# Patient Record
Sex: FEMALE | Race: WHITE | NOT HISPANIC OR LATINO | Employment: OTHER | URBAN - METROPOLITAN AREA
[De-identification: names, ages, dates, MRNs, and addresses within clinical notes are randomized per-mention and may not be internally consistent; named-entity substitution may affect disease eponyms.]

---

## 2017-01-18 ENCOUNTER — OFFICE VISIT (OUTPATIENT)
Dept: MEDICAL GROUP | Facility: MEDICAL CENTER | Age: 67
End: 2017-01-18
Attending: FAMILY MEDICINE
Payer: MEDICAID

## 2017-01-18 VITALS
RESPIRATION RATE: 20 BRPM | DIASTOLIC BLOOD PRESSURE: 80 MMHG | WEIGHT: 293 LBS | TEMPERATURE: 96.6 F | BODY MASS INDEX: 50.02 KG/M2 | OXYGEN SATURATION: 91 % | SYSTOLIC BLOOD PRESSURE: 129 MMHG | HEIGHT: 64 IN | HEART RATE: 84 BPM

## 2017-01-18 DIAGNOSIS — I10 ESSENTIAL HYPERTENSION: ICD-10-CM

## 2017-01-18 DIAGNOSIS — J43.9 PULMONARY EMPHYSEMA, UNSPECIFIED EMPHYSEMA TYPE (HCC): ICD-10-CM

## 2017-01-18 DIAGNOSIS — I82.403 DEEP VEIN THROMBOSIS (DVT) OF BOTH LOWER EXTREMITIES, UNSPECIFIED CHRONICITY, UNSPECIFIED VEIN (HCC): ICD-10-CM

## 2017-01-18 DIAGNOSIS — E03.9 HYPOTHYROIDISM, UNSPECIFIED TYPE: ICD-10-CM

## 2017-01-18 DIAGNOSIS — E88.810 METABOLIC SYNDROME: ICD-10-CM

## 2017-01-18 PROCEDURE — 99213 OFFICE O/P EST LOW 20 MIN: CPT | Performed by: FAMILY MEDICINE

## 2017-01-18 PROCEDURE — 99214 OFFICE O/P EST MOD 30 MIN: CPT | Performed by: FAMILY MEDICINE

## 2017-01-18 ASSESSMENT — ENCOUNTER SYMPTOMS
TINGLING: 1
HEADACHES: 0
SHORTNESS OF BREATH: 0
ABDOMINAL PAIN: 0
FEVER: 0
PALPITATIONS: 0
NAUSEA: 0
BACK PAIN: 1
CHILLS: 0
VOMITING: 0

## 2017-01-18 NOTE — MR AVS SNAPSHOT
"        Atiya Henley   2017 2:10 PM   Office Visit   MRN: [id number]    Department:  [facility]   Dept Phone:  [phone]    Description:  Female : 1950   Provider:  Carmelo Pat M.D.           Reason for Visit     Follow-Up           Allergies as of 2017     Allergen Noted Reactions    Pcn [Penicillins] 07/10/2014   Anaphylaxis, Rash    Reaction; happened as a child. Tolerated cephalosporins and Zosyn      You were diagnosed with     Metabolic syndrome   [350086]       Hypothyroidism, unspecified type   [1509548]       Essential hypertension   [7878701]       Pulmonary emphysema, unspecified emphysema type (CMS-HCC)   [9864345]       Deep vein thrombosis (DVT) of both lower extremities, unspecified chronicity, unspecified vein (CMS-HCC)   [1770666]         Vital Signs     Blood Pressure Pulse Temperature Respirations Height Weight    129/80 mmHg 84 35.9 °C (96.6 °F) 20 1.626 m (5' 4\") 136.079 kg (300 lb)    Body Mass Index Oxygen Saturation Last Menstrual Period Breastfeeding? Smoking Status       51.47 kg/m2 91% 1975 No Current Every Day Smoker       Basic Information     Date Of Birth Sex Race Ethnicity Preferred Language    1950 Female White Non- English      Your appointments     2017  3:30 PM   Established Patient with Carmelo Pat M.D.   [facility] ([facility])    [address] [place] [address] [id number] [phone]           You will be receiving a confirmation call a few days before your appointment from our automated call confirmation system.            May 22, 2017  3:00 PM   Pulmonary Function Test with PFT-RM3   [facility] (--)    [address] [facility] [address]  [facility] [address] [phone]            May 22, 2017  4:00 PM   Established Patient Pul with ALCIRA Chavez   [facility] (--)    [address] [facility] [address]  [facility] [address] [phone]              Problem " List              ICD-10-CM Priority Class Noted - Resolved    Morbid obesity (CMS-HCC) E66.01 Low  7/7/2009 - Present    YESSICA (obstructive sleep apnea) G47.33 Medium  7/7/2009 - Present    Hypothyroid E03.9 Medium  7/7/2009 - Present    Prediabetes R73.03   9/10/2009 - Present    HTN (hypertension) I10 Medium  9/10/2009 - Present    Anemia D64.9 Medium  3/22/2010 - Present    COPD (chronic obstructive pulmonary disease) (CMS-HCC) J44.9 Low  4/9/2010 - Present    DVT (deep venous thrombosis) (CMS-HCC) I82.409 Medium  4/10/2010 - Present    Colostomy care (CMS-HCC) Z43.3   5/17/2010 - Present    Anxiety F41.9   5/17/2010 - Present    Depression F32.9 Low  5/17/2010 - Present    Vitamin d deficiency    8/31/2011 - Present    Chronic autoimmune thyroiditis E06.3   10/2/2012 - Present    Chronic anticoagulation Z79.01   10/16/2012 - Present    Vitamin d deficiency    1/30/2013 - Present    Other pulmonary insufficiency, not elsewhere classified, following trauma and surgery J95.89 High  9/2/2013 - Present    COPD (chronic obstructive pulmonary disease) (CMS-HCC) J44.9 Medium  9/2/2013 - Present    Tobacco abuse Z72.0 Low  9/18/2013 - Present    S/P IVC filter Z95.828 Medium  9/18/2013 - Present    Bipolar disorder F31.9 Low  9/18/2013 - Present    History of hernia repair Z98.890, Z87.19   10/7/2013 - Present    Obesity E66.9   7/7/2009 - Present    GERD (gastroesophageal reflux disease) K21.9 Low  11/25/2013 - Present    Colostomy fistula (CMS-HCC) K94.09 High  12/3/2013 - Present    Thrombocytosis (CMS-HCC) D47.3 Medium  12/3/2013 - Present    Hypomagnesemia E83.42 Medium  12/4/2013 - Present    Hyponatremia E87.1 Medium  12/10/2013 - Present    Vitamin D deficiency E55.9   8/4/2015 - Present    Pain in joint, lower leg M25.569   8/21/2015 - Present    Deep vein thrombosis (DVT) (CMS-HCC) I82.409   11/11/2015 - Present    Cervical spondylosis M47.812   5/4/2016 - Present    DDD (degenerative disc disease), cervical  M50.30   5/4/2016 - Present    Chronic neck pain M54.2, G89.29   5/4/2016 - Present    Lumbosacral spondylosis M47.817   5/4/2016 - Present    DDD (degenerative disc disease), lumbar M51.36   5/4/2016 - Present    Lumbar radiculopathy M54.16   5/4/2016 - Present    Chronic low back pain M54.5, G89.29   5/4/2016 - Present    Left knee pain M25.562   5/4/2016 - Present    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, Z96.659   5/4/2016 - Present    Chronic pain syndrome G89.4   8/25/2016 - Present    Positive FIT (fecal immunochemical test) R19.5   9/27/2016 - Present    Chronic obstructive asthma (CMS-HCC) J44.9, J45.909   11/14/2016 - Present    Allergic rhinitis J30.9   11/14/2016 - Present    Hypoxemia R09.02   11/14/2016 - Present      Health Maintenance        Date Due Completion Dates    RETINAL SCREENING 11/2/1970 ---    IMM DTaP/Tdap/Td Vaccine (1 - Tdap) 11/2/1971 ---    PAP SMEAR 11/2/1973 ---    COLONOSCOPY 11/2/2002 ---    IMM ZOSTER VACCINE 11/2/2012 ---    A1C SCREENING 2/3/2016 8/3/2015, 7/10/2014, 6/20/2014, 11/29/2013, 5/17/2013, 8/31/2012, 7/16/2012, 3/30/2012, 7/19/2011, 5/7/2010, 10/4/2009    URINE ACR / MICROALBUMIN 8/3/2016 8/3/2015, 6/20/2014    MAMMOGRAM 6/29/2017 6/29/2016    FASTING LIPID PROFILE 8/30/2017 8/30/2016, 8/3/2015, 7/11/2014, 6/20/2014, 5/17/2013, 7/19/2011, 5/1/2009, 2/23/2009, 9/16/2008, 5/9/2008, 11/29/2007    SERUM CREATININE 8/30/2017 8/30/2016, 8/3/2015, 7/11/2014, 7/10/2014, 6/20/2014, 2/6/2014, 1/31/2014, 1/17/2014, 1/13/2014, 12/19/2013, 12/18/2013, 12/17/2013, 12/16/2013, 12/15/2013, 12/14/2013, 12/13/2013, 12/12/2013, 12/11/2013, 12/10/2013, 12/10/2013, 12/9/2013, 12/8/2013, 12/7/2013, 12/6/2013, 12/5/2013, 12/4/2013, 12/3/2013, 12/2/2013, 11/30/2013, 11/28/2013, 11/27/2013, 11/25/2013, 11/22/2013, 11/20/2013, 11/10/2013, 10/27/2013, 10/20/2013, 10/15/2013, 10/14/2013, 10/13/2013, 10/12/2013, 10/11/2013, 10/10/2013, 10/9/2013, 10/8/2013, 10/7/2013, 9/25/2013,  9/24/2013, 9/22/2013, 9/21/2013, 9/20/2013, 9/19/2013, 9/18/2013, 9/17/2013, 9/17/2013, 9/15/2013, 9/13/2013, 9/12/2013, 9/11/2013, 9/10/2013, 9/9/2013, 9/9/2013, 9/8/2013, 9/7/2013, 9/7/2013, 9/6/2013, 9/5/2013, 9/4/2013, 9/3/2013, 9/1/2013, 8/31/2013, 5/17/2013, 1/25/2013, 12/13/2012, 10/17/2012, 10/1/2012, 8/24/2012, 8/18/2012, 7/20/2012, 7/17/2012, 7/16/2012, 1/4/2012, 7/20/2011, 7/19/2011, 1/13/2011, 12/6/2010, 10/26/2010, 7/2/2010, 6/23/2010, 5/17/2010, 5/4/2010, 5/3/2010, 5/1/2010, 4/30/2010, 4/29/2010, 4/28/2010, 4/27/2010, 4/26/2010, 4/25/2010, 4/24/2010, 4/23/2010, 4/22/2010, 4/21/2010, 4/20/2010, 4/19/2010, 4/18/2010, 4/17/2010, 4/15/2010, 4/14/2010, 4/13/2010, 4/12/2010, 4/11/2010, 4/10/2010, 4/9/2010, 4/8/2010, 4/7/2010, 4/6/2010, 4/5/2010, 4/4/2010, 4/3/2010, 4/2/2010, 4/1/2010, 3/31/2010, 3/30/2010, 3/29/2010, 3/28/2010, 3/27/2010, 3/26/2010, 3/25/2010, 3/24/2010, 3/23/2010, 3/22/2010, 3/21/2010, 3/20/2010, 3/19/2010, 3/18/2010, 3/17/2010, 3/17/2010, 3/16/2010, 3/15/2010, 3/12/2010, 3/11/2010, 3/5/2010, 3/1/2010, 1/31/2010, 1/30/2010, 1/29/2010, 1/28/2010, 1/27/2010, 1/26/2010, 11/5/2009, 10/7/2009, 10/5/2009, 10/4/2009, 10/3/2009, 10/2/2009, 10/1/2009, 9/21/2009, 9/15/2009, 9/4/2009, 4/27/2009, 2/23/2009, 8/14/2008, 11/29/2007    DIABETES MONOFILAMENT / LE EXAM 12/6/2017 12/6/2016            Current Immunizations     Influenza TIV (IM) 9/3/2013  8:45 PM, 9/20/2011    Influenza Vaccine 12/1/2009    Influenza Vaccine Pediatric 11/11/2010    Influenza Vaccine Quad Inj (Pf) 11/20/2014  1:50 PM    Influenza Vaccine Quad Inj (Preserved) 11/14/2016  9:52 AM, 10/14/2015    Pneumococcal Vaccine (UF)Historical Data 12/1/2009    Pneumococcal polysaccharide vaccine (PPSV-23) 11/14/2016  9:53 AM      Below and/or attached are the medications your provider expects you to take. Review all of your home medications and newly ordered medications with your provider and/or pharmacist. Follow medication instructions as  directed by your provider and/or pharmacist. Please keep your medication list with you and share with your provider. Update the information when medications are discontinued, doses are changed, or new medications (including over-the-counter products) are added; and carry medication information at all times in the event of emergency situations     Allergies:  PCN - Anaphylaxis,Rash               Medications  Valid as of: January 18, 2017 -  3:16 PM    Generic Name Brand Name Tablet Size Instructions for use    Albuterol Sulfate (Aero Soln) albuterol 108 (90 BASE) MCG/ACT Inhale 2 Puffs by mouth every four hours as needed. For Shortness of breath        Amitriptyline HCl (Tab) ELAVIL 25 MG Take 25 mg by mouth every bedtime.        Ascorbic Acid (Tab) ascorbic acid 500 MG TAKE ONE [1] TABLET BY MOUTH EVERY DAY        Budesonide-Formoterol Fumarate (Aerosol) SYMBICORT 160-4.5 MCG/ACT Inhale 2 Puffs by mouth 2 Times a Day. With spacer, rinse mouth after use        CarBAMazepine (Tab) TEGRETOL 200 MG Take 3 Tabs by mouth every bedtime.        Ergocalciferol (Cap) DRISDOL 62624 UNITS TAKE 1 CAPSULE BY MOUTH EVERY 28 DAYS.        FLUoxetine HCl (Cap) PROZAC 20 MG Take 20 mg by mouth every day.        Flurazepam HCl (Cap) DALMANE 30 MG Take 30 mg by mouth at bedtime as needed.        Gabapentin (Cap) NEURONTIN 300 MG TAKE THREE (3) CAPSULES BY MOUTH THREE (3) TIMES DAILY         Incontinence Supply Disposable (Misc) DEPEND UNDERWEAR X-LARGE  1 Each by Does not apply route 3 times a day.        Ipratropium-Albuterol (Aero Soln) COMBIVENT RESPIMAT  MCG/ACT Inhale 1 puff by mouth every 4 hours as needed for shortness of breath and/or wheezing (max is 6 puffs per 24 hours)        Ipratropium-Albuterol (Aero Soln) COMBIVENT RESPIMAT  MCG/ACT Inhale 1 Puff by mouth 4 times a day.        Levothyroxine Sodium (Tab) SYNTHROID 125 MCG TAKE TWO [2] TABLETS BY MOUTH EVERY DAY         Levothyroxine Sodium (Tab) SYNTHROID 200  MCG Take 1 Tab by mouth Every morning on an empty stomach.        Levothyroxine Sodium (Tab) SYNTHROID 25 MCG Take 1 Tab by mouth Every morning on an empty stomach.        MetFORMIN HCl (Tab) GLUCOPHAGE 500 MG TAKE ONE [1] TABLET BY MOUTH EVERY DAY        Misc. Devices (Misc) Misc. Devices  Pt needs service for her PMD she will need a new battery to avoid stalling        Misc. Devices (Misc) Misc. Devices  CPAP supplies (hoses, filter, water chamber, face mask-resmed small), nasal cannula tubing        Misc. Devices (Misc) Misc. Devices  O2 to use continuously to keep her sats >90 %        Misc. Devices (Misc) Misc. Devices  Please evaluate pt for wider power mobility devise, pt has tipped over on multiple occasions        Misc. Devices (Misc) Misc. Devices  O2 tank rack for power wheel chair        Misc. Devices (Misc) Misc. Devices  Adult incontinence supplies to use as directed        Misc. Devices (Misc) Misc. Devices  Power mobility devise        Misc. Devices (Misc) Misc. Devices  O2 concentrator to use 24/7 and also with her CPAP at night to keep her O2 sats > 90%        Misc. Devices (Misc) Misc. Devices  Incontinence supplies (for wheel chair) and adult briefs to use as needed #120        Mometasone Furoate (Suspension) NASONEX 50 MCG/ACT Spray 1-2 Sprays in nose every day. Each nostril.        Ostomy Supplies (Misc) Ostomy Supplies  Dispense as ordered        RaNITidine HCl (Tab) ZANTAC 150 MG TAKE ONE (1) TABLET BY MOUTH TWICE DAILY WITH MEALS        Tiotropium Bromide Monohydrate (Aero Soln) Tiotropium Bromide Monohydrate 2.5 MCG/ACT Inhale 2.5 mcg by mouth every day at 6 PM.        Tiotropium Bromide Monohydrate (Aero Soln) Tiotropium Bromide Monohydrate 2.5 MCG/ACT Inhale 2 Puffs by mouth every day.        Warfarin Sodium (Tab) COUMADIN 10 MG Take one tablet daily as directed by coumadin clinic        Warfarin Sodium (Tab) COUMADIN 10 MG Take 1 to 1.5 tablets by mouth daily as directed by Coumadin  Clinic.        .                 Medicines prescribed today were sent to:     [id number] - [place], [place] - [address]    [address] [place] [address]    Phone: [phone] Fax: [phone]    Open 24 Hours?: No    [facility] - [place], [place] - [address]    [address] [place] [address]    Phone: [phone] Fax: [phone]    Open 24 Hours?: No      Medication refill instructions:       If your prescription bottle indicates you have medication refills left, it is not necessary to call your provider’s office. Please contact your pharmacy and they will refill your medication.    If your prescription bottle indicates you do not have any refills left, you may request refills at any time through one of the following ways: The online [employer] system (except Urgent Care), by calling your provider’s office, or by asking your pharmacy to contact your provider’s office with a refill request. Medication refills are processed only during regular business hours and may not be available until the next business day. Your provider may request additional information or to have a follow-up visit with you prior to refilling your medication.   *Please Note: Medication refills are assigned a new Rx number when refilled electronically. Your pharmacy may indicate that no refills were authorized even though a new prescription for the same medication is available at the pharmacy. Please request the medicine by name with the pharmacy before contacting your provider for a refill.        Your To Do List     Future Labs/Procedures Complete By Expires    CBC WITH DIFFERENTIAL  As directed 1/18/2018    COMP METABOLIC PANEL  As directed 1/18/2018    HEMOGLOBIN A1C  As directed 1/18/2018    LIPID PROFILE  As directed 1/18/2018    TSH WITH REFLEX TO FT4  As directed 1/18/2018         Organizerhart Status: Patient Declined

## 2017-01-18 NOTE — PROGRESS NOTES
Subjective:      Atiya Henley is a 66 y.o. female who presents with Follow-Up            HPI Comments: Pt here for follow up of her metabolic syndrome, hypothyroidism, positive FIT, COPD and YESSICA, and DVT.     Patient has been going to the Coumadin clinic for Coumadin management. Her last INR was 2.5. She has not been having any chest pain, shortness of breath, bruising or bleeding. Will have her continue to go to the Coumadin clinic for further management of her DVT. ER precautions have been given if she should develop any worsening symptoms.    Review the results of her recent blood work. Her blood sugars have been elevated but not to the point of diagnosing diabetes. Her A1c at last check was 5.9. She has a history of hypothyroidism and at her last TSH level was 2.5. We'll have her continue to use her medication as prescribed. She is not having any symptoms of hypo-or hyperthyroidism.  We'll have her continue to follow-up with her endocrinologist as directed. We'll continue to follow.    She has a history of a positive FIT screen, but has not been able to schedule an appointment with GI yet despite being referred. Patient has been advised to go to the emergency room if she should develop any sudden abdominal pain, unusual bloating, bleeding or other abdominal symptoms. We'll continue to follow.    We'll have her continue to use her inhalers as directed for her COPD. She has not had any recent exacerbations of her symptoms. She also uses a CPAP for her YESSICA symptoms. She follows up with pulmonology for additional management of her symptoms. Will continue to follow.     Current medications, allergies, and problem list reviewed with patient and updated in EPIC.        Review of Systems   Constitutional: Negative for fever and chills.   HENT: Negative for hearing loss.    Respiratory: Negative for shortness of breath.    Cardiovascular: Negative for chest pain and palpitations.   Gastrointestinal: Negative for  "nausea, vomiting and abdominal pain.   Musculoskeletal: Positive for back pain and joint pain.   Neurological: Positive for tingling. Negative for headaches.          Objective:     /80 mmHg  Pulse 84  Temp(Src) 35.9 °C (96.6 °F)  Resp 20  Ht 1.626 m (5' 4\")  Wt 136.079 kg (300 lb)  BMI 51.47 kg/m2  SpO2 91%  LMP 08/02/1975  Breastfeeding? No     Physical Exam   HENT:   Right Ear: External ear normal.   Left Ear: External ear normal.   Nose: Nose normal.   Mouth/Throat: Oropharynx is clear and moist.   Eyes: EOM are normal. Pupils are equal, round, and reactive to light.   Neck: Normal range of motion.   Cardiovascular: Normal rate, regular rhythm and normal heart sounds.  Exam reveals no friction rub.    No murmur heard.  Pulmonary/Chest: Breath sounds normal. No respiratory distress. She has no wheezes. She has no rales.   Abdominal: Soft. Bowel sounds are normal.   Musculoskeletal:   Decreased ROM of affected extremities using PMD   Neurological: She is alert.   Skin: Skin is warm and dry.   Psychiatric: Her behavior is normal.               Assessment/Plan:     1. Metabolic syndrome  Repeat labs to further evaluate her metabolic syndrome as well as her hypothyroidism. We'll have her continue to use her medications as directed. We'll continue to follow. Also discussed diet and exercise to help manage her metabolic issues as well as her elevated BMI.  - TSH WITH REFLEX TO FT4; Future  - COMP METABOLIC PANEL; Future  - CBC WITH DIFFERENTIAL; Future  - LIPID PROFILE; Future  - HEMOGLOBIN A1C; Future    2. Hypothyroidism, unspecified type  See above plan.  - TSH WITH REFLEX TO FT4; Future  - COMP METABOLIC PANEL; Future  - CBC WITH DIFFERENTIAL; Future  - LIPID PROFILE; Future  - HEMOGLOBIN A1C; Future    3. Essential hypertension  We'll have patient continue to take her medications as directed. She has been advised to monitor blood pressure at home and keep notes. If blood pressure elevated or having " symptoms of CP, SOB or neurologic changes to go to the er.    - TSH WITH REFLEX TO FT4; Future  - COMP METABOLIC PANEL; Future  - CBC WITH DIFFERENTIAL; Future  - LIPID PROFILE; Future  - HEMOGLOBIN A1C; Future    4. Pulmonary emphysema, unspecified emphysema type (CMS-HCC)  We'll have her continue to use her medications as directed. We'll also have her continue to follow-up with pulmonary as needed and will continue to follow.    5. Deep vein thrombosis (DVT) of both lower extremities, unspecified chronicity, unspecified vein (CMS-HCC)  We'll have patient continue to take her medications as directed. Her last INR was 2.5. She is not having any bleeding or bruising or chest pain or shortness of breath. We'll continue to follow.

## 2017-01-30 RX ORDER — LEVOTHYROXINE SODIUM 0.2 MG/1
TABLET ORAL
Qty: 30 TAB | Refills: 2 | Status: SHIPPED | OUTPATIENT
Start: 2017-01-30 | End: 2017-05-22 | Stop reason: SDUPTHER

## 2017-01-30 RX ORDER — RANITIDINE 150 MG/1
TABLET ORAL
Qty: 60 TAB | Refills: 5 | Status: SHIPPED | OUTPATIENT
Start: 2017-01-30 | End: 2017-08-14 | Stop reason: SDUPTHER

## 2017-01-30 RX ORDER — LEVOTHYROXINE SODIUM 0.03 MG/1
TABLET ORAL
Qty: 30 TAB | Refills: 2 | Status: SHIPPED | OUTPATIENT
Start: 2017-01-30 | End: 2017-05-22 | Stop reason: SDUPTHER

## 2017-02-27 ENCOUNTER — OFFICE VISIT (OUTPATIENT)
Dept: MEDICAL GROUP | Facility: MEDICAL CENTER | Age: 67
End: 2017-02-27
Attending: FAMILY MEDICINE
Payer: MEDICAID

## 2017-02-27 VITALS
HEART RATE: 60 BPM | HEIGHT: 64 IN | DIASTOLIC BLOOD PRESSURE: 80 MMHG | SYSTOLIC BLOOD PRESSURE: 128 MMHG | WEIGHT: 293 LBS | OXYGEN SATURATION: 92 % | TEMPERATURE: 97 F | RESPIRATION RATE: 16 BRPM | BODY MASS INDEX: 50.02 KG/M2

## 2017-02-27 DIAGNOSIS — M54.16 LUMBAR RADICULOPATHY: ICD-10-CM

## 2017-02-27 DIAGNOSIS — G89.29 CHRONIC NECK PAIN: ICD-10-CM

## 2017-02-27 DIAGNOSIS — M54.2 CHRONIC NECK PAIN: ICD-10-CM

## 2017-02-27 DIAGNOSIS — G89.4 CHRONIC PAIN SYNDROME: ICD-10-CM

## 2017-02-27 DIAGNOSIS — R49.9 HOARSENESS OR CHANGING VOICE: ICD-10-CM

## 2017-02-27 DIAGNOSIS — Z72.0 TOBACCO ABUSE: ICD-10-CM

## 2017-02-27 PROCEDURE — 99214 OFFICE O/P EST MOD 30 MIN: CPT | Performed by: FAMILY MEDICINE

## 2017-02-27 ASSESSMENT — ENCOUNTER SYMPTOMS
ABDOMINAL PAIN: 0
FOCAL WEAKNESS: 1
TINGLING: 1
FEVER: 0
SPEECH CHANGE: 0
SHORTNESS OF BREATH: 0
NAUSEA: 0
NECK PAIN: 1
CHILLS: 0
PALPITATIONS: 0
VOMITING: 0
TREMORS: 0
BACK PAIN: 1
SENSORY CHANGE: 0
HEADACHES: 0

## 2017-02-27 NOTE — PROGRESS NOTES
Subjective:      Atiya Henley is a 66 y.o. female who presents with No chief complaint on file.            HPI Comments: Patient here for follow-up of recent cataract and glaucoma surgery, chronic neck and back pain, and worsening hoarseness of her voice.    She recently had glaucoma and cataract surgery in both eyes. The procedure was done by Dr Piedra. Will request records for the procedure. She states she has noticed the difference in her vision. We'll continue to follow.    She has been seeing a pain specialist for the treatments of her chronic leg, back and neck pain. She states [place] [facility] and then was seen Dr. Galan but is currently not seeing any specialist since Dr Galan is no longer at the office she was being seen at.  Will have pt referred back to pain management for a further evaluation and management of her chronic back and neck pain. She is currently using a power mobility device for mobility purposes. We'll continue to follow.    She is also been seeing psychiatry for management of her mood stabilizing medications. She is not having any issues with urges to harm herself or others. She does have occasional bouts of anxiety for which she hasn't had significant medication which has been using. We'll have her continue using her medications as directed by her psychiatrist. She has any sudden worsening of her signs and symptoms will have her go to the emergency room for further assistance in management. We'll continue to follow.    She has recently noticed that her voice has been becoming a increasingly hoarse for no apparent reasons. She is still smoking at least half a pack a day of cigarettes. Discussed the possibility that the hoarseness of her voice could be secondary to her cigarette smoking, but will order a soft tissue neck ultrasound to further evaluate her neck for other possible reasons for the hoarseness of her voice. Patient has been  "encouraged to stop smoking so we will have her try nicotine gum since she was unable to tolerate the nicotine patches. Also recommended attempting smoking cessation programs in the community. We'll continue to follow.    She will also need to get her blood work done that have been ordered at her last appointment. We'll review her blood work once it started at time of her next appointment. We'll continue to follow.     Current medications, allergies, and problem list reviewed with patient and updated in EPIC.        Review of Systems   Constitutional: Negative for fever and chills.   HENT: Negative for hearing loss.         Hoarse voice   Respiratory: Negative for shortness of breath.    Cardiovascular: Negative for chest pain and palpitations.   Gastrointestinal: Negative for nausea, vomiting and abdominal pain.   Musculoskeletal: Positive for back pain, joint pain and neck pain.   Neurological: Positive for tingling and focal weakness. Negative for tremors, sensory change, speech change and headaches.          Objective:     [facility] 08/[birth year]/1975   Filed Vitals:    02/27/17 1530   BP: 128/80   Pulse: 60   Temp: 36.1 °C (97 °F)   Resp: 16   Height: 1.626 m (5' 4.02\")   Weight: 136.079 kg (300 lb)   SpO2: 92%         Physical Exam   HENT:   Right Ear: External ear normal.   Left Ear: External ear normal.   Congestion, hoarseness of her voice   Eyes: EOM are normal. Pupils are equal, round, and reactive to light.   Neck: Normal range of motion.   Cardiovascular: Normal rate, regular rhythm and normal heart sounds.  Exam reveals no friction rub.    No murmur heard.  Pulmonary/Chest: Breath sounds normal. No respiratory distress. She has no wheezes. She has no rales.   Coarse breath sounds    Abdominal: Soft. Bowel sounds are normal.   Musculoskeletal:   In power wheelchair   Neurological: She is alert.   Skin: Skin is warm and dry.   Psychiatric: Her behavior is normal.               Assessment/Plan:     1. Chronic neck " pain  We'll have her continue to use the medication she has been for her pain management until she is further evaluated and managed by her next pain management specialist. She will also continue to use her power mobility device for mobility purposes. We'll continue to follow.  - REFERRAL TO PAIN CLINIC    2. Lumbar radiculopathy  See above plan.  - REFERRAL TO PAIN CLINIC    3. Chronic pain syndrome  See above plan.  - REFERRAL TO PAIN CLINIC    4. Tobacco abuse  We'll have her try nicotine gum as directed. Also recommended attempting smoking cessation classes in the community. We'll continue to follow.  - nicotine polacrilex (NICORETTE) 2 MG Gum; Take 1 Each by mouth as needed for Smoking Cessation.  Dispense: 60 Each; Refill: 3    5. Hoarseness or changing voice  We'll order a ultrasound of her neck to further evaluate the reason for her worsening hoarseness of her voice. Also recommended attempting to stop smoking. If there are any changes on the ultrasound or if her 40s continues to change with all changes on the ultrasound we'll refer to ear nose and throat for a further evaluation and possible laryngoscopy to further evaluate. We'll continue to follow.  - US-SOFT TISSUES OF HEAD - NECK; Future

## 2017-02-27 NOTE — MR AVS SNAPSHOT
"Atiya Henley   2017 3:30 PM   Office Visit   MRN: [id number]    Department:  [facility]   Dept Phone:  [phone]    Description:  Female : 1950   Provider:  Carmelo Pat M.D.           Reason for Visit     Medical Advice           Allergies as of 2017     Allergen Noted Reactions    Pcn [Penicillins] 07/10/2014   Anaphylaxis, Rash    Reaction; happened as a child. Tolerated cephalosporins and Zosyn      You were diagnosed with     Chronic neck pain   [118530]       Lumbar radiculopathy   [422458]       Chronic pain syndrome   [338.4.ICD-9-CM]       Tobacco abuse   [701558]       Hoarseness or changing voice   [791991]         Vital Signs     Blood Pressure Pulse Temperature Respirations Height Weight    128/80 mmHg 60 36.1 °C (97 °F) 16 1.626 m (5' 4.02\") 136.079 kg (300 lb)    Body Mass Index Oxygen Saturation Last Menstrual Period Smoking Status          51.47 kg/m2 92% 1975 Current Every Day Smoker        Basic Information     Date Of Birth Sex Race Ethnicity Preferred Language    1950 Female White Non- English      Your appointments     Apr 10, 2017  3:30 PM   Established Patient with Carmelo Pat M.D.   [facility] ([facility])    [address] [place] [id number] [phone]           You will be receiving a confirmation call a few days before your appointment from our automated call confirmation system.            May 22, 2017  3:00 PM   Pulmonary Function Test with PFT-RM3   [facility] (--)    [address] [facility] [address]  [facility] [id number] [phone]            May 22, 2017  4:00 PM   Established Patient Pul with ALCIRA Chavez   [facility] (--)    [address] [facility] [address]  [facility] [address] [phone]              Problem List              ICD-10-CM Priority Class Noted - Resolved    Morbid obesity (CMS-HCC) E66.01 Low  2009 - Present    YESSICA (obstructive " sleep apnea) G47.33 Medium  7/7/2009 - Present    Hypothyroid E03.9 Medium  7/7/2009 - Present    Prediabetes R73.03   9/10/2009 - Present    HTN (hypertension) I10 Medium  9/10/2009 - Present    Anemia D64.9 Medium  3/22/2010 - Present    COPD (chronic obstructive pulmonary disease) (CMS-HCC) J44.9 Low  4/9/2010 - Present    DVT (deep venous thrombosis) (CMS-HCC) I82.409 Medium  4/10/2010 - Present    Colostomy care (CMS-HCC) Z43.3   5/17/2010 - Present    Anxiety F41.9   5/17/2010 - Present    Depression F32.9 Low  5/17/2010 - Present    Vitamin d deficiency    8/31/2011 - Present    Chronic autoimmune thyroiditis E06.3   10/2/2012 - Present    Chronic anticoagulation Z79.01   10/16/2012 - Present    Vitamin d deficiency    1/30/2013 - Present    Other pulmonary insufficiency, not elsewhere classified, following trauma and surgery J95.89 High  9/2/2013 - Present    COPD (chronic obstructive pulmonary disease) (CMS-HCC) J44.9 Medium  9/2/2013 - Present    Tobacco abuse Z72.0 Low  9/18/2013 - Present    S/P IVC filter Z95.828 Medium  9/18/2013 - Present    Bipolar disorder F31.9 Low  9/18/2013 - Present    History of hernia repair Z98.890, Z87.19   10/7/2013 - Present    Obesity E66.9   7/7/2009 - Present    GERD (gastroesophageal reflux disease) K21.9 Low  11/25/2013 - Present    Colostomy fistula (CMS-HCC) K94.09 High  12/3/2013 - Present    Thrombocytosis (CMS-HCC) D47.3 Medium  12/3/2013 - Present    Hypomagnesemia E83.42 Medium  12/4/2013 - Present    Hyponatremia E87.1 Medium  12/10/2013 - Present    Vitamin D deficiency E55.9   8/4/2015 - Present    Pain in joint, lower leg M25.569   8/21/2015 - Present    Deep vein thrombosis (DVT) (CMS-HCC) I82.409   11/11/2015 - Present    Cervical spondylosis M47.812   5/4/2016 - Present    DDD (degenerative disc disease), cervical M50.30   5/4/2016 - Present    Chronic neck pain M54.2, G89.29   5/4/2016 - Present    Lumbosacral spondylosis M47.817   5/4/2016 - Present     DDD (degenerative disc disease), lumbar M51.36   5/4/2016 - Present    Lumbar radiculopathy M54.16   5/4/2016 - Present    Chronic low back pain M54.5, G89.29   5/4/2016 - Present    Left knee pain M25.562   5/4/2016 - Present    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, Z96.659   5/4/2016 - Present    Chronic pain syndrome G89.4   8/25/2016 - Present    Positive FIT (fecal immunochemical test) R19.5   9/27/2016 - Present    Chronic obstructive asthma (CMS-HCC) J44.9, J45.909   11/14/2016 - Present    Allergic rhinitis J30.9   11/14/2016 - Present    Hypoxemia R09.02   11/14/2016 - Present      Health Maintenance        Date Due Completion Dates    RETINAL SCREENING 11/2/1970 ---    IMM DTaP/Tdap/Td Vaccine (1 - Tdap) 11/2/1971 ---    PAP SMEAR 11/2/1973 ---    IMM ZOSTER VACCINE 11/2/2012 ---    A1C SCREENING 2/3/2016 8/3/2015, 7/10/2014, 6/20/2014, 11/29/2013, 5/17/2013, 8/31/2012, 7/16/2012, 3/30/2012, 7/19/2011, 5/7/2010, 10/4/2009    URINE ACR / MICROALBUMIN 8/3/2016 8/3/2015, 6/20/2014    MAMMOGRAM 6/29/2017 6/29/2016    FASTING LIPID PROFILE 8/30/2017 8/30/2016, 8/3/2015, 7/11/2014, 6/20/2014, 5/17/2013, 7/19/2011, 5/1/2009, 2/23/2009, 9/16/2008, 5/9/2008, 11/29/2007    SERUM CREATININE 8/30/2017 8/30/2016, 8/3/2015, 7/11/2014, 7/10/2014, 6/20/2014, 2/6/2014, 1/31/2014, 1/17/2014, 1/13/2014, 12/19/2013, 12/18/2013, 12/17/2013, 12/16/2013, 12/15/2013, 12/14/2013, 12/13/2013, 12/12/2013, 12/11/2013, 12/10/2013, 12/10/2013, 12/9/2013, 12/8/2013, 12/7/2013, 12/6/2013, 12/5/2013, 12/4/2013, 12/3/2013, 12/2/2013, 11/30/2013, 11/28/2013, 11/27/2013, 11/25/2013, 11/22/2013, 11/20/2013, 11/10/2013, 10/27/2013, 10/20/2013, 10/15/2013, 10/14/2013, 10/13/2013, 10/12/2013, 10/11/2013, 10/10/2013, 10/9/2013, 10/8/2013, 10/7/2013, 9/25/2013, 9/24/2013, 9/22/2013, 9/21/2013, 9/20/2013, 9/19/2013, 9/18/2013, 9/17/2013, 9/17/2013, 9/15/2013, 9/13/2013, 9/12/2013, 9/11/2013, 9/10/2013, 9/9/2013, 9/9/2013, 9/8/2013,  9/7/2013, 9/7/2013, 9/6/2013, 9/5/2013, 9/4/2013, 9/3/2013, 9/1/2013, 8/31/2013, 5/17/2013, 1/25/2013, 12/13/2012, 10/17/2012, 10/1/2012, 8/24/2012, 8/18/2012, 7/20/2012, 7/17/2012, 7/16/2012, 1/4/2012, 7/20/2011, 7/19/2011, 1/13/2011, 12/6/2010, 10/26/2010, 7/2/2010, 6/23/2010, 5/17/2010, 5/4/2010, 5/3/2010, 5/1/2010, 4/30/2010, 4/29/2010, 4/28/2010, 4/27/2010, 4/26/2010, 4/25/2010, 4/24/2010, 4/23/2010, 4/22/2010, 4/21/2010, 4/20/2010, 4/19/2010, 4/18/2010, 4/17/2010, 4/15/2010, 4/14/2010, 4/13/2010, 4/12/2010, 4/11/2010, 4/10/2010, 4/9/2010, 4/8/2010, 4/7/2010, 4/6/2010, 4/5/2010, 4/4/2010, 4/3/2010, 4/2/2010, 4/1/2010, 3/31/2010, 3/30/2010, 3/29/2010, 3/28/2010, 3/27/2010, 3/26/2010, 3/25/2010, 3/24/2010, 3/23/2010, 3/22/2010, 3/21/2010, 3/20/2010, 3/19/2010, 3/18/2010, 3/17/2010, 3/17/2010, 3/16/2010, 3/15/2010, 3/12/2010, 3/11/2010, 3/5/2010, 3/1/2010, 1/31/2010, 1/30/2010, 1/29/2010, 1/28/2010, 1/27/2010, 1/26/2010, 11/5/2009, 10/7/2009, 10/5/2009, 10/4/2009, 10/3/2009, 10/2/2009, 10/1/2009, 9/21/2009, 9/15/2009, 9/4/2009, 4/27/2009, 2/23/2009, 8/14/2008, 11/29/2007    DIABETES MONOFILAMENT / LE EXAM 12/6/2017 12/6/2016    COLONOSCOPY 11/17/2020 11/17/2010            Current Immunizations     Influenza TIV (IM) 9/3/2013  8:45 PM, 9/20/2011    Influenza Vaccine 12/1/2009    Influenza Vaccine Pediatric 11/11/2010    Influenza Vaccine Quad Inj (Pf) 11/20/2014  1:50 PM    Influenza Vaccine Quad Inj (Preserved) 11/14/2016  9:52 AM, 10/14/2015    Pneumococcal Vaccine (UF)Historical Data 12/1/2009    Pneumococcal polysaccharide vaccine (PPSV-23) 11/14/2016  9:53 AM      Below and/or attached are the medications your provider expects you to take. Review all of your home medications and newly ordered medications with your provider and/or pharmacist. Follow medication instructions as directed by your provider and/or pharmacist. Please keep your medication list with you and share with your provider. Update the  information when medications are discontinued, doses are changed, or new medications (including over-the-counter products) are added; and carry medication information at all times in the event of emergency situations     Allergies:  PCN - Anaphylaxis,Rash               Medications  Valid as of: February 27, 2017 -  3:44 PM    Generic Name Brand Name Tablet Size Instructions for use    Albuterol Sulfate (Aero Soln) albuterol 108 (90 BASE) MCG/ACT Inhale 2 Puffs by mouth every four hours as needed. For Shortness of breath        Amitriptyline HCl (Tab) ELAVIL 25 MG Take 25 mg by mouth every bedtime.        Ascorbic Acid (Tab) ascorbic acid 500 MG TAKE ONE [1] TABLET BY MOUTH EVERY DAY        Budesonide-Formoterol Fumarate (Aerosol) SYMBICORT 160-4.5 MCG/ACT Inhale 2 Puffs by mouth 2 Times a Day. With spacer, rinse mouth after use        CarBAMazepine (Tab) TEGRETOL 200 MG Take 3 Tabs by mouth every bedtime.        Ergocalciferol (Cap) DRISDOL 61280 UNITS TAKE 1 CAPSULE BY MOUTH EVERY 28 DAYS.        FLUoxetine HCl (Cap) PROZAC 20 MG Take 20 mg by mouth every day.        Flurazepam HCl (Cap) DALMANE 30 MG Take 30 mg by mouth at bedtime as needed.        Gabapentin (Cap) NEURONTIN 300 MG TAKE THREE (3) CAPSULES BY MOUTH THREE (3) TIMES DAILY         Incontinence Supply Disposable (Misc) DEPEND UNDERWEAR X-LARGE  1 Each by Does not apply route 3 times a day.        Ipratropium-Albuterol (Aero Soln) COMBIVENT RESPIMAT  MCG/ACT Inhale 1 puff by mouth every 4 hours as needed for shortness of breath and/or wheezing (max is 6 puffs per 24 hours)        Ipratropium-Albuterol (Aero Soln) COMBIVENT RESPIMAT  MCG/ACT Inhale 1 Puff by mouth 4 times a day.        Levothyroxine Sodium (Tab) SYNTHROID 125 MCG TAKE TWO [2] TABLETS BY MOUTH EVERY DAY         Levothyroxine Sodium (Tab) SYNTHROID 200 MCG TAKE ONE (1) TABLET BY MOUTH EVERY MORNING ON AN EMPTY STOMACH.        Levothyroxine Sodium (Tab) SYNTHROID 25 MCG TAKE ONE  (1) TABLET BY MOUTH EVERY MORNING ON AN EMPTY STOMACH. TO USE ALONG WITH 200 MCG FOR TOTAL  MCG.        MetFORMIN HCl (Tab) GLUCOPHAGE 500 MG TAKE ONE [1] TABLET BY MOUTH EVERY DAY        Misc. Devices (Misc) Misc. Devices  Pt needs service for her PMD she will need a new battery to avoid stalling        Misc. Devices (Misc) Misc. Devices  CPAP supplies (hoses, filter, water chamber, face mask-resmed small), nasal cannula tubing        Misc. Devices (Misc) Misc. Devices  O2 to use continuously to keep her sats >90 %        Misc. Devices (Misc) Misc. Devices  Please evaluate pt for wider power mobility devise, pt has tipped over on multiple occasions        Misc. Devices (Misc) Misc. Devices  O2 tank rack for power wheel chair        Misc. Devices (Misc) Misc. Devices  Adult incontinence supplies to use as directed        Misc. Devices (Misc) Misc. Devices  Power mobility devise        Misc. Devices (Misc) Misc. Devices  O2 concentrator to use 24/7 and also with her CPAP at night to keep her O2 sats > 90%        Misc. Devices (Misc) Misc. Devices  Incontinence supplies (for wheel chair) and adult briefs to use as needed #120        Mometasone Furoate (Suspension) NASONEX 50 MCG/ACT Spray 1-2 Sprays in nose every day. Each nostril.        Nicotine Polacrilex (Gum) NICORETTE 2 MG Take 1 Each by mouth as needed for Smoking Cessation.        Ostomy Supplies (Misc) Ostomy Supplies  Dispense as ordered        RaNITidine HCl (Tab) ZANTAC 150 MG TAKE ONE (1) TABLET BY MOUTH TWICE DAILY WITH MEALS        Tiotropium Bromide Monohydrate (Aero Soln) Tiotropium Bromide Monohydrate 2.5 MCG/ACT Inhale 2.5 mcg by mouth every day at 6 PM.        Tiotropium Bromide Monohydrate (Aero Soln) Tiotropium Bromide Monohydrate 2.5 MCG/ACT Inhale 2 Puffs by mouth every day.        Warfarin Sodium (Tab) COUMADIN 10 MG Take one tablet daily as directed by coumadin clinic        Warfarin Sodium (Tab) COUMADIN 10 MG Take 1 to 1.5 tablets by  mouth daily as directed by Coumadin Clinic.        .                 Medicines prescribed today were sent to:     [id number] - [place], [place] - [address]    [address] [place] [address]    Phone: [phone] Fax: [phone]    Open 24 Hours?: No    [facility] - [place], [place] - [address] [facility] [address]    [address] [facility] [facility] [place] [address]    Phone: [phone] Fax: [phone]    Open 24 Hours?: No      Medication refill instructions:       If your prescription bottle indicates you have medication refills left, it is not necessary to call your provider’s office. Please contact your pharmacy and they will refill your medication.    If your prescription bottle indicates you do not have any refills left, you may request refills at any time through one of the following ways: The online [employer] system (except Urgent Care), by calling your provider’s office, or by asking your pharmacy to contact your provider’s office with a refill request. Medication refills are processed only during regular business hours and may not be available until the next business day. Your provider may request additional information or to have a follow-up visit with you prior to refilling your medication.   *Please Note: Medication refills are assigned a new Rx number when refilled electronically. Your pharmacy may indicate that no refills were authorized even though a new prescription for the same medication is available at the pharmacy. Please request the medicine by name with the pharmacy before contacting your provider for a refill.        Your To Do List     Future Labs/Procedures Complete By Expires    US-SOFT TISSUES OF HEAD - NECK  As directed 2/27/2018      Referral     A referral request has been sent to our patient care coordination department. Please allow 3-5 business days for us to process this request and contact you either by phone or mail. If you do not hear from us by the 5th business day, please  call us at [phone].           MyChart Status: Patient Declined

## 2017-03-03 DIAGNOSIS — I82.409 ACUTE DEEP VEIN THROMBOSIS (DVT) OF OTHER VEIN OF LOWER EXTREMITY: ICD-10-CM

## 2017-03-06 ENCOUNTER — TELEPHONE (OUTPATIENT)
Dept: VASCULAR LAB | Facility: MEDICAL CENTER | Age: 67
End: 2017-03-06

## 2017-03-06 NOTE — TELEPHONE ENCOUNTER
[facility]  Left a voicemail to remind pt to obtain next INR.     [place], PHARMD

## 2017-03-20 ENCOUNTER — HOSPITAL ENCOUNTER (OUTPATIENT)
Dept: LAB | Facility: MEDICAL CENTER | Age: 67
End: 2017-03-20
Attending: FAMILY MEDICINE
Payer: MEDICAID

## 2017-03-20 ENCOUNTER — ANTICOAGULATION VISIT (OUTPATIENT)
Dept: VASCULAR LAB | Facility: MEDICAL CENTER | Age: 67
End: 2017-03-20
Attending: INTERNAL MEDICINE
Payer: MEDICAID

## 2017-03-20 ENCOUNTER — HOSPITAL ENCOUNTER (OUTPATIENT)
Dept: RADIOLOGY | Facility: MEDICAL CENTER | Age: 67
End: 2017-03-20
Attending: FAMILY MEDICINE
Payer: MEDICAID

## 2017-03-20 DIAGNOSIS — E11.8 DM (DIABETES MELLITUS) WITH COMPLICATIONS (HCC): ICD-10-CM

## 2017-03-20 DIAGNOSIS — I82.401 ACUTE DEEP VEIN THROMBOSIS (DVT) OF RIGHT LOWER EXTREMITY, UNSPECIFIED VEIN (HCC): ICD-10-CM

## 2017-03-20 DIAGNOSIS — Z79.01 CHRONIC ANTICOAGULATION: ICD-10-CM

## 2017-03-20 DIAGNOSIS — Z95.828 S/P IVC FILTER: ICD-10-CM

## 2017-03-20 DIAGNOSIS — E88.810 METABOLIC SYNDROME: ICD-10-CM

## 2017-03-20 DIAGNOSIS — E03.9 HYPOTHYROIDISM, UNSPECIFIED TYPE: ICD-10-CM

## 2017-03-20 DIAGNOSIS — I10 ESSENTIAL HYPERTENSION: ICD-10-CM

## 2017-03-20 DIAGNOSIS — R49.9 HOARSENESS OR CHANGING VOICE: ICD-10-CM

## 2017-03-20 LAB
ALBUMIN SERPL BCP-MCNC: 3.7 G/DL (ref 3.2–4.9)
ALBUMIN/GLOB SERPL: 1 G/DL
ALP SERPL-CCNC: 84 U/L (ref 30–99)
ALT SERPL-CCNC: 12 U/L (ref 2–50)
ANION GAP SERPL CALC-SCNC: 9 MMOL/L (ref 0–11.9)
AST SERPL-CCNC: 20 U/L (ref 12–45)
BASOPHILS # BLD AUTO: 0.03 K/UL (ref 0–0.12)
BASOPHILS NFR BLD AUTO: 0.4 % (ref 0–1.8)
BILIRUB SERPL-MCNC: 0.3 MG/DL (ref 0.1–1.5)
BUN SERPL-MCNC: 10 MG/DL (ref 8–22)
CALCIUM SERPL-MCNC: 8.9 MG/DL (ref 8.5–10.5)
CHLORIDE SERPL-SCNC: 104 MMOL/L (ref 96–112)
CHOLEST SERPL-MCNC: 207 MG/DL (ref 100–199)
CO2 SERPL-SCNC: 25 MMOL/L (ref 20–33)
CREAT SERPL-MCNC: 0.65 MG/DL (ref 0.5–1.4)
CREAT UR-MCNC: 190 MG/DL
EOSINOPHIL # BLD: 0.19 K/UL (ref 0–0.51)
EOSINOPHIL NFR BLD AUTO: 2.8 % (ref 0–6.9)
ERYTHROCYTE [DISTWIDTH] IN BLOOD BY AUTOMATED COUNT: 55.8 FL (ref 35.9–50)
EST. AVERAGE GLUCOSE BLD GHB EST-MCNC: 114 MG/DL
GLOBULIN SER CALC-MCNC: 3.8 G/DL (ref 1.9–3.5)
GLUCOSE SERPL-MCNC: 102 MG/DL (ref 65–99)
HBA1C MFR BLD: 5.6 % (ref 0–5.6)
HCT VFR BLD AUTO: 44.2 % (ref 37–47)
HDLC SERPL-MCNC: 57 MG/DL
HGB BLD-MCNC: 14.3 G/DL (ref 12–16)
IMM GRANULOCYTES # BLD AUTO: 0.01 K/UL (ref 0–0.11)
IMM GRANULOCYTES NFR BLD AUTO: 0.1 % (ref 0–0.9)
INR PPP: 2.1 (ref 2–3.5)
LDLC SERPL CALC-MCNC: 119 MG/DL
LYMPHOCYTES # BLD: 1.81 K/UL (ref 1–4.8)
LYMPHOCYTES NFR BLD AUTO: 26.9 % (ref 22–41)
MCH RBC QN AUTO: 32.4 PG (ref 27–33)
MCHC RBC AUTO-ENTMCNC: 32.4 G/DL (ref 33.6–35)
MCV RBC AUTO: 100.2 FL (ref 81.4–97.8)
MICROALBUMIN UR-MCNC: 1.3 MG/DL
MICROALBUMIN/CREAT UR: 7 MG/G (ref 0–30)
MONOCYTES # BLD: 0.43 K/UL (ref 0–0.85)
MONOCYTES NFR BLD AUTO: 6.4 % (ref 0–13.4)
NEUTROPHILS # BLD: 4.27 K/UL (ref 2–7.15)
NEUTROPHILS NFR BLD AUTO: 63.4 % (ref 44–72)
NRBC # BLD AUTO: 0 K/UL
NRBC BLD-RTO: 0 /100 WBC
PLATELET # BLD AUTO: 229 K/UL (ref 164–446)
PMV BLD AUTO: 11.1 FL (ref 9–12.9)
POTASSIUM SERPL-SCNC: 3.7 MMOL/L (ref 3.6–5.5)
PROT SERPL-MCNC: 7.5 G/DL (ref 6–8.2)
RBC # BLD AUTO: 4.41 M/UL (ref 4.2–5.4)
SODIUM SERPL-SCNC: 138 MMOL/L (ref 135–145)
TRIGL SERPL-MCNC: 154 MG/DL (ref 0–149)
TSH SERPL DL<=0.005 MIU/L-ACNC: 0.61 UIU/ML (ref 0.3–3.7)
WBC # BLD AUTO: 6.7 K/UL (ref 4.8–10.8)

## 2017-03-20 PROCEDURE — 85610 PROTHROMBIN TIME: CPT

## 2017-03-20 PROCEDURE — 99212 OFFICE O/P EST SF 10 MIN: CPT | Performed by: NURSE PRACTITIONER

## 2017-03-20 NOTE — PROGRESS NOTES
Anticoagulation Summary as of 3/20/2017     INR goal 2.0-3.0   Selected INR 2.1 (3/20/2017)   Maintenance plan 10 mg (10 mg x 1) every day   Weekly total 70 mg   Plan last modified PRIYA AlcarazPCHU (3/20/2017)   Next INR check 4/17/2017   Priority Routine   Target end date Indefinite    Indications   DVT (deep venous thrombosis) (CMS-HCC) [I82.409]  Chronic anticoagulation [Z79.01]  S/P IVC filter [Z95.828]  Deep vein thrombosis (DVT) (CMS-[facility]) [I82.409]         Anticoagulation Episode Summary     INR check location [facility]    Preferred lab [facility]    Send INR reminders to     Comments       Anticoagulation Care Providers     Provider Role Specialty Phone number    Carmelo Pat M.D. Referring Family Medicine [phone]    [facility] Responsible  [phone]        Patient is therapeutic today. Last INR in September. She is taking 10 mg daily. Denies any medication or diet changes. No current symptoms of bleeding or thrombosis reported. Continue current regimen. Follow up in 4 weeks.    Next Appointment: Monday, April 17, 2017 at 1:15 pm.      Bri APODACA

## 2017-03-20 NOTE — MR AVS SNAPSHOT
Atiya Henley   3/20/2017 1:45 PM   Anticoagulation Visit   MRN: [id number]    Department:  Vascular Medicine   Dept Phone:  [phone]    Description:  Female : 1950   Provider:  [facility] EXAM 5           Allergies as of 3/20/2017     Allergen Noted Reactions    Pcn [Penicillins] 07/10/2014   Anaphylaxis, Rash    Reaction; happened as a child. Tolerated cephalosporins and Zosyn      You were diagnosed with     S/P IVC filter   [148530]       Acute deep vein thrombosis (DVT) of right lower extremity, unspecified vein (CMS-HCC)   [1257755]       Chronic anticoagulation   [730832]         Vital Signs     Last Menstrual Period Smoking Status                1975 Current Every Day Smoker          Basic Information     Date Of Birth Sex Race Ethnicity Preferred Language    1950 Female White Non- English      Your appointments     Mar 20, 2017  1:00 PM   US [id number] with [address] US 1   [facility] - ULTRASOUND - [address]    [address]  [facility] [id number] [phone]            Mar 20, 2017  1:45 PM   Established Patient with V EXAM 5   [facility]  (--)    [address] [place] [address]   [phone]            Apr 10, 2017  3:30 PM   Established Patient with Carmelo Pat M.D.   [facility] (Healthcare Center)    [address] [facility] [id number] [phone]           You will be receiving a confirmation call a few days before your appointment from our automated call confirmation system.            2017  1:15 PM   Established Patient with IHV EXAM 5   [facility]  (--)    [address] [place] [address]   [phone]            May 22, 2017  3:00 PM   Pulmonary Function Test with PFT-RM3   [facility] (--)    [address]  [address]  [place] [id number] [phone]            May 22, 2017   4:00 PM   Established Patient Pul with ALCIRA Chavez   [facility] (--)    [address]  [address]  [place] [id number] [phone]            Jun 30, 2017  8:30 AM   New Patient with Choco Smith M.D.   [facility] (--)    [address]  [place] [id number] [phone]           Please bring Photo ID, Insurance Cards, All Medication Bottles and copies of any legal documents (such as Living Will, Power of ) If speaking a language besides English please bring an adult . Please arrive 30 minutes prior for check in and registration. You will be receiving a confirmation call a few days before your appointment from our automated call confirmation system.              Problem List              ICD-10-CM Priority Class Noted - Resolved    Morbid obesity (CMS-HCC) E66.01 Low  7/7/2009 - Present    YESSICA (obstructive sleep apnea) G47.33 Medium  7/7/2009 - Present    Hypothyroid E03.9 Medium  7/7/2009 - Present    Prediabetes R73.03   9/10/2009 - Present    HTN (hypertension) I10 Medium  9/10/2009 - Present    Anemia D64.9 Medium  3/22/2010 - Present    COPD (chronic obstructive pulmonary disease) (CMS-HCC) J44.9 Low  4/9/2010 - Present    DVT (deep venous thrombosis) (CMS-HCC) I82.409 Medium  4/10/2010 - Present    Colostomy care (CMS-HCC) Z43.3   5/17/2010 - Present    Anxiety F41.9   5/17/2010 - Present    Depression F32.9 Low  5/17/2010 - Present    Vitamin d deficiency    8/31/2011 - Present    Chronic autoimmune thyroiditis E06.3   10/2/2012 - Present    Chronic anticoagulation Z79.01   10/16/2012 - Present    Vitamin d deficiency    1/30/2013 - Present    Other pulmonary insufficiency, not elsewhere classified, following trauma and surgery J95.89 High  9/2/2013 - Present    COPD (chronic obstructive pulmonary disease) (CMS-HCC) J44.9 Medium  9/2/2013 - Present    Tobacco abuse Z72.0 Low  9/18/2013 - Present    S/P IVC  filter Z95.828 Medium  9/18/2013 - Present    Bipolar disorder F31.9 Low  9/18/2013 - Present    History of hernia repair Z98.890, Z87.19   10/7/2013 - Present    Obesity E66.9   7/7/2009 - Present    GERD (gastroesophageal reflux disease) K21.9 Low  11/25/2013 - Present    Colostomy fistula (CMS-HCC) K94.09 High  12/3/2013 - Present    Thrombocytosis (CMS-HCC) D47.3 Medium  12/3/2013 - Present    Hypomagnesemia E83.42 Medium  12/4/2013 - Present    Hyponatremia E87.1 Medium  12/10/2013 - Present    Vitamin D deficiency E55.9   8/4/2015 - Present    Pain in joint, lower leg M25.569   8/21/2015 - Present    Deep vein thrombosis (DVT) (CMS-HCC) I82.409   11/11/2015 - Present    Cervical spondylosis M47.812   5/4/2016 - Present    DDD (degenerative disc disease), cervical M50.30   5/4/2016 - Present    Chronic neck pain M54.2, G89.29   5/4/2016 - Present    Lumbosacral spondylosis M47.817   5/4/2016 - Present    DDD (degenerative disc disease), lumbar M51.36   5/4/2016 - Present    Lumbar radiculopathy M54.16   5/4/2016 - Present    Chronic low back pain M54.5, G89.29   5/4/2016 - Present    Left knee pain M25.562   5/4/2016 - Present    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, Z96.659   5/4/2016 - Present    Chronic pain syndrome G89.4   8/25/2016 - Present    Positive FIT (fecal immunochemical test) R19.5   9/27/2016 - Present    Chronic obstructive asthma (CMS-HCC) J44.9, J45.909   11/14/2016 - Present    Allergic rhinitis J30.9   11/14/2016 - Present    Hypoxemia R09.02   11/14/2016 - Present      Health Maintenance        Date Due Completion Dates    RETINAL SCREENING 11/2/1970 ---    IMM DTaP/Tdap/Td Vaccine (1 - Tdap) 11/2/1971 ---    PAP SMEAR 11/2/1973 ---    IMM ZOSTER VACCINE 11/2/2012 ---    A1C SCREENING 2/3/2016 8/3/2015, 7/10/2014, 6/20/2014, 11/29/2013, 5/17/2013, 8/31/2012, 7/16/2012, 3/30/2012, 7/19/2011, 5/7/2010, 10/4/2009    URINE ACR / MICROALBUMIN 8/3/2016 8/3/2015, 6/20/2014     MAMMOGRAM 6/29/2017 6/29/2016    FASTING LIPID PROFILE 8/30/2017 8/30/2016, 8/3/2015, 7/11/2014, 6/20/2014, 5/17/2013, 7/19/2011, 5/1/2009, 2/23/2009, 9/16/2008, 5/9/2008, 11/29/2007    SERUM CREATININE 8/30/2017 8/30/2016, 8/3/2015, 7/11/2014, 7/10/2014, 6/20/2014, 2/6/2014, 1/31/2014, 1/17/2014, 1/13/2014, 12/19/2013, 12/18/2013, 12/17/2013, 12/16/2013, 12/15/2013, 12/14/2013, 12/13/2013, 12/12/2013, 12/11/2013, 12/10/2013, 12/10/2013, 12/9/2013, 12/8/2013, 12/7/2013, 12/6/2013, 12/5/2013, 12/4/2013, 12/3/2013, 12/2/2013, 11/30/2013, 11/28/2013, 11/27/2013, 11/25/2013, 11/22/2013, 11/20/2013, 11/10/2013, 10/27/2013, 10/20/2013, 10/15/2013, 10/14/2013, 10/13/2013, 10/12/2013, 10/11/2013, 10/10/2013, 10/9/2013, 10/8/2013, 10/7/2013, 9/25/2013, 9/24/2013, 9/22/2013, 9/21/2013, 9/20/2013, 9/19/2013, 9/18/2013, 9/17/2013, 9/17/2013, 9/15/2013, 9/13/2013, 9/12/2013, 9/11/2013, 9/10/2013, 9/9/2013, 9/9/2013, 9/8/2013, 9/7/2013, 9/7/2013, 9/6/2013, 9/5/2013, 9/4/2013, 9/3/2013, 9/1/2013, 8/31/2013, 5/17/2013, 1/25/2013, 12/13/2012, 10/17/2012, 10/1/2012, 8/24/2012, 8/18/2012, 7/20/2012, 7/17/2012, 7/16/2012, 1/4/2012, 7/20/2011, 7/19/2011, 1/13/2011, 12/6/2010, 10/26/2010, 7/2/2010, 6/23/2010, 5/17/2010, 5/4/2010, 5/3/2010, 5/1/2010, 4/30/2010, 4/29/2010, 4/28/2010, 4/27/2010, 4/26/2010, 4/25/2010, 4/24/2010, 4/23/2010, 4/22/2010, 4/21/2010, 4/20/2010, 4/19/2010, 4/18/2010, 4/17/2010, 4/15/2010, 4/14/2010, 4/13/2010, 4/12/2010, 4/11/2010, 4/10/2010, 4/9/2010, 4/8/2010, 4/7/2010, 4/6/2010, 4/5/2010, 4/4/2010, 4/3/2010, 4/2/2010, 4/1/2010, 3/31/2010, 3/30/2010, 3/29/2010, 3/28/2010, 3/27/2010, 3/26/2010, 3/25/2010, 3/24/2010, 3/23/2010, 3/22/2010, 3/21/2010, 3/20/2010, 3/19/2010, 3/18/2010, 3/17/2010, 3/17/2010, 3/16/2010, 3/15/2010, 3/12/2010, 3/11/2010, 3/5/2010, 3/1/2010, 1/31/2010, 1/30/2010, 1/29/2010, 1/28/2010, 1/27/2010, 1/26/2010, 11/5/2009, 10/7/2009, 10/5/2009, 10/4/2009, 10/3/2009, 10/2/2009, 10/1/2009,  9/21/2009, 9/15/2009, 9/4/2009, 4/27/2009, 2/23/2009, 8/14/2008, 11/29/2007    DIABETES MONOFILAMENT / LE EXAM 12/6/2017 12/6/2016    COLONOSCOPY 11/17/2020 11/17/2010            Results     POCT Protime      Component    INR    2.1                        Current Immunizations     Influenza TIV (IM) 9/3/2013  8:45 PM, 9/20/2011    Influenza Vaccine 12/1/2009    Influenza Vaccine Pediatric 11/11/2010    Influenza Vaccine Quad Inj (Pf) 11/20/2014  1:50 PM    Influenza Vaccine Quad Inj (Preserved) 11/14/2016  9:52 AM, 10/14/2015    Pneumococcal Vaccine (UF)Historical Data 12/1/2009    Pneumococcal polysaccharide vaccine (PPSV-23) 11/14/2016  9:53 AM      Below and/or attached are the medications your provider expects you to take. Review all of your home medications and newly ordered medications with your provider and/or pharmacist. Follow medication instructions as directed by your provider and/or pharmacist. Please keep your medication list with you and share with your provider. Update the information when medications are discontinued, doses are changed, or new medications (including over-the-counter products) are added; and carry medication information at all times in the event of emergency situations     Allergies:  PCN - Anaphylaxis,Rash               Medications  Valid as of: March 20, 2017 - 11:40 AM    Generic Name Brand Name Tablet Size Instructions for use    Albuterol Sulfate (Aero Soln) albuterol 108 (90 BASE) MCG/ACT Inhale 2 Puffs by mouth every four hours as needed. For Shortness of breath        Amitriptyline HCl (Tab) ELAVIL 25 MG Take 25 mg by mouth every bedtime.        Ascorbic Acid (Tab) ascorbic acid 500 MG TAKE ONE [1] TABLET BY MOUTH EVERY DAY        Budesonide-Formoterol Fumarate (Aerosol) SYMBICORT 160-4.5 MCG/ACT Inhale 2 Puffs by mouth 2 Times a Day. With spacer, rinse mouth after use        CarBAMazepine (Tab) TEGRETOL 200 MG Take 3 Tabs by mouth every bedtime.        Ergocalciferol (Cap)  DRISDOL 81187 UNITS TAKE 1 CAPSULE BY MOUTH EVERY 28 DAYS.        FLUoxetine HCl (Cap) PROZAC 20 MG Take 20 mg by mouth every day.        Flurazepam HCl (Cap) DALMANE 30 MG Take 30 mg by mouth at bedtime as needed.        Gabapentin (Cap) NEURONTIN 300 MG TAKE THREE (3) CAPSULES BY MOUTH THREE (3) TIMES DAILY         Incontinence Supply Disposable (Misc) DEPEND UNDERWEAR X-LARGE  1 Each by Does not apply route 3 times a day.        Ipratropium-Albuterol (Aero Soln) COMBIVENT RESPIMAT  MCG/ACT Inhale 1 puff by mouth every 4 hours as needed for shortness of breath and/or wheezing (max is 6 puffs per 24 hours)        Ipratropium-Albuterol (Aero Soln) COMBIVENT RESPIMAT  MCG/ACT Inhale 1 Puff by mouth 4 times a day.        Levothyroxine Sodium (Tab) SYNTHROID 125 MCG TAKE TWO [2] TABLETS BY MOUTH EVERY DAY         Levothyroxine Sodium (Tab) SYNTHROID 200 MCG TAKE ONE (1) TABLET BY MOUTH EVERY MORNING ON AN EMPTY STOMACH.        Levothyroxine Sodium (Tab) SYNTHROID 25 MCG TAKE ONE (1) TABLET BY MOUTH EVERY MORNING ON AN EMPTY STOMACH. TO USE ALONG WITH 200 MCG FOR TOTAL  MCG.        MetFORMIN HCl (Tab) GLUCOPHAGE 500 MG TAKE ONE [1] TABLET BY MOUTH EVERY DAY        Misc. Devices (Misc) Misc. Devices  Pt needs service for her PMD she will need a new battery to avoid stalling        Misc. Devices (Misc) Misc. Devices  CPAP supplies (hoses, filter, water chamber, face mask-resmed small), nasal cannula tubing        Misc. Devices (Misc) Misc. Devices  O2 to use continuously to keep her sats >90 %        Misc. Devices (Misc) Misc. Devices  Please evaluate pt for wider power mobility devise, pt has tipped over on multiple occasions        Misc. Devices (Misc) Misc. Devices  O2 tank rack for power wheel chair        Misc. Devices (Misc) Misc. Devices  Adult incontinence supplies to use as directed        Misc. Devices (Misc) Misc. Devices  Power mobility devise        Misc. Devices (Misc) Misc. Devices  O2  concentrator to use 24/7 and also with her CPAP at night to keep her O2 sats > 90%        Misc. Devices (Misc) Misc. Devices  Incontinence supplies (for wheel chair) and adult briefs to use as needed #120        Mometasone Furoate (Suspension) NASONEX 50 MCG/ACT Spray 1-2 Sprays in nose every day. Each nostril.        Nicotine Polacrilex (Gum) NICORETTE 2 MG Take 1 Each by mouth as needed for Smoking Cessation.        Ostomy Supplies (Misc) Ostomy Supplies  Dispense as ordered        RaNITidine HCl (Tab) ZANTAC 150 MG TAKE ONE (1) TABLET BY MOUTH TWICE DAILY WITH MEALS        Tiotropium Bromide Monohydrate (Aero Soln) Tiotropium Bromide Monohydrate 2.5 MCG/ACT Inhale 2.5 mcg by mouth every day at 6 PM.        Tiotropium Bromide Monohydrate (Aero Soln) Tiotropium Bromide Monohydrate 2.5 MCG/ACT Inhale 2 Puffs by mouth every day.        Warfarin Sodium (Tab) COUMADIN 10 MG Take one tablet daily as directed by coumadin clinic        Warfarin Sodium (Tab) COUMADIN 10 MG Take 1 to 1.5 tablets by mouth daily as directed by Coumadin Clinic.        .                 Medicines prescribed today were sent to:     [id number] - [place] - [address]    [address] [place] [address]    Phone: [phone] Fax: [phone]    Open 24 Hours?: No    [facility] - [place] - [address]    [address] [place] [address]    Phone: [phone] Fax: [phone]    Open 24 Hours?: No      Medication refill instructions:       If your prescription bottle indicates you have medication refills left, it is not necessary to call your provider’s office. Please contact your pharmacy and they will refill your medication.    If your prescription bottle indicates you do not have any refills left, you may request refills at any time through one of the following ways: The online [employer] system (except Urgent Care), by calling your provider’s office, or by asking your pharmacy to contact your  provider’s office with a refill request. Medication refills are processed only during regular business hours and may not be available until the next business day. Your provider may request additional information or to have a follow-up visit with you prior to refilling your medication.   *Please Note: Medication refills are assigned a new Rx number when refilled electronically. Your pharmacy may indicate that no refills were authorized even though a new prescription for the same medication is available at the pharmacy. Please request the medicine by name with the pharmacy before contacting your provider for a refill.        Warfarin Dosing Calendar   March 2017 Details    Sun Mon Tue Wed u Fri Sat        1               2               3               4                 5               6               7               8               9               10               11                 12               13               14               15               16               17               18                 19               20   2.1   10 mg   See details      21      10 mg         22      10 mg         23      10 mg         24      10 mg         25      10 mg           26      10 mg         27      10 mg         28      10 mg         29      10 mg         30      10 mg         31      10 mg           Date Details   03/20 This INR check   INR: 2.1               How to take your warfarin dose     To take:  10 mg Take 1 of the 10 mg tablets.           Warfarin Dosing Calendar   April 2017 Details    Sun Mon Tue Wed u Fri Sat           1      10 mg           2      10 mg         3      10 mg         4      10 mg         5      10 mg         6      10 mg         7      10 mg         8      10 mg           9      10 mg         10      10 mg         11      10 mg         12      10 mg         13      10 mg         14      10 mg         15      10 mg           16      10 mg         17      10 mg         18               19                 20               21               22                 23               24               25               26               27               28               29                 30                      Date Details   No additional details    Date of next INR:  4/17/2017         How to take your warfarin dose     To take:  10 mg Take 1 of the 10 mg tablets.              MyChart Status: Patient Declined        Quit Tobacco Information     Do you want to quit using tobacco?    Quitting tobacco decreases risks of cancer, heart and lung disease, increases life expectancy, improves sense of taste and smell, and increases spending money, among other benefits.    If you are thinking about quitting, we can help.  • [facility] Quit Tobacco Program: [phone]  o Program occurs weekly for four weeks and includes pharmacist consultation on products to support quitting smoking or chewing tobacco. A provider referral is needed for pharmacist consultation.  • Tobacco Users Help Hotline: 2-104-QUITNOW (905-5647) or https://[place].quitlogix.org/  o Free, confidential telephone and online coaching for [place] residents. Sessions are designed on a schedule that is convenient for you. Eligible clients receive free nicotine replacement therapy.  • Nationally: www.smokefree.gov  o Information and professional assistance to support both immediate and long-term needs as you become, and remain, a non-smoker. Smokefree.gov allows you to choose the help that best fits your needs.

## 2017-03-22 LAB — INR BLD: 2.1 (ref 0.9–1.2)

## 2017-04-03 RX ORDER — ASCORBIC ACID 500 MG
TABLET ORAL
Qty: 90 TAB | Refills: 2 | Status: ON HOLD | OUTPATIENT
Start: 2017-04-03 | End: 2018-04-07

## 2017-04-10 ENCOUNTER — OFFICE VISIT (OUTPATIENT)
Dept: MEDICAL GROUP | Facility: MEDICAL CENTER | Age: 67
End: 2017-04-10
Attending: FAMILY MEDICINE
Payer: MEDICAID

## 2017-04-10 VITALS
RESPIRATION RATE: 18 BRPM | DIASTOLIC BLOOD PRESSURE: 70 MMHG | HEART RATE: 80 BPM | BODY MASS INDEX: 50.02 KG/M2 | HEIGHT: 64 IN | OXYGEN SATURATION: 92 % | WEIGHT: 293 LBS | TEMPERATURE: 97.4 F | SYSTOLIC BLOOD PRESSURE: 135 MMHG

## 2017-04-10 DIAGNOSIS — E78.5 HYPERLIPIDEMIA, UNSPECIFIED HYPERLIPIDEMIA TYPE: ICD-10-CM

## 2017-04-10 DIAGNOSIS — K94.09 COLOSTOMY FISTULA (HCC): ICD-10-CM

## 2017-04-10 DIAGNOSIS — R77.9 ELEVATED SERUM PROTEIN LEVEL: ICD-10-CM

## 2017-04-10 DIAGNOSIS — E03.9 HYPOTHYROIDISM, UNSPECIFIED TYPE: ICD-10-CM

## 2017-04-10 PROCEDURE — 99212 OFFICE O/P EST SF 10 MIN: CPT | Performed by: FAMILY MEDICINE

## 2017-04-10 PROCEDURE — 99214 OFFICE O/P EST MOD 30 MIN: CPT | Performed by: FAMILY MEDICINE

## 2017-04-10 ASSESSMENT — ENCOUNTER SYMPTOMS
TREMORS: 0
SHORTNESS OF BREATH: 0
CHILLS: 0
VOMITING: 0
FEVER: 0
HEADACHES: 0
SENSORY CHANGE: 0
TINGLING: 1
FOCAL WEAKNESS: 1
BACK PAIN: 1
PALPITATIONS: 0
NAUSEA: 0
ABDOMINAL PAIN: 0
SPEECH CHANGE: 0

## 2017-04-10 NOTE — MR AVS SNAPSHOT
"        Atiya Henley   4/10/2017 3:30 PM   Office Visit   MRN: [id number]    [facility]   Dept Phone:  [phone]    Description:  Female : 1950   Provider:  Carmelo Pat M.D.           Reason for Visit     Follow-Up           Allergies as of 4/10/2017     Allergen Noted Reactions    Pcn [Penicillins] 07/10/2014   Anaphylaxis, Rash    Reaction; happened as a child. Tolerated cephalosporins and Zosyn      You were diagnosed with     Colostomy fistula (CMS-[facility])   [266692]       Hypothyroidism, unspecified type   [6684731]       Elevated serum protein level   [141194]       Hyperlipidemia, unspecified hyperlipidemia type   [4805537]         Vital Signs     Blood Pressure Pulse Temperature Respirations Height Weight    135/70 mmHg 80 36.3 °C (97.4 °F) 18 1.626 m (5' 4.02\") 136.079 kg (300 lb)    Body Mass Index Oxygen Saturation Last Menstrual Period Breastfeeding? Smoking Status       51.47 kg/m2 92% 1975 No Current Every Day Smoker       Basic Information     Date Of Birth Sex Race Ethnicity Preferred Language    1950 Female White Non- English      Your appointments     2017  1:15 PM   Established Patient with [facility] EXAM 5   [facility]  (--)    [address]  [place] [address]   [phone]            May 22, 2017  3:00 PM   Pulmonary Function Test with PFT-RM3   [facility] (--)    [address]  [address]  [place] [id number] [phone]            May 22, 2017  4:00 PM   Established Patient Pul with ALCIRA Chavez   [facility] (--)    [address]  [address]  [place] [id number] [phone]            2017  8:30 AM   New Patient with Choco Smith M.D.   [facility] (--)    [address]  [place] [id number] [phone]           Please bring Photo ID, Insurance Cards, All Medication " Bottles and copies of any legal documents (such as Living Will, Power of ) If speaking a language besides English please bring an adult . Please arrive 30 minutes prior for check in and registration. You will be receiving a confirmation call a few days before your appointment from our automated call confirmation system.              Problem List              ICD-10-CM Priority Class Noted - Resolved    Morbid obesity (CMS-HCC) E66.01 Low  7/7/2009 - Present    YESSICA (obstructive sleep apnea) G47.33 Medium  7/7/2009 - Present    Hypothyroid E03.9 Medium  7/7/2009 - Present    Prediabetes R73.03   9/10/2009 - Present    HTN (hypertension) I10 Medium  9/10/2009 - Present    Anemia D64.9 Medium  3/22/2010 - Present    COPD (chronic obstructive pulmonary disease) (CMS-HCC) J44.9 Low  4/9/2010 - Present    DVT (deep venous thrombosis) (CMS-HCC) I82.409 Medium  4/10/2010 - Present    Colostomy care (CMS-HCC) Z43.3   5/17/2010 - Present    Anxiety F41.9   5/17/2010 - Present    Depression F32.9 Low  5/17/2010 - Present    Vitamin d deficiency    8/31/2011 - Present    Chronic autoimmune thyroiditis E06.3   10/2/2012 - Present    Chronic anticoagulation Z79.01   10/16/2012 - Present    Vitamin d deficiency    1/30/2013 - Present    Other pulmonary insufficiency, not elsewhere classified, following trauma and surgery J95.89 High  9/2/2013 - Present    COPD (chronic obstructive pulmonary disease) (CMS-HCC) J44.9 Medium  9/2/2013 - Present    Tobacco abuse Z72.0 Low  9/18/2013 - Present    S/P IVC filter Z95.828 Medium  9/18/2013 - Present    Bipolar disorder F31.9 Low  9/18/2013 - Present    History of hernia repair Z98.890, Z87.19   10/7/2013 - Present    Obesity E66.9   7/7/2009 - Present    GERD (gastroesophageal reflux disease) K21.9 Low  11/25/2013 - Present    Colostomy fistula (CMS-HCC) K94.09 High  12/3/2013 - Present    Thrombocytosis (CMS-HCC) D47.3 Medium  12/3/2013 - Present    Hypomagnesemia  E83.42 Medium  12/4/2013 - Present    Hyponatremia E87.1 Medium  12/10/2013 - Present    Vitamin D deficiency E55.9   8/4/2015 - Present    Pain in joint, lower leg M25.569   8/21/2015 - Present    Deep vein thrombosis (DVT) (CMS-HCC) I82.409   11/11/2015 - Present    Cervical spondylosis M47.812   5/4/2016 - Present    DDD (degenerative disc disease), cervical M50.30   5/4/2016 - Present    Chronic neck pain M54.2, G89.29   5/4/2016 - Present    Lumbosacral spondylosis M47.817   5/4/2016 - Present    DDD (degenerative disc disease), lumbar M51.36   5/4/2016 - Present    Lumbar radiculopathy M54.16   5/4/2016 - Present    Chronic low back pain M54.5, G89.29   5/4/2016 - Present    Left knee pain M25.562   5/4/2016 - Present    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, Z96.659   5/4/2016 - Present    Chronic pain syndrome G89.4   8/25/2016 - Present    Positive FIT (fecal immunochemical test) R19.5   9/27/2016 - Present    Chronic obstructive asthma (CMS-HCC) J44.9, J45.909   11/14/2016 - Present    Allergic rhinitis J30.9   11/14/2016 - Present    Hypoxemia R09.02   11/14/2016 - Present      Health Maintenance        Date Due Completion Dates    RETINAL SCREENING 11/2/1970 ---    IMM DTaP/Tdap/Td Vaccine (1 - Tdap) 11/2/1971 ---    PAP SMEAR 11/2/1973 ---    IMM ZOSTER VACCINE 11/2/2012 ---    MAMMOGRAM 6/29/2017 6/29/2016    A1C SCREENING 9/20/2017 3/20/2017, 8/3/2015, 7/10/2014, 6/20/2014, 11/29/2013, 5/17/2013, 8/31/2012, 7/16/2012, 3/30/2012, 7/19/2011, 5/7/2010, 10/4/2009    DIABETES MONOFILAMENT / LE EXAM 12/6/2017 12/6/2016    FASTING LIPID PROFILE 3/20/2018 3/20/2017, 8/30/2016, 8/3/2015, 7/11/2014, 6/20/2014, 5/17/2013, 7/19/2011, 5/1/2009, 2/23/2009, 9/16/2008, 5/9/2008, 11/29/2007    URINE ACR / MICROALBUMIN 3/20/2018 3/20/2017, 8/3/2015, 6/20/2014    SERUM CREATININE 3/20/2018 3/20/2017, 8/30/2016, 8/3/2015, 7/11/2014, 7/10/2014, 6/20/2014, 2/6/2014, 1/31/2014, 1/17/2014, 1/13/2014,  12/19/2013, 12/18/2013, 12/17/2013, 12/16/2013, 12/15/2013, 12/14/2013, 12/13/2013, 12/12/2013, 12/11/2013, 12/10/2013, 12/10/2013, 12/9/2013, 12/8/2013, 12/7/2013, 12/6/2013, 12/5/2013, 12/4/2013, 12/3/2013, 12/2/2013, 11/30/2013, 11/28/2013, 11/27/2013, 11/25/2013, 11/22/2013, 11/20/2013, 11/10/2013, 10/27/2013, 10/20/2013, 10/15/2013, 10/14/2013, 10/13/2013, 10/12/2013, 10/11/2013, 10/10/2013, 10/9/2013, 10/8/2013, 10/7/2013, 9/25/2013, 9/24/2013, 9/22/2013, 9/21/2013, 9/20/2013, 9/19/2013, 9/18/2013, 9/17/2013, 9/17/2013, 9/15/2013, 9/13/2013, 9/12/2013, 9/11/2013, 9/10/2013, 9/9/2013, 9/9/2013, 9/8/2013, 9/7/2013, 9/7/2013, 9/6/2013, 9/5/2013, 9/4/2013, 9/3/2013, 9/1/2013, 8/31/2013, 5/17/2013, 1/25/2013, 12/13/2012, 10/17/2012, 10/1/2012, 8/24/2012, 8/18/2012, 7/20/2012, 7/17/2012, 7/16/2012, 1/4/2012, 7/20/2011, 7/19/2011, 1/13/2011, 12/6/2010, 10/26/2010, 7/2/2010, 6/23/2010, 5/17/2010, 5/4/2010, 5/3/2010, 5/1/2010, 4/30/2010, 4/29/2010, 4/28/2010, 4/27/2010, 4/26/2010, 4/25/2010, 4/24/2010, 4/23/2010, 4/22/2010, 4/21/2010, 4/20/2010, 4/19/2010, 4/18/2010, 4/17/2010, 4/15/2010, 4/14/2010, 4/13/2010, 4/12/2010, 4/11/2010, 4/10/2010, 4/9/2010, 4/8/2010, 4/7/2010, 4/6/2010, 4/5/2010, 4/4/2010, 4/3/2010, 4/2/2010, 4/1/2010, 3/31/2010, 3/30/2010, 3/29/2010, 3/28/2010, 3/27/2010, 3/26/2010, 3/25/2010, 3/24/2010, 3/23/2010, 3/22/2010, 3/21/2010, 3/20/2010, 3/19/2010, 3/18/2010, 3/17/2010, 3/17/2010, 3/16/2010, 3/15/2010, 3/12/2010, 3/11/2010, 3/5/2010, 3/1/2010, 1/31/2010, 1/30/2010, 1/29/2010, 1/28/2010, 1/27/2010, 1/26/2010, 11/5/2009, 10/7/2009, 10/5/2009, 10/4/2009, 10/3/2009, 10/2/2009, 10/1/2009, 9/21/2009, 9/15/2009, 9/4/2009, 4/27/2009, 2/23/2009, 8/14/2008, 11/29/2007    COLONOSCOPY 11/17/2020 11/17/2010            Current Immunizations     Influenza TIV (IM) 9/3/2013  8:45 PM, 9/20/2011    Influenza Vaccine 12/1/2009    Influenza Vaccine Pediatric 11/11/2010    Influenza Vaccine Quad Inj (Pf) 11/20/2014   1:50 PM    Influenza Vaccine Quad Inj (Preserved) 11/14/2016  9:52 AM, 10/14/2015    Pneumococcal Vaccine (UF)Historical Data 12/1/2009    Pneumococcal polysaccharide vaccine (PPSV-23) 11/14/2016  9:53 AM      Below and/or attached are the medications your provider expects you to take. Review all of your home medications and newly ordered medications with your provider and/or pharmacist. Follow medication instructions as directed by your provider and/or pharmacist. Please keep your medication list with you and share with your provider. Update the information when medications are discontinued, doses are changed, or new medications (including over-the-counter products) are added; and carry medication information at all times in the event of emergency situations     Allergies:  PCN - Anaphylaxis,Rash               Medications  Valid as of: April 10, 2017 -  3:45 PM    Generic Name Brand Name Tablet Size Instructions for use    Albuterol Sulfate (Aero Soln) albuterol 108 (90 BASE) MCG/ACT Inhale 2 Puffs by mouth every four hours as needed. For Shortness of breath        Amitriptyline HCl (Tab) ELAVIL 25 MG Take 25 mg by mouth every bedtime.        Ascorbic Acid (Tab) ascorbic acid 500 MG TAKE ONE [1] TABLET BY MOUTH EVERY DAY        Budesonide-Formoterol Fumarate (Aerosol) SYMBICORT 160-4.5 MCG/ACT Inhale 2 Puffs by mouth 2 Times a Day. With spacer, rinse mouth after use        CarBAMazepine (Tab) TEGRETOL 200 MG Take 3 Tabs by mouth every bedtime.        Ergocalciferol (Cap) DRISDOL 75563 UNITS TAKE 1 CAPSULE BY MOUTH EVERY 28 DAYS.        FLUoxetine HCl (Cap) PROZAC 20 MG Take 20 mg by mouth every day.        Flurazepam HCl (Cap) DALMANE 30 MG Take 30 mg by mouth at bedtime as needed.        Gabapentin (Cap) NEURONTIN 300 MG TAKE THREE (3) CAPSULES BY MOUTH THREE (3) TIMES DAILY         Incontinence Supply Disposable (Misc) DEPEND UNDERWEAR X-LARGE  1 Each by Does not apply route 3 times a day.         Ipratropium-Albuterol (Aero Soln) COMBIVENT RESPIMAT  MCG/ACT Inhale 1 puff by mouth every 4 hours as needed for shortness of breath and/or wheezing (max is 6 puffs per 24 hours)        Ipratropium-Albuterol (Aero Soln) COMBIVENT RESPIMAT  MCG/ACT Inhale 1 Puff by mouth 4 times a day.        Levothyroxine Sodium (Tab) SYNTHROID 125 MCG TAKE TWO [2] TABLETS BY MOUTH EVERY DAY         Levothyroxine Sodium (Tab) SYNTHROID 200 MCG TAKE ONE (1) TABLET BY MOUTH EVERY MORNING ON AN EMPTY STOMACH.        Levothyroxine Sodium (Tab) SYNTHROID 25 MCG TAKE ONE (1) TABLET BY MOUTH EVERY MORNING ON AN EMPTY STOMACH. TO USE ALONG WITH 200 MCG FOR TOTAL  MCG.        MetFORMIN HCl (Tab) GLUCOPHAGE 500 MG TAKE ONE [1] TABLET BY MOUTH EVERY DAY        Misc. Devices (Misc) Misc. Devices  Pt needs service for her PMD she will need a new battery to avoid stalling        Misc. Devices (Misc) Misc. Devices  CPAP supplies (hoses, filter, water chamber, face mask-resmed small), nasal cannula tubing        Misc. Devices (Misc) Misc. Devices  O2 to use continuously to keep her sats >90 %        Misc. Devices (Misc) Misc. Devices  Please evaluate pt for wider power mobility devise, pt has tipped over on multiple occasions        Misc. Devices (Misc) Misc. Devices  O2 tank rack for power wheel chair        Misc. Devices (Misc) Misc. Devices  Adult incontinence supplies to use as directed        Misc. Devices (Misc) Misc. Devices  Power mobility devise        Misc. Devices (Misc) Misc. Devices  O2 concentrator to use 24/7 and also with her CPAP at night to keep her O2 sats > 90%        Misc. Devices (Misc) Misc. Devices  Incontinence supplies (for wheel chair) and adult briefs to use as needed #120        Mometasone Furoate (Suspension) NASONEX 50 MCG/ACT Spray 1-2 Sprays in nose every day. Each nostril.        Nicotine Polacrilex (Gum) NICORETTE 2 MG Take 1 Each by mouth as needed for Smoking Cessation.        Ostomy Supplies  (Misc) Ostomy Supplies  Dispense as ordered        RaNITidine HCl (Tab) ZANTAC 150 MG TAKE ONE (1) TABLET BY MOUTH TWICE DAILY WITH MEALS        Tiotropium Bromide Monohydrate (Aero Soln) Tiotropium Bromide Monohydrate 2.5 MCG/ACT Inhale 2.5 mcg by mouth every day at 6 PM.        Tiotropium Bromide Monohydrate (Aero Soln) Tiotropium Bromide Monohydrate 2.5 MCG/ACT Inhale 2 Puffs by mouth every day.        Warfarin Sodium (Tab) COUMADIN 10 MG Take one tablet daily as directed by coumadin clinic        Warfarin Sodium (Tab) COUMADIN 10 MG Take 1 to 1.5 tablets by mouth daily as directed by Coumadin Clinic.        .                 Medicines prescribed today were sent to:     [id number] [place] - [address]    [address] [place] [address]    Phone: [phone] Fax: [phone]    Open 24 Hours?: No    [facility] - [place] - [address]    [address] [place] [address]    Phone: [phone] Fax: [phone]    Open 24 Hours?: No      Medication refill instructions:       If your prescription bottle indicates you have medication refills left, it is not necessary to call your provider’s office. Please contact your pharmacy and they will refill your medication.    If your prescription bottle indicates you do not have any refills left, you may request refills at any time through one of the following ways: The online [employer] system (except Urgent Care), by calling your provider’s office, or by asking your pharmacy to contact your provider’s office with a refill request. Medication refills are processed only during regular business hours and may not be available until the next business day. Your provider may request additional information or to have a follow-up visit with you prior to refilling your medication.   *Please Note: Medication refills are assigned a new Rx number when refilled electronically. Your pharmacy may indicate that no refills were authorized even  though a new prescription for the same medication is available at the pharmacy. Please request the medicine by name with the pharmacy before contacting your provider for a refill.        Your To Do List     Future Labs/Procedures Complete By Expires    COMP METABOLIC PANEL  As directed 4/10/2018    LIPID PROFILE  As directed 4/10/2018    MISCELLANEOUS LAB TEST ([facility]/Other)  As directed 4/10/2018    Comments:    BLAKE Spencemari Status: Patient Declined        Quit Tobacco Information     Do you want to quit using tobacco?    Quitting tobacco decreases risks of cancer, heart and lung disease, increases life expectancy, improves sense of taste and smell, and increases spending money, among other benefits.    If you are thinking about quitting, we can help.  • [facility] Quit Tobacco Program: 754.789.1199  o Program occurs weekly for four weeks and includes pharmacist consultation on products to support quitting smoking or chewing tobacco. A provider referral is needed for pharmacist consultation.  • Tobacco Users Help Hotline: 4-535-QUITNOW (944-5505) or https://[place].quitlogix.org/  o Free, confidential telephone and online coaching for [place] residents. Sessions are designed on a schedule that is convenient for you. Eligible clients receive free nicotine replacement therapy.  • Nationally: www.smokefree.gov  o Information and professional assistance to support both immediate and long-term needs as you become, and remain, a non-smoker. Smokefree.gov allows you to choose the help that best fits your needs.

## 2017-04-10 NOTE — PROGRESS NOTES
Subjective:      Atiya Henley is a 66 y.o. female who presents with Follow-Up            HPI Comments: Patient here to follow-up on her recent thyroid ultrasound as well as recent blood work. She was noted to have an elevated serum globulin which has been consistent over the past few years. She also has a history of hypothyroidism and has a colostomy which is intact. The results of her recent thyroid ultrasound is as follows:    Small thyroid gland with very heterogeneous echotexture. No focal nodules appreciated.    She is currently on 225 mg of levothyroxine. Her last TSH was 0.610. She has not been having any symptoms of hyper or hypothyroidism. She is also followed up with Dr. Mccord for her hypothyroidism and her previously diagnosed Hashimoto's thyroiditis. We'll continue to follow.    On her blood work she was noted to have an elevated globulin level at 3.8. Previously her globulin levels ranged from 4.1-3.7. We'll order a serum protein electrophoresis to further evaluate her globulin levels. We'll continue to follow. If her globulin levels are abnormally high or her serum protein electrophoresis is abnormal will refer to hematology for further assistance in management of her elevated globulin levels. We'll continue to follow.    Patient states that she has an upcoming appointment with GI for a colonoscopy in follow-up of her positive FIT screen. She has not been having any abdominal bloating or changes in the consistency of her stools or blood in her stools and is visible. She does have a colostomy bag. No current issues. We'll continue to follow.    Her recent cholesterol levels were slightly elevated. The results were as follows:    3/20/2017 12:31  Cholesterol,Tot: 207 (H)  Triglycerides: 154 (H)  HDL: 57  LDL: 119 (H)    She wants to attempt to lower her cholesterol with dietary changes as well as attempting to exercise as tolerated. She does have difficulty with most exercises being confined  "mostly to a power mobility device. We'll have her try to decrease her fatty and fried food intake as well as increase the fiber in her diet. We'll recheck her cholesterol in approximately 2-3 months to see if any changes have been made. If her cholesterol continues to remain elevated will start a low-dose statin to attempt to lower her cholesterol. We'll continue to follow.     Current medications, allergies, and problem list reviewed with patient and updated in EPIC.        Review of Systems   Constitutional: Negative for fever and chills.   HENT: Negative for hearing loss.    Respiratory: Negative for shortness of breath.    Cardiovascular: Negative for chest pain and palpitations.   Gastrointestinal: Negative for nausea, vomiting and abdominal pain.   Musculoskeletal: Positive for back pain.   Neurological: Positive for tingling and focal weakness. Negative for tremors, sensory change, speech change and headaches.          Objective:     /70 mmHg  Pulse 80  Temp(Src) 36.3 °C (97.4 °F)  Resp 18  Ht 1.626 m (5' 4.02\")  Wt 136.079 kg (300 lb)  BMI 51.47 kg/m2  SpO2 92%  LMP 08/02/1975  Breastfeeding? No     Physical Exam   Constitutional:   BMI > 51   HENT:   Right Ear: External ear normal.   Left Ear: External ear normal.   Nose: Nose normal.   Mouth/Throat: Oropharynx is clear and moist.   Eyes: EOM are normal. Pupils are equal, round, and reactive to light.   Neck: Normal range of motion.   Cardiovascular: Normal rate, regular rhythm and normal heart sounds.  Exam reveals no friction rub.    No murmur heard.  Pulmonary/Chest: Breath sounds normal. No respiratory distress. She has no wheezes. She has no rales.   Abdominal: Soft. Bowel sounds are normal.   Musculoskeletal:   In power mobility devise   Neurological: She is alert.   Skin: Skin is warm and dry.   Psychiatric: Her behavior is normal.               Assessment/Plan:     1. Colostomy fistula (CMS-HCC)  Ostomy intact, patient has an " appointment with GI for a positive FIT screen will continue to follow.    2. Hypothyroidism, unspecified type  Discussed the results of her recent thyroid ultrasound. We'll have her continue to use her thyroid medication as directed by her endocrinologist. We'll continue to follow.    3. Elevated serum protein level  Will order a serum protein electrophoresis as well as a repeat complete metabolic panel to further evaluate her elevated globulin levels. If her serum protein electrophoresis is abnormal will consult hematology for assistance in evaluating as well as managing her elevated globulin levels. We'll continue to follow.  - MISCELLANEOUS LAB TEST ([facility]/Other); Future  - COMP METABOLIC PANEL; Future    4. Hyperlipidemia, unspecified hyperlipidemia type  We'll repeat her lipid panel to see if dietary measures as well as exercising as tolerated will lower her current LDL levels. We'll continue to follow.  - LIPID PROFILE; Future

## 2017-04-25 ENCOUNTER — TELEPHONE (OUTPATIENT)
Dept: MEDICAL GROUP | Facility: MEDICAL CENTER | Age: 67
End: 2017-04-25

## 2017-04-25 DIAGNOSIS — Z74.09 DECREASED STRENGTH, ENDURANCE, AND MOBILITY: ICD-10-CM

## 2017-04-25 DIAGNOSIS — R68.89 DECREASED STRENGTH, ENDURANCE, AND MOBILITY: ICD-10-CM

## 2017-04-25 DIAGNOSIS — R53.1 DECREASED STRENGTH, ENDURANCE, AND MOBILITY: ICD-10-CM

## 2017-04-25 NOTE — TELEPHONE ENCOUNTER
----- Message from Sagrario Black sent at 4/25/2017  3:10 PM PDT -----  Atiya Henley's  came to the window, she says that Atiya has been trying to reach you but hasn't had much luck and she's been getting a little confused with the new phone system. So instead the  came in on her behalf and is asking if a referral or script can be done for a replacement battery for the wheelchair. It has been failing recently.

## 2017-04-25 NOTE — TELEPHONE ENCOUNTER
Phone Number Called: [phone] (home)     Message: spoke with patient, she states she needs a Rx for a new battery and a gear stick for her powered wheel chair sent to [facility]. Please advise     Left Message for patient to call back: no

## 2017-05-01 ENCOUNTER — TELEPHONE (OUTPATIENT)
Dept: VASCULAR LAB | Facility: MEDICAL CENTER | Age: 67
End: 2017-05-01

## 2017-05-01 NOTE — TELEPHONE ENCOUNTER
[facility]    VM is not set up, will contact again at a later time.    [place], REXD

## 2017-05-03 ENCOUNTER — TELEPHONE (OUTPATIENT)
Dept: PULMONOLOGY | Facility: HOSPICE | Age: 67
End: 2017-05-03

## 2017-05-03 NOTE — TELEPHONE ENCOUNTER
MEDICATION PRIOR AUTHORIZATION NEEDED:    1. Name of Medication: Spiriva Respimat 2.5 mcg    2. Requested By (Name of Pharmacy): [facility]     3. Is insurance on file current? yes    4. What is the name & phone number of the 3rd party payor? Medicaid [phone]

## 2017-05-09 ENCOUNTER — OFFICE VISIT (OUTPATIENT)
Dept: MEDICAL GROUP | Facility: MEDICAL CENTER | Age: 67
End: 2017-05-09
Attending: FAMILY MEDICINE
Payer: MEDICAID

## 2017-05-09 VITALS
HEIGHT: 64 IN | WEIGHT: 293 LBS | OXYGEN SATURATION: 98 % | BODY MASS INDEX: 50.02 KG/M2 | SYSTOLIC BLOOD PRESSURE: 130 MMHG | DIASTOLIC BLOOD PRESSURE: 90 MMHG | RESPIRATION RATE: 20 BRPM | HEART RATE: 68 BPM | TEMPERATURE: 97 F

## 2017-05-09 DIAGNOSIS — J43.9 PULMONARY EMPHYSEMA, UNSPECIFIED EMPHYSEMA TYPE (HCC): ICD-10-CM

## 2017-05-09 DIAGNOSIS — G47.33 OSA (OBSTRUCTIVE SLEEP APNEA): ICD-10-CM

## 2017-05-09 DIAGNOSIS — G47.00 INSOMNIA, UNSPECIFIED TYPE: ICD-10-CM

## 2017-05-09 DIAGNOSIS — E03.9 HYPOTHYROIDISM, UNSPECIFIED TYPE: ICD-10-CM

## 2017-05-09 DIAGNOSIS — Z23 NEED FOR TDAP VACCINATION: ICD-10-CM

## 2017-05-09 DIAGNOSIS — K94.09 COLOSTOMY FISTULA (HCC): ICD-10-CM

## 2017-05-09 PROCEDURE — 99213 OFFICE O/P EST LOW 20 MIN: CPT | Mod: 25 | Performed by: FAMILY MEDICINE

## 2017-05-09 PROCEDURE — 90715 TDAP VACCINE 7 YRS/> IM: CPT | Performed by: FAMILY MEDICINE

## 2017-05-09 PROCEDURE — 90471 IMMUNIZATION ADMIN: CPT | Performed by: FAMILY MEDICINE

## 2017-05-09 PROCEDURE — 99214 OFFICE O/P EST MOD 30 MIN: CPT | Mod: 25 | Performed by: FAMILY MEDICINE

## 2017-05-09 RX ORDER — TRAZODONE HYDROCHLORIDE 50 MG/1
50 TABLET ORAL NIGHTLY PRN
Qty: 30 TAB | Refills: 3 | Status: ON HOLD | OUTPATIENT
Start: 2017-05-09 | End: 2018-04-07

## 2017-05-09 ASSESSMENT — ENCOUNTER SYMPTOMS
VOMITING: 0
FEVER: 0
HEADACHES: 0
TINGLING: 1
BACK PAIN: 1
CHILLS: 0
NAUSEA: 0
PALPITATIONS: 0
ABDOMINAL PAIN: 0
SHORTNESS OF BREATH: 0

## 2017-05-09 NOTE — MR AVS SNAPSHOT
"        Atiya Henley   2017 9:50 AM   Office Visit   MRN: [id number]    [facility]   Dept Phone:  [phone]    Description:  Female : 1950   Provider:  Carmelo Pat M.D.           Reason for Visit     Unable to Sleep           Allergies as of 2017     Allergen Noted Reactions    Pcn [Penicillins] 07/10/2014   Anaphylaxis, Rash    Reaction; happened as a child. Tolerated cephalosporins and Zosyn      You were diagnosed with     Insomnia, unspecified type   [4349594]       YESSICA (obstructive sleep apnea)   [987603]       Hypothyroidism, unspecified type   [2236207]       Colostomy fistula (CMS-HCC)   [356416]       Pulmonary emphysema, unspecified emphysema type (CMS-HCC)   [7393516]       Need for Tdap vaccination   [727730]         Vital Signs     Blood Pressure Pulse Temperature Respirations Height Weight    130/90 mmHg 68 36.1 °C (97 °F) 20 1.626 m (5' 4.02\") 136.079 kg (300 lb)    Body Mass Index Oxygen Saturation Last Menstrual Period Smoking Status          51.47 kg/m2 98% 1975 Current Every Day Smoker        Basic Information     Date Of Birth Sex Race Ethnicity Preferred Language    1950 Female White Non- English      Your appointments     May 22, 2017  3:00 PM   Pulmonary Function Test with PFT-RM3   [facility] Pulmonary Medicine (--)    [address]  [address]  [facility] [id number] [phone]            May 22, 2017  4:00 PM   Established Patient Pul with ALCIRA Chavez   [facility] (--)    [address]  [address]  [place] [address]   [phone]            2017 10:50 AM   Established Patient with Carmelo Pat M.D.   [facility] ([facility])    [address] [facility] [id number] [phone]           You will be receiving a confirmation call a few days before your appointment from our automated call confirmation system.            2017  8:30 AM   New Patient with " Choco Smith M.D.   [facility] PHYSIATRY (--)    [address]  [facility] [address] [phone]           Please bring Photo ID, Insurance Cards, All Medication Bottles and copies of any legal documents (such as Living Will, Power of ) If speaking a language besides English please bring an adult . Please arrive 30 minutes prior for check in and registration. You will be receiving a confirmation call a few days before your appointment from our automated call confirmation system.              Problem List              ICD-10-CM Priority Class Noted - Resolved    Morbid obesity (CMS-HCC) E66.01 Low  7/7/2009 - Present    YESSICA (obstructive sleep apnea) G47.33 Medium  7/7/2009 - Present    Hypothyroid E03.9 Medium  7/7/2009 - Present    Prediabetes R73.03   9/10/2009 - Present    HTN (hypertension) I10 Medium  9/10/2009 - Present    Anemia D64.9 Medium  3/22/2010 - Present    DVT (deep venous thrombosis) (CMS-HCC) I82.409 Medium  4/10/2010 - Present    Anxiety F41.9   5/17/2010 - Present    Depression F32.9 Low  5/17/2010 - Present    Vitamin d deficiency    8/31/2011 - Present    Chronic autoimmune thyroiditis E06.3   10/2/2012 - Present    Chronic anticoagulation Z79.01   10/16/2012 - Present    Vitamin d deficiency    1/30/2013 - Present    Other pulmonary insufficiency, not elsewhere classified, following trauma and surgery J95.89 High  9/2/2013 - Present    COPD (chronic obstructive pulmonary disease) (CMS-HCC) J44.9 Medium  9/2/2013 - Present    Tobacco abuse Z72.0 Low  9/18/2013 - Present    S/P IVC filter Z95.828 Medium  9/18/2013 - Present    Bipolar disorder F31.9 Low  9/18/2013 - Present    History of hernia repair Z98.890, Z87.19   10/7/2013 - Present    Obesity E66.9   7/7/2009 - Present    GERD (gastroesophageal reflux disease) K21.9 Low  11/25/2013 - Present    Colostomy fistula (CMS-HCC) K94.09 High  12/3/2013 - Present    Thrombocytosis (CMS-HCC) D47.3 Medium   12/3/2013 - Present    Hypomagnesemia E83.42 Medium  12/4/2013 - Present    Hyponatremia E87.1 Medium  12/10/2013 - Present    Vitamin D deficiency E55.9   8/4/2015 - Present    Pain in joint, lower leg M25.569   8/21/2015 - Present    Deep vein thrombosis (DVT) (CMS-HCC) I82.409   11/11/2015 - Present    Cervical spondylosis M47.812   5/4/2016 - Present    DDD (degenerative disc disease), cervical M50.30   5/4/2016 - Present    Chronic neck pain M54.2, G89.29   5/4/2016 - Present    Lumbosacral spondylosis M47.817   5/4/2016 - Present    DDD (degenerative disc disease), lumbar M51.36   5/4/2016 - Present    Lumbar radiculopathy M54.16   5/4/2016 - Present    Chronic low back pain M54.5, G89.29   5/4/2016 - Present    Left knee pain M25.562   5/4/2016 - Present    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, Z96.659   5/4/2016 - Present    Chronic pain syndrome G89.4   8/25/2016 - Present    Positive FIT (fecal immunochemical test) R19.5   9/27/2016 - Present    Chronic obstructive asthma (CMS-HCC) J44.9, J45.909   11/14/2016 - Present    Allergic rhinitis J30.9   11/14/2016 - Present    Hypoxemia R09.02   11/14/2016 - Present      Health Maintenance        Date Due Completion Dates    RETINAL SCREENING 11/2/1970 ---    IMM DTaP/Tdap/Td Vaccine (1 - Tdap) 11/2/1971 ---    PAP SMEAR 11/2/1973 ---    IMM ZOSTER VACCINE 11/2/2012 ---    MAMMOGRAM 6/29/2017 6/29/2016    A1C SCREENING 9/20/2017 3/20/2017, 8/3/2015, 7/10/2014, 6/20/2014, 11/29/2013, 5/17/2013, 8/31/2012, 7/16/2012, 3/30/2012, 7/19/2011, 5/7/2010, 10/4/2009    DIABETES MONOFILAMENT / LE EXAM 12/6/2017 12/6/2016    FASTING LIPID PROFILE 3/20/2018 3/20/2017, 8/30/2016, 8/3/2015, 7/11/2014, 6/20/2014, 5/17/2013, 7/19/2011, 5/1/2009, 2/23/2009, 9/16/2008, 5/9/2008, 11/29/2007    URINE ACR / MICROALBUMIN 3/20/2018 3/20/2017, 8/3/2015, 6/20/2014    SERUM CREATININE 3/20/2018 3/20/2017, 8/30/2016, 8/3/2015, 7/11/2014, 7/10/2014, 6/20/2014, 2/6/2014,  1/31/2014, 1/17/2014, 1/13/2014, 12/19/2013, 12/18/2013, 12/17/2013, 12/16/2013, 12/15/2013, 12/14/2013, 12/13/2013, 12/12/2013, 12/11/2013, 12/10/2013, 12/10/2013, 12/9/2013, 12/8/2013, 12/7/2013, 12/6/2013, 12/5/2013, 12/4/2013, 12/3/2013, 12/2/2013, 11/30/2013, 11/28/2013, 11/27/2013, 11/25/2013, 11/22/2013, 11/20/2013, 11/10/2013, 10/27/2013, 10/20/2013, 10/15/2013, 10/14/2013, 10/13/2013, 10/12/2013, 10/11/2013, 10/10/2013, 10/9/2013, 10/8/2013, 10/7/2013, 9/25/2013, 9/24/2013, 9/22/2013, 9/21/2013, 9/20/2013, 9/19/2013, 9/18/2013, 9/17/2013, 9/17/2013, 9/15/2013, 9/13/2013, 9/12/2013, 9/11/2013, 9/10/2013, 9/9/2013, 9/9/2013, 9/8/2013, 9/7/2013, 9/7/2013, 9/6/2013, 9/5/2013, 9/4/2013, 9/3/2013, 9/1/2013, 8/31/2013, 5/17/2013, 1/25/2013, 12/13/2012, 10/17/2012, 10/1/2012, 8/24/2012, 8/18/2012, 7/20/2012, 7/17/2012, 7/16/2012, 1/4/2012, 7/20/2011, 7/19/2011, 1/13/2011, 12/6/2010, 10/26/2010, 7/2/2010, 6/23/2010, 5/17/2010, 5/4/2010, 5/3/2010, 5/1/2010, 4/30/2010, 4/29/2010, 4/28/2010, 4/27/2010, 4/26/2010, 4/25/2010, 4/24/2010, 4/23/2010, 4/22/2010, 4/21/2010, 4/20/2010, 4/19/2010, 4/18/2010, 4/17/2010, 4/15/2010, 4/14/2010, 4/13/2010, 4/12/2010, 4/11/2010, 4/10/2010, 4/9/2010, 4/8/2010, 4/7/2010, 4/6/2010, 4/5/2010, 4/4/2010, 4/3/2010, 4/2/2010, 4/1/2010, 3/31/2010, 3/30/2010, 3/29/2010, 3/28/2010, 3/27/2010, 3/26/2010, 3/25/2010, 3/24/2010, 3/23/2010, 3/22/2010, 3/21/2010, 3/20/2010, 3/19/2010, 3/18/2010, 3/17/2010, 3/17/2010, 3/16/2010, 3/15/2010, 3/12/2010, 3/11/2010, 3/5/2010, 3/1/2010, 1/31/2010, 1/30/2010, 1/29/2010, 1/28/2010, 1/27/2010, 1/26/2010, 11/5/2009, 10/7/2009, 10/5/2009, 10/4/2009, 10/3/2009, 10/2/2009, 10/1/2009, 9/21/2009, 9/15/2009, 9/4/2009, 4/27/2009, 2/23/2009, 8/14/2008, 11/29/2007    COLONOSCOPY 11/17/2020 11/17/2010            Current Immunizations     Influenza TIV (IM) 9/3/2013  8:45 PM, 9/20/2011    Influenza Vaccine 12/1/2009    Influenza Vaccine Pediatric 11/11/2010    Influenza  Vaccine Quad Inj (Pf) 11/20/2014  1:50 PM    Influenza Vaccine Quad Inj (Preserved) 11/14/2016  9:52 AM, 10/14/2015    Pneumococcal Vaccine (UF)Historical Data 12/1/2009    Pneumococcal polysaccharide vaccine (PPSV-23) 11/14/2016  9:53 AM    Tdap Vaccine  Incomplete      Below and/or attached are the medications your provider expects you to take. Review all of your home medications and newly ordered medications with your provider and/or pharmacist. Follow medication instructions as directed by your provider and/or pharmacist. Please keep your medication list with you and share with your provider. Update the information when medications are discontinued, doses are changed, or new medications (including over-the-counter products) are added; and carry medication information at all times in the event of emergency situations     Allergies:  PCN - Anaphylaxis,Rash               Medications  Valid as of: May 09, 2017 - 10:17 AM    Generic Name Brand Name Tablet Size Instructions for use    Albuterol Sulfate (Aero Soln) albuterol 108 (90 BASE) MCG/ACT Inhale 2 Puffs by mouth every four hours as needed. For Shortness of breath        Amitriptyline HCl (Tab) ELAVIL 25 MG Take 25 mg by mouth every bedtime.        Ascorbic Acid (Tab) ascorbic acid 500 MG TAKE ONE [1] TABLET BY MOUTH EVERY DAY        Budesonide-Formoterol Fumarate (Aerosol) SYMBICORT 160-4.5 MCG/ACT Inhale 2 Puffs by mouth 2 Times a Day. With spacer, rinse mouth after use        CarBAMazepine (Tab) TEGRETOL 200 MG Take 3 Tabs by mouth every bedtime.        Ergocalciferol (Cap) DRISDOL 77016 UNITS TAKE 1 CAPSULE BY MOUTH EVERY 28 DAYS.        FLUoxetine HCl (Cap) PROZAC 20 MG Take 20 mg by mouth every day.        Flurazepam HCl (Cap) DALMANE 30 MG Take 30 mg by mouth at bedtime as needed.        Gabapentin (Cap) NEURONTIN 300 MG TAKE THREE (3) CAPSULES BY MOUTH THREE (3) TIMES DAILY         Incontinence Supply Disposable (Misc) DEPEND UNDERWEAR X-LARGE  1 Each by  Does not apply route 3 times a day.        Ipratropium-Albuterol (Aero Soln) COMBIVENT RESPIMAT  MCG/ACT Inhale 1 puff by mouth every 4 hours as needed for shortness of breath and/or wheezing (max is 6 puffs per 24 hours)        Ipratropium-Albuterol (Aero Soln) COMBIVENT RESPIMAT  MCG/ACT Inhale 1 Puff by mouth 4 times a day.        Levothyroxine Sodium (Tab) SYNTHROID 125 MCG TAKE TWO [2] TABLETS BY MOUTH EVERY DAY         Levothyroxine Sodium (Tab) SYNTHROID 200 MCG TAKE ONE (1) TABLET BY MOUTH EVERY MORNING ON AN EMPTY STOMACH.        Levothyroxine Sodium (Tab) SYNTHROID 25 MCG TAKE ONE (1) TABLET BY MOUTH EVERY MORNING ON AN EMPTY STOMACH. TO USE ALONG WITH 200 MCG FOR TOTAL  MCG.        MetFORMIN HCl (Tab) GLUCOPHAGE 500 MG TAKE ONE [1] TABLET BY MOUTH EVERY DAY        Misc. Devices (Misc) Misc. Devices  Pt needs service for her PMD she will need a new battery to avoid stalling        Misc. Devices (Misc) Misc. Devices  CPAP supplies (hoses, filter, water chamber, face mask-resmed small), nasal cannula tubing        Misc. Devices (Misc) Misc. Devices  O2 to use continuously to keep her sats >90 %        Misc. Devices (Misc) Misc. Devices  Please evaluate pt for wider power mobility devise, pt has tipped over on multiple occasions        Misc. Devices (Misc) Misc. Devices  O2 tank rack for power wheel chair        Misc. Devices (Misc) Misc. Devices  Adult incontinence supplies to use as directed        Misc. Devices (Misc) Misc. Devices  Power mobility devise        Misc. Devices (Misc) Misc. Devices  O2 concentrator to use 24/7 and also with her CPAP at night to keep her O2 sats > 90%        Misc. Devices (Misc) Misc. Devices  Incontinence supplies (for wheel chair) and adult briefs to use as needed #120        Misc. Devices (Misc) Misc. Devices  New battery and gear shifter for power wheel chair        Mometasone Furoate (Suspension) NASONEX 50 MCG/ACT Spray 1-2 Sprays in nose every day.  Each nostril.        Nicotine Polacrilex (Gum) NICORETTE 2 MG Take 1 Each by mouth as needed for Smoking Cessation.        Ostomy Supplies (Misc) Ostomy Supplies  Dispense as ordered        RaNITidine HCl (Tab) ZANTAC 150 MG TAKE ONE (1) TABLET BY MOUTH TWICE DAILY WITH MEALS        Tiotropium Bromide Monohydrate (Aero Soln) Tiotropium Bromide Monohydrate 2.5 MCG/ACT Inhale 2.5 mcg by mouth every day at 6 PM.        Tiotropium Bromide Monohydrate (Aero Soln) Tiotropium Bromide Monohydrate 2.5 MCG/ACT Inhale 2 Puffs by mouth every day.        TraZODone HCl (Tab) DESYREL 50 MG Take 1 Tab by mouth at bedtime as needed for Sleep.        Warfarin Sodium (Tab) COUMADIN 10 MG Take one tablet daily as directed by coumadin clinic        Warfarin Sodium (Tab) COUMADIN 10 MG Take 1 to 1.5 tablets by mouth daily as directed by Coumadin Clinic.        .                 Medicines prescribed today were sent to:     [id number] [place] - [address] [facility] [place] [address]    Phone: [phone] Fax: [phone]    Open 24 Hours?: No    [facility] - [place] - [address] [facility] [address]    [address] [facility] [address] [place] [address]    Phone: [phone] Fax: [phone]    Open 24 Hours?: No      Medication refill instructions:       If your prescription bottle indicates you have medication refills left, it is not necessary to call your provider’s office. Please contact your pharmacy and they will refill your medication.    If your prescription bottle indicates you do not have any refills left, you may request refills at any time through one of the following ways: The online [email or web address] system (except Urgent Care), by calling your provider’s office, or by asking your pharmacy to contact your provider’s office with a refill request. Medication refills are processed only during regular business hours and may not be available until the next business day. Your provider may request additional information  or to have a follow-up visit with you prior to refilling your medication.   *Please Note: Medication refills are assigned a new Rx number when refilled electronically. Your pharmacy may indicate that no refills were authorized even though a new prescription for the same medication is available at the pharmacy. Please request the medicine by name with the pharmacy before contacting your provider for a refill.           MyChart Access Code: Activation code not generated  Current MyChart Status: Active          Quit Tobacco Information     Do you want to quit using tobacco?    Quitting tobacco decreases risks of cancer, heart and lung disease, increases life expectancy, improves sense of taste and smell, and increases spending money, among other benefits.    If you are thinking about quitting, we can help.  • [place] [employer] Quit Tobacco Program: [phone]  o Program occurs weekly for four weeks and includes pharmacist consultation on products to support quitting smoking or chewing tobacco. A provider referral is needed for pharmacist consultation.  • Tobacco Users Help Hotline: 4-171-QUIT-NOW (929-1620) or https://[place].quitlogix.org/  o Free, confidential telephone and online coaching for [place] residents. Sessions are designed on a schedule that is convenient for you. Eligible clients receive free nicotine replacement therapy.  • Nationally: www.smokefree.gov  o Information and professional assistance to support both immediate and long-term needs as you become, and remain, a non-smoker. Smokefree.gov allows you to choose the help that best fits your needs.

## 2017-05-09 NOTE — PROGRESS NOTES
"Subjective:      Atiya Henley is a 66 y.o. female who presents with No chief complaint on file.            HPI Comments: Pt here for follow up of her insomnia, COPD, sleep apnea, and hypothyroidism.    She has been having difficulty getting the medications that have been prescribed by her psychiatrist. She states recently despite the medication has been limited so she has not been able to get it from her pharmacy. She will contact her psychiatrist to let them know the situation. In the meantime will prescribe trazodone for her to use as directed. She will also continue to use her CPAP as directed by her sleep specialist. We'll continue to follow.    She has been taking her thyroid medication as directed. Her last TSH was 0.61 discussed the possibility that she may be on too much thyroid medication. Patient is reluctant to decrease her thyroid medication because she states\" it is working for me\". Will have her discuss the level of medication with her endocrinologist. Patient has been advised that her thyroid medication could be affecting her sleep pattern as well. We'll continue to follow.    She has been decreasing the amount of tobacco she has been smoking. She states that she doesn't smoke cigarettes anymore but rolls her own cigarettes and only smokes part of it during the day. Will order a pulmonary function test for a further evaluation of her COPD. Will have her continue to use her inhalers as directed. We'll continue to follow.     Current medications, allergies, and problem list reviewed with patient and updated in EPIC.        Review of Systems   Constitutional: Negative for fever and chills.   HENT: Negative for hearing loss.    Respiratory: Negative for shortness of breath.    Cardiovascular: Negative for chest pain and palpitations.   Gastrointestinal: Negative for nausea, vomiting and abdominal pain.   Musculoskeletal: Positive for back pain.   Neurological: Positive for tingling. Negative for " "headaches.          Objective:     LMP 08/02/1975   Filed Vitals:    05/09/17 0948   BP: 130/90   Pulse: 68   Temp: 36.1 °C (97 °F)   Resp: 20   Height: 1.626 m (5' 4.02\")   Weight: 136.079 kg (300 lb)   SpO2: 98%         Physical Exam   HENT:   Right Ear: External ear normal.   Left Ear: External ear normal.   Nose: Nose normal.   Mouth/Throat: Oropharynx is clear and moist.   Eyes: EOM are normal. Pupils are equal, round, and reactive to light.   Neck: Normal range of motion.   Cardiovascular: Normal rate, regular rhythm and normal heart sounds.  Exam reveals no friction rub.    No murmur heard.  Pulmonary/Chest: Breath sounds normal. No respiratory distress. She has no wheezes.   Abdominal: Soft. Bowel sounds are normal. She exhibits no distension. There is no tenderness. There is no rebound and no guarding.   Has colostomy bag intact with hernia   Musculoskeletal:   In power wheelchair   Neurological: She is alert.   Skin: Skin is warm and dry.   Psychiatric: Her behavior is normal.               Assessment/Plan:     1. Insomnia, unspecified type  We'll have patient try trazodone as directed for her insomnia. She states that the medication she has been using was not available at the pharmacy so she will contact her psychiatrist to see if they would be willing to change her to another medication but in the meantime will have her use of trazodone as directed. We'll continue to follow.  - trazodone (DESYREL) 50 MG Tab; Take 1 Tab by mouth at bedtime as needed for Sleep.  Dispense: 30 Tab; Refill: 3    2. YESSICA (obstructive sleep apnea)  We'll have her continue to use her CPAP as directed. We'll continue to follow.    3. Hypothyroidism, unspecified type  Discussed the multiple medications she is currently taking for her thyroid disease, as possibly causing some problems with her sleeping. Her last TSH was slightly low at 0.61. Will have her discuss her dose with her endocrinologist. We'll continue to follow.    4. " Colostomy fistula (CMS-HCC)  She has been doing well with her colostomy no current problems By Patient. We'll Continue to Follow.    5. Pulmonary emphysema, unspecified emphysema type (CMS-[facility])  We'll have her continue to use her inhalers as directed. Will order a pulmonary function test for further assessment and will continue to follow.  - PULMONARY FUNCTION TESTS Test requested: Complete Pulmonary Function Test    6. Need for Tdap vaccination  We'll have patient get vaccine today discuss the risks and benefits of the vaccine. We'll continue to follow.  - Tdap =>8yo IM

## 2017-05-17 NOTE — TELEPHONE ENCOUNTER
DOCUMENTATION OF PRIOR AUTH STATUS    1. Medication name and dose: Spiriva Respimat 2.5 mcg    2. Name and Phone # of Prescription coverage company: Medicaid [id number]    3. Date Prior Auth was submitted: 5/2/2017    4. What information was given to obtain insurance decision: Clinical notes    5. Prior Auth letter Approved or Denied: Approved through 5/12/2018    6. Pharmacy notified: Yes    7. Patient notified: Yes

## 2017-05-22 ENCOUNTER — ANTICOAGULATION VISIT (OUTPATIENT)
Dept: VASCULAR LAB | Facility: MEDICAL CENTER | Age: 67
End: 2017-05-22
Attending: INTERNAL MEDICINE
Payer: MEDICAID

## 2017-05-22 ENCOUNTER — OFFICE VISIT (OUTPATIENT)
Dept: PULMONOLOGY | Facility: HOSPICE | Age: 67
End: 2017-05-22
Payer: MEDICAID

## 2017-05-22 ENCOUNTER — NON-PROVIDER VISIT (OUTPATIENT)
Dept: PULMONOLOGY | Facility: HOSPICE | Age: 67
End: 2017-05-22
Payer: MEDICAID

## 2017-05-22 VITALS — WEIGHT: 293 LBS | BODY MASS INDEX: 53.19 KG/M2

## 2017-05-22 VITALS
OXYGEN SATURATION: 91 % | BODY MASS INDEX: 53.19 KG/M2 | HEART RATE: 120 BPM | WEIGHT: 293 LBS | RESPIRATION RATE: 16 BRPM | DIASTOLIC BLOOD PRESSURE: 72 MMHG | SYSTOLIC BLOOD PRESSURE: 132 MMHG

## 2017-05-22 DIAGNOSIS — G47.33 OSA (OBSTRUCTIVE SLEEP APNEA): ICD-10-CM

## 2017-05-22 DIAGNOSIS — I82.401 ACUTE DEEP VEIN THROMBOSIS (DVT) OF RIGHT LOWER EXTREMITY, UNSPECIFIED VEIN (HCC): ICD-10-CM

## 2017-05-22 DIAGNOSIS — J30.9 ALLERGIC RHINITIS, UNSPECIFIED ALLERGIC RHINITIS TRIGGER, UNSPECIFIED RHINITIS SEASONALITY: ICD-10-CM

## 2017-05-22 DIAGNOSIS — R09.02 HYPOXEMIA: ICD-10-CM

## 2017-05-22 DIAGNOSIS — J44.89 CHRONIC OBSTRUCTIVE ASTHMA: ICD-10-CM

## 2017-05-22 DIAGNOSIS — E66.01 MORBID OBESITY, UNSPECIFIED OBESITY TYPE (HCC): ICD-10-CM

## 2017-05-22 DIAGNOSIS — Z79.01 CHRONIC ANTICOAGULATION: ICD-10-CM

## 2017-05-22 DIAGNOSIS — Z72.0 TOBACCO ABUSE: ICD-10-CM

## 2017-05-22 DIAGNOSIS — Z95.828 S/P IVC FILTER: ICD-10-CM

## 2017-05-22 LAB
INR BLD: 4.3 (ref 0.9–1.2)
INR PPP: 4.3 (ref 2–3.5)

## 2017-05-22 PROCEDURE — 94729 DIFFUSING CAPACITY: CPT | Performed by: INTERNAL MEDICINE

## 2017-05-22 PROCEDURE — 85610 PROTHROMBIN TIME: CPT

## 2017-05-22 PROCEDURE — 94726 PLETHYSMOGRAPHY LUNG VOLUMES: CPT | Performed by: INTERNAL MEDICINE

## 2017-05-22 PROCEDURE — 99212 OFFICE O/P EST SF 10 MIN: CPT | Performed by: NURSE PRACTITIONER

## 2017-05-22 PROCEDURE — 94060 EVALUATION OF WHEEZING: CPT | Performed by: INTERNAL MEDICINE

## 2017-05-22 PROCEDURE — 99214 OFFICE O/P EST MOD 30 MIN: CPT | Performed by: NURSE PRACTITIONER

## 2017-05-22 RX ORDER — LEVOTHYROXINE SODIUM 0.03 MG/1
TABLET ORAL
Qty: 30 TAB | Refills: 1 | Status: SHIPPED | OUTPATIENT
Start: 2017-05-22 | End: 2017-06-20 | Stop reason: SDUPTHER

## 2017-05-22 RX ORDER — LEVOTHYROXINE SODIUM 0.2 MG/1
TABLET ORAL
Qty: 30 TAB | Refills: 1 | Status: SHIPPED | OUTPATIENT
Start: 2017-05-22 | End: 2017-06-20 | Stop reason: SDUPTHER

## 2017-05-22 ASSESSMENT — PULMONARY FUNCTION TESTS
FEV1/FVC: 84
FVC: 1.7
FEV1_PREDICTED: 2.24
FEV1/FVC_PERCENT_PREDICTED: 111
FEV1/FVC_PERCENT_PREDICTED: 76
FVC_PREDICTED: 2.93
FEV1: 1.42
FEV1_PERCENT_PREDICTED: 63
FVC_PERCENT_PREDICTED: 57

## 2017-05-22 NOTE — PROGRESS NOTES
Chief Complaint   Patient presents with   • Asthma     COPD    • Apnea       HPI:  Atiya Henley is a 66 y.o. year old female here today for follow-up on her chronic obstructive Asthma. She also has a history of Obstructive Sleep Apnea and is on CPAP 9 CM H20 with 3 LPM 02 bleed in. She has been followed by Dr. Hodge in the past, but he retired. She did not bring her compliance chip to today's office visit. She states her current mask is a good fit. She often feels her CPAP pressure is too low and she will wake during the night gasping for air. She is waking un refreshed and is tired during the day. She notes trouble sleep at night. She states her Psychiatrist placed her on Dalmane, but she was unable to get this from the pharmacy. She was switched to Trazodone which helps, but she still has trouble falling asleep with it. She denies any morning headaches. She is on oxygen at 3 LPM with exertion. PFT's today in the office indicate an FEV1 of 1.42 L, 63% predicted with an FEV1/FVC ratio of 84 with a TLC of 78% and a DLCO of 97% predicted. Prior PFT's indicated an FEV1 of 1.55 L, 67% predicted with an FEV1/FVC ratio of 84 with a TLC of 84% predicted with a DLCO of 88% predicted. She does continue to smoke cigarettes and is smoking 1/24-1/2 pack per day. She is working on cutting back.    She is compliant on Symbicort 160/4.5 mcg 2 puffs INH bid, along with a Combivent respimat and Proventil HFA. She had been on Spiriva in the past, but is unsure of why this was stopped. She feels her chronic dyspnea is worse with exertion. She has a morning cough which is productive with clear mucous. She denies hemoptysis. She notes an occasional wheeze in the morning and with exertion. She denies any fevers or chills. She denies any recent respiratory infections. She does have clear sinus drainage which is chronic for her and she feels may be allergy triggered. She does have a Nasonex nasal spray which she uses as needed an  feels helps.       Past Medical History   Diagnosis Date   • Obesity 7/7/2009   • Oxygen dependent      4.5 L NC   • EMPHYSEMA    • Snoring    • Hypothyroid 7/7/2009   • Arthritis    • Cancer (CMS-HCC)      cervical   • Psychiatric problem      depression   • COPD    • Indigestion    • Sleep apnea      USES CPAP, 3-4L oxygen   • Hypertension      stated no longer on meds due to lost 100 lbs   • Colostomy in place (CMS-HCC) 2009   • History of total knee arthroplasty      infection   • Status post cholecystectomy 1992   • Chronic autoimmune thyroiditis      + TPO ab / + ultrasound   • Weight gain    • Obstruction      colostomy   • Pain      b/l legs   • Hashimoto's disease    • Personal history of venous thrombosis and embolism 2009, 2012     arm and leg left side   • Diabetes      type 2    • Dental disorder      dentures upper   • Infectious disease      MRSA,   • Infectious disease      VRE   • Bowel obstruction (CMS-HCC)    • Presence of IVC filter        Past Surgical History   Procedure Laterality Date   • Myringotomy  9/9/2009     Performed by RENETTA GOODSON at SURGERY SAME DAY [facility]   • Low anterior resection  3/10/2010     Performed by JESUSITA SUMNER at SURGERY [facility]   • Low anterior resection laparoscopic  3/10/2010     Performed by JESUSITA SUMNER at SURGERY [facility]   • Colostomy  3/16/2010     Performed by CHRISTA BENNETT at SURGERY [facility]   • Tracheostomy  3/31/2010     Performed by JESUSITA SUMNER at SURGERY [facility]   • Cholecystectomy  1992     laparoscopic   • Exploratory laparotomy  3/16/2010     Performed by CHRISTA BENNETT at SURGERY [facility]   • Colectomy  3/16/2010     Performed by CHRISTA BENNETT at SURGERY [facility]   • Hysterectomy laparoscopy  1983   • Colonoscopy - endo  11/17/2010     Performed by JESUSITA SUMNER at ENDOSCOPY [facility]   • Lateral release  7/20/2011     Performed by BERYL LOYD at SURGERY [facility]    • Other orthopedic surgery       LT KNEE X2   • Knee arthroscopy  7/20/2011     Performed by BERYL LOYD at [facility]   • Medial meniscectomy  7/20/2011     Performed by BERYL LOYD at [facility]   • Meniscectomy  7/20/2011     Performed by BERYL LOYD at [facility]   • Irrigation & debridement ortho  7/17/2012     Performed by BERYL LOYD at [facility]   • Irrigation & debridement ortho  7/19/2012     Performed by BERYL LOYD at [facility]   • Knee arthroplasty total  6/10/2012     left   • Irrigation & debridement general  12/17/2012     Performed by David Fowler M.D. at [facility]   • Irrigation & debridement ortho  2/8/2013     Performed by David Fowler M.D. at [facility]   • Exploratory laparotomy  9/2/2013     Performed by Maninder Carter M.D. at [facility]   • Parastomal hernia repair   9/2/2013     Performed by Maninder Carter M.D. at [facility]   • Bladder biopsy with cystoscopy  10/10/2013     Performed by Sang Castañeda M.D. at [facility]   • Colostomy  11/26/2013     Performed by Maninder aCrter M.D. at [facility]   • Exploratory laparotomy  11/26/2013     Performed by Maninder Carter M.D. at [facility]   • Lysis adhesions general  11/26/2013     Performed by Maninder Carter M.D. at [facility]   • Colon resection  11/26/2013     Performed by Maninder Carter M.D. at [facility]   • Exploratory laparotomy  12/10/2013     Performed by Sang Castañeda M.D. at [facility]   • Wound closure general  12/10/2013     Performed by Sang Castañeda M.D. at [facility]   • Pr inject rx other periph nerve Left 8/21/2015     Procedure: NEUROLYTIC DEST-OTHER NERVE;  Surgeon: Kd Galaviz D.O.;  Location: SURGERY [facility];  Service: Pain  Management   • Pr fluoroscopic guidance needle placement Left 8/21/2015     Procedure: FLOURO GUIDE NEEDLE PLACEMENT;  Surgeon: Kd Galaviz D.O.;  Location: SURGERY SURGICAL [facility];  Service: Pain Management   • Pr inject rx other periph nerve  8/21/2015     Procedure: NEUROLYTIC DEST-OTHER NERVE;  Surgeon: Kd Galaviz D.O.;  Location: SURGERY SURGICAL [facility];  Service: Pain Management   • Pr inject rx other periph nerve  8/21/2015     Procedure: NEUROLYTIC DEST-OTHER NERVE;  Surgeon: Kd Galaviz D.O.;  Location: SURGERY SURGICAL [facility];  Service: Pain Management       Family History   Problem Relation Age of Onset   • Heart Disease Mother    • Hypertension         Social History     Social History   • Marital Status:      Spouse Name: N/A   • Number of Children: N/A   • Years of Education: N/A     Occupational History   • Not on file.     Social History Main Topics   • Smoking status: Current Every Day Smoker -- 0.25 packs/day for 44 years     Types: Cigarettes   • Smokeless tobacco: Never Used      Comment: 1 ppd, 45 yrs   • Alcohol Use: No   • Drug Use: No   • Sexual Activity: Not on file     Other Topics Concern   • Not on file     Social History Narrative       ROS:  Constitutional: Denies fevers, chills, sweats, weight loss. Positive for fatigue  Eyes: Denies glasses, vision loss, pain, drainage, double vision  Ears/Nose/Mouth/Throat: Denies ear ache, difficulty hearing, sore throat, persistent hoarseness, decayed teeth/toothache. Positive for rhinitis   Cardiovascular: Denies chest pain, tightness, palpitations, swelling in feet/legs, fainting, difficulty breathing when laying down  Respiratory: See HPI   GI: Denies heartburn, difficulty swallowing, nausea, vomiting, abdominal pain, diarrhea, constipation  : Denies frequent urination, painful urination  Integumentary: Denies rashes, lumps or color changes  MSK: Denies painful joints, sore muscles, and back pain.    Neurological: Denies frequent headaches, dizziness, weakness  Sleep: See HPI       Current Outpatient Prescriptions on File Prior to Visit   Medication Sig Dispense Refill   • trazodone (DESYREL) 50 MG Tab Take 1 Tab by mouth at bedtime as needed for Sleep. 30 Tab 3   • Misc. Devices Misc New battery and gear shifter for power wheel chair 1 Device 0   • metformin (GLUCOPHAGE) 500 MG Tab TAKE ONE [1] TABLET BY MOUTH EVERY DAY 90 Tab 0   • ascorbic acid (ASCORBIC ACID) 500 MG Tab TAKE ONE [1] TABLET BY MOUTH EVERY DAY 90 Tab 2   • nicotine polacrilex (NICORETTE) 2 MG Gum Take 1 Each by mouth as needed for Smoking Cessation. 60 Each 3   • ranitidine (ZANTAC) 150 MG Tab TAKE ONE (1) TABLET BY MOUTH TWICE DAILY WITH MEALS 60 Tab 5   • gabapentin (NEURONTIN) 300 MG Cap TAKE THREE (3) CAPSULES BY MOUTH THREE (3) TIMES DAILY  270 Cap 5   • Incontinence Supply Disposable (DEPEND UNDERWEAR X-LARGE) Misc 1 Each by Does not apply route 3 times a day. 90 Each 6   • mometasone (NASONEX) 50 MCG/ACT nasal spray Spray 1-2 Sprays in nose every day. Each nostril. 1 Inhaler 6   • budesonide-formoterol (SYMBICORT) 160-4.5 MCG/ACT Aerosol Inhale 2 Puffs by mouth 2 Times a Day. With spacer, rinse mouth after use 1 Inhaler 6   • albuterol (PROVENTIL HFA) 108 (90 BASE) MCG/ACT Aero Soln inhalation aerosol Inhale 2 Puffs by mouth every four hours as needed. For Shortness of breath 1 Inhaler 6   • ipratropium-albuterol (COMBIVENT RESPIMAT)  MCG/ACT Aero Soln Inhale 1 Puff by mouth 4 times a day. 1 Inhaler 6   • Tiotropium Bromide Monohydrate (SPIRIVA RESPIMAT) 2.5 MCG/ACT Aero Soln Inhale 2 Puffs by mouth every day. 1 Inhaler 6   • vitamin D, Ergocalciferol, (DRISDOL) 13378 UNITS Cap capsule TAKE 1 CAPSULE BY MOUTH EVERY 28 DAYS. 3 Cap 3   • warfarin (COUMADIN) 10 MG Tab Take 1 to 1.5 tablets by mouth daily as directed by [facility]. 45 Tab 6   • Misc. Devices Misc Incontinence supplies (for wheel chair) and adult briefs to use  as needed #120 120 Device 6   • Ostomy Supplies Misc Dispense as ordered 20 Each 12   • levothyroxine (SYNTHROID) 125 MCG Tab TAKE TWO [2] TABLETS BY MOUTH EVERY DAY  60 Tab 1   • Misc. Devices Misc O2 concentrator to use 24/7 and also with her CPAP at night to keep her O2 sats > 90% 1 Device 0   • COMBIVENT RESPIMAT  MCG/ACT Aero Soln Inhale 1 puff by mouth every 4 hours as needed for shortness of breath and/or wheezing (max is 6 puffs per 24 hours) 1 Inhaler 5   • Misc. Devices Misc Adult incontinence supplies to use as directed 60 Device 6   • Misc. Devices Misc Power mobility devise 1 Device 0   • Tiotropium Bromide Monohydrate (SPIRIVA RESPIMAT) 2.5 MCG/ACT Aero Soln Inhale 2.5 mcg by mouth every day at 6 PM.     • flurazepam (DALMANE) 30 MG capsule Take 30 mg by mouth at bedtime as needed.     • fluoxetine (PROZAC) 20 MG Cap Take 20 mg by mouth every day.     • amitriptyline (ELAVIL) 25 MG Tab Take 25 mg by mouth every bedtime.     • Misc. Devices Misc Please evaluate pt for wider power mobility devise, pt has tipped over on multiple occasions 1 Device 0   • Misc. Devices Misc O2 tank rack for power wheel chair 1 Device 0   • warfarin (COUMADIN) 10 MG Tab Take one tablet daily as directed by coumadin clinic 30 Tab 0   • Misc. Devices Misc O2 to use continuously to keep her sats >90 % 1 Device 0   • Misc. Devices Misc CPAP supplies (hoses, filter, water chamber, face mask-resmed small), nasal cannula tubing 10 Device 6   • Misc. Devices Misc Pt needs service for her PMD she will need a new battery to avoid stalling 1 Device 0   • carbamazepine (TEGRETOL) 200 MG TABS Take 3 Tabs by mouth every bedtime. 90 Tab      No current facility-administered medications on file prior to visit.     Pcn    Blood pressure 132/72, pulse 120, resp. rate 16, weight 140.615 kg (310 lb), last menstrual period 08/02/1975, SpO2 91 %.  PE:   Appearance: Obese female, no acute distress  Eyes: PERRL, EOM intact, sclera white,  conjunctiva moist  Ears: no lesions or deformities  Hearing: grossly intact  Nose: no lesions or deformities  Oropharynx: tongue normal, posterior pharynx without erythema or exudate  Mallampati Classification: class 4  Neck: supple, trachea midline, no masses   Respiratory effort: no intercostal retractions or use of accessory muscles  Lung auscultation: no rales, rhonchi or wheezes  Heart auscultation: no murmur rub or gallop  Extremities: no cyanosis or edema  Abdomen: soft ,non tender, no masses  Gait and Station: wheelchair  Digits and nails: no clubbing, cyanosis, petechiae or nodes.  Cranial nerves: grossly intact  Skin: no rashes, lesions or ulcers noted  Orientation: Oriented to time, person and place  Mood and affect: mood and affect appropriate, normal interaction with examiner  Judgement: Intact          Assessment:  1. Chronic obstructive asthma (CMS-[facility])     2. YESSICA (obstructive sleep apnea)     3. Tobacco abuse     4. Allergic rhinitis, unspecified allergic rhinitis trigger, unspecified rhinitis seasonality     5. Morbid obesity, unspecified obesity type (CMS-[facility])     6. Hypoxemia           Plan:    1) In lab titration study now, consider switch to Bipap if needed. Request copy of her initial Polysomnogram through Dr. Hodge's office.    2) Smoking cessation discussed and recommended. She is working on quitting.  3) Continue 02 3 LPM prn exertion.  4) Continue Symbicort 160/4.5 mcg. Restart Spiriva respimat 2.5 mcg 2 puffs INH daily. Samples and RX provided. Continue Combivent and Proventil inhalers.   5) Continue Nasonex.  6) She is up to date on Prevnar 13, Pneumovax 23 and Influenza vaccines.  7) Weight loss recommended.  8) Sleep hygiene discussed. Continue Trazodone prescribed by Psychiatry.   9) Follow up after titration study, sooner if needed.

## 2017-05-22 NOTE — MR AVS SNAPSHOT
Atiya Henley   2017 3:00 PM   Non-Provider Visit   MRN: [id number]    Department:  [facility]   Dept Phone:  [phone]    Description:  Female : 1950   Provider:  Jose Luis Lazaro M.D.           Reason for Visit     COPD           Allergies as of 2017     Allergen Noted Reactions    Pcn [Penicillins] 07/10/2014   Anaphylaxis, Rash    Reaction; happened as a child. Tolerated cephalosporins and Zosyn      You were diagnosed with     Chronic obstructive asthma (CMS-[facility])   [0625109]         Vital Signs     Weight Last Menstrual Period Smoking Status             140.615 kg (310 lb) 1975 Current Every Day Smoker         Basic Information     Date Of Birth Sex Race Ethnicity Preferred Language    1950 Female White Non- English      Your appointments     May 22, 2017  4:00 PM   Established Patient Pul with ALCIRA Chavez   [facility] (--)    [address]  [address]  [facility] [address] [phone]            2017  8:30 AM   Established Patient with [facility] EXAM 4   [facility]  (--)    [address] [address]   [phone]            2017 10:50 AM   Established Patient with Carmelo Pat M.D.   [facility] ([facility])    [address] [facility] [id number] [phone]           You will be receiving a confirmation call a few days before your appointment from our automated call confirmation system.            2017  8:30 AM   New Patient with Choco Smith M.D.   [facility] (--)    [address]  [facility] [address] [phone]           Please bring Photo ID, Insurance Cards, All Medication Bottles and copies of any legal documents (such as Living Will, Power of ) If speaking a language besides English please bring an adult . Please arrive 30 minutes prior for check  in and registration. You will be receiving a confirmation call a few days before your appointment from our automated call confirmation system.              Problem List              ICD-10-CM Priority Class Noted - Resolved    Morbid obesity (CMS-HCC) E66.01 Low  7/7/2009 - Present    YESSICA (obstructive sleep apnea) G47.33 Medium  7/7/2009 - Present    Hypothyroid E03.9 Medium  7/7/2009 - Present    Prediabetes R73.03   9/10/2009 - Present    HTN (hypertension) I10 Medium  9/10/2009 - Present    Anemia D64.9 Medium  3/22/2010 - Present    DVT (deep venous thrombosis) (CMS-HCC) I82.409 Medium  4/10/2010 - Present    Anxiety F41.9   5/17/2010 - Present    Depression F32.9 Low  5/17/2010 - Present    Vitamin d deficiency    8/31/2011 - Present    Chronic autoimmune thyroiditis E06.3   10/2/2012 - Present    Chronic anticoagulation Z79.01   10/16/2012 - Present    Vitamin d deficiency    1/30/2013 - Present    Other pulmonary insufficiency, not elsewhere classified, following trauma and surgery J95.89 High  9/2/2013 - Present    COPD (chronic obstructive pulmonary disease) (CMS-HCC) J44.9 Medium  9/2/2013 - Present    Tobacco abuse Z72.0 Low  9/18/2013 - Present    S/P IVC filter Z95.828 Medium  9/18/2013 - Present    Bipolar disorder F31.9 Low  9/18/2013 - Present    History of hernia repair Z98.890, Z87.19   10/7/2013 - Present    Obesity E66.9   7/7/2009 - Present    GERD (gastroesophageal reflux disease) K21.9 Low  11/25/2013 - Present    Colostomy fistula (CMS-HCC) K94.09 High  12/3/2013 - Present    Thrombocytosis (CMS-HCC) D47.3 Medium  12/3/2013 - Present    Hypomagnesemia E83.42 Medium  12/4/2013 - Present    Hyponatremia E87.1 Medium  12/10/2013 - Present    Vitamin D deficiency E55.9   8/4/2015 - Present    Pain in joint, lower leg M25.569   8/21/2015 - Present    Deep vein thrombosis (DVT) (CMS-HCC) I82.409   11/11/2015 - Present    Cervical spondylosis M47.812   5/4/2016 - Present    DDD (degenerative disc  disease), cervical M50.30   5/4/2016 - Present    Chronic neck pain M54.2, G89.29   5/4/2016 - Present    Lumbosacral spondylosis M47.817   5/4/2016 - Present    DDD (degenerative disc disease), lumbar M51.36   5/4/2016 - Present    Lumbar radiculopathy M54.16   5/4/2016 - Present    Chronic low back pain M54.5, G89.29   5/4/2016 - Present    Left knee pain M25.562   5/4/2016 - Present    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, Z96.659   5/4/2016 - Present    Chronic pain syndrome G89.4   8/25/2016 - Present    Positive FIT (fecal immunochemical test) R19.5   9/27/2016 - Present    Chronic obstructive asthma (CMS-HCC) J44.9, J45.909   11/14/2016 - Present    Allergic rhinitis J30.9   11/14/2016 - Present    Hypoxemia R09.02   11/14/2016 - Present      Health Maintenance        Date Due Completion Dates    RETINAL SCREENING 11/2/1970 ---    PAP SMEAR 11/2/1973 ---    IMM ZOSTER VACCINE 11/2/2012 ---    MAMMOGRAM 6/29/2017 6/29/2016    A1C SCREENING 9/20/2017 3/20/2017, 8/3/2015, 7/10/2014, 6/20/2014, 11/29/2013, 5/17/2013, 8/31/2012, 7/16/2012, 3/30/2012, 7/19/2011, 5/7/2010, 10/4/2009    DIABETES MONOFILAMENT / LE EXAM 12/6/2017 12/6/2016    FASTING LIPID PROFILE 3/20/2018 3/20/2017, 8/30/2016, 8/3/2015, 7/11/2014, 6/20/2014, 5/17/2013, 7/19/2011, 5/1/2009, 2/23/2009, 9/16/2008, 5/9/2008, 11/29/2007    URINE ACR / MICROALBUMIN 3/20/2018 3/20/2017, 8/3/2015, 6/20/2014    SERUM CREATININE 3/20/2018 3/20/2017, 8/30/2016, 8/3/2015, 7/11/2014, 7/10/2014, 6/20/2014, 2/6/2014, 1/31/2014, 1/17/2014, 1/13/2014, 12/19/2013, 12/18/2013, 12/17/2013, 12/16/2013, 12/15/2013, 12/14/2013, 12/13/2013, 12/12/2013, 12/11/2013, 12/10/2013, 12/10/2013, 12/9/2013, 12/8/2013, 12/7/2013, 12/6/2013, 12/5/2013, 12/4/2013, 12/3/2013, 12/2/2013, 11/30/2013, 11/28/2013, 11/27/2013, 11/25/2013, 11/22/2013, 11/20/2013, 11/10/2013, 10/27/2013, 10/20/2013, 10/15/2013, 10/14/2013, 10/13/2013, 10/12/2013, 10/11/2013, 10/10/2013,  10/9/2013, 10/8/2013, 10/7/2013, 9/25/2013, 9/24/2013, 9/22/2013, 9/21/2013, 9/20/2013, 9/19/2013, 9/18/2013, 9/17/2013, 9/17/2013, 9/15/2013, 9/13/2013, 9/12/2013, 9/11/2013, 9/10/2013, 9/9/2013, 9/9/2013, 9/8/2013, 9/7/2013, 9/7/2013, 9/6/2013, 9/5/2013, 9/4/2013, 9/3/2013, 9/1/2013, 8/31/2013, 5/17/2013, 1/25/2013, 12/13/2012, 10/17/2012, 10/1/2012, 8/24/2012, 8/18/2012, 7/20/2012, 7/17/2012, 7/16/2012, 1/4/2012, 7/20/2011, 7/19/2011, 1/13/2011, 12/6/2010, 10/26/2010, 7/2/2010, 6/23/2010, 5/17/2010, 5/4/2010, 5/3/2010, 5/1/2010, 4/30/2010, 4/29/2010, 4/28/2010, 4/27/2010, 4/26/2010, 4/25/2010, 4/24/2010, 4/23/2010, 4/22/2010, 4/21/2010, 4/20/2010, 4/19/2010, 4/18/2010, 4/17/2010, 4/15/2010, 4/14/2010, 4/13/2010, 4/12/2010, 4/11/2010, 4/10/2010, 4/9/2010, 4/8/2010, 4/7/2010, 4/6/2010, 4/5/2010, 4/4/2010, 4/3/2010, 4/2/2010, 4/1/2010, 3/31/2010, 3/30/2010, 3/29/2010, 3/28/2010, 3/27/2010, 3/26/2010, 3/25/2010, 3/24/2010, 3/23/2010, 3/22/2010, 3/21/2010, 3/20/2010, 3/19/2010, 3/18/2010, 3/17/2010, 3/17/2010, 3/16/2010, 3/15/2010, 3/12/2010, 3/11/2010, 3/5/2010, 3/1/2010, 1/31/2010, 1/30/2010, 1/29/2010, 1/28/2010, 1/27/2010, 1/26/2010, 11/5/2009, 10/7/2009, 10/5/2009, 10/4/2009, 10/3/2009, 10/2/2009, 10/1/2009, 9/21/2009, 9/15/2009, 9/4/2009, 4/27/2009, 2/23/2009, 8/14/2008, 11/29/2007    COLONOSCOPY 11/17/2020 11/17/2010    IMM DTaP/Tdap/Td Vaccine (2 - Td) 5/9/2027 5/9/2017            Current Immunizations     Influenza TIV (IM) 9/3/2013  8:45 PM, 9/20/2011    Influenza Vaccine 12/1/2009    Influenza Vaccine Pediatric 11/11/2010    Influenza Vaccine Quad Inj (Pf) 11/20/2014  1:50 PM    Influenza Vaccine Quad Inj (Preserved) 11/14/2016  9:52 AM, 10/14/2015    Pneumococcal Vaccine (UF)Historical Data 12/1/2009    Pneumococcal polysaccharide vaccine (PPSV-23) 11/14/2016  9:53 AM    Tdap Vaccine 5/9/2017      Below and/or attached are the medications your provider expects you to take. Review all of your home  medications and newly ordered medications with your provider and/or pharmacist. Follow medication instructions as directed by your provider and/or pharmacist. Please keep your medication list with you and share with your provider. Update the information when medications are discontinued, doses are changed, or new medications (including over-the-counter products) are added; and carry medication information at all times in the event of emergency situations     Allergies:  PCN - Anaphylaxis,Rash               Medications  Valid as of: May 22, 2017 -  3:35 PM    Generic Name Brand Name Tablet Size Instructions for use    Albuterol Sulfate (Aero Soln) albuterol 108 (90 BASE) MCG/ACT Inhale 2 Puffs by mouth every four hours as needed. For Shortness of breath        Amitriptyline HCl (Tab) ELAVIL 25 MG Take 25 mg by mouth every bedtime.        Ascorbic Acid (Tab) ascorbic acid 500 MG TAKE ONE [1] TABLET BY MOUTH EVERY DAY        Budesonide-Formoterol Fumarate (Aerosol) SYMBICORT 160-4.5 MCG/ACT Inhale 2 Puffs by mouth 2 Times a Day. With spacer, rinse mouth after use        CarBAMazepine (Tab) TEGRETOL 200 MG Take 3 Tabs by mouth every bedtime.        Ergocalciferol (Cap) DRISDOL 27759 UNITS TAKE 1 CAPSULE BY MOUTH EVERY 28 DAYS.        FLUoxetine HCl (Cap) PROZAC 20 MG Take 20 mg by mouth every day.        Flurazepam HCl (Cap) DALMANE 30 MG Take 30 mg by mouth at bedtime as needed.        Gabapentin (Cap) NEURONTIN 300 MG TAKE THREE (3) CAPSULES BY MOUTH THREE (3) TIMES DAILY         Incontinence Supply Disposable (Misc) DEPEND UNDERWEAR X-LARGE  1 Each by Does not apply route 3 times a day.        Ipratropium-Albuterol (Aero Soln) COMBIVENT RESPIMAT  MCG/ACT Inhale 1 puff by mouth every 4 hours as needed for shortness of breath and/or wheezing (max is 6 puffs per 24 hours)        Ipratropium-Albuterol (Aero Soln) COMBIVENT RESPIMAT  MCG/ACT Inhale 1 Puff by mouth 4 times a day.        Levothyroxine Sodium  (Tab) SYNTHROID 125 MCG TAKE TWO [2] TABLETS BY MOUTH EVERY DAY         Levothyroxine Sodium (Tab) SYNTHROID 25 MCG Take 1 tablet by mouth once every morning on an empty stomach. Use along with 200 mcg levothyroxine for a total of 225 mcg daily        Levothyroxine Sodium (Tab) SYNTHROID 200 MCG TAKE ONE (1) TABLET BY MOUTH EVERY MORNING ON AN EMPTY STOMACH        MetFORMIN HCl (Tab) GLUCOPHAGE 500 MG TAKE ONE [1] TABLET BY MOUTH EVERY DAY        Misc. Devices (Misc) Misc. Devices  Pt needs service for her PMD she will need a new battery to avoid stalling        Misc. Devices (Misc) Misc. Devices  CPAP supplies (hoses, filter, water chamber, face mask-resmed small), nasal cannula tubing        Misc. Devices (Misc) Misc. Devices  O2 to use continuously to keep her sats >90 %        Misc. Devices (Misc) Misc. Devices  Please evaluate pt for wider power mobility devise, pt has tipped over on multiple occasions        Misc. Devices (Misc) Misc. Devices  O2 tank rack for power wheel chair        Misc. Devices (Misc) Misc. Devices  Adult incontinence supplies to use as directed        Misc. Devices (Misc) Misc. Devices  Power mobility devise        Misc. Devices (Misc) Misc. Devices  O2 concentrator to use 24/7 and also with her CPAP at night to keep her O2 sats > 90%        Misc. Devices (Misc) Misc. Devices  Incontinence supplies (for wheel chair) and adult briefs to use as needed #120        Misc. Devices (Misc) Misc. Devices  New battery and gear shifter for power wheel chair        Mometasone Furoate (Suspension) NASONEX 50 MCG/ACT Spray 1-2 Sprays in nose every day. Each nostril.        Nicotine Polacrilex (Gum) NICORETTE 2 MG Take 1 Each by mouth as needed for Smoking Cessation.        Ostomy Supplies (Misc) Ostomy Supplies  Dispense as ordered        RaNITidine HCl (Tab) ZANTAC 150 MG TAKE ONE (1) TABLET BY MOUTH TWICE DAILY WITH MEALS        Tiotropium Bromide Monohydrate (Aero Soln) Tiotropium Bromide  Monohydrate 2.5 MCG/ACT Inhale 2.5 mcg by mouth every day at 6 PM.        Tiotropium Bromide Monohydrate (Aero Soln) Tiotropium Bromide Monohydrate 2.5 MCG/ACT Inhale 2 Puffs by mouth every day.        TraZODone HCl (Tab) DESYREL 50 MG Take 1 Tab by mouth at bedtime as needed for Sleep.        Warfarin Sodium (Tab) COUMADIN 10 MG Take one tablet daily as directed by coumadin clinic        Warfarin Sodium (Tab) COUMADIN 10 MG Take 1 to 1.5 tablets by mouth daily as directed by Coumadin Clinic.        .                 Medicines prescribed today were sent to:     [facility] [id number] - [place], [place] - [address]    [address] [place] [address]    Phone: [phone] Fax: [phone]    Open 24 Hours?: No    [facility] - [place], [place] - [address]    [address] [place] [address]    Phone: [phone] Fax: [phone]    Open 24 Hours?: No      Medication refill instructions:       If your prescription bottle indicates you have medication refills left, it is not necessary to call your provider’s office. Please contact your pharmacy and they will refill your medication.    If your prescription bottle indicates you do not have any refills left, you may request refills at any time through one of the following ways: The online [employer] system (except Urgent Care), by calling your provider’s office, or by asking your pharmacy to contact your provider’s office with a refill request. Medication refills are processed only during regular business hours and may not be available until the next business day. Your provider may request additional information or to have a follow-up visit with you prior to refilling your medication.   *Please Note: Medication refills are assigned a new Rx number when refilled electronically. Your pharmacy may indicate that no refills were authorized even though a new prescription for the same medication is available at the pharmacy. Please request the medicine by  name with the pharmacy before contacting your provider for a refill.           MyChart Access Code: Activation code not generated  Current MyChart Status: Active          Quit Tobacco Information     Do you want to quit using tobacco?    Quitting tobacco decreases risks of cancer, heart and lung disease, increases life expectancy, improves sense of taste and smell, and increases spending money, among other benefits.    If you are thinking about quitting, we can help.  • [facility] Quit Tobacco Program: [phone]  o Program occurs weekly for four weeks and includes pharmacist consultation on products to support quitting smoking or chewing tobacco. A provider referral is needed for pharmacist consultation.  • Tobacco Users Help Hotline: 7-482-QUIT-NOW (937-6516) or https://[place].quitlogix.org/  o Free, confidential telephone and online coaching for [place] residents. Sessions are designed on a schedule that is convenient for you. Eligible clients receive free nicotine replacement therapy.  • Nationally: www.smokefree.gov  o Information and professional assistance to support both immediate and long-term needs as you become, and remain, a non-smoker. Smokefree.gov allows you to choose the help that best fits your needs.

## 2017-05-22 NOTE — MR AVS SNAPSHOT
Atiya Henley   2017 4:00 PM   Office Visit   MRN: [id number]    Department:  [facility]   Dept Phone:  [phone]    Description:  Female : 1950   Provider:  ALCIRA Chavez           Reason for Visit     Asthma COPD     Apnea           Allergies as of 2017     Allergen Noted Reactions    Pcn [Penicillins] 07/10/2014   Anaphylaxis, Rash    Reaction; happened as a child. Tolerated cephalosporins and Zosyn      You were diagnosed with     Chronic obstructive asthma (CMS-HCC)   [3767577]       YESSICA (obstructive sleep apnea)   [901668]       Tobacco abuse   [513080]       Allergic rhinitis, unspecified allergic rhinitis trigger, unspecified rhinitis seasonality   [6404338]       Morbid obesity, unspecified obesity type (CMS-HCC)   [3682932]       Hypoxemia   [799.02.ICD-9-CM]         Vital Signs     Blood Pressure Pulse Respirations Weight Oxygen Saturation Last Menstrual Period    132/72 mmHg 120 16 140.615 kg (310 lb) 91% 1975    Smoking Status                   Current Every Day Smoker           Basic Information     Date Of Birth Sex Race Ethnicity Preferred Language    1950 Female White Non- English      Your appointments     May 30, 2017  8:10 PM   Sleep Study with SLEEP TECH   [facility] (--)    [address] [facility] [id number] [phone]            2017  8:30 AM   Established Patient with [facility] EXAM 4   [facility]  (--)    [address] [facility] [address]   [phone]            2017 10:50 AM   Established Patient with Carmelo Pat M.D.   [facility] (Healthcare Center)    [address] [facility] [id number] [phone]           You will be receiving a confirmation call a few days before your appointment from our automated call confirmation system.            2017  8:30 AM   New Patient with Choco SERVIN  OSMIN Smith.   [facility] (--)    [address]  [place] [id number] [phone]           Please bring Photo ID, Insurance Cards, All Medication Bottles and copies of any legal documents (such as Living Will, Power of ) If speaking a language besides English please bring an adult . Please arrive 30 minutes prior for check in and registration. You will be receiving a confirmation call a few days before your appointment from our automated call confirmation system.            Jul 11, 2017  2:00 PM   Established Patient Pul with ALCIRA Chavez   [facility] (--)    [address]  [place] [id number] [phone]              Problem List              ICD-10-CM Priority Class Noted - Resolved    Morbid obesity (CMS-HCC) E66.01 Low  7/7/2009 - Present    YESSICA (obstructive sleep apnea) G47.33 Medium  7/7/2009 - Present    Hypothyroid E03.9 Medium  7/7/2009 - Present    Prediabetes R73.03   9/10/2009 - Present    HTN (hypertension) I10 Medium  9/10/2009 - Present    Anemia D64.9 Medium  3/22/2010 - Present    DVT (deep venous thrombosis) (CMS-HCC) I82.409 Medium  4/10/2010 - Present    Anxiety F41.9   5/17/2010 - Present    Depression F32.9 Low  5/17/2010 - Present    Vitamin d deficiency    8/31/2011 - Present    Chronic autoimmune thyroiditis E06.3   10/2/2012 - Present    Chronic anticoagulation Z79.01   10/16/2012 - Present    Vitamin d deficiency    1/30/2013 - Present    Other pulmonary insufficiency, not elsewhere classified, following trauma and surgery J95.89 High  9/2/2013 - Present    COPD (chronic obstructive pulmonary disease) (CMS-HCC) J44.9 Medium  9/2/2013 - Present    Tobacco abuse Z72.0 Low  9/18/2013 - Present    S/P IVC filter Z95.828 Medium  9/18/2013 - Present    Bipolar disorder F31.9 Low  9/18/2013 - Present    History of hernia repair Z98.890, Z87.19   10/7/2013 - Present    Obesity E66.9   7/7/2009  - Present    GERD (gastroesophageal reflux disease) K21.9 Low  11/25/2013 - Present    Colostomy fistula (CMS-HCC) K94.09 High  12/3/2013 - Present    Thrombocytosis (CMS-HCC) D47.3 Medium  12/3/2013 - Present    Hypomagnesemia E83.42 Medium  12/4/2013 - Present    Hyponatremia E87.1 Medium  12/10/2013 - Present    Vitamin D deficiency E55.9   8/4/2015 - Present    Pain in joint, lower leg M25.569   8/21/2015 - Present    Deep vein thrombosis (DVT) (CMS-HCC) I82.409   11/11/2015 - Present    Cervical spondylosis M47.812   5/4/2016 - Present    DDD (degenerative disc disease), cervical M50.30   5/4/2016 - Present    Chronic neck pain M54.2, G89.29   5/4/2016 - Present    Lumbosacral spondylosis M47.817   5/4/2016 - Present    DDD (degenerative disc disease), lumbar M51.36   5/4/2016 - Present    Lumbar radiculopathy M54.16   5/4/2016 - Present    Chronic low back pain M54.5, G89.29   5/4/2016 - Present    Left knee pain M25.562   5/4/2016 - Present    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, Z96.659   5/4/2016 - Present    Chronic pain syndrome G89.4   8/25/2016 - Present    Positive FIT (fecal immunochemical test) R19.5   9/27/2016 - Present    Chronic obstructive asthma (CMS-HCC) J44.9, J45.909   11/14/2016 - Present    Allergic rhinitis J30.9   11/14/2016 - Present    Hypoxemia R09.02   11/14/2016 - Present      Health Maintenance        Date Due Completion Dates    RETINAL SCREENING 11/2/1970 ---    PAP SMEAR 11/2/1973 ---    IMM ZOSTER VACCINE 11/2/2012 ---    MAMMOGRAM 6/29/2017 6/29/2016    A1C SCREENING 9/20/2017 3/20/2017, 8/3/2015, 7/10/2014, 6/20/2014, 11/29/2013, 5/17/2013, 8/31/2012, 7/16/2012, 3/30/2012, 7/19/2011, 5/7/2010, 10/4/2009    DIABETES MONOFILAMENT / LE EXAM 12/6/2017 12/6/2016    FASTING LIPID PROFILE 3/20/2018 3/20/2017, 8/30/2016, 8/3/2015, 7/11/2014, 6/20/2014, 5/17/2013, 7/19/2011, 5/1/2009, 2/23/2009, 9/16/2008, 5/9/2008, 11/29/2007    URINE ACR / MICROALBUMIN 3/20/2018  3/20/2017, 8/3/2015, 6/20/2014    SERUM CREATININE 3/20/2018 3/20/2017, 8/30/2016, 8/3/2015, 7/11/2014, 7/10/2014, 6/20/2014, 2/6/2014, 1/31/2014, 1/17/2014, 1/13/2014, 12/19/2013, 12/18/2013, 12/17/2013, 12/16/2013, 12/15/2013, 12/14/2013, 12/13/2013, 12/12/2013, 12/11/2013, 12/10/2013, 12/10/2013, 12/9/2013, 12/8/2013, 12/7/2013, 12/6/2013, 12/5/2013, 12/4/2013, 12/3/2013, 12/2/2013, 11/30/2013, 11/28/2013, 11/27/2013, 11/25/2013, 11/22/2013, 11/20/2013, 11/10/2013, 10/27/2013, 10/20/2013, 10/15/2013, 10/14/2013, 10/13/2013, 10/12/2013, 10/11/2013, 10/10/2013, 10/9/2013, 10/8/2013, 10/7/2013, 9/25/2013, 9/24/2013, 9/22/2013, 9/21/2013, 9/20/2013, 9/19/2013, 9/18/2013, 9/17/2013, 9/17/2013, 9/15/2013, 9/13/2013, 9/12/2013, 9/11/2013, 9/10/2013, 9/9/2013, 9/9/2013, 9/8/2013, 9/7/2013, 9/7/2013, 9/6/2013, 9/5/2013, 9/4/2013, 9/3/2013, 9/1/2013, 8/31/2013, 5/17/2013, 1/25/2013, 12/13/2012, 10/17/2012, 10/1/2012, 8/24/2012, 8/18/2012, 7/20/2012, 7/17/2012, 7/16/2012, 1/4/2012, 7/20/2011, 7/19/2011, 1/13/2011, 12/6/2010, 10/26/2010, 7/2/2010, 6/23/2010, 5/17/2010, 5/4/2010, 5/3/2010, 5/1/2010, 4/30/2010, 4/29/2010, 4/28/2010, 4/27/2010, 4/26/2010, 4/25/2010, 4/24/2010, 4/23/2010, 4/22/2010, 4/21/2010, 4/20/2010, 4/19/2010, 4/18/2010, 4/17/2010, 4/15/2010, 4/14/2010, 4/13/2010, 4/12/2010, 4/11/2010, 4/10/2010, 4/9/2010, 4/8/2010, 4/7/2010, 4/6/2010, 4/5/2010, 4/4/2010, 4/3/2010, 4/2/2010, 4/1/2010, 3/31/2010, 3/30/2010, 3/29/2010, 3/28/2010, 3/27/2010, 3/26/2010, 3/25/2010, 3/24/2010, 3/23/2010, 3/22/2010, 3/21/2010, 3/20/2010, 3/19/2010, 3/18/2010, 3/17/2010, 3/17/2010, 3/16/2010, 3/15/2010, 3/12/2010, 3/11/2010, 3/5/2010, 3/1/2010, 1/31/2010, 1/30/2010, 1/29/2010, 1/28/2010, 1/27/2010, 1/26/2010, 11/5/2009, 10/7/2009, 10/5/2009, 10/4/2009, 10/3/2009, 10/2/2009, 10/1/2009, 9/21/2009, 9/15/2009, 9/4/2009, 4/27/2009, 2/23/2009, 8/14/2008, 11/29/2007    COLONOSCOPY 11/17/2020 11/17/2010    IMM DTaP/Tdap/Td Vaccine (2 - Td)  5/9/2027 5/9/2017            Current Immunizations     Influenza TIV (IM) 9/3/2013  8:45 PM, 9/20/2011    Influenza Vaccine 12/1/2009    Influenza Vaccine Pediatric 11/11/2010    Influenza Vaccine Quad Inj (Pf) 11/20/2014  1:50 PM    Influenza Vaccine Quad Inj (Preserved) 11/14/2016  9:52 AM, 10/14/2015    Pneumococcal Vaccine (UF)Historical Data 12/1/2009    Pneumococcal polysaccharide vaccine (PPSV-23) 11/14/2016  9:53 AM    Tdap Vaccine 5/9/2017      Below and/or attached are the medications your provider expects you to take. Review all of your home medications and newly ordered medications with your provider and/or pharmacist. Follow medication instructions as directed by your provider and/or pharmacist. Please keep your medication list with you and share with your provider. Update the information when medications are discontinued, doses are changed, or new medications (including over-the-counter products) are added; and carry medication information at all times in the event of emergency situations     Allergies:  PCN - Anaphylaxis,Rash               Medications  Valid as of: May 22, 2017 -  4:51 PM    Generic Name Brand Name Tablet Size Instructions for use    Albuterol Sulfate (Aero Soln) albuterol 108 (90 BASE) MCG/ACT Inhale 2 Puffs by mouth every four hours as needed. For Shortness of breath        Amitriptyline HCl (Tab) ELAVIL 25 MG Take 25 mg by mouth every bedtime.        Ascorbic Acid (Tab) ascorbic acid 500 MG TAKE ONE [1] TABLET BY MOUTH EVERY DAY        Budesonide-Formoterol Fumarate (Aerosol) SYMBICORT 160-4.5 MCG/ACT Inhale 2 Puffs by mouth 2 Times a Day. With spacer, rinse mouth after use        CarBAMazepine (Tab) TEGRETOL 200 MG Take 3 Tabs by mouth every bedtime.        Ergocalciferol (Cap) DRISDOL 26409 UNITS TAKE 1 CAPSULE BY MOUTH EVERY 28 DAYS.        FLUoxetine HCl (Cap) PROZAC 20 MG Take 20 mg by mouth every day.        Flurazepam HCl (Cap) DALMANE 30 MG Take 30 mg by mouth at bedtime as  needed.        Gabapentin (Cap) NEURONTIN 300 MG TAKE THREE (3) CAPSULES BY MOUTH THREE (3) TIMES DAILY         Incontinence Supply Disposable (Misc) DEPEND UNDERWEAR X-LARGE  1 Each by Does not apply route 3 times a day.        Ipratropium-Albuterol (Aero Soln) COMBIVENT RESPIMAT  MCG/ACT Inhale 1 puff by mouth every 4 hours as needed for shortness of breath and/or wheezing (max is 6 puffs per 24 hours)        Ipratropium-Albuterol (Aero Soln) COMBIVENT RESPIMAT  MCG/ACT Inhale 1 Puff by mouth 4 times a day.        Levothyroxine Sodium (Tab) SYNTHROID 125 MCG TAKE TWO [2] TABLETS BY MOUTH EVERY DAY         Levothyroxine Sodium (Tab) SYNTHROID 25 MCG Take 1 tablet by mouth once every morning on an empty stomach. Use along with 200 mcg levothyroxine for a total of 225 mcg daily        Levothyroxine Sodium (Tab) SYNTHROID 200 MCG TAKE ONE (1) TABLET BY MOUTH EVERY MORNING ON AN EMPTY STOMACH        MetFORMIN HCl (Tab) GLUCOPHAGE 500 MG TAKE ONE [1] TABLET BY MOUTH EVERY DAY        Misc. Devices (Misc) Misc. Devices  Pt needs service for her PMD she will need a new battery to avoid stalling        Misc. Devices (Misc) Misc. Devices  CPAP supplies (hoses, filter, water chamber, face mask-resmed small), nasal cannula tubing        Misc. Devices (Misc) Misc. Devices  O2 to use continuously to keep her sats >90 %        Misc. Devices (Misc) Misc. Devices  Please evaluate pt for wider power mobility devise, pt has tipped over on multiple occasions        Misc. Devices (Misc) Misc. Devices  O2 tank rack for power wheel chair        Misc. Devices (Misc) Misc. Devices  Adult incontinence supplies to use as directed        Misc. Devices (Misc) Misc. Devices  Power mobility devise        Misc. Devices (Misc) Misc. Devices  O2 concentrator to use 24/7 and also with her CPAP at night to keep her O2 sats > 90%        Misc. Devices (Misc) Misc. Devices  Incontinence supplies (for wheel chair) and adult briefs to use as  needed #120        Misc. Devices (Misc) Misc. Devices  New battery and gear shifter for power wheel chair        Mometasone Furoate (Suspension) NASONEX 50 MCG/ACT Spray 1-2 Sprays in nose every day. Each nostril.        Nicotine Polacrilex (Gum) NICORETTE 2 MG Take 1 Each by mouth as needed for Smoking Cessation.        Ostomy Supplies (Mis) Ostomy Supplies  Dispense as ordered        RaNITidine HCl (Tab) ZANTAC 150 MG TAKE ONE (1) TABLET BY MOUTH TWICE DAILY WITH MEALS        Tiotropium Bromide Monohydrate (Aero Soln) Tiotropium Bromide Monohydrate 2.5 MCG/ACT Inhale 2.5 mcg by mouth every day at 6 PM.        Tiotropium Bromide Monohydrate (Aero Soln) Tiotropium Bromide Monohydrate 2.5 MCG/ACT Inhale 2 Puffs by mouth every day.        Tiotropium Bromide Monohydrate (Aero Soln) Tiotropium Bromide Monohydrate 2.5 MCG/ACT Inhale 2 Inhalation by mouth every day. Assemble and prime.        TraZODone HCl (Tab) DESYREL 50 MG Take 1 Tab by mouth at bedtime as needed for Sleep.        Warfarin Sodium (Tab) COUMADIN 10 MG Take one tablet daily as directed by coumadin clinic        Warfarin Sodium (Tab) COUMADIN 10 MG Take 1 to 1.5 tablets by mouth daily as directed by Coumadin Clinic.        .                 Medicines prescribed today were sent to:     [id number] [place] - [address]    [address] [place] [address]    Phone: [phone] Fax: [phone]    Open 24 Hours?: No    [facility] [place] - [address]    [address] [place] [address]    Phone: [phone] Fax: [phone]    Open 24 Hours?: No      Medication refill instructions:       If your prescription bottle indicates you have medication refills left, it is not necessary to call your provider’s office. Please contact your pharmacy and they will refill your medication.    If your prescription bottle indicates you do not have any refills left, you may request refills at any time through one of the  following ways: The online [employer] system (except Urgent Care), by calling your provider’s office, or by asking your pharmacy to contact your provider’s office with a refill request. Medication refills are processed only during regular business hours and may not be available until the next business day. Your provider may request additional information or to have a follow-up visit with you prior to refilling your medication.   *Please Note: Medication refills are assigned a new Rx number when refilled electronically. Your pharmacy may indicate that no refills were authorized even though a new prescription for the same medication is available at the pharmacy. Please request the medicine by name with the pharmacy before contacting your provider for a refill.           [employer] Access Code: Activation code not generated  Current [employer] Status: Active          Quit Tobacco Information     Do you want to quit using tobacco?    Quitting tobacco decreases risks of cancer, heart and lung disease, increases life expectancy, improves sense of taste and smell, and increases spending money, among other benefits.    If you are thinking about quitting, we can help.  • [employer] Quit Tobacco Program: 825.186.2610  o Program occurs weekly for four weeks and includes pharmacist consultation on products to support quitting smoking or chewing tobacco. A provider referral is needed for pharmacist consultation.  • Tobacco Users Help Hotline: 1-878-QUIT-NOW (828-0667) or https://[place].quitlogix.org/  o Free, confidential telephone and online coaching for [place] residents. Sessions are designed on a schedule that is convenient for you. Eligible clients receive free nicotine replacement therapy.  • Nationally: www.smokefree.gov  o Information and professional assistance to support both immediate and long-term needs as you become, and remain, a non-smoker. Smokefree.gov allows you to choose the help that best fits your needs.

## 2017-05-22 NOTE — PROCEDURES
Technician: CHELSEY Brumifeld    Technician Comment:  Good patient effort & cooperation.  The results of this test meet the ATS/ERS standards for acceptability & reproducibility.  Test was performed on the [employer] Body Plethysmograph-Elite DX system.  Predicted values were [facility]-3 for spirometry, [facility] for DLCO, ITS for Lung Volumes.  The DLCO was uncorrected for Hgb.  A bronchodilator of Ventolin HFA -2puffs via spacer administered.    Interpretation:  SPIROMETRY:  1. FVC was 1.70 L, 57 % of predicted  2. FEV1 was 1.42 L, 63 % of predicted   3. FEV1/FVC ratio was 84 %  4. Post bronchodilator spirometry was not done  5. Flow volume loop normal in shape but small in size    LUNG VOLUMES:  1. TLC was 78 % of predicted   2. RV was  104 % of predicted     DIFFUSION CAPACITY:  1.Defusion capacity was  97 % of predicted       IMPRESSION:  The patient has mild restrictive ventilatory defect with total lung capacity is 78% predicted  This could be secondary to interstitial lung disease versus chest wall abnormalities and body habitus. Clinical correlation is required.

## 2017-05-22 NOTE — PROGRESS NOTES
Anticoagulation Summary as of 5/22/2017     INR goal 2.0-3.0   Selected INR 4.3! (5/22/2017)   Maintenance plan 10 mg (10 mg x 1) every day   Weekly total 70 mg   Plan last modified PRIYA AlcarazPCHU (3/20/2017)   Next INR check 6/5/2017   Priority Routine   Target end date Indefinite    Indications   DVT (deep venous thrombosis) (CMS-HCC) [I82.409]  Chronic anticoagulation [Z79.01]  S/P IVC filter [Z95.828]  Deep vein thrombosis (DVT) (CMS-[facility]) [I82.409]         Anticoagulation Episode Summary     INR check location [facility]    Preferred lab [facility]    Send INR reminders to     Comments       Anticoagulation Care Providers     Provider Role Specialty Phone number    Carmelo Pat M.D. Referring Family Medicine [phone]    [facility] Responsible  [phone]        Patient is supratherapeutic today. She hasn't been eating greens but will return to her normal diet. Denies any medication changes. No current symptoms of bleeding or thrombosis reported. HOLD tonight then continue current regimen. Follow up in 2 weeks.    Next Appointment: Monday, June 5, 2017 at 8:30 am.     Bri APODACA

## 2017-05-22 NOTE — MR AVS SNAPSHOT
Atiya Henley   2017 8:30 AM   Anticoagulation Visit   MRN: [id number]    Department:  Vascular Medicine   Dept Phone:  [phone]    Description:  Female : 1950   Provider:  [facility] EXAM 5           Allergies as of 2017     Allergen Noted Reactions    Pcn [Penicillins] 07/10/2014   Anaphylaxis, Rash    Reaction; happened as a child. Tolerated cephalosporins and Zosyn      You were diagnosed with     S/P IVC filter   [449269]       Acute deep vein thrombosis (DVT) of right lower extremity, unspecified vein (CMS-HCC)   [0092497]       Chronic anticoagulation   [820302]         Vital Signs     Last Menstrual Period Smoking Status                1975 Current Every Day Smoker          Basic Information     Date Of Birth Sex Race Ethnicity Preferred Language    1950 Female White Non- English      Your appointments     May 22, 2017  8:30 AM   Established Patient with [facility] EXAM 5   [facility]  (--)    [address] [address]   [phone]            May 22, 2017  3:00 PM   Pulmonary Function Test with PFT-RM3   [facility] (--)    [address]  [address]  [place] [address]   [phone]            May 22, 2017  4:00 PM   Established Patient Pul with ALCIRA Chavez   [facility] (--)    [address]  [address]  [place] [address]   [phone]            2017  8:30 AM   Established Patient with [facility] EXAM 4   [facility]  (--)    [address] [address]   [phone]            2017 10:50 AM   Established Patient with Carmelo Pat M.D.   [facility] ([facility])    [address] [id number] [phone]           You will be receiving a confirmation call a few days before your appointment from our automated call confirmation system.            Jun 30, 2017  8:30 AM   New Patient with Choco Smith M.D.   [facility] (--)    [address]  [place] [address] [phone]           Please bring Photo ID, Insurance Cards, All Medication Bottles and copies of any legal documents (such as Living Will, Power of ) If speaking a language besides English please bring an adult . Please arrive 30 minutes prior for check in and registration. You will be receiving a confirmation call a few days before your appointment from our automated call confirmation system.              Problem List              ICD-10-CM Priority Class Noted - Resolved    Morbid obesity (CMS-HCC) E66.01 Low  7/7/2009 - Present    YESSICA (obstructive sleep apnea) G47.33 Medium  7/7/2009 - Present    Hypothyroid E03.9 Medium  7/7/2009 - Present    Prediabetes R73.03   9/10/2009 - Present    HTN (hypertension) I10 Medium  9/10/2009 - Present    Anemia D64.9 Medium  3/22/2010 - Present    DVT (deep venous thrombosis) (CMS-HCC) I82.409 Medium  4/10/2010 - Present    Anxiety F41.9   5/17/2010 - Present    Depression F32.9 Low  5/17/2010 - Present    Vitamin d deficiency    8/31/2011 - Present    Chronic autoimmune thyroiditis E06.3   10/2/2012 - Present    Chronic anticoagulation Z79.01   10/16/2012 - Present    Vitamin d deficiency    1/30/2013 - Present    Other pulmonary insufficiency, not elsewhere classified, following trauma and surgery J95.89 High  9/2/2013 - Present    COPD (chronic obstructive pulmonary disease) (CMS-HCC) J44.9 Medium  9/2/2013 - Present    Tobacco abuse Z72.0 Low  9/18/2013 - Present    S/P IVC filter Z95.828 Medium  9/18/2013 - Present    Bipolar disorder F31.9 Low  9/18/2013 - Present    History of hernia repair Z98.890, Z87.19   10/7/2013 - Present    Obesity E66.9   7/7/2009 - Present    GERD (gastroesophageal reflux disease) K21.9 Low  11/25/2013 - Present    Colostomy fistula (CMS-HCC) K94.09 High  12/3/2013 -  Present    Thrombocytosis (CMS-HCC) D47.3 Medium  12/3/2013 - Present    Hypomagnesemia E83.42 Medium  12/4/2013 - Present    Hyponatremia E87.1 Medium  12/10/2013 - Present    Vitamin D deficiency E55.9   8/4/2015 - Present    Pain in joint, lower leg M25.569   8/21/2015 - Present    Deep vein thrombosis (DVT) (CMS-HCC) I82.409   11/11/2015 - Present    Cervical spondylosis M47.812   5/4/2016 - Present    DDD (degenerative disc disease), cervical M50.30   5/4/2016 - Present    Chronic neck pain M54.2, G89.29   5/4/2016 - Present    Lumbosacral spondylosis M47.817   5/4/2016 - Present    DDD (degenerative disc disease), lumbar M51.36   5/4/2016 - Present    Lumbar radiculopathy M54.16   5/4/2016 - Present    Chronic low back pain M54.5, G89.29   5/4/2016 - Present    Left knee pain M25.562   5/4/2016 - Present    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, Z96.659   5/4/2016 - Present    Chronic pain syndrome G89.4   8/25/2016 - Present    Positive FIT (fecal immunochemical test) R19.5   9/27/2016 - Present    Chronic obstructive asthma (CMS-HCC) J44.9, J45.909   11/14/2016 - Present    Allergic rhinitis J30.9   11/14/2016 - Present    Hypoxemia R09.02   11/14/2016 - Present      Health Maintenance        Date Due Completion Dates    RETINAL SCREENING 11/2/1970 ---    PAP SMEAR 11/2/1973 ---    IMM ZOSTER VACCINE 11/2/2012 ---    MAMMOGRAM 6/29/2017 6/29/2016    A1C SCREENING 9/20/2017 3/20/2017, 8/3/2015, 7/10/2014, 6/20/2014, 11/29/2013, 5/17/2013, 8/31/2012, 7/16/2012, 3/30/2012, 7/19/2011, 5/7/2010, 10/4/2009    DIABETES MONOFILAMENT / LE EXAM 12/6/2017 12/6/2016    FASTING LIPID PROFILE 3/20/2018 3/20/2017, 8/30/2016, 8/3/2015, 7/11/2014, 6/20/2014, 5/17/2013, 7/19/2011, 5/1/2009, 2/23/2009, 9/16/2008, 5/9/2008, 11/29/2007    URINE ACR / MICROALBUMIN 3/20/2018 3/20/2017, 8/3/2015, 6/20/2014    SERUM CREATININE 3/20/2018 3/20/2017, 8/30/2016, 8/3/2015, 7/11/2014, 7/10/2014, 6/20/2014, 2/6/2014,  1/31/2014, 1/17/2014, 1/13/2014, 12/19/2013, 12/18/2013, 12/17/2013, 12/16/2013, 12/15/2013, 12/14/2013, 12/13/2013, 12/12/2013, 12/11/2013, 12/10/2013, 12/10/2013, 12/9/2013, 12/8/2013, 12/7/2013, 12/6/2013, 12/5/2013, 12/4/2013, 12/3/2013, 12/2/2013, 11/30/2013, 11/28/2013, 11/27/2013, 11/25/2013, 11/22/2013, 11/20/2013, 11/10/2013, 10/27/2013, 10/20/2013, 10/15/2013, 10/14/2013, 10/13/2013, 10/12/2013, 10/11/2013, 10/10/2013, 10/9/2013, 10/8/2013, 10/7/2013, 9/25/2013, 9/24/2013, 9/22/2013, 9/21/2013, 9/20/2013, 9/19/2013, 9/18/2013, 9/17/2013, 9/17/2013, 9/15/2013, 9/13/2013, 9/12/2013, 9/11/2013, 9/10/2013, 9/9/2013, 9/9/2013, 9/8/2013, 9/7/2013, 9/7/2013, 9/6/2013, 9/5/2013, 9/4/2013, 9/3/2013, 9/1/2013, 8/31/2013, 5/17/2013, 1/25/2013, 12/13/2012, 10/17/2012, 10/1/2012, 8/24/2012, 8/18/2012, 7/20/2012, 7/17/2012, 7/16/2012, 1/4/2012, 7/20/2011, 7/19/2011, 1/13/2011, 12/6/2010, 10/26/2010, 7/2/2010, 6/23/2010, 5/17/2010, 5/4/2010, 5/3/2010, 5/1/2010, 4/30/2010, 4/29/2010, 4/28/2010, 4/27/2010, 4/26/2010, 4/25/2010, 4/24/2010, 4/23/2010, 4/22/2010, 4/21/2010, 4/20/2010, 4/19/2010, 4/18/2010, 4/17/2010, 4/15/2010, 4/14/2010, 4/13/2010, 4/12/2010, 4/11/2010, 4/10/2010, 4/9/2010, 4/8/2010, 4/7/2010, 4/6/2010, 4/5/2010, 4/4/2010, 4/3/2010, 4/2/2010, 4/1/2010, 3/31/2010, 3/30/2010, 3/29/2010, 3/28/2010, 3/27/2010, 3/26/2010, 3/25/2010, 3/24/2010, 3/23/2010, 3/22/2010, 3/21/2010, 3/20/2010, 3/19/2010, 3/18/2010, 3/17/2010, 3/17/2010, 3/16/2010, 3/15/2010, 3/12/2010, 3/11/2010, 3/5/2010, 3/1/2010, 1/31/2010, 1/30/2010, 1/29/2010, 1/28/2010, 1/27/2010, 1/26/2010, 11/5/2009, 10/7/2009, 10/5/2009, 10/4/2009, 10/3/2009, 10/2/2009, 10/1/2009, 9/21/2009, 9/15/2009, 9/4/2009, 4/27/2009, 2/23/2009, 8/14/2008, 11/29/2007    COLONOSCOPY 11/17/2020 11/17/2010    IMM DTaP/Tdap/Td Vaccine (2 - Td) 5/9/2027 5/9/2017            Results     POCT Protime      Component    INR    4.3                        Current Immunizations      Influenza TIV (IM) 9/3/2013  8:45 PM, 9/20/2011    Influenza Vaccine 12/1/2009    Influenza Vaccine Pediatric 11/11/2010    Influenza Vaccine Quad Inj (Pf) 11/20/2014  1:50 PM    Influenza Vaccine Quad Inj (Preserved) 11/14/2016  9:52 AM, 10/14/2015    Pneumococcal Vaccine (UF)Historical Data 12/1/2009    Pneumococcal polysaccharide vaccine (PPSV-23) 11/14/2016  9:53 AM    Tdap Vaccine 5/9/2017      Below and/or attached are the medications your provider expects you to take. Review all of your home medications and newly ordered medications with your provider and/or pharmacist. Follow medication instructions as directed by your provider and/or pharmacist. Please keep your medication list with you and share with your provider. Update the information when medications are discontinued, doses are changed, or new medications (including over-the-counter products) are added; and carry medication information at all times in the event of emergency situations     Allergies:  PCN - Anaphylaxis,Rash               Medications  Valid as of: May 22, 2017 -  8:22 AM    Generic Name Brand Name Tablet Size Instructions for use    Albuterol Sulfate (Aero Soln) albuterol 108 (90 BASE) MCG/ACT Inhale 2 Puffs by mouth every four hours as needed. For Shortness of breath        Amitriptyline HCl (Tab) ELAVIL 25 MG Take 25 mg by mouth every bedtime.        Ascorbic Acid (Tab) ascorbic acid 500 MG TAKE ONE [1] TABLET BY MOUTH EVERY DAY        Budesonide-Formoterol Fumarate (Aerosol) SYMBICORT 160-4.5 MCG/ACT Inhale 2 Puffs by mouth 2 Times a Day. With spacer, rinse mouth after use        CarBAMazepine (Tab) TEGRETOL 200 MG Take 3 Tabs by mouth every bedtime.        Ergocalciferol (Cap) DRISDOL 84722 UNITS TAKE 1 CAPSULE BY MOUTH EVERY 28 DAYS.        FLUoxetine HCl (Cap) PROZAC 20 MG Take 20 mg by mouth every day.        Flurazepam HCl (Cap) DALMANE 30 MG Take 30 mg by mouth at bedtime as needed.        Gabapentin (Cap) NEURONTIN 300 MG TAKE  THREE (3) CAPSULES BY MOUTH THREE (3) TIMES DAILY         Incontinence Supply Disposable (Misc) DEPEND UNDERWEAR X-LARGE  1 Each by Does not apply route 3 times a day.        Ipratropium-Albuterol (Aero Soln) COMBIVENT RESPIMAT  MCG/ACT Inhale 1 puff by mouth every 4 hours as needed for shortness of breath and/or wheezing (max is 6 puffs per 24 hours)        Ipratropium-Albuterol (Aero Soln) COMBIVENT RESPIMAT  MCG/ACT Inhale 1 Puff by mouth 4 times a day.        Levothyroxine Sodium (Tab) SYNTHROID 125 MCG TAKE TWO [2] TABLETS BY MOUTH EVERY DAY         Levothyroxine Sodium (Tab) SYNTHROID 200 MCG TAKE ONE (1) TABLET BY MOUTH EVERY MORNING ON AN EMPTY STOMACH.        Levothyroxine Sodium (Tab) SYNTHROID 25 MCG TAKE ONE (1) TABLET BY MOUTH EVERY MORNING ON AN EMPTY STOMACH. TO USE ALONG WITH 200 MCG FOR TOTAL  MCG.        MetFORMIN HCl (Tab) GLUCOPHAGE 500 MG TAKE ONE [1] TABLET BY MOUTH EVERY DAY        Misc. Devices (Misc) Misc. Devices  Pt needs service for her PMD she will need a new battery to avoid stalling        Misc. Devices (Misc) Misc. Devices  CPAP supplies (hoses, filter, water chamber, face mask-resmed small), nasal cannula tubing        Misc. Devices (Misc) Misc. Devices  O2 to use continuously to keep her sats >90 %        Misc. Devices (Misc) Misc. Devices  Please evaluate pt for wider power mobility devise, pt has tipped over on multiple occasions        Misc. Devices (Misc) Misc. Devices  O2 tank rack for power wheel chair        Misc. Devices (Misc) Misc. Devices  Adult incontinence supplies to use as directed        Misc. Devices (Misc) Misc. Devices  Power mobility devise        Misc. Devices (Misc) Misc. Devices  O2 concentrator to use 24/7 and also with her CPAP at night to keep her O2 sats > 90%        Misc. Devices (Misc) Misc. Devices  Incontinence supplies (for wheel chair) and adult briefs to use as needed #120        Misc. Devices (Misc) Misc. Devices  New battery and  gear shifter for power wheel chair        Mometasone Furoate (Suspension) NASONEX 50 MCG/ACT Spray 1-2 Sprays in nose every day. Each nostril.        Nicotine Polacrilex (Gum) NICORETTE 2 MG Take 1 Each by mouth as needed for Smoking Cessation.        Ostomy Supplies (Misc) Ostomy Supplies  Dispense as ordered        RaNITidine HCl (Tab) ZANTAC 150 MG TAKE ONE (1) TABLET BY MOUTH TWICE DAILY WITH MEALS        Tiotropium Bromide Monohydrate (Aero Soln) Tiotropium Bromide Monohydrate 2.5 MCG/ACT Inhale 2.5 mcg by mouth every day at 6 PM.        Tiotropium Bromide Monohydrate (Aero Soln) Tiotropium Bromide Monohydrate 2.5 MCG/ACT Inhale 2 Puffs by mouth every day.        TraZODone HCl (Tab) DESYREL 50 MG Take 1 Tab by mouth at bedtime as needed for Sleep.        Warfarin Sodium (Tab) COUMADIN 10 MG Take one tablet daily as directed by coumadin clinic        Warfarin Sodium (Tab) COUMADIN 10 MG Take 1 to 1.5 tablets by mouth daily as directed by Coumadin Clinic.        .                 Medicines prescribed today were sent to:     [id number] - [place], [place] - [address]    [address] [place] [address]    Phone: [phone] Fax: [phone]    Open 24 Hours?: No    [facility] - [place] - [address]    [address] [place] [address]    Phone: [phone] Fax: [phone]    Open 24 Hours?: No      Medication refill instructions:       If your prescription bottle indicates you have medication refills left, it is not necessary to call your provider’s office. Please contact your pharmacy and they will refill your medication.    If your prescription bottle indicates you do not have any refills left, you may request refills at any time through one of the following ways: The online [employer] system (except Urgent Care), by calling your provider’s office, or by asking your pharmacy to contact your provider’s office with a refill request. Medication refills are processed only  during regular business hours and may not be available until the next business day. Your provider may request additional information or to have a follow-up visit with you prior to refilling your medication.   *Please Note: Medication refills are assigned a new Rx number when refilled electronically. Your pharmacy may indicate that no refills were authorized even though a new prescription for the same medication is available at the pharmacy. Please request the medicine by name with the pharmacy before contacting your provider for a refill.        Warfarin Dosing Calendar   May 2017 Details    Sun Mon Tue Wed Thu Fri Sat      1               2               3               4               5               6                 7               8               9               10               11               12               13                 14               15               16               17               18               19               20                 21               22   4.3   Hold   See details      23      10 mg         24      10 mg         25      10 mg         26      10 mg         27      10 mg           28      10 mg         29      10 mg         30      10 mg         31      10 mg             Date Details   05/22 This INR check   INR: 4.3               How to take your warfarin dose     To take:  10 mg Take 1 of the 10 mg tablets.    Hold Do not take your warfarin dose. See the Details table to the right for additional instructions.                Warfarin Dosing Calendar   June 2017 Details    Sun Mon Tue Wed Thu Fri Sat         1      10 mg         2      10 mg         3      10 mg           4      10 mg         5      10 mg         6               7               8               9               10                 11               12               13               14               15               16               17                 18               19               20               21                22               23               24                 25               26               27               28               29               30                 Date Details   No additional details    Date of next INR:  6/5/2017         How to take your warfarin dose     To take:  10 mg Take 1 of the 10 mg tablets.              MyChart Access Code: Activation code not generated  Current MyChart Status: Active          Quit Tobacco Information     Do you want to quit using tobacco?    Quitting tobacco decreases risks of cancer, heart and lung disease, increases life expectancy, improves sense of taste and smell, and increases spending money, among other benefits.    If you are thinking about quitting, we can help.  • [facility] Quit Tobacco Program: [phone]  o Program occurs weekly for four weeks and includes pharmacist consultation on products to support quitting smoking or chewing tobacco. A provider referral is needed for pharmacist consultation.  • Tobacco Users Help Hotline: 5-196-QUITNOW (335-3308) or https://[place].quitlogix.org/  o Free, confidential telephone and online coaching for [place] residents. Sessions are designed on a schedule that is convenient for you. Eligible clients receive free nicotine replacement therapy.  • Nationally: www.smokefree.gov  o Information and professional assistance to support both immediate and long-term needs as you become, and remain, a non-smoker. Smokefree.gov allows you to choose the help that best fits your needs.

## 2017-05-22 NOTE — PATIENT INSTRUCTIONS
Plan:    1) In lab titration study now, consider switch to Bipap if needed. Request copy of her initial Polysomnogram through Dr. Hodge's office.    2) Smoking cessation discussed and recommended. She is working on quitting.  3) Continue 02 3 LPM prn exertion.  4) Continue Symbicort 160/4.5 mcg. Restart Spiriva respimat 2.5 mcg 2 puffs INH daily. Samples and RX provided. Continue Combivent and Proventil inhalers.   5) Continue Nasonex.  6) She is up to date on Prevnar 13, Pneumovax 23 and Influenza vaccines.  7) Weight loss recommended.  8) Sleep hygiene discussed. Continue Trazodone prescribed by Psychiatry.   9) Follow up after titration study, sooner if needed.

## 2017-05-30 ENCOUNTER — APPOINTMENT (OUTPATIENT)
Dept: SLEEP MEDICINE | Facility: MEDICAL CENTER | Age: 67
End: 2017-05-30

## 2017-06-16 DIAGNOSIS — I82.409 DEEP VEIN THROMBOSIS (DVT) OF LOWER EXTREMITY, UNSPECIFIED CHRONICITY, UNSPECIFIED LATERALITY, UNSPECIFIED VEIN (HCC): ICD-10-CM

## 2017-06-16 RX ORDER — WARFARIN SODIUM 10 MG/1
TABLET ORAL
Qty: 30 TAB | Refills: 2 | Status: SHIPPED | OUTPATIENT
Start: 2017-06-16 | End: 2017-09-14 | Stop reason: SDUPTHER

## 2017-06-19 RX ORDER — NICOTINE POLACRILEX 2 MG/1
GUM, CHEWING ORAL
Qty: 110 EACH | Refills: 2 | Status: SHIPPED | OUTPATIENT
Start: 2017-06-19 | End: 2018-04-05

## 2017-06-20 ENCOUNTER — ANTICOAGULATION VISIT (OUTPATIENT)
Dept: VASCULAR LAB | Facility: MEDICAL CENTER | Age: 67
End: 2017-06-20
Attending: INTERNAL MEDICINE
Payer: MEDICAID

## 2017-06-20 ENCOUNTER — OFFICE VISIT (OUTPATIENT)
Dept: MEDICAL GROUP | Facility: MEDICAL CENTER | Age: 67
End: 2017-06-20
Attending: FAMILY MEDICINE
Payer: MEDICAID

## 2017-06-20 VITALS
HEIGHT: 64 IN | TEMPERATURE: 98.1 F | OXYGEN SATURATION: 90 % | HEART RATE: 72 BPM | RESPIRATION RATE: 16 BRPM | DIASTOLIC BLOOD PRESSURE: 84 MMHG | SYSTOLIC BLOOD PRESSURE: 134 MMHG | BODY MASS INDEX: 50.02 KG/M2 | WEIGHT: 293 LBS

## 2017-06-20 DIAGNOSIS — K91.1 DUMPING SYNDROME: ICD-10-CM

## 2017-06-20 DIAGNOSIS — Z78.9 DECREASED ACTIVITIES OF DAILY LIVING (ADL): ICD-10-CM

## 2017-06-20 DIAGNOSIS — E03.9 HYPOTHYROIDISM, UNSPECIFIED TYPE: ICD-10-CM

## 2017-06-20 DIAGNOSIS — Z95.828 S/P IVC FILTER: ICD-10-CM

## 2017-06-20 DIAGNOSIS — Z79.01 CHRONIC ANTICOAGULATION: ICD-10-CM

## 2017-06-20 DIAGNOSIS — K94.09 COLOSTOMY FISTULA (HCC): ICD-10-CM

## 2017-06-20 LAB — INR PPP: 4.4 (ref 2–3.5)

## 2017-06-20 PROCEDURE — 99214 OFFICE O/P EST MOD 30 MIN: CPT | Performed by: FAMILY MEDICINE

## 2017-06-20 PROCEDURE — 99212 OFFICE O/P EST SF 10 MIN: CPT | Mod: 27

## 2017-06-20 PROCEDURE — 85610 PROTHROMBIN TIME: CPT

## 2017-06-20 RX ORDER — LEVOTHYROXINE SODIUM 0.03 MG/1
TABLET ORAL
Qty: 90 TAB | Refills: 1 | Status: SHIPPED | OUTPATIENT
Start: 2017-06-20 | End: 2017-11-11 | Stop reason: SDUPTHER

## 2017-06-20 RX ORDER — LEVOTHYROXINE SODIUM 0.2 MG/1
TABLET ORAL
Qty: 90 TAB | Refills: 1 | Status: SHIPPED | OUTPATIENT
Start: 2017-06-20 | End: 2017-11-11 | Stop reason: SDUPTHER

## 2017-06-20 ASSESSMENT — ENCOUNTER SYMPTOMS
BACK PAIN: 1
SHORTNESS OF BREATH: 0
PALPITATIONS: 0
CHILLS: 0
VOMITING: 0
HEADACHES: 0
FOCAL WEAKNESS: 1
TINGLING: 1
ABDOMINAL PAIN: 0
NAUSEA: 0
FEVER: 0

## 2017-06-20 NOTE — PROGRESS NOTES
"Subjective:      Atiya Henley is a 66 y.o. female who presents with Medication Management            HPI Comments: Pt here for a renewal of her ostomy supplies, she has been recently diagnosed with \"dumping\" (rapid emptying of her bowel) in April by GI. She has been given Levsin which has been working for her.  She will need her ostomy supplies reordered. She will need more supplies due to her more frequent and copious bowel movements.     Her last TSH was 0.610 and she is currently on 225 mg of synthroid. Her prescription was sent to her pharmacy. Will continue to follow.      Current medications, allergies, and problem list reviewed with patient and updated in EPIC.        Review of Systems   Constitutional: Negative for fever and chills.   HENT: Negative for hearing loss.    Respiratory: Negative for shortness of breath.    Cardiovascular: Negative for chest pain and palpitations.   Gastrointestinal: Negative for nausea, vomiting and abdominal pain.   Musculoskeletal: Positive for back pain and joint pain.   Neurological: Positive for tingling and focal weakness. Negative for headaches.          Objective:     /84 mmHg  Pulse 72  Temp(Src) 36.7 °C (98.1 °F)  Resp 16  Ht 1.626 m (5' 4.02\")  Wt 140.615 kg (310 lb)  BMI 53.19 kg/m2  SpO2 90%  LMP 08/02/1975     Physical Exam   HENT:   Right Ear: External ear normal.   Left Ear: External ear normal.   Nose: Nose normal.   Mouth/Throat: Oropharynx is clear and moist.   Eyes: EOM are normal. Pupils are equal, round, and reactive to light.   Cardiovascular: Normal rate, regular rhythm and normal heart sounds.  Exam reveals no friction rub.    No murmur heard.  Pulmonary/Chest: Breath sounds normal. No respiratory distress. She has no wheezes. She has no rales.   Abdominal: Soft. Bowel sounds are normal. She exhibits no distension. There is tenderness. There is no rebound and no guarding.   Using ostomy supplies   Musculoskeletal:   Decreased ROM of " affected extremities using power wheelchair   Neurological: She is alert.   Skin: Skin is warm and dry.   Psychiatric: Her behavior is normal.               Assessment/Plan:     1. Dumping syndrome  Her ostomy supplies have been ordered for her continued use. She also recently followed up with GI for her more frequent abdominal pains as well as her copious bowel movements. She had been given Levsin which seems to alleviate the abdominal cramping but not the volume of stool. We'll continue to follow.  - Misc. Devices Misc; 3 boxes of pouches (64309), 2 boxes of wafers (07786), 3 boxes (tubes) paste  Dispense: 3 Container; Refill: 6    2. Colostomy fistula (CMS-HCC)  See above plan.  - Misc. Devices Misc; 3 boxes of pouches (34095), 2 boxes of wafers (77162), 3 boxes (tubes) paste  Dispense: 3 Container; Refill: 6    3. Decreased activities of daily living (ADL)  She will continue to use her power wheelchair as needed. Also use her incontinence supplies as directed as well. We'll continue to follow.  - Misc. Devices Misc; 3 boxes of pouches (83754), 2 boxes of wafers (25949), 3 boxes (tubes) paste  Dispense: 3 Container; Refill: 6    4. Hypothyroidism, unspecified type  Her Synthroid prescription refills have been sent to her pharmacy. We'll have her continue to use as directed. Her last TSH was 0.61. We'll continue to follow.

## 2017-06-20 NOTE — MR AVS SNAPSHOT
Atiya Henley   2017 8:45 AM   Anticoagulation Visit   MRN: [id number]    Department:  Vascular Medicine   Dept Phone:  [phone]    Description:  Female : 1950   Provider:  [facility] EXAM 5           Allergies as of 2017     Allergen Noted Reactions    Pcn [Penicillins] 07/10/2014   Anaphylaxis, Rash    Reaction; happened as a child. Tolerated cephalosporins and Zosyn      You were diagnosed with     S/P IVC filter   [909050]       Chronic anticoagulation   [981370]         Vital Signs     Last Menstrual Period Smoking Status                1975 Current Every Day Smoker          Basic Information     Date Of Birth Sex Race Ethnicity Preferred Language    1950 Female White Non- English      Your appointments     2017  8:45 AM   Established Patient with [facility] EXAM 5   [facility]  (--)    [address] [address]   [phone]            2017 10:50 AM   Established Patient with Carmelo Pat M.D.   [facility]    [address] [place] [id number] [phone]           You will be receiving a confirmation call a few days before your appointment from our automated call confirmation system.            2017  8:30 AM   New Patient with Choco Smith M.D.   [facility] (--)    [address]  [facility] [id number] [phone]           Please bring Photo ID, Insurance Cards, All Medication Bottles and copies of any legal documents (such as Living Will, Power of ) If speaking a language besides English please bring an adult . Please arrive 30 minutes prior for check in and registration. You will be receiving a confirmation call a few days before your appointment from our automated call confirmation system.            2017  8:00 AM   Established Patient with [facility] EXAM 4   [facility]  (--)    [address]  [place] [address]   [phone]            Jul 19, 2017  7:05 PM   Sleep Study with SLEEP TECH   [facility] Sleep [facility] (--)    [address]  [address]  [place] [id number] [phone]            Jul 26, 2017  1:00 PM   Established Patient Pul with ALCIRA Chavez   [facility] Pulmonary Medicine (--)    [address]  [place] [id number] [phone]              Problem List              ICD-10-CM Priority Class Noted - Resolved    Morbid obesity (CMS-HCC) E66.01 Low  7/7/2009 - Present    YESSICA (obstructive sleep apnea) G47.33 Medium  7/7/2009 - Present    Hypothyroid E03.9 Medium  7/7/2009 - Present    Prediabetes R73.03   9/10/2009 - Present    HTN (hypertension) I10 Medium  9/10/2009 - Present    Anemia D64.9 Medium  3/22/2010 - Present    DVT (deep venous thrombosis) (CMS-HCC) I82.409 Medium  4/10/2010 - Present    Anxiety F41.9   5/17/2010 - Present    Depression F32.9 Low  5/17/2010 - Present    Vitamin d deficiency    8/31/2011 - Present    Chronic autoimmune thyroiditis E06.3   10/2/2012 - Present    Chronic anticoagulation Z79.01   10/16/2012 - Present    Vitamin d deficiency    1/30/2013 - Present    Other pulmonary insufficiency, not elsewhere classified, following trauma and surgery J95.89 High  9/2/2013 - Present    COPD (chronic obstructive pulmonary disease) (CMS-HCC) J44.9 Medium  9/2/2013 - Present    Tobacco abuse Z72.0 Low  9/18/2013 - Present    S/P IVC filter Z95.828 Medium  9/18/2013 - Present    Bipolar disorder F31.9 Low  9/18/2013 - Present    History of hernia repair Z98.890, Z87.19   10/7/2013 - Present    Obesity E66.9   7/7/2009 - Present    GERD (gastroesophageal reflux disease) K21.9 Low  11/25/2013 - Present    Colostomy fistula (CMS-HCC) K94.09 High  12/3/2013 - Present    Thrombocytosis (CMS-HCC) D47.3 Medium  12/3/2013 - Present    Hypomagnesemia E83.42 Medium   12/4/2013 - Present    Hyponatremia E87.1 Medium  12/10/2013 - Present    Vitamin D deficiency E55.9   8/4/2015 - Present    Pain in joint, lower leg M25.569   8/21/2015 - Present    Deep vein thrombosis (DVT) (CMS-HCC) I82.409   11/11/2015 - Present    Cervical spondylosis M47.812   5/4/2016 - Present    DDD (degenerative disc disease), cervical M50.30   5/4/2016 - Present    Chronic neck pain M54.2, G89.29   5/4/2016 - Present    Lumbosacral spondylosis M47.817   5/4/2016 - Present    DDD (degenerative disc disease), lumbar M51.36   5/4/2016 - Present    Lumbar radiculopathy M54.16   5/4/2016 - Present    Chronic low back pain M54.5, G89.29   5/4/2016 - Present    Left knee pain M25.562   5/4/2016 - Present    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, Z96.659   5/4/2016 - Present    Chronic pain syndrome G89.4   8/25/2016 - Present    Positive FIT (fecal immunochemical test) R19.5   9/27/2016 - Present    Chronic obstructive asthma (CMS-HCC) J44.9, J45.909   11/14/2016 - Present    Allergic rhinitis J30.9   11/14/2016 - Present    Hypoxemia R09.02   11/14/2016 - Present      Health Maintenance        Date Due Completion Dates    RETINAL SCREENING 11/2/1970 ---    PAP SMEAR 11/2/1973 ---    IMM ZOSTER VACCINE 11/2/2012 ---    MAMMOGRAM 6/29/2017 6/29/2016    A1C SCREENING 9/20/2017 3/20/2017, 8/3/2015, 7/10/2014, 6/20/2014, 11/29/2013, 5/17/2013, 8/31/2012, 7/16/2012, 3/30/2012, 7/19/2011, 5/7/2010, 10/4/2009    DIABETES MONOFILAMENT / LE EXAM 12/6/2017 12/6/2016    FASTING LIPID PROFILE 3/20/2018 3/20/2017, 8/30/2016, 8/3/2015, 7/11/2014, 6/20/2014, 5/17/2013, 7/19/2011, 5/1/2009, 2/23/2009, 9/16/2008, 5/9/2008, 11/29/2007    URINE ACR / MICROALBUMIN 3/20/2018 3/20/2017, 8/3/2015, 6/20/2014    SERUM CREATININE 3/20/2018 3/20/2017, 8/30/2016, 8/3/2015, 7/11/2014, 7/10/2014, 6/20/2014, 2/6/2014, 1/31/2014, 1/17/2014, 1/13/2014, 12/19/2013, 12/18/2013, 12/17/2013, 12/16/2013, 12/15/2013, 12/14/2013,  12/13/2013, 12/12/2013, 12/11/2013, 12/10/2013, 12/10/2013, 12/9/2013, 12/8/2013, 12/7/2013, 12/6/2013, 12/5/2013, 12/4/2013, 12/3/2013, 12/2/2013, 11/30/2013, 11/28/2013, 11/27/2013, 11/25/2013, 11/22/2013, 11/20/2013, 11/10/2013, 10/27/2013, 10/20/2013, 10/15/2013, 10/14/2013, 10/13/2013, 10/12/2013, 10/11/2013, 10/10/2013, 10/9/2013, 10/8/2013, 10/7/2013, 9/25/2013, 9/24/2013, 9/22/2013, 9/21/2013, 9/20/2013, 9/19/2013, 9/18/2013, 9/17/2013, 9/17/2013, 9/15/2013, 9/13/2013, 9/12/2013, 9/11/2013, 9/10/2013, 9/9/2013, 9/9/2013, 9/8/2013, 9/7/2013, 9/7/2013, 9/6/2013, 9/5/2013, 9/4/2013, 9/3/2013, 9/1/2013, 8/31/2013, 5/17/2013, 1/25/2013, 12/13/2012, 10/17/2012, 10/1/2012, 8/24/2012, 8/18/2012, 7/20/2012, 7/17/2012, 7/16/2012, 1/4/2012, 7/20/2011, 7/19/2011, 1/13/2011, 12/6/2010, 10/26/2010, 7/2/2010, 6/23/2010, 5/17/2010, 5/4/2010, 5/3/2010, 5/1/2010, 4/30/2010, 4/29/2010, 4/28/2010, 4/27/2010, 4/26/2010, 4/25/2010, 4/24/2010, 4/23/2010, 4/22/2010, 4/21/2010, 4/20/2010, 4/19/2010, 4/18/2010, 4/17/2010, 4/15/2010, 4/14/2010, 4/13/2010, 4/12/2010, 4/11/2010, 4/10/2010, 4/9/2010, 4/8/2010, 4/7/2010, 4/6/2010, 4/5/2010, 4/4/2010, 4/3/2010, 4/2/2010, 4/1/2010, 3/31/2010, 3/30/2010, 3/29/2010, 3/28/2010, 3/27/2010, 3/26/2010, 3/25/2010, 3/24/2010, 3/23/2010, 3/22/2010, 3/21/2010, 3/20/2010, 3/19/2010, 3/18/2010, 3/17/2010, 3/17/2010, 3/16/2010, 3/15/2010, 3/12/2010, 3/11/2010, 3/5/2010, 3/1/2010, 1/31/2010, 1/30/2010, 1/29/2010, 1/28/2010, 1/27/2010, 1/26/2010, 11/5/2009, 10/7/2009, 10/5/2009, 10/4/2009, 10/3/2009, 10/2/2009, 10/1/2009, 9/21/2009, 9/15/2009, 9/4/2009, 4/27/2009, 2/23/2009, 8/14/2008, 11/29/2007    COLONOSCOPY 11/17/2020 11/17/2010    IMM DTaP/Tdap/Td Vaccine (2 - Td) 5/9/2027 5/9/2017            Results     POCT Protime      Component    INR    4.4                        Current Immunizations     Influenza TIV (IM) 9/3/2013  8:45 PM, 9/20/2011    Influenza Vaccine 12/1/2009    Influenza Vaccine  Pediatric 11/11/2010    Influenza Vaccine Quad Inj (Pf) 11/20/2014  1:50 PM    Influenza Vaccine Quad Inj (Preserved) 11/14/2016  9:52 AM, 10/14/2015    Pneumococcal Vaccine (UF)Historical Data 12/1/2009    Pneumococcal polysaccharide vaccine (PPSV-23) 11/14/2016  9:53 AM    Tdap Vaccine 5/9/2017      Below and/or attached are the medications your provider expects you to take. Review all of your home medications and newly ordered medications with your provider and/or pharmacist. Follow medication instructions as directed by your provider and/or pharmacist. Please keep your medication list with you and share with your provider. Update the information when medications are discontinued, doses are changed, or new medications (including over-the-counter products) are added; and carry medication information at all times in the event of emergency situations     Allergies:  PCN - Anaphylaxis,Rash               Medications  Valid as of: June 20, 2017 -  8:03 AM    Generic Name Brand Name Tablet Size Instructions for use    Albuterol Sulfate (Aero Soln) albuterol 108 (90 BASE) MCG/ACT Inhale 2 Puffs by mouth every four hours as needed. For Shortness of breath        Amitriptyline HCl (Tab) ELAVIL 25 MG Take 25 mg by mouth every bedtime.        Ascorbic Acid (Tab) ascorbic acid 500 MG TAKE ONE [1] TABLET BY MOUTH EVERY DAY        Budesonide-Formoterol Fumarate (Aerosol) SYMBICORT 160-4.5 MCG/ACT Inhale 2 Puffs by mouth 2 Times a Day. With spacer, rinse mouth after use        CarBAMazepine (Tab) TEGRETOL 200 MG Take 3 Tabs by mouth every bedtime.        Ergocalciferol (Cap) DRISDOL 28255 UNITS TAKE 1 CAPSULE BY MOUTH EVERY 28 DAYS.        FLUoxetine HCl (Cap) PROZAC 20 MG Take 20 mg by mouth every day.        Flurazepam HCl (Cap) DALMANE 30 MG Take 30 mg by mouth at bedtime as needed.        Gabapentin (Cap) NEURONTIN 300 MG TAKE THREE (3) CAPSULES BY MOUTH THREE (3) TIMES DAILY         Incontinence Supply Disposable (Misc)  DEPEND UNDERWEAR X-LARGE  1 Each by Does not apply route 3 times a day.        Ipratropium-Albuterol (Aero Soln) COMBIVENT RESPIMAT  MCG/ACT Inhale 1 puff by mouth every 4 hours as needed for shortness of breath and/or wheezing (max is 6 puffs per 24 hours)        Ipratropium-Albuterol (Aero Soln) COMBIVENT RESPIMAT  MCG/ACT Inhale 1 Puff by mouth 4 times a day.        Levothyroxine Sodium (Tab) SYNTHROID 125 MCG TAKE TWO [2] TABLETS BY MOUTH EVERY DAY         Levothyroxine Sodium (Tab) SYNTHROID 25 MCG Take 1 tablet by mouth once every morning on an empty stomach. Use along with 200 mcg levothyroxine for a total of 225 mcg daily        Levothyroxine Sodium (Tab) SYNTHROID 200 MCG TAKE ONE (1) TABLET BY MOUTH EVERY MORNING ON AN EMPTY STOMACH        MetFORMIN HCl (Tab) GLUCOPHAGE 500 MG TAKE ONE [1] TABLET BY MOUTH EVERY DAY        Misc. Devices (Misc) Misc. Devices  Pt needs service for her PMD she will need a new battery to avoid stalling        Misc. Devices (Misc) Misc. Devices  CPAP supplies (hoses, filter, water chamber, face mask-resmed small), nasal cannula tubing        Misc. Devices (Misc) Misc. Devices  O2 to use continuously to keep her sats >90 %        Misc. Devices (Misc) Misc. Devices  Please evaluate pt for wider power mobility devise, pt has tipped over on multiple occasions        Misc. Devices (Misc) Misc. Devices  O2 tank rack for power wheel chair        Misc. Devices (Misc) Misc. Devices  Adult incontinence supplies to use as directed        Misc. Devices (Misc) Misc. Devices  Power mobility devise        Misc. Devices (Misc) Misc. Devices  O2 concentrator to use 24/7 and also with her CPAP at night to keep her O2 sats > 90%        Misc. Devices (Misc) Misc. Devices  Incontinence supplies (for wheel chair) and adult briefs to use as needed #120        Misc. Devices (Misc) Misc. Devices  New battery and gear shifter for power wheel chair        Mometasone Furoate (Suspension)  NASONEX 50 MCG/ACT Spray 1-2 Sprays in nose every day. Each nostril.        Nicotine Polacrilex (Gum) SM NICOTINE POLACRILEX 2 MG CHEW ONE (1) PIECE AS NEEDED FOR SMOKING CESSATION        Ostomy Supplies (Misc) Ostomy Supplies  Dispense as ordered        RaNITidine HCl (Tab) ZANTAC 150 MG TAKE ONE (1) TABLET BY MOUTH TWICE DAILY WITH MEALS        Tiotropium Bromide Monohydrate (Aero Soln) Tiotropium Bromide Monohydrate 2.5 MCG/ACT Inhale 2.5 mcg by mouth every day at 6 PM.        Tiotropium Bromide Monohydrate (Aero Soln) Tiotropium Bromide Monohydrate 2.5 MCG/ACT Inhale 2 Puffs by mouth every day.        Tiotropium Bromide Monohydrate (Aero Soln) Tiotropium Bromide Monohydrate 2.5 MCG/ACT Inhale 2 Inhalation by mouth every day. Assemble and prime.        TraZODone HCl (Tab) DESYREL 50 MG Take 1 Tab by mouth at bedtime as needed for Sleep.        Warfarin Sodium (Tab) COUMADIN 10 MG Take 1 tablet by mouth daily as directed by [facility]        .                 Medicines prescribed today were sent to:     [id number] - [place], [place] - [address]    [address] [place] [address]    Phone: [phone] Fax: [phone]    Open 24 Hours?: No    [facility] - [place] - [address]    [address] [facility] [address]    Phone: [phone] Fax: [phone]    Open 24 Hours?: No      Medication refill instructions:       If your prescription bottle indicates you have medication refills left, it is not necessary to call your provider’s office. Please contact your pharmacy and they will refill your medication.    If your prescription bottle indicates you do not have any refills left, you may request refills at any time through one of the following ways: The online [employer] system (except Urgent Care), by calling your provider’s office, or by asking your pharmacy to contact your provider’s office with a refill request. Medication refills are processed only during  regular business hours and may not be available until the next business day. Your provider may request additional information or to have a follow-up visit with you prior to refilling your medication.   *Please Note: Medication refills are assigned a new Rx number when refilled electronically. Your pharmacy may indicate that no refills were authorized even though a new prescription for the same medication is available at the pharmacy. Please request the medicine by name with the pharmacy before contacting your provider for a refill.        Warfarin Dosing Calendar   June 2017 Details    Sun Mon Tue Wed Thu Fri Sat         1               2               3                 4               5               6               7               8               9               10                 11               12               13               14               15               16               17                 18               19               20   4.4   Hold   See details      21      10 mg         22      10 mg         23      10 mg         24      10 mg           25      10 mg         26      5 mg         27      10 mg         28      10 mg         29      10 mg         30      10 mg           Date Details   06/20 This INR check   INR: 4.4               How to take your warfarin dose     To take:  5 mg Take 0.5 of a 10 mg tablet.    To take:  10 mg Take 1 of the 10 mg tablets.    Hold Do not take your warfarin dose. See the Details table to the right for additional instructions.                Warfarin Dosing Calendar   July 2017 Details    Sun Mon Tue Wed Thu Fri Sat           1      10 mg           2      10 mg         3      5 mg         4               5               6               7               8                 9               10               11               12               13               14               15                 16               17               18               19               20                21               22                 23               24               25               26               27               28               29                 30               31                     Date Details   No additional details    Date of next INR:  7/3/2017         How to take your warfarin dose     To take:  5 mg Take 0.5 of a 10 mg tablet.    To take:  10 mg Take 1 of the 10 mg tablets.              MyChart Access Code: Activation code not generated  Current MyChart Status: Active          Quit Tobacco Information     Do you want to quit using tobacco?    Quitting tobacco decreases risks of cancer, heart and lung disease, increases life expectancy, improves sense of taste and smell, and increases spending money, among other benefits.    If you are thinking about quitting, we can help.  • [facility] Quit Tobacco Program: [phone]  o Program occurs weekly for four weeks and includes pharmacist consultation on products to support quitting smoking or chewing tobacco. A provider referral is needed for pharmacist consultation.  • Tobacco Users Help Hotline: 5-546-QUIT-NOW (650-0015) or https://[place].quitlogix.org/  o Free, confidential telephone and online coaching for [place] residents. Sessions are designed on a schedule that is convenient for you. Eligible clients receive free nicotine replacement therapy.  • Nationally: www.smokefree.gov  o Information and professional assistance to support both immediate and long-term needs as you become, and remain, a non-smoker. Smokefree.gov allows you to choose the help that best fits your needs.

## 2017-06-20 NOTE — MR AVS SNAPSHOT
"        Atiya Henley   2017 10:50 AM   Office Visit   MRN: [id number]    Department:  [facility]   Dept Phone:  [phone]    Description:  Female : 1950   Provider:  Carmelo Pat M.D.           Reason for Visit     Medication Management           Allergies as of 2017     Allergen Noted Reactions    Pcn [Penicillins] 07/10/2014   Anaphylaxis, Rash    Reaction; happened as a child. 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[facility])   [975158]       Decreased activities of daily living (ADL)   [8897362]       Hypothyroidism, unspecified type   [6037404]         Vital Signs     Blood Pressure Pulse Temperature Respirations Height Weight    134/84 mmHg 72 36.7 °C (98.1 °F) 16 1.626 m (5' 4.02\") 140.615 kg (310 lb)    Body Mass Index Oxygen Saturation Last Menstrual Period Smoking Status          53.19 kg/m2 90% 1975 Current Every Day Smoker        Basic Information     Date Of Birth Sex Race Ethnicity Preferred Language    1950 Female White Non- English      Your appointments     2017  8:30 AM   New Patient with Choco Smith M.D.   [facility] PHYSIATRY (--)    [address]  [place] [id number] [phone]           Please bring Photo ID, Insurance Cards, All Medication Bottles and copies of any legal documents (such as Living Will, Power of ) If speaking a language besides English please bring an adult . Please arrive 30 minutes prior for check in and registration. You will be receiving a confirmation call a few days before your appointment from our automated call confirmation system.            2017  8:00 AM   Established Patient with IHV EXAM 4   [facility]  (--)    [address]  [place] [address]   [phone]            2017  7:05 PM   Sleep Study with SLEEP " TECH   [facility] Sleep [facility] (--)    [address]  [address]  [place] [address] [phone]            Jul 26, 2017  1:00 PM   Established Patient Pul with ALCIRA Chavez   [facility] Pulmonary Medicine (--)    [address]  [address]  [place] [address]   [phone]            Aug 03, 2017 10:50 AM   Established Patient with Carmelo Pat M.D.   [facility] ([facility])    [address]  [place] [address]   [phone]           You will be receiving a confirmation call a few days before your appointment from our automated call confirmation system.              Problem List              ICD-10-CM Priority Class Noted - Resolved    Morbid obesity (CMS-HCC) E66.01 Low  7/7/2009 - Present    YESSICA (obstructive sleep apnea) G47.33 Medium  7/7/2009 - Present    Hypothyroid E03.9 Medium  7/7/2009 - Present    Prediabetes R73.03   9/10/2009 - Present    HTN (hypertension) I10 Medium  9/10/2009 - Present    Anemia D64.9 Medium  3/22/2010 - Present    DVT (deep venous thrombosis) (CMS-HCC) I82.409 Medium  4/10/2010 - Present    Anxiety F41.9   5/17/2010 - Present    Depression F32.9 Low  5/17/2010 - Present    Vitamin d deficiency    8/31/2011 - Present    Chronic autoimmune thyroiditis E06.3   10/2/2012 - Present    Chronic anticoagulation Z79.01   10/16/2012 - Present    Vitamin d deficiency    1/30/2013 - Present    Other pulmonary insufficiency, not elsewhere classified, following trauma and surgery J95.89 High  9/2/2013 - Present    COPD (chronic obstructive pulmonary disease) (CMS-HCC) J44.9 Medium  9/2/2013 - Present    Tobacco abuse Z72.0 Low  9/18/2013 - Present    S/P IVC filter Z95.828 Medium  9/18/2013 - Present    Bipolar disorder F31.9 Low  9/18/2013 - Present    History of hernia repair Z98.890, Z87.19   10/7/2013 - Present    Obesity E66.9   7/7/2009 - Present    GERD (gastroesophageal reflux disease) K21.9 Low  11/25/2013 - Present    Colostomy  fistula (CMS-HCC) K94.09 High  12/3/2013 - Present    Thrombocytosis (CMS-HCC) D47.3 Medium  12/3/2013 - Present    Hypomagnesemia E83.42 Medium  12/4/2013 - Present    Hyponatremia E87.1 Medium  12/10/2013 - Present    Vitamin D deficiency E55.9   8/4/2015 - Present    Pain in joint, lower leg M25.569   8/21/2015 - Present    Deep vein thrombosis (DVT) (CMS-HCC) I82.409   11/11/2015 - Present    Cervical spondylosis M47.812   5/4/2016 - Present    DDD (degenerative disc disease), cervical M50.30   5/4/2016 - Present    Chronic neck pain M54.2, G89.29   5/4/2016 - Present    Lumbosacral spondylosis M47.817   5/4/2016 - Present    DDD (degenerative disc disease), lumbar M51.36   5/4/2016 - Present    Lumbar radiculopathy M54.16   5/4/2016 - Present    Chronic low back pain M54.5, G89.29   5/4/2016 - Present    Left knee pain M25.562   5/4/2016 - Present    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, Z96.659   5/4/2016 - Present    Chronic pain syndrome G89.4   8/25/2016 - Present    Positive FIT (fecal immunochemical test) R19.5   9/27/2016 - Present    Chronic obstructive asthma (CMS-HCC) J44.9, J45.909   11/14/2016 - Present    Allergic rhinitis J30.9   11/14/2016 - Present    Hypoxemia R09.02   11/14/2016 - Present      Health Maintenance        Date Due Completion Dates    RETINAL SCREENING 11/2/1970 ---    PAP SMEAR 11/2/1973 ---    IMM ZOSTER VACCINE 11/2/2012 ---    MAMMOGRAM 6/29/2017 6/29/2016    A1C SCREENING 9/20/2017 3/20/2017, 8/3/2015, 7/10/2014, 6/20/2014, 11/29/2013, 5/17/2013, 8/31/2012, 7/16/2012, 3/30/2012, 7/19/2011, 5/7/2010, 10/4/2009    DIABETES MONOFILAMENT / LE EXAM 12/6/2017 12/6/2016    FASTING LIPID PROFILE 3/20/2018 3/20/2017, 8/30/2016, 8/3/2015, 7/11/2014, 6/20/2014, 5/17/2013, 7/19/2011, 5/1/2009, 2/23/2009, 9/16/2008, 5/9/2008, 11/29/2007    URINE ACR / MICROALBUMIN 3/20/2018 3/20/2017, 8/3/2015, 6/20/2014    SERUM CREATININE 3/20/2018 3/20/2017, 8/30/2016, 8/3/2015,  7/11/2014, 7/10/2014, 6/20/2014, 2/6/2014, 1/31/2014, 1/17/2014, 1/13/2014, 12/19/2013, 12/18/2013, 12/17/2013, 12/16/2013, 12/15/2013, 12/14/2013, 12/13/2013, 12/12/2013, 12/11/2013, 12/10/2013, 12/10/2013, 12/9/2013, 12/8/2013, 12/7/2013, 12/6/2013, 12/5/2013, 12/4/2013, 12/3/2013, 12/2/2013, 11/30/2013, 11/28/2013, 11/27/2013, 11/25/2013, 11/22/2013, 11/20/2013, 11/10/2013, 10/27/2013, 10/20/2013, 10/15/2013, 10/14/2013, 10/13/2013, 10/12/2013, 10/11/2013, 10/10/2013, 10/9/2013, 10/8/2013, 10/7/2013, 9/25/2013, 9/24/2013, 9/22/2013, 9/21/2013, 9/20/2013, 9/19/2013, 9/18/2013, 9/17/2013, 9/17/2013, 9/15/2013, 9/13/2013, 9/12/2013, 9/11/2013, 9/10/2013, 9/9/2013, 9/9/2013, 9/8/2013, 9/7/2013, 9/7/2013, 9/6/2013, 9/5/2013, 9/4/2013, 9/3/2013, 9/1/2013, 8/31/2013, 5/17/2013, 1/25/2013, 12/13/2012, 10/17/2012, 10/1/2012, 8/24/2012, 8/18/2012, 7/20/2012, 7/17/2012, 7/16/2012, 1/4/2012, 7/20/2011, 7/19/2011, 1/13/2011, 12/6/2010, 10/26/2010, 7/2/2010, 6/23/2010, 5/17/2010, 5/4/2010, 5/3/2010, 5/1/2010, 4/30/2010, 4/29/2010, 4/28/2010, 4/27/2010, 4/26/2010, 4/25/2010, 4/24/2010, 4/23/2010, 4/22/2010, 4/21/2010, 4/20/2010, 4/19/2010, 4/18/2010, 4/17/2010, 4/15/2010, 4/14/2010, 4/13/2010, 4/12/2010, 4/11/2010, 4/10/2010, 4/9/2010, 4/8/2010, 4/7/2010, 4/6/2010, 4/5/2010, 4/4/2010, 4/3/2010, 4/2/2010, 4/1/2010, 3/31/2010, 3/30/2010, 3/29/2010, 3/28/2010, 3/27/2010, 3/26/2010, 3/25/2010, 3/24/2010, 3/23/2010, 3/22/2010, 3/21/2010, 3/20/2010, 3/19/2010, 3/18/2010, 3/17/2010, 3/17/2010, 3/16/2010, 3/15/2010, 3/12/2010, 3/11/2010, 3/5/2010, 3/1/2010, 1/31/2010, 1/30/2010, 1/29/2010, 1/28/2010, 1/27/2010, 1/26/2010, 11/5/2009, 10/7/2009, 10/5/2009, 10/4/2009, 10/3/2009, 10/2/2009, 10/1/2009, 9/21/2009, 9/15/2009, 9/4/2009, 4/27/2009, 2/23/2009, 8/14/2008, 11/29/2007    COLONOSCOPY 11/17/2020 11/17/2010    IMM DTaP/Tdap/Td Vaccine (2 - Td) 5/9/2027 5/9/2017            Current Immunizations     Influenza TIV (IM) 9/3/2013  8:45 PM,  9/20/2011    Influenza Vaccine 12/1/2009    Influenza Vaccine Pediatric 11/11/2010    Influenza Vaccine Quad Inj (Pf) 11/20/2014  1:50 PM    Influenza Vaccine Quad Inj (Preserved) 11/14/2016  9:52 AM, 10/14/2015    Pneumococcal Vaccine (UF)Historical Data 12/1/2009    Pneumococcal polysaccharide vaccine (PPSV-23) 11/14/2016  9:53 AM    Tdap Vaccine 5/9/2017      Below and/or attached are the medications your provider expects you to take. Review all of your home medications and newly ordered medications with your provider and/or pharmacist. Follow medication instructions as directed by your provider and/or pharmacist. Please keep your medication list with you and share with your provider. Update the information when medications are discontinued, doses are changed, or new medications (including over-the-counter products) are added; and carry medication information at all times in the event of emergency situations     Allergies:  PCN - Anaphylaxis,Rash               Medications  Valid as of: June 20, 2017 - 11:07 AM    Generic Name Brand Name Tablet Size Instructions for use    Albuterol Sulfate (Aero Soln) albuterol 108 (90 BASE) MCG/ACT Inhale 2 Puffs by mouth every four hours as needed. For Shortness of breath        Amitriptyline HCl (Tab) ELAVIL 25 MG Take 25 mg by mouth every bedtime.        Ascorbic Acid (Tab) ascorbic acid 500 MG TAKE ONE [1] TABLET BY MOUTH EVERY DAY        Budesonide-Formoterol Fumarate (Aerosol) SYMBICORT 160-4.5 MCG/ACT Inhale 2 Puffs by mouth 2 Times a Day. With spacer, rinse mouth after use        Ergocalciferol (Cap) DRISDOL 43708 UNITS TAKE 1 CAPSULE BY MOUTH EVERY 28 DAYS.        FLUoxetine HCl (Cap) PROZAC 20 MG Take 20 mg by mouth every day.        Flurazepam HCl (Cap) DALMANE 30 MG Take 30 mg by mouth at bedtime as needed.        Gabapentin (Cap) NEURONTIN 300 MG TAKE THREE (3) CAPSULES BY MOUTH THREE (3) TIMES DAILY         Incontinence Supply Disposable (Misc) DEPEND UNDERWEAR  X-LARGE  1 Each by Does not apply route 3 times a day.        Levothyroxine Sodium (Tab) SYNTHROID 200 MCG TAKE ONE (1) TABLET BY MOUTH EVERY MORNING ON AN EMPTY STOMACH        Levothyroxine Sodium (Tab) SYNTHROID 25 MCG Take 1 tablet by mouth once every morning on an empty stomach. Use along with 200 mcg levothyroxine for a total of 225 mcg daily        MetFORMIN HCl (Tab) GLUCOPHAGE 500 MG TAKE ONE [1] TABLET BY MOUTH EVERY DAY        Misc. Devices (Misc) Misc. Devices  Pt needs service for her PMD she will need a new battery to avoid stalling        Misc. Devices (Misc) Misc. Devices  CPAP supplies (hoses, filter, water chamber, face mask-resmed small), nasal cannula tubing        Misc. Devices (Misc) Misc. Devices  O2 to use continuously to keep her sats >90 %        Misc. Devices (Misc) Misc. Devices  Please evaluate pt for wider power mobility devise, pt has tipped over on multiple occasions        Misc. Devices (Misc) Misc. Devices  O2 tank rack for power wheel chair        Misc. Devices (Misc) Misc. Devices  Adult incontinence supplies to use as directed        Misc. Devices (Misc) Misc. Devices  Power mobility devise        Misc. Devices (Misc) Misc. Devices  O2 concentrator to use 24/7 and also with her CPAP at night to keep her O2 sats > 90%        Misc. Devices (Misc) Misc. Devices  Incontinence supplies (for wheel chair) and adult briefs to use as needed #120        Misc. Devices (Misc) Misc. Devices  New battery and gear shifter for power wheel chair        Misc. Devices (Misc) Misc. Devices  3 boxes of pouches (79698), 2 boxes of wafers (98470), 3 boxes (tubes) paste        Mometasone Furoate (Suspension) NASONEX 50 MCG/ACT Spray 1-2 Sprays in nose every day. Each nostril.        Nicotine Polacrilex (Gum) SM NICOTINE POLACRILEX 2 MG CHEW ONE (1) PIECE AS NEEDED FOR SMOKING CESSATION        Ostomy Supplies (Misc) Ostomy Supplies  Dispense as ordered        RaNITidine HCl (Tab) ZANTAC 150 MG TAKE ONE  (1) TABLET BY MOUTH TWICE DAILY WITH MEALS        Tiotropium Bromide Monohydrate (Aero Soln) Tiotropium Bromide Monohydrate 2.5 MCG/ACT Inhale 2 Inhalation by mouth every day. Assemble and prime.        TraZODone HCl (Tab) DESYREL 50 MG Take 1 Tab by mouth at bedtime as needed for Sleep.        Warfarin Sodium (Tab) COUMADIN 10 MG Take 1 tablet by mouth daily as directed by [facility]        .                 Medicines prescribed today were sent to:     [id number] - [place], [place] - [address]    [address] [place] [address]    Phone: [phone] Fax: [phone]    Open 24 Hours?: No    [facility] - [place], [place] - [address]    [address] [place] [address]    Phone: [phone] Fax: [phone]    Open 24 Hours?: No      Medication refill instructions:       If your prescription bottle indicates you have medication refills left, it is not necessary to call your provider’s office. Please contact your pharmacy and they will refill your medication.    If your prescription bottle indicates you do not have any refills left, you may request refills at any time through one of the following ways: The online [employer] system (except Urgent Care), by calling your provider’s office, or by asking your pharmacy to contact your provider’s office with a refill request. Medication refills are processed only during regular business hours and may not be available until the next business day. Your provider may request additional information or to have a follow-up visit with you prior to refilling your medication.   *Please Note: Medication refills are assigned a new Rx number when refilled electronically. Your pharmacy may indicate that no refills were authorized even though a new prescription for the same medication is available at the pharmacy. Please request the medicine by name with the pharmacy before contacting your provider for a refill.           [employer]  Code: Activation code not generated  Current MyChart Status: Active          Quit Tobacco Information     Do you want to quit using tobacco?    Quitting tobacco decreases risks of cancer, heart and lung disease, increases life expectancy, improves sense of taste and smell, and increases spending money, among other benefits.    If you are thinking about quitting, we can help.  • [facility]: [phone]  o Program occurs weekly for four weeks and includes pharmacist consultation on products to support quitting smoking or chewing tobacco. A provider referral is needed for pharmacist consultation.  • Tobacco Users Help Hotline: 5-928-QUIT-NOW (671-7518) or https://[place].quitlogix.org/  o Free, confidential telephone and online coaching for [place] residents. Sessions are designed on a schedule that is convenient for you. Eligible clients receive free nicotine replacement therapy.  • Nationally: www.smokefree.gov  o Information and professional assistance to support both immediate and long-term needs as you become, and remain, a non-smoker. Smokefree.gov allows you to choose the help that best fits your needs.

## 2017-06-20 NOTE — PROGRESS NOTES
Anticoagulation Summary as of 6/20/2017     INR goal 2.0-3.0   Selected INR 4.4! (6/20/2017)   Maintenance plan 5 mg (10 mg x 0.5) on Mon; 10 mg (10 mg x 1) all other days   Weekly total 65 mg   Plan last modified [place], PHARMD (6/20/2017)   Next INR check 7/3/2017   Priority Routine   Target end date Indefinite    Indications   DVT (deep venous thrombosis) (CMS-[facility]) [I82.409]  Chronic anticoagulation [Z79.01]  S/P IVC filter [Z95.828]  Deep vein thrombosis (DVT) (CMS-[facility]) [I82.409]         Anticoagulation Episode Summary     INR check location [facility]    Preferred lab [facility]    Send INR reminders to     Comments       Anticoagulation Care Providers     Provider Role Specialty Phone number    Carmelo Pat M.D. Referring Family Medicine [phone]    [facility] Responsible  [phone]        Anticoagulation Patient Findings    Patient's INR was SUPRA therapeutic.  Pt denies any unusual s/s of bleeding, bruising, clotting or any changes to diet or medications.  Confirmed dosing regimen.  Pt to hold today and begin 7% reduced regimen.    Earliest pt can return due to travel restrictions is 2 weeks.    Bon [place], PHARMD

## 2017-06-21 LAB — INR BLD: 4.4 (ref 0.9–1.2)

## 2017-06-30 ENCOUNTER — OFFICE VISIT (OUTPATIENT)
Dept: PHYSICAL MEDICINE AND REHAB | Facility: MEDICAL CENTER | Age: 67
End: 2017-06-30
Payer: MEDICAID

## 2017-06-30 VITALS
TEMPERATURE: 97.7 F | DIASTOLIC BLOOD PRESSURE: 84 MMHG | BODY MASS INDEX: 50.02 KG/M2 | WEIGHT: 293 LBS | HEIGHT: 64 IN | OXYGEN SATURATION: 90 % | SYSTOLIC BLOOD PRESSURE: 140 MMHG | HEART RATE: 78 BPM

## 2017-06-30 DIAGNOSIS — M25.572 LEFT ANKLE PAIN, UNSPECIFIED CHRONICITY: ICD-10-CM

## 2017-06-30 DIAGNOSIS — M51.35 DDD (DEGENERATIVE DISC DISEASE), THORACOLUMBAR: ICD-10-CM

## 2017-06-30 DIAGNOSIS — M79.18 MYOFASCIAL PAIN: ICD-10-CM

## 2017-06-30 DIAGNOSIS — Z98.890 H/O KNEE SURGERY: ICD-10-CM

## 2017-06-30 DIAGNOSIS — M54.6 THORACOLUMBAR BACK PAIN: ICD-10-CM

## 2017-06-30 DIAGNOSIS — M25.562 LEFT KNEE PAIN, UNSPECIFIED CHRONICITY: ICD-10-CM

## 2017-06-30 DIAGNOSIS — M25.50 ARTHRALGIA, UNSPECIFIED JOINT: ICD-10-CM

## 2017-06-30 DIAGNOSIS — R26.9 IMPAIRED GAIT: ICD-10-CM

## 2017-06-30 DIAGNOSIS — M54.50 THORACOLUMBAR BACK PAIN: ICD-10-CM

## 2017-06-30 PROCEDURE — 99214 OFFICE O/P EST MOD 30 MIN: CPT | Performed by: PHYSICAL MEDICINE & REHABILITATION

## 2017-06-30 RX ORDER — NYSTATIN 100000 U/G
OINTMENT TOPICAL
COMMUNITY
Start: 2017-04-28 | End: 2017-08-21

## 2017-06-30 RX ORDER — CARBAMAZEPINE 200 MG/1
600 TABLET ORAL
COMMUNITY
Start: 2017-06-16

## 2017-06-30 RX ORDER — MOMETASONE FUROATE 1 MG/ML
SOLUTION TOPICAL
Status: ON HOLD | COMMUNITY
Start: 2017-05-22 | End: 2018-04-07

## 2017-06-30 RX ORDER — IPRATROPIUM BROMIDE AND ALBUTEROL 20; 100 UG/1; UG/1
SPRAY, METERED RESPIRATORY (INHALATION)
Status: ON HOLD | COMMUNITY
Start: 2017-04-24 | End: 2018-04-07

## 2017-06-30 ASSESSMENT — ENCOUNTER SYMPTOMS
SPUTUM PRODUCTION: 0
NERVOUS/ANXIOUS: 1
NAUSEA: 0
FEVER: 0
ORTHOPNEA: 0
BLURRED VISION: 0
CHILLS: 0
INSOMNIA: 1
PALPITATIONS: 0
DEPRESSION: 1
MYALGIAS: 1
BACK PAIN: 1
VOMITING: 0
DOUBLE VISION: 0
HEMOPTYSIS: 0

## 2017-06-30 NOTE — MR AVS SNAPSHOT
"        Atiya Henley   2017 8:30 AM   Office Visit   MRN: [id number]    Department:  [facility]   Dept Phone:  [phone]    Description:  Female : 1950   Provider:  Choco Smith M.D.           Reason for Visit     New Patient           Allergies as of 2017     Allergen Noted Reactions    Pcn [Penicillins] 07/10/2014   Anaphylaxis, Rash    Reaction; happened as a child. Tolerated cephalosporins and Zosyn      You were diagnosed with     Left knee pain, unspecified chronicity   [7224271]       H/O knee surgery   [654703]       Left ankle pain, unspecified chronicity   [4398812]         Vital Signs     Blood Pressure Pulse Temperature Height Weight Body Mass Index    140/84 mmHg 78 36.5 °C (97.7 °F) 1.626 m (5' 4\") 136.079 kg (300 lb) 51.47 kg/m2    Oxygen Saturation Last Menstrual Period Smoking Status             90% 1975 Current Every Day Smoker         Basic Information     Date Of Birth Sex Race Ethnicity Preferred Language    1950 Female White Non- English      Your appointments     2017  8:00 AM   Established Patient with [facility] EXAM 4   [facility]  (--)    [address]  [place] [address]   [phone]            2017  3:00 PM   Follow Up Visit with Choco Smith M.D.   [facility] PHYSIATRY (--)    [address]  [place] [address] [phone]           You will be receiving a confirmation call a few days before your appointment from our automated call confirmation system.            2017  7:05 PM   Sleep Study with SLEEP TECH   [facility] (--)    [address]  [address]  [place] [address] [phone]            2017  1:00 PM   Established Patient Pul with ALCIRA Chavez   [facility] (--)    [address]  [address]  [place] [id number] [phone]            Aug " 03, 2017 10:50 AM   Established Patient with Carmelo Pat M.D.   [facility]    [address] [place] [address]   [phone]           You will be receiving a confirmation call a few days before your appointment from our automated call confirmation system.              Problem List              ICD-10-CM Priority Class Noted - Resolved    Morbid obesity (CMS-HCC) E66.01 Low  7/7/2009 - Present    YESSICA (obstructive sleep apnea) G47.33 Medium  7/7/2009 - Present    Hypothyroid E03.9 Medium  7/7/2009 - Present    Prediabetes R73.03   9/10/2009 - Present    HTN (hypertension) I10 Medium  9/10/2009 - Present    Anemia D64.9 Medium  3/22/2010 - Present    DVT (deep venous thrombosis) (CMS-HCC) I82.409 Medium  4/10/2010 - Present    Anxiety F41.9   5/17/2010 - Present    Depression F32.9 Low  5/17/2010 - Present    Vitamin d deficiency    8/31/2011 - Present    Chronic autoimmune thyroiditis E06.3   10/2/2012 - Present    Chronic anticoagulation Z79.01   10/16/2012 - Present    Vitamin d deficiency    1/30/2013 - Present    Other pulmonary insufficiency, not elsewhere classified, following trauma and surgery J95.89 High  9/2/2013 - Present    COPD (chronic obstructive pulmonary disease) (CMS-HCC) J44.9 Medium  9/2/2013 - Present    Tobacco abuse Z72.0 Low  9/18/2013 - Present    S/P IVC filter Z95.828 Medium  9/18/2013 - Present    Bipolar disorder F31.9 Low  9/18/2013 - Present    History of hernia repair Z98.890, Z87.19   10/7/2013 - Present    Obesity E66.9   7/7/2009 - Present    GERD (gastroesophageal reflux disease) K21.9 Low  11/25/2013 - Present    Colostomy fistula (CMS-HCC) K94.09 High  12/3/2013 - Present    Thrombocytosis (CMS-HCC) D47.3 Medium  12/3/2013 - Present    Hypomagnesemia E83.42 Medium  12/4/2013 - Present    Hyponatremia E87.1 Medium  12/10/2013 - Present    Vitamin D deficiency E55.9   8/4/2015 - Present    Pain in joint, lower leg M25.569   8/21/2015 - Present     Deep vein thrombosis (DVT) (CMS-HCC) I82.409   11/11/2015 - Present    Cervical spondylosis M47.812   5/4/2016 - Present    DDD (degenerative disc disease), cervical M50.30   5/4/2016 - Present    Chronic neck pain M54.2, G89.29   5/4/2016 - Present    Lumbosacral spondylosis M47.817   5/4/2016 - Present    DDD (degenerative disc disease), lumbar M51.36   5/4/2016 - Present    Lumbar radiculopathy M54.16   5/4/2016 - Present    Chronic low back pain M54.5, G89.29   5/4/2016 - Present    Left knee pain M25.562   5/4/2016 - Present    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, Z96.659   5/4/2016 - Present    Chronic pain syndrome G89.4   8/25/2016 - Present    Positive FIT (fecal immunochemical test) R19.5   9/27/2016 - Present    Chronic obstructive asthma (CMS-HCC) J44.9, J45.909   11/14/2016 - Present    Allergic rhinitis J30.9   11/14/2016 - Present    Hypoxemia R09.02   11/14/2016 - Present      Health Maintenance        Date Due Completion Dates    RETINAL SCREENING 11/2/1970 ---    PAP SMEAR 11/2/1973 ---    IMM ZOSTER VACCINE 11/2/2012 ---    MAMMOGRAM 6/29/2017 6/29/2016    A1C SCREENING 9/20/2017 3/20/2017, 8/3/2015, 7/10/2014, 6/20/2014, 11/29/2013, 5/17/2013, 8/31/2012, 7/16/2012, 3/30/2012, 7/19/2011, 5/7/2010, 10/4/2009    DIABETES MONOFILAMENT / LE EXAM 12/6/2017 12/6/2016    FASTING LIPID PROFILE 3/20/2018 3/20/2017, 8/30/2016, 8/3/2015, 7/11/2014, 6/20/2014, 5/17/2013, 7/19/2011, 5/1/2009, 2/23/2009, 9/16/2008, 5/9/2008, 11/29/2007    URINE ACR / MICROALBUMIN 3/20/2018 3/20/2017, 8/3/2015, 6/20/2014    SERUM CREATININE 3/20/2018 3/20/2017, 8/30/2016, 8/3/2015, 7/11/2014, 7/10/2014, 6/20/2014, 2/6/2014, 1/31/2014, 1/17/2014, 1/13/2014, 12/19/2013, 12/18/2013, 12/17/2013, 12/16/2013, 12/15/2013, 12/14/2013, 12/13/2013, 12/12/2013, 12/11/2013, 12/10/2013, 12/10/2013, 12/9/2013, 12/8/2013, 12/7/2013, 12/6/2013, 12/5/2013, 12/4/2013, 12/3/2013, 12/2/2013, 11/30/2013, 11/28/2013, 11/27/2013,  11/25/2013, 11/22/2013, 11/20/2013, 11/10/2013, 10/27/2013, 10/20/2013, 10/15/2013, 10/14/2013, 10/13/2013, 10/12/2013, 10/11/2013, 10/10/2013, 10/9/2013, 10/8/2013, 10/7/2013, 9/25/2013, 9/24/2013, 9/22/2013, 9/21/2013, 9/20/2013, 9/19/2013, 9/18/2013, 9/17/2013, 9/17/2013, 9/15/2013, 9/13/2013, 9/12/2013, 9/11/2013, 9/10/2013, 9/9/2013, 9/9/2013, 9/8/2013, 9/7/2013, 9/7/2013, 9/6/2013, 9/5/2013, 9/4/2013, 9/3/2013, 9/1/2013, 8/31/2013, 5/17/2013, 1/25/2013, 12/13/2012, 10/17/2012, 10/1/2012, 8/24/2012, 8/18/2012, 7/20/2012, 7/17/2012, 7/16/2012, 1/4/2012, 7/20/2011, 7/19/2011, 1/13/2011, 12/6/2010, 10/26/2010, 7/2/2010, 6/23/2010, 5/17/2010, 5/4/2010, 5/3/2010, 5/1/2010, 4/30/2010, 4/29/2010, 4/28/2010, 4/27/2010, 4/26/2010, 4/25/2010, 4/24/2010, 4/23/2010, 4/22/2010, 4/21/2010, 4/20/2010, 4/19/2010, 4/18/2010, 4/17/2010, 4/15/2010, 4/14/2010, 4/13/2010, 4/12/2010, 4/11/2010, 4/10/2010, 4/9/2010, 4/8/2010, 4/7/2010, 4/6/2010, 4/5/2010, 4/4/2010, 4/3/2010, 4/2/2010, 4/1/2010, 3/31/2010, 3/30/2010, 3/29/2010, 3/28/2010, 3/27/2010, 3/26/2010, 3/25/2010, 3/24/2010, 3/23/2010, 3/22/2010, 3/21/2010, 3/20/2010, 3/19/2010, 3/18/2010, 3/17/2010, 3/17/2010, 3/16/2010, 3/15/2010, 3/12/2010, 3/11/2010, 3/5/2010, 3/1/2010, 1/31/2010, 1/30/2010, 1/29/2010, 1/28/2010, 1/27/2010, 1/26/2010, 11/5/2009, 10/7/2009, 10/5/2009, 10/4/2009, 10/3/2009, 10/2/2009, 10/1/2009, 9/21/2009, 9/15/2009, 9/4/2009, 4/27/2009, 2/23/2009, 8/14/2008, 11/29/2007    COLONOSCOPY 11/17/2020 11/17/2010    IMM DTaP/Tdap/Td Vaccine (2 - Td) 5/9/2027 5/9/2017            Current Immunizations     Influenza TIV (IM) 9/3/2013  8:45 PM, 9/20/2011    Influenza Vaccine 12/1/2009    Influenza Vaccine Pediatric 11/11/2010    Influenza Vaccine Quad Inj (Pf) 11/20/2014  1:50 PM    Influenza Vaccine Quad Inj (Preserved) 11/14/2016  9:52 AM, 10/14/2015    Pneumococcal Vaccine (UF)Historical Data 12/1/2009    Pneumococcal polysaccharide vaccine (PPSV-23) 11/14/2016  9:53  AM    Tdap Vaccine 5/9/2017      Below and/or attached are the medications your provider expects you to take. Review all of your home medications and newly ordered medications with your provider and/or pharmacist. Follow medication instructions as directed by your provider and/or pharmacist. Please keep your medication list with you and share with your provider. Update the information when medications are discontinued, doses are changed, or new medications (including over-the-counter products) are added; and carry medication information at all times in the event of emergency situations     Allergies:  PCN - Anaphylaxis,Rash               Medications  Valid as of: June 30, 2017 -  9:18 AM    Generic Name Brand Name Tablet Size Instructions for use    Albuterol Sulfate (Aero Soln) albuterol 108 (90 BASE) MCG/ACT Inhale 2 Puffs by mouth every four hours as needed. For Shortness of breath        Amitriptyline HCl (Tab) ELAVIL 25 MG Take 25 mg by mouth every bedtime.        Ascorbic Acid (Tab) ascorbic acid 500 MG TAKE ONE [1] TABLET BY MOUTH EVERY DAY        Budesonide-Formoterol Fumarate (Aerosol) SYMBICORT 160-4.5 MCG/ACT Inhale 2 Puffs by mouth 2 Times a Day. With spacer, rinse mouth after use        CarBAMazepine (Tab) TEGRETOL 200 MG         Ergocalciferol (Cap) DRISDOL 26998 UNITS TAKE 1 CAPSULE BY MOUTH EVERY 28 DAYS.        FLUoxetine HCl (Cap) PROZAC 20 MG Take 20 mg by mouth every day.        Flurazepam HCl (Cap) DALMANE 30 MG Take 30 mg by mouth at bedtime as needed.        Gabapentin (Cap) NEURONTIN 300 MG TAKE THREE (3) CAPSULES BY MOUTH THREE (3) TIMES DAILY         Hyoscyamine Sulfate (SL Tab) LEVSIN 0.125 MG         Incontinence Supply Disposable (Misc) DEPEND UNDERWEAR X-LARGE  1 Each by Does not apply route 3 times a day.        Ipratropium-Albuterol (Aero Soln) COMBIVENT RESPIMAT  MCG/ACT         Levothyroxine Sodium (Tab) SYNTHROID 200 MCG TAKE ONE (1) TABLET BY MOUTH EVERY MORNING ON AN EMPTY  STOMACH        Levothyroxine Sodium (Tab) SYNTHROID 25 MCG Take 1 tablet by mouth once every morning on an empty stomach. Use along with 200 mcg levothyroxine for a total of 225 mcg daily        MetFORMIN HCl (Tab) GLUCOPHAGE 500 MG TAKE ONE [1] TABLET BY MOUTH EVERY DAY        Misc. Devices (Misc) Misc. Devices  Pt needs service for her PMD she will need a new battery to avoid stalling        Misc. Devices (Misc) Misc. Devices  CPAP supplies (hoses, filter, water chamber, face mask-resmed small), nasal cannula tubing        Misc. Devices (Misc) Misc. Devices  O2 to use continuously to keep her sats >90 %        Misc. Devices (Misc) Misc. Devices  Please evaluate pt for wider power mobility devise, pt has tipped over on multiple occasions        Misc. Devices (Misc) Misc. Devices  O2 tank rack for power wheel chair        Misc. Devices (Misc) Misc. Devices  Adult incontinence supplies to use as directed        Misc. Devices (Misc) Misc. Devices  Power mobility devise        Misc. Devices (Misc) Misc. Devices  O2 concentrator to use 24/7 and also with her CPAP at night to keep her O2 sats > 90%        Misc. Devices (Misc) Misc. Devices  Incontinence supplies (for wheel chair) and adult briefs to use as needed #120        Misc. Devices (Misc) Misc. Devices  New battery and gear shifter for power wheel chair        Misc. Devices (Misc) Misc. Devices  3 boxes of pouches (93319), 2 boxes of wafers (00028), 3 boxes (tubes) paste        Mometasone Furoate (Suspension) NASONEX 50 MCG/ACT Spray 1-2 Sprays in nose every day. Each nostril.        Mometasone Furoate (Solution) ELOCON 0.1 %         Nicotine Polacrilex (Gum) SM NICOTINE POLACRILEX 2 MG CHEW ONE (1) PIECE AS NEEDED FOR SMOKING CESSATION        Nystatin (Ointment) MYCOSTATIN 654284 UNIT/GM         Ostomy Supplies (Misc) Ostomy Supplies  Dispense as ordered        RaNITidine HCl (Tab) ZANTAC 150 MG TAKE ONE (1) TABLET BY MOUTH TWICE DAILY WITH MEALS         Tiotropium Bromide Monohydrate (Aero Soln) Tiotropium Bromide Monohydrate 2.5 MCG/ACT Inhale 2 Inhalation by mouth every day. Assemble and prime.        TraZODone HCl (Tab) DESYREL 50 MG Take 1 Tab by mouth at bedtime as needed for Sleep.        Warfarin Sodium (Tab) COUMADIN 10 MG Take 1 tablet by mouth daily as directed by [facility] Anticoagulation Program        .                 Medicines prescribed today were sent to:     [id number] - [place], [place] - [address] [facility]    [address] [place] [address]    Phone: [phone] Fax: [phone]    Open 24 Hours?: No      Medication refill instructions:       If your prescription bottle indicates you have medication refills left, it is not necessary to call your provider’s office. Please contact your pharmacy and they will refill your medication.    If your prescription bottle indicates you do not have any refills left, you may request refills at any time through one of the following ways: The online [employer] system (except Urgent Care), by calling your provider’s office, or by asking your pharmacy to contact your provider’s office with a refill request. Medication refills are processed only during regular business hours and may not be available until the next business day. Your provider may request additional information or to have a follow-up visit with you prior to refilling your medication.   *Please Note: Medication refills are assigned a new Rx number when refilled electronically. Your pharmacy may indicate that no refills were authorized even though a new prescription for the same medication is available at the pharmacy. Please request the medicine by name with the pharmacy before contacting your provider for a refill.        Your To Do List     Future Labs/Procedures Complete By Expires    CT-KNEE W/O LEFT  As directed 6/30/2018    DX-ANKLE 3+ VIEWS LEFT  As directed 6/30/2018    DX-KNEE COMPLETE 4+ LEFT  As directed 6/30/2018         [employer] Access Code:  Activation code not generated  Current MyChart Status: Active          Quit Tobacco Information     Do you want to quit using tobacco?    Quitting tobacco decreases risks of cancer, heart and lung disease, increases life expectancy, improves sense of taste and smell, and increases spending money, among other benefits.    If you are thinking about quitting, we can help.  • [facility] Quit Tobacco Program: [phone]  o Program occurs weekly for four weeks and includes pharmacist consultation on products to support quitting smoking or chewing tobacco. A provider referral is needed for pharmacist consultation.  • Tobacco Users Help Hotline: 5-771-QUITNOW (125-3700) or https://[place].quitlogix.org/  o Free, confidential telephone and online coaching for [place] residents. Sessions are designed on a schedule that is convenient for you. Eligible clients receive free nicotine replacement therapy.  • Nationally: www.smokefree.gov  o Information and professional assistance to support both immediate and long-term needs as you become, and remain, a non-smoker. Smokefree.gov allows you to choose the help that best fits your needs.

## 2017-06-30 NOTE — PROGRESS NOTES
Subjective:      Atiya Henley is a 66 y.o. female who presents with Follow-Up            HPI Ms. Henley returns to the office today for follow-up evaluation of spinal/joint/musculoskeletal pain. The patient was previously seen by my colleague, Dr. Galan, reviewed records most recently from 6/2016. The patient was also seen at [facility], records pending.    Regarding today's visit:    The patient notes ongoing pain about the left knee, worse with activities, limiting standing and walking tolerance. The patient has had extensive care regarding the left knee over the years, including arthroscopy, total knee replacement with complication, infection, orthopedic care per Dr. Hogan, records pending. The patient has tried physical therapy and injections, without sustained relief.    The patient notes intermittent left ankle pain.    The patient notes intermittent thoracolumbar pain, now controlled, without radicular component.    The patient denies overt hip area pain.    The patient notes intermittent neck and shoulder area pain.    Note psychiatric disorder, bipolar, anxiety/depression.    The patient notes significant difficulty with sleep.    The patient has had prior treatment with medications. She has been to physical therapy. She has tried injections. She has colostomy in place. No bladder incontinence noted. Home exercise program has been limited due to the ongoing pain. The ongoing pain limits her ability to function. She is inquiring about additional treatment options.      MEDICAL RECORDS REVIEW/DATA REVIEW: Reviewed in epic.    Records Reviewed: Reviewed referring provider notes. Reviewed prior physiatry records, most recently from 6/2016.    I reviewed medications. Reviewed medication list.    I reviewed  profile 6/30/2017.    I reviewed diagnostic studies:     I reviewed radiographs. Reviewed left knee x-rays 12/2014. Reviewed left ankle x-rays 3/2015. Reviewed CTs cervical and lumbar spine 6/2016.  Reviewed CT thoracic spine 3/2015.    I reviewed lab studies. Reviewed labs 3/2017, including CMP, A1C 5.6.     I reviewed medical issues. Followed by consultants, including endocrinology.    I reviewed family history: No neuromuscular disorders noted.    I reviewed social issues. On disability.      PAST MEDICAL HISTORY:   Past Medical History   Diagnosis Date   • Obesity 7/7/2009   • Oxygen dependent      4.5 L NC   • EMPHYSEMA    • Snoring    • Hypothyroid 7/7/2009   • Arthritis    • Cancer (CMS-HCC)      cervical   • Psychiatric problem      depression   • COPD    • Indigestion    • Sleep apnea      USES CPAP, 3-4L oxygen   • Hypertension      stated no longer on meds due to lost 100 lbs   • Colostomy in place (CMS-HCC) 2009   • History of total knee arthroplasty      infection   • Status post cholecystectomy 1992   • Chronic autoimmune thyroiditis      + TPO ab / + ultrasound   • Weight gain    • Obstruction      colostomy   • Pain      b/l legs   • Hashimoto's disease    • Personal history of venous thrombosis and embolism 2009, 2012     arm and leg left side   • Diabetes      type 2    • Dental disorder      dentures upper   • Infectious disease      MRSA,   • Infectious disease      VRE   • Bowel obstruction (CMS-HCC)    • Presence of IVC filter        PAST SURGICAL HISTORY:    Past Surgical History   Procedure Laterality Date   • Myringotomy  9/9/2009     Performed by RENETTA GOODSON at SURGERY SAME DAY [facility] ORS   • Low anterior resection  3/10/2010     Performed by JESUSITA SUMNER at SURGERY [facility] ORS   • Low anterior resection laparoscopic  3/10/2010     Performed by JESUSITA SUMNER at SURGERY [facility] ORS   • Colostomy  3/16/2010     Performed by CHRISTA BENNETT at SURGERY [facility] ORS   • Tracheostomy  3/31/2010     Performed by JESUSITA SUMNER at SURGERY [facility] ORS   • Cholecystectomy  1992     laparoscopic   • Exploratory laparotomy  3/16/2010     Performed by CHRISTA BENNETT at  SURGERY [facility]   • Colectomy  3/16/2010     Performed by CHRISTA BENNETT at [facility]   • Hysterectomy laparoscopy  1983   • Colonoscopy - endo  11/17/2010     Performed by JESUSITA SUMNER at [facility]   • Lateral release  7/20/2011     Performed by BERYL LOYD at [facility]   • Other orthopedic surgery       LT KNEE X2   • Knee arthroscopy  7/20/2011     Performed by BERYL LOYD at [facility]   • Medial meniscectomy  7/20/2011     Performed by BERYL LOYD at [facility]   • Meniscectomy  7/20/2011     Performed by BERYL LOYD at [facility]   • Irrigation & debridement ortho  7/17/2012     Performed by BERYL LOYD at [facility]   • Irrigation & debridement ortho  7/19/2012     Performed by BERYL LOYD at [facility]   • Knee arthroplasty total  6/10/2012     left   • Irrigation & debridement general  12/17/2012     Performed by David Fowler M.D. at [facility]   • Irrigation & debridement ortho  2/8/2013     Performed by Dvaid Fowler M.D. at [facility]   • Exploratory laparotomy  9/2/2013     Performed by Maninder Carter M.D. at [facility]   • Parastomal hernia repair   9/2/2013     Performed by Maninder Carter M.D. at [facility]   • Bladder biopsy with cystoscopy  10/10/2013     Performed by Sang Castañeda M.D. at [facility]   • Colostomy  11/26/2013     Performed by Maninder Carter M.D. at [facility]   • Exploratory laparotomy  11/26/2013     Performed by Maninder Carter M.D. at [facility]   • Lysis adhesions general  11/26/2013     Performed by Maninder Carter M.D. at [facility]   • Colon resection  11/26/2013     Performed by Maninder Carter M.D. at [facility]   • Exploratory laparotomy  12/10/2013     Performed by  Sang Castañeda M.D. at SURGERY [facility] ORS   • Wound closure general  12/10/2013     Performed by Sang Castañeda M.D. at SURGERY [facility] ORS   • Pr inject rx other periph nerve Left 8/21/2015     Procedure: NEUROLYTIC DEST-OTHER NERVE;  Surgeon: Kd Galaviz D.O.;  Location: [facility] ORS;  Service: Pain Management   • Pr fluoroscopic guidance needle placement Left 8/21/2015     Procedure: FLOURO GUIDE NEEDLE PLACEMENT;  Surgeon: Kd Galaviz D.O.;  Location: SURGERY [facility] ORS;  Service: Pain Management   • Pr inject rx other periph nerve  8/21/2015     Procedure: NEUROLYTIC DEST-OTHER NERVE;  Surgeon: Kd Galaviz D.O.;  Location: SURGERY [facility];  Service: Pain Management   • Pr inject rx other periph nerve  8/21/2015     Procedure: NEUROLYTIC DEST-OTHER NERVE;  Surgeon: Kd Galaviz D.O.;  Location: SURGERY [facility];  Service: Pain Management       ALLERGIES:  Pcn    MEDICATIONS:    Outpatient Encounter Prescriptions as of 6/30/2017   Medication Sig Dispense Refill   • carbamazepine (TEGRETOL) 200 MG Tab      • hyoscyamine (LEVSIN) 0.125 MG SL Tab      • COMBIVENT RESPIMAT  MCG/ACT Aero Soln      • mometasone (ELOCON) 0.1 % lotion      • nystatin (MYCOSTATIN) 073488 UNIT/GM Ointment      • levothyroxine (SYNTHROID) 200 MCG Tab TAKE ONE (1) TABLET BY MOUTH EVERY MORNING ON AN EMPTY STOMACH 90 Tab 1   • levothyroxine (SYNTHROID) 25 MCG Tab Take 1 tablet by mouth once every morning on an empty stomach. Use along with 200 mcg levothyroxine for a total of 225 mcg daily 90 Tab 1   • SM NICOTINE POLACRILEX 2 MG Gum CHEW ONE (1) PIECE AS NEEDED FOR SMOKING CESSATION 110 Each 2   • warfarin (COUMADIN) 10 MG Tab Take 1 tablet by mouth daily as directed by [facility] 30 Tab 2   • Tiotropium Bromide Monohydrate (SPIRIVA RESPIMAT) 2.5 MCG/ACT Aero Soln Inhale 2 Inhalation by mouth every day. Assemble and prime. 1 Inhaler 6   •  trazodone (DESYREL) 50 MG Tab Take 1 Tab by mouth at bedtime as needed for Sleep. 30 Tab 3   • metformin (GLUCOPHAGE) 500 MG Tab TAKE ONE [1] TABLET BY MOUTH EVERY DAY 90 Tab 0   • ascorbic acid (ASCORBIC ACID) 500 MG Tab TAKE ONE [1] TABLET BY MOUTH EVERY DAY 90 Tab 2   • ranitidine (ZANTAC) 150 MG Tab TAKE ONE (1) TABLET BY MOUTH TWICE DAILY WITH MEALS 60 Tab 5   • gabapentin (NEURONTIN) 300 MG Cap TAKE THREE (3) CAPSULES BY MOUTH THREE (3) TIMES DAILY  270 Cap 5   • Incontinence Supply Disposable (DEPEND UNDERWEAR X-LARGE) Misc 1 Each by Does not apply route 3 times a day. 90 Each 6   • budesonide-formoterol (SYMBICORT) 160-4.5 MCG/ACT Aerosol Inhale 2 Puffs by mouth 2 Times a Day. With spacer, rinse mouth after use 1 Inhaler 6   • albuterol (PROVENTIL HFA) 108 (90 BASE) MCG/ACT Aero Soln inhalation aerosol Inhale 2 Puffs by mouth every four hours as needed. For Shortness of breath 1 Inhaler 6   • vitamin D, Ergocalciferol, (DRISDOL) 29481 UNITS Cap capsule TAKE 1 CAPSULE BY MOUTH EVERY 28 DAYS. 3 Cap 3   • Ostomy Supplies Misc Dispense as ordered 20 Each 12   • fluoxetine (PROZAC) 20 MG Cap Take 20 mg by mouth every day.     • amitriptyline (ELAVIL) 25 MG Tab Take 25 mg by mouth every bedtime.     • Misc. Devices Misc 3 boxes of pouches (71092), 2 boxes of wafers (47861), 3 boxes (tubes) paste 3 Container 6   • Misc. Devices Misc New battery and gear shifter for power wheel chair 1 Device 0   • mometasone (NASONEX) 50 MCG/ACT nasal spray Spray 1-2 Sprays in nose every day. Each nostril. 1 Inhaler 6   • Misc. Devices Misc Incontinence supplies (for wheel chair) and adult briefs to use as needed #120 120 Device 6   • Misc. Devices Misc O2 concentrator to use 24/7 and also with her CPAP at night to keep her O2 sats > 90% 1 Device 0   • Misc. Devices Misc Adult incontinence supplies to use as directed 60 Device 6   • Misc. Devices Misc Power mobility devise 1 Device 0   • flurazepam (DALMANE) 30 MG capsule Take 30  mg by mouth at bedtime as needed.     • Misc. Devices Misc Please evaluate pt for wider power mobility devise, pt has tipped over on multiple occasions 1 Device 0   • Misc. Devices Misc O2 tank rack for power wheel chair 1 Device 0   • Misc. Devices Misc O2 to use continuously to keep her sats >90 % 1 Device 0   • Misc. Devices Misc CPAP supplies (hoses, filter, water chamber, face mask-resmed small), nasal cannula tubing 10 Device 6   • Misc. Devices Misc Pt needs service for her PMD she will need a new battery to avoid stalling 1 Device 0     No facility-administered encounter medications on file as of 6/30/2017.       SOCIAL HISTORY:    Social History     Social History   • Marital Status:      Spouse Name: N/A   • Number of Children: N/A   • Years of Education: N/A     Social History Main Topics   • Smoking status: Current Every Day Smoker -- 0.25 packs/day for 44 years     Types: Cigarettes   • Smokeless tobacco: Never Used      Comment: 1 ppd, 45 yrs   • Alcohol Use: No   • Drug Use: No   • Sexual Activity: Not Asked     Other Topics Concern   •  Service No   • Blood Transfusions No     unknown   • Caffeine Concern No   • Occupational Exposure No   • Hobby Hazards No   • Sleep Concern Yes     sleep apnea   • Stress Concern Yes   • Weight Concern No   • Special Diet Yes   • Back Care Yes   • Exercise No   • Bike Helmet No     does not ride bike    • Seat Belt Yes   • Self-Exams Yes     Social History Narrative       Review of Systems   Constitutional: Negative for fever and chills.   HENT: Negative for hearing loss and tinnitus.    Eyes: Negative for blurred vision and double vision.   Respiratory: Negative for hemoptysis and sputum production.    Cardiovascular: Negative for palpitations and orthopnea.   Gastrointestinal: Negative for nausea and vomiting.   Genitourinary: Negative for urgency and frequency.   Musculoskeletal: Positive for myalgias, back pain and joint pain.   Skin: Negative.   "  Endo/Heme/Allergies: Negative.    Psychiatric/Behavioral: Positive for depression. The patient is nervous/anxious and has insomnia.    All other systems reviewed and are negative.        Objective:     /84 mmHg  Pulse 78  Temp(Src) 36.5 °C (97.7 °F)  Ht 1.626 m (5' 4\")  Wt 136.079 kg (300 lb)  BMI 51.47 kg/m2  SpO2 90%  LMP 08/02/1975     Physical Exam  Constitutional: oriented to person, place, and time, appears well-developed and well-nourished.   HEENT: Normocephalic atraumatic, neck supple, no JVD noted, no masses noted, no meningeal signs noted  Lymphadenopathy: no cervical, supraclavicular, or inguinal lymphadenopathy noted  Cardiovascular: Intact distal pulses, including at ankles, mild lower limb swelling noted, negative Homans  Pulmonary: No tachypnea noted, no accessory muscle use noted, no dyspnea noted  Abdominal: Soft, nontender, exhibits no distension, no peritoneal signs, no HSM, healed scars  Musculoskeletal:   Right shoulder: exhibits mild tenderness. Minimal pain with range of motion testing  Left shoulder: exhibits mild tenderness. Minimal pain with range of motion testing  Right hip: exhibits only mild tenderness. Minimal pain with range of motion testing  Left hip: exhibits only mild tenderness. Minimal pain with range of motion testing  Cervical back: exhibits only mild decreased range of motion, mild tenderness and mild pain. Spurling's testing produces mild axial pain, trigger points noted  Lumbar back: exhibits only mild decreased range of motion, mild tenderness and mild pain. negative straight leg testing, trigger points noted  Knee: Tender with palpation about left knee, pain with range of motion testing, no instability noted, healed scar  Ankle: Tender with palpation, mild pain with range of motion testing, stable with stress testing  Neurological: oriented to person, place, and time. Cranial nerves grossly intact, normal strength. Sensation intact distally. Reflexes 1+ in " upper and lower limbs,  No upper motor neuron signs evident  Skin: Skin is intact. no rashes or lesions noted  Psychiatric: normal mood and affect. speech is normal and behavior is normal. Judgment and thought content normal. Cognition and memory are normal.        Assessment/Plan:       ASSESSMENT:    1. Left knee pain, sprain strain, history arthritis, status post multiple left knee surgeries including left total knee replacement with complications, Dr. Hogan, impaired gait, consider hardware related complications    - CT-KNEE W/O LEFT; Future  - DX-KNEE COMPLETE 4+ LEFT; Future    2. Left ankle pain, sprain strain    - DX-ANKLE 3+ VIEWS LEFT; Future    3. Thoracolumbar pain, myofascial pain, degenerative disc disease, relatively controlled    4. Spinal/joint/musculoskeletal pain, arthritis    5. Psychiatric disorder, bipolar, anxiety/depression    - Reviewed psychosocial interventions    6. Insomnia    - Review sleep hygiene    7. Comorbid medical issues, including DVT status post IVC, COPD, status post ostomy, with care per primary care provider, consultants      DISCUSSION/PLAN:    - I discussed management options. I reviewed symptomatic care    - I would like to obtain/review additional records, including prior records from [facility]    - I reviewed home exercise program, with medical precautions. Left knee activity/restrictions per orthopedics    - The patient can consider complementary trials with     - I reviewed medication monitoring.  I reviewed further symptomatic medications. I did not prescribe any medications today    - I reviewed additional diagnostic options, including further/advanced imaging, electrodiagnostic testing, vascular studies, and further lab screen    - I reviewed additional therapeutic options, including injection/interventional therapy and additional consultative input    - I encourage the patient to stop or decrease cigarette use    - Return after the above-noted diagnostic studies  or  an as-needed basis      Please note that this dictation was created using voice recognition software. I have made every reasonable attempt to correct obvious errors but there may be errors of grammar and content that I may have overlooked prior to finalization of this note.

## 2017-07-05 ENCOUNTER — HOSPITAL ENCOUNTER (OUTPATIENT)
Dept: RADIOLOGY | Facility: MEDICAL CENTER | Age: 67
End: 2017-07-05
Attending: PHYSICAL MEDICINE & REHABILITATION
Payer: MEDICAID

## 2017-07-05 DIAGNOSIS — M25.562 LEFT KNEE PAIN, UNSPECIFIED CHRONICITY: ICD-10-CM

## 2017-07-05 DIAGNOSIS — Z98.890 H/O KNEE SURGERY: ICD-10-CM

## 2017-07-05 DIAGNOSIS — M25.572 LEFT ANKLE PAIN, UNSPECIFIED CHRONICITY: ICD-10-CM

## 2017-07-05 PROCEDURE — 73564 X-RAY EXAM KNEE 4 OR MORE: CPT | Mod: LT

## 2017-07-05 PROCEDURE — 73610 X-RAY EXAM OF ANKLE: CPT | Mod: LT

## 2017-07-05 PROCEDURE — 73700 CT LOWER EXTREMITY W/O DYE: CPT | Mod: LT

## 2017-07-06 ENCOUNTER — TELEPHONE (OUTPATIENT)
Dept: VASCULAR LAB | Facility: MEDICAL CENTER | Age: 67
End: 2017-07-06

## 2017-07-12 ENCOUNTER — OFFICE VISIT (OUTPATIENT)
Dept: PHYSICAL MEDICINE AND REHAB | Facility: MEDICAL CENTER | Age: 67
End: 2017-07-12
Payer: MEDICAID

## 2017-07-12 VITALS
OXYGEN SATURATION: 88 % | SYSTOLIC BLOOD PRESSURE: 128 MMHG | HEART RATE: 66 BPM | RESPIRATION RATE: 17 BRPM | DIASTOLIC BLOOD PRESSURE: 79 MMHG

## 2017-07-12 DIAGNOSIS — S83.005D PATELLAR DISLOCATION, LEFT, SUBSEQUENT ENCOUNTER: ICD-10-CM

## 2017-07-12 DIAGNOSIS — Z98.890 H/O LEFT KNEE SURGERY: ICD-10-CM

## 2017-07-12 DIAGNOSIS — M25.562 LEFT KNEE PAIN, UNSPECIFIED CHRONICITY: ICD-10-CM

## 2017-07-12 DIAGNOSIS — M25.572 LEFT ANKLE PAIN, UNSPECIFIED CHRONICITY: ICD-10-CM

## 2017-07-12 PROCEDURE — 99213 OFFICE O/P EST LOW 20 MIN: CPT | Performed by: PHYSICAL MEDICINE & REHABILITATION

## 2017-07-12 ASSESSMENT — PATIENT HEALTH QUESTIONNAIRE - PHQ9
5. POOR APPETITE OR OVEREATING: 3 - NEARLY EVERY DAY
SUM OF ALL RESPONSES TO PHQ QUESTIONS 1-9: 13
CLINICAL INTERPRETATION OF PHQ2 SCORE: 3

## 2017-07-12 NOTE — MR AVS SNAPSHOT
Atiya Henley   2017 3:00 PM   Office Visit   MRN: [id number]    Department:  Physiatry [facility]   Dept Phone:  [phone]    Description:  Female : 1950   Provider:  Choco Smith M.D.           Reason for Visit     Follow-Up cat scans and x rays      Allergies as of 2017     Allergen Noted Reactions    Pcn [Penicillins] 07/10/2014   Anaphylaxis, Rash    Reaction; happened as a child. Tolerated cephalosporins and Zosyn      You were diagnosed with     Left knee pain, unspecified chronicity   [5122741]       H/O left knee surgery   [[id number]]       Patellar dislocation, left, subsequent encounter   [[id number]]         Vital Signs     Blood Pressure Pulse Respirations Oxygen Saturation Last Menstrual Period Smoking Status    128/79 mmHg 66 17 88% 1975 Current Every Day Smoker      Basic Information     Date Of Birth Sex Race Ethnicity Preferred Language    1950 Female White Non- English      Your appointments     2017  7:05 PM   Sleep Study with SLEEP TECH   [facility] Sleep [facility] (--)    [address] [place]  [facility] [id number] [phone]            2017  1:00 PM   Established Patient Pul with ALCIRA Chavez   [facility] Pulmonary [facility] (--)    [address]  [address]  [facility] [address] [phone]            Aug 01, 2017 11:00 AM   Established Patient with Matt Trinh M.D.   [facility])    [address]  [facility] [address] [phone]           You will be receiving a confirmation call a few days before your appointment from our automated call confirmation system.            Aug 03, 2017 10:50 AM   Established Patient with Carmelo Pat M.D.   [facility]    [address] [id number] [phone]           You will be receiving a confirmation call a few days before your appointment from our  automated call confirmation system.              Problem List              ICD-10-CM Priority Class Noted - Resolved    Morbid obesity (CMS-HCC) E66.01 Low  7/7/2009 - Present    YESSICA (obstructive sleep apnea) G47.33 Medium  7/7/2009 - Present    Hypothyroid E03.9 Medium  7/7/2009 - Present    Prediabetes R73.03   9/10/2009 - Present    HTN (hypertension) I10 Medium  9/10/2009 - Present    Anemia D64.9 Medium  3/22/2010 - Present    DVT (deep venous thrombosis) (CMS-HCC) I82.409 Medium  4/10/2010 - Present    Anxiety F41.9   5/17/2010 - Present    Depression F32.9 Low  5/17/2010 - Present    Vitamin d deficiency    8/31/2011 - Present    Chronic autoimmune thyroiditis E06.3   10/2/2012 - Present    Chronic anticoagulation Z79.01   10/16/2012 - Present    Vitamin d deficiency    1/30/2013 - Present    Other pulmonary insufficiency, not elsewhere classified, following trauma and surgery J95.89 High  9/2/2013 - Present    COPD (chronic obstructive pulmonary disease) (CMS-HCC) J44.9 Medium  9/2/2013 - Present    Tobacco abuse Z72.0 Low  9/18/2013 - Present    S/P IVC filter Z95.828 Medium  9/18/2013 - Present    Bipolar disorder F31.9 Low  9/18/2013 - Present    History of hernia repair Z98.890, Z87.19   10/7/2013 - Present    Obesity E66.9   7/7/2009 - Present    GERD (gastroesophageal reflux disease) K21.9 Low  11/25/2013 - Present    Colostomy fistula (CMS-HCC) K94.09 High  12/3/2013 - Present    Thrombocytosis (CMS-HCC) D47.3 Medium  12/3/2013 - Present    Hypomagnesemia E83.42 Medium  12/4/2013 - Present    Hyponatremia E87.1 Medium  12/10/2013 - Present    Vitamin D deficiency E55.9   8/4/2015 - Present    Pain in joint, lower leg M25.569   8/21/2015 - Present    Deep vein thrombosis (DVT) (CMS-HCC) I82.409   11/11/2015 - Present    Cervical spondylosis M47.812   5/4/2016 - Present    DDD (degenerative disc disease), cervical M50.30   5/4/2016 - Present    Chronic neck pain M54.2, G89.29   5/4/2016 - Present     Lumbosacral spondylosis M47.817   5/4/2016 - Present    DDD (degenerative disc disease), lumbar M51.36   5/4/2016 - Present    Lumbar radiculopathy M54.16   5/4/2016 - Present    Chronic low back pain M54.5, G89.29   5/4/2016 - Present    Left knee pain M25.562   5/4/2016 - Present    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, Z96.659   5/4/2016 - Present    Chronic pain syndrome G89.4   8/25/2016 - Present    Positive FIT (fecal immunochemical test) R19.5   9/27/2016 - Present    Chronic obstructive asthma (CMS-HCC) J44.9, J45.909   11/14/2016 - Present    Allergic rhinitis J30.9   11/14/2016 - Present    Hypoxemia R09.02   11/14/2016 - Present      Health Maintenance        Date Due Completion Dates    RETINAL SCREENING 11/2/1970 ---    PAP SMEAR 11/2/1973 ---    IMM ZOSTER VACCINE 11/2/2012 ---    MAMMOGRAM 6/29/2017 6/29/2016    IMM INFLUENZA (1) 9/1/2017 11/14/2016, 10/14/2015, 11/20/2014, 9/3/2013, 9/20/2011, 11/11/2010    A1C SCREENING 9/20/2017 3/20/2017, 8/3/2015, 7/10/2014, 6/20/2014, 11/29/2013, 5/17/2013, 8/31/2012, 7/16/2012, 3/30/2012, 7/19/2011, 5/7/2010, 10/4/2009    DIABETES MONOFILAMENT / LE EXAM 12/6/2017 12/6/2016    FASTING LIPID PROFILE 3/20/2018 3/20/2017, 8/30/2016, 8/3/2015, 7/11/2014, 6/20/2014, 5/17/2013, 7/19/2011, 5/1/2009, 2/23/2009, 9/16/2008, 5/9/2008, 11/29/2007    URINE ACR / MICROALBUMIN 3/20/2018 3/20/2017, 8/3/2015, 6/20/2014    SERUM CREATININE 3/20/2018 3/20/2017, 8/30/2016, 8/3/2015, 7/11/2014, 7/10/2014, 6/20/2014, 2/6/2014, 1/31/2014, 1/17/2014, 1/13/2014, 12/19/2013, 12/18/2013, 12/17/2013, 12/16/2013, 12/15/2013, 12/14/2013, 12/13/2013, 12/12/2013, 12/11/2013, 12/10/2013, 12/10/2013, 12/9/2013, 12/8/2013, 12/7/2013, 12/6/2013, 12/5/2013, 12/4/2013, 12/3/2013, 12/2/2013, 11/30/2013, 11/28/2013, 11/27/2013, 11/25/2013, 11/22/2013, 11/20/2013, 11/10/2013, 10/27/2013, 10/20/2013, 10/15/2013, 10/14/2013, 10/13/2013, 10/12/2013, 10/11/2013, 10/10/2013, 10/9/2013,  10/8/2013, 10/7/2013, 9/25/2013, 9/24/2013, 9/22/2013, 9/21/2013, 9/20/2013, 9/19/2013, 9/18/2013, 9/17/2013, 9/17/2013, 9/15/2013, 9/13/2013, 9/12/2013, 9/11/2013, 9/10/2013, 9/9/2013, 9/9/2013, 9/8/2013, 9/7/2013, 9/7/2013, 9/6/2013, 9/5/2013, 9/4/2013, 9/3/2013, 9/1/2013, 8/31/2013, 5/17/2013, 1/25/2013, 12/13/2012, 10/17/2012, 10/1/2012, 8/24/2012, 8/18/2012, 7/20/2012, 7/17/2012, 7/16/2012, 1/4/2012, 7/20/2011, 7/19/2011, 1/13/2011, 12/6/2010, 10/26/2010, 7/2/2010, 6/23/2010, 5/17/2010, 5/4/2010, 5/3/2010, 5/1/2010, 4/30/2010, 4/29/2010, 4/28/2010, 4/27/2010, 4/26/2010, 4/25/2010, 4/24/2010, 4/23/2010, 4/22/2010, 4/21/2010, 4/20/2010, 4/19/2010, 4/18/2010, 4/17/2010, 4/15/2010, 4/14/2010, 4/13/2010, 4/12/2010, 4/11/2010, 4/10/2010, 4/9/2010, 4/8/2010, 4/7/2010, 4/6/2010, 4/5/2010, 4/4/2010, 4/3/2010, 4/2/2010, 4/1/2010, 3/31/2010, 3/30/2010, 3/29/2010, 3/28/2010, 3/27/2010, 3/26/2010, 3/25/2010, 3/24/2010, 3/23/2010, 3/22/2010, 3/21/2010, 3/20/2010, 3/19/2010, 3/18/2010, 3/17/2010, 3/17/2010, 3/16/2010, 3/15/2010, 3/12/2010, 3/11/2010, 3/5/2010, 3/1/2010, 1/31/2010, 1/30/2010, 1/29/2010, 1/28/2010, 1/27/2010, 1/26/2010, 11/5/2009, 10/7/2009, 10/5/2009, 10/4/2009, 10/3/2009, 10/2/2009, 10/1/2009, 9/21/2009, 9/15/2009, 9/4/2009, 4/27/2009, 2/23/2009, 8/14/2008, 11/29/2007    COLONOSCOPY 11/17/2020 11/17/2010    IMM DTaP/Tdap/Td Vaccine (2 - Td) 5/9/2027 5/9/2017            Current Immunizations     Influenza TIV (IM) 9/3/2013  8:45 PM, 9/20/2011    Influenza Vaccine 12/1/2009    Influenza Vaccine Pediatric 11/11/2010    Influenza Vaccine Quad Inj (Pf) 11/20/2014  1:50 PM    Influenza Vaccine Quad Inj (Preserved) 11/14/2016  9:52 AM, 10/14/2015    Pneumococcal Vaccine (UF)Historical Data 12/1/2009    Pneumococcal polysaccharide vaccine (PPSV-23) 11/14/2016  9:53 AM    Tdap Vaccine 5/9/2017      Below and/or attached are the medications your provider expects you to take. Review all of your home medications and  newly ordered medications with your provider and/or pharmacist. Follow medication instructions as directed by your provider and/or pharmacist. Please keep your medication list with you and share with your provider. Update the information when medications are discontinued, doses are changed, or new medications (including over-the-counter products) are added; and carry medication information at all times in the event of emergency situations     Allergies:  PCN - Anaphylaxis,Rash               Medications  Valid as of: July 12, 2017 -  4:00 PM    Generic Name Brand Name Tablet Size Instructions for use    Albuterol Sulfate (Aero Soln) albuterol 108 (90 BASE) MCG/ACT Inhale 2 Puffs by mouth every four hours as needed. For Shortness of breath        Amitriptyline HCl (Tab) ELAVIL 25 MG Take 25 mg by mouth every bedtime.        Ascorbic Acid (Tab) ascorbic acid 500 MG TAKE ONE [1] TABLET BY MOUTH EVERY DAY        Budesonide-Formoterol Fumarate (Aerosol) SYMBICORT 160-4.5 MCG/ACT Inhale 2 Puffs by mouth 2 Times a Day. With spacer, rinse mouth after use        CarBAMazepine (Tab) TEGRETOL 200 MG         Ergocalciferol (Cap) DRISDOL 46741 UNITS TAKE 1 CAPSULE BY MOUTH EVERY 28 DAYS.        FLUoxetine HCl (Cap) PROZAC 20 MG Take 20 mg by mouth every day.        Flurazepam HCl (Cap) DALMANE 30 MG Take 30 mg by mouth at bedtime as needed.        Gabapentin (Cap) NEURONTIN 300 MG TAKE THREE (3) CAPSULES BY MOUTH THREE (3) TIMES DAILY         Hyoscyamine Sulfate (SL Tab) LEVSIN 0.125 MG         Incontinence Supply Disposable (Misc) DEPEND UNDERWEAR X-LARGE  1 Each by Does not apply route 3 times a day.        Ipratropium-Albuterol (Aero Soln) COMBIVENT RESPIMAT  MCG/ACT         Levothyroxine Sodium (Tab) SYNTHROID 200 MCG TAKE ONE (1) TABLET BY MOUTH EVERY MORNING ON AN EMPTY STOMACH        Levothyroxine Sodium (Tab) SYNTHROID 25 MCG Take 1 tablet by mouth once every morning on an empty stomach. Use along with 200 mcg  levothyroxine for a total of 225 mcg daily        MetFORMIN HCl (Tab) GLUCOPHAGE 500 MG TAKE ONE [1] TABLET BY MOUTH EVERY DAY        Misc. Devices (Misc) Misc. Devices  Pt needs service for her PMD she will need a new battery to avoid stalling        Misc. Devices (Misc) Misc. Devices  CPAP supplies (hoses, filter, water chamber, face mask-resmed small), nasal cannula tubing        Misc. Devices (Misc) Misc. Devices  O2 to use continuously to keep her sats >90 %        Misc. Devices (Misc) Misc. Devices  Please evaluate pt for wider power mobility devise, pt has tipped over on multiple occasions        Misc. Devices (Misc) Misc. Devices  O2 tank rack for power wheel chair        Misc. Devices (Misc) Misc. Devices  Adult incontinence supplies to use as directed        Misc. Devices (Misc) Misc. Devices  Power mobility devise        Misc. Devices (Misc) Misc. Devices  O2 concentrator to use 24/7 and also with her CPAP at night to keep her O2 sats > 90%        Misc. Devices (Misc) Misc. Devices  Incontinence supplies (for wheel chair) and adult briefs to use as needed #120        Misc. Devices (Misc) Misc. Devices  New battery and gear shifter for power wheel chair        Misc. Devices (Misc) Misc. Devices  3 boxes of pouches (17203), 2 boxes of wafers (13731), 3 boxes (tubes) paste        Mometasone Furoate (Suspension) NASONEX 50 MCG/ACT Spray 1-2 Sprays in nose every day. Each nostril.        Mometasone Furoate (Solution) ELOCON 0.1 %         Nicotine Polacrilex (Gum) SM NICOTINE POLACRILEX 2 MG CHEW ONE (1) PIECE AS NEEDED FOR SMOKING CESSATION        Nystatin (Ointment) MYCOSTATIN 111531 UNIT/GM         Ostomy Supplies (Misc) Ostomy Supplies  Dispense as ordered        RaNITidine HCl (Tab) ZANTAC 150 MG TAKE ONE (1) TABLET BY MOUTH TWICE DAILY WITH MEALS        Tiotropium Bromide Monohydrate (Aero Soln) Tiotropium Bromide Monohydrate 2.5 MCG/ACT Inhale 2 Inhalation by mouth every day. Assemble and prime.         TraZODone HCl (Tab) DESYREL 50 MG Take 1 Tab by mouth at bedtime as needed for Sleep.        Warfarin Sodium (Tab) COUMADIN 10 MG Take 1 tablet by mouth daily as directed by [facility]        .                 Medicines prescribed today were sent to:     [id number] - [place], [place] - [address]    [address] [place] [address]    Phone: [phone] Fax: [phone]    Open 24 Hours?: No      Medication refill instructions:       If your prescription bottle indicates you have medication refills left, it is not necessary to call your provider’s office. Please contact your pharmacy and they will refill your medication.    If your prescription bottle indicates you do not have any refills left, you may request refills at any time through one of the following ways: The online [employer] system (except Urgent Care), by calling your provider’s office, or by asking your pharmacy to contact your provider’s office with a refill request. Medication refills are processed only during regular business hours and may not be available until the next business day. Your provider may request additional information or to have a follow-up visit with you prior to refilling your medication.   *Please Note: Medication refills are assigned a new Rx number when refilled electronically. Your pharmacy may indicate that no refills were authorized even though a new prescription for the same medication is available at the pharmacy. Please request the medicine by name with the pharmacy before contacting your provider for a refill.        Referral     A referral request has been sent to our patient care coordination department. Please allow 3-5 business days for us to process this request and contact you either by phone or mail. If you do not hear from us by the 5th business day, please call us at [phone].           [employer] Access Code: Activation code not generated  Current [employer] Status: Active          Quit Tobacco  Information     Do you want to quit using tobacco?    Quitting tobacco decreases risks of cancer, heart and lung disease, increases life expectancy, improves sense of taste and smell, and increases spending money, among other benefits.    If you are thinking about quitting, we can help.  • [facility] Quit Tobacco Program: [phone]  o Program occurs weekly for four weeks and includes pharmacist consultation on products to support quitting smoking or chewing tobacco. A provider referral is needed for pharmacist consultation.  • Tobacco Users Help Hotline: 2-787-QUIT-NOW (960-5275) or https://[place].quitlogix.org/  o Free, confidential telephone and online coaching for [place] residents. Sessions are designed on a schedule that is convenient for you. Eligible clients receive free nicotine replacement therapy.  • Nationally: www.smokefree.gov  o Information and professional assistance to support both immediate and long-term needs as you become, and remain, a non-smoker. Smokefree.gov allows you to choose the help that best fits your needs.

## 2017-07-12 NOTE — PROGRESS NOTES
Subjective:      Atiya Henley presents with Follow-Up            Subjective: Ms. Henley returns to the office today for follow-up evaluation of spinal/joint/musculoskeletal pain.     The patient notes ongoing pain about the left knee, worse with activities, limiting standing and walking tolerance. The patient has had extensive care regarding the left knee over the years, including arthroscopy, total knee replacement with complication, infection, orthopedic care per Dr. Hogan. The patient has tried physical therapy and injections, without sustained relief.    The patient was previously seen by Dr. Galan, reviewed records most recently from 6/2016. The patient was also seen at [facility], reviewed records most recently from 11/2015. The patient previously tried left knee area injection therapy, without benefit.    The patient notes intermittent left ankle pain.    The patient notes intermittent thoracolumbar pain, now controlled, without radicular component.    The patient denies overt hip area pain.    The patient notes intermittent neck and shoulder area pain.    Note psychiatric disorder, bipolar, anxiety/depression.    The patient notes significant difficulty with sleep.    The patient has had prior treatment with medications. She has been to physical therapy. She has tried injections. She has colostomy in place. No bladder incontinence noted. Home exercise program has been limited due to the ongoing pain. The ongoing left knee pain limits her ability to function, including standing and walking tolerance.      MEDICAL RECORDS REVIEW/DATA REVIEW: Reviewed in epic.    I reviewed medications. Reviewed medication list.    I reviewed  profile 6/30/2017.    I reviewed diagnostic studies:     I reviewed radiographs. Reviewed CT left knee 7/2017, images and report, see below. Reviewed left knee x-rays 7/2017, images and report, see below. Reviewed left ankle x-rays 7/2017, showed mild arthritis.. Reviewed CTs  cervical and lumbar spine 6/2016. Reviewed CT thoracic spine 3/2015.    I reviewed lab studies. Reviewed labs 3/2017, including CMP, A1C 5.6.     I reviewed medical issues. Followed by consultants, including endocrinology.    I reviewed family history: No neuromuscular disorders noted.    I reviewed social issues. On disability.      7/5/2017 4:47 PM    HISTORY/REASON FOR EXAM:  History of left knee surgery and staph infection.      TECHNIQUE/ EXAM DESCRIPTION AND NUMBER OF VIEWS:  CT scan of the LEFT knee without contrast, with reconstructions.    Noncontrast thin helical sections were obtained from the distal femur through the proximal tibia/fibula. Sagittal and coronal reconstructions were generated from the axial images.    Up to date radiation dose reduction adjustments have been utilized to meet ALARA standards for radiation dose reduction.    COMPARISON:  None.    FINDINGS:  Left knee arthroplasty is noted in place. Prosthetic articular surfaces of the distal femur and tibial plateau appear to be intact. Screws extending into the tibia appear to be intact. No focal malalignment is identified. No acute fracture of the distal   femur is noted. No acute fracture of the proximal tibia is noted. The proximal fibula is intact. The patella appears displaced laterally relative to the distal femur and the femoral prosthesis. No acute fracture or bone erosion involving the patella is   identified. No bone erosion or bone destruction is identified in the distal femur proximal tibia or fibula.         Impression        1.  Left knee arthroplasty is noted in place. Metallic hardware appears to be intact.    2.  Some lateral displacement of the patella is noted relative to the distal femur.    3.  No acute fractures are identified.    4.  No focal areas of bone erosion or bone destruction are identified.           7/5/2017 5:01 PM    HISTORY/REASON FOR EXAM:  Left knee pain. History of left knee  arthroplasty.      TECHNIQUE/EXAM DESCRIPTION AND NUMBER OF VIEWS:  4 views of the LEFT knee.    COMPARISON: 12/11/2014    FINDINGS:  Bone density is osteopenic  There is no evidence of fracture or dislocation.  Left knee arthroplasty is noted in place.  The patella again appears to be displaced somewhat laterally relative to the distal femur. There is no joint effusion.         Impression        1.  No evidence of fracture or dislocation.    2.  Left knee arthroplasty is noted in place.    3.  Unchanged lateral positioning of the patella relative to the distal femur.       PAST MEDICAL HISTORY:   Past Medical History   Diagnosis Date   • Obesity 7/7/2009   • Oxygen dependent      4.5 L NC   • EMPHYSEMA    • Snoring    • Hypothyroid 7/7/2009   • Arthritis    • Cancer (CMS-HCC)      cervical   • Psychiatric problem      depression   • COPD    • Indigestion    • Sleep apnea      USES CPAP, 3-4L oxygen   • Hypertension      stated no longer on meds due to lost 100 lbs   • Colostomy in place (CMS-HCC) 2009   • History of total knee arthroplasty      infection   • Status post cholecystectomy 1992   • Chronic autoimmune thyroiditis      + TPO ab / + ultrasound   • Weight gain    • Obstruction      colostomy   • Pain      b/l legs   • Hashimoto's disease    • Personal history of venous thrombosis and embolism 2009, 2012     arm and leg left side   • Diabetes      type 2    • Dental disorder      dentures upper   • Infectious disease      MRSA,   • Infectious disease      VRE   • Bowel obstruction (CMS-HCC)    • Presence of IVC filter        PAST SURGICAL HISTORY:    Past Surgical History   Procedure Laterality Date   • Myringotomy  9/9/2009     Performed by RENETTA GOODSON at SURGERY [facility]   • Low anterior resection  3/10/2010     Performed by JESUSITA SUMNER at SURGERY [facility]S   • Low anterior resection laparoscopic  3/10/2010     Performed by JESUSITA SUMNER at SURGERY [facility]   • Colostomy   3/16/2010     Performed by CHRISTA BENNETT at SURGERY [facility]   • Tracheostomy  3/31/2010     Performed by JESUSITA SUMNER at SURGERY [facility]   • Cholecystectomy  1992     laparoscopic   • Exploratory laparotomy  3/16/2010     Performed by CHRISTA BENNETT at [facility]   • Colectomy  3/16/2010     Performed by CHRISTA BENNETT at SURGERY [facility]   • Hysterectomy laparoscopy  1983   • Colonoscopy - endo  11/17/2010     Performed by JESUSITA SUMNER at ENDOSCOPY [facility]   • Lateral release  7/20/2011     Performed by BERYL LOYD at [facility]   • Other orthopedic surgery       LT KNEE X2   • Knee arthroscopy  7/20/2011     Performed by BERYL LOYD at [facility]   • Medial meniscectomy  7/20/2011     Performed by BERYL LOYD at [facility]   • Meniscectomy  7/20/2011     Performed by BERYL LOYD at [facility]   • Irrigation & debridement ortho  7/17/2012     Performed by BERYL LOYD at [facility]   • Irrigation & debridement ortho  7/19/2012     Performed by BERYL LOYD at [facility]   • Knee arthroplasty total  6/10/2012     left   • Irrigation & debridement general  12/17/2012     Performed by David Fowler M.D. at [facility]   • Irrigation & debridement ortho  2/8/2013     Performed by David Fowler M.D. at [facility]   • Exploratory laparotomy  9/2/2013     Performed by Maninder Carter M.D. at SURGERY [facility]   • Parastomal hernia repair   9/2/2013     Performed by Maninder Carter M.D. at [facility]   • Bladder biopsy with cystoscopy  10/10/2013     Performed by Sang Castañeda M.D. at [facility]   • Colostomy  11/26/2013     Performed by Maninder Carter M.D. at [facility]   • Exploratory laparotomy  11/26/2013     Performed by Maninder Carter M.D. at SURGERY  [facility] ORS   • Lysis adhesions general  11/26/2013     Performed by Maninder Carter M.D. at SURGERY [facility]   • Colon resection  11/26/2013     Performed by Maninder Carter M.D. at SURGERY [facility] ORS   • Exploratory laparotomy  12/10/2013     Performed by Sang Castañeda M.D. at SURGERY [facility]   • Wound closure general  12/10/2013     Performed by Sang Castañeda M.D. at SURGERY [facility]   • Pr inject rx other periph nerve Left 8/21/2015     Procedure: NEUROLYTIC DEST-OTHER NERVE;  Surgeon: Kd Galaviz D.O.;  Location: SURGERY [facility];  Service: Pain Management   • Pr fluoroscopic guidance needle placement Left 8/21/2015     Procedure: FLOURO GUIDE NEEDLE PLACEMENT;  Surgeon: Kd Galaviz D.O.;  Location: [facility];  Service: Pain Management   • Pr inject rx other periph nerve  8/21/2015     Procedure: NEUROLYTIC DEST-OTHER NERVE;  Surgeon: Kd Galaviz D.O.;  Location: SURGERY [facility];  Service: Pain Management   • Pr inject rx other periph nerve  8/21/2015     Procedure: NEUROLYTIC DEST-OTHER NERVE;  Surgeon: Kd Galaviz D.O.;  Location: [facility];  Service: Pain Management       ALLERGIES:  Pcn    MEDICATIONS:    Outpatient Encounter Prescriptions as of 7/12/2017   Medication Sig Dispense Refill   • carbamazepine (TEGRETOL) 200 MG Tab      • hyoscyamine (LEVSIN) 0.125 MG SL Tab      • COMBIVENT RESPIMAT  MCG/ACT Aero Soln      • mometasone (ELOCON) 0.1 % lotion      • nystatin (MYCOSTATIN) 749030 UNIT/GM Ointment      • Misc. Devices Misc 3 boxes of pouches (12846), 2 boxes of wafers (57227), 3 boxes (tubes) paste 3 Container 6   • levothyroxine (SYNTHROID) 200 MCG Tab TAKE ONE (1) TABLET BY MOUTH EVERY MORNING ON AN EMPTY STOMACH 90 Tab 1   • levothyroxine (SYNTHROID) 25 MCG Tab Take 1 tablet by mouth once every morning on an empty stomach. Use along with 200 mcg levothyroxine for a total of 225  mcg daily 90 Tab 1   • SM NICOTINE POLACRILEX 2 MG Gum CHEW ONE (1) PIECE AS NEEDED FOR SMOKING CESSATION 110 Each 2   • warfarin (COUMADIN) 10 MG Tab Take 1 tablet by mouth daily as directed by [facility] 30 Tab 2   • Tiotropium Bromide Monohydrate (SPIRIVA RESPIMAT) 2.5 MCG/ACT Aero Soln Inhale 2 Inhalation by mouth every day. Assemble and prime. 1 Inhaler 6   • trazodone (DESYREL) 50 MG Tab Take 1 Tab by mouth at bedtime as needed for Sleep. 30 Tab 3   • Misc. Devices Misc New battery and gear shifter for power wheel chair 1 Device 0   • metformin (GLUCOPHAGE) 500 MG Tab TAKE ONE [1] TABLET BY MOUTH EVERY DAY 90 Tab 0   • ascorbic acid (ASCORBIC ACID) 500 MG Tab TAKE ONE [1] TABLET BY MOUTH EVERY DAY 90 Tab 2   • ranitidine (ZANTAC) 150 MG Tab TAKE ONE (1) TABLET BY MOUTH TWICE DAILY WITH MEALS 60 Tab 5   • gabapentin (NEURONTIN) 300 MG Cap TAKE THREE (3) CAPSULES BY MOUTH THREE (3) TIMES DAILY  270 Cap 5   • Incontinence Supply Disposable (DEPEND UNDERWEAR X-LARGE) Misc 1 Each by Does not apply route 3 times a day. 90 Each 6   • mometasone (NASONEX) 50 MCG/ACT nasal spray Spray 1-2 Sprays in nose every day. Each nostril. 1 Inhaler 6   • budesonide-formoterol (SYMBICORT) 160-4.5 MCG/ACT Aerosol Inhale 2 Puffs by mouth 2 Times a Day. With spacer, rinse mouth after use 1 Inhaler 6   • albuterol (PROVENTIL HFA) 108 (90 BASE) MCG/ACT Aero Soln inhalation aerosol Inhale 2 Puffs by mouth every four hours as needed. For Shortness of breath 1 Inhaler 6   • vitamin D, Ergocalciferol, (DRISDOL) 88111 UNITS Cap capsule TAKE 1 CAPSULE BY MOUTH EVERY 28 DAYS. 3 Cap 3   • Misc. Devices Misc Incontinence supplies (for wheel chair) and adult briefs to use as needed #120 120 Device 6   • Ostomy Supplies Misc Dispense as ordered 20 Each 12   • Misc. Devices Misc O2 concentrator to use 24/7 and also with her CPAP at night to keep her O2 sats > 90% 1 Device 0   • Misc. Devices Misc Adult incontinence supplies to  use as directed 60 Device 6   • Misc. Devices Misc Power mobility devise 1 Device 0   • flurazepam (DALMANE) 30 MG capsule Take 30 mg by mouth at bedtime as needed.     • fluoxetine (PROZAC) 20 MG Cap Take 20 mg by mouth every day.     • amitriptyline (ELAVIL) 25 MG Tab Take 25 mg by mouth every bedtime.     • Misc. Devices Misc Please evaluate pt for wider power mobility devise, pt has tipped over on multiple occasions 1 Device 0   • Misc. Devices Misc O2 tank rack for power wheel chair 1 Device 0   • Misc. Devices Misc O2 to use continuously to keep her sats >90 % 1 Device 0   • Misc. Devices Misc CPAP supplies (hoses, filter, water chamber, face mask-resmed small), nasal cannula tubing 10 Device 6   • Misc. Devices Misc Pt needs service for her PMD she will need a new battery to avoid stalling 1 Device 0     No facility-administered encounter medications on file as of 7/12/2017.       SOCIAL HISTORY:    Social History     Social History   • Marital Status:      Spouse Name: N/A   • Number of Children: N/A   • Years of Education: N/A     Social History Main Topics   • Smoking status: Current Every Day Smoker -- 0.25 packs/day for 44 years     Types: Cigarettes   • Smokeless tobacco: Never Used      Comment: 1 ppd, 45 yrs   • Alcohol Use: No   • Drug Use: No   • Sexual Activity: No     Other Topics Concern   •  Service No   • Blood Transfusions No     unknown   • Caffeine Concern No   • Occupational Exposure No   • Hobby Hazards No   • Sleep Concern Yes     sleep apnea   • Stress Concern Yes   • Weight Concern No   • Special Diet Yes   • Back Care Yes   • Exercise No   • Bike Helmet No     does not ride bike    • Seat Belt Yes   • Self-Exams Yes     Social History Narrative       Review of Systems   Constitutional: Negative for fever and chills.   HENT: Negative for hearing loss and tinnitus.    Eyes: Negative for blurred vision and double vision.   Respiratory: Negative for hemoptysis and sputum  production.    Cardiovascular: Negative for palpitations and orthopnea.   Gastrointestinal: Negative for nausea and vomiting.   Genitourinary: Negative for urgency and frequency.   Musculoskeletal: Positive for myalgias, back pain and joint pain.   Skin: Negative.    Endo/Heme/Allergies: Negative.    Psychiatric/Behavioral: Positive for depression. The patient is nervous/anxious and has insomnia.    All other systems reviewed and are negative.        Objective:     /79 mmHg  Pulse 66  Resp 17  SpO2 88%  LMP 08/02/1975     Physical Exam  Constitutional: oriented to person, place, and time, appears well-developed and well-nourished.   HEENT: Normocephalic atraumatic, neck supple, no JVD noted, no masses noted, no meningeal signs noted  Lymphadenopathy: no cervical, supraclavicular, or inguinal lymphadenopathy noted  Cardiovascular: Intact distal pulses, including at ankles, mild lower limb swelling noted, negative Homans  Pulmonary: No tachypnea noted, no accessory muscle use noted, no dyspnea noted  Abdominal: Soft, nontender, exhibits no distension, no peritoneal signs, no HSM, healed scars  Musculoskeletal:   Right shoulder: exhibits mild tenderness. Minimal pain with range of motion testing  Left shoulder: exhibits mild tenderness. Minimal pain with range of motion testing  Right hip: exhibits only mild tenderness. Minimal pain with range of motion testing  Left hip: exhibits only mild tenderness. Minimal pain with range of motion testing  Cervical back: exhibits only mild decreased range of motion, mild tenderness and mild pain. Spurling's testing produces mild axial pain, trigger points noted  Lumbar back: exhibits only mild decreased range of motion, mild tenderness and mild pain. negative straight leg testing, trigger points noted  Knee: Tender with palpation about left knee, pain with range of motion testing, no instability noted, healed scar  Ankle: Tender with palpation, mild pain with range of  motion testing, stable with stress testing  Neurological: oriented to person, place, and time. Cranial nerves grossly intact, normal strength. Sensation intact distally. Reflexes 1+ in upper and lower limbs,  No upper motor neuron signs evident  Skin: Skin is intact. no rashes or lesions noted  Psychiatric: normal mood and affect. speech is normal and behavior is normal. Judgment and thought content normal. Cognition and memory are normal.        Assessment/Plan:       ASSESSMENT:    1. Left knee pain, sprain strain, history arthritis, status post multiple left knee surgeries including left total knee replacement with complications, chronic lateral patellar subluxation/dislocation, impaired gait    - Submitted orthopedic surgery consultation, I evaluate surgical options    2. Left ankle pain, sprain strain, mild arthritis    - Review orthotics/footwear    3. Thoracolumbar pain, myofascial pain, degenerative disc disease, relatively controlled    4. Spinal/joint/musculoskeletal pain, arthritis    5. Psychiatric disorder, bipolar, anxiety/depression    - Reviewed psychosocial interventions    6. Insomnia    - Review sleep hygiene    7. Comorbid medical issues, including DVT status post IVC, COPD, status post ostomy, with care per primary care provider, consultants      DISCUSSION/PLAN:    - I discussed management options. I reviewed symptomatic care    - I reviewed home exercise program, with medical precautions. Left knee activity/restrictions per orthopedics    - The patient can consider complementary trials with acupuncture, superficial massage therapy, or TENS unit    - I reviewed medication monitoring.  I reviewed further symptomatic medications. I did not prescribe any medications today    - I reviewed additional diagnostic options, including further/advanced imaging, electrodiagnostic testing, vascular studies, and further lab screen    - I reviewed additional therapeutic options, including  injection/interventional therapy and additional consultative input    - I encourage the patient to stop or decrease cigarette use    - Return after the orthopedic surgery consultation or an as-needed basis      Please note that this dictation was created using voice recognition software. I have made every reasonable attempt to correct obvious errors but there may be errors of grammar and content that I may have overlooked prior to finalization of this note.

## 2017-07-17 RX ORDER — GABAPENTIN 300 MG/1
CAPSULE ORAL
Qty: 270 CAP | Refills: 4 | Status: SHIPPED | OUTPATIENT
Start: 2017-07-17 | End: 2017-12-26 | Stop reason: SDUPTHER

## 2017-07-19 ENCOUNTER — TELEPHONE (OUTPATIENT)
Dept: VASCULAR LAB | Facility: MEDICAL CENTER | Age: 67
End: 2017-07-19

## 2017-07-19 ENCOUNTER — TELEPHONE (OUTPATIENT)
Dept: PULMONOLOGY | Facility: HOSPICE | Age: 67
End: 2017-07-19

## 2017-07-19 ENCOUNTER — SLEEP STUDY (OUTPATIENT)
Dept: SLEEP MEDICINE | Facility: MEDICAL CENTER | Age: 67
End: 2017-07-19
Attending: NURSE PRACTITIONER
Payer: MEDICAID

## 2017-07-19 DIAGNOSIS — G47.33 OBSTRUCTIVE SLEEP APNEA: ICD-10-CM

## 2017-07-19 PROCEDURE — 95811 POLYSOM 6/>YRS CPAP 4/> PARM: CPT | Performed by: INTERNAL MEDICINE

## 2017-07-19 NOTE — TELEPHONE ENCOUNTER
[facility]    Called to remind pt to obtain INR.  Earliest pt can get INR is 7/25/17.  She would like to be seen in clinic.    [place], PHARMD

## 2017-07-20 NOTE — PROCEDURES
Clinical Comments:  The patient underwent a comprehensive polysomnogram using the standard montage for measurement of parameters of sleep, respiratory events, movement abnormalities, heart rate and rhythm. A microphone was used to monitor snoring.      INTERPRETATION:  The total recording time was 525.0 minutes with a sleep period of 499.2 minutes and the total sleep time was 443.2 minutes with a sleep efficiency of 84.4%.  The sleep latency was 25.8 minutes, and REM latency was 131.0 minutes.  The patient experienced 74 arousals in total, for an arousal index of 10.0    RESPIRATORY: The patient had 9 apneas in total.  Of these, 3 were obstructive apneas, and 6 were central apneas.  This resulted in an apnea index (AI) of 1.2.  The patient had 38 hypopneas, for a hypopnea index of 5.1.  The overall AHI was 6.4, while the AHI during Stage R sleep was 7.1.  AHI while supine was N/A.    OXIMETRY: Oxygen saturation monitoring showed a mean SpO2 of 88.0%, with a minimum oxygen saturation of 81.0%.  Oxygen saturations were less than or = 89% for 348.4 minutes of sleep time.    CARDIAC: The highest heart rate during the recording was 90.0 beats per minute.  The average heart rate during sleep was 55.4 bpm.    LIMB MOVEMENTS: There were a total of 1475 PLMs during sleep, of which 20 were PLMs arousals.  This resulted in a PLMS index of 199.7.    CPAP was tried from 9 to 14cm H2O.    Interpretation:    This was an overnight positive airway pressure titration. The patient chose to use a small Simplus mask and heated humidification. The technician initiated treatment with CPAP at 9 cm of pressure and gradually increased the pressure to a maximum of CPAP at 14 cm. The patient never slept in the supine position. The patient appeared to do best on CPAP at 14 cm with an AHI of 1.0, a low saturation of 86%, and a mean saturation of 87.5%.    Recommendation:    Recommend a trial of CPAP at 14 cm using a mask of choice and heated  humidification followed by clinical and data card review in 6 weeks. Alternatively auto titrating CPAP 14-20 cm of water pressure would be reasonable alternative.

## 2017-07-26 ENCOUNTER — OFFICE VISIT (OUTPATIENT)
Dept: PULMONOLOGY | Facility: HOSPICE | Age: 67
End: 2017-07-26
Payer: MEDICAID

## 2017-07-26 VITALS
WEIGHT: 290 LBS | SYSTOLIC BLOOD PRESSURE: 138 MMHG | DIASTOLIC BLOOD PRESSURE: 72 MMHG | HEART RATE: 65 BPM | OXYGEN SATURATION: 91 % | BODY MASS INDEX: 53.37 KG/M2 | RESPIRATION RATE: 16 BRPM | HEIGHT: 62 IN

## 2017-07-26 DIAGNOSIS — J44.89 CHRONIC OBSTRUCTIVE ASTHMA: ICD-10-CM

## 2017-07-26 DIAGNOSIS — J30.9 ALLERGIC RHINITIS, UNSPECIFIED ALLERGIC RHINITIS TRIGGER, UNSPECIFIED RHINITIS SEASONALITY: ICD-10-CM

## 2017-07-26 DIAGNOSIS — R09.02 HYPOXEMIA: ICD-10-CM

## 2017-07-26 DIAGNOSIS — Z72.0 TOBACCO ABUSE: ICD-10-CM

## 2017-07-26 DIAGNOSIS — G47.33 OSA (OBSTRUCTIVE SLEEP APNEA): ICD-10-CM

## 2017-07-26 PROCEDURE — 99214 OFFICE O/P EST MOD 30 MIN: CPT | Performed by: NURSE PRACTITIONER

## 2017-07-26 RX ORDER — MOMETASONE FUROATE 50 UG/1
1-2 SPRAY, METERED NASAL DAILY
Qty: 1 INHALER | Refills: 6 | Status: ON HOLD | OUTPATIENT
Start: 2017-07-26 | End: 2018-04-07

## 2017-07-26 ASSESSMENT — PATIENT HEALTH QUESTIONNAIRE - PHQ9
CLINICAL INTERPRETATION OF PHQ2 SCORE: 1
SUM OF ALL RESPONSES TO PHQ QUESTIONS 1-9: 2
5. POOR APPETITE OR OVEREATING: 0 - NOT AT ALL

## 2017-07-26 NOTE — MR AVS SNAPSHOT
"        Atiya Quiñonez Henley   2017 1:00 PM   Office Visit   MRN: [id number]    Department:  [facility]   Dept Phone:  [phone]    Description:  Female : 1950   Provider:  ALCIRA Chavez           Reason for Visit     Hypoxemia     COPD           Allergies as of 2017     Allergen Noted Reactions    Pcn [Penicillins] 07/10/2014   Anaphylaxis, Rash    Reaction; happened as a child. Tolerated cephalosporins and Zosyn      You were diagnosed with     Chronic obstructive asthma (CMS-HCC)   [9697244]       YESSICA (obstructive sleep apnea)   [663726]       Tobacco abuse   [821389]       Allergic rhinitis, unspecified allergic rhinitis trigger, unspecified rhinitis seasonality   [2428132]       BMI 50.0-59.9, adult (CMS-HCC)   [960241]       Hypoxemia   [799.02.ICD-9-CM]         Vital Signs     Blood Pressure Pulse Respirations Height Weight Body Mass Index    138/72 mmHg 65 16 1.575 m (5' 2\") 131.543 kg (290 lb) 53.03 kg/m2    Oxygen Saturation Last Menstrual Period Smoking Status             91% 1975 Current Every Day Smoker         Basic Information     Date Of Birth Sex Race Ethnicity Preferred Language    1950 Female White Non- English      Your appointments     Aug 01, 2017 11:00 AM   Established Patient with Matt Trinh M.D.   [facility]    [address], [address]  [place] [place] [id number] [phone]           You will be receiving a confirmation call a few days before your appointment from our automated call confirmation system.            Aug 03, 2017 10:50 AM   Established Patient with Carmelo Pat M.D.   [facility] ([facility])    [address] [id number] [phone]           You will be receiving a confirmation call a few days before your appointment from our automated call confirmation system.            Sep 07, 2017  2:40 PM   Established Patient Pul with Val CAMPO" ALCIRA Jefferson   [facility] (--)    [address]  [address]  [facility] [id number] [phone]              Problem List              ICD-10-CM Priority Class Noted - Resolved    Morbid obesity (CMS-HCC) E66.01 Low  7/7/2009 - Present    YESSICA (obstructive sleep apnea) G47.33 Medium  7/7/2009 - Present    Hypothyroid E03.9 Medium  7/7/2009 - Present    Prediabetes R73.03   9/10/2009 - Present    HTN (hypertension) I10 Medium  9/10/2009 - Present    Anemia D64.9 Medium  3/22/2010 - Present    DVT (deep venous thrombosis) (CMS-HCC) I82.409 Medium  4/10/2010 - Present    Anxiety F41.9   5/17/2010 - Present    Depression F32.9 Low  5/17/2010 - Present    Vitamin d deficiency    8/31/2011 - Present    Chronic autoimmune thyroiditis E06.3   10/2/2012 - Present    Chronic anticoagulation Z79.01   10/16/2012 - Present    Vitamin d deficiency    1/30/2013 - Present    Other pulmonary insufficiency, not elsewhere classified, following trauma and surgery J95.89 High  9/2/2013 - Present    COPD (chronic obstructive pulmonary disease) (CMS-HCC) J44.9 Medium  9/2/2013 - Present    Tobacco abuse Z72.0 Low  9/18/2013 - Present    S/P IVC filter Z95.828 Medium  9/18/2013 - Present    Bipolar disorder F31.9 Low  9/18/2013 - Present    History of hernia repair Z98.890, Z87.19   10/7/2013 - Present    Obesity E66.9   7/7/2009 - Present    GERD (gastroesophageal reflux disease) K21.9 Low  11/25/2013 - Present    Colostomy fistula (CMS-HCC) K94.09 High  12/3/2013 - Present    Thrombocytosis (CMS-HCC) D47.3 Medium  12/3/2013 - Present    Hypomagnesemia E83.42 Medium  12/4/2013 - Present    Hyponatremia E87.1 Medium  12/10/2013 - Present    Vitamin D deficiency E55.9   8/4/2015 - Present    Pain in joint, lower leg M25.569   8/21/2015 - Present    Deep vein thrombosis (DVT) (CMS-HCC) I82.409   11/11/2015 - Present    Cervical spondylosis M47.812   5/4/2016 - Present    DDD (degenerative disc disease), cervical  M50.30   5/4/2016 - Present    Chronic neck pain M54.2, G89.29   5/4/2016 - Present    Lumbosacral spondylosis M47.817   5/4/2016 - Present    DDD (degenerative disc disease), lumbar M51.36   5/4/2016 - Present    Lumbar radiculopathy M54.16   5/4/2016 - Present    Chronic low back pain M54.5, G89.29   5/4/2016 - Present    Left knee pain M25.562   5/4/2016 - Present    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, Z96.659   5/4/2016 - Present    Chronic pain syndrome G89.4   8/25/2016 - Present    Positive FIT (fecal immunochemical test) R19.5   9/27/2016 - Present    Chronic obstructive asthma (CMS-HCC) J44.9, J45.909   11/14/2016 - Present    Allergic rhinitis J30.9   11/14/2016 - Present    Hypoxemia R09.02   11/14/2016 - Present      Health Maintenance        Date Due Completion Dates    RETINAL SCREENING 11/2/1970 ---    PAP SMEAR 11/2/1973 ---    IMM ZOSTER VACCINE 11/2/2012 ---    MAMMOGRAM 6/29/2017 6/29/2016    IMM INFLUENZA (1) 9/1/2017 11/14/2016, 10/14/2015, 11/20/2014, 9/3/2013, 9/20/2011, 11/11/2010    A1C SCREENING 9/20/2017 3/20/2017, 8/3/2015, 7/10/2014, 6/20/2014, 11/29/2013, 5/17/2013, 8/31/2012, 7/16/2012, 3/30/2012, 7/19/2011, 5/7/2010, 10/4/2009    DIABETES MONOFILAMENT / LE EXAM 12/6/2017 12/6/2016    FASTING LIPID PROFILE 3/20/2018 3/20/2017, 8/30/2016, 8/3/2015, 7/11/2014, 6/20/2014, 5/17/2013, 7/19/2011, 5/1/2009, 2/23/2009, 9/16/2008, 5/9/2008, 11/29/2007    URINE ACR / MICROALBUMIN 3/20/2018 3/20/2017, 8/3/2015, 6/20/2014    SERUM CREATININE 3/20/2018 3/20/2017, 8/30/2016, 8/3/2015, 7/11/2014, 7/10/2014, 6/20/2014, 2/6/2014, 1/31/2014, 1/17/2014, 1/13/2014, 12/19/2013, 12/18/2013, 12/17/2013, 12/16/2013, 12/15/2013, 12/14/2013, 12/13/2013, 12/12/2013, 12/11/2013, 12/10/2013, 12/10/2013, 12/9/2013, 12/8/2013, 12/7/2013, 12/6/2013, 12/5/2013, 12/4/2013, 12/3/2013, 12/2/2013, 11/30/2013, 11/28/2013, 11/27/2013, 11/25/2013, 11/22/2013, 11/20/2013, 11/10/2013, 10/27/2013, 10/20/2013,  10/15/2013, 10/14/2013, 10/13/2013, 10/12/2013, 10/11/2013, 10/10/2013, 10/9/2013, 10/8/2013, 10/7/2013, 9/25/2013, 9/24/2013, 9/22/2013, 9/21/2013, 9/20/2013, 9/19/2013, 9/18/2013, 9/17/2013, 9/17/2013, 9/15/2013, 9/13/2013, 9/12/2013, 9/11/2013, 9/10/2013, 9/9/2013, 9/9/2013, 9/8/2013, 9/7/2013, 9/7/2013, 9/6/2013, 9/5/2013, 9/4/2013, 9/3/2013, 9/1/2013, 8/31/2013, 5/17/2013, 1/25/2013, 12/13/2012, 10/17/2012, 10/1/2012, 8/24/2012, 8/18/2012, 7/20/2012, 7/17/2012, 7/16/2012, 1/4/2012, 7/20/2011, 7/19/2011, 1/13/2011, 12/6/2010, 10/26/2010, 7/2/2010, 6/23/2010, 5/17/2010, 5/4/2010, 5/3/2010, 5/1/2010, 4/30/2010, 4/29/2010, 4/28/2010, 4/27/2010, 4/26/2010, 4/25/2010, 4/24/2010, 4/23/2010, 4/22/2010, 4/21/2010, 4/20/2010, 4/19/2010, 4/18/2010, 4/17/2010, 4/15/2010, 4/14/2010, 4/13/2010, 4/12/2010, 4/11/2010, 4/10/2010, 4/9/2010, 4/8/2010, 4/7/2010, 4/6/2010, 4/5/2010, 4/4/2010, 4/3/2010, 4/2/2010, 4/1/2010, 3/31/2010, 3/30/2010, 3/29/2010, 3/28/2010, 3/27/2010, 3/26/2010, 3/25/2010, 3/24/2010, 3/23/2010, 3/22/2010, 3/21/2010, 3/20/2010, 3/19/2010, 3/18/2010, 3/17/2010, 3/17/2010, 3/16/2010, 3/15/2010, 3/12/2010, 3/11/2010, 3/5/2010, 3/1/2010, 1/31/2010, 1/30/2010, 1/29/2010, 1/28/2010, 1/27/2010, 1/26/2010, 11/5/2009, 10/7/2009, 10/5/2009, 10/4/2009, 10/3/2009, 10/2/2009, 10/1/2009, 9/21/2009, 9/15/2009, 9/4/2009, 4/27/2009, 2/23/2009, 8/14/2008, 11/29/2007    COLONOSCOPY 11/17/2020 11/17/2010    IMM DTaP/Tdap/Td Vaccine (2 - Td) 5/9/2027 5/9/2017            Current Immunizations     Influenza TIV (IM) 9/3/2013  8:45 PM, 9/20/2011    Influenza Vaccine 12/1/2009    Influenza Vaccine Pediatric 11/11/2010    Influenza Vaccine Quad Inj (Pf) 11/20/2014  1:50 PM    Influenza Vaccine Quad Inj (Preserved) 11/14/2016  9:52 AM, 10/14/2015    Pneumococcal Vaccine (UF)Historical Data 12/1/2009    Pneumococcal polysaccharide vaccine (PPSV-23) 11/14/2016  9:53 AM    Tdap Vaccine 5/9/2017      Below and/or attached are the  medications your provider expects you to take. Review all of your home medications and newly ordered medications with your provider and/or pharmacist. Follow medication instructions as directed by your provider and/or pharmacist. Please keep your medication list with you and share with your provider. Update the information when medications are discontinued, doses are changed, or new medications (including over-the-counter products) are added; and carry medication information at all times in the event of emergency situations     Allergies:  PCN - Anaphylaxis,Rash               Medications  Valid as of: July 26, 2017 -  1:40 PM    Generic Name Brand Name Tablet Size Instructions for use    Albuterol Sulfate (Aero Soln) albuterol 108 (90 BASE) MCG/ACT Inhale 2 Puffs by mouth every four hours as needed. For Shortness of breath        Amitriptyline HCl (Tab) ELAVIL 25 MG Take 25 mg by mouth every bedtime.        Ascorbic Acid (Tab) ascorbic acid 500 MG TAKE ONE [1] TABLET BY MOUTH EVERY DAY        Budesonide-Formoterol Fumarate (Aerosol) SYMBICORT 160-4.5 MCG/ACT Inhale 2 Puffs by mouth 2 Times a Day. With spacer, rinse mouth after use        CarBAMazepine (Tab) TEGRETOL 200 MG         Ergocalciferol (Cap) DRISDOL 10404 UNITS TAKE 1 CAPSULE BY MOUTH EVERY 28 DAYS.        FLUoxetine HCl (Cap) PROZAC 20 MG Take 20 mg by mouth every day.        Flurazepam HCl (Cap) DALMANE 30 MG Take 30 mg by mouth at bedtime as needed.        Gabapentin (Cap) NEURONTIN 300 MG TAKE THREE (3) CAPSULES BY MOUTH THREE (3) TIMES DAILY         Hyoscyamine Sulfate (SL Tab) LEVSIN 0.125 MG         Incontinence Supply Disposable (Misc) DEPEND UNDERWEAR X-LARGE  1 Each by Does not apply route 3 times a day.        Ipratropium-Albuterol (Aero Soln) COMBIVENT RESPIMAT  MCG/ACT         Levothyroxine Sodium (Tab) SYNTHROID 200 MCG TAKE ONE (1) TABLET BY MOUTH EVERY MORNING ON AN EMPTY STOMACH        Levothyroxine Sodium (Tab) SYNTHROID 25 MCG Take  1 tablet by mouth once every morning on an empty stomach. Use along with 200 mcg levothyroxine for a total of 225 mcg daily        MetFORMIN HCl (Tab) GLUCOPHAGE 500 MG TAKE ONE [1] TABLET BY MOUTH EVERY DAY        Misc. Devices (Misc) Misc. Devices  Pt needs service for her PMD she will need a new battery to avoid stalling        Misc. Devices (Misc) Misc. Devices  CPAP supplies (hoses, filter, water chamber, face mask-resmed small), nasal cannula tubing        Misc. Devices (Misc) Misc. Devices  O2 to use continuously to keep her sats >90 %        Misc. Devices (Misc) Misc. Devices  Please evaluate pt for wider power mobility devise, pt has tipped over on multiple occasions        Misc. Devices (Misc) Misc. Devices  O2 tank rack for power wheel chair        Misc. Devices (Misc) Misc. Devices  Adult incontinence supplies to use as directed        Misc. Devices (Misc) Misc. Devices  Power mobility devise        Misc. Devices (Misc) Misc. Devices  O2 concentrator to use 24/7 and also with her CPAP at night to keep her O2 sats > 90%        Misc. Devices (Misc) Misc. Devices  Incontinence supplies (for wheel chair) and adult briefs to use as needed #120        Misc. Devices (Misc) Misc. Devices  New battery and gear shifter for power wheel chair        Misc. Devices (Misc) Misc. Devices  3 boxes of pouches (90795), 2 boxes of wafers (70512), 3 boxes (tubes) paste        Mometasone Furoate (Solution) ELOCON 0.1 %         Mometasone Furoate (Suspension) NASONEX 50 MCG/ACT Spray 1-2 Sprays in nose every day. Each nostril.        Nicotine Polacrilex (Gum) SM NICOTINE POLACRILEX 2 MG CHEW ONE (1) PIECE AS NEEDED FOR SMOKING CESSATION        Nystatin (Ointment) MYCOSTATIN 799666 UNIT/GM         Ostomy Supplies (Misc) Ostomy Supplies  Dispense as ordered        RaNITidine HCl (Tab) ZANTAC 150 MG TAKE ONE (1) TABLET BY MOUTH TWICE DAILY WITH MEALS        Tiotropium Bromide Monohydrate (Aero Soln) Tiotropium Bromide Monohydrate  2.5 MCG/ACT Inhale 2 Inhalation by mouth every day. Assemble and prime.        TraZODone HCl (Tab) DESYREL 50 MG Take 1 Tab by mouth at bedtime as needed for Sleep.        Warfarin Sodium (Tab) COUMADIN 10 MG Take 1 tablet by mouth daily as directed by [facility]tico[facility]        .                 Medicines prescribed today were sent to:     [id number] - [place], [place] - [address]    [address] [place] [address]    Phone: [phone] Fax: [phone]    Open 24 Hours?: No      Medication refill instructions:       If your prescription bottle indicates you have medication refills left, it is not necessary to call your provider’s office. Please contact your pharmacy and they will refill your medication.    If your prescription bottle indicates you do not have any refills left, you may request refills at any time through one of the following ways: The online [employer] system (except Urgent Care), by calling your provider’s office, or by asking your pharmacy to contact your provider’s office with a refill request. Medication refills are processed only during regular business hours and may not be available until the next business day. Your provider may request additional information or to have a follow-up visit with you prior to refilling your medication.   *Please Note: Medication refills are assigned a new Rx number when refilled electronically. Your pharmacy may indicate that no refills were authorized even though a new prescription for the same medication is available at the pharmacy. Please request the medicine by name with the pharmacy before contacting your provider for a refill.           [employer] Access Code: Activation code not generated  Current [employer] Status: Active          Quit Tobacco Information     Do you want to quit using tobacco?    Quitting tobacco decreases risks of cancer, heart and lung disease, increases life expectancy, improves sense of taste and smell, and increases spending  money, among other benefits.    If you are thinking about quitting, we can help.  • [facility] Quit Tobacco Program: 774.474.5431  o Program occurs weekly for four weeks and includes pharmacist consultation on products to support quitting smoking or chewing tobacco. A provider referral is needed for pharmacist consultation.  • Tobacco Users Help Hotline: 3-800-QUIT-NOW (209-9613) or https://[place].quitlogix.org/  o Free, confidential telephone and online coaching for [place] residents. Sessions are designed on a schedule that is convenient for you. Eligible clients receive free nicotine replacement therapy.  • Nationally: www.smokefree.gov  o Information and professional assistance to support both immediate and long-term needs as you become, and remain, a non-smoker. Smokefree.gov allows you to choose the help that best fits your needs.

## 2017-07-26 NOTE — PROGRESS NOTES
Chief Complaint   Patient presents with   • Hypoxemia   • COPD       HPI:  Atiya Henley is a 66 y.o. year old female here today for follow-up on her chronic obstructive Asthma. She also has a history of Obstructive Sleep Apnea and is on CPAP 9 CM H20 with 3 LPM 02 bleed in. She had been followed by Dr. Hodge in the past, but he retired. She did not bring her compliance chip to today's office visit. She often feels her CPAP pressure is too low and she will wake during the night gasping for air. She is waking un refreshed and is tired during the day. She notes trouble sleep at night. She states her Psychiatrist placed her on Dalmane, but she was unable to get this from the pharmacy. She was switched to Trazodone which helps, but she still has trouble falling asleep with it. She denies any morning headaches. She underwent a dedicated in lab CPAP titration study which indicates adequate titration CPAP of 14 CM H20 with a resultant AHI of 1, mean 02 saturation of 87.5%. She recently received a new mask from her DME and feels this is comfortable.     She is on oxygen at 3 LPM with exertion. PFT's 5/22/2017 indicated an FEV1 of 1.42 L, 63% predicted with an FEV1/FVC ratio of 84 with a TLC of 78% and a DLCO of 97% predicted. Prior PFT's indicated an FEV1 of 1.55 L, 67% predicted with an FEV1/FVC ratio of 84 with a TLC of 84% predicted with a DLCO of 88% predicted. She does continue to smoke cigarettes and is smoking 1/24-1/2 pack per day. She is working on cutting back.      She is compliant on Symbicort 160/4.5 mcg 2 puffs INH bid, along with a Combivent respimat and Proventil HFA. She was restarted on Spiriva respimat 2.5 mcg 2 puffs INH daily. She feels her chronic dyspnea is worse with exertion. She has a morning cough which is productive with clear mucous. She denies hemoptysis. She notes an occasional wheeze in the morning and with exertion. She denies any fevers or chills. She denies any recent respiratory  infections. She does have clear sinus drainage which is chronic for her and she feels may be allergy triggered. She does have a Nasonex nasal spray which she uses as needed an feels helps.        Past Medical History   Diagnosis Date   • Obesity 7/7/2009   • Oxygen dependent      4.5 L NC   • EMPHYSEMA    • Snoring    • Hypothyroid 7/7/2009   • Arthritis    • Cancer (CMS-HCC)      cervical   • Psychiatric problem      depression   • COPD    • Indigestion    • Sleep apnea      USES CPAP, 3-4L oxygen   • Hypertension      stated no longer on meds due to lost 100 lbs   • Colostomy in place (CMS-HCC) 2009   • History of total knee arthroplasty      infection   • Status post cholecystectomy 1992   • Chronic autoimmune thyroiditis      + TPO ab / + ultrasound   • Weight gain    • Obstruction      colostomy   • Pain      b/l legs   • Hashimoto's disease    • Personal history of venous thrombosis and embolism 2009, 2012     arm and leg left side   • Diabetes      type 2    • Dental disorder      dentures upper   • Infectious disease      MRSA,   • Infectious disease      VRE   • Bowel obstruction (CMS-HCC)    • Presence of IVC filter        Past Surgical History   Procedure Laterality Date   • Myringotomy  9/9/2009     Performed by RENETTA GOODSON at SURGERY [facility] DAY [facility]   • Low anterior resection  3/10/2010     Performed by JESUSITA SUMNER at SURGERY [facility] ORS   • Low anterior resection laparoscopic  3/10/2010     Performed by JESUSITA SUMNER at SURGERY [facility]   • Colostomy  3/16/2010     Performed by CHRISTA BENNETT at SURGERY [facility]   • Tracheostomy  3/31/2010     Performed by JESUSITA SUMNER at SURGERY [facility]S   • Cholecystectomy  1992     laparoscopic   • Exploratory laparotomy  3/16/2010     Performed by CHRISTA BENNETT at SURGERY [facility]   • Colectomy  3/16/2010     Performed by CHRISTA BENNETT at SURGERY [facility]S   • Hysterectomy laparoscopy  1983   • Colonoscopy  - endo  11/17/2010     Performed by JESUSITA SUMNER at [facility]   • Lateral release  7/20/2011     Performed by BERYL LOYD at [facility]   • Other orthopedic surgery       LT KNEE X2   • Knee arthroscopy  7/20/2011     Performed by BERYL LOYD at [facility]   • Medial meniscectomy  7/20/2011     Performed by BERYL LOYD at [facility]   • Meniscectomy  7/20/2011     Performed by BERYL LOYD at [facility]   • Irrigation & debridement ortho  7/17/2012     Performed by BERYL LOYD at [facility]   • Irrigation & debridement ortho  7/19/2012     Performed by BERYL LOYD at [facility]   • Knee arthroplasty total  6/10/2012     left   • Irrigation & debridement general  12/17/2012     Performed by David Fowler M.D. at [facility]   • Irrigation & debridement ortho  2/8/2013     Performed by David Fowler M.D. at [facility]   • Exploratory laparotomy  9/2/2013     Performed by Maninder Carter M.D. at [facility]   • Parastomal hernia repair   9/2/2013     Performed by Maninder Carter M.D. at [facility]   • Bladder biopsy with cystoscopy  10/10/2013     Performed by Sang Castañeda M.D. at [facility]   • Colostomy  11/26/2013     Performed by Maninder Carter M.D. at [facility]   • Exploratory laparotomy  11/26/2013     Performed by Maninder Carter M.D. at [facility]   • Lysis adhesions general  11/26/2013     Performed by Maninder Carter M.D. at [facility]   • Colon resection  11/26/2013     Performed by Maninder Carter M.D. at [facility]   • Exploratory laparotomy  12/10/2013     Performed by Sang Castañeda M.D. at [facility]   • Wound closure general  12/10/2013     Performed by Sang Castañeda M.D. at [facility]   • Pr inject  rx other periph nerve Left 8/21/2015     Procedure: NEUROLYTIC DEST-OTHER NERVE;  Surgeon: Kd Galaviz D.O.;  Location: SURGERY SURGICAL [facility]S;  Service: Pain Management   • Pr fluoroscopic guidance needle placement Left 8/21/2015     Procedure: FLOURO GUIDE NEEDLE PLACEMENT;  Surgeon: Kd Galaviz D.O.;  Location: SURGERY [facility] ORS;  Service: Pain Management   • Pr inject rx other periph nerve  8/21/2015     Procedure: NEUROLYTIC DEST-OTHER NERVE;  Surgeon: Kd Galaviz D.O.;  Location: SURGERY SURGICAL [facility] ORS;  Service: Pain Management   • Pr inject rx other periph nerve  8/21/2015     Procedure: NEUROLYTIC DEST-OTHER NERVE;  Surgeon: Kd Galaviz D.O.;  Location: SURGERY [facility];  Service: Pain Management       Family History   Problem Relation Age of Onset   • Heart Disease Mother    • Hypertension         Social History     Social History   • Marital Status:      Spouse Name: N/A   • Number of Children: N/A   • Years of Education: N/A     Occupational History   • Not on file.     Social History Main Topics   • Smoking status: Current Every Day Smoker -- 0.25 packs/day for 44 years     Types: Cigarettes   • Smokeless tobacco: Current User      Comment: 1 ppd, 45 yrs   • Alcohol Use: No   • Drug Use: Yes     Special: Marijuana      Comment: 1x per month   • Sexual Activity: No     Other Topics Concern   •  Service No   • Blood Transfusions No     unknown   • Caffeine Concern No   • Occupational Exposure No   • Hobby Hazards No   • Sleep Concern Yes     sleep apnea   • Stress Concern Yes   • Weight Concern No   • Special Diet Yes   • Back Care Yes   • Exercise No   • Bike Helmet No     does not ride bike    • Seat Belt Yes   • Self-Exams Yes     Social History Narrative       ROS:  Constitutional: Denies fevers, chills, sweats, fatigue, weight loss  Eyes: Denies glasses, vision loss, pain, drainage, double vision  Ears/Nose/Mouth/Throat: Denies ear  ache, difficulty hearing, sore throat, persistent hoarseness, decayed teeth/toothache  Cardiovascular: Denies chest pain, tightness, palpitations, swelling in feet/legs, fainting, difficulty breathing when laying down  Respiratory: See HPI   GI: Denies heartburn, difficulty swallowing, nausea, vomiting, abdominal pain, diarrhea, constipation  : Denies frequent urination, painful urination  Integumentary: Denies rashes, lumps or color changes  MSK: Positive knee and ankle pain on the left  Neurological: Denies frequent headaches, dizziness, weakness  Sleep: See HPI       Current Outpatient Prescriptions on File Prior to Visit   Medication Sig Dispense Refill   • gabapentin (NEURONTIN) 300 MG Cap TAKE THREE (3) CAPSULES BY MOUTH THREE (3) TIMES DAILY  270 Cap 4   • carbamazepine (TEGRETOL) 200 MG Tab      • hyoscyamine (LEVSIN) 0.125 MG SL Tab      • COMBIVENT RESPIMAT  MCG/ACT Aero Soln      • mometasone (ELOCON) 0.1 % lotion      • nystatin (MYCOSTATIN) 563599 UNIT/GM Ointment      • Misc. Devices Misc 3 boxes of pouches (14399), 2 boxes of wafers (79448), 3 boxes (tubes) paste 3 Container 6   • levothyroxine (SYNTHROID) 200 MCG Tab TAKE ONE (1) TABLET BY MOUTH EVERY MORNING ON AN EMPTY STOMACH 90 Tab 1   • levothyroxine (SYNTHROID) 25 MCG Tab Take 1 tablet by mouth once every morning on an empty stomach. Use along with 200 mcg levothyroxine for a total of 225 mcg daily 90 Tab 1   • SM NICOTINE POLACRILEX 2 MG Gum CHEW ONE (1) PIECE AS NEEDED FOR SMOKING CESSATION 110 Each 2   • warfarin (COUMADIN) 10 MG Tab Take 1 tablet by mouth daily as directed by [facility] 30 Tab 2   • Tiotropium Bromide Monohydrate (SPIRIVA RESPIMAT) 2.5 MCG/ACT Aero Soln Inhale 2 Inhalation by mouth every day. Assemble and prime. 1 Inhaler 6   • trazodone (DESYREL) 50 MG Tab Take 1 Tab by mouth at bedtime as needed for Sleep. 30 Tab 3   • Misc. Devices Misc New battery and gear shifter for power wheel chair 1  Device 0   • metformin (GLUCOPHAGE) 500 MG Tab TAKE ONE [1] TABLET BY MOUTH EVERY DAY 90 Tab 0   • ascorbic acid (ASCORBIC ACID) 500 MG Tab TAKE ONE [1] TABLET BY MOUTH EVERY DAY 90 Tab 2   • ranitidine (ZANTAC) 150 MG Tab TAKE ONE (1) TABLET BY MOUTH TWICE DAILY WITH MEALS 60 Tab 5   • Incontinence Supply Disposable (DEPEND UNDERWEAR X-LARGE) Misc 1 Each by Does not apply route 3 times a day. 90 Each 6   • mometasone (NASONEX) 50 MCG/ACT nasal spray Spray 1-2 Sprays in nose every day. Each nostril. 1 Inhaler 6   • budesonide-formoterol (SYMBICORT) 160-4.5 MCG/ACT Aerosol Inhale 2 Puffs by mouth 2 Times a Day. With spacer, rinse mouth after use 1 Inhaler 6   • albuterol (PROVENTIL HFA) 108 (90 BASE) MCG/ACT Aero Soln inhalation aerosol Inhale 2 Puffs by mouth every four hours as needed. For Shortness of breath 1 Inhaler 6   • vitamin D, Ergocalciferol, (DRISDOL) 70036 UNITS Cap capsule TAKE 1 CAPSULE BY MOUTH EVERY 28 DAYS. 3 Cap 3   • Misc. Devices Misc Incontinence supplies (for wheel chair) and adult briefs to use as needed #120 120 Device 6   • Ostomy Supplies Misc Dispense as ordered 20 Each 12   • Misc. Devices Misc O2 concentrator to use 24/7 and also with her CPAP at night to keep her O2 sats > 90% 1 Device 0   • Misc. Devices Misc Adult incontinence supplies to use as directed 60 Device 6   • Misc. Devices Misc Power mobility devise 1 Device 0   • flurazepam (DALMANE) 30 MG capsule Take 30 mg by mouth at bedtime as needed.     • fluoxetine (PROZAC) 20 MG Cap Take 20 mg by mouth every day.     • amitriptyline (ELAVIL) 25 MG Tab Take 25 mg by mouth every bedtime.     • Misc. Devices Misc Please evaluate pt for wider power mobility devise, pt has tipped over on multiple occasions 1 Device 0   • Misc. Devices Misc O2 tank rack for power wheel chair 1 Device 0   • Misc. Devices Misc O2 to use continuously to keep her sats >90 % 1 Device 0   • Misc. Devices Misc CPAP supplies (hoses, filter, water chamber, face  "mask-resmed small), nasal cannula tubing 10 Device 6   • Misc. Devices Misc Pt needs service for her PMD she will need a new battery to avoid stalling 1 Device 0     No current facility-administered medications on file prior to visit.     Pcn    Blood pressure 138/72, pulse 65, resp. rate 16, height 1.575 m (5' 2\"), weight 131.543 kg (290 lb), last menstrual period 08/02/1975, SpO2 91 %.  PE:   Appearance: Obese female, no acute distress  Eyes: PERRL, EOM intact, sclera white, conjunctiva moist  Ears: no lesions or deformities  Hearing: grossly intact  Nose: no lesions or deformities  Oropharynx: tongue normal, posterior pharynx without erythema or exudate  Mallampati Classification: class 4   Neck: supple, trachea midline, no masses   Respiratory effort: no intercostal retractions or use of accessory muscles  Lung auscultation: no rales, rhonchi or wheezes  Heart auscultation: no murmur rub or gallop  Extremities: no cyanosis or edema  Abdomen: soft ,non tender, no masses  Gait and Station: wheelchair   Digits and nails: no clubbing, cyanosis, petechiae or nodes.  Cranial nerves: grossly intact  Skin: no rashes, lesions or ulcers noted  Orientation: Oriented to time, person and place  Mood and affect: mood and affect appropriate, normal interaction with examiner  Judgement: Intact          Assessment:  1. Chronic obstructive asthma (CMS-[facility])     2. YESSICA (obstructive sleep apnea)     3. Tobacco abuse     4. Allergic rhinitis, unspecified allergic rhinitis trigger, unspecified rhinitis seasonality     5. BMI 50.0-59.9, adult (CMS-[facility])     6. Hypoxemia           Plan:    1) Increase CPAP to 14 CM H20 with 3 LPM 02 bleed in. She recently received a new mask from her DME which she feels is a good fit.   2) Smoking cessation discussed and recommended. She is working on quitting.  3) Continue 02 3 LPM prn exertion.  4) Continue Symbicort 160/4.5 mcg. Continue Spiriva respimat 2.5 mcg 2 puffs INH daily. Continue Combivent " and Proventil inhalers.    5) Continue Nasonex. RX refill sent to her local pharmacy.   6) She is up to date on Prevnar 13, Pneumovax 23 and Influenza vaccines.  7) Weight loss recommended.  8) Sleep hygiene discussed. Continue Trazodone prescribed by Psychiatry.    9) Referral to [facility] lung cancer screening program  10) Follow up in 6 weeks with compliance card download, sooner if needed.

## 2017-07-26 NOTE — PATIENT INSTRUCTIONS
Plan:    1) Increase CPAP to 14 CM H20 with 3 LPM 02 bleed in. She recently received a new mask from her DME which she feels is a good fit.   2) Smoking cessation discussed and recommended. She is working on quitting.  3) Continue 02 3 LPM prn exertion.  4) Continue Symbicort 160/4.5 mcg. Continue Spiriva respimat 2.5 mcg 2 puffs INH daily. Continue Combivent and Proventil inhalers.    5) Continue Nasonex. RX refill sent to her local pharmacy.   6) She is up to date on Prevnar 13, Pneumovax 23 and Influenza vaccines.  7) Weight loss recommended.  8) Sleep hygiene discussed. Continue Trazodone prescribed by Psychiatry.    9) Referral to [facility] lung cancer screening program  10) Follow up in 6 weeks with compliance card download, sooner if needed.

## 2017-07-27 RX ORDER — DIAPER,BRIEF,ADULT, DISPOSABLE
EACH MISCELLANEOUS
Qty: 90 EACH | Refills: 0 | Status: SHIPPED | OUTPATIENT
Start: 2017-07-27 | End: 2017-08-07 | Stop reason: SDUPTHER

## 2017-07-28 ENCOUNTER — TELEPHONE (OUTPATIENT)
Dept: HEMATOLOGY ONCOLOGY | Facility: MEDICAL CENTER | Age: 67
End: 2017-07-28

## 2017-07-28 NOTE — TELEPHONE ENCOUNTER
Received referral to lung cancer screening program.  Chart review to assess for lung cancer screening program eligibility.   1. Age 55-77 yrs of age? Yes 66 y.o.  2. 30 pack year hx of smoking, or greater? Yes 1 ppdx 44 yrs=  44 pkyr hx  3. Current smoker or if quit, has pt quit within last 15 yrs?Yes    4. Any signs or symptoms of lung cancer? None noted  5. Previous history of lung cancer? None noted  6. Chest CT within past 12 mos.? None noted  Patient does meet eligibility criteria. [facility] scheduling notified to schedule the shared decision making visit.

## 2017-08-01 ENCOUNTER — OFFICE VISIT (OUTPATIENT)
Dept: ENDOCRINOLOGY | Facility: MEDICAL CENTER | Age: 67
End: 2017-08-01
Payer: MEDICAID

## 2017-08-01 VITALS
DIASTOLIC BLOOD PRESSURE: 78 MMHG | WEIGHT: 290 LBS | SYSTOLIC BLOOD PRESSURE: 136 MMHG | OXYGEN SATURATION: 96 % | HEIGHT: 64 IN | HEART RATE: 74 BPM | BODY MASS INDEX: 49.51 KG/M2

## 2017-08-01 DIAGNOSIS — E06.3 HYPOTHYROIDISM, ACQUIRED, AUTOIMMUNE: ICD-10-CM

## 2017-08-01 DIAGNOSIS — E66.01 MORBID OBESITY, UNSPECIFIED OBESITY TYPE (HCC): ICD-10-CM

## 2017-08-01 DIAGNOSIS — E06.3 CHRONIC AUTOIMMUNE THYROIDITIS: ICD-10-CM

## 2017-08-01 PROCEDURE — 99214 OFFICE O/P EST MOD 30 MIN: CPT | Performed by: INTERNAL MEDICINE

## 2017-08-01 NOTE — MR AVS SNAPSHOT
"Atiya Henley   2017 11:00 AM   Office Visit   MRN: [id number]    Department:  Endocrinology [facility]   Dept Phone:  [phone]    Description:  Female : 1950   Provider:  Matt Trinh M.D.           Allergies as of 2017     Allergen Noted Reactions    Pcn [Penicillins] 07/10/2014   Anaphylaxis, Rash    Reaction; happened as a child. Tolerated cephalosporins and Zosyn      Vital Signs     Blood Pressure Pulse Height Weight Body Mass Index Oxygen Saturation    136/78 mmHg 74 1.626 m (5' 4.02\") 131.543 kg (290 lb) 49.75 kg/m2 96%    Last Menstrual Period Smoking Status                1975 Current Every Day Smoker          Basic Information     Date Of Birth Sex Race Ethnicity Preferred Language    1950 Female White Non- English      Your appointments     Aug 03, 2017 10:50 AM   Established Patient with Carmelo Pat M.D.   [facility] ([facility])    [address] [facility] [id number] [phone]           You will be receiving a confirmation call a few days before your appointment from our automated call confirmation system.            Sep 07, 2017  2:40 PM   Established Patient Pul with ALCIRA Chavez   [facility] (--)    [address]  [facility] [address]  [facility] [address] [phone]              Problem List              ICD-10-CM Priority Class Noted - Resolved    Morbid obesity (CMS-HCC) E66.01 Low  2009 - Present    YESSICA (obstructive sleep apnea) G47.33 Medium  2009 - Present    Hypothyroid E03.9 Medium  2009 - Present    Prediabetes R73.03   9/10/2009 - Present    HTN (hypertension) I10 Medium  9/10/2009 - Present    Anemia D64.9 Medium  3/22/2010 - Present    DVT (deep venous thrombosis) (CMS-HCC) I82.409 Medium  4/10/2010 - Present    Anxiety F41.9   2010 - Present    Depression F32.9 Low  2010 - Present    Vitamin d deficiency    2011 - Present    Chronic autoimmune thyroiditis " E06.3   10/2/2012 - Present    Chronic anticoagulation Z79.01   10/16/2012 - Present    Vitamin d deficiency    1/30/2013 - Present    Other pulmonary insufficiency, not elsewhere classified, following trauma and surgery J95.89 High  9/2/2013 - Present    COPD (chronic obstructive pulmonary disease) (CMS-HCC) J44.9 Medium  9/2/2013 - Present    Tobacco abuse Z72.0 Low  9/18/2013 - Present    S/P IVC filter Z95.828 Medium  9/18/2013 - Present    Bipolar disorder F31.9 Low  9/18/2013 - Present    History of hernia repair Z98.890, Z87.19   10/7/2013 - Present    Obesity E66.9   7/7/2009 - Present    GERD (gastroesophageal reflux disease) K21.9 Low  11/25/2013 - Present    Colostomy fistula (CMS-HCC) K94.09 High  12/3/2013 - Present    Thrombocytosis (CMS-HCC) D47.3 Medium  12/3/2013 - Present    Hypomagnesemia E83.42 Medium  12/4/2013 - Present    Hyponatremia E87.1 Medium  12/10/2013 - Present    Vitamin D deficiency E55.9   8/4/2015 - Present    Pain in joint, lower leg M25.569   8/21/2015 - Present    Deep vein thrombosis (DVT) (CMS-HCC) I82.409   11/11/2015 - Present    Cervical spondylosis M47.812   5/4/2016 - Present    DDD (degenerative disc disease), cervical M50.30   5/4/2016 - Present    Chronic neck pain M54.2, G89.29   5/4/2016 - Present    Lumbosacral spondylosis M47.817   5/4/2016 - Present    DDD (degenerative disc disease), lumbar M51.36   5/4/2016 - Present    Lumbar radiculopathy M54.16   5/4/2016 - Present    Chronic low back pain M54.5, G89.29   5/4/2016 - Present    Left knee pain M25.562   5/4/2016 - Present    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, Z96.659   5/4/2016 - Present    Chronic pain syndrome G89.4   8/25/2016 - Present    Positive FIT (fecal immunochemical test) R19.5   9/27/2016 - Present    Chronic obstructive asthma (CMS-HCC) J44.9, J45.909   11/14/2016 - Present    Allergic rhinitis J30.9   11/14/2016 - Present    Hypoxemia R09.02   11/14/2016 - Present      Health  Maintenance        Date Due Completion Dates    RETINAL SCREENING 11/2/1970 ---    PAP SMEAR 11/2/1973 ---    IMM ZOSTER VACCINE 11/2/2012 ---    MAMMOGRAM 6/29/2017 6/29/2016    IMM INFLUENZA (1) 9/1/2017 11/14/2016, 10/14/2015, 11/20/2014, 9/3/2013, 9/20/2011, 11/11/2010    A1C SCREENING 9/20/2017 3/20/2017, 8/3/2015, 7/10/2014, 6/20/2014, 11/29/2013, 5/17/2013, 8/31/2012, 7/16/2012, 3/30/2012, 7/19/2011, 5/7/2010, 10/4/2009    DIABETES MONOFILAMENT / LE EXAM 12/6/2017 12/6/2016    FASTING LIPID PROFILE 3/20/2018 3/20/2017, 8/30/2016, 8/3/2015, 7/11/2014, 6/20/2014, 5/17/2013, 7/19/2011, 5/1/2009, 2/23/2009, 9/16/2008, 5/9/2008, 11/29/2007    URINE ACR / MICROALBUMIN 3/20/2018 3/20/2017, 8/3/2015, 6/20/2014    SERUM CREATININE 3/20/2018 3/20/2017, 8/30/2016, 8/3/2015, 7/11/2014, 7/10/2014, 6/20/2014, 2/6/2014, 1/31/2014, 1/17/2014, 1/13/2014, 12/19/2013, 12/18/2013, 12/17/2013, 12/16/2013, 12/15/2013, 12/14/2013, 12/13/2013, 12/12/2013, 12/11/2013, 12/10/2013, 12/10/2013, 12/9/2013, 12/8/2013, 12/7/2013, 12/6/2013, 12/5/2013, 12/4/2013, 12/3/2013, 12/2/2013, 11/30/2013, 11/28/2013, 11/27/2013, 11/25/2013, 11/22/2013, 11/20/2013, 11/10/2013, 10/27/2013, 10/20/2013, 10/15/2013, 10/14/2013, 10/13/2013, 10/12/2013, 10/11/2013, 10/10/2013, 10/9/2013, 10/8/2013, 10/7/2013, 9/25/2013, 9/24/2013, 9/22/2013, 9/21/2013, 9/20/2013, 9/19/2013, 9/18/2013, 9/17/2013, 9/17/2013, 9/15/2013, 9/13/2013, 9/12/2013, 9/11/2013, 9/10/2013, 9/9/2013, 9/9/2013, 9/8/2013, 9/7/2013, 9/7/2013, 9/6/2013, 9/5/2013, 9/4/2013, 9/3/2013, 9/1/2013, 8/31/2013, 5/17/2013, 1/25/2013, 12/13/2012, 10/17/2012, 10/1/2012, 8/24/2012, 8/18/2012, 7/20/2012, 7/17/2012, 7/16/2012, 1/4/2012, 7/20/2011, 7/19/2011, 1/13/2011, 12/6/2010, 10/26/2010, 7/2/2010, 6/23/2010, 5/17/2010, 5/4/2010, 5/3/2010, 5/1/2010, 4/30/2010, 4/29/2010, 4/28/2010, 4/27/2010, 4/26/2010, 4/25/2010, 4/24/2010, 4/23/2010, 4/22/2010, 4/21/2010, 4/20/2010, 4/19/2010, 4/18/2010, 4/17/2010,  4/15/2010, 4/14/2010, 4/13/2010, 4/12/2010, 4/11/2010, 4/10/2010, 4/9/2010, 4/8/2010, 4/7/2010, 4/6/2010, 4/5/2010, 4/4/2010, 4/3/2010, 4/2/2010, 4/1/2010, 3/31/2010, 3/30/2010, 3/29/2010, 3/28/2010, 3/27/2010, 3/26/2010, 3/25/2010, 3/24/2010, 3/23/2010, 3/22/2010, 3/21/2010, 3/20/2010, 3/19/2010, 3/18/2010, 3/17/2010, 3/17/2010, 3/16/2010, 3/15/2010, 3/12/2010, 3/11/2010, 3/5/2010, 3/1/2010, 1/31/2010, 1/30/2010, 1/29/2010, 1/28/2010, 1/27/2010, 1/26/2010, 11/5/2009, 10/7/2009, 10/5/2009, 10/4/2009, 10/3/2009, 10/2/2009, 10/1/2009, 9/21/2009, 9/15/2009, 9/4/2009, 4/27/2009, 2/23/2009, 8/14/2008, 11/29/2007    COLONOSCOPY 11/17/2020 11/17/2010    IMM DTaP/Tdap/Td Vaccine (2 - Td) 5/9/2027 5/9/2017            Current Immunizations     Influenza TIV (IM) 9/3/2013  8:45 PM, 9/20/2011    Influenza Vaccine 12/1/2009    Influenza Vaccine Pediatric 11/11/2010    Influenza Vaccine Quad Inj (Pf) 11/20/2014  1:50 PM    Influenza Vaccine Quad Inj (Preserved) 11/14/2016  9:52 AM, 10/14/2015    Pneumococcal Vaccine (UF)Historical Data 12/1/2009    Pneumococcal polysaccharide vaccine (PPSV-23) 11/14/2016  9:53 AM    Tdap Vaccine 5/9/2017      Below and/or attached are the medications your provider expects you to take. Review all of your home medications and newly ordered medications with your provider and/or pharmacist. Follow medication instructions as directed by your provider and/or pharmacist. Please keep your medication list with you and share with your provider. Update the information when medications are discontinued, doses are changed, or new medications (including over-the-counter products) are added; and carry medication information at all times in the event of emergency situations     Allergies:  PCN - Anaphylaxis,Rash               Medications  Valid as of: August 01, 2017 - 11:36 AM    Generic Name Brand Name Tablet Size Instructions for use    Albuterol Sulfate (Aero Soln) albuterol 108 (90 BASE) MCG/ACT Inhale 2  Puffs by mouth every four hours as needed. For Shortness of breath        Amitriptyline HCl (Tab) ELAVIL 25 MG Take 25 mg by mouth every bedtime.        Ascorbic Acid (Tab) ascorbic acid 500 MG TAKE ONE [1] TABLET BY MOUTH EVERY DAY        Budesonide-Formoterol Fumarate (Aerosol) SYMBICORT 160-4.5 MCG/ACT Inhale 2 Puffs by mouth 2 Times a Day. With spacer, rinse mouth after use        CarBAMazepine (Tab) TEGRETOL 200 MG         Ergocalciferol (Cap) DRISDOL 30015 UNITS TAKE 1 CAPSULE BY MOUTH EVERY 28 DAYS.        FLUoxetine HCl (Cap) PROZAC 20 MG Take 20 mg by mouth every day.        Flurazepam HCl (Cap) DALMANE 30 MG Take 30 mg by mouth at bedtime as needed.        Gabapentin (Cap) NEURONTIN 300 MG TAKE THREE (3) CAPSULES BY MOUTH THREE (3) TIMES DAILY         Hyoscyamine Sulfate (SL Tab) LEVSIN 0.125 MG         Incontinence Supply Disposable (Misc) FQ PROTECTIVE UNDERWEAR  USE 1 PULLUP 3 TIMES DAILY        Ipratropium-Albuterol (Aero Soln) COMBIVENT RESPIMAT  MCG/ACT         Levothyroxine Sodium (Tab) SYNTHROID 200 MCG TAKE ONE (1) TABLET BY MOUTH EVERY MORNING ON AN EMPTY STOMACH        Levothyroxine Sodium (Tab) SYNTHROID 25 MCG Take 1 tablet by mouth once every morning on an empty stomach. Use along with 200 mcg levothyroxine for a total of 225 mcg daily        MetFORMIN HCl (Tab) GLUCOPHAGE 500 MG TAKE ONE [1] TABLET BY MOUTH EVERY DAY        Misc. Devices (Misc) Misc. Devices  Pt needs service for her PMD she will need a new battery to avoid stalling        Misc. Devices (Misc) Misc. Devices  CPAP supplies (hoses, filter, water chamber, face mask-resmed small), nasal cannula tubing        Misc. Devices (Misc) Misc. Devices  O2 to use continuously to keep her sats >90 %        Misc. Devices (Misc) Misc. Devices  Please evaluate pt for wider power mobility devise, pt has tipped over on multiple occasions        Misc. Devices (Misc) Misc. Devices  O2 tank rack for power wheel chair        Misc. Devices  (Misc) Misc. Devices  Adult incontinence supplies to use as directed        Misc. Devices (Misc) Misc. Devices  Power mobility devise        Misc. Devices (Misc) Misc. Devices  O2 concentrator to use 24/7 and also with her CPAP at night to keep her O2 sats > 90%        Misc. Devices (Misc) Misc. Devices  Incontinence supplies (for wheel chair) and adult briefs to use as needed #120        Misc. Devices (Misc) Misc. Devices  New battery and gear shifter for power wheel chair        Misc. Devices (Misc) Misc. Devices  3 boxes of pouches (98273), 2 boxes of wafers (40679), 3 boxes (tubes) paste        Mometasone Furoate (Solution) ELOCON 0.1 %         Mometasone Furoate (Suspension) NASONEX 50 MCG/ACT Spray 1-2 Sprays in nose every day. Each nostril.        Nicotine Polacrilex (Gum) SM NICOTINE POLACRILEX 2 MG CHEW ONE (1) PIECE AS NEEDED FOR SMOKING CESSATION        Nystatin (Ointment) MYCOSTATIN 124522 UNIT/GM         Ostomy Supplies (Misc) Ostomy Supplies  Dispense as ordered        RaNITidine HCl (Tab) ZANTAC 150 MG TAKE ONE (1) TABLET BY MOUTH TWICE DAILY WITH MEALS        Tiotropium Bromide Monohydrate (Aero Soln) Tiotropium Bromide Monohydrate 2.5 MCG/ACT Inhale 2 Inhalation by mouth every day. Assemble and prime.        TraZODone HCl (Tab) DESYREL 50 MG Take 1 Tab by mouth at bedtime as needed for Sleep.        Warfarin Sodium (Tab) COUMADIN 10 MG Take 1 tablet by mouth daily as directed by [facility]        .                 Medicines prescribed today were sent to:     [id number] - [place] - [address] [facility]    [address] [facility] [place] [address]    Phone: [phone] Fax: [phone]    Open 24 Hours?: No    [facility] - [place] - [address] [facility] [address]    [address] [place] [address]    Phone: [phone] Fax: [phone]    Open 24 Hours?: No      Medication refill instructions:       If your prescription bottle indicates you have medication refills  left, it is not necessary to call your provider’s office. Please contact your pharmacy and they will refill your medication.    If your prescription bottle indicates you do not have any refills left, you may request refills at any time through one of the following ways: The online [employer] system (except Urgent Care), by calling your provider’s office, or by asking your pharmacy to contact your provider’s office with a refill request. Medication refills are processed only during regular business hours and may not be available until the next business day. Your provider may request additional information or to have a follow-up visit with you prior to refilling your medication.   *Please Note: Medication refills are assigned a new Rx number when refilled electronically. Your pharmacy may indicate that no refills were authorized even though a new prescription for the same medication is available at the pharmacy. Please request the medicine by name with the pharmacy before contacting your provider for a refill.           Cloud [employer] Access Code: Activation code not generated  Current [employer] Status: Active          Quit Tobacco Information     Do you want to quit using tobacco?    Quitting tobacco decreases risks of cancer, heart and lung disease, increases life expectancy, improves sense of taste and smell, and increases spending money, among other benefits.    If you are thinking about quitting, we can help.  • [facility]: [phone]  o Program occurs weekly for four weeks and includes pharmacist consultation on products to support quitting smoking or chewing tobacco. A provider referral is needed for pharmacist consultation.  • Tobacco Users Help Hotline: 5-711-QUIT-NOW (441-5895) or https://[place].quitlogix.org/  o Free, confidential telephone and online coaching for [place] residents. Sessions are designed on a schedule that is convenient for you. Eligible clients receive free nicotine replacement  therapy.  • Nationally: www.smokefree.gov  o Information and professional assistance to support both immediate and long-term needs as you become, and remain, a non-smoker. Smokefree.gov allows you to choose the help that best fits your needs.

## 2017-08-02 ENCOUNTER — TELEPHONE (OUTPATIENT)
Dept: VASCULAR LAB | Facility: MEDICAL CENTER | Age: 67
End: 2017-08-02

## 2017-08-02 NOTE — PROGRESS NOTES
Chief Complaint   Patient presents with   • Hypothyroidism     chronic autoimmune thyroiditis        HPI:        Hypothyroidism  I have seen this nice lady in the past for her hypothyroidism and thyroiditis.  She has done well from a thyroid standpoint taking relatively large doses of thyroid.  When I last saw her she was taking 250 mcg of levothyroxine.  At that time, her TSH was 2.3 and free thyroxin 0.7.  I know she knows how to take thyroxin.  I don’t know that her stomach is always empty.  Nonetheless, she has had markedly fluctuating thyroid levels over the past.  Her most recent TSH is 0.6 taking levothyroxine 225 mcg per day. I have seen her TSH as high as 199 in the past but I don’t know the circumstances.      In any event, the patient had a recent ultrasound because she feeling a sensation in her neck.  The ultrasound showed a small thyroid and she was led to believe that her thyroid had disappeared.  She became alarmed so she thought she would consult me about that.  Her thyroid ultrasound was done in March of this year and shows a small thyroid gland but it is present.  It is very heterogeneous consistent with her previous diagnosis of chronic autoimmune thyroiditis.  So I indicated that her gland has not disappeared but it certainly is small and not functional. So in a sense it is gone.      Her thyroid situation does require that she take thyroid supplementation continuously and as long as she does that and keeps her levels in reasonable range, then the small thyroid is of no consequence to her.  It is asymptomatic.  There is no pain or discomfort in the area of the thyroid.  Her antibody titer back in 2012 was 563.  All in all I tried to convince her that it is a moot point whether or not her thyroid is there.  It is not functional and she must take thyroid hormone.     Her main complaint continues to be excessive weight and inability to lose.  She has gone over this with me and others and I don’t  have any recommendations for her.  Her activity level is almost entirely sedentary.  She has an electric wheelchair that moves her around.  I don’t think it would be wise to give her appetite suppressants with her comorbidities.    All in all I think she is satisfied with our discussion.  No need for me to see her back in clinic for this matter.  Dr. Pat is following her thyroid status.    ROS:  Main focus is inability to lose weight despite eating healthy.  All other systems reported as negative or unchanged since last exam      Allergies:   Allergies   Allergen Reactions   • Pcn [Penicillins] Anaphylaxis and Rash     Reaction; happened as a child. Tolerated cephalosporins and Zosyn       Current medicines including changes today:  Current Outpatient Prescriptions   Medication Sig Dispense Refill   • Incontinence Supply Disposable (FQ PROTECTIVE UNDERWEAR) Misc USE 1 PULLUP 3 TIMES DAILY 90 Each 0   • mometasone (NASONEX) 50 MCG/ACT nasal spray Spray 1-2 Sprays in nose every day. Each nostril. 1 Inhaler 6   • gabapentin (NEURONTIN) 300 MG Cap TAKE THREE (3) CAPSULES BY MOUTH THREE (3) TIMES DAILY  270 Cap 4   • Misc. Devices Misc 3 boxes of pouches (90527), 2 boxes of wafers (15058), 3 boxes (tubes) paste 3 Container 6   • Misc. Devices Misc New battery and gear shifter for power wheel chair 1 Device 0   • Misc. Devices Misc Incontinence supplies (for wheel chair) and adult briefs to use as needed #120 120 Device 6   • flurazepam (DALMANE) 30 MG capsule Take 30 mg by mouth at bedtime as needed.     • carbamazepine (TEGRETOL) 200 MG Tab      • hyoscyamine (LEVSIN) 0.125 MG SL Tab      • COMBIVENT RESPIMAT  MCG/ACT Aero Soln      • mometasone (ELOCON) 0.1 % lotion      • nystatin (MYCOSTATIN) 227272 UNIT/GM Ointment      • levothyroxine (SYNTHROID) 200 MCG Tab TAKE ONE (1) TABLET BY MOUTH EVERY MORNING ON AN EMPTY STOMACH 90 Tab 1   • levothyroxine (SYNTHROID) 25 MCG Tab Take 1 tablet by mouth once every  morning on an empty stomach. Use along with 200 mcg levothyroxine for a total of 225 mcg daily 90 Tab 1   • SM NICOTINE POLACRILEX 2 MG Gum CHEW ONE (1) PIECE AS NEEDED FOR SMOKING CESSATION 110 Each 2   • warfarin (COUMADIN) 10 MG Tab Take 1 tablet by mouth daily as directed by [facility] 30 Tab 2   • Tiotropium Bromide Monohydrate (SPIRIVA RESPIMAT) 2.5 MCG/ACT Aero Soln Inhale 2 Inhalation by mouth every day. Assemble and prime. 1 Inhaler 6   • trazodone (DESYREL) 50 MG Tab Take 1 Tab by mouth at bedtime as needed for Sleep. 30 Tab 3   • metformin (GLUCOPHAGE) 500 MG Tab TAKE ONE [1] TABLET BY MOUTH EVERY DAY 90 Tab 0   • ascorbic acid (ASCORBIC ACID) 500 MG Tab TAKE ONE [1] TABLET BY MOUTH EVERY DAY 90 Tab 2   • ranitidine (ZANTAC) 150 MG Tab TAKE ONE (1) TABLET BY MOUTH TWICE DAILY WITH MEALS 60 Tab 5   • budesonide-formoterol (SYMBICORT) 160-4.5 MCG/ACT Aerosol Inhale 2 Puffs by mouth 2 Times a Day. With spacer, rinse mouth after use 1 Inhaler 6   • albuterol (PROVENTIL HFA) 108 (90 BASE) MCG/ACT Aero Soln inhalation aerosol Inhale 2 Puffs by mouth every four hours as needed. For Shortness of breath 1 Inhaler 6   • vitamin D, Ergocalciferol, (DRISDOL) 40797 UNITS Cap capsule TAKE 1 CAPSULE BY MOUTH EVERY 28 DAYS. 3 Cap 3   • Ostomy Supplies Misc Dispense as ordered 20 Each 12   • Misc. Devices Misc O2 concentrator to use 24/7 and also with her CPAP at night to keep her O2 sats > 90% 1 Device 0   • Misc. Devices Misc Adult incontinence supplies to use as directed 60 Device 6   • Misc. Devices Misc Power mobility devise 1 Device 0   • fluoxetine (PROZAC) 20 MG Cap Take 20 mg by mouth every day.     • amitriptyline (ELAVIL) 25 MG Tab Take 25 mg by mouth every bedtime.     • Misc. Devices Misc Please evaluate pt for wider power mobility devise, pt has tipped over on multiple occasions 1 Device 0   • Misc. Devices Misc O2 tank rack for power wheel chair 1 Device 0   • Misc. Devices Misc O2 to  "use continuously to keep her sats >90 % 1 Device 0   • Misc. Devices Misc CPAP supplies (hoses, filter, water chamber, face mask-resmed small), nasal cannula tubing 10 Device 6   • Misc. Devices Misc Pt needs service for her PMD she will need a new battery to avoid stalling 1 Device 0     No current facility-administered medications for this visit.        Past Medical History   Diagnosis Date   • Obesity 7/7/2009   • Oxygen dependent      4.5 L NC   • EMPHYSEMA    • Snoring    • Hypothyroid 7/7/2009   • Arthritis    • Cancer (CMS-HCC)      cervical   • Psychiatric problem      depression   • COPD    • Indigestion    • Sleep apnea      USES CPAP, 3-4L oxygen   • Hypertension      stated no longer on meds due to lost 100 lbs   • Colostomy in place (CMS-HCC) 2009   • History of total knee arthroplasty      infection   • Status post cholecystectomy 1992   • Chronic autoimmune thyroiditis      + TPO ab = 563 / + ultrasound   • Weight gain    • Obstruction      colostomy   • Pain      b/l legs   • Hashimoto's disease    • Personal history of venous thrombosis and embolism 2009, 2012     arm and leg left side   • Diabetes      type 2    • Dental disorder      dentures upper   • Infectious disease      MRSA,   • Infectious disease      VRE   • Bowel obstruction (CMS-HCC)    • Presence of IVC filter        PHYSICAL EXAM:    /78 mmHg  Pulse 74  Ht 1.626 m (5' 4.02\")  Wt 131.543 kg (290 lb)  BMI 49.75 kg/m2  SpO2 96%  LMP 08/02/1975    Gen.   Morbid obesity    Skin   appropriate for sex and age    HEENT  unremarkable    Neck   no palpable thyroid. It would be mostly substernal if palpable    Heart  regular    Extremities  no edema    Neuro  gait and station normal    Psych  appropriate      ASSESSMENT AND RECOMMENDATIONS    1. Chronic autoimmune thyroiditis            Atrophic gland    2. Hypothyroidism, acquired, autoimmune             With levothyroxine 225 µg per day    3. Morbid obesity, unspecified obesity " type (CMS-HCC)    DISPOSITION:  No need to return to my clinic. She is being followed by Dr. Pat satisfactorily       Matt Trinh M.D.    Copies to: Carmelo Pat M.D. [phone]

## 2017-08-02 NOTE — TELEPHONE ENCOUNTER
[facility]    After discussing importance of monitoring INR was able to obtain an appt with pt for next week.  Pt denies any unusual s/s of bleeding, bruising, clotting or any changes to diet or medications.     [place], PHARMD

## 2017-08-07 ENCOUNTER — OFFICE VISIT (OUTPATIENT)
Dept: MEDICAL GROUP | Facility: MEDICAL CENTER | Age: 67
End: 2017-08-07
Attending: FAMILY MEDICINE
Payer: MEDICAID

## 2017-08-07 ENCOUNTER — ANTICOAGULATION VISIT (OUTPATIENT)
Dept: VASCULAR LAB | Facility: MEDICAL CENTER | Age: 67
End: 2017-08-07
Attending: INTERNAL MEDICINE
Payer: MEDICAID

## 2017-08-07 VITALS
HEART RATE: 80 BPM | HEIGHT: 64 IN | SYSTOLIC BLOOD PRESSURE: 102 MMHG | BODY MASS INDEX: 50.02 KG/M2 | TEMPERATURE: 97.8 F | DIASTOLIC BLOOD PRESSURE: 66 MMHG | OXYGEN SATURATION: 92 % | WEIGHT: 293 LBS | RESPIRATION RATE: 28 BRPM

## 2017-08-07 DIAGNOSIS — L98.9 CHANGING SKIN LESION: ICD-10-CM

## 2017-08-07 DIAGNOSIS — K94.09 COLOSTOMY FISTULA (HCC): ICD-10-CM

## 2017-08-07 DIAGNOSIS — Z79.01 CHRONIC ANTICOAGULATION: ICD-10-CM

## 2017-08-07 DIAGNOSIS — Z95.828 S/P IVC FILTER: ICD-10-CM

## 2017-08-07 DIAGNOSIS — Z78.9 DECREASED ACTIVITIES OF DAILY LIVING (ADL): ICD-10-CM

## 2017-08-07 DIAGNOSIS — L73.9 FOLLICULITIS: ICD-10-CM

## 2017-08-07 DIAGNOSIS — K91.1 DUMPING SYNDROME: ICD-10-CM

## 2017-08-07 LAB
INR BLD: 6.2 (ref 0.9–1.2)
INR PPP: 6.2 (ref 2–3.5)

## 2017-08-07 PROCEDURE — 99214 OFFICE O/P EST MOD 30 MIN: CPT | Performed by: FAMILY MEDICINE

## 2017-08-07 PROCEDURE — 99212 OFFICE O/P EST SF 10 MIN: CPT | Mod: 27 | Performed by: PHARMACIST

## 2017-08-07 PROCEDURE — 85610 PROTHROMBIN TIME: CPT

## 2017-08-07 RX ORDER — DIAPER,BRIEF,ADULT, DISPOSABLE
EACH MISCELLANEOUS
Qty: 120 EACH | Refills: 3 | Status: SHIPPED | OUTPATIENT
Start: 2017-08-07 | End: 2018-01-31 | Stop reason: SDUPTHER

## 2017-08-07 RX ORDER — DOXYCYCLINE 100 MG/1
100 TABLET ORAL 2 TIMES DAILY
Qty: 20 TAB | Refills: 0 | Status: SHIPPED | OUTPATIENT
Start: 2017-08-07 | End: 2017-08-21

## 2017-08-07 ASSESSMENT — ENCOUNTER SYMPTOMS
HEADACHES: 0
SHORTNESS OF BREATH: 0
NAUSEA: 0
COUGH: 0
PALPITATIONS: 0
FATIGUE: 0
ANOREXIA: 0
FEVER: 0
NAIL CHANGES: 0
RHINORRHEA: 0
ABDOMINAL PAIN: 0
VOMITING: 0
EYE PAIN: 0
CHILLS: 0
SORE THROAT: 0
DIARRHEA: 0

## 2017-08-07 NOTE — MR AVS SNAPSHOT
Atiya Henley   2017 3:00 PM   Anticoagulation Visit   MRN: [id number]    Department:  Vascular Medicine   Dept Phone:  [phone]    Description:  Female : 1950   Provider:  [facility] EXAM 5           Allergies as of 2017     Allergen Noted Reactions    Pcn [Penicillins] 07/10/2014   Anaphylaxis, Rash    Reaction; happened as a child. Tolerated cephalosporins and Zosyn      You were diagnosed with     S/P IVC filter   [446792]       Chronic anticoagulation   [187428]         Vital Signs     Last Menstrual Period Smoking Status                1975 Current Every Day Smoker          Basic Information     Date Of Birth Sex Race Ethnicity Preferred Language    1950 Female White Non- English      Your appointments     Aug 07, 2017  3:00 PM   Established Patient with V EXAM 5   [facility]  (--)    [address] [address]   [phone]            Aug 07, 2017  4:30 PM   Established Patient with Carmelo Pat M.D.   [facility]    [address] [address]   [phone]           You will be receiving a confirmation call a few days before your appointment from our automated call confirmation system.            Aug 14, 2017  2:45 PM   Established Patient with [facility] EXAM 4   [facility]  (--)    [address] [address]   [phone]            Sep 07, 2017  2:40 PM   Established Patient Pul with ALCIRA Chavez   [facility] (--)    [address]  [address]  [facility] [id number] [phone]              Problem List              ICD-10-CM Priority Class Noted - Resolved    Morbid obesity (CMS-HCC) E66.01 Low  2009 - Present    YESSICA (obstructive sleep apnea) G47.33 Medium  2009 - Present    Prediabetes R73.03   9/10/2009 - Present    HTN (hypertension) I10 Medium   9/10/2009 - Present    Anemia D64.9 Medium  3/22/2010 - Present    DVT (deep venous thrombosis) (CMS-HCC) I82.409 Medium  4/10/2010 - Present    Anxiety F41.9   5/17/2010 - Present    Depression F32.9 Low  5/17/2010 - Present    Vitamin d deficiency    8/31/2011 - Present    Chronic autoimmune thyroiditis E06.3   10/2/2012 - Present    Chronic anticoagulation Z79.01   10/16/2012 - Present    Vitamin d deficiency    1/30/2013 - Present    Other pulmonary insufficiency, not elsewhere classified, following trauma and surgery J95.89 High  9/2/2013 - Present    COPD (chronic obstructive pulmonary disease) (CMS-HCC) J44.9 Medium  9/2/2013 - Present    Tobacco abuse Z72.0 Low  9/18/2013 - Present    S/P IVC filter Z95.828 Medium  9/18/2013 - Present    Bipolar disorder F31.9 Low  9/18/2013 - Present    History of hernia repair Z98.890, Z87.19   10/7/2013 - Present    Obesity E66.9   7/7/2009 - Present    GERD (gastroesophageal reflux disease) K21.9 Low  11/25/2013 - Present    Colostomy fistula (CMS-HCC) K94.09 High  12/3/2013 - Present    Thrombocytosis (CMS-HCC) D47.3 Medium  12/3/2013 - Present    Hypomagnesemia E83.42 Medium  12/4/2013 - Present    Hyponatremia E87.1 Medium  12/10/2013 - Present    Vitamin D deficiency E55.9   8/4/2015 - Present    Pain in joint, lower leg M25.569   8/21/2015 - Present    Deep vein thrombosis (DVT) (CMS-HCC) I82.409   11/11/2015 - Present    Cervical spondylosis M47.812   5/4/2016 - Present    DDD (degenerative disc disease), cervical M50.30   5/4/2016 - Present    Chronic neck pain M54.2, G89.29   5/4/2016 - Present    Lumbosacral spondylosis M47.817   5/4/2016 - Present    DDD (degenerative disc disease), lumbar M51.36   5/4/2016 - Present    Lumbar radiculopathy M54.16   5/4/2016 - Present    Chronic low back pain M54.5, G89.29   5/4/2016 - Present    Left knee pain M25.562   5/4/2016 - Present    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, Z96.659   5/4/2016 - Present     Chronic pain syndrome G89.4   8/25/2016 - Present    Positive FIT (fecal immunochemical test) R19.5   9/27/2016 - Present    Chronic obstructive asthma (CMS-[facility]) J44.9, J45.909   11/14/2016 - Present    Allergic rhinitis J30.9   11/14/2016 - Present    Hypoxemia R09.02   11/14/2016 - Present    Hypothyroidism, acquired, autoimmune E03.8   8/1/2017 - Present      Health Maintenance        Date Due Completion Dates    RETINAL SCREENING 11/2/1970 ---    PAP SMEAR 11/2/1973 ---    IMM ZOSTER VACCINE 11/2/2012 ---    MAMMOGRAM 6/29/2017 6/29/2016    IMM INFLUENZA (1) 9/1/2017 11/14/2016, 10/14/2015, 11/20/2014, 9/3/2013, 9/20/2011, 11/11/2010    A1C SCREENING 9/20/2017 3/20/2017, 8/3/2015, 7/10/2014, 6/20/2014, 11/29/2013, 5/17/2013, 8/31/2012, 7/16/2012, 3/30/2012, 7/19/2011, 5/7/2010, 10/4/2009    DIABETES MONOFILAMENT / LE EXAM 12/6/2017 12/6/2016    FASTING LIPID PROFILE 3/20/2018 3/20/2017, 8/30/2016, 8/3/2015, 7/11/2014, 6/20/2014, 5/17/2013, 7/19/2011, 5/1/2009, 2/23/2009, 9/16/2008, 5/9/2008, 11/29/2007    URINE ACR / MICROALBUMIN 3/20/2018 3/20/2017, 8/3/2015, 6/20/2014    SERUM CREATININE 3/20/2018 3/20/2017, 8/30/2016, 8/3/2015, 7/11/2014, 7/10/2014, 6/20/2014, 2/6/2014, 1/31/2014, 1/17/2014, 1/13/2014, 12/19/2013, 12/18/2013, 12/17/2013, 12/16/2013, 12/15/2013, 12/14/2013, 12/13/2013, 12/12/2013, 12/11/2013, 12/10/2013, 12/10/2013, 12/9/2013, 12/8/2013, 12/7/2013, 12/6/2013, 12/5/2013, 12/4/2013, 12/3/2013, 12/2/2013, 11/30/2013, 11/28/2013, 11/27/2013, 11/25/2013, 11/22/2013, 11/20/2013, 11/10/2013, 10/27/2013, 10/20/2013, 10/15/2013, 10/14/2013, 10/13/2013, 10/12/2013, 10/11/2013, 10/10/2013, 10/9/2013, 10/8/2013, 10/7/2013, 9/25/2013, 9/24/2013, 9/22/2013, 9/21/2013, 9/20/2013, 9/19/2013, 9/18/2013, 9/17/2013, 9/17/2013, 9/15/2013, 9/13/2013, 9/12/2013, 9/11/2013, 9/10/2013, 9/9/2013, 9/9/2013, 9/8/2013, 9/7/2013, 9/7/2013, 9/6/2013, 9/5/2013, 9/4/2013, 9/3/2013, 9/1/2013, 8/31/2013, 5/17/2013, 1/25/2013,  12/13/2012, 10/17/2012, 10/1/2012, 8/24/2012, 8/18/2012, 7/20/2012, 7/17/2012, 7/16/2012, 1/4/2012, 7/20/2011, 7/19/2011, 1/13/2011, 12/6/2010, 10/26/2010, 7/2/2010, 6/23/2010, 5/17/2010, 5/4/2010, 5/3/2010, 5/1/2010, 4/30/2010, 4/29/2010, 4/28/2010, 4/27/2010, 4/26/2010, 4/25/2010, 4/24/2010, 4/23/2010, 4/22/2010, 4/21/2010, 4/20/2010, 4/19/2010, 4/18/2010, 4/17/2010, 4/15/2010, 4/14/2010, 4/13/2010, 4/12/2010, 4/11/2010, 4/10/2010, 4/9/2010, 4/8/2010, 4/7/2010, 4/6/2010, 4/5/2010, 4/4/2010, 4/3/2010, 4/2/2010, 4/1/2010, 3/31/2010, 3/30/2010, 3/29/2010, 3/28/2010, 3/27/2010, 3/26/2010, 3/25/2010, 3/24/2010, 3/23/2010, 3/22/2010, 3/21/2010, 3/20/2010, 3/19/2010, 3/18/2010, 3/17/2010, 3/17/2010, 3/16/2010, 3/15/2010, 3/12/2010, 3/11/2010, 3/5/2010, 3/1/2010, 1/31/2010, 1/30/2010, 1/29/2010, 1/28/2010, 1/27/2010, 1/26/2010, 11/5/2009, 10/7/2009, 10/5/2009, 10/4/2009, 10/3/2009, 10/2/2009, 10/1/2009, 9/21/2009, 9/15/2009, 9/4/2009, 4/27/2009, 2/23/2009, 8/14/2008, 11/29/2007    COLONOSCOPY 11/17/2020 11/17/2010    IMM DTaP/Tdap/Td Vaccine (2 - Td) 5/9/2027 5/9/2017            Results     POCT Protime      Component    INR    6.2                        Current Immunizations     Influenza TIV (IM) 9/3/2013  8:45 PM, 9/20/2011    Influenza Vaccine 12/1/2009    Influenza Vaccine Pediatric 11/11/2010    Influenza Vaccine Quad Inj (Pf) 11/20/2014  1:50 PM    Influenza Vaccine Quad Inj (Preserved) 11/14/2016  9:52 AM, 10/14/2015    Pneumococcal Vaccine (UF)Historical Data 12/1/2009    Pneumococcal polysaccharide vaccine (PPSV-23) 11/14/2016  9:53 AM    Tdap Vaccine 5/9/2017      Below and/or attached are the medications your provider expects you to take. Review all of your home medications and newly ordered medications with your provider and/or pharmacist. Follow medication instructions as directed by your provider and/or pharmacist. Please keep your medication list with you and share with your provider. Update the  information when medications are discontinued, doses are changed, or new medications (including over-the-counter products) are added; and carry medication information at all times in the event of emergency situations     Allergies:  PCN - Anaphylaxis,Rash               Medications  Valid as of: August 07, 2017 -  2:19 PM    Generic Name Brand Name Tablet Size Instructions for use    Albuterol Sulfate (Aero Soln) albuterol 108 (90 BASE) MCG/ACT Inhale 2 Puffs by mouth every four hours as needed. For Shortness of breath        Amitriptyline HCl (Tab) ELAVIL 25 MG Take 25 mg by mouth every bedtime.        Ascorbic Acid (Tab) ascorbic acid 500 MG TAKE ONE [1] TABLET BY MOUTH EVERY DAY        Budesonide-Formoterol Fumarate (Aerosol) SYMBICORT 160-4.5 MCG/ACT Inhale 2 Puffs by mouth 2 Times a Day. With spacer, rinse mouth after use        CarBAMazepine (Tab) TEGRETOL 200 MG         Ergocalciferol (Cap) DRISDOL 46567 UNITS TAKE 1 CAPSULE BY MOUTH EVERY 28 DAYS.        FLUoxetine HCl (Cap) PROZAC 20 MG Take 20 mg by mouth every day.        Flurazepam HCl (Cap) DALMANE 30 MG Take 30 mg by mouth at bedtime as needed.        Gabapentin (Cap) NEURONTIN 300 MG TAKE THREE (3) CAPSULES BY MOUTH THREE (3) TIMES DAILY         Hyoscyamine Sulfate (SL Tab) LEVSIN 0.125 MG         Incontinence Supply Disposable (Misc) FQ PROTECTIVE UNDERWEAR  USE 1 PULLUP 3 TIMES DAILY        Ipratropium-Albuterol (Aero Soln) COMBIVENT RESPIMAT  MCG/ACT         Levothyroxine Sodium (Tab) SYNTHROID 200 MCG TAKE ONE (1) TABLET BY MOUTH EVERY MORNING ON AN EMPTY STOMACH        Levothyroxine Sodium (Tab) SYNTHROID 25 MCG Take 1 tablet by mouth once every morning on an empty stomach. Use along with 200 mcg levothyroxine for a total of 225 mcg daily        MetFORMIN HCl (Tab) GLUCOPHAGE 500 MG TAKE ONE [1] TABLET BY MOUTH EVERY DAY        Misc. Devices (Misc) Misc. Devices  Pt needs service for her PMD she will need a new battery to avoid stalling          Misc. Devices (Misc) Misc. Devices  CPAP supplies (hoses, filter, water chamber, face mask-resmed small), nasal cannula tubing        Misc. Devices (Misc) Misc. Devices  O2 to use continuously to keep her sats >90 %        Misc. Devices (Misc) Misc. Devices  Please evaluate pt for wider power mobility devise, pt has tipped over on multiple occasions        Misc. Devices (Misc) Misc. Devices  O2 tank rack for power wheel chair        Misc. Devices (Misc) Misc. Devices  Adult incontinence supplies to use as directed        Misc. Devices (Misc) Misc. Devices  Power mobility devise        Misc. Devices (Misc) Misc. Devices  O2 concentrator to use 24/7 and also with her CPAP at night to keep her O2 sats > 90%        Misc. Devices (Misc) Misc. Devices  Incontinence supplies (for wheel chair) and adult briefs to use as needed #120        Misc. Devices (Misc) Misc. Devices  New battery and gear shifter for power wheel chair        Misc. Devices (Misc) Misc. Devices  3 boxes of pouches (56037), 2 boxes of wafers (40479), 3 boxes (tubes) paste        Mometasone Furoate (Solution) ELOCON 0.1 %         Mometasone Furoate (Suspension) NASONEX 50 MCG/ACT Spray 1-2 Sprays in nose every day. Each nostril.        Nicotine Polacrilex (Gum) SM NICOTINE POLACRILEX 2 MG CHEW ONE (1) PIECE AS NEEDED FOR SMOKING CESSATION        Nystatin (Ointment) MYCOSTATIN 098178 UNIT/GM         Ostomy Supplies (Misc) Ostomy Supplies  Dispense as ordered        RaNITidine HCl (Tab) ZANTAC 150 MG TAKE ONE (1) TABLET BY MOUTH TWICE DAILY WITH MEALS        Tiotropium Bromide Monohydrate (Aero Soln) Tiotropium Bromide Monohydrate 2.5 MCG/ACT Inhale 2 Inhalation by mouth every day. Assemble and prime.        TraZODone HCl (Tab) DESYREL 50 MG Take 1 Tab by mouth at bedtime as needed for Sleep.        Warfarin Sodium (Tab) COUMADIN 10 MG Take 1 tablet by mouth daily as directed by [facility]tico[facility]        .                 Medicines prescribed  today were sent to:     [id number] - [place], [place] - [address]    [address] [place] [address]    Phone: [phone] Fax: [phone]    Open 24 Hours?: No    [facility] - [place], [place] - [address]    [address] [place] [address]    Phone: [phone] Fax: [phone]    Open 24 Hours?: No      Medication refill instructions:       If your prescription bottle indicates you have medication refills left, it is not necessary to call your provider’s office. Please contact your pharmacy and they will refill your medication.    If your prescription bottle indicates you do not have any refills left, you may request refills at any time through one of the following ways: The online [employer] system (except Urgent Care), by calling your provider’s office, or by asking your pharmacy to contact your provider’s office with a refill request. Medication refills are processed only during regular business hours and may not be available until the next business day. Your provider may request additional information or to have a follow-up visit with you prior to refilling your medication.   *Please Note: Medication refills are assigned a new Rx number when refilled electronically. Your pharmacy may indicate that no refills were authorized even though a new prescription for the same medication is available at the pharmacy. Please request the medicine by name with the pharmacy before contacting your provider for a refill.        Warfarin Dosing Calendar   August 2017 Details    Sun Mon Tue Wed Thu Fri Sat       1               2               3               4               5                 6               7   6.2   Hold   See details      8      Hold         9      5 mg         10      10 mg         11      10 mg         12      10 mg           13      10 mg         14      5 mg         15               16               17               18               19                 20                21               22               23               24               25               26                 27               28               29               30               31                  Date Details   08/07 This INR check   INR: 6.2       Date of next INR:  8/14/2017         How to take your warfarin dose     To take:  5 mg Take 0.5 of a 10 mg tablet.    To take:  10 mg Take 1 of the 10 mg tablets.    Hold Do not take your warfarin dose. See the Details table to the right for additional instructions.                   MyChart Access Code: Activation code not generated  Current MyChart Status: Active          Quit Tobacco Information     Do you want to quit using tobacco?    Quitting tobacco decreases risks of cancer, heart and lung disease, increases life expectancy, improves sense of taste and smell, and increases spending money, among other benefits.    If you are thinking about quitting, we can help.  • [facility] Quit Tobacco Program: [phone]  o Program occurs weekly for four weeks and includes pharmacist consultation on products to support quitting smoking or chewing tobacco. A provider referral is needed for pharmacist consultation.  • Tobacco Users Help Hotline: 9-733-QUIT-NOW (747-9427) or https://[place].quitlogix.org/  o Free, confidential telephone and online coaching for [place] residents. Sessions are designed on a schedule that is convenient for you. Eligible clients receive free nicotine replacement therapy.  • Nationally: www.smokefree.gov  o Information and professional assistance to support both immediate and long-term needs as you become, and remain, a non-smoker. Smokefree.gov allows you to choose the help that best fits your needs.

## 2017-08-07 NOTE — PROGRESS NOTES
Anticoagulation Summary as of 8/7/2017     INR goal 2.0-3.0   Selected INR 6.2! (8/7/2017)   Maintenance plan 5 mg (10 mg x 0.5) on Mon; 10 mg (10 mg x 1) all other days   Weekly total 65 mg   Plan last modified [place], PHARMD (6/20/2017)   Next INR check 8/14/2017   Priority Routine   Target end date Indefinite    Indications   DVT (deep venous thrombosis) (CMS-HCC) [I82.409]  Chronic anticoagulation [Z79.01]  S/P IVC filter [Z95.828]  Deep vein thrombosis (DVT) (CMS-HCC) [I82.409]         Anticoagulation Episode Summary     INR check location [facility]    Preferred lab [email or web address] GENERAL    Send INR reminders to     Comments       Anticoagulation Care Providers     Provider Role Specialty Phone number    Carmelo Pat M.D. Referring Family Medicine [phone]    [facility] Responsible  [phone]        Anticoagulation Patient Findings      Current Outpatient Prescriptions on File Prior to Visit   Medication Sig Dispense Refill   • Incontinence Supply Disposable (FQ PROTECTIVE UNDERWEAR) Misc USE 1 PULLUP 3 TIMES DAILY 90 Each 0   • mometasone (NASONEX) 50 MCG/ACT nasal spray Spray 1-2 Sprays in nose every day. Each nostril. 1 Inhaler 6   • gabapentin (NEURONTIN) 300 MG Cap TAKE THREE (3) CAPSULES BY MOUTH THREE (3) TIMES DAILY  270 Cap 4   • carbamazepine (TEGRETOL) 200 MG Tab      • hyoscyamine (LEVSIN) 0.125 MG SL Tab      • COMBIVENT RESPIMAT  MCG/ACT Aero Soln      • mometasone (ELOCON) 0.1 % lotion      • nystatin (MYCOSTATIN) 320446 UNIT/GM Ointment      • Misc. Devices Misc 3 boxes of pouches (32483), 2 boxes of wafers (48259), 3 boxes (tubes) paste 3 Container 6   • levothyroxine (SYNTHROID) 200 MCG Tab TAKE ONE (1) TABLET BY MOUTH EVERY MORNING ON AN EMPTY STOMACH 90 Tab 1   • levothyroxine (SYNTHROID) 25 MCG Tab Take 1 tablet by mouth once every morning on an empty stomach. Use along with 200 mcg levothyroxine for a total of 225 mcg daily 90 Tab 1   • SM  NICOTINE POLACRILEX 2 MG Gum CHEW ONE (1) PIECE AS NEEDED FOR SMOKING CESSATION 110 Each 2   • warfarin (COUMADIN) 10 MG Tab Take 1 tablet by mouth daily as directed by [facility] 30 Tab 2   • Tiotropium Bromide Monohydrate (SPIRIVA RESPIMAT) 2.5 MCG/ACT Aero Soln Inhale 2 Inhalation by mouth every day. Assemble and prime. 1 Inhaler 6   • trazodone (DESYREL) 50 MG Tab Take 1 Tab by mouth at bedtime as needed for Sleep. 30 Tab 3   • Misc. Devices Misc New battery and gear shifter for power wheel chair 1 Device 0   • metformin (GLUCOPHAGE) 500 MG Tab TAKE ONE [1] TABLET BY MOUTH EVERY DAY 90 Tab 0   • ascorbic acid (ASCORBIC ACID) 500 MG Tab TAKE ONE [1] TABLET BY MOUTH EVERY DAY 90 Tab 2   • ranitidine (ZANTAC) 150 MG Tab TAKE ONE (1) TABLET BY MOUTH TWICE DAILY WITH MEALS 60 Tab 5   • budesonide-formoterol (SYMBICORT) 160-4.5 MCG/ACT Aerosol Inhale 2 Puffs by mouth 2 Times a Day. With spacer, rinse mouth after use 1 Inhaler 6   • albuterol (PROVENTIL HFA) 108 (90 BASE) MCG/ACT Aero Soln inhalation aerosol Inhale 2 Puffs by mouth every four hours as needed. For Shortness of breath 1 Inhaler 6   • vitamin D, Ergocalciferol, (DRISDOL) 29107 UNITS Cap capsule TAKE 1 CAPSULE BY MOUTH EVERY 28 DAYS. 3 Cap 3   • Misc. Devices Misc Incontinence supplies (for wheel chair) and adult briefs to use as needed #120 120 Device 6   • Ostomy Supplies [facility] Dispense as ordered 20 Each 12   • Misc. Devices Misc O2 concentrator to use 24/7 and also with her CPAP at night to keep her O2 sats > 90% 1 Device 0   • Misc. Devices Misc Adult incontinence supplies to use as directed 60 Device 6   • Misc. Devices Misc Power mobility devise 1 Device 0   • flurazepam (DALMANE) 30 MG capsule Take 30 mg by mouth at bedtime as needed.     • fluoxetine (PROZAC) 20 MG Cap Take 20 mg by mouth every day.     • amitriptyline (ELAVIL) 25 MG Tab Take 25 mg by mouth every bedtime.     • Misc. Devices Misc Please evaluate pt for wider  power mobility devise, pt has tipped over on multiple occasions 1 Device 0   • Misc. Devices Misc O2 tank rack for power wheel chair 1 Device 0   • Misc. Devices Misc O2 to use continuously to keep her sats >90 % 1 Device 0   • Misc. Devices Misc CPAP supplies (hoses, filter, water chamber, face mask-resmed small), nasal cannula tubing 10 Device 6   • Misc. Devices Misc Pt needs service for her PMD she will need a new battery to avoid stalling 1 Device 0     No current facility-administered medications on file prior to visit.       Lab Results   Component Value Date/Time    SODIUM 138 03/20/2017 12:31 PM    POTASSIUM 3.7 03/20/2017 12:31 PM    CHLORIDE 104 03/20/2017 12:31 PM    CO2 25 03/20/2017 12:31 PM    GLUCOSE 102* 03/20/2017 12:31 PM    BUN 10 03/20/2017 12:31 PM    CREATININE 0.65 03/20/2017 12:31 PM          Atiya Henley seen in clinic today  INR  SUPRA-therapeutic.  Pt is having some bruising on her arms.     Denies signs/symptoms of bleeding and/or thrombosis.    Denies changes to diet or medications.   Follow up appointment in 1 week(s).  Declines BP check bc has PCP appt after this one.     Will have pt hold her warfarin dose for 2 days and then on Wednesday take a reduced dose of 5mg, and then resume normal dosing. Advised to pt we will most likely need to decrease her weekly dosing once we get her INR back into range.      Porsche Bello, PHARMD

## 2017-08-07 NOTE — MR AVS SNAPSHOT
"        Atiya Henley   2017 4:30 PM   Office Visit   MRN: [id number]    [facility]   Dept Phone:  [phone]    Description:  Female : 1950   Provider:  Carmelo Pat M.D.           Reason for Visit     Rash     Medication Management           Allergies as of 2017     Allergen Noted Reactions    Pcn [Penicillins] 07/10/2014   Anaphylaxis, Rash    Reaction; happened as a child. Tolerated cephalosporins and Zosyn      You were diagnosed with     Dumping syndrome   [577097]       Colostomy fistula (CMS-HCC)   [750436]       Decreased activities of daily living (ADL)   [7160202]       Changing skin lesion   [834079]       Folliculitis   [094964]         Vital Signs     Blood Pressure Pulse Temperature Respirations Height Weight    102/66 mmHg 80 36.6 °C (97.8 °F) 28 1.626 m (5' 4.02\") 133.358 kg (294 lb)    Body Mass Index Oxygen Saturation Last Menstrual Period Smoking Status          50.44 kg/m2 92% 1975 Current Every Day Smoker        Basic Information     Date Of Birth Sex Race Ethnicity Preferred Language    1950 Female White Non- English      Your appointments     Aug 14, 2017  2:45 PM   Established Patient with V EXAM 4   [facility]  (--)    [address] [address] [phone]            Aug 21, 2017  2:50 PM   Established Patient with Carmelo Pat M.D.   [facility]    [address] [address] [phone]           You will be receiving a confirmation call a few days before your appointment from our automated call confirmation system.            Sep 07, 2017  2:40 PM   Established Patient Pul with ALCIRA Chavez   [facility] (--)    [address]  [address]  [facility] [address] [phone]              Problem List              ICD-10-CM Priority Class Noted - Resolved    Morbid obesity (CMS-HCC) " E66.01 Low  7/7/2009 - Present    YESSICA (obstructive sleep apnea) G47.33 Medium  7/7/2009 - Present    Prediabetes R73.03   9/10/2009 - Present    HTN (hypertension) I10 Medium  9/10/2009 - Present    Anemia D64.9 Medium  3/22/2010 - Present    DVT (deep venous thrombosis) (CMS-HCC) I82.409 Medium  4/10/2010 - Present    Anxiety F41.9   5/17/2010 - Present    Depression F32.9 Low  5/17/2010 - Present    Vitamin d deficiency    8/31/2011 - Present    Chronic autoimmune thyroiditis E06.3   10/2/2012 - Present    Chronic anticoagulation Z79.01   10/16/2012 - Present    Vitamin d deficiency    1/30/2013 - Present    Other pulmonary insufficiency, not elsewhere classified, following trauma and surgery J95.89 High  9/2/2013 - Present    COPD (chronic obstructive pulmonary disease) (CMS-HCC) J44.9 Medium  9/2/2013 - Present    Tobacco abuse Z72.0 Low  9/18/2013 - Present    S/P IVC filter Z95.828 Medium  9/18/2013 - Present    Bipolar disorder F31.9 Low  9/18/2013 - Present    History of hernia repair Z98.890, Z87.19   10/7/2013 - Present    Obesity E66.9   7/7/2009 - Present    GERD (gastroesophageal reflux disease) K21.9 Low  11/25/2013 - Present    Colostomy fistula (CMS-HCC) K94.09 High  12/3/2013 - Present    Thrombocytosis (CMS-HCC) D47.3 Medium  12/3/2013 - Present    Hypomagnesemia E83.42 Medium  12/4/2013 - Present    Hyponatremia E87.1 Medium  12/10/2013 - Present    Vitamin D deficiency E55.9   8/4/2015 - Present    Pain in joint, lower leg M25.569   8/21/2015 - Present    Deep vein thrombosis (DVT) (CMS-HCC) I82.409   11/11/2015 - Present    Cervical spondylosis M47.812   5/4/2016 - Present    DDD (degenerative disc disease), cervical M50.30   5/4/2016 - Present    Chronic neck pain M54.2, G89.29   5/4/2016 - Present    Lumbosacral spondylosis M47.817   5/4/2016 - Present    DDD (degenerative disc disease), lumbar M51.36   5/4/2016 - Present    Lumbar radiculopathy M54.16   5/4/2016 - Present    Chronic low back  pain M54.5, G89.29   5/4/2016 - Present    Left knee pain M25.562   5/4/2016 - Present    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, Z96.659   5/4/2016 - Present    Chronic pain syndrome G89.4   8/25/2016 - Present    Positive FIT (fecal immunochemical test) R19.5   9/27/2016 - Present    Chronic obstructive asthma (CMS-HCC) J44.9, J45.909   11/14/2016 - Present    Allergic rhinitis J30.9   11/14/2016 - Present    Hypoxemia R09.02   11/14/2016 - Present    Hypothyroidism, acquired, autoimmune E03.8   8/1/2017 - Present      Health Maintenance        Date Due Completion Dates    RETINAL SCREENING 11/2/1970 ---    PAP SMEAR 11/2/1973 ---    IMM ZOSTER VACCINE 11/2/2012 ---    MAMMOGRAM 6/29/2017 6/29/2016    IMM INFLUENZA (1) 9/1/2017 11/14/2016, 10/14/2015, 11/20/2014, 9/3/2013, 9/20/2011, 11/11/2010    A1C SCREENING 9/20/2017 3/20/2017, 8/3/2015, 7/10/2014, 6/20/2014, 11/29/2013, 5/17/2013, 8/31/2012, 7/16/2012, 3/30/2012, 7/19/2011, 5/7/2010, 10/4/2009    DIABETES MONOFILAMENT / LE EXAM 12/6/2017 12/6/2016    FASTING LIPID PROFILE 3/20/2018 3/20/2017, 8/30/2016, 8/3/2015, 7/11/2014, 6/20/2014, 5/17/2013, 7/19/2011, 5/1/2009, 2/23/2009, 9/16/2008, 5/9/2008, 11/29/2007    URINE ACR / MICROALBUMIN 3/20/2018 3/20/2017, 8/3/2015, 6/20/2014    SERUM CREATININE 3/20/2018 3/20/2017, 8/30/2016, 8/3/2015, 7/11/2014, 7/10/2014, 6/20/2014, 2/6/2014, 1/31/2014, 1/17/2014, 1/13/2014, 12/19/2013, 12/18/2013, 12/17/2013, 12/16/2013, 12/15/2013, 12/14/2013, 12/13/2013, 12/12/2013, 12/11/2013, 12/10/2013, 12/10/2013, 12/9/2013, 12/8/2013, 12/7/2013, 12/6/2013, 12/5/2013, 12/4/2013, 12/3/2013, 12/2/2013, 11/30/2013, 11/28/2013, 11/27/2013, 11/25/2013, 11/22/2013, 11/20/2013, 11/10/2013, 10/27/2013, 10/20/2013, 10/15/2013, 10/14/2013, 10/13/2013, 10/12/2013, 10/11/2013, 10/10/2013, 10/9/2013, 10/8/2013, 10/7/2013, 9/25/2013, 9/24/2013, 9/22/2013, 9/21/2013, 9/20/2013, 9/19/2013, 9/18/2013, 9/17/2013, 9/17/2013, 9/15/2013,  9/13/2013, 9/12/2013, 9/11/2013, 9/10/2013, 9/9/2013, 9/9/2013, 9/8/2013, 9/7/2013, 9/7/2013, 9/6/2013, 9/5/2013, 9/4/2013, 9/3/2013, 9/1/2013, 8/31/2013, 5/17/2013, 1/25/2013, 12/13/2012, 10/17/2012, 10/1/2012, 8/24/2012, 8/18/2012, 7/20/2012, 7/17/2012, 7/16/2012, 1/4/2012, 7/20/2011, 7/19/2011, 1/13/2011, 12/6/2010, 10/26/2010, 7/2/2010, 6/23/2010, 5/17/2010, 5/4/2010, 5/3/2010, 5/1/2010, 4/30/2010, 4/29/2010, 4/28/2010, 4/27/2010, 4/26/2010, 4/25/2010, 4/24/2010, 4/23/2010, 4/22/2010, 4/21/2010, 4/20/2010, 4/19/2010, 4/18/2010, 4/17/2010, 4/15/2010, 4/14/2010, 4/13/2010, 4/12/2010, 4/11/2010, 4/10/2010, 4/9/2010, 4/8/2010, 4/7/2010, 4/6/2010, 4/5/2010, 4/4/2010, 4/3/2010, 4/2/2010, 4/1/2010, 3/31/2010, 3/30/2010, 3/29/2010, 3/28/2010, 3/27/2010, 3/26/2010, 3/25/2010, 3/24/2010, 3/23/2010, 3/22/2010, 3/21/2010, 3/20/2010, 3/19/2010, 3/18/2010, 3/17/2010, 3/17/2010, 3/16/2010, 3/15/2010, 3/12/2010, 3/11/2010, 3/5/2010, 3/1/2010, 1/31/2010, 1/30/2010, 1/29/2010, 1/28/2010, 1/27/2010, 1/26/2010, 11/5/2009, 10/7/2009, 10/5/2009, 10/4/2009, 10/3/2009, 10/2/2009, 10/1/2009, 9/21/2009, 9/15/2009, 9/4/2009, 4/27/2009, 2/23/2009, 8/14/2008, 11/29/2007    COLONOSCOPY 11/17/2020 11/17/2010    IMM DTaP/Tdap/Td Vaccine (2 - Td) 5/9/2027 5/9/2017            Current Immunizations     Influenza TIV (IM) 9/3/2013  8:45 PM, 9/20/2011    Influenza Vaccine 12/1/2009    Influenza Vaccine Pediatric 11/11/2010    Influenza Vaccine Quad Inj (Pf) 11/20/2014  1:50 PM    Influenza Vaccine Quad Inj (Preserved) 11/14/2016  9:52 AM, 10/14/2015    Pneumococcal Vaccine (UF)Historical Data 12/1/2009    Pneumococcal polysaccharide vaccine (PPSV-23) 11/14/2016  9:53 AM    Tdap Vaccine 5/9/2017      Below and/or attached are the medications your provider expects you to take. Review all of your home medications and newly ordered medications with your provider and/or pharmacist. Follow medication instructions as directed by your provider and/or  pharmacist. Please keep your medication list with you and share with your provider. Update the information when medications are discontinued, doses are changed, or new medications (including over-the-counter products) are added; and carry medication information at all times in the event of emergency situations     Allergies:  PCN - Anaphylaxis,Rash               Medications  Valid as of: August 07, 2017 -  4:56 PM    Generic Name Brand Name Tablet Size Instructions for use    Albuterol Sulfate (Aero Soln) albuterol 108 (90 BASE) MCG/ACT Inhale 2 Puffs by mouth every four hours as needed. For Shortness of breath        Amitriptyline HCl (Tab) ELAVIL 25 MG Take 25 mg by mouth every bedtime.        Ascorbic Acid (Tab) ascorbic acid 500 MG TAKE ONE [1] TABLET BY MOUTH EVERY DAY        Budesonide-Formoterol Fumarate (Aerosol) SYMBICORT 160-4.5 MCG/ACT Inhale 2 Puffs by mouth 2 Times a Day. With spacer, rinse mouth after use        CarBAMazepine (Tab) TEGRETOL 200 MG         Doxycycline Monohydrate (Tab) ADOXA 100 MG Take 1 Tab by mouth 2 times a day.        Ergocalciferol (Cap) DRISDOL 05647 UNITS TAKE 1 CAPSULE BY MOUTH EVERY 28 DAYS.        FLUoxetine HCl (Cap) PROZAC 20 MG Take 20 mg by mouth every day.        Flurazepam HCl (Cap) DALMANE 30 MG Take 30 mg by mouth at bedtime as needed.        Gabapentin (Cap) NEURONTIN 300 MG TAKE THREE (3) CAPSULES BY MOUTH THREE (3) TIMES DAILY         Hyoscyamine Sulfate (SL Tab) LEVSIN 0.125 MG         Incontinence Supply Disposable (Misc) FQ PROTECTIVE UNDERWEAR  USE 1 PULLUP 3 TIMES DAILY        Ipratropium-Albuterol (Aero Soln) COMBIVENT RESPIMAT  MCG/ACT         Levothyroxine Sodium (Tab) SYNTHROID 200 MCG TAKE ONE (1) TABLET BY MOUTH EVERY MORNING ON AN EMPTY STOMACH        Levothyroxine Sodium (Tab) SYNTHROID 25 MCG Take 1 tablet by mouth once every morning on an empty stomach. Use along with 200 mcg levothyroxine for a total of 225 mcg daily        MetFORMIN HCl (Tab)  GLUCOPHAGE 500 MG TAKE ONE [1] TABLET BY MOUTH EVERY DAY        Misc. Devices (Misc) Misc. Devices  Pt needs service for her PMD she will need a new battery to avoid stalling        Misc. Devices (Misc) Misc. Devices  CPAP supplies (hoses, filter, water chamber, face mask-resmed small), nasal cannula tubing        Misc. Devices (Misc) Misc. Devices  O2 to use continuously to keep her sats >90 %        Misc. Devices (Misc) Misc. Devices  Please evaluate pt for wider power mobility devise, pt has tipped over on multiple occasions        Misc. Devices (Misc) Misc. Devices  O2 tank rack for power wheel chair        Misc. Devices (Misc) Misc. Devices  Adult incontinence supplies to use as directed        Misc. Devices (Misc) Misc. Devices  Power mobility devise        Misc. Devices (Misc) Misc. Devices  O2 concentrator to use 24/7 and also with her CPAP at night to keep her O2 sats > 90%        Misc. Devices (Misc) Misc. Devices  Incontinence supplies (for wheel chair) and adult briefs to use as needed #120        Misc. Devices (Misc) Misc. Devices  New battery and gear shifter for power wheel chair        Misc. Devices (Misc) Misc. Devices  3 boxes of pouches (54949), 2 boxes of wafers (26501), 3 boxes (tubes) paste, 1 box of no string skin prep, 1 box of no string unsolve        Mometasone Furoate (Solution) ELOCON 0.1 %         Mometasone Furoate (Suspension) NASONEX 50 MCG/ACT Spray 1-2 Sprays in nose every day. Each nostril.        Nicotine Polacrilex (Gum) SM NICOTINE POLACRILEX 2 MG CHEW ONE (1) PIECE AS NEEDED FOR SMOKING CESSATION        Nystatin (Ointment) MYCOSTATIN 064638 UNIT/GM         Ostomy Supplies (Misc) Ostomy Supplies  Dispense as ordered        RaNITidine HCl (Tab) ZANTAC 150 MG TAKE ONE (1) TABLET BY MOUTH TWICE DAILY WITH MEALS        Tiotropium Bromide Monohydrate (Aero Soln) Tiotropium Bromide Monohydrate 2.5 MCG/ACT Inhale 2 Inhalation by mouth every day. Assemble and prime.        TraZODone HCl  (Tab) DESYREL 50 MG Take 1 Tab by mouth at bedtime as needed for Sleep.        Warfarin Sodium (Tab) COUMADIN 10 MG Take 1 tablet by mouth daily as directed by [facility]        .                 Medicines prescribed today were sent to:     [id number] - [place], [place] - [address] [facility]    [address] [facility] [place] [address]    Phone: [phone] Fax: [phone]    Open 24 Hours?: No    [facility] - [place], [place] - [address]    [address] [place] [address]    Phone: [phone] Fax: [phone]    Open 24 Hours?: No      Medication refill instructions:       If your prescription bottle indicates you have medication refills left, it is not necessary to call your provider’s office. Please contact your pharmacy and they will refill your medication.    If your prescription bottle indicates you do not have any refills left, you may request refills at any time through one of the following ways: The online [employer] system (except Urgent Care), by calling your provider’s office, or by asking your pharmacy to contact your provider’s office with a refill request. Medication refills are processed only during regular business hours and may not be available until the next business day. Your provider may request additional information or to have a follow-up visit with you prior to refilling your medication.   *Please Note: Medication refills are assigned a new Rx number when refilled electronically. Your pharmacy may indicate that no refills were authorized even though a new prescription for the same medication is available at the pharmacy. Please request the medicine by name with the pharmacy before contacting your provider for a refill.        Referral     A referral request has been sent to our patient care coordination department. Please allow 3-5 business days for us to process this request and contact you either by phone or mail. If you do not hear from us by the 5th  business day, please call us at [phone].           [employer] Access Code: Activation code not generated  Current [employer] Status: Active          Quit Tobacco Information     Do you want to quit using tobacco?    Quitting tobacco decreases risks of cancer, heart and lung disease, increases life expectancy, improves sense of taste and smell, and increases spending money, among other benefits.    If you are thinking about quitting, we can help.  • [facility] Quit Tobacco Program: [phone]  o Program occurs weekly for four weeks and includes pharmacist consultation on products to support quitting smoking or chewing tobacco. A provider referral is needed for pharmacist consultation.  • Tobacco Users Help Hotline: 800QUIT-NOW (744-7635) or https://[place].quitlogix.org/  o Free, confidential telephone and online coaching for [place] residents. Sessions are designed on a schedule that is convenient for you. Eligible clients receive free nicotine replacement therapy.  • Nationally: www.smokefree.gov  o Information and professional assistance to support both immediate and long-term needs as you become, and remain, a non-smoker. Smokefree.gov allows you to choose the help that best fits your needs.

## 2017-08-07 NOTE — PROGRESS NOTES
"Subjective:      Atiya Henley is a 66 y.o. female who presents with No chief complaint on file.            HPI Comments: She has been having increased dumping and has been going through more ostomy supplies. She states sometime her bags have also leaked and caused a mess. Will reorder ostomy supplies as well as incontinence supplies.     Will also refer to dermatology for a further evaluation of several dark skin lesions on her back that have been increasing in size and shape. Will continue to follow.      Current medications, allergies, and problem list reviewed with patient and updated in EPIC.      Rash  This is a recurrent problem. The current episode started 1 to 4 weeks ago. The problem has been gradually worsening since onset. The affected locations include the left arm and right arm. The rash is characterized by pain, redness and swelling. She was exposed to nothing. Pertinent negatives include no anorexia, congestion, cough, diarrhea, eye pain, facial edema, fatigue, fever, joint pain, nail changes, rhinorrhea, shortness of breath, sore throat or vomiting. Past treatments include nothing (will have her try warm compresses and will have her try doxycycline).       Review of Systems   Constitutional: Negative for fever, chills and fatigue.   HENT: Negative for congestion, hearing loss, rhinorrhea and sore throat.    Eyes: Negative for pain.   Respiratory: Negative for cough and shortness of breath.    Cardiovascular: Negative for chest pain and palpitations.   Gastrointestinal: Negative for nausea, vomiting, abdominal pain, diarrhea and anorexia.   Musculoskeletal: Negative for joint pain.   Skin: Positive for rash. Negative for nail changes.        Pustular lesions on B arms   Neurological: Negative for headaches.          Objective:     LMP 08/02/1975   Filed Vitals:    08/07/17 1644   BP: 102/66   Pulse: 80   Temp: 36.6 °C (97.8 °F)   Resp: 28   Height: 1.626 m (5' 4.02\")   Weight: 133.358 kg (294 " lb)   SpO2: 92%         Physical Exam   Constitutional: She is oriented to person, place, and time.   BMI > 50   HENT:   Right Ear: External ear normal.   Left Ear: External ear normal.   Nose: Nose normal.   Mouth/Throat: Oropharynx is clear and moist.   Eyes: EOM are normal. Pupils are equal, round, and reactive to light.   Neck: Normal range of motion.   Cardiovascular: Normal rate, regular rhythm and normal heart sounds.  Exam reveals no friction rub.    No murmur heard.  Pulmonary/Chest: Breath sounds normal. No respiratory distress. She has no wheezes. She has no rales.   Abdominal: Soft. Bowel sounds are normal. She exhibits no distension. There is no tenderness.   Ostomy intact   Musculoskeletal:   Using power wheelchairs   Neurological: She is alert and oriented to person, place, and time.   Skin: Skin is warm and dry. Rash noted. There is erythema.   Pustular on B arms   Psychiatric: She has a normal mood and affect. Her behavior is normal.               Assessment/Plan:     1. Dumping syndrome  Will have her continue to change her ostomy bags as directed (supplies reordered) and follow up with GI as needed.  - Misc. Devices Misc; 3 boxes of pouches (72717), 2 boxes of wafers (65933), 3 boxes (tubes) paste, 1 box of no string skin prep, 1 box of no string unsolve  Dispense: 5 Container; Refill: 6    2. Colostomy fistula (CMS-HCC)  See above plan.  - Misc. Devices Misc; 3 boxes of pouches (43680), 2 boxes of wafers (19955), 3 boxes (tubes) paste, 1 box of no string skin prep, 1 box of no string unsolve  Dispense: 5 Container; Refill: 6    3. Decreased activities of daily living (ADL)  Using power wheel chair as directed.  - Misc. Devices Misc; 3 boxes of pouches (91553), 2 boxes of wafers (31642), 3 boxes (tubes) paste, 1 box of no string skin prep, 1 box of no string unsolve  Dispense: 5 Container; Refill: 6    4. Changing skin lesion  Will refer to derm for a further evaluation and management of her  changing skin lesions.  - REFERRAL TO DERMATOLOGY    5. Folliculitis  Will have her try doxy and use warm compresses. Also recommended keeping area clean and dry.   - doxycycline monohydrate (ADOXA) 100 MG tablet; Take 1 Tab by mouth 2 times a day.  Dispense: 20 Tab; Refill: 0

## 2017-08-14 ENCOUNTER — ANTICOAGULATION VISIT (OUTPATIENT)
Dept: VASCULAR LAB | Facility: MEDICAL CENTER | Age: 67
End: 2017-08-14
Attending: INTERNAL MEDICINE
Payer: MEDICAID

## 2017-08-14 ENCOUNTER — OFFICE VISIT (OUTPATIENT)
Dept: HEMATOLOGY ONCOLOGY | Facility: MEDICAL CENTER | Age: 67
End: 2017-08-14
Payer: MEDICAID

## 2017-08-14 VITALS
DIASTOLIC BLOOD PRESSURE: 86 MMHG | BODY MASS INDEX: 49.55 KG/M2 | HEART RATE: 74 BPM | OXYGEN SATURATION: 91 % | WEIGHT: 290.24 LBS | TEMPERATURE: 97.2 F | SYSTOLIC BLOOD PRESSURE: 138 MMHG | HEIGHT: 64 IN | RESPIRATION RATE: 14 BRPM

## 2017-08-14 VITALS — SYSTOLIC BLOOD PRESSURE: 116 MMHG | DIASTOLIC BLOOD PRESSURE: 66 MMHG | HEART RATE: 58 BPM

## 2017-08-14 DIAGNOSIS — Z79.01 CHRONIC ANTICOAGULATION: ICD-10-CM

## 2017-08-14 DIAGNOSIS — F17.210 CIGARETTE SMOKER: ICD-10-CM

## 2017-08-14 DIAGNOSIS — Z95.828 S/P IVC FILTER: ICD-10-CM

## 2017-08-14 LAB — INR PPP: 3.1 (ref 2–3.5)

## 2017-08-14 PROCEDURE — 99212 OFFICE O/P EST SF 10 MIN: CPT | Performed by: NURSE PRACTITIONER

## 2017-08-14 PROCEDURE — G0296 VISIT TO DETERM LDCT ELIG: HCPCS | Performed by: NURSE PRACTITIONER

## 2017-08-14 PROCEDURE — 85610 PROTHROMBIN TIME: CPT

## 2017-08-14 RX ORDER — RANITIDINE 150 MG/1
TABLET ORAL
Qty: 60 TAB | Refills: 4 | Status: SHIPPED | OUTPATIENT
Start: 2017-08-14 | End: 2018-01-08 | Stop reason: SDUPTHER

## 2017-08-14 ASSESSMENT — ENCOUNTER SYMPTOMS
SHORTNESS OF BREATH: 1
WHEEZING: 1
SPUTUM PRODUCTION: 1
WEIGHT LOSS: 1
HEMOPTYSIS: 0
COUGH: 1

## 2017-08-14 ASSESSMENT — PAIN SCALES - GENERAL: PAINLEVEL: NO PAIN

## 2017-08-14 NOTE — PROGRESS NOTES
Anticoagulation Summary as of 8/14/2017     INR goal 2.0-3.0   Selected INR 3.1! (8/14/2017)   Maintenance plan 5 mg (10 mg x 0.5) on Mon, Wed, Fri; 10 mg (10 mg x 1) all other days   Weekly total 55 mg   Plan last modified ALCIRA Colbert (8/14/2017)   Next INR check 8/21/2017   Priority Routine   Target end date Indefinite    Indications   DVT (deep venous thrombosis) (CMS-HCC) [I82.409]  Chronic anticoagulation [Z79.01]  S/P IVC filter [Z95.828]  Deep vein thrombosis (DVT) (CMS-HCC) [I82.409]         Anticoagulation Episode Summary     INR check location [facility]    Preferred lab [facility]    Send INR reminders to     Comments       Anticoagulation Care Providers     Provider Role Specialty Phone number    Carmelo Pat M.D. Referring Family Medicine [phone]    [facility] An[facility]agulation Services Responsible  [phone]        Anticoagulation Patient Findings      Current Outpatient Prescriptions on File Prior to Visit   Medication Sig Dispense Refill   • Incontinence Supply Disposable (FQ PROTECTIVE UNDERWEAR) Misc USE 1 PULLUP 3 TIMES DAILY 120 Each 3   • Misc. Devices Misc 3 boxes of pouches (83748), 2 boxes of wafers (57334), 3 boxes (tubes) paste, 1 box of no string skin prep, 1 box of no string unsolve 5 Container 6   • doxycycline monohydrate (ADOXA) 100 MG tablet Take 1 Tab by mouth 2 times a day. 20 Tab 0   • mometasone (NASONEX) 50 MCG/ACT nasal spray Spray 1-2 Sprays in nose every day. Each nostril. 1 Inhaler 6   • gabapentin (NEURONTIN) 300 MG Cap TAKE THREE (3) CAPSULES BY MOUTH THREE (3) TIMES DAILY  270 Cap 4   • carbamazepine (TEGRETOL) 200 MG Tab      • hyoscyamine (LEVSIN) 0.125 MG SL Tab      • COMBIVENT RESPIMAT  MCG/ACT Aero Soln      • mometasone (ELOCON) 0.1 % lotion      • nystatin (MYCOSTATIN) 019823 UNIT/GM Ointment      • levothyroxine (SYNTHROID) 200 MCG Tab TAKE ONE (1) TABLET BY MOUTH EVERY MORNING ON AN EMPTY STOMACH 90 Tab 1   • levothyroxine  (SYNTHROID) 25 MCG Tab Take 1 tablet by mouth once every morning on an empty stomach. Use along with 200 mcg levothyroxine for a total of 225 mcg daily 90 Tab 1   • SM NICOTINE POLACRILEX 2 MG Gum CHEW ONE (1) PIECE AS NEEDED FOR SMOKING CESSATION 110 Each 2   • warfarin (COUMADIN) 10 MG Tab Take 1 tablet by mouth daily as directed by [facility] Anticoagulation Program 30 Tab 2   • Tiotropium Bromide Monohydrate (SPIRIVA RESPIMAT) 2.5 MCG/ACT Aero Soln Inhale 2 Inhalation by mouth every day. Assemble and prime. 1 Inhaler 6   • trazodone (DESYREL) 50 MG Tab Take 1 Tab by mouth at bedtime as needed for Sleep. 30 Tab 3   • Misc. Devices Misc New battery and gear shifter for power wheel chair 1 Device 0   • metformin (GLUCOPHAGE) 500 MG Tab TAKE ONE [1] TABLET BY MOUTH EVERY DAY 90 Tab 0   • ascorbic acid (ASCORBIC ACID) 500 MG Tab TAKE ONE [1] TABLET BY MOUTH EVERY DAY 90 Tab 2   • budesonide-formoterol (SYMBICORT) 160-4.5 MCG/ACT Aerosol Inhale 2 Puffs by mouth 2 Times a Day. With spacer, rinse mouth after use 1 Inhaler 6   • albuterol (PROVENTIL HFA) 108 (90 BASE) MCG/ACT Aero Soln inhalation aerosol Inhale 2 Puffs by mouth every four hours as needed. For Shortness of breath 1 Inhaler 6   • vitamin D, Ergocalciferol, (DRISDOL) 86814 UNITS Cap capsule TAKE 1 CAPSULE BY MOUTH EVERY 28 DAYS. 3 Cap 3   • Misc. Devices Misc Incontinence supplies (for wheel chair) and adult briefs to use as needed #120 120 Device 6   • Ostomy Supplies [facility] Dispense as ordered 20 Each 12   • Misc. Devices Misc O2 concentrator to use 24/7 and also with her CPAP at night to keep her O2 sats > 90% 1 Device 0   • Misc. Devices Misc Adult incontinence supplies to use as directed 60 Device 6   • Misc. Devices Misc Power mobility devise 1 Device 0   • flurazepam (DALMANE) 30 MG capsule Take 30 mg by mouth at bedtime as needed.     • fluoxetine (PROZAC) 20 MG Cap Take 20 mg by mouth every day.     • amitriptyline (ELAVIL) 25 MG Tab Take 25 mg by mouth  every bedtime.     • Misc. Devices Misc Please evaluate pt for wider power mobility devise, pt has tipped over on multiple occasions 1 Device 0   • Misc. Devices Misc O2 tank rack for power wheel chair 1 Device 0   • Misc. Devices Misc O2 to use continuously to keep her sats >90 % 1 Device 0   • Misc. Devices Misc CPAP supplies (hoses, filter, water chamber, face mask-resmed small), nasal cannula tubing 10 Device 6   • Misc. Devices Misc Pt needs service for her PMD she will need a new battery to avoid stalling 1 Device 0     No current facility-administered medications on file prior to visit.       Lab Results   Component Value Date/Time    SODIUM 138 03/20/2017 12:31 PM    POTASSIUM 3.7 03/20/2017 12:31 PM    CHLORIDE 104 03/20/2017 12:31 PM    CO2 25 03/20/2017 12:31 PM    GLUCOSE 102* 03/20/2017 12:31 PM    BUN 10 03/20/2017 12:31 PM    CREATININE 0.65 03/20/2017 12:31 PM            Atiya Henley seen in clinic today  INR  supra-therapeutic.    Denies signs/symptoms of bleeding and/or thrombosis.    Denies changes to diet or medications.   Follow up appointment in 1 week(s).      Take 5 mg mon, wed Fri, 10 mg rest of the week. Continue current medication regimen.        IDALIA Colbert.

## 2017-08-14 NOTE — MR AVS SNAPSHOT
Atiya Hneley   2017 2:45 PM   Anticoagulation Visit   MRN: [id number]    Department:  Vascular Medicine   Dept Phone:  [phone]    Description:  Female : 1950   Provider:  [facility] EXAM 4           Allergies as of 2017     Allergen Noted Reactions    Pcn [Penicillins] 07/10/2014   Anaphylaxis, Rash    Reaction; happened as a child. Tolerated cephalosporins and Zosyn      You were diagnosed with     S/P IVC filter   [073857]       Chronic anticoagulation   [502953]         Vital Signs     Last Menstrual Period Smoking Status                1975 Current Every Day Smoker          Basic Information     Date Of Birth Sex Race Ethnicity Preferred Language    1950 Female White Non- English      Your appointments     Aug 14, 2017  2:45 PM   Established Patient with V EXAM 4   [facility]  (--)    [address] [address]   [phone]            Aug 14, 2017  3:00 PM   Int Lung CA Screen with Mariela Joiner A.P.NMirian   [facility] (--)    [address] [facility], [address]  [facility] [place] [facility] [id number] [phone]            Aug 21, 2017  1:45 PM   Established Patient with IHV EXAM 5   [facility]  (--)    [address] [address]   [phone]            Aug 21, 2017  2:50 PM   Established Patient with Carmelo Pat M.D.   [facility]    [address] [facility] [id number] [phone]           You will be receiving a confirmation call a few days before your appointment from our automated call confirmation system.            Sep 07, 2017  2:40 PM   Established Patient Pul with PRIYA ChavezPMirianNMirian   [facility] (--)    [address]  [place] [facility] [place] [facility] [address] [phone]              Problem List              ICD-10-CM Priority Class Noted - Resolved    Morbid obesity  (CMS-HCC) E66.01 Low  7/7/2009 - Present    YESSICA (obstructive sleep apnea) G47.33 Medium  7/7/2009 - Present    Prediabetes R73.03   9/10/2009 - Present    HTN (hypertension) I10 Medium  9/10/2009 - Present    Anemia D64.9 Medium  3/22/2010 - Present    DVT (deep venous thrombosis) (CMS-HCC) I82.409 Medium  4/10/2010 - Present    Anxiety F41.9   5/17/2010 - Present    Depression F32.9 Low  5/17/2010 - Present    Vitamin d deficiency    8/31/2011 - Present    Chronic autoimmune thyroiditis E06.3   10/2/2012 - Present    Chronic anticoagulation Z79.01   10/16/2012 - Present    Vitamin d deficiency    1/30/2013 - Present    Other pulmonary insufficiency, not elsewhere classified, following trauma and surgery J95.89 High  9/2/2013 - Present    COPD (chronic obstructive pulmonary disease) (CMS-HCC) J44.9 Medium  9/2/2013 - Present    Tobacco abuse Z72.0 Low  9/18/2013 - Present    S/P IVC filter Z95.828 Medium  9/18/2013 - Present    Bipolar disorder F31.9 Low  9/18/2013 - Present    History of hernia repair Z98.890, Z87.19   10/7/2013 - Present    Obesity E66.9   7/7/2009 - Present    GERD (gastroesophageal reflux disease) K21.9 Low  11/25/2013 - Present    Colostomy fistula (CMS-HCC) K94.09 High  12/3/2013 - Present    Thrombocytosis (CMS-HCC) D47.3 Medium  12/3/2013 - Present    Hypomagnesemia E83.42 Medium  12/4/2013 - Present    Hyponatremia E87.1 Medium  12/10/2013 - Present    Vitamin D deficiency E55.9   8/4/2015 - Present    Pain in joint, lower leg M25.569   8/21/2015 - Present    Deep vein thrombosis (DVT) (CMS-HCC) I82.409   11/11/2015 - Present    Cervical spondylosis M47.812   5/4/2016 - Present    DDD (degenerative disc disease), cervical M50.30   5/4/2016 - Present    Chronic neck pain M54.2, G89.29   5/4/2016 - Present    Lumbosacral spondylosis M47.817   5/4/2016 - Present    DDD (degenerative disc disease), lumbar M51.36   5/4/2016 - Present    Lumbar radiculopathy M54.16   5/4/2016 - Present    Chronic  low back pain M54.5, G89.29   5/4/2016 - Present    Left knee pain M25.562   5/4/2016 - Present    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, Z96.659   5/4/2016 - Present    Chronic pain syndrome G89.4   8/25/2016 - Present    Positive FIT (fecal immunochemical test) R19.5   9/27/2016 - Present    Chronic obstructive asthma (CMS-HCC) J44.9, J45.909   11/14/2016 - Present    Allergic rhinitis J30.9   11/14/2016 - Present    Hypoxemia R09.02   11/14/2016 - Present    Hypothyroidism, acquired, autoimmune E03.8   8/1/2017 - Present      Health Maintenance        Date Due Completion Dates    RETINAL SCREENING 11/2/1970 ---    PAP SMEAR 11/2/1973 ---    IMM ZOSTER VACCINE 11/2/2012 ---    MAMMOGRAM 6/29/2017 6/29/2016    IMM INFLUENZA (1) 9/1/2017 11/14/2016, 10/14/2015, 11/20/2014, 9/3/2013, 9/20/2011, 11/11/2010    A1C SCREENING 9/20/2017 3/20/2017, 8/3/2015, 7/10/2014, 6/20/2014, 11/29/2013, 5/17/2013, 8/31/2012, 7/16/2012, 3/30/2012, 7/19/2011, 5/7/2010, 10/4/2009    DIABETES MONOFILAMENT / LE EXAM 12/6/2017 12/6/2016    FASTING LIPID PROFILE 3/20/2018 3/20/2017, 8/30/2016, 8/3/2015, 7/11/2014, 6/20/2014, 5/17/2013, 7/19/2011, 5/1/2009, 2/23/2009, 9/16/2008, 5/9/2008, 11/29/2007    URINE ACR / MICROALBUMIN 3/20/2018 3/20/2017, 8/3/2015, 6/20/2014    SERUM CREATININE 3/20/2018 3/20/2017, 8/30/2016, 8/3/2015, 7/11/2014, 7/10/2014, 6/20/2014, 2/6/2014, 1/31/2014, 1/17/2014, 1/13/2014, 12/19/2013, 12/18/2013, 12/17/2013, 12/16/2013, 12/15/2013, 12/14/2013, 12/13/2013, 12/12/2013, 12/11/2013, 12/10/2013, 12/10/2013, 12/9/2013, 12/8/2013, 12/7/2013, 12/6/2013, 12/5/2013, 12/4/2013, 12/3/2013, 12/2/2013, 11/30/2013, 11/28/2013, 11/27/2013, 11/25/2013, 11/22/2013, 11/20/2013, 11/10/2013, 10/27/2013, 10/20/2013, 10/15/2013, 10/14/2013, 10/13/2013, 10/12/2013, 10/11/2013, 10/10/2013, 10/9/2013, 10/8/2013, 10/7/2013, 9/25/2013, 9/24/2013, 9/22/2013, 9/21/2013, 9/20/2013, 9/19/2013, 9/18/2013, 9/17/2013, 9/17/2013,  9/15/2013, 9/13/2013, 9/12/2013, 9/11/2013, 9/10/2013, 9/9/2013, 9/9/2013, 9/8/2013, 9/7/2013, 9/7/2013, 9/6/2013, 9/5/2013, 9/4/2013, 9/3/2013, 9/1/2013, 8/31/2013, 5/17/2013, 1/25/2013, 12/13/2012, 10/17/2012, 10/1/2012, 8/24/2012, 8/18/2012, 7/20/2012, 7/17/2012, 7/16/2012, 1/4/2012, 7/20/2011, 7/19/2011, 1/13/2011, 12/6/2010, 10/26/2010, 7/2/2010, 6/23/2010, 5/17/2010, 5/4/2010, 5/3/2010, 5/1/2010, 4/30/2010, 4/29/2010, 4/28/2010, 4/27/2010, 4/26/2010, 4/25/2010, 4/24/2010, 4/23/2010, 4/22/2010, 4/21/2010, 4/20/2010, 4/19/2010, 4/18/2010, 4/17/2010, 4/15/2010, 4/14/2010, 4/13/2010, 4/12/2010, 4/11/2010, 4/10/2010, 4/9/2010, 4/8/2010, 4/7/2010, 4/6/2010, 4/5/2010, 4/4/2010, 4/3/2010, 4/2/2010, 4/1/2010, 3/31/2010, 3/30/2010, 3/29/2010, 3/28/2010, 3/27/2010, 3/26/2010, 3/25/2010, 3/24/2010, 3/23/2010, 3/22/2010, 3/21/2010, 3/20/2010, 3/19/2010, 3/18/2010, 3/17/2010, 3/17/2010, 3/16/2010, 3/15/2010, 3/12/2010, 3/11/2010, 3/5/2010, 3/1/2010, 1/31/2010, 1/30/2010, 1/29/2010, 1/28/2010, 1/27/2010, 1/26/2010, 11/5/2009, 10/7/2009, 10/5/2009, 10/4/2009, 10/3/2009, 10/2/2009, 10/1/2009, 9/21/2009, 9/15/2009, 9/4/2009, 4/27/2009, 2/23/2009, 8/14/2008, 11/29/2007    COLONOSCOPY 11/17/2020 11/17/2010    IMM DTaP/Tdap/Td Vaccine (2 - Td) 5/9/2027 5/9/2017            Results     POCT Protime      Component    INR    3.1                        Current Immunizations     Influenza TIV (IM) 9/3/2013  8:45 PM, 9/20/2011    Influenza Vaccine 12/1/2009    Influenza Vaccine Pediatric 11/11/2010    Influenza Vaccine Quad Inj (Pf) 11/20/2014  1:50 PM    Influenza Vaccine Quad Inj (Preserved) 11/14/2016  9:52 AM, 10/14/2015    Pneumococcal Vaccine (UF)Historical Data 12/1/2009    Pneumococcal polysaccharide vaccine (PPSV-23) 11/14/2016  9:53 AM    Tdap Vaccine 5/9/2017      Below and/or attached are the medications your provider expects you to take. Review all of your home medications and newly ordered medications with your provider  and/or pharmacist. Follow medication instructions as directed by your provider and/or pharmacist. Please keep your medication list with you and share with your provider. Update the information when medications are discontinued, doses are changed, or new medications (including over-the-counter products) are added; and carry medication information at all times in the event of emergency situations     Allergies:  PCN - Anaphylaxis,Rash               Medications  Valid as of: August 14, 2017 -  2:30 PM    Generic Name Brand Name Tablet Size Instructions for use    Albuterol Sulfate (Aero Soln) albuterol 108 (90 BASE) MCG/ACT Inhale 2 Puffs by mouth every four hours as needed. For Shortness of breath        Amitriptyline HCl (Tab) ELAVIL 25 MG Take 25 mg by mouth every bedtime.        Ascorbic Acid (Tab) ascorbic acid 500 MG TAKE ONE [1] TABLET BY MOUTH EVERY DAY        Budesonide-Formoterol Fumarate (Aerosol) SYMBICORT 160-4.5 MCG/ACT Inhale 2 Puffs by mouth 2 Times a Day. With spacer, rinse mouth after use        CarBAMazepine (Tab) TEGRETOL 200 MG         Doxycycline Monohydrate (Tab) ADOXA 100 MG Take 1 Tab by mouth 2 times a day.        Ergocalciferol (Cap) DRISDOL 55588 UNITS TAKE 1 CAPSULE BY MOUTH EVERY 28 DAYS.        FLUoxetine HCl (Cap) PROZAC 20 MG Take 20 mg by mouth every day.        Flurazepam HCl (Cap) DALMANE 30 MG Take 30 mg by mouth at bedtime as needed.        Gabapentin (Cap) NEURONTIN 300 MG TAKE THREE (3) CAPSULES BY MOUTH THREE (3) TIMES DAILY         Hyoscyamine Sulfate (SL Tab) LEVSIN 0.125 MG         Incontinence Supply Disposable (Misc) FQ PROTECTIVE UNDERWEAR  USE 1 PULLUP 3 TIMES DAILY        Ipratropium-Albuterol (Aero Soln) COMBIVENT RESPIMAT  MCG/ACT         Levothyroxine Sodium (Tab) SYNTHROID 200 MCG TAKE ONE (1) TABLET BY MOUTH EVERY MORNING ON AN EMPTY STOMACH        Levothyroxine Sodium (Tab) SYNTHROID 25 MCG Take 1 tablet by mouth once every morning on an empty stomach. Use  along with 200 mcg levothyroxine for a total of 225 mcg daily        MetFORMIN HCl (Tab) GLUCOPHAGE 500 MG TAKE ONE [1] TABLET BY MOUTH EVERY DAY        Misc. Devices (Misc) Misc. Devices  Pt needs service for her PMD she will need a new battery to avoid stalling        Misc. Devices (Misc) Misc. Devices  CPAP supplies (hoses, filter, water chamber, face mask-resmed small), nasal cannula tubing        Misc. Devices (Misc) Misc. Devices  O2 to use continuously to keep her sats >90 %        Misc. Devices (Misc) Misc. Devices  Please evaluate pt for wider power mobility devise, pt has tipped over on multiple occasions        Misc. Devices (Misc) Misc. Devices  O2 tank rack for power wheel chair        Misc. Devices (Misc) Misc. Devices  Adult incontinence supplies to use as directed        Misc. Devices (Misc) Misc. Devices  Power mobility devise        Misc. Devices (Misc) Misc. Devices  O2 concentrator to use 24/7 and also with her CPAP at night to keep her O2 sats > 90%        Misc. Devices (Misc) Misc. Devices  Incontinence supplies (for wheel chair) and adult briefs to use as needed #120        Misc. Devices (Misc) Misc. Devices  New battery and gear shifter for power wheel chair        Misc. Devices (Misc) Misc. Devices  3 boxes of pouches (58163), 2 boxes of wafers (01692), 3 boxes (tubes) paste, 1 box of no string skin prep, 1 box of no string unsolve        Mometasone Furoate (Solution) ELOCON 0.1 %         Mometasone Furoate (Suspension) NASONEX 50 MCG/ACT Spray 1-2 Sprays in nose every day. Each nostril.        Nicotine Polacrilex (Gum) SM NICOTINE POLACRILEX 2 MG CHEW ONE (1) PIECE AS NEEDED FOR SMOKING CESSATION        Nystatin (Ointment) MYCOSTATIN 952941 UNIT/GM         Ostomy Supplies (Misc) Ostomy Supplies  Dispense as ordered        RaNITidine HCl (Tab) ZANTAC 150 MG TAKE ONE TABLET BY MOUTH TWICE DAILY WITH MEALS        Tiotropium Bromide Monohydrate (Aero Soln) Tiotropium Bromide Monohydrate 2.5  MCG/ACT Inhale 2 Inhalation by mouth every day. Assemble and prime.        TraZODone HCl (Tab) DESYREL 50 MG Take 1 Tab by mouth at bedtime as needed for Sleep.        Warfarin Sodium (Tab) COUMADIN 10 MG Take 1 tablet by mouth daily as directed by [facility] Anticoagulation Program        .                 Medicines prescribed today were sent to:     [id number] - [place], [place] - [address] [facility]    [address] [facility] [place] [address]    Phone: [phone] Fax: [phone]    Open 24 Hours?: No    [facility] - [place], [place] - [address]    [address] [place] [address]    Phone: [phone] Fax: [phone]    Open 24 Hours?: No      Medication refill instructions:       If your prescription bottle indicates you have medication refills left, it is not necessary to call your provider’s office. Please contact your pharmacy and they will refill your medication.    If your prescription bottle indicates you do not have any refills left, you may request refills at any time through one of the following ways: The online [employer] system (except Urgent Care), by calling your provider’s office, or by asking your pharmacy to contact your provider’s office with a refill request. Medication refills are processed only during regular business hours and may not be available until the next business day. Your provider may request additional information or to have a follow-up visit with you prior to refilling your medication.   *Please Note: Medication refills are assigned a new Rx number when refilled electronically. Your pharmacy may indicate that no refills were authorized even though a new prescription for the same medication is available at the pharmacy. Please request the medicine by name with the pharmacy before contacting your provider for a refill.        Warfarin Dosing Calendar   August 2017 Details    Sun Mon Tue Wed Thu Fri Sat       1               2               3               4                5                 6               7               8               9               10               11               12                 13               14   3.1   5 mg   See details      15      10 mg         16      5 mg         17      10 mg         18      5 mg         19      10 mg           20      10 mg         21      5 mg         22               23               24               25               26                 27               28               29               30               31                  Date Details   08/14 This INR check   INR: 3.1       Date of next INR:  8/21/2017         How to take your warfarin dose     To take:  5 mg Take 0.5 of a 10 mg tablet.    To take:  10 mg Take 1 of the 10 mg tablets.              MyChart Access Code: Activation code not generated  Current [employer] Status: Active          Quit Tobacco Information     Do you want to quit using tobacco?    Quitting tobacco decreases risks of cancer, heart and lung disease, increases life expectancy, improves sense of taste and smell, and increases spending money, among other benefits.    If you are thinking about quitting, we can help.  • [facility] Quit Tobacco Program: [phone]  o Program occurs weekly for four weeks and includes pharmacist consultation on products to support quitting smoking or chewing tobacco. A provider referral is needed for pharmacist consultation.  • Tobacco Users Help Hotline: 8-480-QUITNOW (779-1718) or https://[place].quitlogix.org/  o Free, confidential telephone and online coaching for [place] residents. Sessions are designed on a schedule that is convenient for you. Eligible clients receive free nicotine replacement therapy.  • Nationally: www.smokefree.gov  o Information and professional assistance to support both immediate and long-term needs as you become, and remain, a non-smoker. Smokefree.gov allows you to choose the help that best fits your needs.

## 2017-08-14 NOTE — MR AVS SNAPSHOT
"        Atiya Henley   2017 3:00 PM   Office Visit   MRN: [id number]    Department:  [facility]   Dept Phone:  [phone]    Description:  Female : 1950   Provider:  Mariela Joiner A.P.N.           Reason for Visit     Lung Cancer Screening Program Prescreen           Allergies as of 2017     Allergen Noted Reactions    Pcn [Penicillins] 07/10/2014   Anaphylaxis, Rash    Reaction; happened as a child. Tolerated cephalosporins and Zosyn      You were diagnosed with     Cigarette smoker   [[id number]]         Vital Signs     Blood Pressure Pulse Temperature Respirations Height Weight    138/86 mmHg 74 36.2 °C (97.2 °F) 14 1.626 m (5' 4\") 131.65 kg (290 lb 3.8 oz)    Body Mass Index Oxygen Saturation Last Menstrual Period Smoking Status          49.79 kg/m2 91% 1975 Current Every Day Smoker        Basic Information     Date Of Birth Sex Race Ethnicity Preferred Language    1950 Female White Non- English      Your appointments     Aug 21, 2017  1:45 PM   Established Patient with [facility] EXAM 5   [facility]  (--)    [address] [facility] [address] [phone]            Aug 21, 2017  2:50 PM   Established Patient with Carmelo Pat M.D.   [facility] ([facility])    [address] [id number] [phone]           You will be receiving a confirmation call a few days before your appointment from our automated call confirmation system.            Sep 07, 2017  2:40 PM   Established Patient Pul with Val Jefferson A.P.NMirian   [facility] (--)    [address]  [facility] [address]  [facility] [id number] [phone]              Problem List              ICD-10-CM Priority Class Noted - Resolved    Morbid obesity (CMS-HCC) E66.01 Low  2009 - Present    YESSICA (obstructive sleep apnea) G47.33 Medium  2009 - Present    Prediabetes R73.03   9/10/2009 - Present    HTN " (hypertension) I10 Medium  9/10/2009 - Present    Anemia D64.9 Medium  3/22/2010 - Present    DVT (deep venous thrombosis) (CMS-HCC) I82.409 Medium  4/10/2010 - Present    Anxiety F41.9   5/17/2010 - Present    Depression F32.9 Low  5/17/2010 - Present    Vitamin d deficiency    8/31/2011 - Present    Chronic autoimmune thyroiditis E06.3   10/2/2012 - Present    Chronic anticoagulation Z79.01   10/16/2012 - Present    Vitamin d deficiency    1/30/2013 - Present    Other pulmonary insufficiency, not elsewhere classified, following trauma and surgery J95.89 High  9/2/2013 - Present    COPD (chronic obstructive pulmonary disease) (CMS-HCC) J44.9 Medium  9/2/2013 - Present    Tobacco abuse Z72.0 Low  9/18/2013 - Present    S/P IVC filter Z95.828 Medium  9/18/2013 - Present    Bipolar disorder F31.9 Low  9/18/2013 - Present    History of hernia repair Z98.890, Z87.19   10/7/2013 - Present    Obesity E66.9   7/7/2009 - Present    GERD (gastroesophageal reflux disease) K21.9 Low  11/25/2013 - Present    Colostomy fistula (CMS-HCC) K94.09 High  12/3/2013 - Present    Thrombocytosis (CMS-HCC) D47.3 Medium  12/3/2013 - Present    Hypomagnesemia E83.42 Medium  12/4/2013 - Present    Hyponatremia E87.1 Medium  12/10/2013 - Present    Vitamin D deficiency E55.9   8/4/2015 - Present    Pain in joint, lower leg M25.569   8/21/2015 - Present    Deep vein thrombosis (DVT) (CMS-HCC) I82.409   11/11/2015 - Present    Cervical spondylosis M47.812   5/4/2016 - Present    DDD (degenerative disc disease), cervical M50.30   5/4/2016 - Present    Chronic neck pain M54.2, G89.29   5/4/2016 - Present    Lumbosacral spondylosis M47.817   5/4/2016 - Present    DDD (degenerative disc disease), lumbar M51.36   5/4/2016 - Present    Lumbar radiculopathy M54.16   5/4/2016 - Present    Chronic low back pain M54.5, G89.29   5/4/2016 - Present    Left knee pain M25.562   5/4/2016 - Present    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,  Z96.659   5/4/2016 - Present    Chronic pain syndrome G89.4   8/25/2016 - Present    Positive FIT (fecal immunochemical test) R19.5   9/27/2016 - Present    Chronic obstructive asthma (CMS-HCC) J44.9, J45.909   11/14/2016 - Present    Allergic rhinitis J30.9   11/14/2016 - Present    Hypoxemia R09.02   11/14/2016 - Present    Hypothyroidism, acquired, autoimmune E03.8   8/1/2017 - Present      Health Maintenance        Date Due Completion Dates    RETINAL SCREENING 11/2/1970 ---    PAP SMEAR 11/2/1973 ---    IMM ZOSTER VACCINE 11/2/2012 ---    MAMMOGRAM 6/29/2017 6/29/2016    IMM INFLUENZA (1) 9/1/2017 11/14/2016, 10/14/2015, 11/20/2014, 9/3/2013, 9/20/2011, 11/11/2010    A1C SCREENING 9/20/2017 3/20/2017, 8/3/2015, 7/10/2014, 6/20/2014, 11/29/2013, 5/17/2013, 8/31/2012, 7/16/2012, 3/30/2012, 7/19/2011, 5/7/2010, 10/4/2009    DIABETES MONOFILAMENT / LE EXAM 12/6/2017 12/6/2016    FASTING LIPID PROFILE 3/20/2018 3/20/2017, 8/30/2016, 8/3/2015, 7/11/2014, 6/20/2014, 5/17/2013, 7/19/2011, 5/1/2009, 2/23/2009, 9/16/2008, 5/9/2008, 11/29/2007    URINE ACR / MICROALBUMIN 3/20/2018 3/20/2017, 8/3/2015, 6/20/2014    SERUM CREATININE 3/20/2018 3/20/2017, 8/30/2016, 8/3/2015, 7/11/2014, 7/10/2014, 6/20/2014, 2/6/2014, 1/31/2014, 1/17/2014, 1/13/2014, 12/19/2013, 12/18/2013, 12/17/2013, 12/16/2013, 12/15/2013, 12/14/2013, 12/13/2013, 12/12/2013, 12/11/2013, 12/10/2013, 12/10/2013, 12/9/2013, 12/8/2013, 12/7/2013, 12/6/2013, 12/5/2013, 12/4/2013, 12/3/2013, 12/2/2013, 11/30/2013, 11/28/2013, 11/27/2013, 11/25/2013, 11/22/2013, 11/20/2013, 11/10/2013, 10/27/2013, 10/20/2013, 10/15/2013, 10/14/2013, 10/13/2013, 10/12/2013, 10/11/2013, 10/10/2013, 10/9/2013, 10/8/2013, 10/7/2013, 9/25/2013, 9/24/2013, 9/22/2013, 9/21/2013, 9/20/2013, 9/19/2013, 9/18/2013, 9/17/2013, 9/17/2013, 9/15/2013, 9/13/2013, 9/12/2013, 9/11/2013, 9/10/2013, 9/9/2013, 9/9/2013, 9/8/2013, 9/7/2013, 9/7/2013, 9/6/2013, 9/5/2013, 9/4/2013, 9/3/2013, 9/1/2013,  8/31/2013, 5/17/2013, 1/25/2013, 12/13/2012, 10/17/2012, 10/1/2012, 8/24/2012, 8/18/2012, 7/20/2012, 7/17/2012, 7/16/2012, 1/4/2012, 7/20/2011, 7/19/2011, 1/13/2011, 12/6/2010, 10/26/2010, 7/2/2010, 6/23/2010, 5/17/2010, 5/4/2010, 5/3/2010, 5/1/2010, 4/30/2010, 4/29/2010, 4/28/2010, 4/27/2010, 4/26/2010, 4/25/2010, 4/24/2010, 4/23/2010, 4/22/2010, 4/21/2010, 4/20/2010, 4/19/2010, 4/18/2010, 4/17/2010, 4/15/2010, 4/14/2010, 4/13/2010, 4/12/2010, 4/11/2010, 4/10/2010, 4/9/2010, 4/8/2010, 4/7/2010, 4/6/2010, 4/5/2010, 4/4/2010, 4/3/2010, 4/2/2010, 4/1/2010, 3/31/2010, 3/30/2010, 3/29/2010, 3/28/2010, 3/27/2010, 3/26/2010, 3/25/2010, 3/24/2010, 3/23/2010, 3/22/2010, 3/21/2010, 3/20/2010, 3/19/2010, 3/18/2010, 3/17/2010, 3/17/2010, 3/16/2010, 3/15/2010, 3/12/2010, 3/11/2010, 3/5/2010, 3/1/2010, 1/31/2010, 1/30/2010, 1/29/2010, 1/28/2010, 1/27/2010, 1/26/2010, 11/5/2009, 10/7/2009, 10/5/2009, 10/4/2009, 10/3/2009, 10/2/2009, 10/1/2009, 9/21/2009, 9/15/2009, 9/4/2009, 4/27/2009, 2/23/2009, 8/14/2008, 11/29/2007    COLONOSCOPY 11/17/2020 11/17/2010    IMM DTaP/Tdap/Td Vaccine (2 - Td) 5/9/2027 5/9/2017            Current Immunizations     Influenza TIV (IM) 9/3/2013  8:45 PM, 9/20/2011    Influenza Vaccine 12/1/2009    Influenza Vaccine Pediatric 11/11/2010    Influenza Vaccine Quad Inj (Pf) 11/20/2014  1:50 PM    Influenza Vaccine Quad Inj (Preserved) 11/14/2016  9:52 AM, 10/14/2015    Pneumococcal Vaccine (UF)Historical Data 12/1/2009    Pneumococcal polysaccharide vaccine (PPSV-23) 11/14/2016  9:53 AM    Tdap Vaccine 5/9/2017      Below and/or attached are the medications your provider expects you to take. Review all of your home medications and newly ordered medications with your provider and/or pharmacist. Follow medication instructions as directed by your provider and/or pharmacist. Please keep your medication list with you and share with your provider. Update the information when medications are discontinued, doses  are changed, or new medications (including over-the-counter products) are added; and carry medication information at all times in the event of emergency situations     Allergies:  PCN - Anaphylaxis,Rash               Medications  Valid as of: August 14, 2017 -  3:50 PM    Generic Name Brand Name Tablet Size Instructions for use    Albuterol Sulfate (Aero Soln) albuterol 108 (90 BASE) MCG/ACT Inhale 2 Puffs by mouth every four hours as needed. For Shortness of breath        Amitriptyline HCl (Tab) ELAVIL 25 MG Take 25 mg by mouth every bedtime.        Ascorbic Acid (Tab) ascorbic acid 500 MG TAKE ONE [1] TABLET BY MOUTH EVERY DAY        Budesonide-Formoterol Fumarate (Aerosol) SYMBICORT 160-4.5 MCG/ACT Inhale 2 Puffs by mouth 2 Times a Day. With spacer, rinse mouth after use        CarBAMazepine (Tab) TEGRETOL 200 MG         Doxycycline Monohydrate (Tab) ADOXA 100 MG Take 1 Tab by mouth 2 times a day.        Ergocalciferol (Cap) DRISDOL 64826 UNITS TAKE 1 CAPSULE BY MOUTH EVERY 28 DAYS.        FLUoxetine HCl (Cap) PROZAC 20 MG Take 20 mg by mouth every day.        Flurazepam HCl (Cap) DALMANE 30 MG Take 30 mg by mouth at bedtime as needed.        Gabapentin (Cap) NEURONTIN 300 MG TAKE THREE (3) CAPSULES BY MOUTH THREE (3) TIMES DAILY         Hyoscyamine Sulfate (SL Tab) LEVSIN 0.125 MG         Incontinence Supply Disposable (Misc) FQ PROTECTIVE UNDERWEAR  USE 1 PULLUP 3 TIMES DAILY        Ipratropium-Albuterol (Aero Soln) COMBIVENT RESPIMAT  MCG/ACT         Levothyroxine Sodium (Tab) SYNTHROID 200 MCG TAKE ONE (1) TABLET BY MOUTH EVERY MORNING ON AN EMPTY STOMACH        Levothyroxine Sodium (Tab) SYNTHROID 25 MCG Take 1 tablet by mouth once every morning on an empty stomach. Use along with 200 mcg levothyroxine for a total of 225 mcg daily        MetFORMIN HCl (Tab) GLUCOPHAGE 500 MG TAKE ONE [1] TABLET BY MOUTH EVERY DAY        Misc. Devices (Misc) Misc. Devices  Pt needs service for her PMD she will need a  new battery to avoid stalling        Misc. Devices (Misc) Misc. Devices  CPAP supplies (hoses, filter, water chamber, face mask-resmed small), nasal cannula tubing        Misc. Devices (Misc) Misc. Devices  O2 to use continuously to keep her sats >90 %        Misc. Devices (Misc) Misc. Devices  Please evaluate pt for wider power mobility devise, pt has tipped over on multiple occasions        Misc. Devices (Misc) Misc. Devices  O2 tank rack for power wheel chair        Misc. Devices (Misc) Misc. Devices  Adult incontinence supplies to use as directed        Misc. Devices (Misc) Misc. Devices  Power mobility devise        Misc. Devices (Misc) Misc. Devices  O2 concentrator to use 24/7 and also with her CPAP at night to keep her O2 sats > 90%        Misc. Devices (Misc) Misc. Devices  Incontinence supplies (for wheel chair) and adult briefs to use as needed #120        Misc. Devices (Misc) Misc. Devices  New battery and gear shifter for power wheel chair        Misc. Devices (Misc) Misc. Devices  3 boxes of pouches (69947), 2 boxes of wafers (91962), 3 boxes (tubes) paste, 1 box of no string skin prep, 1 box of no string unsolve        Mometasone Furoate (Solution) ELOCON 0.1 %         Mometasone Furoate (Suspension) NASONEX 50 MCG/ACT Spray 1-2 Sprays in nose every day. Each nostril.        Nicotine Polacrilex (Gum) SM NICOTINE POLACRILEX 2 MG CHEW ONE (1) PIECE AS NEEDED FOR SMOKING CESSATION        Nystatin (Ointment) MYCOSTATIN 363054 UNIT/GM         Ostomy Supplies (Misc) Ostomy Supplies  Dispense as ordered        RaNITidine HCl (Tab) ZANTAC 150 MG TAKE ONE TABLET BY MOUTH TWICE DAILY WITH MEALS        Tiotropium Bromide Monohydrate (Aero Soln) Tiotropium Bromide Monohydrate 2.5 MCG/ACT Inhale 2 Inhalation by mouth every day. Assemble and prime.        TraZODone HCl (Tab) DESYREL 50 MG Take 1 Tab by mouth at bedtime as needed for Sleep.        Warfarin Sodium (Tab) COUMADIN 10 MG Take 1 tablet by mouth daily as  directed by [facility] Anticoagulation [facility]        .                 Medicines prescribed today were sent to:     [id number] - [place], [place] - [address]    [address] [place] [address]    Phone: [phone] Fax: [phone]    Open 24 Hours?: No    [facility] - [place], [place] - [address]    [address] [place] [address]    Phone: [phone] Fax: [phone]    Open 24 Hours?: No      Medication refill instructions:       If your prescription bottle indicates you have medication refills left, it is not necessary to call your provider’s office. Please contact your pharmacy and they will refill your medication.    If your prescription bottle indicates you do not have any refills left, you may request refills at any time through one of the following ways: The online [employer] system (except Urgent Care), by calling your provider’s office, or by asking your pharmacy to contact your provider’s office with a refill request. Medication refills are processed only during regular business hours and may not be available until the next business day. Your provider may request additional information or to have a follow-up visit with you prior to refilling your medication.   *Please Note: Medication refills are assigned a new Rx number when refilled electronically. Your pharmacy may indicate that no refills were authorized even though a new prescription for the same medication is available at the pharmacy. Please request the medicine by name with the pharmacy before contacting your provider for a refill.           [employer] Access Code: Activation code not generated  Current [employer] Status: Active          Quit Tobacco Information     Do you want to quit using tobacco?    Quitting tobacco decreases risks of cancer, heart and lung disease, increases life expectancy, improves sense of taste and smell, and increases spending money, among other benefits.    If you are thinking about quitting,  we can help.  • [facility] Quit Tobacco Program: [phone]  o Program occurs weekly for four weeks and includes pharmacist consultation on products to support quitting smoking or chewing tobacco. A provider referral is needed for pharmacist consultation.  • Tobacco Users Help Hotline: 3-762-QUIT-NOW (694-6810) or https://[place].quitlogix.org/  o Free, confidential telephone and online coaching for [place] residents. Sessions are designed on a schedule that is convenient for you. Eligible clients receive free nicotine replacement therapy.  • Nationally: www.smokefree.gov  o Information and professional assistance to support both immediate and long-term needs as you become, and remain, a non-smoker. Smokefree.gov allows you to choose the help that best fits your needs.

## 2017-08-14 NOTE — PROGRESS NOTES
Subjective:      Atiya Henley is a 66 y.o. female who presents with Lung Cancer Screening Program Prescreen for lung cancer screening shared decision making visit.         HPI    Patient seen today for initial lung cancer screening visit. Patient referred by her pulmonary provider, PAULIE Holden. PCP is Dr. Pat.     The patient meets eligibility criteria including age, smoking history (30+ pack years), if former smoker, quit in the last 15 years, and absence of signs or symptoms of lung cancer.    - Age - 66  - Smoking history - Patient has smoked for 50 years at an average of 1.5 ppd = 75 pack year smoking history.  - Current smoking status - Current smoker, and she would like to quit but does struggle. She has tried the patches and gum without success.   - No symptoms of lung cancer and no previous history of lung cancer     Allergies   Allergen Reactions   • Pcn [Penicillins] Anaphylaxis and Rash     Reaction; happened as a child. Tolerated cephalosporins and Zosyn     Current Outpatient Prescriptions on File Prior to Visit   Medication Sig Dispense Refill   • Incontinence Supply Disposable (FQ PROTECTIVE UNDERWEAR) Misc USE 1 PULLUP 3 TIMES DAILY 120 Each 3   • Misc. Devices Misc 3 boxes of pouches (94555), 2 boxes of wafers (40746), 3 boxes (tubes) paste, 1 box of no string skin prep, 1 box of no string unsolve 5 Container 6   • doxycycline monohydrate (ADOXA) 100 MG tablet Take 1 Tab by mouth 2 times a day. 20 Tab 0   • mometasone (NASONEX) 50 MCG/ACT nasal spray Spray 1-2 Sprays in nose every day. Each nostril. 1 Inhaler 6   • gabapentin (NEURONTIN) 300 MG Cap TAKE THREE (3) CAPSULES BY MOUTH THREE (3) TIMES DAILY  270 Cap 4   • carbamazepine (TEGRETOL) 200 MG Tab      • hyoscyamine (LEVSIN) 0.125 MG SL Tab      • COMBIVENT RESPIMAT  MCG/ACT Aero Soln      • mometasone (ELOCON) 0.1 % lotion      • nystatin (MYCOSTATIN) 811338 UNIT/GM Ointment      • levothyroxine (SYNTHROID) 200 MCG Tab  TAKE ONE (1) TABLET BY MOUTH EVERY MORNING ON AN EMPTY STOMACH 90 Tab 1   • levothyroxine (SYNTHROID) 25 MCG Tab Take 1 tablet by mouth once every morning on an empty stomach. Use along with 200 mcg levothyroxine for a total of 225 mcg daily 90 Tab 1   • SM NICOTINE POLACRILEX 2 MG Gum CHEW ONE (1) PIECE AS NEEDED FOR SMOKING CESSATION 110 Each 2   • warfarin (COUMADIN) 10 MG Tab Take 1 tablet by mouth daily as directed by [facility] Anticoagulation Program 30 Tab 2   • Tiotropium Bromide Monohydrate (SPIRIVA RESPIMAT) 2.5 MCG/ACT Aero Soln Inhale 2 Inhalation by mouth every day. Assemble and prime. 1 Inhaler 6   • trazodone (DESYREL) 50 MG Tab Take 1 Tab by mouth at bedtime as needed for Sleep. 30 Tab 3   • metformin (GLUCOPHAGE) 500 MG Tab TAKE ONE [1] TABLET BY MOUTH EVERY DAY 90 Tab 0   • ascorbic acid (ASCORBIC ACID) 500 MG Tab TAKE ONE [1] TABLET BY MOUTH EVERY DAY 90 Tab 2   • budesonide-formoterol (SYMBICORT) 160-4.5 MCG/ACT Aerosol Inhale 2 Puffs by mouth 2 Times a Day. With spacer, rinse mouth after use 1 Inhaler 6   • albuterol (PROVENTIL HFA) 108 (90 BASE) MCG/ACT Aero Soln inhalation aerosol Inhale 2 Puffs by mouth every four hours as needed. For Shortness of breath 1 Inhaler 6   • vitamin D, Ergocalciferol, (DRISDOL) 37181 UNITS Cap capsule TAKE 1 CAPSULE BY MOUTH EVERY 28 DAYS. 3 Cap 3   • Ostomy Supplies Misc Dispense as ordered 20 Each 12   • flurazepam (DALMANE) 30 MG capsule Take 30 mg by mouth at bedtime as needed.     • fluoxetine (PROZAC) 20 MG Cap Take 20 mg by mouth every day.     • amitriptyline (ELAVIL) 25 MG Tab Take 25 mg by mouth every bedtime.     • Misc. Devices Misc New battery and gear shifter for power wheel chair 1 Device 0   • Misc. Devices Misc Incontinence supplies (for wheel chair) and adult briefs to use as needed #120 120 Device 6   • Misc. Devices Misc O2 concentrator to use 24/7 and also with her CPAP at night to keep her O2 sats > 90% 1 Device 0   • Misc. Devices Misc Adult  "incontinence supplies to use as directed 60 Device 6   • Misc. Devices Misc Power mobility devise 1 Device 0   • Misc. Devices Misc Please evaluate pt for wider power mobility devise, pt has tipped over on multiple occasions 1 Device 0   • Misc. Devices Misc O2 tank rack for power wheel chair 1 Device 0   • Misc. Devices Misc O2 to use continuously to keep her sats >90 % 1 Device 0   • Misc. Devices Misc CPAP supplies (hoses, filter, water chamber, face mask-resmed small), nasal cannula tubing 10 Device 6   • Misc. Devices Misc Pt needs service for her PMD she will need a new battery to avoid stalling 1 Device 0     No current facility-administered medications on file prior to visit.       Review of Systems   Constitutional: Positive for weight loss (recently lost 14 pounds but she has made raffi diet changes). Negative for malaise/fatigue.   Respiratory: Positive for cough (in the morning), sputum production (clear sputum production in the morning), shortness of breath (with activity more so and sometimes she will have it at rest as well. ) and wheezing (sometimes). Negative for hemoptysis.         She is on O2 with CPAP and nasal canula at 3L during the day          Objective:     /86 mmHg  Pulse 74  Temp(Src) 36.2 °C (97.2 °F)  Resp 14  Ht 1.626 m (5' 4\")  Wt 131.65 kg (290 lb 3.8 oz)  BMI 49.79 kg/m2  SpO2 91%  LMP 08/02/1975     Physical Exam   Constitutional: She is oriented to person, place, and time. She appears well-developed and well-nourished. No distress.   HENT:   Head: Normocephalic and atraumatic.   Cardiovascular: Normal rate, regular rhythm and normal heart sounds.  Exam reveals no gallop and no friction rub.    No murmur heard.  Pulmonary/Chest: Effort normal. No respiratory distress. She has wheezes.   Audible inspiratory wheezing throughout all lung fields.    Musculoskeletal: Normal range of motion.   Neurological: She is alert and oriented to person, place, and time.   Skin: Skin is " warm and dry. She is not diaphoretic.   Vitals reviewed.              Assessment/Plan:     1. Cigarette smoker  CT-LUNG CANCER-SCREENING         We conducted a shared decision-making process using a decision aid. We reviewed benefits and harms of screening, including false positives and potential need for additional diagnostic testing, the possibility of over diagnosis, and total radiation exposure.    We discussed the importance of adhering to annual LDCT screening. We also discussed the impact of comorbities on the patient's the ability or willingness to undergo diagnostic procedure(s) and treatment.    Counseling on the importance of maintaining cigarette smoking abstinence if former smoker; or the importance of smoking cessation if current smoker and, if appropriate, furnishing of information about tobacco cessation interventions. I provided patient with smoking cessation materials and resources within [facility] and the community. Patient appreciative of the resources.       Based on our discussion, we have decided to begin annual lung cancer screening starting now.

## 2017-08-18 LAB — INR BLD: 3.1 (ref 0.9–1.2)

## 2017-08-21 ENCOUNTER — ANTICOAGULATION VISIT (OUTPATIENT)
Dept: VASCULAR LAB | Facility: MEDICAL CENTER | Age: 67
End: 2017-08-21
Attending: INTERNAL MEDICINE
Payer: MEDICAID

## 2017-08-21 ENCOUNTER — OFFICE VISIT (OUTPATIENT)
Dept: MEDICAL GROUP | Facility: MEDICAL CENTER | Age: 67
End: 2017-08-21
Attending: FAMILY MEDICINE
Payer: MEDICAID

## 2017-08-21 VITALS
SYSTOLIC BLOOD PRESSURE: 120 MMHG | DIASTOLIC BLOOD PRESSURE: 62 MMHG | TEMPERATURE: 98.8 F | BODY MASS INDEX: 49.51 KG/M2 | HEIGHT: 64 IN | WEIGHT: 290 LBS | OXYGEN SATURATION: 91 % | HEART RATE: 80 BPM | RESPIRATION RATE: 20 BRPM

## 2017-08-21 DIAGNOSIS — Z95.828 S/P IVC FILTER: ICD-10-CM

## 2017-08-21 DIAGNOSIS — I82.90 DEEP VEIN THROMBOSIS (DVT) OF NON-EXTREMITY VEIN, UNSPECIFIED CHRONICITY: ICD-10-CM

## 2017-08-21 DIAGNOSIS — I82.4Y9 DEEP VEIN THROMBOSIS (DVT) OF PROXIMAL LOWER EXTREMITY, UNSPECIFIED CHRONICITY, UNSPECIFIED LATERALITY (HCC): ICD-10-CM

## 2017-08-21 DIAGNOSIS — Z79.01 CHRONIC ANTICOAGULATION: ICD-10-CM

## 2017-08-21 DIAGNOSIS — L30.9 ECZEMA, UNSPECIFIED TYPE: ICD-10-CM

## 2017-08-21 LAB — INR PPP: 1.8 (ref 2–3.5)

## 2017-08-21 PROCEDURE — 85610 PROTHROMBIN TIME: CPT

## 2017-08-21 PROCEDURE — 99212 OFFICE O/P EST SF 10 MIN: CPT | Mod: 27 | Performed by: NURSE PRACTITIONER

## 2017-08-21 PROCEDURE — 99214 OFFICE O/P EST MOD 30 MIN: CPT | Performed by: FAMILY MEDICINE

## 2017-08-21 RX ORDER — AMMONIUM LACTATE 12 G/100G
LOTION TOPICAL
Qty: 1 BOTTLE | Refills: 3 | Status: SHIPPED | OUTPATIENT
Start: 2017-08-21 | End: 2017-09-21 | Stop reason: SDUPTHER

## 2017-08-21 ASSESSMENT — ENCOUNTER SYMPTOMS
FEVER: 0
DIARRHEA: 0
RHINORRHEA: 0
SHORTNESS OF BREATH: 0
CHILLS: 0
COUGH: 0
NAUSEA: 0
HEADACHES: 0
ANOREXIA: 0
ABDOMINAL PAIN: 0
NAIL CHANGES: 0
VOMITING: 0
EYE PAIN: 0
PALPITATIONS: 0
SORE THROAT: 0
FATIGUE: 0

## 2017-08-21 NOTE — PROGRESS NOTES
Anticoagulation Summary as of 8/21/2017     INR goal 2.0-3.0   Selected INR 1.8! (8/21/2017)   Maintenance plan 5 mg (10 mg x 0.5) on Wed, Fri; 10 mg (10 mg x 1) all other days   Weekly total 60 mg   Plan last modified ALCIRA Alcaraz (8/21/2017)   Next INR check    Priority Routine   Target end date Indefinite    Indications   DVT (deep venous thrombosis) (CMS-[facility]) [I82.409]  Chronic anticoagulation [Z79.01]  S/P IVC filter [Z95.828]  Deep vein thrombosis (DVT) (CMS-[facility]) [I82.409]         Anticoagulation Episode Summary     INR check location [facility]    Preferred lab [facility]    Send INR reminders to     Comments       Anticoagulation Care Providers     Provider Role Specialty Phone number    Carmelo Pat M.D. Referring Family Medicine [phone]    [facility] Responsible  [phone]        Patient is subtherapeutic today. Denies any medication or diet changes. No current symptoms of bleeding or thrombosis reported. No recent VTEs. Will increase regimen. Follow up in 1 week.    Next Appointment: Monday, August 28, 2017 at 1:45 pm.    Bri APODACA

## 2017-08-21 NOTE — MR AVS SNAPSHOT
"Atiya Henley   2017 2:50 PM   Office Visit   MRN: [id number]    [facility]   Dept Phone:  [phone]    Description:  Female : 1950   Provider:  Carmelo Pat M.D.           Reason for Visit     Follow-Up     Rash           Allergies as of 2017     Allergen Noted Reactions    Pcn [Penicillins] 07/10/2014   Anaphylaxis, Rash    Reaction; happened as a child. Tolerated cephalosporins and Zosyn      You were diagnosed with     Eczema, unspecified type   [[id number]]         Vital Signs     Blood Pressure Pulse Temperature Respirations Height Weight    120/62 mmHg 80 37.1 °C (98.8 °F) 20 1.626 m (5' 4.02\") 131.543 kg (290 lb)    Body Mass Index Oxygen Saturation Last Menstrual Period Smoking Status          49.75 kg/m2 91% 1975 Current Every Day Smoker        Basic Information     Date Of Birth Sex Race Ethnicity Preferred Language    1950 Female White Non- English      Your appointments     Aug 28, 2017  1:45 PM   Established Patient with [facility] EXAM 5   [facility]  (--)    [address] [facility] [address]   [phone]            Sep 05, 2017  3:00 PM   CT-LUNG CANCER SCREENING with [address]   [facility] IMAGING - CT - [address])    [address]  [place] [address]   [phone]            Sep 07, 2017  2:40 PM   Established Patient Pul with ALCIRA Chavez   [facility] (--)    [address]  [address]  [place] [address]   [phone]              Problem List              ICD-10-CM Priority Class Noted - Resolved    Morbid obesity (CMS-HCC) E66.01 Low  2009 - Present    YESSICA (obstructive sleep apnea) G47.33 Medium  2009 - Present    Prediabetes R73.03   9/10/2009 - Present    HTN (hypertension) I10 Medium  9/10/2009 - Present    Anemia D64.9 Medium  3/22/2010 - Present    DVT (deep venous thrombosis) (CMS-[facility]) I82.409 Medium  " 4/10/2010 - Present    Anxiety F41.9   5/17/2010 - Present    Depression F32.9 Low  5/17/2010 - Present    Vitamin d deficiency    8/31/2011 - Present    Chronic autoimmune thyroiditis E06.3   10/2/2012 - Present    Chronic anticoagulation Z79.01   10/16/2012 - Present    Vitamin d deficiency    1/30/2013 - Present    Other pulmonary insufficiency, not elsewhere classified, following trauma and surgery J95.89 High  9/2/2013 - Present    COPD (chronic obstructive pulmonary disease) (CMS-HCC) J44.9 Medium  9/2/2013 - Present    Tobacco abuse Z72.0 Low  9/18/2013 - Present    S/P IVC filter Z95.828 Medium  9/18/2013 - Present    Bipolar disorder F31.9 Low  9/18/2013 - Present    History of hernia repair Z98.890, Z87.19   10/7/2013 - Present    Obesity E66.9   7/7/2009 - Present    GERD (gastroesophageal reflux disease) K21.9 Low  11/25/2013 - Present    Colostomy fistula (CMS-HCC) K94.09 High  12/3/2013 - Present    Thrombocytosis (CMS-HCC) D47.3 Medium  12/3/2013 - Present    Hypomagnesemia E83.42 Medium  12/4/2013 - Present    Hyponatremia E87.1 Medium  12/10/2013 - Present    Vitamin D deficiency E55.9   8/4/2015 - Present    Pain in joint, lower leg M25.569   8/21/2015 - Present    Deep vein thrombosis (DVT) (CMS-HCC) I82.409   11/11/2015 - Present    Cervical spondylosis M47.812   5/4/2016 - Present    DDD (degenerative disc disease), cervical M50.30   5/4/2016 - Present    Chronic neck pain M54.2, G89.29   5/4/2016 - Present    Lumbosacral spondylosis M47.817   5/4/2016 - Present    DDD (degenerative disc disease), lumbar M51.36   5/4/2016 - Present    Lumbar radiculopathy M54.16   5/4/2016 - Present    Chronic low back pain M54.5, G89.29   5/4/2016 - Present    Left knee pain M25.562   5/4/2016 - Present    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, Z96.659   5/4/2016 - Present    Chronic pain syndrome G89.4   8/25/2016 - Present    Positive FIT (fecal immunochemical test) R19.5   9/27/2016 - Present      Chronic obstructive asthma (CMS-HCC) J44.9, J45.909   11/14/2016 - Present    Allergic rhinitis J30.9   11/14/2016 - Present    Hypoxemia R09.02   11/14/2016 - Present    Hypothyroidism, acquired, autoimmune E03.8   8/1/2017 - Present      Health Maintenance        Date Due Completion Dates    RETINAL SCREENING 11/2/1970 ---    PAP SMEAR 11/2/1973 ---    IMM ZOSTER VACCINE 11/2/2012 ---    MAMMOGRAM 6/29/2017 6/29/2016    IMM INFLUENZA (1) 9/1/2017 11/14/2016, 10/14/2015, 11/20/2014, 9/3/2013, 9/20/2011, 11/11/2010    A1C SCREENING 9/20/2017 3/20/2017, 8/3/2015, 7/10/2014, 6/20/2014, 11/29/2013, 5/17/2013, 8/31/2012, 7/16/2012, 3/30/2012, 7/19/2011, 5/7/2010, 10/4/2009    DIABETES MONOFILAMENT / LE EXAM 12/6/2017 12/6/2016    FASTING LIPID PROFILE 3/20/2018 3/20/2017, 8/30/2016, 8/3/2015, 7/11/2014, 6/20/2014, 5/17/2013, 7/19/2011, 5/1/2009, 2/23/2009, 9/16/2008, 5/9/2008, 11/29/2007    URINE ACR / MICROALBUMIN 3/20/2018 3/20/2017, 8/3/2015, 6/20/2014    SERUM CREATININE 3/20/2018 3/20/2017, 8/30/2016, 8/3/2015, 7/11/2014, 7/10/2014, 6/20/2014, 2/6/2014, 1/31/2014, 1/17/2014, 1/13/2014, 12/19/2013, 12/18/2013, 12/17/2013, 12/16/2013, 12/15/2013, 12/14/2013, 12/13/2013, 12/12/2013, 12/11/2013, 12/10/2013, 12/10/2013, 12/9/2013, 12/8/2013, 12/7/2013, 12/6/2013, 12/5/2013, 12/4/2013, 12/3/2013, 12/2/2013, 11/30/2013, 11/28/2013, 11/27/2013, 11/25/2013, 11/22/2013, 11/20/2013, 11/10/2013, 10/27/2013, 10/20/2013, 10/15/2013, 10/14/2013, 10/13/2013, 10/12/2013, 10/11/2013, 10/10/2013, 10/9/2013, 10/8/2013, 10/7/2013, 9/25/2013, 9/24/2013, 9/22/2013, 9/21/2013, 9/20/2013, 9/19/2013, 9/18/2013, 9/17/2013, 9/17/2013, 9/15/2013, 9/13/2013, 9/12/2013, 9/11/2013, 9/10/2013, 9/9/2013, 9/9/2013, 9/8/2013, 9/7/2013, 9/7/2013, 9/6/2013, 9/5/2013, 9/4/2013, 9/3/2013, 9/1/2013, 8/31/2013, 5/17/2013, 1/25/2013, 12/13/2012, 10/17/2012, 10/1/2012, 8/24/2012, 8/18/2012, 7/20/2012, 7/17/2012, 7/16/2012, 1/4/2012, 7/20/2011, 7/19/2011,  1/13/2011, 12/6/2010, 10/26/2010, 7/2/2010, 6/23/2010, 5/17/2010, 5/4/2010, 5/3/2010, 5/1/2010, 4/30/2010, 4/29/2010, 4/28/2010, 4/27/2010, 4/26/2010, 4/25/2010, 4/24/2010, 4/23/2010, 4/22/2010, 4/21/2010, 4/20/2010, 4/19/2010, 4/18/2010, 4/17/2010, 4/15/2010, 4/14/2010, 4/13/2010, 4/12/2010, 4/11/2010, 4/10/2010, 4/9/2010, 4/8/2010, 4/7/2010, 4/6/2010, 4/5/2010, 4/4/2010, 4/3/2010, 4/2/2010, 4/1/2010, 3/31/2010, 3/30/2010, 3/29/2010, 3/28/2010, 3/27/2010, 3/26/2010, 3/25/2010, 3/24/2010, 3/23/2010, 3/22/2010, 3/21/2010, 3/20/2010, 3/19/2010, 3/18/2010, 3/17/2010, 3/17/2010, 3/16/2010, 3/15/2010, 3/12/2010, 3/11/2010, 3/5/2010, 3/1/2010, 1/31/2010, 1/30/2010, 1/29/2010, 1/28/2010, 1/27/2010, 1/26/2010, 11/5/2009, 10/7/2009, 10/5/2009, 10/4/2009, 10/3/2009, 10/2/2009, 10/1/2009, 9/21/2009, 9/15/2009, 9/4/2009, 4/27/2009, 2/23/2009, 8/14/2008, 11/29/2007    COLONOSCOPY 11/17/2020 11/17/2010    IMM DTaP/Tdap/Td Vaccine (2 - Td) 5/9/2027 5/9/2017            Current Immunizations     Influenza TIV (IM) 9/3/2013  8:45 PM, 9/20/2011    Influenza Vaccine 12/1/2009    Influenza Vaccine Pediatric 11/11/2010    Influenza Vaccine Quad Inj (Pf) 11/20/2014  1:50 PM    Influenza Vaccine Quad Inj (Preserved) 11/14/2016  9:52 AM, 10/14/2015    Pneumococcal Vaccine (UF)Historical Data 12/1/2009    Pneumococcal polysaccharide vaccine (PPSV-23) 11/14/2016  9:53 AM    Tdap Vaccine 5/9/2017      Below and/or attached are the medications your provider expects you to take. Review all of your home medications and newly ordered medications with your provider and/or pharmacist. Follow medication instructions as directed by your provider and/or pharmacist. Please keep your medication list with you and share with your provider. Update the information when medications are discontinued, doses are changed, or new medications (including over-the-counter products) are added; and carry medication information at all times in the event of emergency  situations     Allergies:  PCN - Anaphylaxis,Rash               Medications  Valid as of: August 21, 2017 -  3:05 PM    Generic Name Brand Name Tablet Size Instructions for use    Albuterol Sulfate (Aero Soln) albuterol 108 (90 Base) MCG/ACT Inhale 2 Puffs by mouth every four hours as needed. For Shortness of breath        Amitriptyline HCl (Tab) ELAVIL 25 MG Take 25 mg by mouth every bedtime.        Ammonium Lactate (Lotion) LAC-HYDRIN 12 % Apply to dry skin after shower as directed. #1        Ascorbic Acid (Tab) ascorbic acid 500 MG TAKE ONE [1] TABLET BY MOUTH EVERY DAY        Budesonide-Formoterol Fumarate (Aerosol) SYMBICORT 160-4.5 MCG/ACT Inhale 2 Puffs by mouth 2 Times a Day. With spacer, rinse mouth after use        CarBAMazepine (Tab) TEGRETOL 200 MG         Ergocalciferol (Cap) DRISDOL 75704 units TAKE 1 CAPSULE BY MOUTH EVERY 28 DAYS.        FLUoxetine HCl (Cap) PROZAC 20 MG Take 20 mg by mouth every day.        Flurazepam HCl (Cap) DALMANE 30 MG Take 30 mg by mouth at bedtime as needed.        Gabapentin (Cap) NEURONTIN 300 MG TAKE THREE (3) CAPSULES BY MOUTH THREE (3) TIMES DAILY         Hyoscyamine Sulfate (SL Tab) LEVSIN 0.125 MG         Incontinence Supply Disposable (Misc) FQ PROTECTIVE UNDERWEAR  USE 1 PULLUP 3 TIMES DAILY        Ipratropium-Albuterol (Aero Soln) COMBIVENT RESPIMAT  MCG/ACT         Levothyroxine Sodium (Tab) SYNTHROID 200 MCG TAKE ONE (1) TABLET BY MOUTH EVERY MORNING ON AN EMPTY STOMACH        Levothyroxine Sodium (Tab) SYNTHROID 25 MCG Take 1 tablet by mouth once every morning on an empty stomach. Use along with 200 mcg levothyroxine for a total of 225 mcg daily        MetFORMIN HCl (Tab) GLUCOPHAGE 500 MG TAKE ONE [1] TABLET BY MOUTH EVERY DAY        Misc. Devices (Misc) Misc. Devices  Pt needs service for her PMD she will need a new battery to avoid stalling        Misc. Devices (Misc) Misc. Devices  CPAP supplies (hoses, filter, water chamber, face mask-resmed small),  nasal cannula tubing        Misc. Devices (Misc) Misc. Devices  O2 to use continuously to keep her sats >90 %        Misc. Devices (Misc) Misc. Devices  Please evaluate pt for wider power mobility devise, pt has tipped over on multiple occasions        Misc. Devices (Misc) Misc. Devices  O2 tank rack for power wheel chair        Misc. Devices (Misc) Misc. Devices  Adult incontinence supplies to use as directed        Misc. Devices (Misc) Misc. Devices  Power mobility devise        Misc. Devices (Misc) Misc. Devices  O2 concentrator to use 24/7 and also with her CPAP at night to keep her O2 sats > 90%        Misc. Devices (Misc) Misc. Devices  Incontinence supplies (for wheel chair) and adult briefs to use as needed #120        Misc. Devices (Misc) Misc. Devices  New battery and gear shifter for power wheel chair        Misc. Devices (Misc) Misc. Devices  3 boxes of pouches (57131), 2 boxes of wafers (57498), 3 boxes (tubes) paste, 1 box of no string skin prep, 1 box of no string unsolve        Mometasone Furoate (Solution) ELOCON 0.1 %         Mometasone Furoate (Suspension) NASONEX 50 MCG/ACT Spray 1-2 Sprays in nose every day. Each nostril.        Nicotine Polacrilex (Gum) SM NICOTINE POLACRILEX 2 MG CHEW ONE (1) PIECE AS NEEDED FOR SMOKING CESSATION        Ostomy Supplies (Misc) Ostomy Supplies  Dispense as ordered        RaNITidine HCl (Tab) ZANTAC 150 MG TAKE ONE TABLET BY MOUTH TWICE DAILY WITH MEALS        Tiotropium Bromide Monohydrate (Aero Soln) Tiotropium Bromide Monohydrate 2.5 MCG/ACT Inhale 2 Inhalation by mouth every day. Assemble and prime.        TraZODone HCl (Tab) DESYREL 50 MG Take 1 Tab by mouth at bedtime as needed for Sleep.        Warfarin Sodium (Tab) COUMADIN 10 MG Take 1 tablet by mouth daily as directed by [facility]        .                 Medicines prescribed today were sent to:     [id number] - [place], [place] - [address]    [address] [address]     Phone: [phone] Fax: [phone]    Open 24 Hours?: No    [facility] - [place], [place] - [address]    [address] [place] [address]    Phone: [phone] Fax: [phone]    Open 24 Hours?: No      Medication refill instructions:       If your prescription bottle indicates you have medication refills left, it is not necessary to call your provider’s office. Please contact your pharmacy and they will refill your medication.    If your prescription bottle indicates you do not have any refills left, you may request refills at any time through one of the following ways: The online Webcom system (except Urgent Care), by calling your provider’s office, or by asking your pharmacy to contact your provider’s office with a refill request. Medication refills are processed only during regular business hours and may not be available until the next business day. Your provider may request additional information or to have a follow-up visit with you prior to refilling your medication.   *Please Note: Medication refills are assigned a new Rx number when refilled electronically. Your pharmacy may indicate that no refills were authorized even though a new prescription for the same medication is available at the pharmacy. Please request the medicine by name with the pharmacy before contacting your provider for a refill.           Webcom Access Code: Activation code not generated  Current Webcom Status: Active          Quit Tobacco Information     Do you want to quit using tobacco?    Quitting tobacco decreases risks of cancer, heart and lung disease, increases life expectancy, improves sense of taste and smell, and increases spending money, among other benefits.    If you are thinking about quitting, we can help.  • [facility] Quit Tobacco Program: [phone]  o Program occurs weekly for four weeks and includes pharmacist consultation on products to support quitting smoking or chewing  tobacco. A provider referral is needed for pharmacist consultation.  • Tobacco Users Help Hotline: 6-800QUIT-NOW (006-7309) or https://[place].quitlogix.org/  o Free, confidential telephone and online coaching for [place] residents. Sessions are designed on a schedule that is convenient for you. Eligible clients receive free nicotine replacement therapy.  • Nationally: www.smokefree.gov  o Information and professional assistance to support both immediate and long-term needs as you become, and remain, a non-smoker. Smokefree.gov allows you to choose the help that best fits your needs.

## 2017-08-21 NOTE — PROGRESS NOTES
"Subjective:      Atiya Henley is a 66 y.o. female who presents with Follow-Up and Rash            Rash  This is a recurrent problem. The current episode started more than 1 month ago. The affected locations include the left arm, right arm, left lower leg and right lowerleg. The rash is characterized by itchiness, dryness, redness and swelling. Pertinent negatives include no anorexia, congestion, cough, diarrhea, eye pain, facial edema, fatigue, fever, joint pain, nail changes, rhinorrhea, shortness of breath, sore throat or vomiting. Treatments tried: will have her apply moisturizers to the affected areas will continue to follow.       Review of Systems   Constitutional: Negative for fever, chills and fatigue.   HENT: Negative for congestion, hearing loss, rhinorrhea and sore throat.    Eyes: Negative for pain.   Respiratory: Negative for cough and shortness of breath.    Cardiovascular: Negative for chest pain and palpitations.   Gastrointestinal: Negative for nausea, vomiting, abdominal pain, diarrhea and anorexia.   Musculoskeletal: Negative for joint pain.   Skin: Positive for itching and rash. Negative for nail changes.   Neurological: Negative for headaches.          Objective:     /62 mmHg  Pulse 80  Temp(Src) 37.1 °C (98.8 °F)  Resp 20  Ht 1.626 m (5' 4.02\")  Wt 131.543 kg (290 lb)  BMI 49.75 kg/m2  SpO2 91%  LMP 08/02/1975     Physical Exam   HENT:   Right Ear: External ear normal.   Left Ear: External ear normal.   Nose: Nose normal.   Mouth/Throat: Oropharynx is clear and moist.   Eyes: EOM are normal. Pupils are equal, round, and reactive to light.   Neck: Normal range of motion.   Cardiovascular: Normal rate, regular rhythm and normal heart sounds.  Exam reveals no friction rub.    No murmur heard.  Pulmonary/Chest: Breath sounds normal. No respiratory distress. She has no wheezes. She has no rales.   Abdominal: Soft. Bowel sounds are normal.   Neurological: She is alert.   Skin: " Skin is warm and dry.   Psychiatric: Her behavior is normal.               Assessment/Plan:     1. Eczema, unspecified type  Will have her apply moisturizers to her affected skin will continue to follow. She has a dermatology appt in Dec for her changing skin lesions. Will continue to follow.   - ammonium lactate (LAC-HYDRIN) 12 % Lotion; Apply to dry skin after shower as directed. #1  Dispense: 1 Bottle; Refill: 3

## 2017-08-21 NOTE — MR AVS SNAPSHOT
Atiya Henley   2017 1:45 PM   Anticoagulation Visit   MRN: [id number]    Department:  Vascular Medicine   Dept Phone:  [phone]    Description:  Female : 1950   Provider:  [facility] EXAM 5           Allergies as of 2017     Allergen Noted Reactions    Pcn [Penicillins] 07/10/2014   Anaphylaxis, Rash    Reaction; happened as a child. Tolerated cephalosporins and Zosyn      You were diagnosed with     S/P IVC filter   [617436]       Deep vein thrombosis (DVT) of proximal lower extremity, unspecified chronicity, unspecified laterality (CMS-HCC)   [0423268]       Deep vein thrombosis (DVT) of non-extremity vein, unspecified chronicity   [9250550]       Chronic anticoagulation   [250977]         Vital Signs     Last Menstrual Period Smoking Status                1975 Current Every Day Smoker          Basic Information     Date Of Birth Sex Race Ethnicity Preferred Language    1950 Female White Non- English      Your appointments     Aug 21, 2017  2:50 PM   Established Patient with Carmelo Pat M.D.   [facility] (Healthcare Center)    [address] [address] [phone]           You will be receiving a confirmation call a few days before your appointment from our automated call confirmation system.            Aug 28, 2017  1:45 PM   Established Patient with [facility] EXAM 5   [facility]  (--)    [address] [address]   [phone]            Sep 05, 2017  3:00 PM   CT-LUNG CANCER SCREENING with [address]   [facility] IMAGING - CT - [address]    [address]  [facility] [address] [phone]            Sep 07, 2017  2:40 PM   Established Patient Pul with ALCIRA Chavez   [facility] Pulmonary Medicine (--)    [address]  [facility] [id number] [phone]              Problem List              ICD-10-CM Priority Class Noted - Resolved       Morbid obesity (CMS-HCC) E66.01 Low  7/7/2009 - Present    YESSICA (obstructive sleep apnea) G47.33 Medium  7/7/2009 - Present    Prediabetes R73.03   9/10/2009 - Present    HTN (hypertension) I10 Medium  9/10/2009 - Present    Anemia D64.9 Medium  3/22/2010 - Present    DVT (deep venous thrombosis) (CMS-HCC) I82.409 Medium  4/10/2010 - Present    Anxiety F41.9   5/17/2010 - Present    Depression F32.9 Low  5/17/2010 - Present    Vitamin d deficiency    8/31/2011 - Present    Chronic autoimmune thyroiditis E06.3   10/2/2012 - Present    Chronic anticoagulation Z79.01   10/16/2012 - Present    Vitamin d deficiency    1/30/2013 - Present    Other pulmonary insufficiency, not elsewhere classified, following trauma and surgery J95.89 High  9/2/2013 - Present    COPD (chronic obstructive pulmonary disease) (CMS-HCC) J44.9 Medium  9/2/2013 - Present    Tobacco abuse Z72.0 Low  9/18/2013 - Present    S/P IVC filter Z95.828 Medium  9/18/2013 - Present    Bipolar disorder F31.9 Low  9/18/2013 - Present    History of hernia repair Z98.890, Z87.19   10/7/2013 - Present    Obesity E66.9   7/7/2009 - Present    GERD (gastroesophageal reflux disease) K21.9 Low  11/25/2013 - Present    Colostomy fistula (CMS-HCC) K94.09 High  12/3/2013 - Present    Thrombocytosis (CMS-HCC) D47.3 Medium  12/3/2013 - Present    Hypomagnesemia E83.42 Medium  12/4/2013 - Present    Hyponatremia E87.1 Medium  12/10/2013 - Present    Vitamin D deficiency E55.9   8/4/2015 - Present    Pain in joint, lower leg M25.569   8/21/2015 - Present    Deep vein thrombosis (DVT) (CMS-HCC) I82.409   11/11/2015 - Present    Cervical spondylosis M47.812   5/4/2016 - Present    DDD (degenerative disc disease), cervical M50.30   5/4/2016 - Present    Chronic neck pain M54.2, G89.29   5/4/2016 - Present    Lumbosacral spondylosis M47.817   5/4/2016 - Present    DDD (degenerative disc disease), lumbar M51.36   5/4/2016 - Present    Lumbar radiculopathy M54.16   5/4/2016 -  Present    Chronic low back pain M54.5, G89.29   5/4/2016 - Present    Left knee pain M25.562   5/4/2016 - Present    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, Z96.659   5/4/2016 - Present    Chronic pain syndrome G89.4   8/25/2016 - Present    Positive FIT (fecal immunochemical test) R19.5   9/27/2016 - Present    Chronic obstructive asthma (CMS-HCC) J44.9, J45.909   11/14/2016 - Present    Allergic rhinitis J30.9   11/14/2016 - Present    Hypoxemia R09.02   11/14/2016 - Present    Hypothyroidism, acquired, autoimmune E03.8   8/1/2017 - Present      Health Maintenance        Date Due Completion Dates    RETINAL SCREENING 11/2/1970 ---    PAP SMEAR 11/2/1973 ---    IMM ZOSTER VACCINE 11/2/2012 ---    MAMMOGRAM 6/29/2017 6/29/2016    IMM INFLUENZA (1) 9/1/2017 11/14/2016, 10/14/2015, 11/20/2014, 9/3/2013, 9/20/2011, 11/11/2010    A1C SCREENING 9/20/2017 3/20/2017, 8/3/2015, 7/10/2014, 6/20/2014, 11/29/2013, 5/17/2013, 8/31/2012, 7/16/2012, 3/30/2012, 7/19/2011, 5/7/2010, 10/4/2009    DIABETES MONOFILAMENT / LE EXAM 12/6/2017 12/6/2016    FASTING LIPID PROFILE 3/20/2018 3/20/2017, 8/30/2016, 8/3/2015, 7/11/2014, 6/20/2014, 5/17/2013, 7/19/2011, 5/1/2009, 2/23/2009, 9/16/2008, 5/9/2008, 11/29/2007    URINE ACR / MICROALBUMIN 3/20/2018 3/20/2017, 8/3/2015, 6/20/2014    SERUM CREATININE 3/20/2018 3/20/2017, 8/30/2016, 8/3/2015, 7/11/2014, 7/10/2014, 6/20/2014, 2/6/2014, 1/31/2014, 1/17/2014, 1/13/2014, 12/19/2013, 12/18/2013, 12/17/2013, 12/16/2013, 12/15/2013, 12/14/2013, 12/13/2013, 12/12/2013, 12/11/2013, 12/10/2013, 12/10/2013, 12/9/2013, 12/8/2013, 12/7/2013, 12/6/2013, 12/5/2013, 12/4/2013, 12/3/2013, 12/2/2013, 11/30/2013, 11/28/2013, 11/27/2013, 11/25/2013, 11/22/2013, 11/20/2013, 11/10/2013, 10/27/2013, 10/20/2013, 10/15/2013, 10/14/2013, 10/13/2013, 10/12/2013, 10/11/2013, 10/10/2013, 10/9/2013, 10/8/2013, 10/7/2013, 9/25/2013, 9/24/2013, 9/22/2013, 9/21/2013, 9/20/2013, 9/19/2013, 9/18/2013,  9/17/2013, 9/17/2013, 9/15/2013, 9/13/2013, 9/12/2013, 9/11/2013, 9/10/2013, 9/9/2013, 9/9/2013, 9/8/2013, 9/7/2013, 9/7/2013, 9/6/2013, 9/5/2013, 9/4/2013, 9/3/2013, 9/1/2013, 8/31/2013, 5/17/2013, 1/25/2013, 12/13/2012, 10/17/2012, 10/1/2012, 8/24/2012, 8/18/2012, 7/20/2012, 7/17/2012, 7/16/2012, 1/4/2012, 7/20/2011, 7/19/2011, 1/13/2011, 12/6/2010, 10/26/2010, 7/2/2010, 6/23/2010, 5/17/2010, 5/4/2010, 5/3/2010, 5/1/2010, 4/30/2010, 4/29/2010, 4/28/2010, 4/27/2010, 4/26/2010, 4/25/2010, 4/24/2010, 4/23/2010, 4/22/2010, 4/21/2010, 4/20/2010, 4/19/2010, 4/18/2010, 4/17/2010, 4/15/2010, 4/14/2010, 4/13/2010, 4/12/2010, 4/11/2010, 4/10/2010, 4/9/2010, 4/8/2010, 4/7/2010, 4/6/2010, 4/5/2010, 4/4/2010, 4/3/2010, 4/2/2010, 4/1/2010, 3/31/2010, 3/30/2010, 3/29/2010, 3/28/2010, 3/27/2010, 3/26/2010, 3/25/2010, 3/24/2010, 3/23/2010, 3/22/2010, 3/21/2010, 3/20/2010, 3/19/2010, 3/18/2010, 3/17/2010, 3/17/2010, 3/16/2010, 3/15/2010, 3/12/2010, 3/11/2010, 3/5/2010, 3/1/2010, 1/31/2010, 1/30/2010, 1/29/2010, 1/28/2010, 1/27/2010, 1/26/2010, 11/5/2009, 10/7/2009, 10/5/2009, 10/4/2009, 10/3/2009, 10/2/2009, 10/1/2009, 9/21/2009, 9/15/2009, 9/4/2009, 4/27/2009, 2/23/2009, 8/14/2008, 11/29/2007    COLONOSCOPY 11/17/2020 11/17/2010    IMM DTaP/Tdap/Td Vaccine (2 - Td) 5/9/2027 5/9/2017            Results     POCT Protime      Component    INR    1.8                        Current Immunizations     Influenza TIV (IM) 9/3/2013  8:45 PM, 9/20/2011    Influenza Vaccine 12/1/2009    Influenza Vaccine Pediatric 11/11/2010    Influenza Vaccine Quad Inj (Pf) 11/20/2014  1:50 PM    Influenza Vaccine Quad Inj (Preserved) 11/14/2016  9:52 AM, 10/14/2015    Pneumococcal Vaccine (UF)Historical Data 12/1/2009    Pneumococcal polysaccharide vaccine (PPSV-23) 11/14/2016  9:53 AM    Tdap Vaccine 5/9/2017      Below and/or attached are the medications your provider expects you to take. Review all of your home medications and newly ordered medications  with your provider and/or pharmacist. Follow medication instructions as directed by your provider and/or pharmacist. Please keep your medication list with you and share with your provider. Update the information when medications are discontinued, doses are changed, or new medications (including over-the-counter products) are added; and carry medication information at all times in the event of emergency situations     Allergies:  PCN - Anaphylaxis,Rash               Medications  Valid as of: August 21, 2017 -  1:45 PM    Generic Name Brand Name Tablet Size Instructions for use    Albuterol Sulfate (Aero Soln) albuterol 108 (90 Base) MCG/ACT Inhale 2 Puffs by mouth every four hours as needed. For Shortness of breath        Amitriptyline HCl (Tab) ELAVIL 25 MG Take 25 mg by mouth every bedtime.        Ascorbic Acid (Tab) ascorbic acid 500 MG TAKE ONE [1] TABLET BY MOUTH EVERY DAY        Budesonide-Formoterol Fumarate (Aerosol) SYMBICORT 160-4.5 MCG/ACT Inhale 2 Puffs by mouth 2 Times a Day. With spacer, rinse mouth after use        CarBAMazepine (Tab) TEGRETOL 200 MG         Doxycycline Monohydrate (Tab) ADOXA 100 MG Take 1 Tab by mouth 2 times a day.        Ergocalciferol (Cap) DRISDOL 71482 units TAKE 1 CAPSULE BY MOUTH EVERY 28 DAYS.        FLUoxetine HCl (Cap) PROZAC 20 MG Take 20 mg by mouth every day.        Flurazepam HCl (Cap) DALMANE 30 MG Take 30 mg by mouth at bedtime as needed.        Gabapentin (Cap) NEURONTIN 300 MG TAKE THREE (3) CAPSULES BY MOUTH THREE (3) TIMES DAILY         Hyoscyamine Sulfate (SL Tab) LEVSIN 0.125 MG         Incontinence Supply Disposable (Misc) FQ PROTECTIVE UNDERWEAR  USE 1 PULLUP 3 TIMES DAILY        Ipratropium-Albuterol (Aero Soln) COMBIVENT RESPIMAT  MCG/ACT         Levothyroxine Sodium (Tab) SYNTHROID 200 MCG TAKE ONE (1) TABLET BY MOUTH EVERY MORNING ON AN EMPTY STOMACH        Levothyroxine Sodium (Tab) SYNTHROID 25 MCG Take 1 tablet by mouth once every morning on an  empty stomach. Use along with 200 mcg levothyroxine for a total of 225 mcg daily        MetFORMIN HCl (Tab) GLUCOPHAGE 500 MG TAKE ONE [1] TABLET BY MOUTH EVERY DAY        Misc. Devices (Misc) Misc. Devices  Pt needs service for her PMD she will need a new battery to avoid stalling        Misc. Devices (Misc) Misc. Devices  CPAP supplies (hoses, filter, water chamber, face mask-resmed small), nasal cannula tubing        Misc. Devices (Misc) Misc. Devices  O2 to use continuously to keep her sats >90 %        Misc. Devices (Misc) Misc. Devices  Please evaluate pt for wider power mobility devise, pt has tipped over on multiple occasions        Misc. Devices (Misc) Misc. Devices  O2 tank rack for power wheel chair        Misc. Devices (Misc) Misc. Devices  Adult incontinence supplies to use as directed        Misc. Devices (Misc) Misc. Devices  Power mobility devise        Misc. Devices (Misc) Misc. Devices  O2 concentrator to use 24/7 and also with her CPAP at night to keep her O2 sats > 90%        Misc. Devices (Misc) Misc. Devices  Incontinence supplies (for wheel chair) and adult briefs to use as needed #120        Misc. Devices (Misc) Misc. Devices  New battery and gear shifter for power wheel chair        Misc. Devices (Misc) Misc. Devices  3 boxes of pouches (57862), 2 boxes of wafers (07652), 3 boxes (tubes) paste, 1 box of no string skin prep, 1 box of no string unsolve        Mometasone Furoate (Solution) ELOCON 0.1 %         Mometasone Furoate (Suspension) NASONEX 50 MCG/ACT Spray 1-2 Sprays in nose every day. Each nostril.        Nicotine Polacrilex (Gum) SM NICOTINE POLACRILEX 2 MG CHEW ONE (1) PIECE AS NEEDED FOR SMOKING CESSATION        Nystatin (Ointment) MYCOSTATIN 685316 UNIT/GM         Ostomy Supplies (Misc) Ostomy Supplies  Dispense as ordered        RaNITidine HCl (Tab) ZANTAC 150 MG TAKE ONE TABLET BY MOUTH TWICE DAILY WITH MEALS        Tiotropium Bromide Monohydrate (Aero Soln) Tiotropium Bromide  Monohydrate 2.5 MCG/ACT Inhale 2 Inhalation by mouth every day. Assemble and prime.        TraZODone HCl (Tab) DESYREL 50 MG Take 1 Tab by mouth at bedtime as needed for Sleep.        Warfarin Sodium (Tab) COUMADIN 10 MG Take 1 tablet by mouth daily as directed by [facility]ticoagulation [facility]        .                 Medicines prescribed today were sent to:     [id number] - [place], [place] - [address] [facility]    [address] [place] [address]    Phone: [phone] Fax: [phone]    Open 24 Hours?: No    [facility] - [place], [place] - [address]    [address] [place] [address]    Phone: [phone] Fax: [phone]    Open 24 Hours?: No      Medication refill instructions:       If your prescription bottle indicates you have medication refills left, it is not necessary to call your provider’s office. Please contact your pharmacy and they will refill your medication.    If your prescription bottle indicates you do not have any refills left, you may request refills at any time through one of the following ways: The online [email or web address] system (except Urgent Care), by calling your provider’s office, or by asking your pharmacy to contact your provider’s office with a refill request. Medication refills are processed only during regular business hours and may not be available until the next business day. Your provider may request additional information or to have a follow-up visit with you prior to refilling your medication.   *Please Note: Medication refills are assigned a new Rx number when refilled electronically. Your pharmacy may indicate that no refills were authorized even though a new prescription for the same medication is available at the pharmacy. Please request the medicine by name with the pharmacy before contacting your provider for a refill.        Warfarin Dosing Calendar   August 2017 Details    Sun Mon Tue Wed Thu Fri Sat       1               2               3                4               5                 6               7               8               9               10               11               12                 13               14               15               16               17               18               19                 20               21   1.8   10 mg   See details      22      10 mg         23      5 mg         24      10 mg         25      5 mg         26      10 mg           27      10 mg         28      10 mg         29               30               31                  Date Details   08/21 This INR check   INR: 1.8       Date of next INR:  8/28/2017         How to take your warfarin dose     To take:  5 mg Take 0.5 of a 10 mg tablet.    To take:  10 mg Take 1 of the 10 mg tablets.              MyChart Access Code: Activation code not generated  Current MyChart Status: Active          Quit Tobacco Information     Do you want to quit using tobacco?    Quitting tobacco decreases risks of cancer, heart and lung disease, increases life expectancy, improves sense of taste and smell, and increases spending money, among other benefits.    If you are thinking about quitting, we can help.  • [facility] Quit Tobacco Program: [phone]  o Program occurs weekly for four weeks and includes pharmacist consultation on products to support quitting smoking or chewing tobacco. A provider referral is needed for pharmacist consultation.  • Tobacco Users Help Hotline: 6-152-QUIT-NOW (159-3289) or https://[place].quitlogix.org/  o Free, confidential telephone and online coaching for [place] residents. Sessions are designed on a schedule that is convenient for you. Eligible clients receive free nicotine replacement therapy.  • Nationally: www.smokefree.gov  o Information and professional assistance to support both immediate and long-term needs as you become, and remain, a non-smoker. Smokefree.gov allows you to choose the help that best fits your needs.

## 2017-08-24 LAB — INR BLD: 1.8 (ref 0.9–1.2)

## 2017-09-01 ENCOUNTER — ANTICOAGULATION VISIT (OUTPATIENT)
Dept: VASCULAR LAB | Facility: MEDICAL CENTER | Age: 67
End: 2017-09-01
Attending: INTERNAL MEDICINE
Payer: MEDICAID

## 2017-09-01 DIAGNOSIS — Z95.828 S/P IVC FILTER: ICD-10-CM

## 2017-09-01 DIAGNOSIS — I82.401 ACUTE DEEP VEIN THROMBOSIS (DVT) OF RIGHT LOWER EXTREMITY, UNSPECIFIED VEIN (HCC): ICD-10-CM

## 2017-09-01 DIAGNOSIS — Z79.01 CHRONIC ANTICOAGULATION: ICD-10-CM

## 2017-09-01 LAB — INR PPP: 1.3 (ref 2–3.5)

## 2017-09-01 PROCEDURE — 99212 OFFICE O/P EST SF 10 MIN: CPT

## 2017-09-01 PROCEDURE — 85610 PROTHROMBIN TIME: CPT

## 2017-09-01 NOTE — PROGRESS NOTES
Anticoagulation Summary  As of 9/1/2017    INR goal:   2.0-3.0   TTR:   39.2 % (2.3 y)   Today's INR:   1.3!   Maintenance plan:   5 mg (10 mg x 0.5) on Wed, Fri; 10 mg (10 mg x 1) all other days   Weekly total:   60 mg   Plan last modified:   ALCIRA Alcaraz (8/21/2017)   Next INR check:   9/5/2017   Priority:   Routine   Target end date:   Indefinite    Indications    DVT (deep venous thrombosis) (CMS-[facility]) [I82.409]  Chronic anticoagulation [Z79.01]  S/P IVC filter [Z95.828]  Deep vein thrombosis (DVT) (CMS-[facility]) [I82.409]             Anticoagulation Episode Summary     [facility]   [facility]    Preferred lab:   [facility]    Send INR reminders to:       Comments:         Anticoagulation Care Providers     Provider Role Specialty Phone number    Carmelo Pat M.D. Referring [facility] Medicine [phone]    [facility] Anticoagulation Services Responsible  [phone]        Anticoagulation Patient Findings    Pt remains sub therapeutic today, trending down.  Will bolus dose with 15mg po qhs x3 then 10mg then test  Follow up in 4 days    Ruth Munoz PharmD      Pt continues to smoke 1/2-1 ppd.  Written materials given.        Pt declines vitals today

## 2017-09-05 ENCOUNTER — ANTICOAGULATION VISIT (OUTPATIENT)
Dept: VASCULAR LAB | Facility: MEDICAL CENTER | Age: 67
End: 2017-09-05
Attending: INTERNAL MEDICINE
Payer: MEDICAID

## 2017-09-05 ENCOUNTER — ANTICOAGULATION MONITORING (OUTPATIENT)
Dept: VASCULAR LAB | Facility: MEDICAL CENTER | Age: 67
End: 2017-09-05

## 2017-09-05 ENCOUNTER — HOSPITAL ENCOUNTER (OUTPATIENT)
Dept: RADIOLOGY | Facility: MEDICAL CENTER | Age: 67
End: 2017-09-05
Attending: NURSE PRACTITIONER
Payer: MEDICAID

## 2017-09-05 VITALS — DIASTOLIC BLOOD PRESSURE: 67 MMHG | SYSTOLIC BLOOD PRESSURE: 135 MMHG | HEART RATE: 69 BPM

## 2017-09-05 DIAGNOSIS — Z95.828 S/P IVC FILTER: ICD-10-CM

## 2017-09-05 DIAGNOSIS — Z79.01 CHRONIC ANTICOAGULATION: ICD-10-CM

## 2017-09-05 DIAGNOSIS — F17.210 CIGARETTE SMOKER: ICD-10-CM

## 2017-09-05 DIAGNOSIS — I82.401 ACUTE DEEP VEIN THROMBOSIS (DVT) OF RIGHT LOWER EXTREMITY, UNSPECIFIED VEIN (HCC): ICD-10-CM

## 2017-09-05 LAB — INR PPP: 1.1 (ref 2–3.5)

## 2017-09-05 PROCEDURE — G0297 LDCT FOR LUNG CA SCREEN: HCPCS

## 2017-09-05 PROCEDURE — 85610 PROTHROMBIN TIME: CPT | Performed by: NURSE PRACTITIONER

## 2017-09-05 PROCEDURE — 99212 OFFICE O/P EST SF 10 MIN: CPT | Performed by: NURSE PRACTITIONER

## 2017-09-05 NOTE — PROGRESS NOTES
Anticoagulation Summary  As of 9/5/2017    INR goal:   2.0-3.0   TTR:   39.0 % (2.3 y)   Today's INR:   1.1!   Maintenance plan:   5 mg (10 mg x 0.5) on Wed, Fri; 10 mg (10 mg x 1) all other days   Weekly total:   60 mg   Plan last modified:   PRIYA AlcarazPCHU (8/21/2017)   Next INR check:   9/11/2017   Priority:   Routine   Target end date:   Indefinite    Indications    DVT (deep venous thrombosis) (CMS-[facility]) [I82.409]  Chronic anticoagulation [Z79.01]  S/P IVC filter [Z95.828]  Deep vein thrombosis (DVT) (CMS-[facility]) [I82.409]             Anticoagulation Episode Summary     [facility] check [facility]   [facility]    Preferred lab:   [email or web address]    Send INR reminders to:       Comments:         Anticoagulation Care Providers     Provider Role Specialty Phone number    Carmelo Pat M.D. Referring Family Medicine [phone]    [facility] Anticoagulation Services Responsible  [phone]        Anticoagulation Patient Findings          Atiya Henley seen in clinic today  INR  sub-therapeutic.    Denies signs/symptoms of bleeding and/or thrombosis.    Denies changes to diet or medications.   Follow up appointment in 1 week(s). Unable to come in before Monday.     9/5 Take 20 mg tonight  Plus LOVENOX 120 mg ,   9/6 15 mg warfarin PLUS LOVENOX 120 MG AM & PM    9/7 15 mg Warfarin plus Lovenox 120 am & pm   9/8 10 mg Warfarin and Lovenox 120 mg am  Then follow usual schedule of 10 mg daily.       Verna Medina A.P.N.    9/15/2017    Concur with plan outlined above    Rashaun Benavidez, RodriguezD

## 2017-09-06 ENCOUNTER — TELEPHONE (OUTPATIENT)
Dept: HEMATOLOGY ONCOLOGY | Facility: MEDICAL CENTER | Age: 67
End: 2017-09-06

## 2017-09-06 LAB
INR BLD: 1.1 (ref 0.9–1.2)
INR BLD: 1.3 (ref 0.9–1.2)

## 2017-09-06 NOTE — TELEPHONE ENCOUNTER
Phoned patient with results of LDCT exam performed 9/5/2017 following review with PAULIE Bronson. Notified her that the results showed no lung nodules and no sign of lung cancer. Recommended to continue annual screening with LDCT in 12 months.  Informed her that the results did show findings consistent with mild pneumonitis. Explained that the results showed diffuse interstitial and groundglass opacities both lungs especially within the upper lobe similar to previous findings.  Also informed her of left lower lobe atelectasis.  Provided pt education on pneumonitis and recommended she follow up with Dr. Pat.  She verbalized understanding understanding.  Pt's PCP Dr. Pat and PAULIE Holden, notified of incidental findings by RN via [employer] message.  Health maintenance updated and pt sent result letter.

## 2017-09-14 DIAGNOSIS — J44.89 CHRONIC OBSTRUCTIVE ASTHMA: ICD-10-CM

## 2017-09-14 DIAGNOSIS — I82.409 DEEP VEIN THROMBOSIS (DVT) OF LOWER EXTREMITY, UNSPECIFIED CHRONICITY, UNSPECIFIED LATERALITY, UNSPECIFIED VEIN (HCC): ICD-10-CM

## 2017-09-14 RX ORDER — WARFARIN SODIUM 10 MG/1
TABLET ORAL
Qty: 30 TAB | Refills: 3 | Status: SHIPPED | OUTPATIENT
Start: 2017-09-14 | End: 2018-04-05

## 2017-09-14 RX ORDER — BUDESONIDE AND FORMOTEROL FUMARATE DIHYDRATE 160; 4.5 UG/1; UG/1
AEROSOL RESPIRATORY (INHALATION)
Qty: 1 INHALER | Refills: 0 | Status: SHIPPED | OUTPATIENT
Start: 2017-09-14 | End: 2017-10-26 | Stop reason: SDUPTHER

## 2017-09-14 NOTE — TELEPHONE ENCOUNTER
Have we ever prescribed this med? Yes.  If yes, what date? 11/14/2016    Last OV: 07/26/2017 - Alesia Mckeon    Next OV: none scheduled; No showed 9/7/2017 appt with alesia mckeon     DX: Chronic obstructive asthma    Medications: Symbicort

## 2017-09-21 ENCOUNTER — ANTICOAGULATION VISIT (OUTPATIENT)
Dept: VASCULAR LAB | Facility: MEDICAL CENTER | Age: 67
End: 2017-09-21
Attending: INTERNAL MEDICINE
Payer: MEDICAID

## 2017-09-21 ENCOUNTER — OFFICE VISIT (OUTPATIENT)
Dept: MEDICAL GROUP | Facility: MEDICAL CENTER | Age: 67
End: 2017-09-21
Attending: FAMILY MEDICINE
Payer: MEDICAID

## 2017-09-21 VITALS — DIASTOLIC BLOOD PRESSURE: 66 MMHG | HEART RATE: 74 BPM | SYSTOLIC BLOOD PRESSURE: 134 MMHG

## 2017-09-21 VITALS
RESPIRATION RATE: 16 BRPM | DIASTOLIC BLOOD PRESSURE: 82 MMHG | HEART RATE: 88 BPM | WEIGHT: 291 LBS | BODY MASS INDEX: 49.68 KG/M2 | SYSTOLIC BLOOD PRESSURE: 116 MMHG | OXYGEN SATURATION: 93 % | HEIGHT: 64 IN | TEMPERATURE: 98 F

## 2017-09-21 DIAGNOSIS — Z23 INFLUENZA VACCINE NEEDED: ICD-10-CM

## 2017-09-21 DIAGNOSIS — H26.9 CATARACT OF BOTH EYES, UNSPECIFIED CATARACT TYPE: ICD-10-CM

## 2017-09-21 DIAGNOSIS — Z79.01 CHRONIC ANTICOAGULATION: ICD-10-CM

## 2017-09-21 DIAGNOSIS — L30.9 ECZEMA, UNSPECIFIED TYPE: ICD-10-CM

## 2017-09-21 DIAGNOSIS — Z95.828 S/P IVC FILTER: ICD-10-CM

## 2017-09-21 LAB — INR PPP: 1.7 (ref 2–3.5)

## 2017-09-21 PROCEDURE — 90471 IMMUNIZATION ADMIN: CPT | Performed by: FAMILY MEDICINE

## 2017-09-21 PROCEDURE — 99214 OFFICE O/P EST MOD 30 MIN: CPT | Mod: 25 | Performed by: FAMILY MEDICINE

## 2017-09-21 PROCEDURE — 99212 OFFICE O/P EST SF 10 MIN: CPT

## 2017-09-21 PROCEDURE — 99213 OFFICE O/P EST LOW 20 MIN: CPT | Mod: 25 | Performed by: FAMILY MEDICINE

## 2017-09-21 PROCEDURE — 85610 PROTHROMBIN TIME: CPT

## 2017-09-21 PROCEDURE — 90686 IIV4 VACC NO PRSV 0.5 ML IM: CPT | Performed by: FAMILY MEDICINE

## 2017-09-21 RX ORDER — AMMONIUM LACTATE 12 G/100G
LOTION TOPICAL
Qty: 1 BOTTLE | Refills: 3 | Status: ON HOLD | OUTPATIENT
Start: 2017-09-21 | End: 2018-04-07

## 2017-09-21 ASSESSMENT — ENCOUNTER SYMPTOMS
FATIGUE: 0
FEVER: 0
SORE THROAT: 0
CHILLS: 0
BLURRED VISION: 0
DOUBLE VISION: 1
ANOREXIA: 0
HEADACHES: 0
DIARRHEA: 0
PALPITATIONS: 0
SPUTUM PRODUCTION: 0
FOCAL WEAKNESS: 1
NAIL CHANGES: 0
BACK PAIN: 1
VOMITING: 0
EYE PAIN: 0
PHOTOPHOBIA: 0
RHINORRHEA: 0
SHORTNESS OF BREATH: 0
TINGLING: 1
COUGH: 1

## 2017-09-21 NOTE — PROGRESS NOTES
Anticoagulation Summary  As of 9/21/2017    INR goal:   2.0-3.0   TTR:   38.3 % (2.4 y)   Today's INR:   1.7!   Maintenance plan:   5 mg (10 mg x 0.5) on Wed; 10 mg (10 mg x 1) all other days   Weekly total:   65 mg   Plan last modified:   Bon Ash, PharmD (9/21/2017)   Next INR check:   9/26/2017   Priority:   Routine   Target end date:   Indefinite    Indications    DVT (deep venous thrombosis) (CMS-[facility]) [I82.409]  Chronic anticoagulation [Z79.01]  S/P IVC filter [Z95.828]  Deep vein thrombosis (DVT) (CMS-[facility]) [I82.409]             Anticoagulation Episode Summary     [facility]   [facility]    Preferred lab:   [facility]    Send INR reminders to:       Comments:         Anticoagulation Care Providers     Provider Role Specialty Phone number    Carmelo Pat M.D. Referring Family Medicine [phone]    [facility] Anticoagulation Services Responsible  [phone]        Anticoagulation Patient Findings    HPI:  Atiya Henley seen in clinic today, on anticoagulation therapy with warfarin for DVT.  Changes to current medical/health status since last appt: None  Denies signs/symptoms of bleeding and/or thrombosis since the last appt.    Denies any interval changes to diet  Denies any interval changes to medications since last appt.   Denies any complications or cost restrictions with current therapy.   BP recorded in vitals.  Pt refuses enoxaparin.      A/P   INR  SUB-therapeutic.   Bolus today then begin 8% increased regimen.     Follow up appointment in 5 days.    Bon Ash, RodriguezD

## 2017-09-21 NOTE — PROGRESS NOTES
Subjective:      Atiya Henley is a 66 y.o. female who presents with No chief complaint on file.            Pt here for follow up of her rash which has improved slightly. She will need a refill of her lotion, she has an appt with dermatology in Dec. Will have her also use sunscreen over her lotion to prevent further sun irritation. Will continue to follow.    She recently had cataract surgery and since has been seeing double. Will have her follow up with her eye surgeon for her double vision.      Current medications, allergies, and problem list reviewed with patient and updated in [facility].        Rash   This is a recurrent problem. The current episode started more than 1 month ago. The problem has been gradually improving since onset. The affected locations include the left arm and right arm. The rash is characterized by dryness, itchiness, scaling and redness. She was exposed to nothing. Associated symptoms include coughing. Pertinent negatives include no anorexia, congestion, diarrhea, eye pain, facial edema, fatigue, fever, joint pain, nail changes, rhinorrhea, shortness of breath, sore throat or vomiting. Past treatments include anti-itch cream, moisturizer and topical steroids (will have her also see dermatologist). The treatment provided mild relief. Her past medical history is significant for eczema.       Review of Systems   Constitutional: Negative for chills, fatigue and fever.   HENT: Negative for congestion, hearing loss, rhinorrhea and sore throat.    Eyes: Positive for double vision. Negative for blurred vision, photophobia and pain.        Following cataract surgery   Respiratory: Positive for cough. Negative for sputum production and shortness of breath.    Cardiovascular: Negative for chest pain and palpitations.   Gastrointestinal: Negative for anorexia, diarrhea and vomiting.   Musculoskeletal: Positive for back pain. Negative for joint pain.   Skin: Positive for itching and rash. Negative for  "nail changes.   Neurological: Positive for tingling and focal weakness. Negative for headaches.          Objective:     LMP 07/19/2011    Vitals:    09/21/17 1439 09/21/17 1440   BP: 116/82    Pulse: 88    Resp: 16    Temp: 36.7 °C (98 °F)    SpO2: (!) 86% 93%   Weight: (!) 132 kg (291 lb)    Height: 1.626 m (5' 4.02\")              Physical Exam   Constitutional: She is oriented to person, place, and time.   Cardiovascular: Normal rate, regular rhythm and normal heart sounds.  Exam reveals no friction rub.    No murmur heard.  Pulmonary/Chest: Effort normal and breath sounds normal. No respiratory distress. She has no wheezes. She has no rales.   Slight coarse breath sounds   Abdominal: Soft. Bowel sounds are normal.   Musculoskeletal:   In power wheelchair   Neurological: She is alert and oriented to person, place, and time.   Skin: Skin is warm and dry. Rash noted. There is erythema.   BUE   Psychiatric: She has a normal mood and affect. Her behavior is normal.   Nursing note and vitals reviewed.              Assessment/Plan:     1. Eczema, unspecified type  Will have her continue to use her moisturizer as directed, she has an appt with a dermatologist for her rash.  - ammonium lactate (LAC-HYDRIN) 12 % Lotion; Apply to dry skin after shower as directed. #1  Dispense: 1 Bottle; Refill: 3    2. Influenza vaccine needed  She would like to get a flu shot, she understands the risks and benefits of the flu shot. No recent fevers, no egg allergies, and no hx of GBS.    - Flu Quad Inj >3 Year Pre-Filled PF    3. Cataract of both eyes, unspecified cataract type  She recently had cataract surgery and has been since having double vision she will be following up with her ophthalmologist for a further evaluation and management.       "

## 2017-09-22 LAB — INR BLD: 1.7 (ref 0.9–1.2)

## 2017-09-28 ENCOUNTER — TELEPHONE (OUTPATIENT)
Dept: VASCULAR LAB | Facility: MEDICAL CENTER | Age: 67
End: 2017-09-28

## 2017-09-28 ENCOUNTER — ANTICOAGULATION VISIT (OUTPATIENT)
Dept: VASCULAR LAB | Facility: MEDICAL CENTER | Age: 67
End: 2017-09-28
Attending: INTERNAL MEDICINE
Payer: MEDICAID

## 2017-09-28 DIAGNOSIS — Z79.01 CHRONIC ANTICOAGULATION: ICD-10-CM

## 2017-09-28 DIAGNOSIS — Z95.828 S/P IVC FILTER: ICD-10-CM

## 2017-09-28 LAB
INR BLD: 3.5 (ref 0.9–1.2)
INR PPP: 3.5 (ref 2–3.5)

## 2017-09-28 PROCEDURE — 99212 OFFICE O/P EST SF 10 MIN: CPT

## 2017-09-28 PROCEDURE — 85610 PROTHROMBIN TIME: CPT

## 2017-09-28 NOTE — TELEPHONE ENCOUNTER
[facility]    Pt missed appt yesterday.  Called pt and made appt for today.    Bon Ash, PharmD

## 2017-09-28 NOTE — PROGRESS NOTES
Anticoagulation Summary  As of 9/28/2017    INR goal:   2.0-3.0   TTR:   38.4 % (2.4 y)   Today's INR:   3.5!   Maintenance plan:   5 mg (10 mg x 0.5) on Wed; 10 mg (10 mg x 1) all other days   Weekly total:   65 mg   Plan last modified:   Rodriguez MartinezD (9/21/2017)   Next INR check:   10/12/2017   Priority:   Routine   Target end date:   Indefinite    Indications    DVT (deep venous thrombosis) (CMS-[facility]) [I82.409]  Chronic anticoagulation [Z79.01]  S/P IVC filter [Z95.828]  Deep vein thrombosis (DVT) (CMS-[facility]) [I82.409]             Anticoagulation Episode Summary     INR check location:   [facility]    Preferred lab:   [facility]    Send INR reminders to:       Comments:         Anticoagulation Care Providers     Provider Role Specialty Phone number    Carmelo Pat M.D. Referring Family Medicine [phone]    [facility] Anticoagulation Services Responsible  [phone]        Anticoagulation Patient Findings    HPI:  Atiya Henley seen in clinic today, on anticoagulation therapy with warfarin for DVT  Changes to current medical/health status since last appt:   Denies signs/symptoms of bleeding and/or thrombosis since the last appt.    Denies any interval changes to diet  Denies any interval changes to medications since last appt.   Denies any complications or cost restrictions with current therapy.   BP recorded in vitals.    A/P   INR  supra-therapeutic.   5mg today then continue weekly warfarin dose as noted      Follow up appointment in 4 week(s).     Bob Dupree, RodriguezD

## 2017-10-26 ENCOUNTER — ANTICOAGULATION VISIT (OUTPATIENT)
Dept: VASCULAR LAB | Facility: MEDICAL CENTER | Age: 67
End: 2017-10-26
Attending: INTERNAL MEDICINE
Payer: MEDICAID

## 2017-10-26 ENCOUNTER — OFFICE VISIT (OUTPATIENT)
Dept: PULMONOLOGY | Facility: HOSPICE | Age: 67
End: 2017-10-26
Payer: MEDICAID

## 2017-10-26 VITALS
BODY MASS INDEX: 49.92 KG/M2 | SYSTOLIC BLOOD PRESSURE: 146 MMHG | WEIGHT: 292.4 LBS | DIASTOLIC BLOOD PRESSURE: 84 MMHG | HEART RATE: 86 BPM | HEIGHT: 64 IN | OXYGEN SATURATION: 92 % | RESPIRATION RATE: 16 BRPM

## 2017-10-26 DIAGNOSIS — J44.89 CHRONIC OBSTRUCTIVE ASTHMA: ICD-10-CM

## 2017-10-26 DIAGNOSIS — J30.9 ALLERGIC RHINITIS, UNSPECIFIED CHRONICITY, UNSPECIFIED SEASONALITY, UNSPECIFIED TRIGGER: ICD-10-CM

## 2017-10-26 DIAGNOSIS — G47.33 OSA (OBSTRUCTIVE SLEEP APNEA): ICD-10-CM

## 2017-10-26 DIAGNOSIS — F17.200 TOBACCO DEPENDENCE: ICD-10-CM

## 2017-10-26 DIAGNOSIS — R09.02 HYPOXEMIA: ICD-10-CM

## 2017-10-26 DIAGNOSIS — Z95.828 S/P IVC FILTER: ICD-10-CM

## 2017-10-26 DIAGNOSIS — Z79.01 CHRONIC ANTICOAGULATION: ICD-10-CM

## 2017-10-26 LAB
INR BLD: 2.5 (ref 0.9–1.2)
INR PPP: 2.5 (ref 2–3.5)

## 2017-10-26 PROCEDURE — 85610 PROTHROMBIN TIME: CPT

## 2017-10-26 PROCEDURE — 99211 OFF/OP EST MAY X REQ PHY/QHP: CPT | Performed by: PHARMACIST

## 2017-10-26 PROCEDURE — 99214 OFFICE O/P EST MOD 30 MIN: CPT | Performed by: NURSE PRACTITIONER

## 2017-10-26 RX ORDER — BUDESONIDE AND FORMOTEROL FUMARATE DIHYDRATE 160; 4.5 UG/1; UG/1
2 AEROSOL RESPIRATORY (INHALATION) 2 TIMES DAILY
Qty: 1 INHALER | Refills: 11 | Status: SHIPPED | OUTPATIENT
Start: 2017-10-26 | End: 2019-01-07 | Stop reason: SDUPTHER

## 2017-10-26 NOTE — PROGRESS NOTES
Anticoagulation Summary  As of 10/26/2017    INR goal:   2.0-3.0   TTR:   38.8 % (2.5 y)   Today's INR:   2.5   Maintenance plan:   5 mg (10 mg x 0.5) on Wed; 10 mg (10 mg x 1) all other days   Weekly total:   65 mg   Plan last modified:   Bon Ash, PharmD (9/21/2017)   Next INR check:   12/5/2017   Priority:   Routine   Target end date:   Indefinite    Indications    DVT (deep venous thrombosis) (CMS-[facility]) [I82.409]  Chronic anticoagulation [Z79.01]  S/P IVC filter [Z95.828]  Deep vein thrombosis (DVT) (CMS-[facility]) [I82.409]             Anticoagulation Episode Summary     INR check location[facility]   [facility]    Preferred lab:   [facility]    Send INR reminders to:       Comments:         Anticoagulation Care Providers     Provider Role Specialty Phone number    Carmelo Pat M.D. Referring Family Medicine [phone]    [facility] Anticoagulation Services Responsible  [phone]        Anticoagulation Patient Findings      HPI:  Atiya Henley seen in clinic today, on anticoagulation therapy with warfarin for DVT  Changes to current medical/health status since last appt: denies  Denies signs/symptoms of bleeding and/or thrombosis since the last appt.    Denies any interval changes to diet  Denies any interval changes to medications since last appt.   Denies any complications or cost restrictions with current therapy.   Declines BP check      A/P   INR  is-therapeutic.   Will continue with the same warfarin dosing.     Follow up appointment in 6 week(s).    Porsche Bello, PharmD

## 2017-10-26 NOTE — PROGRESS NOTES
Chief Complaint   Patient presents with   • Apnea     14 CM H2O       HPI:  Atiya Henley is a 66 y.o. year old female here today for follow-up on her chronic obstructive Asthma. She also has a history of Obstructive Sleep Apnea and had been on CPAP 9 CM H20 with 3 LPM 02 bleed in. She had been followed by Dr. Hodge in the past, but he retired. She did not bring her compliance chip to her last office visit. However, she felt the pressure was too low and she was waking un refreshed. She underwent a dedicated in lab CPAP titration study in July which indicates adequate titration CPAP of 14 CM H20 with a resultant AHI of 1, mean 02 saturation of 87.5%. She was increased to CPAP of 14 CM H20 with 3 LPM 02 bleed in at her last office visit. She did not bring her chip to today's office visit. We are unable to get a download from her DME. She does tolerate the new pressure well. She does feel she is sleeping better on therapy and is waking more refreshed. She denies any morning headaches.      She is on oxygen at 3 LPM with exertion. PFT's 5/22/2017 indicated an FEV1 of 1.42 L, 63% predicted with an FEV1/FVC ratio of 84 with a TLC of 78% and a DLCO of 97% predicted. Prior PFT's indicated an FEV1 of 1.55 L, 67% predicted with an FEV1/FVC ratio of 84 with a TLC of 84% predicted with a DLCO of 88% predicted. She does continue to smoke cigarettes and is smoking 1/24-1/2 pack per day. She is working on cutting back.       She is compliant on Symbicort 160/4.5 mcg 2 puffs INH bid, along with a Combivent respimat and Proventil HFA. She was restarted on Spiriva respimat 2.5 mcg 2 puffs INH daily. She feels her chronic dyspnea is worse with exertion, but overall unchanged. She has a morning cough which is productive with clear mucous. She denies hemoptysis. She notes an occasional wheeze in the morning and with exertion. She denies any fevers or chills. She denies any recent respiratory infections. She does have clear sinus  drainage which is chronic for her and she feels may be allergy triggered. She does have a Nasonex nasal spray which she uses as needed an feels helps.      She was referred to lung cancer screening. She had a CT chest 9/5/2017 which indicated;  Diffuse interstitial and groundglass opacities both lungs especially within the upper lobe similar to previous findings. Consistent with mild pneumonitis.  Left lower lobe atelectasis similar to previous findings. No pleural effusion.  No focal mass identified.  She is recommended annual imaging.     Past Medical History:   Diagnosis Date   • Arthritis    • Bowel obstruction    • Cancer (CMS-HCC)     cervical   • Chronic autoimmune thyroiditis     + TPO ab = 563 / + ultrasound   • Colostomy in place (CMS-HCC) 2009   • COPD    • Dental disorder     dentures upper   • Diabetes     type 2    • EMPHYSEMA    • Hashimoto's disease    • History of total knee arthroplasty     infection   • Hypertension     stated no longer on meds due to lost 100 lbs   • Hypothyroid 7/7/2009   • Indigestion    • Infectious disease     MRSA,   • Infectious disease     VRE   • Obesity 7/7/2009   • Obstruction     colostomy   • Oxygen dependent     4.5 L NC   • Pain     b/l legs   • Personal history of venous thrombosis and embolism 2009, 2012    arm and leg left side   • Presence of IVC filter    • Psychiatric problem     depression   • Sleep apnea     USES CPAP, 3-4L oxygen   • Snoring    • Status post cholecystectomy 1992   • Weight gain        Past Surgical History:   Procedure Laterality Date   • PB INJECT RX OTHER PERIPH NERVE Left 8/21/2015    Procedure: NEUROLYTIC DEST-OTHER NERVE;  Surgeon: Kd Galaviz D.O.;  [facility];  Service: Pain Management   • PB FLUOROSCOPIC GUIDANCE NEEDLE PLACEMENT Left 8/21/2015    Procedure: FLOURO GUIDE NEEDLE PLACEMENT;  Surgeon: Kd Galaviz D.O.;  [facility];  Service: Pain Management   • PB INJECT  RX OTHER PERIPH NERVE  8/21/2015    Procedure: NEUROLYTIC DEST-OTHER NERVE;  Surgeon: Kd Galaviz D.O.;  Location: [facility];  Service: Pain Management   • PB INJECT RX OTHER PERIPH NERVE  8/21/2015    Procedure: NEUROLYTIC DEST-OTHER NERVE;  Surgeon: Kd Galaviz D.O.;  Location: [facility];  Service: Pain Management   • EXPLORATORY LAPAROTOMY  12/10/2013    Performed by Sang Castañeda M.D. at [facility]   • WOUND CLOSURE GENERAL  12/10/2013    Performed by Sang Castañeda M.D. at [facility]   • COLOSTOMY  11/26/2013    Performed by Maninder Carter M.D. at [facility]   • EXPLORATORY LAPAROTOMY  11/26/2013    Performed by Maninder Carter M.D. at [facility]   • LYSIS ADHESIONS GENERAL  11/26/2013    Performed by Maninder Carter M.D. at [facility]   • COLON RESECTION  11/26/2013    Performed by Maninder Carter M.D. at [facility]   • BLADDER BIOPSY WITH CYSTOSCOPY  10/10/2013    Performed by Sang Castañeda M.D. at [facility]   • EXPLORATORY LAPAROTOMY  9/2/2013    Performed by Maninder Carter M.D. at [facility]   • PARASTOMAL HERNIA REPAIR   9/2/2013    Performed by Maninder Carter M.D. at [facility]   • IRRIGATION & DEBRIDEMENT ORTHO  2/8/2013    Performed by David Fowler M.D. at [facility]   • IRRIGATION & DEBRIDEMENT GENERAL  12/17/2012    Performed by David Fowler M.D. at [facility]   • IRRIGATION & DEBRIDEMENT ORTHO  7/19/2012    Performed by BERYL LOYD at [facility]   • IRRIGATION & DEBRIDEMENT ORTHO  7/17/2012    Performed by BERYL LOYD at [facility]   • KNEE ARTHROPLASTY TOTAL  6/10/2012    left   • LATERAL RELEASE  7/20/2011    Performed by BERYL LOYD at [facility]   • KNEE ARTHROSCOPY  7/20/2011    Performed by BERYL LOYD at SURGERY  [facility]   • MEDIAL MENISCECTOMY  7/20/2011    Performed by BERYL LOYD at SURGERY [facility]   • MENISCECTOMY  7/20/2011    Performed by BERYL LOYD at SURGERY [facility]   • COLONOSCOPY - ENDO  11/17/2010    Performed by JESUSITA SUMNER at ENDOSCOPY [facility]   • TRACHEOSTOMY  3/31/2010    Performed by JESUSITA SUMNER at SURGERY [facility]   • COLOSTOMY  3/16/2010    Performed by CHRISTA BENNETT at SURGERY [facility]   • EXPLORATORY LAPAROTOMY  3/16/2010    Performed by CHRISTA BENNETT at SURGERY [facility]   • COLECTOMY  3/16/2010    Performed by CHRISTA BENNETT at SURGERY [facility]   • LOW ANTERIOR RESECTION  3/10/2010    Performed by JESUSITA SUMNER at SURGERY [facility]   • LOW ANTERIOR RESECTION LAPAROSCOPIC  3/10/2010    Performed by JESUSITA SUMNER at SURGERY [facility]   • MYRINGOTOMY  9/9/2009    Performed by RENETTA GOODSON at SURGERY [facility]   • CHOLECYSTECTOMY  1992    laparoscopic   • HYSTERECTOMY LAPAROSCOPY  1983   • OTHER ORTHOPEDIC SURGERY      LT KNEE X2       Family History   Problem Relation Age of Onset   • Heart Disease Mother    • Hypertension         Social History     Social History   • Marital status:      Spouse name: N/A   • Number of children: N/A   • Years of education: N/A     Occupational History   • Not on file.     Social History Main Topics   • Smoking status: Current Every Day Smoker     Packs/day: 0.25     Years: 50.00     Types: Cigarettes   • Smokeless tobacco: Current User      Comment: 1 ppd, 45 yrs   • Alcohol use No   • Drug use:      Types: Marijuana      Comment: 1x per month   • Sexual activity: No     Other Topics Concern   •  Service No   • Blood Transfusions No     unknown   • Caffeine Concern No   • Occupational Exposure No   • Hobby Hazards No   • Sleep Concern Yes     sleep apnea   • Stress Concern Yes   • Weight Concern No   • Special Diet Yes   • Back Care Yes    • Exercise No   • Bike Helmet No     does not ride bike    • Seat Belt Yes   • Self-Exams Yes     Social History Narrative   • No narrative on file         ROS:  Constitutional: Denies fevers, chills, sweats, weight loss  Eyes: Denies glasses, vision loss, pain, drainage, double vision  Ears/Nose/Mouth/Throat: Denies ear ache, difficulty hearing, sore throat, persistent hoarseness, decayed teeth/toothache  Cardiovascular: Denies chest pain, tightness, palpitations, swelling in feet/legs, fainting, difficulty breathing when laying down  Respiratory: See HPI   GI: Denies heartburn, difficulty swallowing, nausea, vomiting, abdominal pain, diarrhea, constipation  : Denies frequent urination, painful urination  Integumentary: Denies rashes, lumps or color changes  MSK: Denies painful joints, sore muscles, and back pain.   Neurological: Denies frequent headaches, dizziness, weakness  Sleep: See HPI       Current Outpatient Prescriptions   Medication Sig Dispense Refill   • NYSTATIN, TOPICAL, EX by Apply externally route.     • warfarin (COUMADIN) 10 MG Tab TAKE ONE [1] TABLET BY MOUTH EVERY DAY  AS DIRECTED BY [facility] ANTICOAGULATION PROGRAM 30 Tab 3   • ranitidine (ZANTAC) 150 MG Tab TAKE ONE TABLET BY MOUTH TWICE DAILY WITH MEALS 60 Tab 4   • Incontinence Supply Disposable (FQ PROTECTIVE UNDERWEAR) Misc USE 1 PULLUP 3 TIMES DAILY 120 Each 3   • mometasone (NASONEX) 50 MCG/ACT nasal spray Spray 1-2 Sprays in nose every day. Each nostril. 1 Inhaler 6   • gabapentin (NEURONTIN) 300 MG Cap TAKE THREE (3) CAPSULES BY MOUTH THREE (3) TIMES DAILY  270 Cap 4   • carbamazepine (TEGRETOL) 200 MG Tab      • hyoscyamine (LEVSIN) 0.125 MG SL Tab      • COMBIVENT RESPIMAT  MCG/ACT Aero Soln      • levothyroxine (SYNTHROID) 200 MCG Tab TAKE ONE (1) TABLET BY MOUTH EVERY MORNING ON AN EMPTY STOMACH 90 Tab 1   • levothyroxine (SYNTHROID) 25 MCG Tab Take 1 tablet by mouth once every morning on an empty stomach. Use along with  200 mcg levothyroxine for a total of 225 mcg daily 90 Tab 1   • SM NICOTINE POLACRILEX 2 MG Gum CHEW ONE (1) PIECE AS NEEDED FOR SMOKING CESSATION 110 Each 2   • Tiotropium Bromide Monohydrate (SPIRIVA RESPIMAT) 2.5 MCG/ACT Aero Soln Inhale 2 Inhalation by mouth every day. Assemble and prime. 1 Inhaler 6   • trazodone (DESYREL) 50 MG Tab Take 1 Tab by mouth at bedtime as needed for Sleep. 30 Tab 3   • metformin (GLUCOPHAGE) 500 MG Tab TAKE ONE [1] TABLET BY MOUTH EVERY DAY 90 Tab 0   • ascorbic acid (ASCORBIC ACID) 500 MG Tab TAKE ONE [1] TABLET BY MOUTH EVERY DAY 90 Tab 2   • vitamin D, Ergocalciferol, (DRISDOL) 42053 UNITS Cap capsule TAKE 1 CAPSULE BY MOUTH EVERY 28 DAYS. 3 Cap 3   • Ostomy Supplies Misc Dispense as ordered 20 Each 12   • flurazepam (DALMANE) 30 MG capsule Take 30 mg by mouth at bedtime as needed.     • fluoxetine (PROZAC) 20 MG Cap Take 20 mg by mouth every day.     • amitriptyline (ELAVIL) 25 MG Tab Take 25 mg by mouth every bedtime.     • ammonium lactate (LAC-HYDRIN) 12 % Lotion Apply to dry skin after shower as directed. #1 1 Bottle 3   • SYMBICORT 160-4.5 MCG/ACT Aerosol INHALE TWO [2] PUFFS BY MOUTH TWO [2] TIMES A DAY WITH SPACER. RINSE MOUTH AFTER USE 1 Inhaler 0   • enoxaparin (LOVENOX) 120 MG/0.8ML Solution inj Inject 120 mg as instructed every 12 hours. 6 Syringe 1   • Misc. Devices Misc 3 boxes of pouches (78127), 2 boxes of wafers (93486), 3 boxes (tubes) paste, 1 box of no string skin prep, 1 box of no string unsolve 5 Container 6   • mometasone (ELOCON) 0.1 % lotion      • Misc. Devices Misc New battery and gear shifter for power wheel chair 1 Device 0   • albuterol (PROVENTIL HFA) 108 (90 BASE) MCG/ACT Aero Soln inhalation aerosol Inhale 2 Puffs by mouth every four hours as needed. For Shortness of breath 1 Inhaler 6   • Misc. Devices Misc Incontinence supplies (for wheel chair) and adult briefs to use as needed #120 120 Device 6   • Misc. Devices Misc O2 concentrator to use 24/7  "and also with her CPAP at night to keep her O2 sats > 90% 1 Device 0   • Misc. Devices Misc Adult incontinence supplies to use as directed 60 Device 6   • Misc. Devices Misc Power mobility devise 1 Device 0   • Misc. Devices Misc Please evaluate pt for wider power mobility devise, pt has tipped over on multiple occasions 1 Device 0   • Misc. Devices Misc O2 tank rack for power wheel chair 1 Device 0   • Misc. Devices Misc O2 to use continuously to keep her sats >90 % 1 Device 0   • Misc. Devices Misc CPAP supplies (hoses, filter, water chamber, face mask-resmed small), nasal cannula tubing 10 Device 6   • Misc. Devices Misc Pt needs service for her PMD she will need a new battery to avoid stalling 1 Device 0     No current facility-administered medications for this visit.        Allergies   Allergen Reactions   • Pcn [Penicillins] Anaphylaxis and Rash     Reaction; happened as a child. Tolerated cephalosporins and Zosyn       Blood pressure 146/84, pulse 86, resp. rate 16, height 1.626 m (5' 4.02\"), weight (!) 132.6 kg (292 lb 6.4 oz), last menstrual period 07/19/2011, SpO2 92 %.    PE:   Appearance: Obese female, no acute distress  Eyes: PERRL, EOM intact, sclera white, conjunctiva moist  Ears: no lesions or deformities  Hearing: grossly intact  Nose: no lesions or deformities  Oropharynx: tongue normal, posterior pharynx without erythema or exudate  Mallampati Classification: Class 4   Neck: supple, trachea midline, no masses   Respiratory effort: no intercostal retractions or use of accessory muscles  Lung auscultation: no rales, rhonchi or wheezes, somewhat diminished throughout  Heart auscultation: no murmur rub or gallop  Extremities: no cyanosis or edema  Abdomen: soft ,non tender, no masses  Gait and Station: wheelchair  Digits and nails: no clubbing, cyanosis, petechiae or nodes.  Cranial nerves: grossly intact  Skin: no rashes, lesions or ulcers noted  Orientation: Oriented to time, person and place  Mood " and affect: mood and affect appropriate, normal interaction with examiner  Judgement: Intact          Assessment:  1. Chronic obstructive asthma (CMS-HCC)  budesonide-formoterol (SYMBICORT) 160-4.5 MCG/ACT Aerosol   2. YESSICA (obstructive sleep apnea)     3. Tobacco dependence     4. Allergic rhinitis, unspecified chronicity, unspecified seasonality, unspecified trigger     5. BMI 50.0-59.9, adult (CMS-HCC)     6. Hypoxemia           Plan:    1) Increase CPAP to 14 CM H20 with 3 LPM 02 bleed in. Encouraged to bring compliance chip to follow up visit.   2) Smoking cessation discussed and recommended. She is working on quitting.  3) Continue 02 3 LPM prn exertion.  4) Continue Symbicort 160/4.5 mcg. Continue Spiriva respimat 2.5 mcg 2 puffs INH daily. Continue Combivent and Proventil inhalers.    5) Continue Nasonex.   6) She is up to date on Prevnar 13, Pneumovax 23 and Influenza vaccines.  7) Weight loss recommended.  8) Sleep hygiene discussed. Continue Trazodone prescribed by Psychiatry.    9) CT scan of the chest indicates diffuse interstitial and ground glass opacities similar to 2015 imaging. No acute infections symptoms. Recommend quitting smoking. Continue annual CT's.  10) Follow up in 6 months with compliance card download, sooner if needed.

## 2017-10-27 NOTE — PATIENT INSTRUCTIONS
Plan:    1) Increase CPAP to 14 CM H20 with 3 LPM 02 bleed in. Encouraged to bring compliance chip to follow up visit.   2) Smoking cessation discussed and recommended. She is working on quitting.  3) Continue 02 3 LPM prn exertion.  4) Continue Symbicort 160/4.5 mcg. Continue Spiriva respimat 2.5 mcg 2 puffs INH daily. Continue Combivent and Proventil inhalers.    5) Continue Nasonex.   6) She is up to date on Prevnar 13, Pneumovax 23 and Influenza vaccines.  7) Weight loss recommended.  8) Sleep hygiene discussed. Continue Trazodone prescribed by Psychiatry.    9) CT scan of the chest indicates diffuse interstitial and ground glass opacities similar to 2015 imaging. No acute infections symptoms. Recommend quitting smoking. Continue annual CT's.  10) Follow up in 6 months with compliance card download, sooner if needed.

## 2017-11-13 RX ORDER — LEVOTHYROXINE SODIUM 0.2 MG/1
TABLET ORAL
Qty: 90 TAB | Refills: 0 | Status: SHIPPED | OUTPATIENT
Start: 2017-11-13 | End: 2017-12-26 | Stop reason: SDUPTHER

## 2017-11-13 RX ORDER — LEVOTHYROXINE SODIUM 0.03 MG/1
TABLET ORAL
Qty: 90 TAB | Refills: 0 | Status: SHIPPED | OUTPATIENT
Start: 2017-11-13 | End: 2017-12-26 | Stop reason: SDUPTHER

## 2017-11-15 RX ORDER — ERGOCALCIFEROL 1.25 MG/1
CAPSULE ORAL
Qty: 1 CAP | Refills: 2 | Status: SHIPPED | OUTPATIENT
Start: 2017-11-15 | End: 2018-05-04

## 2017-11-15 NOTE — TELEPHONE ENCOUNTER
Was the patient seen in the last year in this department? Yes     Does patient have an active prescription for medications requested? Yes     Received Request Via: Pharmacy   Future Appointments       Provider Department Center    12/5/2017 2:30 PM [facility] EXAM 5 [facility] for Heart and Vascular Health      4/26/2018 2:40 PM ALCIRA Chavez [facility] Pulmonary Medicine

## 2017-12-05 ENCOUNTER — ANTICOAGULATION VISIT (OUTPATIENT)
Dept: VASCULAR LAB | Facility: MEDICAL CENTER | Age: 67
End: 2017-12-05
Attending: INTERNAL MEDICINE
Payer: MEDICARE

## 2017-12-05 DIAGNOSIS — I82.401 ACUTE DEEP VEIN THROMBOSIS (DVT) OF RIGHT LOWER EXTREMITY, UNSPECIFIED VEIN (HCC): ICD-10-CM

## 2017-12-05 DIAGNOSIS — Z79.01 CHRONIC ANTICOAGULATION: ICD-10-CM

## 2017-12-05 DIAGNOSIS — Z95.828 S/P IVC FILTER: ICD-10-CM

## 2017-12-05 LAB — INR PPP: 2 (ref 2–3.5)

## 2017-12-05 PROCEDURE — 99211 OFF/OP EST MAY X REQ PHY/QHP: CPT

## 2017-12-05 PROCEDURE — 85610 PROTHROMBIN TIME: CPT

## 2017-12-05 NOTE — PROGRESS NOTES
/  Anticoagulation Summary  As of 12/5/2017    INR goal:   2.0-3.0   TTR:   41.4 % (2.6 y)   Today's INR:   2.0   Maintenance plan:   5 mg (10 mg x 0.5) on Wed; 10 mg (10 mg x 1) all other days   Weekly total:   65 mg   Plan last modified:   Rodriguez MartinezD (9/21/2017)   Next INR check:   1/30/2018   Priority:   Routine   Target end date:   Indefinite    Indications    DVT (deep venous thrombosis) (CMS-[facility]) [I82.409]  Chronic anticoagulation [Z79.01]  S/P IVC filter [Z95.828]  Deep vein thrombosis (DVT) (CMS-[facility]) [I82.409]             Anticoagulation Episode Summary     INR check location:   [facility]    Preferred lab:   [email or web address]    Send INR reminders to:       Comments:         Anticoagulation Care Providers     Provider Role Specialty Phone number    Carmelo Pat M.D. Referring Family Medicine [phone]    [facility] Responsible  [phone]        Anticoagulation Patient Findings  Patient Findings     Negatives:   Signs/symptoms of thrombosis, Signs/symptoms of bleeding, Laboratory test error suspected, Change in health, Change in alcohol use, Change in activity, Upcoming invasive procedure, Emergency department visit, Upcoming dental procedure, Missed doses, Extra doses, Change in medications, Change in diet/appetite, Hospital admission, Bruising, Other complaints        History of Present Illness: follow up appointment for chronic anticoagulation with the high risk medication, warfarin for DVT/IVC filter.  Pt continues to smoke 1/2ppd. Pt is not yet ready to quit Pt is therapeutic today. Pt is to continue with current warfarin dosing regimen.Follow up in 8 weeks, to reduce risk of adverse events related to this high risk medication,  Warfarin.    Ruth Munoz, PharmD      Pt declines vitals today

## 2017-12-06 LAB — INR BLD: 2 (ref 0.9–1.2)

## 2017-12-13 ENCOUNTER — APPOINTMENT (RX ONLY)
Dept: URBAN - METROPOLITAN AREA CLINIC 20 | Facility: CLINIC | Age: 67
Setting detail: DERMATOLOGY
End: 2017-12-13

## 2017-12-13 DIAGNOSIS — D22 MELANOCYTIC NEVI: ICD-10-CM

## 2017-12-13 DIAGNOSIS — L72.8 OTHER FOLLICULAR CYSTS OF THE SKIN AND SUBCUTANEOUS TISSUE: ICD-10-CM

## 2017-12-13 DIAGNOSIS — L82.1 OTHER SEBORRHEIC KERATOSIS: ICD-10-CM

## 2017-12-13 DIAGNOSIS — Z71.89 OTHER SPECIFIED COUNSELING: ICD-10-CM

## 2017-12-13 DIAGNOSIS — L81.4 OTHER MELANIN HYPERPIGMENTATION: ICD-10-CM

## 2017-12-13 DIAGNOSIS — D18.0 HEMANGIOMA: ICD-10-CM

## 2017-12-13 PROBLEM — D22.62 MELANOCYTIC NEVI OF LEFT UPPER LIMB, INCLUDING SHOULDER: Status: ACTIVE | Noted: 2017-12-13

## 2017-12-13 PROBLEM — D18.01 HEMANGIOMA OF SKIN AND SUBCUTANEOUS TISSUE: Status: ACTIVE | Noted: 2017-12-13

## 2017-12-13 PROBLEM — D22.5 MELANOCYTIC NEVI OF TRUNK: Status: ACTIVE | Noted: 2017-12-13

## 2017-12-13 PROBLEM — D22.61 MELANOCYTIC NEVI OF RIGHT UPPER LIMB, INCLUDING SHOULDER: Status: ACTIVE | Noted: 2017-12-13

## 2017-12-13 PROBLEM — E03.9 HYPOTHYROIDISM, UNSPECIFIED: Status: ACTIVE | Noted: 2017-12-13

## 2017-12-13 PROCEDURE — ? COUNSELING

## 2017-12-13 PROCEDURE — ? SUNSCREEN RECOMMENDATIONS

## 2017-12-13 PROCEDURE — 99203 OFFICE O/P NEW LOW 30 MIN: CPT

## 2017-12-13 ASSESSMENT — LOCATION SIMPLE DESCRIPTION DERM
LOCATION SIMPLE: RIGHT FOREHEAD
LOCATION SIMPLE: LEFT FOREARM
LOCATION SIMPLE: UPPER BACK
LOCATION SIMPLE: RIGHT HAND
LOCATION SIMPLE: RIGHT FOREARM
LOCATION SIMPLE: LEFT HAND
LOCATION SIMPLE: RIGHT UPPER BACK

## 2017-12-13 ASSESSMENT — LOCATION DETAILED DESCRIPTION DERM
LOCATION DETAILED: LEFT VENTRAL PROXIMAL FOREARM
LOCATION DETAILED: INFERIOR THORACIC SPINE
LOCATION DETAILED: RIGHT RADIAL DORSAL HAND
LOCATION DETAILED: LEFT PROXIMAL DORSAL FOREARM
LOCATION DETAILED: SUPERIOR THORACIC SPINE
LOCATION DETAILED: RIGHT INFERIOR MEDIAL FOREHEAD
LOCATION DETAILED: RIGHT VENTRAL DISTAL FOREARM
LOCATION DETAILED: LEFT RADIAL DORSAL HAND
LOCATION DETAILED: RIGHT PROXIMAL DORSAL FOREARM
LOCATION DETAILED: RIGHT INFERIOR UPPER BACK
LOCATION DETAILED: RIGHT MEDIAL UPPER BACK

## 2017-12-13 ASSESSMENT — LOCATION ZONE DERM
LOCATION ZONE: FACE
LOCATION ZONE: TRUNK
LOCATION ZONE: HAND
LOCATION ZONE: ARM

## 2017-12-27 RX ORDER — GABAPENTIN 300 MG/1
CAPSULE ORAL
Qty: 270 CAP | Refills: 3 | Status: SHIPPED | OUTPATIENT
Start: 2017-12-27 | End: 2018-07-02 | Stop reason: SDUPTHER

## 2017-12-27 RX ORDER — LEVOTHYROXINE SODIUM 0.03 MG/1
TABLET ORAL
Qty: 30 TAB | Refills: 2 | Status: ON HOLD | OUTPATIENT
Start: 2017-12-27 | End: 2018-04-07

## 2017-12-27 RX ORDER — LEVOTHYROXINE SODIUM 0.2 MG/1
TABLET ORAL
Qty: 30 TAB | Refills: 2 | Status: ON HOLD | OUTPATIENT
Start: 2017-12-27 | End: 2018-04-07

## 2017-12-27 NOTE — TELEPHONE ENCOUNTER
Was the patient seen in the last year in this department? Yes     Does patient have an active prescription for medications requested? Yes     Received Request Via: Pharmacy   Future Appointments       Provider Department Center    1/30/2018 2:30 PM [facility] EXAM 5 [facility] for [facility] Health      4/26/2018 2:40 PM ALCIRA Chavez [facility] Pulmonary Medicine

## 2018-01-08 RX ORDER — RANITIDINE 150 MG/1
TABLET ORAL
Qty: 60 TAB | Refills: 3 | Status: ON HOLD | OUTPATIENT
Start: 2018-01-08 | End: 2018-04-07

## 2018-01-08 NOTE — TELEPHONE ENCOUNTER
Was the patient seen in the last year in this department? Yes     Does patient have an active prescription for medications requested? Yes     Received Request Via: Pharmacy   Future Appointments       Provider Department Center    1/30/2018 2:30 PM [facility] EXAM 5 [facility] for Heart and Vascular Health      4/26/2018 2:40 PM ALCIRA Chavez [facility] Pulmonary Medicine

## 2018-01-31 RX ORDER — DIAPER,BRIEF,ADULT, DISPOSABLE
EACH MISCELLANEOUS
Qty: 90 EACH | Refills: 0 | Status: SHIPPED | OUTPATIENT
Start: 2018-01-31 | End: 2018-03-15 | Stop reason: SDUPTHER

## 2018-02-13 ENCOUNTER — TELEPHONE (OUTPATIENT)
Dept: VASCULAR LAB | Facility: MEDICAL CENTER | Age: 68
End: 2018-02-13

## 2018-02-28 DIAGNOSIS — Z86.718 HISTORY OF DVT (DEEP VEIN THROMBOSIS): ICD-10-CM

## 2018-03-06 ENCOUNTER — TELEPHONE (OUTPATIENT)
Dept: VASCULAR LAB | Facility: MEDICAL CENTER | Age: 68
End: 2018-03-06

## 2018-03-06 NOTE — TELEPHONE ENCOUNTER
[facility]    Pt states she will obtain INR at earliest convenience.     Bon Ash, PharmD

## 2018-03-15 RX ORDER — DIAPER,BRIEF,ADULT, DISPOSABLE
EACH MISCELLANEOUS
Qty: 90 EACH | Refills: 0 | Status: SHIPPED | OUTPATIENT
Start: 2018-03-15 | End: 2018-04-05 | Stop reason: SDUPTHER

## 2018-03-22 ENCOUNTER — TELEPHONE (OUTPATIENT)
Dept: VASCULAR LAB | Facility: MEDICAL CENTER | Age: 68
End: 2018-03-22

## 2018-04-05 ENCOUNTER — RESOLUTE PROFESSIONAL BILLING HOSPITAL PROF FEE (OUTPATIENT)
Dept: HOSPITALIST | Facility: MEDICAL CENTER | Age: 68
End: 2018-04-05
Payer: MEDICARE

## 2018-04-05 ENCOUNTER — APPOINTMENT (OUTPATIENT)
Dept: RADIOLOGY | Facility: MEDICAL CENTER | Age: 68
DRG: 194 | End: 2018-04-05
Attending: EMERGENCY MEDICINE
Payer: MEDICARE

## 2018-04-05 ENCOUNTER — HOSPITAL ENCOUNTER (INPATIENT)
Facility: MEDICAL CENTER | Age: 68
LOS: 6 days | DRG: 194 | End: 2018-04-11
Attending: EMERGENCY MEDICINE | Admitting: HOSPITALIST
Payer: MEDICARE

## 2018-04-05 ENCOUNTER — OFFICE VISIT (OUTPATIENT)
Dept: MEDICAL GROUP | Facility: MEDICAL CENTER | Age: 68
DRG: 194 | End: 2018-04-05
Attending: FAMILY MEDICINE
Payer: MEDICARE

## 2018-04-05 VITALS
HEART RATE: 92 BPM | RESPIRATION RATE: 14 BRPM | BODY MASS INDEX: 48.32 KG/M2 | SYSTOLIC BLOOD PRESSURE: 145 MMHG | OXYGEN SATURATION: 90 % | HEIGHT: 64 IN | DIASTOLIC BLOOD PRESSURE: 80 MMHG | WEIGHT: 283 LBS | TEMPERATURE: 97.3 F

## 2018-04-05 DIAGNOSIS — Z78.9 DECREASED ACTIVITIES OF DAILY LIVING (ADL): ICD-10-CM

## 2018-04-05 DIAGNOSIS — K94.09 COLOSTOMY FISTULA (HCC): ICD-10-CM

## 2018-04-05 DIAGNOSIS — J44.9 CHRONIC OBSTRUCTIVE PULMONARY DISEASE, UNSPECIFIED COPD TYPE (HCC): ICD-10-CM

## 2018-04-05 DIAGNOSIS — K91.1 DUMPING SYNDROME: ICD-10-CM

## 2018-04-05 DIAGNOSIS — J43.9 PULMONARY EMPHYSEMA, UNSPECIFIED EMPHYSEMA TYPE (HCC): ICD-10-CM

## 2018-04-05 DIAGNOSIS — I48.20 CHRONIC ATRIAL FIBRILLATION (HCC): ICD-10-CM

## 2018-04-05 DIAGNOSIS — J18.9 PNEUMONIA OF BOTH LUNGS DUE TO INFECTIOUS ORGANISM, UNSPECIFIED PART OF LUNG: ICD-10-CM

## 2018-04-05 DIAGNOSIS — R19.7 DIARRHEA, UNSPECIFIED TYPE: ICD-10-CM

## 2018-04-05 LAB
ALBUMIN SERPL BCP-MCNC: 3.9 G/DL (ref 3.2–4.9)
ALBUMIN/GLOB SERPL: 0.8 G/DL
ALP SERPL-CCNC: 84 U/L (ref 30–99)
ALT SERPL-CCNC: 11 U/L (ref 2–50)
ANION GAP SERPL CALC-SCNC: 10 MMOL/L (ref 0–11.9)
AST SERPL-CCNC: 17 U/L (ref 12–45)
BASOPHILS # BLD AUTO: 0.6 % (ref 0–1.8)
BASOPHILS # BLD: 0.05 K/UL (ref 0–0.12)
BILIRUB SERPL-MCNC: 0.3 MG/DL (ref 0.1–1.5)
BNP SERPL-MCNC: 27 PG/ML (ref 0–100)
BUN SERPL-MCNC: 7 MG/DL (ref 8–22)
CALCIUM SERPL-MCNC: 9.4 MG/DL (ref 8.5–10.5)
CHLORIDE SERPL-SCNC: 102 MMOL/L (ref 96–112)
CO2 SERPL-SCNC: 27 MMOL/L (ref 20–33)
COMMENT 1642: NORMAL
CREAT SERPL-MCNC: 0.7 MG/DL (ref 0.5–1.4)
EKG IMPRESSION: NORMAL
EOSINOPHIL # BLD AUTO: 0.24 K/UL (ref 0–0.51)
EOSINOPHIL NFR BLD: 2.9 % (ref 0–6.9)
ERYTHROCYTE [DISTWIDTH] IN BLOOD BY AUTOMATED COUNT: 55.8 FL (ref 35.9–50)
GLOBULIN SER CALC-MCNC: 4.8 G/DL (ref 1.9–3.5)
GLUCOSE SERPL-MCNC: 110 MG/DL (ref 65–99)
HCT VFR BLD AUTO: 47.7 % (ref 37–47)
HGB BLD-MCNC: 15.1 G/DL (ref 12–16)
IMM GRANULOCYTES # BLD AUTO: 0.07 K/UL (ref 0–0.11)
IMM GRANULOCYTES NFR BLD AUTO: 0.9 % (ref 0–0.9)
LYMPHOCYTES # BLD AUTO: 2.33 K/UL (ref 1–4.8)
LYMPHOCYTES NFR BLD: 28.5 % (ref 22–41)
MCH RBC QN AUTO: 32.4 PG (ref 27–33)
MCHC RBC AUTO-ENTMCNC: 31.7 G/DL (ref 33.6–35)
MCV RBC AUTO: 102.4 FL (ref 81.4–97.8)
MONOCYTES # BLD AUTO: 0.54 K/UL (ref 0–0.85)
MONOCYTES NFR BLD AUTO: 6.6 % (ref 0–13.4)
MORPHOLOGY BLD-IMP: NORMAL
NEUTROPHILS # BLD AUTO: 4.95 K/UL (ref 2–7.15)
NEUTROPHILS NFR BLD: 60.5 % (ref 44–72)
NRBC # BLD AUTO: 0 K/UL
NRBC BLD-RTO: 0 /100 WBC
PLATELET # BLD AUTO: 303 K/UL (ref 164–446)
PMV BLD AUTO: 10.4 FL (ref 9–12.9)
POTASSIUM SERPL-SCNC: 4 MMOL/L (ref 3.6–5.5)
PROT SERPL-MCNC: 8.7 G/DL (ref 6–8.2)
RBC # BLD AUTO: 4.66 M/UL (ref 4.2–5.4)
SODIUM SERPL-SCNC: 139 MMOL/L (ref 135–145)
WBC # BLD AUTO: 8.2 K/UL (ref 4.8–10.8)

## 2018-04-05 PROCEDURE — 83880 ASSAY OF NATRIURETIC PEPTIDE: CPT

## 2018-04-05 PROCEDURE — 36415 COLL VENOUS BLD VENIPUNCTURE: CPT

## 2018-04-05 PROCEDURE — 96374 THER/PROPH/DIAG INJ IV PUSH: CPT

## 2018-04-05 PROCEDURE — 94640 AIRWAY INHALATION TREATMENT: CPT

## 2018-04-05 PROCEDURE — 80053 COMPREHEN METABOLIC PANEL: CPT

## 2018-04-05 PROCEDURE — A9270 NON-COVERED ITEM OR SERVICE: HCPCS | Performed by: EMERGENCY MEDICINE

## 2018-04-05 PROCEDURE — 71045 X-RAY EXAM CHEST 1 VIEW: CPT

## 2018-04-05 PROCEDURE — 96375 TX/PRO/DX INJ NEW DRUG ADDON: CPT

## 2018-04-05 PROCEDURE — 83036 HEMOGLOBIN GLYCOSYLATED A1C: CPT

## 2018-04-05 PROCEDURE — 700101 HCHG RX REV CODE 250: Performed by: EMERGENCY MEDICINE

## 2018-04-05 PROCEDURE — 99212 OFFICE O/P EST SF 10 MIN: CPT | Performed by: FAMILY MEDICINE

## 2018-04-05 PROCEDURE — 99285 EMERGENCY DEPT VISIT HI MDM: CPT

## 2018-04-05 PROCEDURE — 87040 BLOOD CULTURE FOR BACTERIA: CPT | Mod: 91

## 2018-04-05 PROCEDURE — 85025 COMPLETE CBC W/AUTO DIFF WBC: CPT

## 2018-04-05 PROCEDURE — 700102 HCHG RX REV CODE 250 W/ 637 OVERRIDE(OP): Performed by: EMERGENCY MEDICINE

## 2018-04-05 PROCEDURE — 770006 HCHG ROOM/CARE - MED/SURG/GYN SEMI*

## 2018-04-05 PROCEDURE — 93005 ELECTROCARDIOGRAM TRACING: CPT | Performed by: EMERGENCY MEDICINE

## 2018-04-05 PROCEDURE — 99223 1ST HOSP IP/OBS HIGH 75: CPT | Mod: AI | Performed by: HOSPITALIST

## 2018-04-05 PROCEDURE — 99214 OFFICE O/P EST MOD 30 MIN: CPT | Performed by: FAMILY MEDICINE

## 2018-04-05 PROCEDURE — 700111 HCHG RX REV CODE 636 W/ 250 OVERRIDE (IP): Performed by: EMERGENCY MEDICINE

## 2018-04-05 PROCEDURE — 93005 ELECTROCARDIOGRAM TRACING: CPT

## 2018-04-05 RX ORDER — ONDANSETRON 4 MG/1
4 TABLET, ORALLY DISINTEGRATING ORAL EVERY 4 HOURS PRN
Status: DISCONTINUED | OUTPATIENT
Start: 2018-04-05 | End: 2018-04-11 | Stop reason: HOSPADM

## 2018-04-05 RX ORDER — SODIUM CHLORIDE 9 MG/ML
INJECTION, SOLUTION INTRAVENOUS CONTINUOUS
Status: DISCONTINUED | OUTPATIENT
Start: 2018-04-06 | End: 2018-04-10

## 2018-04-05 RX ORDER — ALBUTEROL SULFATE 90 UG/1
2 AEROSOL, METERED RESPIRATORY (INHALATION) EVERY 4 HOURS PRN
Status: DISCONTINUED | OUTPATIENT
Start: 2018-04-05 | End: 2018-04-11 | Stop reason: HOSPADM

## 2018-04-05 RX ORDER — LEVOTHYROXINE SODIUM 0.07 MG/1
225 TABLET ORAL DAILY
Status: DISCONTINUED | OUTPATIENT
Start: 2018-04-06 | End: 2018-04-11 | Stop reason: HOSPADM

## 2018-04-05 RX ORDER — MOMETASONE FUROATE 50 UG/1
1-2 SPRAY, METERED NASAL DAILY
Status: DISCONTINUED | OUTPATIENT
Start: 2018-04-06 | End: 2018-04-06

## 2018-04-05 RX ORDER — GABAPENTIN 300 MG/1
900 CAPSULE ORAL 3 TIMES DAILY
Status: DISCONTINUED | OUTPATIENT
Start: 2018-04-06 | End: 2018-04-11 | Stop reason: HOSPADM

## 2018-04-05 RX ORDER — NICOTINE 21 MG/24HR
14 PATCH, TRANSDERMAL 24 HOURS TRANSDERMAL
Status: DISCONTINUED | OUTPATIENT
Start: 2018-04-06 | End: 2018-04-11 | Stop reason: HOSPADM

## 2018-04-05 RX ORDER — ACETAMINOPHEN 325 MG/1
650 TABLET ORAL EVERY 6 HOURS PRN
Status: DISCONTINUED | OUTPATIENT
Start: 2018-04-05 | End: 2018-04-11 | Stop reason: HOSPADM

## 2018-04-05 RX ORDER — POLYETHYLENE GLYCOL 3350 17 G/17G
1 POWDER, FOR SOLUTION ORAL
Status: DISCONTINUED | OUTPATIENT
Start: 2018-04-05 | End: 2018-04-11 | Stop reason: HOSPADM

## 2018-04-05 RX ORDER — IPRATROPIUM BROMIDE AND ALBUTEROL SULFATE 2.5; .5 MG/3ML; MG/3ML
3 SOLUTION RESPIRATORY (INHALATION)
Status: DISCONTINUED | OUTPATIENT
Start: 2018-04-05 | End: 2018-04-07

## 2018-04-05 RX ORDER — LEVOFLOXACIN 5 MG/ML
750 INJECTION, SOLUTION INTRAVENOUS EVERY 24 HOURS
Status: DISCONTINUED | OUTPATIENT
Start: 2018-04-06 | End: 2018-04-06

## 2018-04-05 RX ORDER — DIAPER,BRIEF,ADULT, DISPOSABLE
EACH MISCELLANEOUS
Qty: 90 EACH | Refills: 0 | Status: SHIPPED
Start: 2018-04-05 | End: 2018-04-05

## 2018-04-05 RX ORDER — HALOPERIDOL 5 MG/ML
5 INJECTION INTRAMUSCULAR EVERY 4 HOURS PRN
Status: DISCONTINUED | OUTPATIENT
Start: 2018-04-05 | End: 2018-04-11 | Stop reason: HOSPADM

## 2018-04-05 RX ORDER — CARBAMAZEPINE 200 MG/1
600 TABLET ORAL
Status: DISCONTINUED | OUTPATIENT
Start: 2018-04-06 | End: 2018-04-11 | Stop reason: HOSPADM

## 2018-04-05 RX ORDER — BUDESONIDE AND FORMOTEROL FUMARATE DIHYDRATE 160; 4.5 UG/1; UG/1
2 AEROSOL RESPIRATORY (INHALATION) 2 TIMES DAILY
Status: DISCONTINUED | OUTPATIENT
Start: 2018-04-06 | End: 2018-04-11 | Stop reason: HOSPADM

## 2018-04-05 RX ORDER — TIOTROPIUM BROMIDE 18 UG/1
1 CAPSULE ORAL; RESPIRATORY (INHALATION)
Status: DISCONTINUED | OUTPATIENT
Start: 2018-04-06 | End: 2018-04-06

## 2018-04-05 RX ORDER — AZITHROMYCIN 250 MG/1
500 TABLET, FILM COATED ORAL ONCE
Status: COMPLETED | OUTPATIENT
Start: 2018-04-05 | End: 2018-04-05

## 2018-04-05 RX ORDER — METHYLPREDNISOLONE SODIUM SUCCINATE 125 MG/2ML
125 INJECTION, POWDER, LYOPHILIZED, FOR SOLUTION INTRAMUSCULAR; INTRAVENOUS ONCE
Status: COMPLETED | OUTPATIENT
Start: 2018-04-05 | End: 2018-04-05

## 2018-04-05 RX ORDER — ACETAMINOPHEN AND CODEINE PHOSPHATE 120; 12 MG/5ML; MG/5ML
30 SOLUTION ORAL NIGHTLY PRN
Status: DISCONTINUED | OUTPATIENT
Start: 2018-04-05 | End: 2018-04-06

## 2018-04-05 RX ORDER — AMOXICILLIN 250 MG
2 CAPSULE ORAL 2 TIMES DAILY
Status: DISCONTINUED | OUTPATIENT
Start: 2018-04-06 | End: 2018-04-11 | Stop reason: HOSPADM

## 2018-04-05 RX ORDER — AMITRIPTYLINE HYDROCHLORIDE 25 MG/1
25 TABLET, FILM COATED ORAL
Status: DISCONTINUED | OUTPATIENT
Start: 2018-04-06 | End: 2018-04-11 | Stop reason: HOSPADM

## 2018-04-05 RX ORDER — BISACODYL 10 MG
10 SUPPOSITORY, RECTAL RECTAL
Status: DISCONTINUED | OUTPATIENT
Start: 2018-04-05 | End: 2018-04-11 | Stop reason: HOSPADM

## 2018-04-05 RX ORDER — CEFTRIAXONE 2 G/1
2 INJECTION, POWDER, FOR SOLUTION INTRAMUSCULAR; INTRAVENOUS ONCE
Status: COMPLETED | OUTPATIENT
Start: 2018-04-05 | End: 2018-04-05

## 2018-04-05 RX ORDER — RANITIDINE 150 MG/1
150 TABLET ORAL 2 TIMES DAILY
Status: DISCONTINUED | OUTPATIENT
Start: 2018-04-06 | End: 2018-04-06

## 2018-04-05 RX ORDER — ONDANSETRON 2 MG/ML
4 INJECTION INTRAMUSCULAR; INTRAVENOUS EVERY 4 HOURS PRN
Status: DISCONTINUED | OUTPATIENT
Start: 2018-04-05 | End: 2018-04-11 | Stop reason: HOSPADM

## 2018-04-05 RX ORDER — FLUOXETINE HYDROCHLORIDE 20 MG/1
20 CAPSULE ORAL DAILY
Status: DISCONTINUED | OUTPATIENT
Start: 2018-04-06 | End: 2018-04-11 | Stop reason: HOSPADM

## 2018-04-05 RX ADMIN — CEFTRIAXONE SODIUM 2 G: 2 INJECTION, POWDER, FOR SOLUTION INTRAMUSCULAR; INTRAVENOUS at 23:21

## 2018-04-05 RX ADMIN — ALBUTEROL SULFATE 2.5 MG: 2.5 SOLUTION RESPIRATORY (INHALATION) at 23:54

## 2018-04-05 RX ADMIN — METHYLPREDNISOLONE SODIUM SUCCINATE 125 MG: 125 INJECTION, POWDER, FOR SOLUTION INTRAMUSCULAR; INTRAVENOUS at 23:20

## 2018-04-05 RX ADMIN — IPRATROPIUM BROMIDE 0.5 MG: 0.5 SOLUTION RESPIRATORY (INHALATION) at 23:54

## 2018-04-05 RX ADMIN — AZITHROMYCIN 500 MG: 250 TABLET, FILM COATED ORAL at 23:20

## 2018-04-05 ASSESSMENT — ENCOUNTER SYMPTOMS
ARTHRALGIAS: 0
PALPITATIONS: 0
COUGH: 0
WEIGHT LOSS: 1
FLATUS: 0
DEPRESSION: 1
FEVER: 0
FEVER: 1
SHORTNESS OF BREATH: 1
NAUSEA: 0
VOMITING: 0
WHEEZING: 1
BLOATING: 1
ROS GI COMMENTS: COLOSTOMY IN PLACE
EYES NEGATIVE: 1
SPUTUM PRODUCTION: 0
HEADACHES: 0
CARDIOVASCULAR NEGATIVE: 1
CHILLS: 1
BLOOD IN STOOL: 0
MYALGIAS: 0
SPUTUM PRODUCTION: 1
CHILLS: 0
DIARRHEA: 1
SHORTNESS OF BREATH: 0
NERVOUS/ANXIOUS: 1
COUGH: 1
DIARRHEA: 1
DIZZINESS: 1
SWEATS: 0
TINGLING: 1
ABDOMINAL PAIN: 0

## 2018-04-05 ASSESSMENT — COPD QUESTIONNAIRES
DURING THE PAST 4 WEEKS HOW MUCH DID YOU FEEL SHORT OF BREATH: SOME OF THE TIME
COPD SCREENING SCORE: 7
DO YOU EVER COUGH UP ANY MUCUS OR PHLEGM?: YES, A FEW DAYS A WEEK OR MONTH
HAVE YOU SMOKED AT LEAST 100 CIGARETTES IN YOUR ENTIRE LIFE: YES

## 2018-04-05 ASSESSMENT — PAIN SCALES - GENERAL: PAINLEVEL_OUTOF10: 0

## 2018-04-05 ASSESSMENT — LIFESTYLE VARIABLES: EVER_SMOKED: YES

## 2018-04-05 NOTE — PROGRESS NOTES
"Subjective:      Atiya Henley is a 67 y.o. female who presents with Cough (x1 week)            Diarrhea    This is a recurrent problem. The current episode started 1 to 4 weeks ago. The problem occurs 5 to 10 times per day. The problem has been unchanged. The stool consistency is described as watery. Associated symptoms include bloating and weight loss. Pertinent negatives include no abdominal pain, arthralgias, chills, coughing, fever, headaches, increased  flatus, myalgias, sweats, URI or vomiting. Nothing aggravates the symptoms. There are no known risk factors (pt has colostomy and incontinence of urine). Her past medical history is significant for bowel resection and malabsorption.       Review of Systems   Constitutional: Positive for weight loss. Negative for chills and fever.   HENT: Positive for congestion. Negative for hearing loss and tinnitus.    Respiratory: Negative for cough, sputum production and shortness of breath.    Cardiovascular: Negative for chest pain and palpitations.   Gastrointestinal: Positive for bloating and diarrhea. Negative for abdominal pain, blood in stool, flatus, nausea and vomiting.        Colostomy in place   Musculoskeletal: Negative for arthralgias and myalgias.   Neurological: Positive for tingling. Negative for headaches.          Objective:     /80   Pulse 92   Temp 36.3 °C (97.3 °F)   Resp 14   Ht 1.626 m (5' 4\")   Wt (!) 128.4 kg (283 lb)   LMP 07/19/2011   SpO2 90%   BMI 48.58 kg/m²      Physical Exam   Constitutional: She is oriented to person, place, and time.   BMI 48   HENT:   Head: Normocephalic and atraumatic.   congestion   Cardiovascular: Normal rate, regular rhythm and normal heart sounds.  Exam reveals no friction rub.    No murmur heard.  Pulmonary/Chest: Effort normal and breath sounds normal. No respiratory distress. She has no wheezes. She has no rales.   Abdominal: Soft. She exhibits no distension. There is no tenderness.   Watery " stool in bag   Musculoskeletal:   In wheelchair   Neurological: She is alert and oriented to person, place, and time.   Skin: Skin is warm and dry.   Psychiatric: She has a normal mood and affect. Her behavior is normal.   Nursing note and vitals reviewed.              Assessment/Plan:     1. Colostomy fistula (CMS-[facility])  Will order colostomy supplies for patient to continue using as directed. Referral to GI has also been made due to her recurrent diarrhea. Will also order stool studies and blood work. She has also been advised to stay well hydrated. Waiver to go to er signed, pt advised to go to er for IV hydration.  - Misc. Devices Misc; 3 boxes of pouches (46320), 2 boxes of wafers (81290), 3 boxes (tubes) paste, 1 box of no string skin prep, 1 box of no string unsolve  Dispense: 5 Container; Refill: 6    2. Diarrhea, unspecified type   see above plan  - REFERRAL TO GASTROENTEROLOGY  - CULTURE STOOL; Future  - COMPLETE O&P; Future  - C Diff by PCR rflx Toxin; Future  - COMP METABOLIC PANEL; Future  - CBC WITH DIFFERENTIAL; Future    3. Dumping syndrome  See above plan  - Misc. Devices Misc; 3 boxes of pouches (74270), 2 boxes of wafers (38391), 3 boxes (tubes) paste, 1 box of no string skin prep, 1 box of no string unsolve  Dispense: 5 Container; Refill: 6  - REFERRAL TO GASTROENTEROLOGY  - CULTURE STOOL; Future  - COMPLETE O&P; Future  - C Diff by PCR rflx Toxin; Future  - COMP METABOLIC PANEL; Future  - CBC WITH DIFFERENTIAL; Future    4. Decreased activities of daily living (ADL)  In wheelchair for mobility.  - Misc. Devices Misc; 3 boxes of pouches (83114), 2 boxes of wafers (01339), 3 boxes (tubes) paste, 1 box of no string skin prep, 1 box of no string unsolve  Dispense: 5 Container; Refill: 6

## 2018-04-06 PROBLEM — B37.9 CANDIDIASIS: Status: ACTIVE | Noted: 2018-04-06

## 2018-04-06 LAB
EST. AVERAGE GLUCOSE BLD GHB EST-MCNC: 123 MG/DL
HBA1C MFR BLD: 5.9 % (ref 0–5.6)

## 2018-04-06 PROCEDURE — G8989 SELF CARE D/C STATUS: HCPCS | Mod: CI

## 2018-04-06 PROCEDURE — 3E0234Z INTRODUCTION OF SERUM, TOXOID AND VACCINE INTO MUSCLE, PERCUTANEOUS APPROACH: ICD-10-PCS | Performed by: HOSPITALIST

## 2018-04-06 PROCEDURE — 700101 HCHG RX REV CODE 250: Performed by: HOSPITALIST

## 2018-04-06 PROCEDURE — G8978 MOBILITY CURRENT STATUS: HCPCS | Mod: CI

## 2018-04-06 PROCEDURE — A9270 NON-COVERED ITEM OR SERVICE: HCPCS | Performed by: HOSPITALIST

## 2018-04-06 PROCEDURE — 99407 BEHAV CHNG SMOKING > 10 MIN: CPT

## 2018-04-06 PROCEDURE — 90670 PCV13 VACCINE IM: CPT | Performed by: HOSPITALIST

## 2018-04-06 PROCEDURE — 700111 HCHG RX REV CODE 636 W/ 250 OVERRIDE (IP): Performed by: FAMILY MEDICINE

## 2018-04-06 PROCEDURE — 700102 HCHG RX REV CODE 250 W/ 637 OVERRIDE(OP): Performed by: HOSPITALIST

## 2018-04-06 PROCEDURE — A9270 NON-COVERED ITEM OR SERVICE: HCPCS | Performed by: FAMILY MEDICINE

## 2018-04-06 PROCEDURE — 700105 HCHG RX REV CODE 258: Performed by: HOSPITALIST

## 2018-04-06 PROCEDURE — 93005 ELECTROCARDIOGRAM TRACING: CPT | Performed by: FAMILY MEDICINE

## 2018-04-06 PROCEDURE — 770020 HCHG ROOM/CARE - TELE (206)

## 2018-04-06 PROCEDURE — 97161 PT EVAL LOW COMPLEX 20 MIN: CPT

## 2018-04-06 PROCEDURE — 97165 OT EVAL LOW COMPLEX 30 MIN: CPT

## 2018-04-06 PROCEDURE — 700101 HCHG RX REV CODE 250: Performed by: FAMILY MEDICINE

## 2018-04-06 PROCEDURE — 700102 HCHG RX REV CODE 250 W/ 637 OVERRIDE(OP): Performed by: FAMILY MEDICINE

## 2018-04-06 PROCEDURE — 90471 IMMUNIZATION ADMIN: CPT

## 2018-04-06 PROCEDURE — 93010 ELECTROCARDIOGRAM REPORT: CPT | Performed by: INTERNAL MEDICINE

## 2018-04-06 PROCEDURE — G8979 MOBILITY GOAL STATUS: HCPCS | Mod: CI

## 2018-04-06 PROCEDURE — 99233 SBSQ HOSP IP/OBS HIGH 50: CPT | Performed by: FAMILY MEDICINE

## 2018-04-06 PROCEDURE — G8980 MOBILITY D/C STATUS: HCPCS | Mod: CI

## 2018-04-06 PROCEDURE — G8987 SELF CARE CURRENT STATUS: HCPCS | Mod: CI

## 2018-04-06 PROCEDURE — 94760 N-INVAS EAR/PLS OXIMETRY 1: CPT

## 2018-04-06 PROCEDURE — G8988 SELF CARE GOAL STATUS: HCPCS | Mod: CI

## 2018-04-06 PROCEDURE — 700111 HCHG RX REV CODE 636 W/ 250 OVERRIDE (IP): Performed by: HOSPITALIST

## 2018-04-06 PROCEDURE — 94640 AIRWAY INHALATION TREATMENT: CPT

## 2018-04-06 RX ORDER — IPRATROPIUM BROMIDE AND ALBUTEROL SULFATE 2.5; .5 MG/3ML; MG/3ML
3 SOLUTION RESPIRATORY (INHALATION)
Status: DISCONTINUED | OUTPATIENT
Start: 2018-04-06 | End: 2018-04-07

## 2018-04-06 RX ORDER — TIOTROPIUM BROMIDE 18 UG/1
1 CAPSULE ORAL; RESPIRATORY (INHALATION) DAILY
Status: DISCONTINUED | OUTPATIENT
Start: 2018-04-06 | End: 2018-04-11 | Stop reason: HOSPADM

## 2018-04-06 RX ORDER — FAMOTIDINE 20 MG/1
20 TABLET, FILM COATED ORAL 2 TIMES DAILY
Status: DISCONTINUED | OUTPATIENT
Start: 2018-04-06 | End: 2018-04-11

## 2018-04-06 RX ORDER — METOPROLOL TARTRATE 1 MG/ML
5 INJECTION, SOLUTION INTRAVENOUS ONCE
Status: COMPLETED | OUTPATIENT
Start: 2018-04-06 | End: 2018-04-06

## 2018-04-06 RX ORDER — METOPROLOL TARTRATE 1 MG/ML
INJECTION, SOLUTION INTRAVENOUS
Status: ACTIVE
Start: 2018-04-06 | End: 2018-04-07

## 2018-04-06 RX ORDER — LEVOFLOXACIN 750 MG/1
750 TABLET, FILM COATED ORAL DAILY
Status: DISCONTINUED | OUTPATIENT
Start: 2018-04-06 | End: 2018-04-06

## 2018-04-06 RX ORDER — AZITHROMYCIN 250 MG/1
250 TABLET, FILM COATED ORAL DAILY
Status: COMPLETED | OUTPATIENT
Start: 2018-04-06 | End: 2018-04-09

## 2018-04-06 RX ORDER — LEVOFLOXACIN 750 MG/1
750 TABLET, FILM COATED ORAL DAILY
Status: CANCELLED | OUTPATIENT
Start: 2018-04-06 | End: 2018-04-11

## 2018-04-06 RX ORDER — FLUTICASONE PROPIONATE 50 MCG
2 SPRAY, SUSPENSION (ML) NASAL DAILY
Status: DISCONTINUED | OUTPATIENT
Start: 2018-04-06 | End: 2018-04-11 | Stop reason: HOSPADM

## 2018-04-06 RX ADMIN — FLUOXETINE HYDROCHLORIDE 20 MG: 20 CAPSULE ORAL at 10:32

## 2018-04-06 RX ADMIN — SODIUM CHLORIDE: 9 INJECTION, SOLUTION INTRAVENOUS at 04:05

## 2018-04-06 RX ADMIN — GABAPENTIN 900 MG: 300 CAPSULE ORAL at 20:40

## 2018-04-06 RX ADMIN — CEFTRIAXONE 2 G: 2 INJECTION, POWDER, FOR SOLUTION INTRAMUSCULAR; INTRAVENOUS at 13:28

## 2018-04-06 RX ADMIN — IPRATROPIUM BROMIDE AND ALBUTEROL SULFATE 3 ML: .5; 3 SOLUTION RESPIRATORY (INHALATION) at 06:55

## 2018-04-06 RX ADMIN — IPRATROPIUM BROMIDE AND ALBUTEROL SULFATE 3 ML: .5; 3 SOLUTION RESPIRATORY (INHALATION) at 18:47

## 2018-04-06 RX ADMIN — NICOTINE 14 MG: 14 PATCH, EXTENDED RELEASE TRANSDERMAL at 06:00

## 2018-04-06 RX ADMIN — LEVOFLOXACIN 750 MG: 750 TABLET, FILM COATED ORAL at 11:34

## 2018-04-06 RX ADMIN — FLUTICASONE PROPIONATE 100 MCG: 50 SPRAY, METERED NASAL at 10:29

## 2018-04-06 RX ADMIN — ENOXAPARIN SODIUM 40 MG: 100 INJECTION SUBCUTANEOUS at 10:34

## 2018-04-06 RX ADMIN — BUDESONIDE AND FORMOTEROL FUMARATE DIHYDRATE 2 PUFF: 160; 4.5 AEROSOL RESPIRATORY (INHALATION) at 18:51

## 2018-04-06 RX ADMIN — SODIUM CHLORIDE: 9 INJECTION, SOLUTION INTRAVENOUS at 18:12

## 2018-04-06 RX ADMIN — FAMOTIDINE 20 MG: 20 TABLET, FILM COATED ORAL at 10:32

## 2018-04-06 RX ADMIN — BUDESONIDE AND FORMOTEROL FUMARATE DIHYDRATE 2 PUFF: 160; 4.5 AEROSOL RESPIRATORY (INHALATION) at 10:28

## 2018-04-06 RX ADMIN — LEVOTHYROXINE SODIUM 225 MCG: 75 TABLET ORAL at 10:31

## 2018-04-06 RX ADMIN — CARBAMAZEPINE 600 MG: 200 TABLET ORAL at 20:39

## 2018-04-06 RX ADMIN — AMITRIPTYLINE HYDROCHLORIDE 25 MG: 25 TABLET, FILM COATED ORAL at 20:38

## 2018-04-06 RX ADMIN — FAMOTIDINE 20 MG: 20 TABLET, FILM COATED ORAL at 20:39

## 2018-04-06 RX ADMIN — IPRATROPIUM BROMIDE AND ALBUTEROL SULFATE 3 ML: .5; 3 SOLUTION RESPIRATORY (INHALATION) at 10:38

## 2018-04-06 RX ADMIN — TIOTROPIUM BROMIDE 1 CAPSULE: 18 CAPSULE ORAL; RESPIRATORY (INHALATION) at 10:27

## 2018-04-06 RX ADMIN — GABAPENTIN 900 MG: 300 CAPSULE ORAL at 10:30

## 2018-04-06 RX ADMIN — PNEUMOCOCCAL 13-VALENT CONJUGATE VACCINE 0.5 ML: 2.2; 2.2; 2.2; 2.2; 2.2; 4.4; 2.2; 2.2; 2.2; 2.2; 2.2; 2.2; 2.2 INJECTION, SUSPENSION INTRAMUSCULAR at 10:36

## 2018-04-06 RX ADMIN — IPRATROPIUM BROMIDE AND ALBUTEROL SULFATE 3 ML: .5; 3 SOLUTION RESPIRATORY (INHALATION) at 14:53

## 2018-04-06 RX ADMIN — METOPROLOL TARTRATE 5 MG: 5 INJECTION INTRAVENOUS at 20:15

## 2018-04-06 RX ADMIN — AZITHROMYCIN 250 MG: 250 TABLET, FILM COATED ORAL at 13:28

## 2018-04-06 RX ADMIN — GABAPENTIN 900 MG: 300 CAPSULE ORAL at 16:07

## 2018-04-06 ASSESSMENT — COGNITIVE AND FUNCTIONAL STATUS - GENERAL
MOVING FROM LYING ON BACK TO SITTING ON SIDE OF FLAT BED: A LITTLE
CLIMB 3 TO 5 STEPS WITH RAILING: A LITTLE
HELP NEEDED FOR BATHING: A LITTLE
DAILY ACTIVITIY SCORE: 24
SUGGESTED CMS G CODE MODIFIER DAILY ACTIVITY: CI
SUGGESTED CMS G CODE MODIFIER DAILY ACTIVITY: CH
MOVING TO AND FROM BED TO CHAIR: A LITTLE
TURNING FROM BACK TO SIDE WHILE IN FLAT BAD: A LITTLE
WALKING IN HOSPITAL ROOM: A LITTLE
CLIMB 3 TO 5 STEPS WITH RAILING: A LOT
MOBILITY SCORE: 20
DAILY ACTIVITIY SCORE: 23
SUGGESTED CMS G CODE MODIFIER MOBILITY: CJ
MOBILITY SCORE: 20
SUGGESTED CMS G CODE MODIFIER MOBILITY: CJ
TURNING FROM BACK TO SIDE WHILE IN FLAT BAD: A LITTLE

## 2018-04-06 ASSESSMENT — ENCOUNTER SYMPTOMS
HEARTBURN: 0
NECK PAIN: 0
HEADACHES: 0
FEVER: 0
MYALGIAS: 0
COUGH: 1
NAUSEA: 0
DIZZINESS: 0
SHORTNESS OF BREATH: 1
PALPITATIONS: 0
DOUBLE VISION: 0
CHILLS: 0
DEPRESSION: 0
BLURRED VISION: 0
BRUISES/BLEEDS EASILY: 0

## 2018-04-06 ASSESSMENT — LIFESTYLE VARIABLES
ALCOHOL_USE: NO
EVER_SMOKED: YES

## 2018-04-06 ASSESSMENT — PATIENT HEALTH QUESTIONNAIRE - PHQ9
SUM OF ALL RESPONSES TO PHQ9 QUESTIONS 1 AND 2: 0
1. LITTLE INTEREST OR PLEASURE IN DOING THINGS: NOT AT ALL
2. FEELING DOWN, DEPRESSED, IRRITABLE, OR HOPELESS: NOT AT ALL

## 2018-04-06 ASSESSMENT — ACTIVITIES OF DAILY LIVING (ADL): TOILETING: INDEPENDENT

## 2018-04-06 ASSESSMENT — GAIT ASSESSMENTS
ASSISTIVE DEVICE: 4 WHEEL WALKER
DEVIATION: ANTALGIC
GAIT LEVEL OF ASSIST: SUPERVISED
DISTANCE (FEET): 15

## 2018-04-06 ASSESSMENT — PAIN SCALES - GENERAL
PAINLEVEL_OUTOF10: 3
PAINLEVEL_OUTOF10: 6
PAINLEVEL_OUTOF10: 3
PAINLEVEL_OUTOF10: 0
PAINLEVEL_OUTOF10: 0

## 2018-04-06 NOTE — PROGRESS NOTES
2 RN skin check donw with ROS Puri     Ears red and blanching bilaterally, silicone tubing placed,  Colostomy present L abdomen, skin appears dry and intact.   Heels dry and flaky   Sacrum red and non blanching, wound consult ordered, waffle mattress placed

## 2018-04-06 NOTE — ED PROVIDER NOTES
ED Provider Note    Scribed for Lio Marte M.D. by Jennifer Guadalupe. 4/5/2018  10:35 PM    Primary care provider: Carmelo Pat M.D.  Means of arrival: walk-in  History obtained from: patient  History limited by: none    CHIEF COMPLAINT  Chief Complaint   Patient presents with   • Dizziness   • Shortness of Breath       HPI  Atiya Henley is a 67 y.o. female with a history of COPD, diabetes, hypertension, hypothyroid who presents to the Emergency Department for shortness of breath onset last week with associated dizziness, decreased PO intake, productive cough, and generalized weakness.  Patient was evaluated by her PCP earlier today following these week long symptoms, who advised her to come into the ER for further evaluation. She has a history of COPD and sleep apnea, requiring a CPAP machine at night. Patient has a significant abdominal and pulmonary history. She smokes several cigarettes daily.  No complaints of fever.    REVIEW OF SYSTEMS  Pertinent positives include shortness of breath, dizziness, decreased PO intake, productive cough, generalized weakness. Pertinent negatives include no fever.  All other systems reviewed and negative.    PAST MEDICAL HISTORY   has a past medical history of Arthritis; Bowel obstruction; Cancer (CMS-HCC); Chronic autoimmune thyroiditis; Colostomy in place (CMS-HCC) (2009); COPD; Dental disorder; Diabetes; EMPHYSEMA; Hashimoto's disease; History of total knee arthroplasty; Hypertension; Hypothyroid (7/7/2009); Indigestion; Infectious disease; Infectious disease; Obesity (7/7/2009); Obstruction; Oxygen dependent; Pain; Personal history of venous thrombosis and embolism (2009, 2012); Presence of IVC filter; Psychiatric problem; Sleep apnea; Snoring; Status post cholecystectomy (1992); and Weight gain.    SURGICAL HISTORY   has a past surgical history that includes myringotomy (9/9/2009); low anterior resection (3/10/2010); low anterior resection laparoscopic (3/10/2010);  colostomy (3/16/2010); tracheostomy (3/31/2010); cholecystectomy (1992); exploratory laparotomy (3/16/2010); colectomy (3/16/2010); hysterectomy laparoscopy (1983); colonoscopy - endo (11/17/2010); lateral release (7/20/2011); other orthopedic surgery; knee arthroscopy (7/20/2011); medial meniscectomy (7/20/2011); meniscectomy (7/20/2011); irrigation & debridement ortho (7/17/2012); irrigation & debridement ortho (7/19/2012); knee arthroplasty total (6/10/2012); irrigation & debridement general (12/17/2012); irrigation & debridement ortho (2/8/2013); exploratory laparotomy (9/2/2013); parastomal hernia repair (9/2/2013); bladder biopsy with cystoscopy (10/10/2013); colostomy (11/26/2013); exploratory laparotomy (11/26/2013); lysis adhesions general (11/26/2013); colon resection (11/26/2013); exploratory laparotomy (12/10/2013); wound closure general (12/10/2013); inject rx other periph nerve (Left, 8/21/2015); fluoroscopic guidance needle placement (Left, 8/21/2015); inject rx other periph nerve (8/21/2015); and inject rx other periph nerve (8/21/2015).    SOCIAL HISTORY  Social History   Substance Use Topics   • Smoking status: Current Every Day Smoker     Packs/day: 0.25     Years: 50.00     Types: Cigarettes   • Smokeless tobacco: Current User      Comment: 1 ppd, 45 yrs   • Alcohol use No      History   Drug Use   • Types: Marijuana     Comment: 1x per month       FAMILY HISTORY  Family History   Problem Relation Age of Onset   • Heart Disease Mother    • Hypertension         CURRENT MEDICATIONS  No current facility-administered medications on file prior to encounter.      Current Outpatient Prescriptions on File Prior to Encounter   Medication Sig Dispense Refill   • Misc. Devices Misc 3 boxes of pouches (75454), 2 boxes of wafers (69954), 3 boxes (tubes) paste, 1 box of no string skin prep, 1 box of no string unsolve 5 Container 6   • Incontinence Supply Disposable (FQ PROTECTIVE UNDERWEAR) Misc USE 1 PULLUP 3  TIMES DAILY 90 Each 0   • ranitidine (ZANTAC) 150 MG Tab TAKE ONE (1) TABLET BY MOUTH TWICE DAILY. 60 Tab 3   • gabapentin (NEURONTIN) 300 MG Cap TAKE THREE (3) CAPSULES BY MOUTH THREE (3) TIMES DAILY  270 Cap 3   • levothyroxine (SYNTHROID) 25 MCG Tab TAKE ONE (1) TABLET BY MOUTH EVERY MORNING ON AN EMPTY STOMACH USE ALONG WITH 200MCG FOR A DAILY DOSE OF 225MCG 30 Tab 2   • levothyroxine (SYNTHROID) 200 MCG Tab TAKE ONE [1] TABLET BY MOUTH EVERY DAY 30 Tab 2   • vitamin D, Ergocalciferol, (DRISDOL) 24866 units Cap capsule TAKE 1 CAPSULE BY MOUTH EVERY 28 DAYS. 1 Cap 2   • NYSTATIN, TOPICAL, EX by Apply externally route.     • budesonide-formoterol (SYMBICORT) 160-4.5 MCG/ACT Aerosol Inhale 2 Puffs by mouth 2 Times a Day. With spacer, rinse mouth after use 1 Inhaler 11   • ammonium lactate (LAC-HYDRIN) 12 % Lotion Apply to dry skin after shower as directed. #1 1 Bottle 3   • enoxaparin (LOVENOX) 120 MG/0.8ML Solution inj Inject 120 mg as instructed every 12 hours. 6 Syringe 1   • mometasone (NASONEX) 50 MCG/ACT nasal spray Spray 1-2 Sprays in nose every day. Each nostril. 1 Inhaler 6   • carbamazepine (TEGRETOL) 200 MG Tab      • hyoscyamine (LEVSIN) 0.125 MG SL Tab      • COMBIVENT RESPIMAT  MCG/ACT Aero Soln      • mometasone (ELOCON) 0.1 % lotion      • SM NICOTINE POLACRILEX 2 MG Gum CHEW ONE (1) PIECE AS NEEDED FOR SMOKING CESSATION 110 Each 2   • Tiotropium Bromide Monohydrate (SPIRIVA RESPIMAT) 2.5 MCG/ACT Aero Soln Inhale 2 Inhalation by mouth every day. Assemble and prime. 1 Inhaler 6   • trazodone (DESYREL) 50 MG Tab Take 1 Tab by mouth at bedtime as needed for Sleep. 30 Tab 3   • Misc. Devices Misc New battery and gear shifter for power wheel chair 1 Device 0   • metformin (GLUCOPHAGE) 500 MG Tab TAKE ONE [1] TABLET BY MOUTH EVERY DAY 90 Tab 0   • ascorbic acid (ASCORBIC ACID) 500 MG Tab TAKE ONE [1] TABLET BY MOUTH EVERY DAY 90 Tab 2   • albuterol (PROVENTIL HFA) 108 (90 BASE) MCG/ACT Aero Soln  "inhalation aerosol Inhale 2 Puffs by mouth every four hours as needed. For Shortness of breath 1 Inhaler 6   • Misc. Devices Misc Incontinence supplies (for wheel chair) and adult briefs to use as needed #120 120 Device 6   • Ostomy Supplies Misc Dispense as ordered 20 Each 12   • Misc. Devices Misc O2 concentrator to use 24/7 and also with her CPAP at night to keep her O2 sats > 90% 1 Device 0   • Misc. Devices Misc Adult incontinence supplies to use as directed 60 Device 6   • Misc. Devices Misc Power mobility devise 1 Device 0   • flurazepam (DALMANE) 30 MG capsule Take 30 mg by mouth at bedtime as needed.     • fluoxetine (PROZAC) 20 MG Cap Take 20 mg by mouth every day.     • amitriptyline (ELAVIL) 25 MG Tab Take 25 mg by mouth every bedtime.     • Misc. Devices Misc Please evaluate pt for wider power mobility devise, pt has tipped over on multiple occasions 1 Device 0   • Misc. Devices Misc O2 tank rack for power wheel chair 1 Device 0   • Misc. Devices Misc O2 to use continuously to keep her sats >90 % 1 Device 0   • Misc. Devices Misc CPAP supplies (hoses, filter, water chamber, face mask-resmed small), nasal cannula tubing 10 Device 6   • Misc. Devices Misc Pt needs service for her PMD she will need a new battery to avoid stalling 1 Device 0       ALLERGIES  Allergies   Allergen Reactions   • Pcn [Penicillins] Anaphylaxis and Rash     Reaction; happened as a child. Tolerated cephalosporins and Zosyn       PHYSICAL EXAM  VITAL SIGNS: /64   Pulse 66   Temp 36.6 °C (97.9 °F)   Resp 20   Ht 1.626 m (5' 4\")   Wt (!) 128.4 kg (283 lb)   LMP 07/19/2011   SpO2 93%   BMI 48.58 kg/m²     Vital signs reviewed.  Constitutional:  Ill appearing, elderly female  Head: normocephalic  Mouth/Throat: oropharynx moist  Neck: normal ROM  Cardiovascular: regular rate and rhythm  Pulmonary/Chest: minimal expiratory wheezing, no chest wall tenderness  Abdominal: non-tender, obese, soft  Musculoskeletal: normal UE and " LE  Lymphadenopathy: none noted  Neurological: alert and oriented, normal strength, sensation and gait  Skin: warm and dry no rashes  Psychiatric: normal mood and affect    LABS  Results for orders placed or performed during the hospital encounter of 04/05/18   BTYPE NATRIURETIC PEPTIDE   Result Value Ref Range    B Natriuretic Peptide 27 0 - 100 pg/mL   CBC WITH DIFFERENTIAL   Result Value Ref Range    WBC 8.2 4.8 - 10.8 K/uL    RBC 4.66 4.20 - 5.40 M/uL    Hemoglobin 15.1 12.0 - 16.0 g/dL    Hematocrit 47.7 (H) 37.0 - 47.0 %    .4 (H) 81.4 - 97.8 fL    MCH 32.4 27.0 - 33.0 pg    MCHC 31.7 (L) 33.6 - 35.0 g/dL    RDW 55.8 (H) 35.9 - 50.0 fL    Platelet Count 303 164 - 446 K/uL    MPV 10.4 9.0 - 12.9 fL    Neutrophils-Polys 60.50 44.00 - 72.00 %    Lymphocytes 28.50 22.00 - 41.00 %    Monocytes 6.60 0.00 - 13.40 %    Eosinophils 2.90 0.00 - 6.90 %    Basophils 0.60 0.00 - 1.80 %    Immature Granulocytes 0.90 0.00 - 0.90 %    Nucleated RBC 0.00 /100 WBC    Lymphs (Absolute) 2.33 1.00 - 4.80 K/uL    Monos (Absolute) 0.54 0.00 - 0.85 K/uL    Eos (Absolute) 0.24 0.00 - 0.51 K/uL    Baso (Absolute) 0.05 0.00 - 0.12 K/uL    Immature Granulocytes (abs) 0.07 0.00 - 0.11 K/uL    NRBC (Absolute) 0.00 K/uL    Neutrophils (Absolute) 4.95 2.00 - 7.15 K/uL   COMP METABOLIC PANEL   Result Value Ref Range    Sodium 139 135 - 145 mmol/L    Potassium 4.0 3.6 - 5.5 mmol/L    Chloride 102 96 - 112 mmol/L    Co2 27 20 - 33 mmol/L    Anion Gap 10.0 0.0 - 11.9    Glucose 110 (H) 65 - 99 mg/dL    Bun 7 (L) 8 - 22 mg/dL    Creatinine 0.70 0.50 - 1.40 mg/dL    Calcium 9.4 8.5 - 10.5 mg/dL    AST(SGOT) 17 12 - 45 U/L    ALT(SGPT) 11 2 - 50 U/L    Alkaline Phosphatase 84 30 - 99 U/L    Total Bilirubin 0.3 0.1 - 1.5 mg/dL    Albumin 3.9 3.2 - 4.9 g/dL    Total Protein 8.7 (H) 6.0 - 8.2 g/dL    Globulin 4.8 (H) 1.9 - 3.5 g/dL    A-G Ratio 0.8 g/dL   PERIPHERAL SMEAR REVIEW   Result Value Ref Range    Peripheral Smear Review see below     DIFFERENTIAL COMMENT   Result Value Ref Range    Comments-Diff see below    ESTIMATED GFR   Result Value Ref Range    GFR If African American >60 >60 mL/min/1.73 m 2    GFR If Non African American >60 >60 mL/min/1.73 m 2   EKG (ER)   Result Value Ref Range    Report       [facility] Emergency Dept.    Test Date:  2018  Pt Name:    CAITLIN BOYER                Department: ER  MRN:        [id number]                      Room:  Gender:     Female                       Technician: [id number]  :        1950                   Requested By:ER TRIAGE PROTOCOL  Order #:    [id number]                    Reading MD:    Measurements  Intervals                                Axis  Rate:       71                           P:          32  MT:         180                          QRS:        -46  QRSD:       86                           T:          78  QT:         404  QTc:        439    Interpretive Statements  SINUS RHYTHM  LEFT ANTERIOR FASCICULAR BLOCK  BORDERLINE R WAVE PROGRESSION, ANTERIOR LEADS  BORDERLINE T ABNORMALITIES, ANT-LAT LEADS  Compared to ECG 07/10/2014 13:03:32  Left anterior fascicular block now present  Ectopic atrial rhythm no longer present  Possible ischemia no longer present  T-wave abnormality still present         All labs reviewed by me.    EKG  12 Lead EKG interpreted by me to show sinus rhythm at 70. Normal P waves. Q waves in 3 and AVF, late R wave progression. Normal ST segments. Normal T waves. Borderline EKG    RADIOLOGY  DX-CHEST-LIMITED (1 VIEW)   Final Result         1.  Hazy pulmonary opacities suggests edema or infiltrate.   2.  Atherosclerosis        The radiologist's interpretation of all radiological studies have been reviewed by me.    COURSE & MEDICAL DECISION MAKING  Pertinent Labs & Imaging studies reviewed. (See chart for details) The patient's [facility] Nursing and past medical  records were reviewed    10:35 PM - Patient seen and examined at bedside. Ordered  chest xray, peripheral smear review, differential comment, BNP, blood culture, CBC, CMP, and EKG to evaluate her symptoms. The differential diagnoses include but are not limited to: pneumonia, bronchitis, COPD exacerbation. I informed the patient that imaging and labs would be completed to evaluate for any acute origins of her symptoms. Patient understands and agrees.    11:15 PM Spoke with Dr. Yi, Hospitalist, about the patient's condition. Agrees to admit.    11:30 PM I informed the patient of her test results and explained that she would be admitted for further treatment and evaluation. She understands and agrees.    DISPOSITION:  Patient will be admitted to Dr. Yi, Hospitalist in guarded condition.    FINAL IMPRESSION  1. Pneumonia of both lungs due to infectious organism, unspecified part of lung    2. Hypoxia     Jennifer KYLE (Scribe), am scribing for, and in the presence of, Lio Marte M.D..    Electronically signed by: Jennifer Guadalupe (Aideeibe), 4/5/2018    ILio M.D. personally performed the services described in this documentation, as scribed by Jennifer Guadalupe in my presence, and it is both accurate and complete.    The note accurately reflects work and decisions made by me.  Lio Marte  4/6/2018  12:07 AM

## 2018-04-06 NOTE — H&P
Hospital Medicine History and Physical    Date of Service  4/5/2018    Chief Complaint  Chief Complaint   Patient presents with   • Dizziness   • Shortness of Breath       History of Presenting Illness  67 y.o. female with history of COPD on oxygen therapy at home, obesity hypoventilation and obstructive sleep apnea with home CPAP therapy, and severe hypothyroidism on replacement therapy was in her usual state of health until 6 days prior to admission when she had the onset of light-headedness, weakness, and shortness of breath.  She initially attributed the shortness of breath to her COPD, but was alarmed by her weakness and light-headedness.  She endorses chills and fever during the same time, and in the days just prior to her arrival, she began to have a productive cough with bright green sputum.  She subsequently came to the emergency department for further evaluation.      Primary Care Physician  Carmelo Pat M.D.    Consultants  none    Code Status  Full code     Review of Systems  Review of Systems   Constitutional: Positive for chills, fever and malaise/fatigue.   HENT: Negative.    Eyes: Negative.    Respiratory: Positive for cough, sputum production, shortness of breath and wheezing.    Cardiovascular: Negative.    Gastrointestinal: Positive for diarrhea.   Genitourinary: Negative.    Musculoskeletal: Positive for joint pain.   Skin: Negative.    Neurological: Positive for dizziness.   Endo/Heme/Allergies: Negative.    Psychiatric/Behavioral: Positive for depression. The patient is nervous/anxious.         Past Medical History  Past Medical History:   Diagnosis Date   • Obesity 7/7/2009   • Hypothyroid 7/7/2009   • Colostomy in place (CMS-HCC) 2009   • Status post cholecystectomy 1992   • Arthritis    • Bowel obstruction    • Cancer (CMS-HCC)     cervical   • Chronic autoimmune thyroiditis     + TPO ab = 563 / + ultrasound   • COPD    • Dental disorder     dentures upper   • Diabetes     type 2    •  EMPHYSEMA    • Hashimoto's disease    • History of total knee arthroplasty     infection   • Hypertension     stated no longer on meds due to lost 100 lbs   • Indigestion    • Infectious disease     MRSA,   • Infectious disease     VRE   • Obstruction     colostomy   • Oxygen dependent     4.5 L NC   • Pain     b/l legs   • Personal history of venous thrombosis and embolism 2009, 2012    arm and leg left side   • Presence of IVC filter    • Psychiatric problem     depression   • Sleep apnea     USES CPAP, 3-4L oxygen   • Snoring    • Weight gain        Surgical History  Past Surgical History:   Procedure Laterality Date   • PB INJECT RX OTHER PERIPH NERVE Left 8/21/2015    Procedure: NEUROLYTIC DEST-OTHER NERVE;  Surgeon: Kd Galaviz D.O.;  Location: [facility];  Service: Pain Management   • PB FLUOROSCOPIC GUIDANCE NEEDLE PLACEMENT Left 8/21/2015    Procedure: FLOURO GUIDE NEEDLE PLACEMENT;  Surgeon: Kd Galaviz D.O.;  Location: [facility];  Service: Pain Management   • PB INJECT RX OTHER PERIPH NERVE  8/21/2015    Procedure: NEUROLYTIC DEST-OTHER NERVE;  Surgeon: Kd Galaviz D.O.;  Location: [facility];  Service: Pain Management   • PB INJECT RX OTHER PERIPH NERVE  8/21/2015    Procedure: NEUROLYTIC DEST-OTHER NERVE;  Surgeon: Kd Galaviz D.O.;  Location: [facility];  Service: Pain Management   • EXPLORATORY LAPAROTOMY  12/10/2013    Performed by Sang Castañeda M.D. at [facility]   • WOUND CLOSURE GENERAL  12/10/2013    Performed by Sang Castañeda M.D. at [facility]   • COLOSTOMY  11/26/2013    Performed by Maninder Carter M.D. at [facility]   • EXPLORATORY LAPAROTOMY  11/26/2013    Performed by Maninder Carter M.D. at [facility]   • LYSIS ADHESIONS GENERAL  11/26/2013    Performed by Maninder Carter M.D. at [facility]   • COLON RESECTION  11/26/2013    Performed  by Maninder Carter M.D. at [facility]   • BLADDER BIOPSY WITH CYSTOSCOPY  10/10/2013    Performed by Sang Castañeda M.D. at [facility]   • EXPLORATORY LAPAROTOMY  9/2/2013    Performed by Maninder Carter M.D. at [facility]   • PARASTOMAL HERNIA REPAIR   9/2/2013    Performed by Maninder Carter M.D. at [facility]   • IRRIGATION & DEBRIDEMENT ORTHO  2/8/2013    Performed by David Fowler M.D. at [facility]   • IRRIGATION & DEBRIDEMENT GENERAL  12/17/2012    Performed by David Fowler M.D. at [facility]   • IRRIGATION & DEBRIDEMENT ORTHO  7/19/2012    Performed by BERYL LOYD at [facility]   • IRRIGATION & DEBRIDEMENT ORTHO  7/17/2012    Performed by BERYL LOYD at [facility]   • KNEE ARTHROPLASTY TOTAL  6/10/2012    left   • LATERAL RELEASE  7/20/2011    Performed by BERYL LOYD at [facility]   • KNEE ARTHROSCOPY  7/20/2011    Performed by BERYL LOYD at [facility]   • MEDIAL MENISCECTOMY  7/20/2011    Performed by BERYL LOYD at [facility]   • MENISCECTOMY  7/20/2011    Performed by BERYL LOYD at [facility]   • COLONOSCOPY - ENDO  11/17/2010    Performed by JESUSITA SUMNER at [facility]   • TRACHEOSTOMY  3/31/2010    Performed by JESUSITA SUMNER at [facility]   • COLOSTOMY  3/16/2010    Performed by CHRISTA BENNETT at [facility]   • EXPLORATORY LAPAROTOMY  3/16/2010    Performed by CHRISTA BENNETT at [facility]   • COLECTOMY  3/16/2010    Performed by CHRISTA BENNETT at [facility]   • LOW ANTERIOR RESECTION  3/10/2010    Performed by JESUSITA SUMNER at [facility]   • LOW ANTERIOR RESECTION LAPAROSCOPIC  3/10/2010    Performed by JESUSITA SUMNER at [facility]   • MYRINGOTOMY  9/9/2009    Performed by RENETTA GOODSON  NELLA at SUR[facility]ERY [facility]   • CHOLECYSTECTOMY  1992    laparoscopic   • HYSTERECTOMY LAPAROSCOPY  1983   • OTHER ORTHOPEDIC SURGERY      LT KNEE X2       Medications  No current facility-administered medications on file prior to encounter.      Current Outpatient Prescriptions on File Prior to Encounter   Medication Sig Dispense Refill   • ranitidine (ZANTAC) 150 MG Tab TAKE ONE (1) TABLET BY MOUTH TWICE DAILY. 60 Tab 3   • gabapentin (NEURONTIN) 300 MG Cap TAKE THREE (3) CAPSULES BY MOUTH THREE (3) TIMES DAILY  270 Cap 3   • levothyroxine (SYNTHROID) 25 MCG Tab TAKE ONE (1) TABLET BY MOUTH EVERY MORNING ON AN EMPTY STOMACH USE ALONG WITH 200MCG FOR A DAILY DOSE OF 225MCG 30 Tab 2   • levothyroxine (SYNTHROID) 200 MCG Tab TAKE ONE [1] TABLET BY MOUTH EVERY DAY 30 Tab 2   • vitamin D, Ergocalciferol, (DRISDOL) 41082 units Cap capsule TAKE 1 CAPSULE BY MOUTH EVERY 28 DAYS. 1 Cap 2   • NYSTATIN, TOPICAL, EX by Apply externally route.     • budesonide-formoterol (SYMBICORT) 160-4.5 MCG/ACT Aerosol Inhale 2 Puffs by mouth 2 Times a Day. With spacer, rinse mouth after use 1 Inhaler 11   • ammonium lactate (LAC-HYDRIN) 12 % Lotion Apply to dry skin after shower as directed. #1 1 Bottle 3   • mometasone (NASONEX) 50 MCG/ACT nasal spray Spray 1-2 Sprays in nose every day. Each nostril. 1 Inhaler 6   • carbamazepine (TEGRETOL) 200 MG Tab      • COMBIVENT RESPIMAT  MCG/ACT Aero Soln      • mometasone (ELOCON) 0.1 % lotion      • Tiotropium Bromide Monohydrate (SPIRIVA RESPIMAT) 2.5 MCG/ACT Aero Soln Inhale 2 Inhalation by mouth every day. Assemble and prime. 1 Inhaler 6   • trazodone (DESYREL) 50 MG Tab Take 1 Tab by mouth at bedtime as needed for Sleep. 30 Tab 3   • metformin (GLUCOPHAGE) 500 MG Tab TAKE ONE [1] TABLET BY MOUTH EVERY DAY 90 Tab 0   • ascorbic acid (ASCORBIC ACID) 500 MG Tab TAKE ONE [1] TABLET BY MOUTH EVERY DAY 90 Tab 2   • albuterol (PROVENTIL HFA) 108 (90 BASE) MCG/ACT Aero Soln  inhalation aerosol Inhale 2 Puffs by mouth every four hours as needed. For Shortness of breath 1 Inhaler 6   • flurazepam (DALMANE) 30 MG capsule Take 30 mg by mouth at bedtime as needed.     • fluoxetine (PROZAC) 20 MG Cap Take 20 mg by mouth every day.     • amitriptyline (ELAVIL) 25 MG Tab Take 25 mg by mouth every bedtime.         Family History  Family History   Problem Relation Age of Onset   • Heart Disease Mother    • Seizures Mother    • Alcohol/Drug Father    • Lung Disease Father    • Hypertension         Social History  Social History   Substance Use Topics   • Smoking status: Current Every Day Smoker     Packs/day: 0.25     Years: 50.00     Types: Cigarettes   • Smokeless tobacco: Current User      Comment: 1 ppd, 45 yrs   • Alcohol use No       Allergies  Allergies   Allergen Reactions   • Pcn [Penicillins] Anaphylaxis and Rash     Reaction; happened as a child. Tolerated cephalosporins and Zosyn        Physical Exam  Laboratory   Hemodynamics  Temp (24hrs), Av.6 °C (97.9 °F), Min:36.6 °C (97.9 °F), Max:36.6 °C (97.9 °F)   Temperature: 36.6 °C (97.9 °F)  Pulse  Av.7  Min: 66  Max: 88 Heart Rate (Monitored): 65  Blood Pressure : 132/64, NIBP: 149/76      Respiratory      Respiration: (!) 22, Pulse Oximetry: 91 %        RUL Breath Sounds: Clear, RML Breath Sounds: Diminished, RLL Breath Sounds: Diminished, HUI Breath Sounds: Clear, LLL Breath Sounds: Diminished    Physical Exam   Constitutional: She is oriented to person, place, and time. She appears well-developed and well-nourished. No distress.   HENT:   Head: Normocephalic and atraumatic.   Eyes: Conjunctivae are normal. Pupils are equal, round, and reactive to light.   Neck: Normal range of motion. Neck supple. No tracheal deviation present. No thyromegaly present.   Cardiovascular: Normal rate, regular rhythm and normal heart sounds.  Exam reveals no gallop and no friction rub.    No murmur heard.  Pulmonary/Chest: She is in respiratory  distress. She has no wheezes. She has rales.   Abdominal: Soft. Bowel sounds are normal. She exhibits no distension. There is no tenderness. There is no rebound.   Left sided colostomy in place, multiple surgical scarring noted   Musculoskeletal: Normal range of motion. She exhibits no edema.   Lymphadenopathy:     She has no cervical adenopathy.   Neurological: She is alert and oriented to person, place, and time. No cranial nerve deficit.   Skin: Skin is warm and dry. She is not diaphoretic.   Psychiatric: She has a normal mood and affect.   Nursing note and vitals reviewed.      Recent Labs      04/05/18 1943   WBC  8.2   RBC  4.66   HEMOGLOBIN  15.1   HEMATOCRIT  47.7*   MCV  102.4*   MCH  32.4   MCHC  31.7*   RDW  55.8*   PLATELETCT  303   MPV  10.4     Recent Labs      04/05/18 1943   SODIUM  139   POTASSIUM  4.0   CHLORIDE  102   CO2  27   GLUCOSE  110*   BUN  7*   CREATININE  0.70   CALCIUM  9.4     Recent Labs      04/05/18 1943   ALTSGPT  11   ASTSGOT  17   ALKPHOSPHAT  84   TBILIRUBIN  0.3   GLUCOSE  110*         Recent Labs      04/05/18 1943   BNPBTYPENAT  27         Lab Results   Component Value Date    TROPONINI <0.01 07/11/2014     Urinalysis:    Lab Results  Component Value Date/Time   SPECGRAVITY 1.018 07/10/2014 1123   GLUCOSEUR Negative 07/10/2014 1123   KETONES Negative 07/10/2014 1123   NITRITE Negative 07/10/2014 1123   WBCURINE 10-20 (A) 07/10/2014 1123   RBCURINE 20-50 (A) 07/10/2014 1123   BACTERIA Few (A) 07/10/2014 1123   EPITHELCELL Few 07/10/2014 1123        Imaging  Chest radiograph with bilateral infiltrates   Assessment/Plan     I anticipate this patient will require at least two midnights for appropriate medical management, necessitating inpatient admission.    * CAP (community acquired pneumonia)   Assessment & Plan    IV antibiotic therapy.  Follow up culture results.          COPD (chronic obstructive pulmonary disease) (CMS-[facility])- (present on admission)   Assessment &  Plan    Without current exacerbation.  Likely a component of obesity hypoventilation syndrome as well.  Continue current respiratory therapy treatments.         HTN (hypertension)- (present on admission)   Assessment & Plan    Controlled with current medication regimen, monitor.         YESSICA (obstructive sleep apnea)- (present on admission)   Assessment & Plan    On CPAP at home.  Does not have home device with her.  Will order from respiratory.         Hypothyroidism, acquired, autoimmune- (present on admission)   Assessment & Plan    Continue synthroid and monitor.         Tobacco abuse- (present on admission)   Assessment & Plan    Ongoing.  No desire for cessation.         Depression- (present on admission)   Assessment & Plan    Continue current therapy.  Follow with psychiatry as outpatient.         Morbid obesity (CMS-HCC)- (present on admission)   Assessment & Plan    Body mass index is 48.58 kg/m².              VTE prophylaxis: SCD Lovenox.

## 2018-04-06 NOTE — ASSESSMENT & PLAN NOTE
Without current exacerbation.  Likely a component of obesity hypoventilation syndrome as well.    - Continue current respiratory therapy treatments.

## 2018-04-06 NOTE — PROGRESS NOTES
Assumed care at 0700, bedside report received from night shift RN. Pt is AOX4 with no signs of distress. Pt is SR 75 on the monitor. Initial assessment completed, orders reviewed, call light within reach, and hourly rounding in place. POC addressed with patient, no additional questions at this time.

## 2018-04-06 NOTE — PROGRESS NOTES
Pt arrived to unit with RN on zoll, tele monitor placed, SR verified with monitor tech with rate of 63. Denies pain at this time. Unit routines explained, verbalized understanding.

## 2018-04-06 NOTE — RESPIRATORY CARE
COPD EDUCATION by COPD CLINICAL EDUCATOR  4/6/2018  at  1:20 PM by Odalis Burgess     Patient interviewed by COPD education team.  Patient unable to participate in full program.  Short intervention has been conducted.  A comprehensive packet including information about COPD, treatments, and smoking cessation given.

## 2018-04-06 NOTE — THERAPY
"Occupational Therapy Evaluation completed.   Functional Status:  spv w/supine>sit EOB, spv w/sit>stand walking in room w/spv and 4ww, no overt c/o pain or fatigue no c/o dizziness, SBA w/LB dressing able to manage socks, BTB w/spv O2 in place. Reports having caregiver assist 9 hours per week and living in an [place] apartment   Plan of Care: Patient with no further skilled OT needs in the acute care setting at this time  Discharge Recommendations:  Equipment: No Equipment Needed. Post-acute therapy Currently anticipate no further OT needs   See \"Rehab Therapy-Acute\" Patient Summary Report for complete documentation.    "

## 2018-04-06 NOTE — ED TRIAGE NOTES
Pt sent by PCP for eval of dizziness and SOB. Pt reports not eating due to productive cough and weakness. SOB with activity. Denies fever.   Pt advised to return to triage nurse for any changes or concerns.

## 2018-04-06 NOTE — ED TRIAGE NOTES
"Patient complaining of productive cough w/ \"lime green sputum\". Patient also states \"I'm dizzy, I'm just falling this way and that way\". Patient denies vertigo, \"the room isn't spinning\", but states she is having balance issues.  "

## 2018-04-06 NOTE — CARE PLAN
Problem: Communication  Goal: The ability to communicate needs accurately and effectively will improve  Outcome: PROGRESSING AS EXPECTED  Pt educated to voice concerns and ask questions. Pt verbalizes understanding. Pt uses call light appropriately. Pt has no further questions at this time.     Problem: Safety  Goal: Will remain free from falls  Outcome: PROGRESSING AS EXPECTED  Pt up x1 with walker. Pt calls for assistance appropriately. Call light within reach. Treaded socks in place. Pt remains free from falls at this time.

## 2018-04-06 NOTE — DOCUMENTATION QUERY
DOCUMENTATION QUERY    PROVIDERS: Please select “Cosign w/ note” to reply to query.    To better represent the severity of illness of your patient, please review the following information and exercise your independent professional judgment in responding to this query.     History of COPD on oxygen therapy at home is documented in the History and Physical. Based upon the clinical findings, risk factors, and treatment, can a diagnosis be provided to support this finding?    • Chronic Respiratory Failure with Hypoxia  • Chronic Hypoxemia  • Acute on Chronic Respiratory Failure with Hypoxia  • Other explanation of clinical findings  • Unable to determine    The medical record reflects the following:   Clinical Findings H&P:   · history of COPD on oxygen therapy at home   · PMH: oxygen dependent (4.5L NC)  · RR 22, spO2 91%  · She is in respiratory distress, she has rales    4/6 RN PN: oxygen increased to 4L for hypoxia at 86% on 2L    ED provider note: Hypoxia   Treatment RT protocol  Supplemental oxygen   Spiriva  Solumedrol  Symbicort    Risk Factors Pneumonia  COPD  YESSICA  Obesity hypoventilation syndrome   Tobacco abuse    Location within medical record History and Physical, ED provider note and Progress Notes     Thank you,   Freda Aj, RN, BSN  Clinical   [phone]

## 2018-04-06 NOTE — DIETARY
NUTRITION SERVICES:   BMI - Pt with BMI >40 (=47.44). Weight loss counseling not appropriate in acute care setting.   Per nutrition admit screen, pt with recent unplanned wt loss. Per chart review pt weighed 132 kg at an office visit from ~6 months ago. Current wt per stand up scale = 129.3 kg, pt has had a 2% wt loss in the past 6 months which is not significant. No further nutritional concerns at this time.    RECOMMEND - Referral to outpatient nutrition services for weight management after D/C.    RD available prn

## 2018-04-06 NOTE — THERAPY
"Physical Therapy Evaluation completed.   Bed Mobility:  Supine to Sit: Supervised  Transfers: Sit to Stand: Supervised with 4WW   Gait: Level Of Assist: Supervised with 4-Wheel Walker       Plan of Care: Patient with no further skilled PT needs in the acute care setting at this time  Discharge Recommendations: Equipment: No Equipment Needed. See below.    Pt presents to physical therapy for mobility assessment. She is able to perform all mobility with SUP and 4WW. She states that she is at/near baseline with functional mobility. She expresses no concerns regarding discharge home, as she has all equipment she needs. Pt has no further acute PT needs.     See \"Rehab Therapy-Acute\" Patient Summary Report for complete documentation.     "

## 2018-04-07 PROBLEM — I48.91 NEW ONSET A-FIB (HCC): Status: ACTIVE | Noted: 2018-04-07

## 2018-04-07 PROBLEM — E83.39 HYPOPHOSPHATEMIA: Status: ACTIVE | Noted: 2018-04-07

## 2018-04-07 LAB
ALBUMIN SERPL BCP-MCNC: 2.9 G/DL (ref 3.2–4.9)
ALBUMIN/GLOB SERPL: 0.8 G/DL
ALP SERPL-CCNC: 59 U/L (ref 30–99)
ALT SERPL-CCNC: 8 U/L (ref 2–50)
ANION GAP SERPL CALC-SCNC: 5 MMOL/L (ref 0–11.9)
APTT PPP: 22 SEC (ref 24.7–36)
APTT PPP: 79.8 SEC (ref 24.7–36)
AST SERPL-CCNC: 10 U/L (ref 12–45)
BASOPHILS # BLD AUTO: 0.5 % (ref 0–1.8)
BASOPHILS # BLD: 0.04 K/UL (ref 0–0.12)
BILIRUB SERPL-MCNC: 0.2 MG/DL (ref 0.1–1.5)
BUN SERPL-MCNC: 8 MG/DL (ref 8–22)
CALCIUM SERPL-MCNC: 8.8 MG/DL (ref 8.5–10.5)
CHLORIDE SERPL-SCNC: 107 MMOL/L (ref 96–112)
CO2 SERPL-SCNC: 30 MMOL/L (ref 20–33)
CREAT SERPL-MCNC: 0.7 MG/DL (ref 0.5–1.4)
EKG IMPRESSION: NORMAL
EOSINOPHIL # BLD AUTO: 0.06 K/UL (ref 0–0.51)
EOSINOPHIL NFR BLD: 0.8 % (ref 0–6.9)
ERYTHROCYTE [DISTWIDTH] IN BLOOD BY AUTOMATED COUNT: 55.8 FL (ref 35.9–50)
GLOBULIN SER CALC-MCNC: 3.6 G/DL (ref 1.9–3.5)
GLUCOSE SERPL-MCNC: 96 MG/DL (ref 65–99)
HCT VFR BLD AUTO: 38.5 % (ref 37–47)
HGB BLD-MCNC: 12.1 G/DL (ref 12–16)
IMM GRANULOCYTES # BLD AUTO: 0.06 K/UL (ref 0–0.11)
IMM GRANULOCYTES NFR BLD AUTO: 0.8 % (ref 0–0.9)
INR PPP: 1.17 (ref 0.87–1.13)
LYMPHOCYTES # BLD AUTO: 2.24 K/UL (ref 1–4.8)
LYMPHOCYTES NFR BLD: 29.8 % (ref 22–41)
MAGNESIUM SERPL-MCNC: 1.9 MG/DL (ref 1.5–2.5)
MCH RBC QN AUTO: 32.4 PG (ref 27–33)
MCHC RBC AUTO-ENTMCNC: 31.4 G/DL (ref 33.6–35)
MCV RBC AUTO: 103.2 FL (ref 81.4–97.8)
MONOCYTES # BLD AUTO: 0.49 K/UL (ref 0–0.85)
MONOCYTES NFR BLD AUTO: 6.5 % (ref 0–13.4)
NEUTROPHILS # BLD AUTO: 4.62 K/UL (ref 2–7.15)
NEUTROPHILS NFR BLD: 61.6 % (ref 44–72)
NRBC # BLD AUTO: 0 K/UL
NRBC BLD-RTO: 0 /100 WBC
PHOSPHATE SERPL-MCNC: 2.3 MG/DL (ref 2.5–4.5)
PLATELET # BLD AUTO: 229 K/UL (ref 164–446)
PMV BLD AUTO: 10.2 FL (ref 9–12.9)
POTASSIUM SERPL-SCNC: 3.8 MMOL/L (ref 3.6–5.5)
PROT SERPL-MCNC: 6.5 G/DL (ref 6–8.2)
PROTHROMBIN TIME: 14.6 SEC (ref 12–14.6)
RBC # BLD AUTO: 3.73 M/UL (ref 4.2–5.4)
SODIUM SERPL-SCNC: 142 MMOL/L (ref 135–145)
TSH SERPL DL<=0.005 MIU/L-ACNC: 4.15 UIU/ML (ref 0.38–5.33)
WBC # BLD AUTO: 7.5 K/UL (ref 4.8–10.8)

## 2018-04-07 PROCEDURE — 85025 COMPLETE CBC W/AUTO DIFF WBC: CPT | Mod: 91

## 2018-04-07 PROCEDURE — 85610 PROTHROMBIN TIME: CPT | Mod: 91

## 2018-04-07 PROCEDURE — 83735 ASSAY OF MAGNESIUM: CPT

## 2018-04-07 PROCEDURE — 700105 HCHG RX REV CODE 258: Performed by: HOSPITALIST

## 2018-04-07 PROCEDURE — A9270 NON-COVERED ITEM OR SERVICE: HCPCS | Performed by: FAMILY MEDICINE

## 2018-04-07 PROCEDURE — A9270 NON-COVERED ITEM OR SERVICE: HCPCS | Performed by: HOSPITALIST

## 2018-04-07 PROCEDURE — 770020 HCHG ROOM/CARE - TELE (206)

## 2018-04-07 PROCEDURE — 85730 THROMBOPLASTIN TIME PARTIAL: CPT | Mod: 91

## 2018-04-07 PROCEDURE — 700111 HCHG RX REV CODE 636 W/ 250 OVERRIDE (IP): Performed by: FAMILY MEDICINE

## 2018-04-07 PROCEDURE — 700102 HCHG RX REV CODE 250 W/ 637 OVERRIDE(OP): Performed by: HOSPITALIST

## 2018-04-07 PROCEDURE — 36415 COLL VENOUS BLD VENIPUNCTURE: CPT

## 2018-04-07 PROCEDURE — 94640 AIRWAY INHALATION TREATMENT: CPT

## 2018-04-07 PROCEDURE — 700102 HCHG RX REV CODE 250 W/ 637 OVERRIDE(OP): Performed by: FAMILY MEDICINE

## 2018-04-07 PROCEDURE — 700111 HCHG RX REV CODE 636 W/ 250 OVERRIDE (IP): Performed by: HOSPITALIST

## 2018-04-07 PROCEDURE — A6250 SKIN SEAL PROTECT MOISTURIZR: HCPCS | Performed by: FAMILY MEDICINE

## 2018-04-07 PROCEDURE — 84443 ASSAY THYROID STIM HORMONE: CPT

## 2018-04-07 PROCEDURE — 84100 ASSAY OF PHOSPHORUS: CPT

## 2018-04-07 PROCEDURE — 700105 HCHG RX REV CODE 258: Performed by: FAMILY MEDICINE

## 2018-04-07 PROCEDURE — 99233 SBSQ HOSP IP/OBS HIGH 50: CPT | Performed by: FAMILY MEDICINE

## 2018-04-07 PROCEDURE — 700101 HCHG RX REV CODE 250: Performed by: FAMILY MEDICINE

## 2018-04-07 PROCEDURE — 80053 COMPREHEN METABOLIC PANEL: CPT

## 2018-04-07 RX ORDER — IPRATROPIUM BROMIDE AND ALBUTEROL SULFATE 2.5; .5 MG/3ML; MG/3ML
3 SOLUTION RESPIRATORY (INHALATION)
Status: DISCONTINUED | OUTPATIENT
Start: 2018-04-07 | End: 2018-04-11 | Stop reason: HOSPADM

## 2018-04-07 RX ORDER — HEPARIN SODIUM 1000 [USP'U]/ML
7000 INJECTION, SOLUTION INTRAVENOUS; SUBCUTANEOUS ONCE
Status: COMPLETED | OUTPATIENT
Start: 2018-04-07 | End: 2018-04-07

## 2018-04-07 RX ORDER — NYSTATIN 100000 U/G
CREAM TOPICAL 2 TIMES DAILY
Status: DISCONTINUED | OUTPATIENT
Start: 2018-04-07 | End: 2018-04-11 | Stop reason: HOSPADM

## 2018-04-07 RX ORDER — HEPARIN SODIUM 1000 [USP'U]/ML
3800 INJECTION, SOLUTION INTRAVENOUS; SUBCUTANEOUS PRN
Status: DISCONTINUED | OUTPATIENT
Start: 2018-04-07 | End: 2018-04-09 | Stop reason: ALTCHOICE

## 2018-04-07 RX ORDER — WARFARIN SODIUM 10 MG/1
10 TABLET ORAL
Status: DISCONTINUED | OUTPATIENT
Start: 2018-04-07 | End: 2018-04-09

## 2018-04-07 RX ORDER — LEVOTHYROXINE SODIUM 0.2 MG/1
225 TABLET ORAL
COMMUNITY
End: 2018-04-18 | Stop reason: SDUPTHER

## 2018-04-07 RX ORDER — CARVEDILOL 3.12 MG/1
3.12 TABLET ORAL 2 TIMES DAILY WITH MEALS
Status: DISCONTINUED | OUTPATIENT
Start: 2018-04-07 | End: 2018-04-10

## 2018-04-07 RX ORDER — WARFARIN SODIUM 5 MG/1
5 TABLET ORAL
Status: DISCONTINUED | OUTPATIENT
Start: 2018-04-11 | End: 2018-04-09

## 2018-04-07 RX ORDER — RANITIDINE 150 MG/1
150 TABLET ORAL 2 TIMES DAILY
COMMUNITY
End: 2019-01-03 | Stop reason: SDUPTHER

## 2018-04-07 RX ORDER — WARFARIN SODIUM 5 MG/1
5-10 TABLET ORAL EVERY EVENING
Status: ON HOLD | COMMUNITY
End: 2018-04-11

## 2018-04-07 RX ADMIN — NICOTINE 14 MG: 14 PATCH, EXTENDED RELEASE TRANSDERMAL at 06:03

## 2018-04-07 RX ADMIN — CARBAMAZEPINE 600 MG: 200 TABLET ORAL at 21:49

## 2018-04-07 RX ADMIN — FAMOTIDINE 20 MG: 20 TABLET, FILM COATED ORAL at 09:58

## 2018-04-07 RX ADMIN — FLUOXETINE HYDROCHLORIDE 20 MG: 20 CAPSULE ORAL at 09:57

## 2018-04-07 RX ADMIN — ENOXAPARIN SODIUM 40 MG: 100 INJECTION SUBCUTANEOUS at 09:58

## 2018-04-07 RX ADMIN — POTASSIUM PHOSPHATE, MONOBASIC AND POTASSIUM PHOSPHATE, DIBASIC 30 MMOL: 224; 236 INJECTION, SOLUTION INTRAVENOUS at 17:21

## 2018-04-07 RX ADMIN — GABAPENTIN 900 MG: 300 CAPSULE ORAL at 15:05

## 2018-04-07 RX ADMIN — NYSTATIN: 100000 CREAM TOPICAL at 21:45

## 2018-04-07 RX ADMIN — BUDESONIDE AND FORMOTEROL FUMARATE DIHYDRATE 2 PUFF: 160; 4.5 AEROSOL RESPIRATORY (INHALATION) at 21:47

## 2018-04-07 RX ADMIN — GABAPENTIN 900 MG: 300 CAPSULE ORAL at 09:58

## 2018-04-07 RX ADMIN — BUDESONIDE AND FORMOTEROL FUMARATE DIHYDRATE 2 PUFF: 160; 4.5 AEROSOL RESPIRATORY (INHALATION) at 07:57

## 2018-04-07 RX ADMIN — GABAPENTIN 900 MG: 300 CAPSULE ORAL at 21:47

## 2018-04-07 RX ADMIN — TIOTROPIUM BROMIDE 1 CAPSULE: 18 CAPSULE ORAL; RESPIRATORY (INHALATION) at 07:57

## 2018-04-07 RX ADMIN — AMITRIPTYLINE HYDROCHLORIDE 25 MG: 25 TABLET, FILM COATED ORAL at 21:48

## 2018-04-07 RX ADMIN — FLUTICASONE PROPIONATE 100 MCG: 50 SPRAY, METERED NASAL at 09:52

## 2018-04-07 RX ADMIN — SODIUM CHLORIDE: 9 INJECTION, SOLUTION INTRAVENOUS at 07:19

## 2018-04-07 RX ADMIN — FAMOTIDINE 20 MG: 20 TABLET, FILM COATED ORAL at 21:47

## 2018-04-07 RX ADMIN — HEPARIN SODIUM 7000 UNITS: 1000 INJECTION, SOLUTION INTRAVENOUS; SUBCUTANEOUS at 11:51

## 2018-04-07 RX ADMIN — ACETAMINOPHEN 650 MG: 325 TABLET, FILM COATED ORAL at 11:57

## 2018-04-07 RX ADMIN — HEPARIN SODIUM 1450 UNITS/HR: 5000 INJECTION, SOLUTION INTRAVENOUS; SUBCUTANEOUS at 11:52

## 2018-04-07 RX ADMIN — LEVOTHYROXINE SODIUM 225 MCG: 75 TABLET ORAL at 09:53

## 2018-04-07 RX ADMIN — CARVEDILOL 3.12 MG: 3.12 TABLET, FILM COATED ORAL at 17:21

## 2018-04-07 RX ADMIN — AZITHROMYCIN 250 MG: 250 TABLET, FILM COATED ORAL at 09:57

## 2018-04-07 RX ADMIN — CEFTRIAXONE 2 G: 2 INJECTION, POWDER, FOR SOLUTION INTRAMUSCULAR; INTRAVENOUS at 09:58

## 2018-04-07 RX ADMIN — NYSTATIN: 100000 CREAM TOPICAL at 11:57

## 2018-04-07 RX ADMIN — WARFARIN SODIUM 10 MG: 10 TABLET ORAL at 17:22

## 2018-04-07 ASSESSMENT — PAIN SCALES - GENERAL
PAINLEVEL_OUTOF10: 5
PAINLEVEL_OUTOF10: 6
PAINLEVEL_OUTOF10: 0
PAINLEVEL_OUTOF10: 3
PAINLEVEL_OUTOF10: 6
PAINLEVEL_OUTOF10: 3

## 2018-04-07 ASSESSMENT — ENCOUNTER SYMPTOMS
DOUBLE VISION: 0
BLURRED VISION: 0
TINGLING: 0
DIZZINESS: 0
BRUISES/BLEEDS EASILY: 0
BACK PAIN: 0
DEPRESSION: 0
NECK PAIN: 0
CHILLS: 0
PALPITATIONS: 0
PHOTOPHOBIA: 0
COUGH: 1
HEARTBURN: 0
MYALGIAS: 0
SHORTNESS OF BREATH: 1
HEADACHES: 0
VOMITING: 0
WEIGHT LOSS: 0
NAUSEA: 0
ORTHOPNEA: 0

## 2018-04-07 ASSESSMENT — CHA2DS2 SCORE
AGE 65 TO 74: YES
VASCULAR DISEASE: NO
CHF OR LEFT VENTRICULAR DYSFUNCTION: NO
AGE 75 OR GREATER: NO
DIABETES: YES
PRIOR STROKE OR TIA OR THROMBOEMBOLISM: YES
HYPERTENSION: YES
SEX: FEMALE
CHA2DS2 VASC SCORE: 6

## 2018-04-07 ASSESSMENT — LIFESTYLE VARIABLES: SUBSTANCE_ABUSE: 0

## 2018-04-07 NOTE — ASSESSMENT & PLAN NOTE
S/P IVC. Was on Coumadin and ran out 10 days prior to admission.  - On heparin gtt and Coumadin   - INR goal 2-3.

## 2018-04-07 NOTE — PROGRESS NOTES
[facility] Hospitalist Progress Note    Date of Service: 2018    Chief Complaint  67 y.o. female with history of COPD on oxygen therapy at home, obesity hypoventilation and obstructive sleep apnea with home CPAP therapy, and severe hypothyroidism on replacement therapy was in her usual state of health until 6 days prior to admission when she had the onset of light-headedness, weakness, and shortness of breath.  She initially attributed the shortness of breath to her COPD, but was alarmed by her weakness and light-headedness.  She endorses chills and fever during the same time, and in the days just prior to her arrival, she began to have a productive cough with bright green sputum.  She subsequently came to the emergency department for further evaluation.      Interval Problem Update  Resp status stable. No chest pain. Was found in a.fib last night. Given 1 dose of IV BB . Just informed the staff that she is on Coumadin for DVT and ran out 10 days ago!     Consultants/Specialty  None    Disposition  To be determined        Review of Systems   Constitutional: Negative for chills and weight loss.   HENT: Negative for ear pain, hearing loss and tinnitus.    Eyes: Negative for blurred vision, double vision and photophobia.   Respiratory: Positive for cough and shortness of breath.    Cardiovascular: Negative for chest pain, palpitations and orthopnea.   Gastrointestinal: Negative for heartburn, nausea and vomiting.   Genitourinary: Negative for dysuria and urgency.   Musculoskeletal: Negative for back pain, myalgias and neck pain.   Skin: Positive for rash (under the breast).   Neurological: Negative for dizziness, tingling and headaches.   Endo/Heme/Allergies: Negative for environmental allergies. Does not bruise/bleed easily.   Psychiatric/Behavioral: Negative for depression, substance abuse and suicidal ideas.      Physical Exam  Laboratory/Imaging   Hemodynamics  Temp (24hrs), Av.3 °C (97.3 °F), Min:36.1 °C (96.9  °F), Max:36.6 °C (97.9 °F)   Temperature: 36.1 °C (96.9 °F)  Pulse  Av.5  Min: 57  Max: 111    Blood Pressure : 111/48      Respiratory      Respiration: 18, Pulse Oximetry: 95 %, O2 Daily Delivery Respiratory : Silicone Nasal Cannula     Given By:: Mouthpiece, #MDI/DPI Given: MDI/DPI x 2, Work Of Breathing / Effort: Moderate  RUL Breath Sounds: Clear, RML Breath Sounds: Diminished, RLL Breath Sounds: Diminished, HUI Breath Sounds: Clear, LLL Breath Sounds: Diminished    Fluids    Intake/Output Summary (Last 24 hours) at 18 1604  Last data filed at 18 1500   Gross per 24 hour   Intake              300 ml   Output                0 ml   Net              300 ml       Nutrition  Orders Placed This Encounter   Procedures   • Diet Order     Standing Status:   Standing     Number of Occurrences:   1     Order Specific Question:   Diet:     Answer:   Cardiac [6]     Physical Exam   Constitutional: She is oriented to person, place, and time. She appears well-developed. No distress.   Obese   HENT:   Head: Normocephalic and atraumatic.   Eyes: Pupils are equal, round, and reactive to light. Left eye exhibits no discharge.   Neck: Normal range of motion. No tracheal deviation present. No thyromegaly present.   Cardiovascular: Normal rate and regular rhythm.  Exam reveals no friction rub.    Pulmonary/Chest: Effort normal. No accessory muscle usage. No respiratory distress. She has decreased breath sounds. She has no wheezes.   Abdominal: Soft. She exhibits no distension. There is no tenderness.   Musculoskeletal: Normal range of motion. She exhibits no tenderness or deformity.   Neurological: She is alert and oriented to person, place, and time.   Skin: Skin is warm. She is not diaphoretic. No erythema.   Psychiatric: She has a normal mood and affect. Her behavior is normal. Thought content normal.       Recent Labs      18   1943  18   0421   WBC  8.2  7.5   RBC  4.66  3.73*   HEMOGLOBIN  15.1   12.1   HEMATOCRIT  47.7*  38.5   MCV  102.4*  103.2*   MCH  32.4  32.4   MCHC  31.7*  31.4*   RDW  55.8*  55.8*   PLATELETCT  303  229   MPV  10.4  10.2     Recent Labs      04/05/18 1943 04/07/18   0421   SODIUM  139  142   POTASSIUM  4.0  3.8   CHLORIDE  102  107   CO2  27  30   GLUCOSE  110*  96   BUN  7*  8   CREATININE  0.70  0.70   CALCIUM  9.4  8.8     Recent Labs      04/07/18   1106   APTT  22.0*   INR  1.17*     Recent Labs      04/05/18 1943   BNPBTYPENAT  27              Assessment/Plan     * CAP (community acquired pneumonia)   Assessment & Plan    IV antibiotic therapy.  Follow up culture results.          COPD (chronic obstructive pulmonary disease) (CMS-HCC)- (present on admission)   Assessment & Plan    Without current exacerbation.  Likely a component of obesity hypoventilation syndrome as well.  Continue current respiratory therapy treatments.         HTN (hypertension)- (present on admission)   Assessment & Plan    Controlled with current medication regimen, monitor.         YESSICA (obstructive sleep apnea)- (present on admission)   Assessment & Plan    On CPAP at home.  Does not have home device with her.  Will order from respiratory.         Hypophosphatemia- (present on admission)   Assessment & Plan    Will replace        New onset a-fib (CMS-HCC)- (present on admission)   Assessment & Plan    Echocardiogram ordered  Check TSH, Mg, Phos  CHADVASC 5  Currently on heparin gtt and Coumadin.   Will add BB .        Candidiasis- (present on admission)   Assessment & Plan    Under the breasts bilaterally. Topical nystatin.        Hypothyroidism, acquired, autoimmune- (present on admission)   Assessment & Plan    Continue synthroid and monitor.         Deep vein thrombosis (DVT) (CMS-HCC)- (present on admission)   Assessment & Plan    S/P IVC  Obviously was on Coumadin and ran out 10 days ago.  Started heparin gtt and Coumadin   INR goal 2-3.         Prediabetes- (present on admission)   Assessment  & Plan    A1c 5.9  SSI         Tobacco abuse- (present on admission)   Assessment & Plan    Ongoing.  No desire for cessation.         Depression- (present on admission)   Assessment & Plan    Continue current therapy.  Follow with psychiatry as outpatient.         Morbid obesity (CMS-HCC)- (present on admission)   Assessment & Plan    Body mass index is 48.58 kg/m².          Plan of care discussed with multidisciplinary team during rounds.   Quality-Core Measures   Reviewed items::  Labs reviewed, Medications reviewed and Radiology images reviewed  Turner catheter::  No Turner  DVT prophylaxis - mechanical:  SCDs  Antibiotics:  Treating active infection/contamination beyond 24 hours perioperative coverage

## 2018-04-07 NOTE — PROGRESS NOTES
Assumed care at 0700, bedside report received from night shift RN. Pt is AOx4 with no signs of distress. Pt is SR 71 on the monitor. Initial assessment completed, orders reviewed, call light within reach, and hourly rounding in place. POC addressed with patient, no additional questions at this time.

## 2018-04-07 NOTE — PROGRESS NOTES
[facility]ist Progress Note    Date of Service: 2018    Chief Complaint  67 y.o. female with history of COPD on oxygen therapy at home, obesity hypoventilation and obstructive sleep apnea with home CPAP therapy, and severe hypothyroidism on replacement therapy was in her usual state of health until 6 days prior to admission when she had the onset of light-headedness, weakness, and shortness of breath.  She initially attributed the shortness of breath to her COPD, but was alarmed by her weakness and light-headedness.  She endorses chills and fever during the same time, and in the days just prior to her arrival, she began to have a productive cough with bright green sputum.  She subsequently came to the emergency department for further evaluation.      Interval Problem Update  Breathing better. No chest pain. No issues to report.    Consultants/Specialty  None    Disposition  To be determined        Review of Systems   Constitutional: Negative for chills and fever.   HENT: Negative for hearing loss and tinnitus.    Eyes: Negative for blurred vision and double vision.   Respiratory: Positive for cough and shortness of breath.    Cardiovascular: Negative for chest pain and palpitations.   Gastrointestinal: Negative for heartburn and nausea.   Genitourinary: Negative for dysuria.   Musculoskeletal: Negative for myalgias and neck pain.   Skin: Positive for rash (under the breast).   Neurological: Negative for dizziness and headaches.   Endo/Heme/Allergies: Does not bruise/bleed easily.   Psychiatric/Behavioral: Negative for depression and suicidal ideas.      Physical Exam  Laboratory/Imaging   Hemodynamics  Temp (24hrs), Av.8 °C (98.2 °F), Min:36.3 °C (97.3 °F), Max:37.2 °C (99 °F)   Temperature: 37.2 °C (99 °F)  Pulse  Av.4  Min: 57  Max: 88 Heart Rate (Monitored): 71  Blood Pressure : 113/55, NIBP: 156/70      Respiratory      Respiration: 18, Pulse Oximetry: 93 %, O2 Daily Delivery Respiratory :  Silicone Nasal Cannula     Given By:: Mouthpiece  RUL Breath Sounds: Clear, RML Breath Sounds: Diminished, RLL Breath Sounds: Diminished, HUI Breath Sounds: Clear, LLL Breath Sounds: Diminished    Fluids    Intake/Output Summary (Last 24 hours) at 04/06/18 1713  Last data filed at 04/06/18 1400   Gross per 24 hour   Intake              255 ml   Output                0 ml   Net              255 ml       Nutrition  Orders Placed This Encounter   Procedures   • Diet Order     Standing Status:   Standing     Number of Occurrences:   1     Order Specific Question:   Diet:     Answer:   Cardiac [6]     Physical Exam   Constitutional: She is oriented to person, place, and time. She appears well-developed. No distress.   Obese   HENT:   Head: Normocephalic and atraumatic.   Eyes: Pupils are equal, round, and reactive to light. Left eye exhibits no discharge.   Neck: Normal range of motion. No tracheal deviation present. No thyromegaly present.   Cardiovascular: Normal rate and regular rhythm.  Exam reveals no friction rub.    Pulmonary/Chest: Effort normal. No accessory muscle usage. No respiratory distress. She has decreased breath sounds.   Abdominal: Soft. She exhibits no distension.   Musculoskeletal: Normal range of motion. She exhibits no deformity.   Neurological: She is alert and oriented to person, place, and time.   Skin: Skin is warm. No erythema.   Psychiatric: She has a normal mood and affect. Her behavior is normal.       Recent Labs      04/05/18 1943   WBC  8.2   RBC  4.66   HEMOGLOBIN  15.1   HEMATOCRIT  47.7*   MCV  102.4*   MCH  32.4   MCHC  31.7*   RDW  55.8*   PLATELETCT  303   MPV  10.4     Recent Labs      04/05/18 1943   SODIUM  139   POTASSIUM  4.0   CHLORIDE  102   CO2  27   GLUCOSE  110*   BUN  7*   CREATININE  0.70   CALCIUM  9.4         Recent Labs      04/05/18 1943   BNPBTYPENAT  27              Assessment/Plan     * CAP (community acquired pneumonia)   Assessment & Plan    IV antibiotic  therapy.  Follow up culture results.          COPD (chronic obstructive pulmonary disease) (CMS-HCC)- (present on admission)   Assessment & Plan    Without current exacerbation.  Likely a component of obesity hypoventilation syndrome as well.  Continue current respiratory therapy treatments.         HTN (hypertension)- (present on admission)   Assessment & Plan    Controlled with current medication regimen, monitor.         YESSICA (obstructive sleep apnea)- (present on admission)   Assessment & Plan    On CPAP at home.  Does not have home device with her.  Will order from respiratory.         Candidiasis- (present on admission)   Assessment & Plan    Under the breasts bilaterally. Topical nystatin.        Hypothyroidism, acquired, autoimmune- (present on admission)   Assessment & Plan    Continue synthroid and monitor.         Tobacco abuse- (present on admission)   Assessment & Plan    Ongoing.  No desire for cessation.         Depression- (present on admission)   Assessment & Plan    Continue current therapy.  Follow with psychiatry as outpatient.         Morbid obesity (CMS-HCC)- (present on admission)   Assessment & Plan    Body mass index is 48.58 kg/m².          Plan of care discussed with multidisciplinary team during rounds.   Quality-Core Measures   Reviewed items::  Labs reviewed, Medications reviewed and Radiology images reviewed  Turner catheter::  No Turner  DVT prophylaxis - mechanical:  SCDs  Antibiotics:  Treating active infection/contamination beyond 24 hours perioperative coverage

## 2018-04-07 NOTE — PROGRESS NOTES
Paged on-call hospitalist Italo Flores regarding pt's conversion to Afib. Pt states she feels more SOB. Pt's O2 bumped up from 3L to 5L. Pt's O2 sats at 93%. Pt states she feels a little bit better. Pt's HR low 100s to 120s. Continuing to monitor pt closely. Waiting for hospitalist to call back.

## 2018-04-07 NOTE — PROGRESS NOTES
Monitor summary  SR-Afib   Converted from SR to Afib at 1940  Converted from Afib to SR at 2051  .20/.10/.44

## 2018-04-07 NOTE — ASSESSMENT & PLAN NOTE
Paroxysmal. No further episodes. Unable to tolerate BB due to slow HR  CHADVASC 5. Echocardiogram showed normal ejection fraction and mild tricuspid regurgitation.  - On Coumadin for now.

## 2018-04-07 NOTE — CARE PLAN
Problem: Safety  Goal: Will remain free from injury  Outcome: PROGRESSING AS EXPECTED  Pt remains free from falls or injuries. Bed alarm set and pt calls for assistance appropriately. Pt up 1-1 assist with walker.     Problem: Venous Thromboembolism (VTW)/Deep Vein Thrombosis (DVT) Prevention:  Goal: Patient will participate in Venous Thrombosis (VTE)/Deep Vein Thrombosis (DVT)Prevention Measures  Outcome: PROGRESSING AS EXPECTED  Pt free from s/sx of DVT. Pt receiving Lovenox for VTE prophylaxis.     Problem: Respiratory:  Goal: Respiratory status will improve  Outcome: PROGRESSING AS EXPECTED  No SOB or distress present at this time. Pt resting comfortably in bed. VSS.

## 2018-04-07 NOTE — CARE PLAN
Problem: Respiratory:  Goal: Respiratory status will improve  Outcome: PROGRESSING AS EXPECTED  Pt is satting at 95% on 3L liters of o2. Pt denies SOB at this time. Educated on TCDB. Resting in bed comfortably.     Problem: Pain Management  Goal: Pain level will decrease to patient's comfort goal  Outcome: PROGRESSING AS EXPECTED  Pt encouraged to voice feelings of pain. Pt educated to use pain scale 0-10 to rate pain. Pt states best pain gets is a 3/10. Tylenol given prn as ordered.

## 2018-04-08 LAB
ALBUMIN SERPL BCP-MCNC: 3 G/DL (ref 3.2–4.9)
ALBUMIN/GLOB SERPL: 0.9 G/DL
ALP SERPL-CCNC: 78 U/L (ref 30–99)
ALT SERPL-CCNC: 11 U/L (ref 2–50)
ANION GAP SERPL CALC-SCNC: 6 MMOL/L (ref 0–11.9)
APTT PPP: 67.7 SEC (ref 24.7–36)
APTT PPP: 76.9 SEC (ref 24.7–36)
AST SERPL-CCNC: 21 U/L (ref 12–45)
BASOPHILS # BLD AUTO: 0.5 % (ref 0–1.8)
BASOPHILS # BLD: 0.03 K/UL (ref 0–0.12)
BILIRUB SERPL-MCNC: 0.2 MG/DL (ref 0.1–1.5)
BUN SERPL-MCNC: 9 MG/DL (ref 8–22)
CALCIUM SERPL-MCNC: 8.2 MG/DL (ref 8.5–10.5)
CHLORIDE SERPL-SCNC: 106 MMOL/L (ref 96–112)
CO2 SERPL-SCNC: 28 MMOL/L (ref 20–33)
CREAT SERPL-MCNC: 0.62 MG/DL (ref 0.5–1.4)
EOSINOPHIL # BLD AUTO: 0.13 K/UL (ref 0–0.51)
EOSINOPHIL NFR BLD: 2 % (ref 0–6.9)
ERYTHROCYTE [DISTWIDTH] IN BLOOD BY AUTOMATED COUNT: 55.8 FL (ref 35.9–50)
ERYTHROCYTE [DISTWIDTH] IN BLOOD BY AUTOMATED COUNT: 56.2 FL (ref 35.9–50)
FERRITIN SERPL-MCNC: 90.8 NG/ML (ref 10–291)
FOLATE SERPL-MCNC: 5.3 NG/ML
GLOBULIN SER CALC-MCNC: 3.5 G/DL (ref 1.9–3.5)
GLUCOSE SERPL-MCNC: 104 MG/DL (ref 65–99)
HCT VFR BLD AUTO: 36.8 % (ref 37–47)
HCT VFR BLD AUTO: 41.7 % (ref 37–47)
HGB BLD-MCNC: 11.5 G/DL (ref 12–16)
HGB BLD-MCNC: 13.2 G/DL (ref 12–16)
HGB RETIC QN AUTO: 36.5 PG/CELL (ref 29–35)
IMM GRANULOCYTES # BLD AUTO: 0.04 K/UL (ref 0–0.11)
IMM GRANULOCYTES NFR BLD AUTO: 0.6 % (ref 0–0.9)
IMM RETICS NFR: 16.3 % (ref 9.3–17.4)
INR PPP: 1.08 (ref 0.87–1.13)
INR PPP: 1.13 (ref 0.87–1.13)
IRON SATN MFR SERPL: 26 % (ref 15–55)
IRON SERPL-MCNC: 81 UG/DL (ref 40–170)
LV EJECT FRACT  99904: 60
LV EJECT FRACT MOD 2C 99903: 69.45
LV EJECT FRACT MOD 4C 99902: 71.43
LV EJECT FRACT MOD BP 99901: 68.79
LYMPHOCYTES # BLD AUTO: 2.83 K/UL (ref 1–4.8)
LYMPHOCYTES NFR BLD: 42.8 % (ref 22–41)
MCH RBC QN AUTO: 31.9 PG (ref 27–33)
MCH RBC QN AUTO: 32.7 PG (ref 27–33)
MCHC RBC AUTO-ENTMCNC: 31.3 G/DL (ref 33.6–35)
MCHC RBC AUTO-ENTMCNC: 31.7 G/DL (ref 33.6–35)
MCV RBC AUTO: 101.9 FL (ref 81.4–97.8)
MCV RBC AUTO: 103.2 FL (ref 81.4–97.8)
MONOCYTES # BLD AUTO: 0.43 K/UL (ref 0–0.85)
MONOCYTES NFR BLD AUTO: 6.5 % (ref 0–13.4)
NEUTROPHILS # BLD AUTO: 3.15 K/UL (ref 2–7.15)
NEUTROPHILS NFR BLD: 47.6 % (ref 44–72)
NRBC # BLD AUTO: 0 K/UL
NRBC BLD-RTO: 0 /100 WBC
PLATELET # BLD AUTO: 226 K/UL (ref 164–446)
PLATELET # BLD AUTO: 233 K/UL (ref 164–446)
PMV BLD AUTO: 10.3 FL (ref 9–12.9)
PMV BLD AUTO: 10.7 FL (ref 9–12.9)
POTASSIUM SERPL-SCNC: 4.2 MMOL/L (ref 3.6–5.5)
PROT SERPL-MCNC: 6.5 G/DL (ref 6–8.2)
PROTHROMBIN TIME: 13.7 SEC (ref 12–14.6)
PROTHROMBIN TIME: 14.2 SEC (ref 12–14.6)
RBC # BLD AUTO: 3.61 M/UL (ref 4.2–5.4)
RBC # BLD AUTO: 4.04 M/UL (ref 4.2–5.4)
RETICS # AUTO: 0.05 M/UL (ref 0.04–0.06)
RETICS/RBC NFR: 1.2 % (ref 0.8–2.1)
SODIUM SERPL-SCNC: 140 MMOL/L (ref 135–145)
TIBC SERPL-MCNC: 307 UG/DL (ref 250–450)
VIT B12 SERPL-MCNC: 430 PG/ML (ref 211–911)
WBC # BLD AUTO: 6.6 K/UL (ref 4.8–10.8)
WBC # BLD AUTO: 7.2 K/UL (ref 4.8–10.8)

## 2018-04-08 PROCEDURE — 83540 ASSAY OF IRON: CPT

## 2018-04-08 PROCEDURE — 93306 TTE W/DOPPLER COMPLETE: CPT

## 2018-04-08 PROCEDURE — 99233 SBSQ HOSP IP/OBS HIGH 50: CPT | Performed by: FAMILY MEDICINE

## 2018-04-08 PROCEDURE — 85027 COMPLETE CBC AUTOMATED: CPT

## 2018-04-08 PROCEDURE — 770020 HCHG ROOM/CARE - TELE (206)

## 2018-04-08 PROCEDURE — 82746 ASSAY OF FOLIC ACID SERUM: CPT

## 2018-04-08 PROCEDURE — 82607 VITAMIN B-12: CPT

## 2018-04-08 PROCEDURE — 82728 ASSAY OF FERRITIN: CPT

## 2018-04-08 PROCEDURE — 93306 TTE W/DOPPLER COMPLETE: CPT | Mod: 26 | Performed by: INTERNAL MEDICINE

## 2018-04-08 PROCEDURE — 85046 RETICYTE/HGB CONCENTRATE: CPT

## 2018-04-08 PROCEDURE — 36415 COLL VENOUS BLD VENIPUNCTURE: CPT

## 2018-04-08 PROCEDURE — 83550 IRON BINDING TEST: CPT

## 2018-04-08 PROCEDURE — 700102 HCHG RX REV CODE 250 W/ 637 OVERRIDE(OP): Performed by: HOSPITALIST

## 2018-04-08 PROCEDURE — 700111 HCHG RX REV CODE 636 W/ 250 OVERRIDE (IP): Performed by: FAMILY MEDICINE

## 2018-04-08 PROCEDURE — 700105 HCHG RX REV CODE 258: Performed by: HOSPITALIST

## 2018-04-08 PROCEDURE — 85610 PROTHROMBIN TIME: CPT

## 2018-04-08 PROCEDURE — 85730 THROMBOPLASTIN TIME PARTIAL: CPT

## 2018-04-08 PROCEDURE — A9270 NON-COVERED ITEM OR SERVICE: HCPCS | Performed by: HOSPITALIST

## 2018-04-08 PROCEDURE — 700102 HCHG RX REV CODE 250 W/ 637 OVERRIDE(OP): Performed by: FAMILY MEDICINE

## 2018-04-08 PROCEDURE — A9270 NON-COVERED ITEM OR SERVICE: HCPCS | Performed by: FAMILY MEDICINE

## 2018-04-08 RX ORDER — MICONAZOLE NITRATE 20 MG/G
CREAM TOPICAL 2 TIMES DAILY
Status: DISCONTINUED | OUTPATIENT
Start: 2018-04-08 | End: 2018-04-11 | Stop reason: HOSPADM

## 2018-04-08 RX ADMIN — NICOTINE 14 MG: 14 PATCH, EXTENDED RELEASE TRANSDERMAL at 05:55

## 2018-04-08 RX ADMIN — AMITRIPTYLINE HYDROCHLORIDE 25 MG: 25 TABLET, FILM COATED ORAL at 22:18

## 2018-04-08 RX ADMIN — BUDESONIDE AND FORMOTEROL FUMARATE DIHYDRATE 2 PUFF: 160; 4.5 AEROSOL RESPIRATORY (INHALATION) at 22:18

## 2018-04-08 RX ADMIN — SODIUM CHLORIDE: 9 INJECTION, SOLUTION INTRAVENOUS at 15:52

## 2018-04-08 RX ADMIN — BUDESONIDE AND FORMOTEROL FUMARATE DIHYDRATE 2 PUFF: 160; 4.5 AEROSOL RESPIRATORY (INHALATION) at 09:18

## 2018-04-08 RX ADMIN — STANDARDIZED SENNA CONCENTRATE AND DOCUSATE SODIUM 2 TABLET: 8.6; 5 TABLET, FILM COATED ORAL at 22:18

## 2018-04-08 RX ADMIN — GABAPENTIN 900 MG: 300 CAPSULE ORAL at 22:18

## 2018-04-08 RX ADMIN — LEVOTHYROXINE SODIUM 225 MCG: 75 TABLET ORAL at 09:19

## 2018-04-08 RX ADMIN — HEPARIN SODIUM 1450 UNITS/HR: 5000 INJECTION, SOLUTION INTRAVENOUS; SUBCUTANEOUS at 23:24

## 2018-04-08 RX ADMIN — ACETAMINOPHEN 650 MG: 325 TABLET, FILM COATED ORAL at 22:18

## 2018-04-08 RX ADMIN — FAMOTIDINE 20 MG: 20 TABLET, FILM COATED ORAL at 22:18

## 2018-04-08 RX ADMIN — GABAPENTIN 900 MG: 300 CAPSULE ORAL at 16:12

## 2018-04-08 RX ADMIN — TIOTROPIUM BROMIDE 1 CAPSULE: 18 CAPSULE ORAL; RESPIRATORY (INHALATION) at 09:22

## 2018-04-08 RX ADMIN — SODIUM CHLORIDE: 9 INJECTION, SOLUTION INTRAVENOUS at 03:21

## 2018-04-08 RX ADMIN — HEPARIN SODIUM 1450 UNITS/HR: 5000 INJECTION, SOLUTION INTRAVENOUS; SUBCUTANEOUS at 06:31

## 2018-04-08 RX ADMIN — CARBAMAZEPINE 600 MG: 200 TABLET ORAL at 22:17

## 2018-04-08 RX ADMIN — FLUTICASONE PROPIONATE 100 MCG: 50 SPRAY, METERED NASAL at 09:37

## 2018-04-08 RX ADMIN — FLUOXETINE HYDROCHLORIDE 20 MG: 20 CAPSULE ORAL at 09:19

## 2018-04-08 RX ADMIN — WARFARIN SODIUM 10 MG: 10 TABLET ORAL at 17:07

## 2018-04-08 RX ADMIN — FAMOTIDINE 20 MG: 20 TABLET, FILM COATED ORAL at 09:19

## 2018-04-08 RX ADMIN — CEFTRIAXONE 2 G: 2 INJECTION, POWDER, FOR SOLUTION INTRAMUSCULAR; INTRAVENOUS at 09:19

## 2018-04-08 RX ADMIN — GABAPENTIN 900 MG: 300 CAPSULE ORAL at 09:20

## 2018-04-08 RX ADMIN — AZITHROMYCIN 250 MG: 250 TABLET, FILM COATED ORAL at 09:21

## 2018-04-08 RX ADMIN — NYSTATIN: 100000 CREAM TOPICAL at 09:00

## 2018-04-08 RX ADMIN — ACETAMINOPHEN 650 MG: 325 TABLET, FILM COATED ORAL at 06:30

## 2018-04-08 ASSESSMENT — ENCOUNTER SYMPTOMS
HALLUCINATIONS: 0
FEVER: 0
BACK PAIN: 0
SHORTNESS OF BREATH: 0
MYALGIAS: 0
COUGH: 1
BRUISES/BLEEDS EASILY: 0
BLURRED VISION: 0
POLYDIPSIA: 0
DEPRESSION: 0
ORTHOPNEA: 0
EYE PAIN: 0
NAUSEA: 0
DIZZINESS: 0
ABDOMINAL PAIN: 0
DOUBLE VISION: 0
TINGLING: 0
CHILLS: 0
PALPITATIONS: 0
HEARTBURN: 0
CLAUDICATION: 0

## 2018-04-08 ASSESSMENT — PAIN SCALES - GENERAL
PAINLEVEL_OUTOF10: 0
PAINLEVEL_OUTOF10: 3
PAINLEVEL_OUTOF10: 8
PAINLEVEL_OUTOF10: 3
PAINLEVEL_OUTOF10: 4
PAINLEVEL_OUTOF10: 4
PAINLEVEL_OUTOF10: 3
PAINLEVEL_OUTOF10: 3

## 2018-04-08 ASSESSMENT — LIFESTYLE VARIABLES: SUBSTANCE_ABUSE: 0

## 2018-04-08 NOTE — CARE PLAN
Problem: Safety  Goal: Will remain free from injury  Outcome: PROGRESSING AS EXPECTED  Pt remains free from falls or injuries. Bed alarm set and pt calls for assistance appropriately. Pt 1-1 assist to bedside commode.     Problem: Venous Thromboembolism (VTW)/Deep Vein Thrombosis (DVT) Prevention:  Goal: Patient will participate in Venous Thrombosis (VTE)/Deep Vein Thrombosis (DVT)Prevention Measures  Outcome: PROGRESSING AS EXPECTED  Pt free from s/x of DVT. Pt receiving Heparin/Coumadin bridge of Afib, history of DVT, and VTE prophylaxis.     Problem: Pain Management  Goal: Pain level will decrease to patient's comfort goal  Outcome: PROGRESSING AS EXPECTED  Pt free from pain at this time. Pt resting comfortably in bed.

## 2018-04-08 NOTE — CARE PLAN
Problem: Safety  Goal: Will remain free from injury  Outcome: PROGRESSING AS EXPECTED  Bed locked and in lowest position, Bed alarm on. Treaded socks on. Call light and belongings within reach. Assistive device out of reach. Patient educated to call for assistance. Patient verbalized understanding. Hourly rounding in place.     Problem: Knowledge Deficit  Goal: Knowledge of the prescribed therapeutic regimen will improve  Outcome: PROGRESSING AS EXPECTED  Discussed POC and medications with patient, patient verbalized understanding.

## 2018-04-08 NOTE — PROGRESS NOTES
Assumed care at 0700, bedside report received from night shift RN. Pt is sinus Kade at 52 on the telemetry monitor. Patient is AO x 4 and is resting comfortably in bed with eyes open and even, unlabored breathing. Initial assessment completed and orders reviewed. POC addressed with patient. Call light within reach and hourly rounding in place. No further questions at this time

## 2018-04-08 NOTE — PROGRESS NOTES
[facility] Hospitalist Progress Note    Date of Service: 2018    Chief Complaint  67 y.o. female with history of COPD on oxygen therapy at home, obesity hypoventilation and obstructive sleep apnea with home CPAP therapy, and severe hypothyroidism on replacement therapy was in her usual state of health until 6 days prior to admission when she had the onset of light-headedness, weakness, and shortness of breath.  She initially attributed the shortness of breath to her COPD, but was alarmed by her weakness and light-headedness.  She endorses chills and fever during the same time, and in the days just prior to her arrival, she began to have a productive cough with bright green sputum.  She subsequently came to the emergency department for further evaluation.      Interval Problem Update  Breathing okay. At baseline with her oxygen requirements. No new issues to report.    Consultants/Specialty  None    Disposition  To be determined        Review of Systems   Constitutional: Negative for chills and fever.   HENT: Negative for ear discharge, ear pain, hearing loss and tinnitus.    Eyes: Negative for blurred vision, double vision and pain.   Respiratory: Positive for cough (Chronic ). Negative for shortness of breath.    Cardiovascular: Negative for chest pain, palpitations, orthopnea and claudication.   Gastrointestinal: Negative for abdominal pain, heartburn and nausea.   Genitourinary: Negative for dysuria and frequency.   Musculoskeletal: Negative for back pain and myalgias.   Skin: Positive for rash (under the breast).   Neurological: Negative for dizziness and tingling.   Endo/Heme/Allergies: Negative for polydipsia. Does not bruise/bleed easily.   Psychiatric/Behavioral: Negative for depression, hallucinations and substance abuse.      Physical Exam  Laboratory/Imaging   Hemodynamics  Temp (24hrs), Av.1 °C (96.9 °F), Min:35.9 °C (96.6 °F), Max:36.2 °C (97.2 °F)   Temperature: 36.1 °C (97 °F)  Pulse  Av.2   Min: 50  Max: 111    Blood Pressure : 131/69      Respiratory      Respiration: 18, Pulse Oximetry: 93 %     Work Of Breathing / Effort: Mild  RUL Breath Sounds: Clear, RML Breath Sounds: Clear, RLL Breath Sounds: Diminished;Fine Crackles, HUI Breath Sounds: Clear, LLL Breath Sounds: Diminished;Fine Crackles    Fluids    Intake/Output Summary (Last 24 hours) at 04/08/18 1631  Last data filed at 04/08/18 0700   Gross per 24 hour   Intake              300 ml   Output             2475 ml   Net            -2175 ml       Nutrition  Orders Placed This Encounter   Procedures   • Diet Order     Standing Status:   Standing     Number of Occurrences:   1     Order Specific Question:   Diet:     Answer:   Cardiac [6]     Physical Exam   Constitutional: She is oriented to person, place, and time. She appears well-developed. No distress.   Obese   HENT:   Head: Normocephalic and atraumatic.   Eyes: Pupils are equal, round, and reactive to light. Left eye exhibits no discharge.   Neck: Normal range of motion. No JVD present. No tracheal deviation present. No thyromegaly present.   Cardiovascular: Normal rate and regular rhythm.  Exam reveals no gallop and no friction rub.    Pulmonary/Chest: Effort normal. No accessory muscle usage. No respiratory distress. She has decreased breath sounds. She has no wheezes. She has no rales.   Abdominal: Soft. She exhibits no distension. There is no tenderness. There is no rebound.   Musculoskeletal: Normal range of motion. She exhibits no tenderness or deformity.   Neurological: She is alert and oriented to person, place, and time.   Skin: Skin is warm. No erythema.   Psychiatric: She has a normal mood and affect. Her behavior is normal. Thought content normal.       Recent Labs      04/07/18   0421  04/07/18   2356  04/08/18   0909   WBC  7.5  6.6  7.2   RBC  3.73*  3.61*  4.04*   HEMOGLOBIN  12.1  11.5*  13.2   HEMATOCRIT  38.5  36.8*  41.7   MCV  103.2*  101.9*  103.2*   MCH  32.4  31.9   32.7   MCHC  31.4*  31.3*  31.7*   RDW  55.8*  56.2*  55.8*   PLATELETCT  229  233  226   MPV  10.2  10.7  10.3     Recent Labs      04/05/18 1943 04/07/18   0421  04/07/18   2356   SODIUM  139  142  140   POTASSIUM  4.0  3.8  4.2   CHLORIDE  102  107  106   CO2  27  30  28   GLUCOSE  110*  96  104*   BUN  7*  8  9   CREATININE  0.70  0.70  0.62   CALCIUM  9.4  8.8  8.2*     Recent Labs      04/07/18   1106  04/07/18   1817  04/07/18   2357  04/08/18   0559   APTT  22.0*  79.8*  67.7*  76.9*   INR  1.17*   --   1.13  1.08     Recent Labs      04/05/18 1943   BNPBTYPENAT  27              Assessment/Plan     * CAP (community acquired pneumonia)   Assessment & Plan    IV antibiotic therapy.  Follow up culture results.          COPD (chronic obstructive pulmonary disease) (CMS-HCC)- (present on admission)   Assessment & Plan    Without current exacerbation.  Likely a component of obesity hypoventilation syndrome as well.  Continue current respiratory therapy treatments.         HTN (hypertension)- (present on admission)   Assessment & Plan    Controlled with current medication regimen, monitor.         YESSICA (obstructive sleep apnea)- (present on admission)   Assessment & Plan    On CPAP at home.  Does not have home device with her.  Will order from respiratory.         Hypophosphatemia- (present on admission)   Assessment & Plan    Replaced.         New onset a-fib (CMS-HCC)- (present on admission)   Assessment & Plan    Echocardiogram showed normal ejection fraction and mild tricuspid regurgitation.  Check TSH, Mg, Phos  CHADVASC 5  Currently on heparin gtt and Coumadin.   Unable to tolerate low doses of beta blockers. We'll hold for now and administer as heart rate allows.        Candidiasis- (present on admission)   Assessment & Plan    Under the breasts bilaterally. Topical nystatin.        Hypothyroidism, acquired, autoimmune- (present on admission)   Assessment & Plan    Continue synthroid and monitor.          Deep vein thrombosis (DVT) (CMS-HCC)- (present on admission)   Assessment & Plan    S/P IVC  Obviously was on Coumadin and ran out 10 days ago.  On heparin gtt and Coumadin   INR goal 2-3.         Prediabetes- (present on admission)   Assessment & Plan    A1c 5.9  SSI         Tobacco abuse- (present on admission)   Assessment & Plan    Ongoing.  No desire for cessation.         Depression- (present on admission)   Assessment & Plan    Continue current therapy.  Follow with psychiatry as outpatient.         Morbid obesity (CMS-HCC)- (present on admission)   Assessment & Plan    Body mass index is 48.58 kg/m².          Plan of care discussed with multidisciplinary team during rounds.   Quality-Core Measures   Reviewed items::  Labs reviewed, Medications reviewed and Radiology images reviewed  Turner catheter::  No Turner  DVT prophylaxis - mechanical:  SCDs  Antibiotics:  Treating active infection/contamination beyond 24 hours perioperative coverage

## 2018-04-08 NOTE — PROGRESS NOTES
Med rec completed by interview with patient at bedside and [facility]  Pt has been filling flurazepam 30 mg nightly  Pt reported taking warfarin 10 mg daily except for Wednesday where she takes 5 mg  No abx in past 30 days filled at [facility]  Allergy reviewed

## 2018-04-09 PROBLEM — I49.1 PAC (PREMATURE ATRIAL CONTRACTION): Status: ACTIVE | Noted: 2018-04-07

## 2018-04-09 PROBLEM — I47.19 MULTIFOCAL ATRIAL TACHYCARDIA (HCC): Status: ACTIVE | Noted: 2018-04-07

## 2018-04-09 LAB
ALBUMIN SERPL BCP-MCNC: 3 G/DL (ref 3.2–4.9)
ALBUMIN/GLOB SERPL: 0.9 G/DL
ALP SERPL-CCNC: 64 U/L (ref 30–99)
ALT SERPL-CCNC: 15 U/L (ref 2–50)
ANION GAP SERPL CALC-SCNC: 7 MMOL/L (ref 0–11.9)
APTT PPP: 83.5 SEC (ref 24.7–36)
AST SERPL-CCNC: 23 U/L (ref 12–45)
BASOPHILS # BLD AUTO: 0.5 % (ref 0–1.8)
BASOPHILS # BLD: 0.03 K/UL (ref 0–0.12)
BILIRUB SERPL-MCNC: 0.2 MG/DL (ref 0.1–1.5)
BUN SERPL-MCNC: 10 MG/DL (ref 8–22)
CALCIUM SERPL-MCNC: 8.4 MG/DL (ref 8.5–10.5)
CHLORIDE SERPL-SCNC: 103 MMOL/L (ref 96–112)
CO2 SERPL-SCNC: 28 MMOL/L (ref 20–33)
CREAT SERPL-MCNC: 0.56 MG/DL (ref 0.5–1.4)
EOSINOPHIL # BLD AUTO: 0.24 K/UL (ref 0–0.51)
EOSINOPHIL NFR BLD: 4.1 % (ref 0–6.9)
ERYTHROCYTE [DISTWIDTH] IN BLOOD BY AUTOMATED COUNT: 54.7 FL (ref 35.9–50)
GLOBULIN SER CALC-MCNC: 3.3 G/DL (ref 1.9–3.5)
GLUCOSE SERPL-MCNC: 119 MG/DL (ref 65–99)
HCT VFR BLD AUTO: 35.9 % (ref 37–47)
HGB BLD-MCNC: 11.6 G/DL (ref 12–16)
IMM GRANULOCYTES # BLD AUTO: 0.03 K/UL (ref 0–0.11)
IMM GRANULOCYTES NFR BLD AUTO: 0.5 % (ref 0–0.9)
INR PPP: 1.1 (ref 0.87–1.13)
LYMPHOCYTES # BLD AUTO: 1.96 K/UL (ref 1–4.8)
LYMPHOCYTES NFR BLD: 33.4 % (ref 22–41)
MAGNESIUM SERPL-MCNC: 1.8 MG/DL (ref 1.5–2.5)
MCH RBC QN AUTO: 32.8 PG (ref 27–33)
MCHC RBC AUTO-ENTMCNC: 32.3 G/DL (ref 33.6–35)
MCV RBC AUTO: 101.4 FL (ref 81.4–97.8)
MONOCYTES # BLD AUTO: 0.39 K/UL (ref 0–0.85)
MONOCYTES NFR BLD AUTO: 6.7 % (ref 0–13.4)
NEUTROPHILS # BLD AUTO: 3.21 K/UL (ref 2–7.15)
NEUTROPHILS NFR BLD: 54.8 % (ref 44–72)
NRBC # BLD AUTO: 0 K/UL
NRBC BLD-RTO: 0 /100 WBC
PHOSPHATE SERPL-MCNC: 3.9 MG/DL (ref 2.5–4.5)
PLATELET # BLD AUTO: 217 K/UL (ref 164–446)
PMV BLD AUTO: 10.7 FL (ref 9–12.9)
POTASSIUM SERPL-SCNC: 3.7 MMOL/L (ref 3.6–5.5)
PROT SERPL-MCNC: 6.3 G/DL (ref 6–8.2)
PROTHROMBIN TIME: 13.9 SEC (ref 12–14.6)
RBC # BLD AUTO: 3.54 M/UL (ref 4.2–5.4)
SODIUM SERPL-SCNC: 138 MMOL/L (ref 135–145)
WBC # BLD AUTO: 5.9 K/UL (ref 4.8–10.8)

## 2018-04-09 PROCEDURE — 700102 HCHG RX REV CODE 250 W/ 637 OVERRIDE(OP): Performed by: FAMILY MEDICINE

## 2018-04-09 PROCEDURE — A9270 NON-COVERED ITEM OR SERVICE: HCPCS | Performed by: HOSPITALIST

## 2018-04-09 PROCEDURE — 84100 ASSAY OF PHOSPHORUS: CPT

## 2018-04-09 PROCEDURE — 83735 ASSAY OF MAGNESIUM: CPT

## 2018-04-09 PROCEDURE — 99233 SBSQ HOSP IP/OBS HIGH 50: CPT | Performed by: FAMILY MEDICINE

## 2018-04-09 PROCEDURE — 700105 HCHG RX REV CODE 258: Performed by: HOSPITALIST

## 2018-04-09 PROCEDURE — 700111 HCHG RX REV CODE 636 W/ 250 OVERRIDE (IP): Performed by: FAMILY MEDICINE

## 2018-04-09 PROCEDURE — 36415 COLL VENOUS BLD VENIPUNCTURE: CPT

## 2018-04-09 PROCEDURE — 700102 HCHG RX REV CODE 250 W/ 637 OVERRIDE(OP): Performed by: HOSPITALIST

## 2018-04-09 PROCEDURE — 770020 HCHG ROOM/CARE - TELE (206)

## 2018-04-09 PROCEDURE — 85025 COMPLETE CBC W/AUTO DIFF WBC: CPT

## 2018-04-09 PROCEDURE — 85610 PROTHROMBIN TIME: CPT

## 2018-04-09 PROCEDURE — 80053 COMPREHEN METABOLIC PANEL: CPT

## 2018-04-09 PROCEDURE — 85730 THROMBOPLASTIN TIME PARTIAL: CPT

## 2018-04-09 PROCEDURE — A9270 NON-COVERED ITEM OR SERVICE: HCPCS | Performed by: FAMILY MEDICINE

## 2018-04-09 RX ORDER — WARFARIN SODIUM 10 MG/1
10 TABLET ORAL
Status: DISCONTINUED | OUTPATIENT
Start: 2018-04-10 | End: 2018-04-11 | Stop reason: HOSPADM

## 2018-04-09 RX ORDER — WARFARIN SODIUM 7.5 MG/1
15 TABLET ORAL
Status: COMPLETED | OUTPATIENT
Start: 2018-04-09 | End: 2018-04-09

## 2018-04-09 RX ORDER — WARFARIN SODIUM 5 MG/1
5 TABLET ORAL
Status: DISCONTINUED | OUTPATIENT
Start: 2018-04-11 | End: 2018-04-11 | Stop reason: HOSPADM

## 2018-04-09 RX ORDER — OMEPRAZOLE 20 MG/1
20 CAPSULE, DELAYED RELEASE ORAL DAILY
Status: DISCONTINUED | OUTPATIENT
Start: 2018-04-09 | End: 2018-04-11 | Stop reason: HOSPADM

## 2018-04-09 RX ADMIN — AMITRIPTYLINE HYDROCHLORIDE 25 MG: 25 TABLET, FILM COATED ORAL at 20:20

## 2018-04-09 RX ADMIN — AZITHROMYCIN 250 MG: 250 TABLET, FILM COATED ORAL at 08:14

## 2018-04-09 RX ADMIN — CEFTRIAXONE 2 G: 2 INJECTION, POWDER, FOR SOLUTION INTRAMUSCULAR; INTRAVENOUS at 08:13

## 2018-04-09 RX ADMIN — BUDESONIDE AND FORMOTEROL FUMARATE DIHYDRATE 2 PUFF: 160; 4.5 AEROSOL RESPIRATORY (INHALATION) at 20:20

## 2018-04-09 RX ADMIN — GABAPENTIN 900 MG: 300 CAPSULE ORAL at 20:20

## 2018-04-09 RX ADMIN — LEVOTHYROXINE SODIUM 225 MCG: 75 TABLET ORAL at 08:13

## 2018-04-09 RX ADMIN — ENOXAPARIN SODIUM 150 MG: 150 INJECTION SUBCUTANEOUS at 14:18

## 2018-04-09 RX ADMIN — TIOTROPIUM BROMIDE 1 CAPSULE: 18 CAPSULE ORAL; RESPIRATORY (INHALATION) at 08:12

## 2018-04-09 RX ADMIN — CARVEDILOL 3.12 MG: 3.12 TABLET, FILM COATED ORAL at 18:25

## 2018-04-09 RX ADMIN — MICONAZOLE NITRATE: 20 CREAM TOPICAL at 20:22

## 2018-04-09 RX ADMIN — GABAPENTIN 900 MG: 300 CAPSULE ORAL at 08:15

## 2018-04-09 RX ADMIN — BUDESONIDE AND FORMOTEROL FUMARATE DIHYDRATE 2 PUFF: 160; 4.5 AEROSOL RESPIRATORY (INHALATION) at 08:11

## 2018-04-09 RX ADMIN — FLUTICASONE PROPIONATE 100 MCG: 50 SPRAY, METERED NASAL at 08:11

## 2018-04-09 RX ADMIN — SODIUM CHLORIDE: 9 INJECTION, SOLUTION INTRAVENOUS at 04:27

## 2018-04-09 RX ADMIN — SODIUM CHLORIDE: 9 INJECTION, SOLUTION INTRAVENOUS at 18:26

## 2018-04-09 RX ADMIN — FAMOTIDINE 20 MG: 20 TABLET, FILM COATED ORAL at 20:20

## 2018-04-09 RX ADMIN — FAMOTIDINE 20 MG: 20 TABLET, FILM COATED ORAL at 08:14

## 2018-04-09 RX ADMIN — WARFARIN SODIUM 15 MG: 7.5 TABLET ORAL at 18:23

## 2018-04-09 RX ADMIN — GABAPENTIN 900 MG: 300 CAPSULE ORAL at 14:19

## 2018-04-09 RX ADMIN — NICOTINE 14 MG: 14 PATCH, EXTENDED RELEASE TRANSDERMAL at 06:00

## 2018-04-09 RX ADMIN — OMEPRAZOLE 20 MG: 20 CAPSULE, DELAYED RELEASE ORAL at 12:42

## 2018-04-09 RX ADMIN — FLUOXETINE HYDROCHLORIDE 20 MG: 20 CAPSULE ORAL at 08:14

## 2018-04-09 RX ADMIN — MICONAZOLE NITRATE: 20 CREAM TOPICAL at 08:16

## 2018-04-09 RX ADMIN — NYSTATIN: 100000 CREAM TOPICAL at 08:16

## 2018-04-09 RX ADMIN — CARBAMAZEPINE 600 MG: 200 TABLET ORAL at 20:20

## 2018-04-09 RX ADMIN — NYSTATIN: 100000 CREAM TOPICAL at 20:22

## 2018-04-09 ASSESSMENT — ENCOUNTER SYMPTOMS
BACK PAIN: 0
HALLUCINATIONS: 0
SPUTUM PRODUCTION: 0
COUGH: 1
HEARTBURN: 0
EYE PAIN: 0
DIZZINESS: 0
BRUISES/BLEEDS EASILY: 0
VOMITING: 0
PHOTOPHOBIA: 0
NAUSEA: 0
PALPITATIONS: 0
HEADACHES: 0
ORTHOPNEA: 0
CHILLS: 0
NECK PAIN: 0
DEPRESSION: 0
TINGLING: 0
DOUBLE VISION: 0
POLYDIPSIA: 0
WEIGHT LOSS: 0

## 2018-04-09 ASSESSMENT — PAIN SCALES - GENERAL
PAINLEVEL_OUTOF10: 0

## 2018-04-09 ASSESSMENT — LIFESTYLE VARIABLES: SUBSTANCE_ABUSE: 0

## 2018-04-09 NOTE — PROGRESS NOTES
Inpatient Anticoagulation Service Note  Date: 4/9/2018  Reason for Anticoagulation: Atrial Fibrillation, Deep Vein Thrombosis   [id number] VASc Score: 6  Hemoglobin Value: (!) 11.6  Hematocrit Value: (!) 35.9  Lab Platelet Value: 217  Target INR: 2.0 to 3.0  INR from last 7 days     Date/Time INR Value    04/09/18 0152  1.1    04/08/18 0559  1.08    04/07/18 2357  1.13    04/07/18 1106 (!)  1.17        Dose from last 7 days     Date/Time Dose (mg)    04/09/18 1100  15 (~1.5x home dose)    04/08/18 1700  10    04/07/18 1500  10        Average Dose (mg): TBD (Home Dose: 5 mg We and 10 mg ROW)  Significant Interactions: Proton Pump Inhibitor, Thyroid Medications, Other (Comments) (SSRI, TCA, carbamexepine)  Bridge Therapy: Yes  Bridge Therapy Start Date: 04/07/18  Days of Overlap Therapy: 2   INR Value Greater than 2 Prior to Discontinuation of Parenteral Anticoagulation: Not Applicable   Reversal Agent Administered: Not Applicable  Comments: INR unmoved. Prior dosing noted (pending confirmation). Heparin overlap noted. H/H low. PLT unremarkable. No overt bleeding noted. Warfarin interactions noted. Will give 15 mg once and trend INR with AM labs. Will trial resumption of home regimen pending response.     Plan:  Warfarin 15 mg 4/9/18  Education Material Provided?: No (chronic warfarin patient)  Pharmacist suggested discharge dosing: warfarin 5 mg We and 10 mg all other days    Gavino Fowler, PharmD

## 2018-04-09 NOTE — THERAPY
"Occupational Therapy     Patient verbalize and demo no significant change in status and remains at / near functional baseline. No skilled OT needs in this setting at this time. OT order canceled.     See \"Rehab Therapy-Acute\" Patient Summary Report for complete documentation.   "

## 2018-04-09 NOTE — PROGRESS NOTES
Assumed care at 0700, bedside report received from night shift RN. Pt is AOx4 with no signs of distress. Pt is SR 74 on the monitor. Initial assessment completed, orders reviewed, call light within reach, and hourly rounding in place. POC addressed with patient, no additional questions at this time.

## 2018-04-09 NOTE — WOUND TEAM
In to see pt for possible pressure injury to sacrum.Pt is incontinent of urine @ times. She reports she wears brief and has leakage and she doesn't change the briefs unless she urinates in them a lot. This has caused the skin to have IAD. Antifungal barrier cream ordered. Nsg to apply BID and if incontinent.

## 2018-04-09 NOTE — PROGRESS NOTES
Pt in bed, no complaints at this time, denies pain.  Plan of care fo th night reviewed with the patient.  Call light in reach, bed in lowest position.

## 2018-04-09 NOTE — THERAPY
Re-consult received; pt reports has been getting up self with 4WW and has no concerns with functional d/c to home once medically cleared. Please refer to initial PT evaluation for recommendations as needed. Pt should continue to get OOB as appropriate/able to prevent deconditioning while here. No skilled acute PT needs at this time.

## 2018-04-09 NOTE — CARE PLAN
Problem: Venous Thromboembolism (VTW)/Deep Vein Thrombosis (DVT) Prevention:  Goal: Patient will participate in Venous Thrombosis (VTE)/Deep Vein Thrombosis (DVT)Prevention Measures  Outcome: PROGRESSING AS EXPECTED  Pt being treated with lovenox/coumadin bridge. Pt educated on VTE prophylaxis and risk of bleeding. SCD's in place. Pt encouraged to ambulate.    Problem: Respiratory:  Goal: Respiratory status will improve  Outcome: PROGRESSING AS EXPECTED  Pt is satting at 92% on 3L liters of o2 which is her baseline. Pt denies SOB at this time. Resting in bed comfortably.

## 2018-04-09 NOTE — PROGRESS NOTES
Inpatient Anticoagulation Service Note    Date: 4/8/2018  Reason for Anticoagulation: Atrial Fibrillation, Deep Vein Thrombosis   [id number] VASc Score: 6    Hemoglobin Value: 13.2  Hematocrit Value: 41.7  Lab Platelet Value: 226  Target INR: 2.0 to 3.0    INR from last 7 days     Date/Time INR Value    04/08/18 0559  1.08    04/07/18 2357  1.13    04/07/18 1106 (!)  1.17        Dose from last 7 days     Date/Time Dose (mg)    04/08/18 1700  10    04/07/18 1500  10        Significant Interactions: Antibiotics, Thyroid Medications, Other (Comments) (Carbazapine, TCA, SSRI)  Bridge Therapy: Yes  Bridge Therapy Start Date: 04/07/18  Days of Overlap Therapy: 1  INR Value Greater than 2 Prior to Discontinuation of Parenteral Anticoagulation: Not Applicable     Reversal Agent Administered: Not Applicable  Comments: INR subtherapeutic, trending down. Anticipate INR to start increasing over the next couple days. Siginifcant interactions with carbamazapine may interfere with achieving goal INR quickly. Will continue with reported previous home dose to reach goal. Heparin drip continues. H/H, vitals, and renal function stable.     Plan:  10mg  Education Material Provided?: No  Pharmacist suggested discharge dosing: Pharmacist suggested discharge dosing: 10mg daily expect for 5mg on Wednesday. INR follow up within 72 hours of discharge     Hunter Fernandes, PharmD

## 2018-04-09 NOTE — WOUND TEAM
"[facility]   Inpatient Services   Established Ostomy Management/ troubleshooting  HPI: Reviewed  PMH: Reviewed   SH: Reviewed   Reason for Ostomy nurse consult:  Assess ostomy needs  Subjective: \"I was hoping I'd see you. It's been 3 years since I was here.\"  Objective: appliance starting to leak underneath  Ostomy type: colostomy  Stoma location: LLQ  Stoma assessment:    Appearance:  red, moist, oval base small center   Size: 1.5\"   Protrusion: ~1\"    jxn: resolved   Peristomal skin: intact    Ostomy Appliance (type and size): two piece 2.25\" convex cut to fit to 1.5\"  Assessment: pt has established colostomy with output and flatus  Interventions:  Removed appliance, cleaned skin with warm moist washcloths. Cut barrier to fit stoma, applied paste ring to skin then applied appliance, attached pouch and closed end.   Pt education: Questions and concerns addressed. Discussed paste ring vs paste  Plan: Nsg to help pt perform ostomy care. Ostomy nurses to follow for ostomy needs PRN pt request.  Anticipated discharge needs:none    "

## 2018-04-10 LAB
ALBUMIN SERPL BCP-MCNC: 3.2 G/DL (ref 3.2–4.9)
ALBUMIN/GLOB SERPL: 0.9 G/DL
ALP SERPL-CCNC: 81 U/L (ref 30–99)
ALT SERPL-CCNC: 16 U/L (ref 2–50)
ANION GAP SERPL CALC-SCNC: 4 MMOL/L (ref 0–11.9)
AST SERPL-CCNC: 22 U/L (ref 12–45)
BILIRUB SERPL-MCNC: 0.2 MG/DL (ref 0.1–1.5)
BUN SERPL-MCNC: 10 MG/DL (ref 8–22)
CALCIUM SERPL-MCNC: 8.7 MG/DL (ref 8.5–10.5)
CHLORIDE SERPL-SCNC: 102 MMOL/L (ref 96–112)
CO2 SERPL-SCNC: 32 MMOL/L (ref 20–33)
CREAT SERPL-MCNC: 0.62 MG/DL (ref 0.5–1.4)
GLOBULIN SER CALC-MCNC: 3.5 G/DL (ref 1.9–3.5)
GLUCOSE SERPL-MCNC: 96 MG/DL (ref 65–99)
INR PPP: 1.17 (ref 0.87–1.13)
POTASSIUM SERPL-SCNC: 3.9 MMOL/L (ref 3.6–5.5)
PROT SERPL-MCNC: 6.7 G/DL (ref 6–8.2)
PROTHROMBIN TIME: 14.6 SEC (ref 12–14.6)
SODIUM SERPL-SCNC: 138 MMOL/L (ref 135–145)
UFH PPP CHRO-ACNC: 1.1 U/ML

## 2018-04-10 PROCEDURE — 700102 HCHG RX REV CODE 250 W/ 637 OVERRIDE(OP): Performed by: HOSPITALIST

## 2018-04-10 PROCEDURE — 770020 HCHG ROOM/CARE - TELE (206)

## 2018-04-10 PROCEDURE — 85610 PROTHROMBIN TIME: CPT

## 2018-04-10 PROCEDURE — 700111 HCHG RX REV CODE 636 W/ 250 OVERRIDE (IP): Performed by: HOSPITALIST

## 2018-04-10 PROCEDURE — 80053 COMPREHEN METABOLIC PANEL: CPT

## 2018-04-10 PROCEDURE — 36415 COLL VENOUS BLD VENIPUNCTURE: CPT

## 2018-04-10 PROCEDURE — A9270 NON-COVERED ITEM OR SERVICE: HCPCS | Performed by: HOSPITALIST

## 2018-04-10 PROCEDURE — 85520 HEPARIN ASSAY: CPT

## 2018-04-10 PROCEDURE — 700105 HCHG RX REV CODE 258: Performed by: HOSPITALIST

## 2018-04-10 PROCEDURE — A9270 NON-COVERED ITEM OR SERVICE: HCPCS | Performed by: FAMILY MEDICINE

## 2018-04-10 PROCEDURE — 700102 HCHG RX REV CODE 250 W/ 637 OVERRIDE(OP): Performed by: FAMILY MEDICINE

## 2018-04-10 PROCEDURE — 99232 SBSQ HOSP IP/OBS MODERATE 35: CPT | Performed by: HOSPITALIST

## 2018-04-10 PROCEDURE — 700111 HCHG RX REV CODE 636 W/ 250 OVERRIDE (IP): Performed by: FAMILY MEDICINE

## 2018-04-10 RX ADMIN — AMITRIPTYLINE HYDROCHLORIDE 25 MG: 25 TABLET, FILM COATED ORAL at 21:13

## 2018-04-10 RX ADMIN — MICONAZOLE NITRATE: 20 CREAM TOPICAL at 09:03

## 2018-04-10 RX ADMIN — FLUOXETINE HYDROCHLORIDE 20 MG: 20 CAPSULE ORAL at 09:02

## 2018-04-10 RX ADMIN — FAMOTIDINE 20 MG: 20 TABLET, FILM COATED ORAL at 09:02

## 2018-04-10 RX ADMIN — LEVOTHYROXINE SODIUM 225 MCG: 75 TABLET ORAL at 09:02

## 2018-04-10 RX ADMIN — TIOTROPIUM BROMIDE 1 CAPSULE: 18 CAPSULE ORAL; RESPIRATORY (INHALATION) at 09:01

## 2018-04-10 RX ADMIN — WARFARIN SODIUM 10 MG: 10 TABLET ORAL at 17:12

## 2018-04-10 RX ADMIN — NYSTATIN: 100000 CREAM TOPICAL at 21:20

## 2018-04-10 RX ADMIN — GABAPENTIN 900 MG: 300 CAPSULE ORAL at 16:35

## 2018-04-10 RX ADMIN — STANDARDIZED SENNA CONCENTRATE AND DOCUSATE SODIUM 2 TABLET: 8.6; 5 TABLET, FILM COATED ORAL at 21:13

## 2018-04-10 RX ADMIN — MICONAZOLE NITRATE: 20 CREAM TOPICAL at 21:20

## 2018-04-10 RX ADMIN — GABAPENTIN 900 MG: 300 CAPSULE ORAL at 21:13

## 2018-04-10 RX ADMIN — ENOXAPARIN SODIUM 120 MG: 150 INJECTION SUBCUTANEOUS at 21:18

## 2018-04-10 RX ADMIN — FLUTICASONE PROPIONATE 100 MCG: 50 SPRAY, METERED NASAL at 09:01

## 2018-04-10 RX ADMIN — GABAPENTIN 900 MG: 300 CAPSULE ORAL at 09:02

## 2018-04-10 RX ADMIN — ACETAMINOPHEN 650 MG: 325 TABLET, FILM COATED ORAL at 21:13

## 2018-04-10 RX ADMIN — ENOXAPARIN SODIUM 150 MG: 150 INJECTION SUBCUTANEOUS at 15:26

## 2018-04-10 RX ADMIN — FAMOTIDINE 20 MG: 20 TABLET, FILM COATED ORAL at 21:13

## 2018-04-10 RX ADMIN — BUDESONIDE AND FORMOTEROL FUMARATE DIHYDRATE 2 PUFF: 160; 4.5 AEROSOL RESPIRATORY (INHALATION) at 21:16

## 2018-04-10 RX ADMIN — NYSTATIN: 100000 CREAM TOPICAL at 09:02

## 2018-04-10 RX ADMIN — OMEPRAZOLE 20 MG: 20 CAPSULE, DELAYED RELEASE ORAL at 09:03

## 2018-04-10 RX ADMIN — CARBAMAZEPINE 600 MG: 200 TABLET ORAL at 21:13

## 2018-04-10 RX ADMIN — ENOXAPARIN SODIUM 150 MG: 150 INJECTION SUBCUTANEOUS at 02:28

## 2018-04-10 RX ADMIN — SODIUM CHLORIDE: 9 INJECTION, SOLUTION INTRAVENOUS at 05:29

## 2018-04-10 RX ADMIN — NICOTINE 14 MG: 14 PATCH, EXTENDED RELEASE TRANSDERMAL at 05:28

## 2018-04-10 RX ADMIN — BUDESONIDE AND FORMOTEROL FUMARATE DIHYDRATE 2 PUFF: 160; 4.5 AEROSOL RESPIRATORY (INHALATION) at 09:01

## 2018-04-10 ASSESSMENT — ENCOUNTER SYMPTOMS
CHILLS: 0
NAUSEA: 0
HEADACHES: 0
PALPITATIONS: 0
DIZZINESS: 0
DOUBLE VISION: 0
PSYCHIATRIC NEGATIVE: 1
BACK PAIN: 0
BRUISES/BLEEDS EASILY: 0
ORTHOPNEA: 0
VOMITING: 0
SPUTUM PRODUCTION: 0
WEIGHT LOSS: 0
NECK PAIN: 0
PHOTOPHOBIA: 0
EYE PAIN: 0
HEARTBURN: 0
COUGH: 1

## 2018-04-10 ASSESSMENT — PAIN SCALES - GENERAL
PAINLEVEL_OUTOF10: 0
PAINLEVEL_OUTOF10: 8
PAINLEVEL_OUTOF10: 0
PAINLEVEL_OUTOF10: 0

## 2018-04-10 NOTE — DISCHARGE PLANNING
Medical Social Work    Anticipated Discharge Disposition: Home     Action: This  met with pt at bedside.  Pt uses the [facility] on [address].    This  faxed script to #[phone] to get copay amount and to see if prior auth is needed.     Barriers: None at this time.     Plan: SW to follow up on script

## 2018-04-10 NOTE — DISCHARGE PLANNING
Medical Social Work    Ongoing: This  received a call back from the [facility].  Per pharmacy there is no copay required and no PA required.    Updated FRANCISCO Engle on above.

## 2018-04-10 NOTE — DISCHARGE PLANNING
FRANCISCO provided Lovenox script to pt, updating her on her $0 copay. RN aware. SW to remain available.

## 2018-04-10 NOTE — CARE PLAN
Problem: Communication  Goal: The ability to communicate needs accurately and effectively will improve    Intervention: [place] patient and significant other/support system to call light to alert staff of needs  Completed at start of shift.       Problem: Safety  Goal: Will remain free from injury  Outcome: PROGRESSING AS EXPECTED  Bed in low position.  Treaded socks on patient.  Call light within reach.  Bedrails closest to bathroom down.  Patient educated to call for assistance.

## 2018-04-10 NOTE — PROGRESS NOTES
Inpatient Anticoagulation Service Note    Date: 4/10/2018  Reason for Anticoagulation: Atrial Fibrillation   [id number] VASc Score: 6    Hemoglobin Value: (!) 11.6  Hematocrit Value: (!) 35.9  Lab Platelet Value: 217  Target INR: 2.0 to 3.0    INR from last 7 days     Date/Time INR Value    04/10/18 0409 (!)  1.17    04/09/18 0152  1.1    04/08/18 0559  1.08    04/07/18 2357  1.13    04/07/18 1106 (!)  1.17        Dose from last 7 days     Date/Time Dose (mg)    04/10/18 0800  10    04/09/18 1100  15    04/08/18 1700  10    04/07/18 1500  10        Average Dose (mg):  (Home Dose: 5 mg We and 10 mg ROW)  Significant Interactions: Proton Pump Inhibitor, Thyroid Medications, Other (Comments) (Fluoxetine, Carbamazepine and Amitriptyline)  Bridge Therapy: Yes  Bridge Therapy Start Date: 04/07/18  Days of Overlap Therapy: 3   INR Value Greater than 2 Prior to Discontinuation of Parenteral Anticoagulation: Not Applicable   Reversal Agent Administered: Not Applicable    HPI: 68 yo female chronically anticoagulated with warfarin therapy for h/o DVT, diagnosed with new onset Afib this admission ([id number] VASc 6, moderate risk). Warfarin is managed outpatient by the [facility] and per records, patient is presribed warfarin 5mg po every Wednesday and warfarin 10mg po on all other days. Latest clinic visits within goal range, although last visit was December 2017. INR subtherapeutic on admission. Bridging to therapeutic INR with Lovenox 1mg/kg every 12 hours.     Assessment: INR remains SUBtherapeutic with trend upward, 1.17 from 1.1, following a bolus dose of warfarin 15mg po x one yesterday. Anti Xa level resulted at 1.1 u/mL, slightly supratherapeutic.     Potential drug-drug interactions identified with acute inpatient medications: Antibiotics completed on 4/9/18.    Potential drug-drug interactions identified with chronic home medications: Omeprazole, Levothyroxine, Fluoxetine and Amitriptyline which should be established interactions  with warfarin.   Inpatient Diet: Cardiac, all meals     Plan:  Resume usual home dosing regimen. Observe trend following bolus dose yesterday. Continue daily INR monitoring until re-stabilized in therapeutic range. Reduced Lovenox to 120mg subQ every 12 hours, d/t stand-up weight of 129 kg and slightly supratherapeutic anti-Xa level.     Education Material Provided?: No (chronic warfarin patient)    Pharmacist suggested discharge dosing: warfarin 5mg po every Wednesday and warfarin 10mg po on all other days.      Sabina June, PharmD

## 2018-04-10 NOTE — PROGRESS NOTES
[facility]ist Progress Note    Date of Service: 2018    Chief Complaint  67 y.o. female with history of COPD on oxygen therapy at home, obesity hypoventilation and obstructive sleep apnea with home CPAP therapy, and severe hypothyroidism on replacement therapy was in her usual state of health until 6 days prior to admission when she had the onset of light-headedness, weakness, and shortness of breath.  She initially attributed the shortness of breath to her COPD, but was alarmed by her weakness and light-headedness.  She endorses chills and fever during the same time, and in the days just prior to her arrival, she began to have a productive cough with bright green sputum.  She subsequently came to the emergency department for further evaluation.      Interval Problem Update  At baseline with her oxygen requirements. No new issues to report.    Consultants/Specialty  None    Disposition  To be determined        Review of Systems   Constitutional: Negative for chills and weight loss.   HENT: Negative for ear pain, hearing loss and tinnitus.    Eyes: Negative for double vision, photophobia and pain.   Respiratory: Positive for cough (Chronic ). Negative for sputum production.    Cardiovascular: Negative for chest pain, palpitations and orthopnea.   Gastrointestinal: Negative for heartburn, nausea and vomiting.   Genitourinary: Negative for dysuria and urgency.   Musculoskeletal: Negative for back pain and neck pain.   Skin: Positive for rash (under the breast).   Neurological: Negative for dizziness, tingling and headaches.   Endo/Heme/Allergies: Negative for environmental allergies and polydipsia. Does not bruise/bleed easily.   Psychiatric/Behavioral: Negative for depression, hallucinations, substance abuse and suicidal ideas.      Physical Exam  Laboratory/Imaging   Hemodynamics  Temp (24hrs), Av.6 °C (97.9 °F), Min:36.2 °C (97.1 °F), Max:37.1 °C (98.7 °F)   Temperature: 37.1 °C (98.7 °F)  Pulse  Avg:  69.5  Min: 50  Max: 111    Blood Pressure : 121/69      Respiratory      Respiration: 16, Pulse Oximetry: 91 %     Work Of Breathing / Effort: Mild  RUL Breath Sounds: Clear, RML Breath Sounds: Expiratory Wheezes, RLL Breath Sounds: Expiratory Wheezes, HUI Breath Sounds: Clear, LLL Breath Sounds: Expiratory Wheezes    Fluids    Intake/Output Summary (Last 24 hours) at 04/09/18 1818  Last data filed at 04/09/18 1200   Gross per 24 hour   Intake              300 ml   Output                0 ml   Net              300 ml       Nutrition  Orders Placed This Encounter   Procedures   • Diet Order     Standing Status:   Standing     Number of Occurrences:   1     Order Specific Question:   Diet:     Answer:   Cardiac [6]     Physical Exam   Constitutional: She is oriented to person, place, and time. She appears well-developed. No distress.   Obese   HENT:   Head: Normocephalic and atraumatic.   Eyes: Pupils are equal, round, and reactive to light. Left eye exhibits no discharge. No scleral icterus.   Neck: Normal range of motion. No JVD present. No tracheal deviation present. No thyromegaly present.   Cardiovascular: Normal rate and regular rhythm.  Exam reveals no gallop and no friction rub.    Pulmonary/Chest: Effort normal. No accessory muscle usage. No respiratory distress. She has decreased breath sounds. She has no wheezes. She has no rales.   Abdominal: Soft. She exhibits no distension. There is no tenderness. There is no rebound.   Musculoskeletal: Normal range of motion. She exhibits no tenderness or deformity.   Neurological: She is alert and oriented to person, place, and time.   Skin: Skin is warm and dry. She is not diaphoretic.   Psychiatric: She has a normal mood and affect. Her behavior is normal. Thought content normal.       Recent Labs      04/07/18   2356  04/08/18   0909  04/09/18   0152   WBC  6.6  7.2  5.9   RBC  3.61*  4.04*  3.54*   HEMOGLOBIN  11.5*  13.2  11.6*   HEMATOCRIT  36.8*  41.7  35.9*    MCV  101.9*  103.2*  101.4*   MCH  31.9  32.7  32.8   MCHC  31.3*  31.7*  32.3*   RDW  56.2*  55.8*  54.7*   PLATELETCT  233  226  217   MPV  10.7  10.3  10.7     Recent Labs      04/07/18   0421  04/07/18   2356  04/09/18   0152   SODIUM  142  140  138   POTASSIUM  3.8  4.2  3.7   CHLORIDE  107  106  103   CO2  30  28  28   GLUCOSE  96  104*  119*   BUN  8  9  10   CREATININE  0.70  0.62  0.56   CALCIUM  8.8  8.2*  8.4*     Recent Labs      04/07/18   2357  04/08/18   0559  04/09/18   0152  04/09/18   0608   APTT  67.7*  76.9*   --   83.5*   INR  1.13  1.08  1.10   --                   Assessment/Plan     * CAP (community acquired pneumonia)   Assessment & Plan    IV antibiotic therapy.  Follow up culture results.          COPD (chronic obstructive pulmonary disease) (CMS-HCC)- (present on admission)   Assessment & Plan    Without current exacerbation.  Likely a component of obesity hypoventilation syndrome as well.  Continue current respiratory therapy treatments.         Macrocytic anemia- (present on admission)   Assessment & Plan    Vitamin B12 and folic acid levels were normal.  Monitor H&H.        HTN (hypertension)- (present on admission)   Assessment & Plan    Controlled with current medication regimen, monitor.         YESSICA (obstructive sleep apnea)- (present on admission)   Assessment & Plan    On CPAP at home.  Does not have home device with her.  Will order from respiratory.         Hypophosphatemia- (present on admission)   Assessment & Plan    Replaced.         A-fib (CMS-HCC)- (present on admission)   Assessment & Plan    1 episode earlier (new dx). No further episodes. Unable to tolerate BB due to slow HR  CHADVASC 5  Echocardiogram showed normal ejection fraction and mild tricuspid regurgitation.  On Coumadin for now.         Candidiasis- (present on admission)   Assessment & Plan    Under the breasts bilaterally. Topical nystatin.        Hypothyroidism, acquired, autoimmune- (present on  admission)   Assessment & Plan    Continue synthroid and monitor.         Deep vein thrombosis (DVT) (CMS-HCC)- (present on admission)   Assessment & Plan    S/P IVC  Obviously was on Coumadin and ran out 10 days ago.  On heparin gtt and Coumadin   INR goal 2-3.         Prediabetes- (present on admission)   Assessment & Plan    A1c 5.9  SSI         Tobacco abuse- (present on admission)   Assessment & Plan    Ongoing.  No desire for cessation.         Depression- (present on admission)   Assessment & Plan    Continue current therapy.  Follow with psychiatry as outpatient.         Morbid obesity (CMS-HCC)- (present on admission)   Assessment & Plan    Body mass index is 48.58 kg/m².          Plan of care discussed with multidisciplinary team during rounds.   Quality-Core Measures   Reviewed items::  Labs reviewed, Medications reviewed and Radiology images reviewed  Turner catheter::  No Turner  DVT prophylaxis - mechanical:  SCDs  Antibiotics:  Treating active infection/contamination beyond 24 hours perioperative coverage

## 2018-04-10 NOTE — PROGRESS NOTES
Received report from day shift RN. Twelve hour chart check completed. Patient assessed. Patient A and 0 X 4.  Bed alarm is on.   Patient states that pain is a 0 out of 10.  Patient educated on plan of care for the evening including medications per MAR, monitoring vital signs, pain control, and cardiac monitoring. Patient educated to call for assistance. Patient verbalizes understanding.

## 2018-04-11 VITALS
DIASTOLIC BLOOD PRESSURE: 72 MMHG | TEMPERATURE: 96.9 F | HEIGHT: 65 IN | HEART RATE: 56 BPM | RESPIRATION RATE: 20 BRPM | WEIGHT: 293 LBS | OXYGEN SATURATION: 95 % | BODY MASS INDEX: 48.82 KG/M2 | SYSTOLIC BLOOD PRESSURE: 133 MMHG

## 2018-04-11 LAB
ALBUMIN SERPL BCP-MCNC: 3.1 G/DL (ref 3.2–4.9)
ALBUMIN/GLOB SERPL: 0.9 G/DL
ALP SERPL-CCNC: 84 U/L (ref 30–99)
ALT SERPL-CCNC: 13 U/L (ref 2–50)
ANION GAP SERPL CALC-SCNC: 6 MMOL/L (ref 0–11.9)
AST SERPL-CCNC: 17 U/L (ref 12–45)
BACTERIA BLD CULT: NORMAL
BACTERIA BLD CULT: NORMAL
BILIRUB SERPL-MCNC: 0.2 MG/DL (ref 0.1–1.5)
BUN SERPL-MCNC: 12 MG/DL (ref 8–22)
CALCIUM SERPL-MCNC: 8.8 MG/DL (ref 8.5–10.5)
CHLORIDE SERPL-SCNC: 102 MMOL/L (ref 96–112)
CO2 SERPL-SCNC: 33 MMOL/L (ref 20–33)
CREAT SERPL-MCNC: 0.63 MG/DL (ref 0.5–1.4)
ERYTHROCYTE [DISTWIDTH] IN BLOOD BY AUTOMATED COUNT: 53 FL (ref 35.9–50)
GLOBULIN SER CALC-MCNC: 3.6 G/DL (ref 1.9–3.5)
GLUCOSE SERPL-MCNC: 121 MG/DL (ref 65–99)
HCT VFR BLD AUTO: 36.2 % (ref 37–47)
HGB BLD-MCNC: 11.8 G/DL (ref 12–16)
INR PPP: 1.68 (ref 0.87–1.13)
MCH RBC QN AUTO: 32.5 PG (ref 27–33)
MCHC RBC AUTO-ENTMCNC: 32.6 G/DL (ref 33.6–35)
MCV RBC AUTO: 99.7 FL (ref 81.4–97.8)
PLATELET # BLD AUTO: 233 K/UL (ref 164–446)
PMV BLD AUTO: 11 FL (ref 9–12.9)
POTASSIUM SERPL-SCNC: 3.6 MMOL/L (ref 3.6–5.5)
PROT SERPL-MCNC: 6.7 G/DL (ref 6–8.2)
PROTHROMBIN TIME: 19.5 SEC (ref 12–14.6)
RBC # BLD AUTO: 3.63 M/UL (ref 4.2–5.4)
SIGNIFICANT IND 70042: NORMAL
SIGNIFICANT IND 70042: NORMAL
SITE SITE: NORMAL
SITE SITE: NORMAL
SODIUM SERPL-SCNC: 141 MMOL/L (ref 135–145)
SOURCE SOURCE: NORMAL
SOURCE SOURCE: NORMAL
WBC # BLD AUTO: 6 K/UL (ref 4.8–10.8)

## 2018-04-11 PROCEDURE — 700102 HCHG RX REV CODE 250 W/ 637 OVERRIDE(OP): Performed by: FAMILY MEDICINE

## 2018-04-11 PROCEDURE — 85610 PROTHROMBIN TIME: CPT

## 2018-04-11 PROCEDURE — 700102 HCHG RX REV CODE 250 W/ 637 OVERRIDE(OP): Performed by: HOSPITALIST

## 2018-04-11 PROCEDURE — A9270 NON-COVERED ITEM OR SERVICE: HCPCS | Performed by: HOSPITALIST

## 2018-04-11 PROCEDURE — 99239 HOSP IP/OBS DSCHRG MGMT >30: CPT | Performed by: HOSPITALIST

## 2018-04-11 PROCEDURE — 700111 HCHG RX REV CODE 636 W/ 250 OVERRIDE (IP): Performed by: HOSPITALIST

## 2018-04-11 PROCEDURE — A9270 NON-COVERED ITEM OR SERVICE: HCPCS | Performed by: FAMILY MEDICINE

## 2018-04-11 PROCEDURE — 36415 COLL VENOUS BLD VENIPUNCTURE: CPT

## 2018-04-11 PROCEDURE — 85027 COMPLETE CBC AUTOMATED: CPT

## 2018-04-11 PROCEDURE — 80053 COMPREHEN METABOLIC PANEL: CPT

## 2018-04-11 RX ORDER — ALUMINA, MAGNESIA, AND SIMETHICONE 2400; 2400; 240 MG/30ML; MG/30ML; MG/30ML
10 SUSPENSION ORAL 4 TIMES DAILY PRN
Status: DISCONTINUED | OUTPATIENT
Start: 2018-04-11 | End: 2018-04-11 | Stop reason: HOSPADM

## 2018-04-11 RX ORDER — AMITRIPTYLINE HYDROCHLORIDE 25 MG/1
25 TABLET, FILM COATED ORAL
Qty: 30 TAB | Refills: 0 | Status: SHIPPED | OUTPATIENT
Start: 2018-04-11 | End: 2018-04-11

## 2018-04-11 RX ORDER — WARFARIN SODIUM 5 MG/1
5-10 TABLET ORAL EVERY EVENING
Qty: 60 TAB | Refills: 0 | Status: SHIPPED | OUTPATIENT
Start: 2018-04-11 | End: 2018-05-04

## 2018-04-11 RX ORDER — AMITRIPTYLINE HYDROCHLORIDE 25 MG/1
25 TABLET, FILM COATED ORAL
Qty: 30 TAB | Refills: 0 | Status: SHIPPED | OUTPATIENT
Start: 2018-04-11 | End: 2020-10-05

## 2018-04-11 RX ADMIN — MICONAZOLE NITRATE: 20 CREAM TOPICAL at 09:34

## 2018-04-11 RX ADMIN — ACETAMINOPHEN 650 MG: 325 TABLET, FILM COATED ORAL at 11:09

## 2018-04-11 RX ADMIN — NICOTINE 14 MG: 14 PATCH, EXTENDED RELEASE TRANSDERMAL at 06:02

## 2018-04-11 RX ADMIN — ALUMINUM HYDROXIDE, MAGNESIUM HYDROXIDE,SIMETHICONE 10 ML: 400; 400; 40 LIQUID ORAL at 11:43

## 2018-04-11 RX ADMIN — GABAPENTIN 900 MG: 300 CAPSULE ORAL at 17:08

## 2018-04-11 RX ADMIN — BUDESONIDE AND FORMOTEROL FUMARATE DIHYDRATE 2 PUFF: 160; 4.5 AEROSOL RESPIRATORY (INHALATION) at 09:34

## 2018-04-11 RX ADMIN — FLUOXETINE HYDROCHLORIDE 20 MG: 20 CAPSULE ORAL at 09:33

## 2018-04-11 RX ADMIN — ENOXAPARIN SODIUM 120 MG: 150 INJECTION SUBCUTANEOUS at 09:35

## 2018-04-11 RX ADMIN — LEVOTHYROXINE SODIUM 225 MCG: 75 TABLET ORAL at 09:33

## 2018-04-11 RX ADMIN — TIOTROPIUM BROMIDE 1 CAPSULE: 18 CAPSULE ORAL; RESPIRATORY (INHALATION) at 09:56

## 2018-04-11 RX ADMIN — FAMOTIDINE 20 MG: 20 TABLET, FILM COATED ORAL at 09:33

## 2018-04-11 RX ADMIN — OMEPRAZOLE 20 MG: 20 CAPSULE, DELAYED RELEASE ORAL at 09:33

## 2018-04-11 RX ADMIN — FLUTICASONE PROPIONATE 100 MCG: 50 SPRAY, METERED NASAL at 09:34

## 2018-04-11 RX ADMIN — GABAPENTIN 900 MG: 300 CAPSULE ORAL at 09:32

## 2018-04-11 RX ADMIN — STANDARDIZED SENNA CONCENTRATE AND DOCUSATE SODIUM 2 TABLET: 8.6; 5 TABLET, FILM COATED ORAL at 09:37

## 2018-04-11 RX ADMIN — NYSTATIN: 100000 CREAM TOPICAL at 09:34

## 2018-04-11 ASSESSMENT — PAIN SCALES - GENERAL: PAINLEVEL_OUTOF10: 7

## 2018-04-11 NOTE — CARE PLAN
Problem: Communication  Goal: The ability to communicate needs accurately and effectively will improve    Intervention: Educate patient and significant other/support system about the plan of care, procedures, treatments, medications and allow for questions  Bedside report completed. Patient educated on plan of care, call light, and communication board. Patient verbalizes understanding.         Problem: Safety  Goal: Will remain free from injury  Outcome: PROGRESSING AS EXPECTED  Bed in low position.  Treaded socks on patient.  Call light within reach.  Saline locked.  Bedrails closest to bathroom down.  Patient educated to call for assistance.       Problem: Pain Management  Goal: Pain level will decrease to patient's comfort goal  Outcome: PROGRESSING AS EXPECTED  Patient verbalizing pain in head. Patient given Tylenol per MAR. Room lights turned off and peaceful environment promoted.

## 2018-04-11 NOTE — CARE PLAN
Problem: Safety  Goal: Will remain free from injury  Outcome: PROGRESSING AS EXPECTED  No falls or injuries this shift. Pt calls appropriately with call light. Bed alarm placed. Falls education given.    Problem: Discharge Barriers/Planning  Goal: Patient's continuum of care needs will be met  Outcome: PROGRESSING AS EXPECTED  POC reviewed with pt and pt verbalizes understanding.    Problem: Pain Management  Goal: Pain level will decrease to patient's comfort goal  Outcome: PROGRESSING AS EXPECTED  Pt denies having any pain this shift. Will continue to monitor.

## 2018-04-11 NOTE — WOUND TEAM
Patient seen by wound team per consult order. Patient able to turn self easily to the side for assessment of sacrococcygeal area and buttocks.  Patient already seen by wound team on 04/08 for redness to area and ROB cream ordered at that time by wound team. Redness has resolved with ROB treatment, no pressure injuries noted. Patient with waffle overlay in place to mattress. Bilateral ears assessed beneath oxygen mask ties, skin is red, but blanching,no pressure injuries noted. Patient is utilizing gray foam pads over ties. No advanced wound care needs at this time. Discussed POC with staff RN, patient.

## 2018-04-11 NOTE — PROGRESS NOTES
[facility] Hospitalist Progress Note    Date of Service: 4/10/2018    Chief Complaint  67 y.o. female with history of COPD on oxygen therapy at home, obesity hypoventilation and obstructive sleep apnea with home CPAP therapy, and severe hypothyroidism on replacement therapy was in her usual state of health until 6 days prior to admission when she had the onset of light-headedness, weakness, and shortness of breath.  She initially attributed the shortness of breath to her COPD, but was alarmed by her weakness and light-headedness.  She endorses chills and fever during the same time, and in the days just prior to her arrival, she began to have a productive cough with bright green sputum.  She subsequently came to the emergency department for further evaluation.      Interval Problem Update  Doing well. No questions at this time. No acute overnight events.    Consultants/Specialty  None    Disposition  To be determined        Review of Systems   Constitutional: Negative for chills and weight loss.   HENT: Negative for ear pain, hearing loss and tinnitus.    Eyes: Negative for double vision, photophobia and pain.   Respiratory: Positive for cough (Chronic ). Negative for sputum production.    Cardiovascular: Negative for chest pain, palpitations and orthopnea.   Gastrointestinal: Negative for heartburn, nausea and vomiting.   Genitourinary: Negative for dysuria and urgency.   Musculoskeletal: Negative for back pain and neck pain.   Skin: Positive for rash (under the breast).   Neurological: Negative for dizziness and headaches.   Endo/Heme/Allergies: Does not bruise/bleed easily.   Psychiatric/Behavioral: Negative.       Physical Exam  Laboratory/Imaging   Hemodynamics  Temp (24hrs), Av.2 °C (97.1 °F), Min:36 °C (96.8 °F), Max:36.4 °C (97.5 °F)   Temperature: 36.1 °C (96.9 °F)  Pulse  Av  Min: 50  Max: 111    Blood Pressure : 130/65      Respiratory      Respiration: 16, Pulse Oximetry: 97 %     Work Of Breathing /  Effort: Mild  RUL Breath Sounds: Clear, RML Breath Sounds: Clear, RLL Breath Sounds: Diminished, HUI Breath Sounds: Clear, LLL Breath Sounds: Diminished    Fluids    Intake/Output Summary (Last 24 hours) at 04/10/18 2129  Last data filed at 04/10/18 0904   Gross per 24 hour   Intake             1370 ml   Output                0 ml   Net             1370 ml       Nutrition  Orders Placed This Encounter   Procedures   • Diet Order     Standing Status:   Standing     Number of Occurrences:   1     Order Specific Question:   Diet:     Answer:   Cardiac [6]     Physical Exam   Constitutional: She is oriented to person, place, and time. She appears well-developed. No distress.   Obese   HENT:   Head: Normocephalic and atraumatic.   Mouth/Throat: Oropharynx is clear and moist.   Eyes: EOM are normal. Right eye exhibits no discharge. Left eye exhibits no discharge. No scleral icterus.   Neck: Normal range of motion. Neck supple.   Cardiovascular: Normal rate, regular rhythm and intact distal pulses.    Pulmonary/Chest: Effort normal. No accessory muscle usage. No respiratory distress. She has decreased breath sounds. She has no rales.   Abdominal: Soft. She exhibits no distension. There is no tenderness.   Musculoskeletal: Normal range of motion.   Neurological: She is alert and oriented to person, place, and time.   Skin: Skin is warm and dry.   Psychiatric: She has a normal mood and affect. Her behavior is normal.   Vitals reviewed.      Recent Labs      04/07/18 2356  04/08/18   0909  04/09/18   0152   WBC  6.6  7.2  5.9   RBC  3.61*  4.04*  3.54*   HEMOGLOBIN  11.5*  13.2  11.6*   HEMATOCRIT  36.8*  41.7  35.9*   MCV  101.9*  103.2*  101.4*   MCH  31.9  32.7  32.8   MCHC  31.3*  31.7*  32.3*   RDW  56.2*  55.8*  54.7*   PLATELETCT  233  226  217   MPV  10.7  10.3  10.7     Recent Labs      04/07/18   2356  04/09/18   0152  04/10/18   0409   SODIUM  140  138  138   POTASSIUM  4.2  3.7  3.9   CHLORIDE  106  103  102    CO2  28  28  32   GLUCOSE  104*  119*  96   BUN  9  10  10   CREATININE  0.62  0.56  0.62   CALCIUM  8.2*  8.4*  8.7     Recent Labs      04/07/18   2357  04/08/18   0559  04/09/18   0152  04/09/18   0608  04/10/18   0409   APTT  67.7*  76.9*   --   83.5*   --    INR  1.13  1.08  1.10   --   1.17*                  Assessment/Plan     * CAP (community acquired pneumonia)- (present on admission)   Assessment & Plan    - s/p 5 days abx   - Follow up culture results        Deep vein thrombosis (DVT) (CMS-HCC)- (present on admission)   Assessment & Plan    S/P IVC. Was on Coumadin and ran out 10 days prior to admission.  - On heparin gtt and Coumadin   - INR goal 2-3.         COPD (chronic obstructive pulmonary disease) (CMS-HCC)- (present on admission)   Assessment & Plan    Without current exacerbation.  Likely a component of obesity hypoventilation syndrome as well.    - Continue current respiratory therapy treatments.         Macrocytic anemia- (present on admission)   Assessment & Plan    Vitamin B12 and folic acid levels were normal.  Monitor H&H.        HTN (hypertension)- (present on admission)   Assessment & Plan    Controlled with current medication regimen, monitor.         YESSICA (obstructive sleep apnea)- (present on admission)   Assessment & Plan    On CPAP at home.  Does not have home device with her.          Hypophosphatemia- (present on admission)   Assessment & Plan    Replaced.         A-fib (CMS-HCC)- (present on admission)   Assessment & Plan    Paroxysmal. No further episodes. Unable to tolerate BB due to slow HR  CHADVASC 5. Echocardiogram showed normal ejection fraction and mild tricuspid regurgitation.  - On Coumadin for now.         Hypothyroidism, acquired, autoimmune- (present on admission)   Assessment & Plan    Continue synthroid and monitor.         Prediabetes- (present on admission)   Assessment & Plan    A1c 5.9  SSI         Candidiasis- (present on admission)   Assessment & Plan     Under the breasts bilaterally. Topical nystatin.        Tobacco abuse- (present on admission)   Assessment & Plan    Ongoing.  No desire for cessation.         Depression- (present on admission)   Assessment & Plan    Continue current therapy.  Follow with psychiatry as outpatient.         Morbid obesity (CMS-HCC)- (present on admission)   Assessment & Plan    Body mass index is 48.58 kg/m².           Quality-Core Measures   Reviewed items::  Labs reviewed, Medications reviewed and Radiology images reviewed  Turner catheter::  No Turner  DVT prophylaxis pharmacological::  Enoxaparin (Lovenox)  DVT prophylaxis - mechanical:  SCDs  Antibiotics:  Treating active infection/contamination beyond 24 hours perioperative coverage

## 2018-04-11 NOTE — PROGRESS NOTES
Inpatient Anticoagulation Service Note    Date: 4/11/2018  Reason for Anticoagulation: Atrial Fibrillation   [id number] VASc Score: 6    Hemoglobin Value: (!) 11.8  Hematocrit Value: (!) 36.2  Lab Platelet Value: 233  Target INR: 2.0 to 3.0    INR from last 7 days     Date/Time INR Value    04/11/18 0157 (!)  1.68    04/10/18 0409 (!)  1.17    04/09/18 0152  1.1    04/08/18 0559  1.08    04/07/18 2357  1.13    04/07/18 1106 (!)  1.17        Dose from last 7 days     Date/Time Dose (mg)    04/11/18 0900  5    04/10/18 0800  10    04/09/18 1100  15    04/08/18 1700  10    04/07/18 1500  10        Average Dose (mg): 10  Significant Interactions: Proton Pump Inhibitor, Thyroid Medications, Other (Comments) (Fluoxetine, Carbamazepine and Amitriptyline)  Bridge Therapy: Yes  Bridge Therapy Start Date: 04/07/18  Days of Overlap Therapy: 4  INR Value Greater than 2 Prior to Discontinuation of Parenteral Anticoagulation: Not Applicable   Reversal Agent Administered: Not Applicable    HPI: 68 yo female chronically anticoagulated with warfarin therapy for h/o DVT, diagnosed with new onset Afib this admission ([id number] VASc 6, moderate risk). Warfarin is managed outpatient by the [facility] and per records, patient is presribed warfarin 5mg po every Wednesday and warfarin 10mg po on all other days. Latest clinic visits within goal range, although last visit was December 2017. INR subtherapeutic on admission. Bridging to therapeutic INR with Lovenox 1mg/kg every 12 hours.      Assessment: INR remains SUBtherapeutic with significant trend upward, 1.68 from 1.17, following a dose of warfarin 10mg last night per usual dosing regimen. Bolus dose of warfarin 15mg x one on 4/9/18 demonstrating its effect on the INR. Anti-XA level resulted at 1.1 u/mL yesterday, slightly supratherapeutic. Stand up weight on file is 129.3kg (less than bed scale). Reduced Lovenox from 150mg to 120mg subQ every 12 hours.      Potential drug-drug interactions  identified with acute inpatient medications: Antibiotics completed on 4/9/18.    Potential drug-drug interactions identified with chronic home medications: Omeprazole, Levothyroxine, Fluoxetine and Amitriptyline which should be established interactions with warfarin.   Inpatient Diet: Cardiac, all meals      Plan: Continue usual home dosing regimen, warfarin 5mg tonight. Continue daily INR monitoring until re-stabilized in therapeutic range.      Education Material Provided?: No (chronic warfarin patient)     Pharmacist suggested discharge dosing: warfarin 5mg po every Wednesday and warfarin 10mg po on all other days.      Sabina June, PharmD

## 2018-04-11 NOTE — DISCHARGE PLANNING
Receivied choice form from FRANCIS[facility] for O2. Referral sent to [facility] at 1357.

## 2018-04-11 NOTE — FACE TO FACE
Face to Face Note  -  Durable Medical Equipment    More Yeung M.D. - NPI: [id number]  I certify that this patient is under my care and that they had a durable medical equipment(DME)face to face encounter by myself that meets the physician DME face-to-face encounter requirements with this patient on:    Date of encounter:   Patient:                    MRN:                       YOB: 2016  Atiya Henley  [id number]  1950     The encounter with the patient was in whole, or in part, for the following medical condition, which is the primary reason for durable medical equipment:  COPD    I certify that, based on my findings, the following durable medical equipment is medically necessary:  Oxygen.    HOME O2 Saturation Measurements:(Values must be present for Home Oxygen orders)  Room air sat at rest: 87  Room air sat with amb: 84  With liters of O2: 3, O2 sat at rest with O2: 95  With Liters of O2: 3, O2 sat with amb with O2 : 93  Is the patient mobile?: Yes    My Clinical findings support the need for the above equipment due to:  Hypoxia

## 2018-04-11 NOTE — DISCHARGE PLANNING
MSW was provided updated lovenox prescription. MSW called [facility] and pt does not have a copay and no prior Auth is needed with the change in the lovenox prescription.

## 2018-04-11 NOTE — DISCHARGE INSTRUCTIONS
Discharge Instructions    Discharged to home by car with self. Discharged via wheelchair, hospital escort: Yes.  Special equipment needed: Oxygen    Be sure to schedule a follow-up appointment with your primary care doctor or any specialists as instructed.     Discharge Plan:   Diet Plan: Discussed  Activity Level: Discussed  Smoking Cessation Offered: Patient Counseled  Confirmed Follow up Appointment: Appointment Scheduled  Confirmed Symptoms Management: Discussed  Medication Reconciliation Updated: Yes  Pneumococcal Vaccine Given - only chart on this line when given: Given (See MAR)  Influenza Vaccine Indication: Not indicated: Previously immunized this influenza season and > 8 years of age    I understand that a diet low in cholesterol, fat, and sodium is recommended for good health. Unless I have been given specific instructions below for another diet, I accept this instruction as my diet prescription.   Other diet: heart healthy and low sugar    Special Instructions:     · Is patient discharged on Warfarin / Coumadin?   Yes    You are receiving the drug warfarin. Please understand the importance of monitoring warfarin with scheduled PT/INR blood draws.  Follow[facility]up with the [facility] in one week for INR lab..    IMPORTANT: HOW TO USE THIS INFORMATION:  This is a summary and does NOT have all possible information about this product. This information does not assure that this product is safe, effective, or appropriate for you. This information is not individual medical advice and does not substitute for the advice of your health care professional. Always ask your health care professional for complete information about this product and your specific health needs.      WARFARIN - ORAL (WARF-uh-rin)      COMMON BRAND NAME(S): Coumadin      WARNING:  Warfarin can cause very serious (possibly fatal) bleeding. This is more likely to occur when you first start taking this medication or if you take too much  "warfarin. To decrease your risk for bleeding, your doctor or other health care provider will monitor you closely and check your lab results (INR test) to make sure you are not taking too much warfarin. Keep all medical and laboratory appointments. Tell your doctor right away if you notice any signs of serious bleeding. See also Side Effects section.      USES:  This medication is used to treat blood clots (such as in deep vein thrombosis-DVT or pulmonary embolus-PE) and/or to prevent new clots from forming in your body. Preventing harmful blood clots helps to reduce the risk of a stroke or heart attack. Conditions that increase your risk of developing blood clots include a certain type of irregular heart rhythm (atrial fibrillation), heart valve replacement, recent heart attack, and certain surgeries (such as hip/knee replacement). Warfarin is commonly called a \"blood thinner,\" but the more correct term is \"anticoagulant.\" It helps to keep blood flowing smoothly in your body by decreasing the amount of certain substances (clotting proteins) in your blood.      HOW TO USE:  Read the Medication Guide provided by your pharmacist before you start taking warfarin and each time you get a refill. If you have any questions, ask your doctor or pharmacist. Take this medication by mouth with or without food as directed by your doctor or other health care professional, usually once a day. It is very important to take it exactly as directed. Do not increase the dose, take it more frequently, or stop using it unless directed by your doctor. Dosage is based on your medical condition, laboratory tests (such as INR), and response to treatment. Your doctor or other health care provider will monitor you closely while you are taking this medication to determine the right dose for you. Use this medication regularly to get the most benefit from it. To help you remember, take it at the same time each day. It is important to eat a " balanced, consistent diet while taking warfarin. Some foods can affect how warfarin works in your body and may affect your treatment and dose. Avoid sudden large increases or decreases in your intake of foods high in vitamin K (such as broccoli, cauliflower, cabbage, brussels sprouts, kale, spinach, and other green leafy vegetables, liver, green tea, certain vitamin supplements). If you are trying to lose weight, check with your doctor before you try to go on a diet. Cranberry products may also affect how your warfarin works. Limit the amount of cranberry juice (16 ounces/480 milliliters a day) or other cranberry products you may drink or eat.      SIDE EFFECTS:  Nausea, loss of appetite, or stomach/abdominal pain may occur. If any of these effects persist or worsen, tell your doctor or pharmacist promptly. Remember that your doctor has prescribed this medication because he or she has judged that the benefit to you is greater than the risk of side effects. Many people using this medication do not have serious side effects. This medication can cause serious bleeding if it affects your blood clotting proteins too much (shown by unusually high INR lab results). Even if your doctor stops your medication, this risk of bleeding can continue for up to a week. Tell your doctor right away if you have any signs of serious bleeding, including: unusual pain/swelling/discomfort, unusual/easy bruising, prolonged bleeding from cuts or gums, persistent/frequent nosebleeds, unusually heavy/prolonged menstrual flow, pink/dark urine, coughing up blood, vomit that is bloody or looks like coffee grounds, severe headache, dizziness/fainting, unusual or persistent tiredness/weakness, bloody/black/tarry stools, chest pain, shortness of breath, difficulty swallowing. Tell your doctor right away if any of these unlikely but serious side effects occur: persistent nausea/vomiting, severe stomach/abdominal pain, yellowing eyes/skin. This drug  rarely has caused very serious (possibly fatal) problems if its effects lead to small blood clots (usually at the beginning of treatment). This can lead to severe skin/tissue damage that may require surgery or amputation if left untreated. Patients with certain blood conditions (protein C or S deficiency) may be at greater risk. Get medical help right away if any of these rare but serious side effects occur: painful/red/purplish patches on the skin (such as on the toe, breast, abdomen), change in the amount of urine, vision changes, confusion, slurred speech, weakness on one side of the body. A very serious allergic reaction to this drug is rare. However, get medical help right away if you notice any symptoms of a serious allergic reaction, including: rash, itching/swelling (especially of the face/tongue/throat), severe dizziness, trouble breathing. This is not a complete list of possible side effects. If you notice other effects not listed above, contact your doctor or pharmacist. In the [place] - Call your doctor for medical advice about side effects. You may report side effects to FDA at 4-571-BHB-5802. In [place] - Call your doctor for medical advice about side effects. You may report side effects to [facility] [place] at 1-447.927.8886.      PRECAUTIONS:  Before taking warfarin, tell your doctor or pharmacist if you are allergic to it; or if you have any other allergies. This product may contain inactive ingredients, which can cause allergic reactions or other problems. Talk to your pharmacist for more details. Before using this medication, tell your doctor or pharmacist your medical history, especially of: blood disorders (such as anemia, hemophilia), bleeding problems (such as bleeding of the stomach/intestines, bleeding in the brain), blood vessel disorders (such as aneurysms), recent major injury/surgery, liver disease, alcohol use, mental/mood disorders (including memory problems), frequent falls/injuries. It is  important that all your doctors and dentists know that you take warfarin. Before having surgery or any medical/dental procedures, tell your doctor or dentist that you are taking this medication and about all the products you use (including prescription drugs, nonprescription drugs, and herbal products). Avoid getting injections into the muscles. If you must have an injection into a muscle (for example, a flu shot), it should be given in the arm. This way, it will be easier to check for bleeding and/or apply pressure bandages. This medication may cause stomach bleeding. Daily use of alcohol while using this medicine will increase your risk for stomach bleeding and may also affect how this medication works. Limit or avoid alcoholic beverages. If you have not been eating well, if you have an illness or infection that causes fever, vomiting, or diarrhea for more than 2 days, or if you start using any antibiotic medications, contact your doctor or pharmacist immediately because these conditions can affect how warfarin works. This medication can cause heavy bleeding. To lower the chance of getting cut, bruised, or injured, use great caution with sharp objects like safety razors and nail cutters. Use an electric razor when shaving and a soft toothbrush when brushing your teeth. Avoid activities such as contact sports. If you fall or injure yourself, especially if you hit your head, call your doctor immediately. Your doctor may need to check you. The Food & Drug Administration has stated that generic warfarin products are interchangeable. However, consult your doctor or pharmacist before switching warfarin products. Be careful not to take more than one medication that contains warfarin unless specifically directed by the doctor or health care provider who is monitoring your warfarin treatment. Older adults may be at greater risk for bleeding while using this drug. This medication is not recommended for use during pregnancy  "because of serious (possibly fatal) harm to an unborn baby. Discuss the use of reliable forms of birth control with your doctor. If you become pregnant or think you may be pregnant, tell your doctor immediately. If you are planning pregnancy, discuss a plan for managing your condition with your doctor before you become pregnant. Your doctor may switch the type of medication you use during pregnancy. Very small amounts of this medication may pass into breast milk but is unlikely to harm a nursing infant. Consult your doctor before breast-feeding.      DRUG INTERACTIONS:  Drug interactions may change how your medications work or increase your risk for serious side effects. This document does not contain all possible drug interactions. Keep a list of all the products you use (including prescription/nonprescription drugs and herbal products) and share it with your doctor and pharmacist. Do not start, stop, or change the dosage of any medicines without your doctor's approval. Warfarin interacts with many prescription, nonprescription, vitamin, and herbal products. This includes medications that are applied to the skin or inside the vagina or rectum. The interactions with warfarin usually result in an increase or decrease in the \"blood-thinning\" (anticoagulant) effect. Your doctor or other health care professional should closely monitor you to prevent serious bleeding or clotting problems. While taking warfarin, it is very important to tell your doctor or pharmacist of any changes in medications, vitamins, or herbal products that you are taking. Some products that may interact with this drug include: capecitabine, imatinib, mifepristone. Aspirin, aspirin-like drugs (salicylates), and nonsteroidal anti-inflammatory drugs (NSAIDs such as ibuprofen, naproxen, celecoxib) may have effects similar to warfarin. These drugs may increase the risk of bleeding problems if taken during treatment with warfarin. Carefully check all " prescription/nonprescription product labels (including drugs applied to the skin such as pain-relieving creams) since the products may contain NSAIDs or salicylates. Talk to your doctor about using a different medication (such as acetaminophen) to treat pain/fever. Low-dose aspirin and related drugs (such as clopidogrel, ticlopidine) should be continued if prescribed by your doctor for specific medical reasons such as heart attack or stroke prevention. Consult your doctor or pharmacist for more details. Many herbal products interact with warfarin. Tell your doctor before taking any herbal products, especially bromelains, coenzyme Q10, cranberry, danshen, dong quai, fenugreek, garlic, ginkgo biloba, ginseng, and [place]'s wort, among others. This medication may interfere with a certain laboratory test to measure theophylline levels, possibly causing false test results. Make sure laboratory personnel and all your doctors know you use this drug.      OVERDOSE:  If overdose is suspected, contact a poison control center or emergency room immediately. [place] residents can call the [place] National Poison Hotline at 1-441.343.4958. [place] residents can call a provincial poison control center. Symptoms of overdose may include: bloody/black/tarry stools, pink/dark urine, unusual/prolonged bleeding.      NOTES:  Do not share this medication with others. Laboratory and/or medical tests (such as INR, complete blood count) must be performed periodically to monitor your progress or check for side effects. Consult your doctor for more details.      MISSED DOSE:  For the best possible benefit, do not miss any doses. If you do miss a dose and remember on the same day, take it as soon as you remember. If you remember on the next day, skip the missed dose and resume your usual dosing schedule. Do not double the dose to catch up because this could increase your risk for bleeding. Keep a record of missed doses to give to your doctor or  pharmacist. Contact your doctor or pharmacist if you miss 2 or more doses in a row.      STORAGE:  Store at room temperature away from light and moisture. Do not store in the bathroom. Keep all medications away from children and pets. Do not flush medications down the toilet or pour them into a drain unless instructed to do so. Properly discard this product when it is  or no longer needed. Consult your pharmacist or local waste disposal company for more details about how to safely discard your product.      MEDICAL ALERT:  Your condition and medication can cause complications in a medical emergency. For information about enrolling in MedicAlert, call 1-152.457.6756 ([place]) or 1-569.391.1411 ([place]).      Information last revised 2010 Copyright(c) 2010 [employer]        Community-Acquired Pneumonia, Adult  Pneumonia is an infection of the lungs. There are different types of pneumonia. One type can develop while a person is in a hospital. A different type, called community-acquired pneumonia, develops in people who are not, or have not recently been, in the hospital or other health care facility.  What are the causes?  Pneumonia may be caused by bacteria, viruses, or funguses. Community-acquired pneumonia is often caused by Streptococcus pneumonia bacteria. These bacteria are often passed from one person to another by breathing in droplets from the cough or sneeze of an infected person.  What increases the risk?  The condition is more likely to develop in:  · People who have chronic diseases, such as chronic obstructive pulmonary disease (COPD), asthma, congestive heart failure, cystic fibrosis, diabetes, or kidney disease.  · People who have early-stage or late-stage HIV.  · People who have sickle cell disease.  · People who have had their spleen removed (splenectomy).  · People who have poor dental hygiene.  · People who have medical conditions that increase the risk of breathing in  (aspirating) secretions their own mouth and nose.  · People who have a weakened immune system (immunocompromised).  · People who smoke.  · People who travel to areas where pneumonia-causing germs commonly exist.  · People who are around animal habitats or animals that have pneumonia-causing germs, including birds, bats, rabbits, cats, and farm animals.  What are the signs or symptoms?  Symptoms of this condition include:  · A dry cough.  · A wet (productive) cough.  · Fever.  · Sweating.  · Chest pain, especially when breathing deeply or coughing.  · Rapid breathing or difficulty breathing.  · Shortness of breath.  · Shaking chills.  · Fatigue.  · Muscle aches.  How is this diagnosed?  Your health care provider will take a medical history and perform a physical exam. You may also have other tests, including:  · Imaging studies of your chest, including X-rays.  · Tests to check your blood oxygen level and other blood gases.  · Other tests on blood, mucus (sputum), fluid around your lungs (pleural fluid), and urine.  If your pneumonia is severe, other tests may be done to identify the specific cause of your illness.  How is this treated?  The type of treatment that you receive depends on many factors, such as the cause of your pneumonia, the medicines you take, and other medical conditions that you have. For most adults, treatment and recovery from pneumonia may occur at home. In some cases, treatment must happen in a hospital. Treatment may include:  · Antibiotic medicines, if the pneumonia was caused by bacteria.  · Antiviral medicines, if the pneumonia was caused by a virus.  · Medicines that are given by mouth or through an IV tube.  · Oxygen.  · Respiratory therapy.  Although rare, treating severe pneumonia may include:  · Mechanical ventilation. This is done if you are not breathing well on your own and you cannot maintain a safe blood oxygen level.  · Thoracentesis. This procedure removes fluid around one lung  or both lungs to help you breathe better.  Follow these instructions at home:  · Take over-the-counter and prescription medicines only as told by your health care provider.  ¨ Only take cough medicine if you are losing sleep. Understand that cough medicine can prevent your body’s natural ability to remove mucus from your lungs.  ¨ If you were prescribed an antibiotic medicine, take it as told by your health care provider. Do not stop taking the antibiotic even if you start to feel better.  · Sleep in a semi-upright position at night. Try sleeping in a reclining chair, or place a few pillows under your head.  · Do not use tobacco products, including cigarettes, chewing tobacco, and e-cigarettes. If you need help quitting, ask your health care provider.  · Drink enough water to keep your urine clear or pale yellow. This will help to thin out mucus secretions in your lungs.  How is this prevented?  There are ways that you can decrease your risk of developing community-acquired pneumonia. Consider getting a pneumococcal vaccine if:  · You are older than 65 years of age.  · You are older than 19 years of age and are undergoing cancer treatment, have chronic lung disease, or have other medical conditions that affect your immune system. Ask your health care provider if this applies to you.  There are different types and schedules of pneumococcal vaccines. Ask your health care provider which vaccination option is best for you.  You may also prevent community-acquired pneumonia if you take these actions:  · Get an influenza vaccine every year. Ask your health care provider which type of influenza vaccine is best for you.  · Go to the dentist on a regular basis.  · Wash your hands often. Use hand  if soap and water are not available.  Contact a health care provider if:  · You have a fever.  · You are losing sleep because you cannot control your cough with cough medicine.  Get help right away if:  · You have worsening  shortness of breath.  · You have increased chest pain.  · Your sickness becomes worse, especially if you are an older adult or have a weakened immune system.  · You cough up blood.  This information is not intended to replace advice given to you by your health care provider. Make sure you discuss any questions you have with your health care provider.  Document Released: 12/18/2006 Document Revised: 04/27/2017 Document Reviewed: 04/13/2016  [employer] Patient Education © 2017 [employer]        Depression / Suicide Risk    As you are discharged from this [facility] facility, it is important to learn how to keep safe from harming yourself.    Recognize the warning signs:  · Abrupt changes in personality, positive or negative- including increase in energy   · Giving away possessions  · Change in eating patterns- significant weight changes-  positive or negative  · Change in sleeping patterns- unable to sleep or sleeping all the time   · Unwillingness or inability to communicate  · Depression  · Unusual sadness, discouragement and loneliness  · Talk of wanting to die  · Neglect of personal appearance   · Rebelliousness- reckless behavior  · Withdrawal from people/activities they love  · Confusion- inability to concentrate     If you or a loved one observes any of these behaviors or has concerns about self-harm, here's what you can do:  · Talk about it- your feelings and reasons for harming yourself  · Remove any means that you might use to hurt yourself (examples: pills, rope, extension cords, firearm)  · Get professional help from the community (Mental Health, Substance Abuse, psychological counseling)  · Do not be alone:Call your Safe Contact- someone whom you trust who will be there for you.  · Call your local CRISIS HOTLINE 294-0477 or 152-017-8534  · Call your local Children's Mobile Crisis Response Team [place] (874) 559-2438 or www.SureDone  · Call the toll free National Suicide  Prevention Hotlines    · National Suicide Prevention Lifeline 894-672-HBML (1870)  · National Hope Line Network 800-SUICIDE (324-3658)

## 2018-04-11 NOTE — PROGRESS NOTES
Received report from day shift RN. Twelve hour chart check completed. Patient assessed. Patient A and 0 X 4.  Bed alarm is on.   Patient states that pain is a 8 out of 10.  Patient educated on plan of care for the evening including medications per MAR, monitoring vital signs, pain control, and rest. Patient educated to call for assistance. Patient verbalizes understanding.

## 2018-04-11 NOTE — DISCHARGE SUMMARY
CHIEF COMPLAINT ON ADMISSION  Chief Complaint   Patient presents with   • Dizziness   • Shortness of Breath       CODE STATUS  Full Code    HPI & HOSPITAL COURSE  This is a 67 y.o. female here with SOB and dizziness. She has known history of COPD on home O2 during the day and CPAP at night for YESSICA/obesity hypoventilation syndrome. Her associated symptoms included generalized weakness, productive cough, SOB and fever/chills. She was admitted to telemetry for community acquired pneumonia. Her symptoms improved and she completed a 5 day course of antibiotics.     During her hospitalization, it was noted that her INR was subtherapeutic. She had h/o DVT and noted that she had ran out of coumadin ~10 days prior to admission. She was started on lovenox, as she was comfortable giving herself the injections during her last admission for DVT. INR was up to 1.68 by the time of discharge. She was supplied with a prescription for Lovenox (no copay) and referred to the anticoagulation clinic. Coumadin prescription was refilled for an additional month.    The patient met 2-midnight criteria for an inpatient stay at the time of discharge.    Therefore, she is discharged in good and stable condition with close outpatient follow-up.    SPECIFIC OUTPATIENT FOLLOW-UP  Follow up as noted below.  Anticoagulation clinic.    DISCHARGE PROBLEM LIST  Principal Problem:    CAP (community acquired pneumonia) POA: Yes  Active Problems:    Deep vein thrombosis (DVT) (CMS-HCC) POA: Yes      Overview: Robert update 10/1/2016    YESSICA (obstructive sleep apnea) POA: Yes      Overview: CPAP at night at home    HTN (hypertension) POA: Yes    Macrocytic anemia POA: Yes      Overview: Follow.          COPD (chronic obstructive pulmonary disease) (CMS-HCC) POA: Yes      Overview: On home O2      Current smoker.      Significant comorbidity    Morbid obesity (CMS-HCC) POA: Yes      Overview: BMI 51.8      Significant comorbidity    Depression POA: Yes     Tobacco abuse POA: Yes    Candidiasis POA: Yes    Prediabetes POA: Yes    Hypothyroidism, acquired, autoimmune POA: Yes    A-fib (CMS-HCC) POA: Yes    Hypophosphatemia POA: Yes      FOLLOW UP  Future Appointments  Date Time Provider Department Center   4/13/2018 2:20 PM V EXAM 4 [facility] None   4/18/2018 3:50 PM Carmelo Pat M.D. [facility]   4/26/2018 2:40 PM ALCIRA Chavez PULM None     Carmelo Pat M.D.  [address]  [address]  [facility] [address]  [phone]            MEDICATIONS ON DISCHARGE   Atiya Henley   Home Medication Instructions [id number]    Printed on:04/11/18 1372   Medication Information                      albuterol (PROVENTIL HFA) 108 (90 BASE) MCG/ACT Aero Soln inhalation aerosol  Inhale 2 Puffs by mouth every four hours as needed. For Shortness of breath             amitriptyline (ELAVIL) 25 MG Tab  Take 1 Tab by mouth every bedtime.             budesonide-formoterol (SYMBICORT) 160-4.5 MCG/ACT Aerosol  Inhale 2 Puffs by mouth 2 Times a Day. With spacer, rinse mouth after use             carbamazepine (TEGRETOL) 200 MG Tab  Take 600 mg by mouth every bedtime.             enoxaparin (LOVENOX) 120 MG/0.8ML Solution inj  Inject 120 mg as instructed every 12 hours.             fluoxetine (PROZAC) 20 MG Cap  Take 20 mg by mouth every day.             flurazepam (DALMANE) 30 MG capsule  Take 30 mg by mouth every bedtime.             gabapentin (NEURONTIN) 300 MG Cap  TAKE THREE (3) CAPSULES BY MOUTH THREE (3) TIMES DAILY              levothyroxine (SYNTHROID) 200 MCG Tab  Take 225 mcg by mouth Every morning on an empty stomach.             raNITidine (ZANTAC) 150 MG Tab  Take 150 mg by mouth 2 times a day.             Tiotropium Bromide Monohydrate (SPIRIVA RESPIMAT) 2.5 MCG/ACT Aero Soln  Inhale 2 Inhalation by mouth every day. Assemble and prime.             vitamin D, Ergocalciferol, (DRISDOL) 06348 units Cap capsule  TAKE 1 CAPSULE BY MOUTH EVERY 28 DAYS.              warfarin (COUMADIN) 5 MG Tab  Take 5-10 mg by mouth every evening. 5 mg on Wednesdays and 10 mg all other days                 DIET  Orders Placed This Encounter   Procedures   • Diet Order     Standing Status:   Standing     Number of Occurrences:   1     Order Specific Question:   Diet:     Answer:   Cardiac [6]       ACTIVITY  As tolerated.  Weight bearing as tolerated      CONSULTATIONS  None    PROCEDURES  CXR-  1.  Hazy pulmonary opacities suggests edema or infiltrate.  2.  Atherosclerosis    Echo-  Technically difficult due to body habitus, positioning and pt unable to   assume left lateral decubitus but adequate study for interpretation.   Normal left ventricular wall thickness.  Left ventricular ejection fraction is visually estimated to be 60%.  Mild tricuspid regurgitation.  Right ventricular systolic pressure is estimated to be 32 mmHg with an   estimated RAP of 3 mmHg.    LABORATORY  Lab Results   Component Value Date/Time    SODIUM 141 04/11/2018 01:57 AM    POTASSIUM 3.6 04/11/2018 01:57 AM    CHLORIDE 102 04/11/2018 01:57 AM    CO2 33 04/11/2018 01:57 AM    GLUCOSE 121 (H) 04/11/2018 01:57 AM    BUN 12 04/11/2018 01:57 AM    CREATININE 0.63 04/11/2018 01:57 AM    CREATININE 0.8 04/27/2009 08:33 AM        Lab Results   Component Value Date/Time    WBC 6.0 04/11/2018 01:57 AM    HEMOGLOBIN 11.8 (L) 04/11/2018 01:57 AM    HEMATOCRIT 36.2 (L) 04/11/2018 01:57 AM    PLATELETCT 233 04/11/2018 01:57 AM        Total time of the discharge process exceeds 40 minutes

## 2018-04-12 ENCOUNTER — ANTICOAGULATION VISIT (OUTPATIENT)
Dept: VASCULAR LAB | Facility: MEDICAL CENTER | Age: 68
End: 2018-04-12
Attending: INTERNAL MEDICINE
Payer: MEDICARE

## 2018-04-12 ENCOUNTER — PATIENT OUTREACH (OUTPATIENT)
Dept: HEALTH INFORMATION MANAGEMENT | Facility: OTHER | Age: 68
End: 2018-04-12

## 2018-04-12 DIAGNOSIS — I82.401 ACUTE DEEP VEIN THROMBOSIS (DVT) OF RIGHT LOWER EXTREMITY, UNSPECIFIED VEIN (HCC): ICD-10-CM

## 2018-04-12 DIAGNOSIS — Z79.01 CHRONIC ANTICOAGULATION: ICD-10-CM

## 2018-04-12 DIAGNOSIS — Z95.828 S/P IVC FILTER: ICD-10-CM

## 2018-04-12 LAB — INR PPP: 1.5 (ref 2–3.5)

## 2018-04-12 PROCEDURE — 85610 PROTHROMBIN TIME: CPT

## 2018-04-12 PROCEDURE — 99212 OFFICE O/P EST SF 10 MIN: CPT | Performed by: NURSE PRACTITIONER

## 2018-04-12 NOTE — PROGRESS NOTES
Anticoagulation Summary  As of 4/12/2018    INR goal:   2.0-3.0   TTR:   41.4 % (2.6 y)   Today's INR:   1.5!   Maintenance plan:   5 mg (10 mg x 0.5) on Wed; 10 mg (10 mg x 1) all other days   Weekly total:   65 mg   Plan last modified:   Bon Ash, PharmD (9/21/2017)   Next INR check:   4/17/2018   Priority:   Routine   Target end date:   Indefinite    Indications    DVT (deep venous thrombosis) (CMS-[facility]) [I82.409]  Chronic anticoagulation [Z79.01]  S/P IVC filter [Z95.828]  Deep vein thrombosis (DVT) (CMS-[facility]) [I82.409]             Anticoagulation Episode Summary     [facility]   [facility]    Preferred lab:   [facility]    Send INR reminders to:       Comments:         Anticoagulation Care Providers     Provider Role Specialty Phone number    Carmelo Pat M.D. Referring Family Medicine [phone]    [facility] Anticoagulation Services Responsible  [phone]        Anticoagulation Patient Findings      HPI:  Atiya Henley seen in clinic today for follow up on anticoagulation therapy in the presence of DVT hx. DC'd from hospital yesterday. Completed 5 day course of abx for CAP. Also noted to have subtherapeutic INR because she ran out of meds. No recent VTEs. Started on Lovenox but hasn't picked it up from pharm yet. Denies any medication or diet changes. No current symptoms of bleeding or thrombosis reported.    A/P:   INR subtherapeutic. Restart Lovenox 120 mg injections twice daily and take warfarin.     Follow up appointment in 5 days.    Next Appointment: Tuesday, April 17, 2018 at 4:00 pm.     Bri APODACA

## 2018-04-12 NOTE — PROGRESS NOTES
Placed discharge outreach phone call to patient s/p hospital discharge 4/11/18.  Left voicemail providing my contact information and instructions to call with any questions or concerns.

## 2018-04-12 NOTE — PROGRESS NOTES
Discharge instructions provided to pt. Discussed diet, activity, follow up, prescriptions and symptom management. Pt states understanding. Pt states all questions have been answered. Copy of discharge provided to pt. Signed copy in chart. Pt states that all personal belongings are in possession. Pt wheeled to car in [facility] with all belongings.

## 2018-04-13 LAB — INR BLD: 1.5 (ref 0.9–1.2)

## 2018-04-16 ENCOUNTER — PATIENT MESSAGE (OUTPATIENT)
Dept: HEALTH INFORMATION MANAGEMENT | Facility: OTHER | Age: 68
End: 2018-04-16

## 2018-04-17 ENCOUNTER — ANTICOAGULATION VISIT (OUTPATIENT)
Dept: VASCULAR LAB | Facility: MEDICAL CENTER | Age: 68
End: 2018-04-17
Attending: INTERNAL MEDICINE
Payer: MEDICARE

## 2018-04-17 DIAGNOSIS — Z79.01 CHRONIC ANTICOAGULATION: ICD-10-CM

## 2018-04-17 DIAGNOSIS — Z95.828 S/P IVC FILTER: ICD-10-CM

## 2018-04-17 LAB
INR PPP: 1 (ref 2–3.5)
INR PPP: 1 (ref 2–3.5)

## 2018-04-17 PROCEDURE — 85610 PROTHROMBIN TIME: CPT

## 2018-04-17 PROCEDURE — 99212 OFFICE O/P EST SF 10 MIN: CPT

## 2018-04-17 NOTE — PROGRESS NOTES
Anticoagulation Summary  As of 4/17/2018    INR goal:   2.0-3.0   TTR:   41.2 % (2.6 y)   Today's INR:   1!   Maintenance plan:   5 mg (10 mg x 0.5) on Wed; 10 mg (10 mg x 1) all other days   Weekly total:   65 mg   Plan last modified:   ALCIRA Alcaraz (4/12/2018)   Next INR check:   4/20/2018   Priority:   Routine   Target end date:   Indefinite    Indications    DVT (deep venous thrombosis) (CMS-[facility]) [I82.409]  Chronic anticoagulation [Z79.01]  S/P IVC filter [Z95.828]  Deep vein thrombosis (DVT) (CMS-[facility]) [I82.409]             Anticoagulation Episode Summary     INR check location[facility]   [facility]    Preferred lab:   [facility]    Send INR reminders to:       Comments:         Anticoagulation Care Providers     Provider Role Specialty Phone number    Carmelo Pat M.D. Referring Family Medicine [phone]    [facility] Anticoagulation Services Responsible  [phone]        Anticoagulation Patient Findings      HPI:  Atiya Henley seen in clinic today, on anticoagulation therapy with warfarin for DVT.   Changes to current medical/health status since last appt: Pt is bridging with enoxaparin.    Denies signs/symptoms of bleeding and/or thrombosis since the last appt.    Denies any interval changes to diet  Denies any interval changes to medications since last appt.   Denies any complications or cost restrictions with current therapy.   Refused BP.  Unknown reason why INR is low.     A/P   INR  SUB-therapeutic.   Continue enoxaparin.  Bolus warfairn 20 mg daily until INR follow up in 3 days.    Follow up appointment in 3 days.    Bon Ash, PharmD

## 2018-04-18 ENCOUNTER — OFFICE VISIT (OUTPATIENT)
Dept: MEDICAL GROUP | Facility: MEDICAL CENTER | Age: 68
End: 2018-04-18
Attending: FAMILY MEDICINE
Payer: MEDICARE

## 2018-04-18 VITALS
WEIGHT: 283 LBS | DIASTOLIC BLOOD PRESSURE: 60 MMHG | HEART RATE: 72 BPM | RESPIRATION RATE: 14 BRPM | BODY MASS INDEX: 48.32 KG/M2 | TEMPERATURE: 96.6 F | SYSTOLIC BLOOD PRESSURE: 125 MMHG | HEIGHT: 64 IN | OXYGEN SATURATION: 92 %

## 2018-04-18 DIAGNOSIS — J18.9 COMMUNITY ACQUIRED PNEUMONIA, UNSPECIFIED LATERALITY: ICD-10-CM

## 2018-04-18 DIAGNOSIS — M25.562 LEFT KNEE PAIN, UNSPECIFIED CHRONICITY: ICD-10-CM

## 2018-04-18 DIAGNOSIS — G47.33 OSA AND COPD OVERLAP SYNDROME (HCC): ICD-10-CM

## 2018-04-18 DIAGNOSIS — J44.9 OSA AND COPD OVERLAP SYNDROME (HCC): ICD-10-CM

## 2018-04-18 DIAGNOSIS — E03.9 HYPOTHYROIDISM, UNSPECIFIED TYPE: ICD-10-CM

## 2018-04-18 DIAGNOSIS — Z98.890 HISTORY OF LEFT KNEE SURGERY: ICD-10-CM

## 2018-04-18 LAB — INR BLD: 1 (ref 0.9–1.2)

## 2018-04-18 PROCEDURE — 99214 OFFICE O/P EST MOD 30 MIN: CPT | Performed by: FAMILY MEDICINE

## 2018-04-18 PROCEDURE — 99213 OFFICE O/P EST LOW 20 MIN: CPT | Performed by: FAMILY MEDICINE

## 2018-04-18 RX ORDER — LEVOTHYROXINE SODIUM 0.2 MG/1
225 TABLET ORAL
Qty: 30 TAB | Refills: 3 | Status: SHIPPED | OUTPATIENT
Start: 2018-04-18 | End: 2018-05-04

## 2018-04-18 ASSESSMENT — ENCOUNTER SYMPTOMS
NAUSEA: 0
CHILLS: 0
SPUTUM PRODUCTION: 0
HEADACHES: 0
COUGH: 0
FEVER: 0
SHORTNESS OF BREATH: 0
TREMORS: 0
VOMITING: 0
TINGLING: 1
SENSORY CHANGE: 0
ABDOMINAL PAIN: 0
PALPITATIONS: 0
SPEECH CHANGE: 0
BACK PAIN: 1
FOCAL WEAKNESS: 1

## 2018-04-18 NOTE — PROGRESS NOTES
Subjective:      Atiya Henley is a 67 y.o. female who presents with Follow-Up (pneumonia)            Patient here for a recent hospitalization secondary to a community-acquired pneumonia. She had been admitted for several days of receiving antibiotic treatments. Her INR level was subtherapeutic so she had also been started on Lovenox as well as her Coumadin. She'll be discharged with Lovenox and Coumadin and is currently following up with the anticoagulation clinic for continued management. She's been feeling much better since being discharged from the hospital. She is currently using her walker rather than her power wheelchair for mobility. She states that since she has been given a car by her sister she has been able to be more independent in getting around to her appointments as well as doing her daily chores. The result of her x-ray prior to being admitted to the hospital is as follows:    1.  Hazy pulmonary opacities suggests edema or infiltrate.  2.  Atherosclerosis    Since she is feeling much better since her discharge, will have her continue using her medications as previously directed. If she has any worsening or persistent symptoms we'll reorder a chest x-ray but otherwise will just continue to follow.    She states that she had been referred to an orthopedic physician for her left knee pain by her pain specialist. The initial referral has been made last year so another referral will be made today. She has had surgery to that same knee in the past, so we'll have her continue to use her medications as directed by her pain specialist will continue to follow.     Current medications, allergies, and problem list reviewed with patient and updated in EPIC.          Review of Systems   Constitutional: Negative for chills and fever.   HENT: Negative for hearing loss and tinnitus.    Respiratory: Negative for cough, sputum production and shortness of breath.    Cardiovascular: Negative for chest pain and  "palpitations.   Gastrointestinal: Negative for abdominal pain, nausea and vomiting.   Musculoskeletal: Positive for back pain and joint pain.   Neurological: Positive for tingling and focal weakness. Negative for tremors, sensory change, speech change and headaches.          Objective:     LMP 07/19/2011    Vitals:    04/18/18 1646   BP: 125/60   Pulse: 72   Resp: 14   Temp: 35.9 °C (96.6 °F)   SpO2: 92%   Weight: (!) 128.4 kg (283 lb)   Height: 1.626 m (5' 4\")         Physical Exam   Constitutional: She is oriented to person, place, and time.   HENT:   Head: Normocephalic and atraumatic.   Cardiovascular: Normal rate and regular rhythm.  Exam reveals no friction rub.    No murmur heard.  Heart sounds distant   Pulmonary/Chest: Effort normal and breath sounds normal. No respiratory distress. She has no wheezes. She has no rales.   Abdominal: Soft. Bowel sounds are normal. She exhibits no distension. There is no tenderness.   Colostomy bag intact   Musculoskeletal:   Using walker to ambulate   Neurological: She is alert and oriented to person, place, and time.   Psychiatric: She has a normal mood and affect. Her behavior is normal.   Nursing note and vitals reviewed.              Assessment/Plan:     1. Left knee pain, unspecified chronicity  Will have her referred to orthopedics for further assessment and possible assistance in management of her left knee pain. She will also continue to follow-up with her pain specialist for continued management of her other chronic pain issues. We'll continue to follow.    - REFERRAL TO ORTHOPEDICS    2. History of left knee surgery  See above plan.  - levothyroxine (SYNTHROID) 200 MCG Tab; Take 1 Tab by mouth Every morning on an empty stomach.  Dispense: 30 Tab; Refill: 3  - REFERRAL TO ORTHOPEDICS    3. Community acquired pneumonia, unspecified laterality  Will have her continue to use her medication as previously directed. She is doing much better since being discharged from the " hospital. We'll continue to follow.    4. YESSICA and COPD overlap syndrome (CMS-HCC)  See above plan.    5. Hypothyroidism, unspecified type  Will have her continue to use medication as directed. Will recheck her TSH and T4 levels. We'll continue to follow.  - levothyroxine (SYNTHROID) 200 MCG Tab; Take 1 Tab by mouth Every morning on an empty stomach.  Dispense: 30 Tab; Refill: 3

## 2018-04-20 ENCOUNTER — ANTICOAGULATION VISIT (OUTPATIENT)
Dept: VASCULAR LAB | Facility: MEDICAL CENTER | Age: 68
End: 2018-04-20
Attending: INTERNAL MEDICINE
Payer: MEDICARE

## 2018-04-20 DIAGNOSIS — Z79.01 CHRONIC ANTICOAGULATION: ICD-10-CM

## 2018-04-20 DIAGNOSIS — Z95.828 S/P IVC FILTER: ICD-10-CM

## 2018-04-20 LAB
INR BLD: 1.5 (ref 0.9–1.2)
INR PPP: 1.5 (ref 2–3.5)

## 2018-04-20 PROCEDURE — 99212 OFFICE O/P EST SF 10 MIN: CPT

## 2018-04-20 PROCEDURE — 85610 PROTHROMBIN TIME: CPT

## 2018-04-20 NOTE — PROGRESS NOTES
Anticoagulation Summary  As of 4/20/2018    INR goal:   2.0-3.0   TTR:   41.1 % (2.6 y)   Today's INR:   1.5!   Maintenance plan:   5 mg (10 mg x 0.5) on Wed; 10 mg (10 mg x 1) all other days   Weekly total:   65 mg   Plan last modified:   ALCIRA Alcaraz (4/12/2018)   Next INR check:   4/23/2018   Priority:   Routine   Target end date:   Indefinite    Indications    DVT (deep venous thrombosis) (CMS-[facility]) [I82.409]  Chronic anticoagulation [Z79.01]  S/P IVC filter [Z95.828]  Deep vein thrombosis (DVT) (CMS-[facility]) [I82.409]             Anticoagulation Episode Summary     INR check location:   [facility]    Preferred lab:   [facility]    Send INR reminders to:       Comments:         Anticoagulation Care Providers     Provider Role Specialty Phone number    Carmelo Pat M.D. Referring Family Medicine [phone]    [facility] Anticoagulation Services Responsible  [phone]        Anticoagulation Patient Findings  Patient Findings     Negatives:   Signs/symptoms of thrombosis, Signs/symptoms of bleeding, Laboratory test error suspected, Change in health, Change in alcohol use, Change in activity, Upcoming invasive procedure, Emergency department visit, Upcoming dental procedure, Missed doses, Extra doses, Change in medications, Change in diet/appetite, Hospital admission, Bruising, Other complaints        History of Present Illness: follow up appointment for chronic anticoagulation with the high risk medication, warfarin for dvt.    Last INR was out of range, dosage adjusted: yes    INR subtherapeutic  Will bolus through the weekend  Lovenox to continue, prescription sent  Follow up INR Monday  No complaints from patient  She is eating THC butter since coming out of the hospital.     Hunter Fernandes, PharmD

## 2018-04-23 ENCOUNTER — ANTICOAGULATION VISIT (OUTPATIENT)
Dept: VASCULAR LAB | Facility: MEDICAL CENTER | Age: 68
End: 2018-04-23
Attending: INTERNAL MEDICINE
Payer: MEDICARE

## 2018-04-23 DIAGNOSIS — Z95.828 S/P IVC FILTER: ICD-10-CM

## 2018-04-23 DIAGNOSIS — Z79.01 CHRONIC ANTICOAGULATION: ICD-10-CM

## 2018-04-23 LAB — INR PPP: 1.1 (ref 2–3.5)

## 2018-04-23 PROCEDURE — 99212 OFFICE O/P EST SF 10 MIN: CPT

## 2018-04-23 PROCEDURE — 85610 PROTHROMBIN TIME: CPT

## 2018-04-23 NOTE — PROGRESS NOTES
OP Anticoagulation Service Note    Date: 4/23/2018  There were no vitals filed for this visit.  pt declined vitals    Anticoagulation Summary  As of 4/23/2018    INR goal:   2.0-3.0   TTR:   40.9 % (2.6 y)   Today's INR:   1.1!   Maintenance plan:   5 mg (10 mg x 0.5) on Wed; 10 mg (10 mg x 1) all other days   Weekly total:   65 mg   Plan last modified:   ALCIRA Alcaraz (4/12/2018)   Next INR check:      Priority:   Routine   Target end date:   Indefinite    Indications    DVT (deep venous thrombosis) (CMS-[facility]) [I82.409]  Chronic anticoagulation [Z79.01]  S/P IVC filter [Z95.828]  Deep vein thrombosis (DVT) (CMS-[facility]) [I82.409]             Anticoagulation Episode Summary     INR check location:   [facility]    Preferred lab:   [facility]    Send INR reminders to:       Comments:         Anticoagulation Care Providers     Provider Role Specialty Phone number    Carmelo Pat M.D. Referring Family Medicine [phone]    [facility] Anticoagulation Services Responsible  [phone]        Anticoagulation Patient Findings      HPI:   Atiya Henley seen in clinic today, on anticoagulation therapy with warfarin (a high risk medication) for DVT hx.    Pt is here today to evaluate anticoagulation therapy    Previous INR was  1.5 on 4/20/18    Pt was instructed to continue lovenox and bolus warfarin through the weekend.    Patient reports missing Saturday's dose. Patient reports not being able to  Lovenox on Friday.    Diet has been consistent with foods rich in vitamin K: No - patient reports eating much less, missing meals  Changes in ETOH:  No  Changes in smoking status: No  Changes in medication: No   Cost restriction: No  S/s of bleeding:  No  Signs/symptoms  thrombosis since the last appt: No    A/P   INR  sub-therapeutic today, will require dose adjust ment today to prevent recurrence of thrombosis and closer follow up.  Will continue to bolus with 20mg warfarin daily.  Patient to   lovenox injections today.  Stressed importance of medication adherence.  Patient unable to be seen within 72 hours due to scheduling conflicts.  Patient to be seen on Friday in AM.     Pt educated to contact our clinic with any changes in medications or s/s of bleeding or thrombosis. Pt is aware to seek immediate medical attention for falls, head injury or deep cuts    Follow up appointment on Friday April 27th to reduce risk of adverse events from warfarin.  Raj Birmingham Pharm.D.  Pharmacy Practice Resident, PGY1

## 2018-04-26 ENCOUNTER — OFFICE VISIT (OUTPATIENT)
Dept: PULMONOLOGY | Facility: HOSPICE | Age: 68
End: 2018-04-26
Payer: MEDICARE

## 2018-04-26 VITALS
BODY MASS INDEX: 47.97 KG/M2 | TEMPERATURE: 98.2 F | WEIGHT: 281 LBS | HEART RATE: 77 BPM | HEIGHT: 64 IN | RESPIRATION RATE: 16 BRPM | OXYGEN SATURATION: 91 % | SYSTOLIC BLOOD PRESSURE: 128 MMHG | DIASTOLIC BLOOD PRESSURE: 80 MMHG

## 2018-04-26 DIAGNOSIS — J30.9 ALLERGIC RHINITIS, UNSPECIFIED CHRONICITY, UNSPECIFIED SEASONALITY, UNSPECIFIED TRIGGER: ICD-10-CM

## 2018-04-26 DIAGNOSIS — J18.9 COMMUNITY ACQUIRED PNEUMONIA, UNSPECIFIED LATERALITY: ICD-10-CM

## 2018-04-26 DIAGNOSIS — R09.02 HYPOXEMIA: ICD-10-CM

## 2018-04-26 DIAGNOSIS — Z72.0 TOBACCO ABUSE: ICD-10-CM

## 2018-04-26 DIAGNOSIS — G47.33 OSA (OBSTRUCTIVE SLEEP APNEA): ICD-10-CM

## 2018-04-26 DIAGNOSIS — J44.89 CHRONIC OBSTRUCTIVE ASTHMA: ICD-10-CM

## 2018-04-26 LAB — INR BLD: 1.1 (ref 0.9–1.2)

## 2018-04-26 PROCEDURE — 99214 OFFICE O/P EST MOD 30 MIN: CPT | Performed by: NURSE PRACTITIONER

## 2018-04-26 RX ORDER — ALBUTEROL SULFATE 90 UG/1
2 AEROSOL, METERED RESPIRATORY (INHALATION) EVERY 4 HOURS PRN
Qty: 1 INHALER | Refills: 11 | Status: SHIPPED | OUTPATIENT
Start: 2018-04-26 | End: 2019-05-08

## 2018-04-26 NOTE — PATIENT INSTRUCTIONS
Plan:    1) Continue CPAP to 14 CM H20 with 3 LPM 02 bleed in. Encouraged to bring compliance chip in. She states she will bring her chip by next week for download.   2) Smoking cessation discussed and recommended. She is working on quitting.  3) Continue 02 3 LPM prn exertion.  4) Continue Symbicort 160/4.5 mcg. Continue Spiriva respimat 2.5 mcg 2 puffs INH daily. Continue Proventil HFA inhaler as needed. RX refill sent to her local pharmacy.   5) Continue Nasonex.   6) She is up to date on Prevnar 13, Pneumovax 23 and Influenza vaccines.  7) Weight loss recommended.  8) Sleep hygiene discussed. Continue Trazodone prescribed by Psychiatry.    9) CT scan of the chest indicates diffuse interstitial and ground glass opacities similar to 2015 imaging. No acute infections symptoms. Recommend quitting smoking. Continue annual CT's.  10) Follow up in 1 month with repeat chest xray for follow up on her Pneumonia, sooner OV if needed.

## 2018-04-26 NOTE — PROGRESS NOTES
Chief Complaint   Patient presents with   • Apnea   • COPD       HPI:  Atiya Henley is a 67 y.o. year old female here today for follow-up on her chronic obstructive Asthma and YESSICA. She was hospitalized in 4/5-4/11/2018 for community acquired pneumonia and a DVT. She was discharged on Coumadin. She states she was treated in hospital with antibiotics, but was not discharged on antibiotics.   Chest xray 4/5/2018 indicated; Hazy pulm  onary opacities suggests edema or infiltrate. Atherosclerosis.     Echo 4/8/2018 indicated;   Technically difficult due to body habitus, positioning and pt unable to   assume left lateral decubitus but adequate study for interpretation.   Normal left ventricular wall thickness.  Left ventricular ejection fraction is visually estimated to be 60%.  Mild tricuspid regurgitation.  Right ventricular systolic pressure is estimated to be 32 mmHg with an   estimated RAP of 3 mmHg.    She is feeling better overall. She feels her chronic dyspnea is worse with exertion. She feels better when she is on her oxygen. She has a morning cough which is productive with clear mucous. She denies hemoptysis. She notes an occasional wheeze in the morning and with exertion. She feels this has improved since her hospitalization. She denies any fevers or chills. She does have clear sinus drainage which is chronic for her and she feels may be allergy triggered. She does have a Nasonex nasal spray which she uses as needed an feels helps.      In regards to her Obstructive Sleep Apnea she is compliant on CPAP 9 CM H20 with 3 LPM 02 bleed in. She had been followed by Dr. Hodge in the past, but he retired. She did not bring her compliance chip to her last office visit. However, she felt the pressure was too low and she was waking un refreshed. She underwent a dedicated in lab CPAP titration study in July which indicates adequate titration CPAP of 14 CM H20 with a resultant AHI of 1, mean 02 saturation of 87.5%.  She was increased to CPAP of 14 CM H20 with 3 LPM 02 bleed in. She did not bring her chip to today's office visit. We are unable to get a download from her DME. She does tolerate the new pressure well. She does feel she is sleeping better on therapy and is waking more refreshed. She denies any morning headaches.      She is on oxygen at 3 LPM with exertion. PFT's 5/22/2017 indicated an FEV1 of 1.42 L, 63% predicted with an FEV1/FVC ratio of 84 with a TLC of 78% and a DLCO of 97% predicted. Prior PFT's indicated an FEV1 of 1.55 L, 67% predicted with an FEV1/FVC ratio of 84 with a TLC of 84% predicted with a DLCO of 88% predicted. She does continue to smoke cigarettes and is smoking 1/24-1/2 pack per day. She is working on [employer] back.       She is compliant on Symbicort 160/4.5 mcg 2 puffs INH bid, along with a Combivent respimat and Proventil HFA. She was restarted on Spiriva respimat 2.5 mcg 2 puffs INH daily.      She was referred to lung cancer screening. She had a CT chest 9/5/2017 which indicated;  Diffuse interstitial and groundglass opacities both lungs especially within the upper lobe similar to previous findings. Consistent with mild pneumonitis.  Left lower lobe atelectasis similar to previous findings. No pleural effusion.  No focal mass identified.  She is recommended annual imaging.       Past Medical History:   Diagnosis Date   • Arthritis    • Bowel obstruction    • Cancer (CMS-HCC)     cervical   • Chronic autoimmune thyroiditis     + TPO ab = 563 / + ultrasound   • Colostomy in place (CMS-HCC) 2009   • COPD    • Dental disorder     dentures upper   • Diabetes     type 2    • EMPHYSEMA    • Hashimoto's disease    • History of total knee arthroplasty     infection   • Hypertension     stated no longer on meds due to lost 100 lbs   • Hypothyroid 7/7/2009   • Indigestion    • Infectious disease     MRSA,   • Infectious disease     VRE   • Obesity 7/7/2009   • Obstruction     colostomy   • Oxygen  dependent     4.5 L NC   • Pain     b/l legs   • Personal history of venous thrombosis and embolism 2009, 2012    arm and leg left side   • Presence of IVC filter    • Psychiatric problem     depression   • Sleep apnea     USES CPAP, 3-4L oxygen   • Snoring    • Status post cholecystectomy 1992   • Weight gain        Past Surgical History:   Procedure Laterality Date   • PB INJECT RX OTHER PERIPH NERVE Left 8/21/2015    Procedure: NEUROLYTIC DEST-OTHER NERVE;  Surgeon: Kd Galaviz D.O.;  Location: SURGERY [facility];  Service: Pain Management   • PB FLUOROSCOPIC GUIDANCE NEEDLE PLACEMENT Left 8/21/2015    Procedure: FLOURO GUIDE NEEDLE PLACEMENT;  Surgeon: Kd Galaviz D.O.;  Location: SURGERY [facility];  Service: Pain Management   • PB INJECT RX OTHER PERIPH NERVE  8/21/2015    Procedure: NEUROLYTIC DEST-OTHER NERVE;  Surgeon: Kd Galaviz D.O.;  Location: [facility];  Service: Pain Management   • PB INJECT RX OTHER PERIPH NERVE  8/21/2015    Procedure: NEUROLYTIC DEST-OTHER NERVE;  Surgeon: Kd Galaviz D.O.;  Location: [facility];  Service: Pain Management   • EXPLORATORY LAPAROTOMY  12/10/2013    Performed by Sang Castañeda M.D. at [facility]   • WOUND CLOSURE GENERAL  12/10/2013    Performed by Sang Castañeda M.D. at [facility]   • COLOSTOMY  11/26/2013    Performed by Maninder Carter M.D. at [facility]   • EXPLORATORY LAPAROTOMY  11/26/2013    Performed by Maninder Carter M.D. at [facility]   • LYSIS ADHESIONS GENERAL  11/26/2013    Performed by Maninder Carter M.D. at [facility]   • COLON RESECTION  11/26/2013    Performed by Maninder Carter M.D. at [facility]   • BLADDER BIOPSY WITH CYSTOSCOPY  10/10/2013    Performed by Sang Castañeda M.D. at [facility]   • EXPLORATORY LAPAROTOMY  9/2/2013    Performed by Maninder aCrter M.D. at [facility]  ORS   • PARASTOMAL HERNIA REPAIR   9/2/2013    Performed by Maninder Carter M.D. at SURGERY [facility]   • IRRIGATION & DEBRIDEMENT ORTHO  2/8/2013    Performed by David Fowler M.D. at SURGERY [facility]   • IRRIGATION & DEBRIDEMENT GENERAL  12/17/2012    Performed by David Fowler M.D. at [facility]   • IRRIGATION & DEBRIDEMENT ORTHO  7/19/2012    Performed by BERYL LOYD at [facility]   • IRRIGATION & DEBRIDEMENT ORTHO  7/17/2012    Performed by BERYL LOYD at [facility]   • KNEE ARTHROPLASTY TOTAL  6/10/2012    left   • LATERAL RELEASE  7/20/2011    Performed by BERYL LOYD at [facility]   • KNEE ARTHROSCOPY  7/20/2011    Performed by BERYL LOYD at [facility]   • MEDIAL MENISCECTOMY  7/20/2011    Performed by BERYL LOYD at [facility]   • MENISCECTOMY  7/20/2011    Performed by BERYL LOYD at [facility]   • COLONOSCOPY - ENDO  11/17/2010    Performed by JESUSITA SUMNER at ENDOSCOPY [facility]S   • TRACHEOSTOMY  3/31/2010    Performed by JESUSITA SUMNER at SURGERY [facility]   • COLOSTOMY  3/16/2010    Performed by CHRISTA BENNETT at SURGERY [facility]   • EXPLORATORY LAPAROTOMY  3/16/2010    Performed by CHRISTA BENNETT at SURGERY [facility]   • COLECTOMY  3/16/2010    Performed by CHRISTA BENNETT at SURGERY [facility]   • LOW ANTERIOR RESECTION  3/10/2010    Performed by JESUSITA SUMNER at SURGERY [facility]   • LOW ANTERIOR RESECTION LAPAROSCOPIC  3/10/2010    Performed by JESUSITA SUMNER at SURGERY [facility]   • MYRINGOTOMY  9/9/2009    Performed by RENETTA GOODSON at SURGERY [facility] DAY [facility]   • CHOLECYSTECTOMY  1992    laparoscopic   • HYSTERECTOMY LAPAROSCOPY  1983   • OTHER ORTHOPEDIC SURGERY      LT KNEE X2       Family History   Problem Relation Age of Onset   • Heart Disease Mother    • Seizures Mother     • Alcohol/Drug Father    • Lung Disease Father    • Hypertension         Social History     Social History   • Marital status:      Spouse name: N/A   • Number of children: N/A   • Years of education: N/A     Occupational History   • Not on file.     Social History Main Topics   • Smoking status: Current Every Day Smoker     Packs/day: 0.25     Years: 50.00     Types: Cigarettes   • Smokeless tobacco: Current User      Comment: 1 ppd, 45 yrs   • Alcohol use No   • Drug use: Yes     Types: Marijuana      Comment: 1x per month   • Sexual activity: No     Other Topics Concern   •  Service No   • Blood Transfusions No     unknown   • Caffeine Concern No   • Occupational Exposure No   • Hobby Hazards No   • Sleep Concern Yes     sleep apnea   • Stress Concern Yes   • Weight Concern No   • Special Diet Yes   • Back Care Yes   • Exercise No   • Bike Helmet No     does not ride bike    • Seat Belt Yes   • Self-Exams Yes     Social History Narrative   • No narrative on file       ROS:  Constitutional: Denies fevers, chills, sweats, weight loss  Eyes: Denies vision loss, pain, drainage, double vision. Wears glasses   Ears/Nose/Mouth/Throat: Denies ear ache, difficulty hearing, sore throat, persistent hoarseness, decayed teeth/toothache  Cardiovascular: Denies chest pain, tightness, palpitations, fainting, difficulty breathing when laying down  Respiratory: See HPI   GI: Denies heartburn, difficulty swallowing, nausea, vomiting, abdominal pain, diarrhea, constipation  : Denies frequent urination, painful urination  Integumentary: Denies rashes, lumps or color changes  MSK: Denies painful joints, sore muscles, and back pain.   Neurological: Denies frequent headaches, dizziness, weakness  Sleep: See HPI       Current Outpatient Prescriptions   Medication Sig Dispense Refill   • enoxaparin (LOVENOX) 120 MG/0.8ML Solution inj Inject 120 mg as instructed every 12 hours. 10 Syringe 1   • levothyroxine  "(SYNTHROID) 200 MCG Tab Take 1 Tab by mouth Every morning on an empty stomach. 30 Tab 3   • amitriptyline (ELAVIL) 25 MG Tab Take 1 Tab by mouth every bedtime. 30 Tab 0   • warfarin (COUMADIN) 5 MG Tab Take 1-2 Tabs by mouth every evening. 5 mg on Wednesdays and 10 mg all other days 60 Tab 0   • raNITidine (ZANTAC) 150 MG Tab Take 150 mg by mouth 2 times a day.     • gabapentin (NEURONTIN) 300 MG Cap TAKE THREE (3) CAPSULES BY MOUTH THREE (3) TIMES DAILY  270 Cap 3   • vitamin D, Ergocalciferol, (DRISDOL) 74217 units Cap capsule TAKE 1 CAPSULE BY MOUTH EVERY 28 DAYS. 1 Cap 2   • budesonide-formoterol (SYMBICORT) 160-4.5 MCG/ACT Aerosol Inhale 2 Puffs by mouth 2 Times a Day. With spacer, rinse mouth after use 1 Inhaler 11   • carbamazepine (TEGRETOL) 200 MG Tab Take 600 mg by mouth every bedtime.     • Tiotropium Bromide Monohydrate (SPIRIVA RESPIMAT) 2.5 MCG/ACT Aero Soln Inhale 2 Inhalation by mouth every day. Assemble and prime. 1 Inhaler 6   • albuterol (PROVENTIL HFA) 108 (90 BASE) MCG/ACT Aero Soln inhalation aerosol Inhale 2 Puffs by mouth every four hours as needed. For Shortness of breath 1 Inhaler 6   • flurazepam (DALMANE) 30 MG capsule Take 30 mg by mouth every bedtime.     • fluoxetine (PROZAC) 20 MG Cap Take 20 mg by mouth every day.       No current facility-administered medications for this visit.        Allergies   Allergen Reactions   • Pcn [Penicillins] Anaphylaxis and Rash     Reaction; happened as a child. Tolerated cephalosporins and Zosyn       Blood pressure 128/80, pulse 77, temperature 36.8 °C (98.2 °F), resp. rate 16, height 1.626 m (5' 4\"), weight (!) 127.5 kg (281 lb), last menstrual period 07/19/2011, SpO2 91 %.    PE:   Appearance: Well developed, well nourished, no acute distress  Eyes: PERRL, EOM intact, sclera white, conjunctiva moist  Ears: no lesions or deformities  Hearing: grossly intact  Nose: no lesions or deformities  Oropharynx: tongue normal, posterior pharynx without " erythema or exudate  Mallampati Classification: Class 4  Neck: supple, trachea midline, no masses   Respiratory effort: no intercostal retractions or use of accessory muscles  Lung auscultation: no rales, rhonchi or wheezes  Heart auscultation: no murmur rub or gallop  Extremities: no cyanosis or edema  Abdomen: soft ,non tender, no masses  Gait and Station: Wheelchair   Digits and nails: no clubbing, cyanosis, petechiae or nodes.  Cranial nerves: grossly intact  Skin: no rashes, lesions or ulcers noted  Orientation: Oriented to time, person and place  Mood and affect: mood and affect appropriate, normal interaction with examiner  Judgement: Intact          Assessment:  1. Community acquired pneumonia, unspecified laterality  DX-CHEST-2 VIEWS   2. Chronic obstructive asthma (CMS-HCC)  albuterol (PROVENTIL HFA) 108 (90 Base) MCG/ACT Aero Soln inhalation aerosol   3. YESSICA (obstructive sleep apnea)     4. Tobacco abuse     5. Allergic rhinitis, unspecified chronicity, unspecified seasonality, unspecified trigger     6. BMI 45.0-49.9, adult (CMS-HCC)     7. Hypoxemia           Plan:    1) Continue CPAP to 14 CM H20 with 3 LPM 02 bleed in. Encouraged to bring compliance chip in. She states she will bring her chip by next week for download.   2) Smoking cessation discussed and recommended. She is working on quitting.  3) Continue 02 3 LPM prn exertion.  4) Continue Symbicort 160/4.5 mcg. Continue Spiriva respimat 2.5 mcg 2 puffs INH daily. Continue Proventil HFA inhaler as needed. RX refill sent to her local pharmacy.   5) Continue Nasonex.   6) She is up to date on Prevnar 13, Pneumovax 23 and Influenza vaccines.  7) Weight loss recommended.  8) Sleep hygiene discussed. Continue Trazodone prescribed by Psychiatry.    9) CT scan of the chest indicates diffuse interstitial and ground glass opacities similar to 2015 imaging. No acute infections symptoms. Recommend quitting smoking. Continue annual CT's.  10) Follow up in 1  month with repeat chest xray for follow up on her Pneumonia, sooner OV if needed.

## 2018-05-01 ENCOUNTER — ANTICOAGULATION VISIT (OUTPATIENT)
Dept: VASCULAR LAB | Facility: MEDICAL CENTER | Age: 68
End: 2018-05-01
Attending: INTERNAL MEDICINE
Payer: MEDICARE

## 2018-05-01 DIAGNOSIS — Z95.828 S/P IVC FILTER: ICD-10-CM

## 2018-05-01 DIAGNOSIS — Z79.01 CHRONIC ANTICOAGULATION: ICD-10-CM

## 2018-05-01 LAB — INR PPP: 2 (ref 2–3.5)

## 2018-05-01 PROCEDURE — 85610 PROTHROMBIN TIME: CPT

## 2018-05-01 PROCEDURE — 99212 OFFICE O/P EST SF 10 MIN: CPT

## 2018-05-01 RX ORDER — WARFARIN SODIUM 10 MG/1
15-20 TABLET ORAL DAILY
Qty: 60 TAB | Refills: 3 | Status: SHIPPED | OUTPATIENT
Start: 2018-05-01 | End: 2018-05-04

## 2018-05-01 NOTE — PROGRESS NOTES
OP Anticoagulation Service Note    Date: 5/1/2018  There were no vitals filed for this visit.    Anticoagulation Summary  As of 5/1/2018    INR goal:   2.0-3.0   TTR:   40.6 % (2.6 y)   Today's INR:   2.0   Warfarin maintenance plan:   10 mg (10 mg x 1) on Wed; 15 mg (10 mg x 1.5) all other days   Weekly warfarin total:   100 mg   Plan last modified:   Bob Dupree, PharmD (5/1/2018)   Next INR check:   5/8/2018   Priority:   Routine   Target end date:   Indefinite    Indications    DVT (deep venous thrombosis) (HCC) [I82.409]  Chronic anticoagulation [Z79.01]  S/P IVC filter [Z95.828]  Deep vein thrombosis (DVT) (HCC) [I82.409]             Anticoagulation Episode Summary     INR check location:   [facility]    Preferred lab:   [facility]    Send INR reminders to:       Comments:         Anticoagulation Care Providers     Provider Role Specialty Phone number    Carmelo Pat M.D. Referring Family Medicine [phone]    [facility] Anticoagulation Services Responsible  [phone]        Anticoagulation Patient Findings      HPI:   Atiya Henley seen in clinic today, they are here today for a INR check on anticoagulation therapy with warfarin because they have a PMH of a DVT     The reason for today's visit is to prevent morbidity and mortality from a blood clot  and to reduce the risk of bleeding while on a anticoagulant.     Reason for today's visit (per our collaborative practice policy) is because their last INR was 1.1  on  4/23.   Intervention at the last visit:  Increased the dose     Additional education provided today regarding reducing bleed risk and dietary constraints:  About diet     Any upcoming procedures:   none    Confirmed warfarin dosing regimen  Interval Changes with foods rich in vitamin K: No  Interval Changes in ETOH:   No  Interval Changes in smoking status:  No  Interval Changes in medication:  No   Cost restriction:  No    S/S of bleeding or bruising:  No  Signs/symptoms   thrombosis since the last appt:  No  Bleed risk is:  moderate,     3 vitals included with today's appt :No  (BP, HR, weight, ht, RR)     Assessment:   INR  therapeutic.     No change in dose needed today, they will need to continue with the same dose and diet to prevent adverse events while on a anticoagulant.     They have a TTR of 40.6  which is not at target (TTR target/goal is 100%) and requires close follow up to prevent a adverse event (the lower the TTR the higher risk of clots, strokes, or bleeding).     Plan:  Increase weekly warfarin dose as noted to 100mg per week which is what she has been on for the last 7 days       Follow up:  Follow up appointment in 1 week(s)       Other info:  Pt educated to contact our clinic with any changes in medications or s/s of bleeding or thrombosis    CHEST guidelines recommend frequent INR monitoring at regular intervals (a few days up to a max of 12 weeks) to ensure they are on the proper dose of warfarin and not having any complications from therapy.  INRs can dramatically change over a short time period due to diet, medications, and medical conditions.

## 2018-05-04 ENCOUNTER — APPOINTMENT (OUTPATIENT)
Dept: RADIOLOGY | Facility: MEDICAL CENTER | Age: 68
End: 2018-05-04
Attending: EMERGENCY MEDICINE
Payer: MEDICARE

## 2018-05-04 ENCOUNTER — HOSPITAL ENCOUNTER (OUTPATIENT)
Facility: MEDICAL CENTER | Age: 68
End: 2018-05-05
Attending: EMERGENCY MEDICINE | Admitting: HOSPITALIST
Payer: MEDICARE

## 2018-05-04 DIAGNOSIS — R07.89 CHEST WALL PAIN: ICD-10-CM

## 2018-05-04 DIAGNOSIS — R26.81 GAIT INSTABILITY: ICD-10-CM

## 2018-05-04 DIAGNOSIS — I48.20 CHRONIC ATRIAL FIBRILLATION (HCC): ICD-10-CM

## 2018-05-04 DIAGNOSIS — R29.6 FREQUENT FALLS: ICD-10-CM

## 2018-05-04 DIAGNOSIS — S20.212A CONTUSION OF LEFT CHEST WALL, INITIAL ENCOUNTER: ICD-10-CM
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
## 2018-05-04 LAB
ALBUMIN SERPL BCP-MCNC: 3.6 G/DL (ref 3.2–4.9)
ALBUMIN/GLOB SERPL: 1 G/DL
ALP SERPL-CCNC: 102 U/L (ref 30–99)
ALT SERPL-CCNC: 12 U/L (ref 2–50)
ANION GAP SERPL CALC-SCNC: 6 MMOL/L (ref 0–11.9)
APTT PPP: 27.7 SEC (ref 24.7–36)
AST SERPL-CCNC: 20 U/L (ref 12–45)
BASOPHILS # BLD AUTO: 0.5 % (ref 0–1.8)
BASOPHILS # BLD: 0.03 K/UL (ref 0–0.12)
BILIRUB SERPL-MCNC: 0.4 MG/DL (ref 0.1–1.5)
BNP SERPL-MCNC: 24 PG/ML (ref 0–100)
BUN SERPL-MCNC: 11 MG/DL (ref 8–22)
CALCIUM SERPL-MCNC: 9.1 MG/DL (ref 8.5–10.5)
CHLORIDE SERPL-SCNC: 105 MMOL/L (ref 96–112)
CO2 SERPL-SCNC: 26 MMOL/L (ref 20–33)
CREAT SERPL-MCNC: 0.59 MG/DL (ref 0.5–1.4)
DEPRECATED D DIMER PPP IA-ACNC: 216 NG/ML(D-DU)
EKG IMPRESSION: NORMAL
EOSINOPHIL # BLD AUTO: 0.24 K/UL (ref 0–0.51)
EOSINOPHIL NFR BLD: 4 % (ref 0–6.9)
ERYTHROCYTE [DISTWIDTH] IN BLOOD BY AUTOMATED COUNT: 53 FL (ref 35.9–50)
GLOBULIN SER CALC-MCNC: 3.6 G/DL (ref 1.9–3.5)
GLUCOSE SERPL-MCNC: 117 MG/DL (ref 65–99)
HCT VFR BLD AUTO: 41 % (ref 37–47)
HGB BLD-MCNC: 13.2 G/DL (ref 12–16)
IMM GRANULOCYTES # BLD AUTO: 0.02 K/UL (ref 0–0.11)
IMM GRANULOCYTES NFR BLD AUTO: 0.3 % (ref 0–0.9)
INR PPP: 1.6 (ref 0.87–1.13)
LIPASE SERPL-CCNC: 5 U/L (ref 11–82)
LYMPHOCYTES # BLD AUTO: 1.83 K/UL (ref 1–4.8)
LYMPHOCYTES NFR BLD: 30.3 % (ref 22–41)
MCH RBC QN AUTO: 32.1 PG (ref 27–33)
MCHC RBC AUTO-ENTMCNC: 32.2 G/DL (ref 33.6–35)
MCV RBC AUTO: 99.8 FL (ref 81.4–97.8)
MONOCYTES # BLD AUTO: 0.38 K/UL (ref 0–0.85)
MONOCYTES NFR BLD AUTO: 6.3 % (ref 0–13.4)
NEUTROPHILS # BLD AUTO: 3.54 K/UL (ref 2–7.15)
NEUTROPHILS NFR BLD: 58.6 % (ref 44–72)
NRBC # BLD AUTO: 0 K/UL
NRBC BLD-RTO: 0 /100 WBC
PLATELET # BLD AUTO: 211 K/UL (ref 164–446)
PMV BLD AUTO: 10 FL (ref 9–12.9)
POTASSIUM SERPL-SCNC: 4.1 MMOL/L (ref 3.6–5.5)
PROCALCITONIN SERPL-MCNC: <0.05 NG/ML
PROT SERPL-MCNC: 7.2 G/DL (ref 6–8.2)
PROTHROMBIN TIME: 18.7 SEC (ref 12–14.6)
RBC # BLD AUTO: 4.11 M/UL (ref 4.2–5.4)
SODIUM SERPL-SCNC: 137 MMOL/L (ref 135–145)
TSH SERPL DL<=0.005 MIU/L-ACNC: 0.12 UIU/ML (ref 0.38–5.33)
WBC # BLD AUTO: 6 K/UL (ref 4.8–10.8)

## 2018-05-04 PROCEDURE — 85379 FIBRIN DEGRADATION QUANT: CPT

## 2018-05-04 PROCEDURE — 71045 X-RAY EXAM CHEST 1 VIEW: CPT

## 2018-05-04 PROCEDURE — 36415 COLL VENOUS BLD VENIPUNCTURE: CPT

## 2018-05-04 PROCEDURE — 83690 ASSAY OF LIPASE: CPT

## 2018-05-04 PROCEDURE — 80053 COMPREHEN METABOLIC PANEL: CPT

## 2018-05-04 PROCEDURE — 96374 THER/PROPH/DIAG INJ IV PUSH: CPT

## 2018-05-04 PROCEDURE — 70450 CT HEAD/BRAIN W/O DYE: CPT

## 2018-05-04 PROCEDURE — 85730 THROMBOPLASTIN TIME PARTIAL: CPT

## 2018-05-04 PROCEDURE — 85610 PROTHROMBIN TIME: CPT

## 2018-05-04 PROCEDURE — A9270 NON-COVERED ITEM OR SERVICE: HCPCS | Performed by: HOSPITALIST

## 2018-05-04 PROCEDURE — 71250 CT THORAX DX C-: CPT

## 2018-05-04 PROCEDURE — 700102 HCHG RX REV CODE 250 W/ 637 OVERRIDE(OP): Performed by: HOSPITALIST

## 2018-05-04 PROCEDURE — 85025 COMPLETE CBC W/AUTO DIFF WBC: CPT

## 2018-05-04 PROCEDURE — G0378 HOSPITAL OBSERVATION PER HR: HCPCS

## 2018-05-04 PROCEDURE — 304561 HCHG STAT O2

## 2018-05-04 PROCEDURE — 84443 ASSAY THYROID STIM HORMONE: CPT

## 2018-05-04 PROCEDURE — 94760 N-INVAS EAR/PLS OXIMETRY 1: CPT

## 2018-05-04 PROCEDURE — 700111 HCHG RX REV CODE 636 W/ 250 OVERRIDE (IP): Performed by: HOSPITALIST

## 2018-05-04 PROCEDURE — 99220 PR INITIAL OBSERVATION CARE,LEVL III: CPT | Performed by: HOSPITALIST

## 2018-05-04 PROCEDURE — 84145 PROCALCITONIN (PCT): CPT

## 2018-05-04 PROCEDURE — 99285 EMERGENCY DEPT VISIT HI MDM: CPT

## 2018-05-04 PROCEDURE — 93005 ELECTROCARDIOGRAM TRACING: CPT | Performed by: EMERGENCY MEDICINE

## 2018-05-04 PROCEDURE — 83880 ASSAY OF NATRIURETIC PEPTIDE: CPT

## 2018-05-04 PROCEDURE — 93005 ELECTROCARDIOGRAM TRACING: CPT

## 2018-05-04 RX ORDER — LEVOTHYROXINE SODIUM 0.12 MG/1
250 TABLET ORAL
Status: DISCONTINUED | OUTPATIENT
Start: 2018-05-04 | End: 2018-05-04

## 2018-05-04 RX ORDER — TEMAZEPAM 15 MG/1
15 CAPSULE ORAL
Status: DISCONTINUED | OUTPATIENT
Start: 2018-05-04 | End: 2018-05-05 | Stop reason: HOSPADM

## 2018-05-04 RX ORDER — BISACODYL 10 MG
10 SUPPOSITORY, RECTAL RECTAL
Status: DISCONTINUED | OUTPATIENT
Start: 2018-05-04 | End: 2018-05-05 | Stop reason: HOSPADM

## 2018-05-04 RX ORDER — ONDANSETRON 4 MG/1
4 TABLET, ORALLY DISINTEGRATING ORAL EVERY 4 HOURS PRN
Status: DISCONTINUED | OUTPATIENT
Start: 2018-05-04 | End: 2018-05-05 | Stop reason: HOSPADM

## 2018-05-04 RX ORDER — BUDESONIDE AND FORMOTEROL FUMARATE DIHYDRATE 160; 4.5 UG/1; UG/1
2 AEROSOL RESPIRATORY (INHALATION) 2 TIMES DAILY
Status: DISCONTINUED | OUTPATIENT
Start: 2018-05-04 | End: 2018-05-05 | Stop reason: HOSPADM

## 2018-05-04 RX ORDER — LEVOTHYROXINE SODIUM 0.12 MG/1
250 TABLET ORAL
Status: DISCONTINUED | OUTPATIENT
Start: 2018-05-04 | End: 2018-05-05 | Stop reason: HOSPADM

## 2018-05-04 RX ORDER — ALBUTEROL SULFATE 90 UG/1
2 AEROSOL, METERED RESPIRATORY (INHALATION) EVERY 4 HOURS PRN
Status: DISCONTINUED | OUTPATIENT
Start: 2018-05-04 | End: 2018-05-05 | Stop reason: HOSPADM

## 2018-05-04 RX ORDER — ACETAMINOPHEN 325 MG/1
650 TABLET ORAL EVERY 6 HOURS PRN
Status: DISCONTINUED | OUTPATIENT
Start: 2018-05-04 | End: 2018-05-05 | Stop reason: HOSPADM

## 2018-05-04 RX ORDER — ONDANSETRON 2 MG/ML
4 INJECTION INTRAMUSCULAR; INTRAVENOUS EVERY 4 HOURS PRN
Status: DISCONTINUED | OUTPATIENT
Start: 2018-05-04 | End: 2018-05-05 | Stop reason: HOSPADM

## 2018-05-04 RX ORDER — CARBAMAZEPINE 200 MG/1
600 TABLET ORAL
Status: DISCONTINUED | OUTPATIENT
Start: 2018-05-04 | End: 2018-05-05 | Stop reason: HOSPADM

## 2018-05-04 RX ORDER — GABAPENTIN 300 MG/1
900 CAPSULE ORAL 3 TIMES DAILY
Status: DISCONTINUED | OUTPATIENT
Start: 2018-05-04 | End: 2018-05-05 | Stop reason: HOSPADM

## 2018-05-04 RX ORDER — WARFARIN SODIUM 10 MG/1
10 TABLET ORAL
Status: DISCONTINUED | OUTPATIENT
Start: 2018-05-09 | End: 2018-05-05 | Stop reason: HOSPADM

## 2018-05-04 RX ORDER — AMITRIPTYLINE HYDROCHLORIDE 25 MG/1
25 TABLET, FILM COATED ORAL
Status: DISCONTINUED | OUTPATIENT
Start: 2018-05-04 | End: 2018-05-05 | Stop reason: HOSPADM

## 2018-05-04 RX ORDER — FLUOXETINE HYDROCHLORIDE 20 MG/1
20 CAPSULE ORAL DAILY
Status: DISCONTINUED | OUTPATIENT
Start: 2018-05-04 | End: 2018-05-05 | Stop reason: HOSPADM

## 2018-05-04 RX ORDER — POLYETHYLENE GLYCOL 3350 17 G/17G
1 POWDER, FOR SOLUTION ORAL
Status: DISCONTINUED | OUTPATIENT
Start: 2018-05-04 | End: 2018-05-05 | Stop reason: HOSPADM

## 2018-05-04 RX ORDER — AMOXICILLIN 250 MG
2 CAPSULE ORAL 2 TIMES DAILY
Status: DISCONTINUED | OUTPATIENT
Start: 2018-05-04 | End: 2018-05-05 | Stop reason: HOSPADM

## 2018-05-04 RX ORDER — ACETAMINOPHEN AND CODEINE PHOSPHATE 120; 12 MG/5ML; MG/5ML
30 SOLUTION ORAL
Status: DISCONTINUED | OUTPATIENT
Start: 2018-05-04 | End: 2018-05-04

## 2018-05-04 RX ORDER — RANITIDINE 150 MG/1
150 TABLET ORAL 2 TIMES DAILY
Status: DISCONTINUED | OUTPATIENT
Start: 2018-05-04 | End: 2018-05-04

## 2018-05-04 RX ORDER — LEVOTHYROXINE SODIUM 0.05 MG/1
250 TABLET ORAL
COMMUNITY
End: 2018-05-25

## 2018-05-04 RX ORDER — FAMOTIDINE 20 MG/1
20 TABLET, FILM COATED ORAL 2 TIMES DAILY
Status: DISCONTINUED | OUTPATIENT
Start: 2018-05-04 | End: 2018-05-05 | Stop reason: HOSPADM

## 2018-05-04 RX ORDER — GUAIFENESIN/DEXTROMETHORPHAN 100-10MG/5
10 SYRUP ORAL EVERY 6 HOURS PRN
Status: DISCONTINUED | OUTPATIENT
Start: 2018-05-04 | End: 2018-05-05 | Stop reason: HOSPADM

## 2018-05-04 RX ORDER — MORPHINE SULFATE 4 MG/ML
2 INJECTION, SOLUTION INTRAMUSCULAR; INTRAVENOUS
Status: DISCONTINUED | OUTPATIENT
Start: 2018-05-04 | End: 2018-05-05 | Stop reason: HOSPADM

## 2018-05-04 RX ORDER — WARFARIN SODIUM 10 MG/1
10-15 TABLET ORAL EVERY EVENING
Status: ON HOLD | COMMUNITY
End: 2018-05-27

## 2018-05-04 RX ORDER — WARFARIN SODIUM 7.5 MG/1
15 TABLET ORAL
Status: DISCONTINUED | OUTPATIENT
Start: 2018-05-04 | End: 2018-05-05 | Stop reason: HOSPADM

## 2018-05-04 RX ORDER — NICOTINE 21 MG/24HR
14 PATCH, TRANSDERMAL 24 HOURS TRANSDERMAL
Status: DISCONTINUED | OUTPATIENT
Start: 2018-05-04 | End: 2018-05-05 | Stop reason: HOSPADM

## 2018-05-04 RX ORDER — TIOTROPIUM BROMIDE 18 UG/1
1 CAPSULE ORAL; RESPIRATORY (INHALATION) DAILY
Status: DISCONTINUED | OUTPATIENT
Start: 2018-05-04 | End: 2018-05-05 | Stop reason: HOSPADM

## 2018-05-04 RX ORDER — LEVOTHYROXINE SODIUM 0.2 MG/1
250 TABLET ORAL
Status: ON HOLD | COMMUNITY
End: 2018-05-27

## 2018-05-04 RX ORDER — OXYCODONE HYDROCHLORIDE 5 MG/1
2.5 TABLET ORAL
Status: DISCONTINUED | OUTPATIENT
Start: 2018-05-04 | End: 2018-05-05 | Stop reason: HOSPADM

## 2018-05-04 RX ORDER — OXYCODONE HYDROCHLORIDE 5 MG/1
5 TABLET ORAL
Status: DISCONTINUED | OUTPATIENT
Start: 2018-05-04 | End: 2018-05-05 | Stop reason: HOSPADM

## 2018-05-04 RX ADMIN — FAMOTIDINE 20 MG: 20 TABLET ORAL at 20:03

## 2018-05-04 RX ADMIN — CARBAMAZEPINE 600 MG: 200 TABLET ORAL at 20:16

## 2018-05-04 RX ADMIN — MORPHINE SULFATE 2 MG: 4 INJECTION INTRAVENOUS at 17:43

## 2018-05-04 RX ADMIN — GABAPENTIN 900 MG: 300 CAPSULE ORAL at 20:03

## 2018-05-04 RX ADMIN — WARFARIN SODIUM 15 MG: 7.5 TABLET ORAL at 20:07

## 2018-05-04 RX ADMIN — AMITRIPTYLINE HYDROCHLORIDE 25 MG: 25 TABLET, FILM COATED ORAL at 20:16

## 2018-05-04 RX ADMIN — GABAPENTIN 900 MG: 300 CAPSULE ORAL at 17:42

## 2018-05-04 RX ADMIN — BUDESONIDE AND FORMOTEROL FUMARATE DIHYDRATE 2 PUFF: 160; 4.5 AEROSOL RESPIRATORY (INHALATION) at 20:16

## 2018-05-04 RX ADMIN — FLUOXETINE HYDROCHLORIDE 20 MG: 20 CAPSULE ORAL at 17:42

## 2018-05-04 ASSESSMENT — COPD QUESTIONNAIRES
DURING THE PAST 4 WEEKS HOW MUCH DID YOU FEEL SHORT OF BREATH: NONE/LITTLE OF THE TIME
DO YOU EVER COUGH UP ANY MUCUS OR PHLEGM?: NO/ONLY WITH OCCASIONAL COLDS OR INFECTIONS
HAVE YOU SMOKED AT LEAST 100 CIGARETTES IN YOUR ENTIRE LIFE: YES
COPD SCREENING SCORE: 4

## 2018-05-04 ASSESSMENT — PAIN SCALES - GENERAL
PAINLEVEL_OUTOF10: 4
PAINLEVEL_OUTOF10: 7

## 2018-05-04 ASSESSMENT — LIFESTYLE VARIABLES: EVER_SMOKED: YES

## 2018-05-04 NOTE — ED NOTES
[place] fall risk assessment done on pt. K sign outside the room, yellow armband placed on right wrist. Call light within reach and educated/instructed to to call for assistance. Interventions done per fall protocol.

## 2018-05-04 NOTE — ED NOTES
Pt also states that she hit her head when she fell. Denies loc. No visible laceration noted.  Instructed to call for assistance once ready to provide urine specimen.

## 2018-05-04 NOTE — ED TRIAGE NOTES
"Chief Complaint   Patient presents with   • Rib Pain     right side after falling yesterday. states she slipped on the carpet then fell on her right side. per ems right chest/rib tender to touch.   • Shortness of Breath     \"all the time\", but states today having pain during deep inspiration. dx w/pna a month ago.   • Chest Wall Pain     right side     Pt arrived AAOX4. Was given fentanyl 100 mcg enroute w/relief. Pt able to speak in full sentences.    "

## 2018-05-04 NOTE — ED NOTES
Med rec complete per pt with RX bottles at bedside  Bottles reviewed and returned  Pt has many empty bottles with her. Pt reports this is because she told EMS to grab her medications and they brought mainly empty ones, so she is still missing a couple that she takes usually  Reviewed all current prescriptions, whether RX bottles present or not  Allergies reviewed  Pt denies ABX in last month

## 2018-05-04 NOTE — ED PROVIDER NOTES
"ED Provider Note    CHIEF COMPLAINT  Chief Complaint   Patient presents with   • Rib Pain     right side after falling yesterday. states she slipped on the carpet then fell on her right side. per ems right chest/rib tender to touch.   • Shortness of Breath     \"all the time\", but states today having pain during deep inspiration. dx w/pna a month ago.   • Chest Wall Pain     right side       HPI  Atiya Henley is a 67 y.o. female who presents to the ER complaining of right-sided rib pain.  The patient states that she is on a couple recent falls, landing with her arm tucked under her right rib cage.  The 1st one was a few days ago.  She fell on right side of her chest.  Then today she fell again with her arm tucked in the right side of her chest.  She has right-sided chest wall pain associated with this.  Pain is worse with movement, palpation, and taking a deep breath.  One of the time she fell she didn't hit her head.  She did not get knocked out.  The patient states she takes Coumadin chronically for A. fib.    Patient also states she is recent hospital for pneumonia.  She's been dyspneic and oxygen.  Since that time.  Denies any other acute concerns or complaints.    REVIEW OF SYSTEMS  See HPI for further details. All other systems are negative.    PAST MEDICAL HISTORY  Past Medical History:   Diagnosis Date   • Arthritis    • Bowel obstruction (HCC)    • Cancer (HCC)     cervical   • Chronic autoimmune thyroiditis     + TPO ab = 563 / + ultrasound   • Colostomy in place (HCC) 2009   • COPD    • Dental disorder     dentures upper   • Diabetes     type 2    • EMPHYSEMA    • Hashimoto's disease    • History of total knee arthroplasty     infection   • Hypertension     stated no longer on meds due to lost 100 lbs   • Hypothyroid 7/7/2009   • Indigestion    • Infectious disease     MRSA,   • Infectious disease     VRE   • Obesity 7/7/2009   • Obstruction     colostomy   • Oxygen dependent     4.5 L NC   • Pain  "    b/l legs   • Personal history of venous thrombosis and embolism 2009, 2012    arm and leg left side   • Presence of IVC filter    • Psychiatric problem     depression   • Sleep apnea     USES CPAP, 3-4L oxygen   • Snoring    • Status post cholecystectomy 1992   • Weight gain        FAMILY HISTORY  Family History   Problem Relation Age of Onset   • Heart Disease Mother    • Seizures Mother    • Alcohol/Drug Father    • Lung Disease Father    • Hypertension         SOCIAL HISTORY  Social History     Social History   • Marital status:      Spouse name: N/A   • Number of children: N/A   • Years of education: N/A     Social History Main Topics   • Smoking status: Current Every Day Smoker     Packs/day: 0.25     Years: 50.00     Types: Cigarettes   • Smokeless tobacco: Current User      Comment: 1 ppd, 45 yrs   • Alcohol use No   • Drug use: Yes     Types: Marijuana      Comment: 1x per month   • Sexual activity: No     Other Topics Concern   •  Service No   • Blood Transfusions No     unknown   • Caffeine Concern No   • Occupational Exposure No   • Hobby Hazards No   • Sleep Concern Yes     sleep apnea   • Stress Concern Yes   • Weight Concern No   • Special Diet Yes   • Back Care Yes   • Exercise No   • Bike Helmet No     does not ride bike    • Seat Belt Yes   • Self-Exams Yes     Social History Narrative   • No narrative on file       SURGICAL HISTORY  Past Surgical History:   Procedure Laterality Date   • PB INJECT RX OTHER PERIPH NERVE Left 8/21/2015    Procedure: NEUROLYTIC DEST-OTHER NERVE;  Surgeon: Kd Galaviz D.O.;  Location: [facility];  Service: Pain Management   • PB FLUOROSCOPIC GUIDANCE NEEDLE PLACEMENT Left 8/21/2015    Procedure: FLOURO GUIDE NEEDLE PLACEMENT;  Surgeon: Kd Galaviz D.O.;  Location: [facility];  Service: Pain Management   • PB INJECT RX OTHER PERIPH NERVE  8/21/2015    Procedure: NEUROLYTIC DEST-OTHER NERVE;  Surgeon: Kd DAMON  CK Galaviz;  Location: [facility];  Service: Pain Management   • PB INJECT RX OTHER PERIPH NERVE  8/21/2015    Procedure: NEUROLYTIC DEST-OTHER NERVE;  Surgeon: Kd Galaviz D.O.;  Location: [facility];  Service: Pain Management   • EXPLORATORY LAPAROTOMY  12/10/2013    Performed by Sang Castañeda M.D. at [facility]   • WOUND CLOSURE GENERAL  12/10/2013    Performed by Sang Castañeda M.D. at [facility]   • COLOSTOMY  11/26/2013    Performed by Maninder Carter M.D. at [facility]   • EXPLORATORY LAPAROTOMY  11/26/2013    Performed by Maninder Carter M.D. at [facility]   • LYSIS ADHESIONS GENERAL  11/26/2013    Performed by Maninder Carter M.D. at [facility]   • COLON RESECTION  11/26/2013    Performed by Maninder Carter M.D. at [facility]   • BLADDER BIOPSY WITH CYSTOSCOPY  10/10/2013    Performed by Sang Castañeda M.D. at [facility]   • EXPLORATORY LAPAROTOMY  9/2/2013    Performed by Maninder Carter M.D. at [facility]   • PARASTOMAL HERNIA REPAIR   9/2/2013    Performed by Maninder Carter M.D. at [facility]   • IRRIGATION & DEBRIDEMENT ORTHO  2/8/2013    Performed by David Fowler M.D. at [facility]   • IRRIGATION & DEBRIDEMENT GENERAL  12/17/2012    Performed by David Fowler M.D. at [facility]   • IRRIGATION & DEBRIDEMENT ORTHO  7/19/2012    Performed by BERYL LOYD at [facility]   • IRRIGATION & DEBRIDEMENT ORTHO  7/17/2012    Performed by BERYL LOYD at [facility]   • KNEE ARTHROPLASTY TOTAL  6/10/2012    left   • LATERAL RELEASE  7/20/2011    Performed by BERYL LOYD at [facility]   • KNEE ARTHROSCOPY  7/20/2011    Performed by BERYL LOYD at [facility]   • MEDIAL MENISCECTOMY  7/20/2011    Performed by BERYL LOYD at SURGERY  "[facility]   • MENISCECTOMY  7/20/2011    Performed by BERYL LOYD at SURGERY [facility]   • COLONOSCOPY - ENDO  11/17/2010    Performed by JESUSITA SUMNER at ENDOSCOPY [facility]   • TRACHEOSTOMY  3/31/2010    Performed by JESUSITA SUMNER at SURGERY [facility]   • COLOSTOMY  3/16/2010    Performed by CHRISTA BENNETT at SURGERY [facility]   • EXPLORATORY LAPAROTOMY  3/16/2010    Performed by CHRISTA BENNETT at SURGERY [facility]   • COLECTOMY  3/16/2010    Performed by CHRISTA BENNETT at SURGERY [facility]   • LOW ANTERIOR RESECTION  3/10/2010    Performed by JESUSITA SUMNER at SURGERY [facility]   • LOW ANTERIOR RESECTION LAPAROSCOPIC  3/10/2010    Performed by JESUSITA SUMNER at SURGERY [facility]   • MYRINGOTOMY  9/9/2009    Performed by RENETTA GOODSON at SURGERY SAME DAY [facility]   • CHOLECYSTECTOMY  1992    laparoscopic   • HYSTERECTOMY LAPAROSCOPY  1983   • OTHER ORTHOPEDIC SURGERY      LT KNEE X2       CURRENT MEDICATIONS  Home Medications    **Home medications have not yet been reviewed for this encounter**         ALLERGIES  Allergies   Allergen Reactions   • Pcn [Penicillins] Anaphylaxis and Rash     Reaction; happened as a child. Tolerated cephalosporins and Zosyn       PHYSICAL EXAM  VITAL SIGNS: /64   Pulse (!) 57   Temp 36.8 °C (98.3 °F)   Resp 16   Ht 1.65 m (5' 4.96\")   Wt (!) 127.5 kg (281 lb)   LMP 07/19/2011   SpO2 95%   BMI 46.82 kg/m²      Constitutional: Awake, alert, chronically ill-appearing, no acute cardiopulmonary distress.  HENT: Normocephalic, Atraumatic, Bilateral external ears normal, Oropharynx moist, No oral exudates, Nose normal.   Eyes: PERRL, EOMI, Conjunctiva normal, No discharge.   Neck: Normal range of motion, No tenderness, Supple, No stridor.   Cardiovascular: Normal heart rate, Normal rhythm, No murmurs, No rubs, No gallops.   Thorax & Lungs: Normal breath sounds, No respiratory distress, No wheezing.  " Right-sided chest wall tenderness.  Without crepitus.  Abdomen: Bowel sounds normal, Soft, No tenderness , left-sided colostomy.  Multiple bruises and subcutaneous swelling from injections from Lovenox.  S.   Skin: Warm, Dry, No erythema, No rash.   Musculoskeletal: Good range of motion in all major joints.  Range of motion, mild edema.  Neurologic: Alert No focal deficits noted.   Psychiatric: Affect anxious    Results for orders placed or performed during the hospital encounter of 05/04/18   CBC WITH DIFFERENTIAL   Result Value Ref Range    WBC 6.0 4.8 - 10.8 K/uL    RBC 4.11 (L) 4.20 - 5.40 M/uL    Hemoglobin 13.2 12.0 - 16.0 g/dL    Hematocrit 41.0 37.0 - 47.0 %    MCV 99.8 (H) 81.4 - 97.8 fL    MCH 32.1 27.0 - 33.0 pg    MCHC 32.2 (L) 33.6 - 35.0 g/dL    RDW 53.0 (H) 35.9 - 50.0 fL    Platelet Count 211 164 - 446 K/uL    MPV 10.0 9.0 - 12.9 fL    Neutrophils-Polys 58.60 44.00 - 72.00 %    Lymphocytes 30.30 22.00 - 41.00 %    Monocytes 6.30 0.00 - 13.40 %    Eosinophils 4.00 0.00 - 6.90 %    Basophils 0.50 0.00 - 1.80 %    Immature Granulocytes 0.30 0.00 - 0.90 %    Nucleated RBC 0.00 /100 WBC    Neutrophils (Absolute) 3.54 2.00 - 7.15 K/uL    Lymphs (Absolute) 1.83 1.00 - 4.80 K/uL    Monos (Absolute) 0.38 0.00 - 0.85 K/uL    Eos (Absolute) 0.24 0.00 - 0.51 K/uL    Baso (Absolute) 0.03 0.00 - 0.12 K/uL    Immature Granulocytes (abs) 0.02 0.00 - 0.11 K/uL    NRBC (Absolute) 0.00 K/uL   COMP METABOLIC PANEL   Result Value Ref Range    Sodium 137 135 - 145 mmol/L    Potassium 4.1 3.6 - 5.5 mmol/L    Chloride 105 96 - 112 mmol/L    Co2 26 20 - 33 mmol/L    Anion Gap 6.0 0.0 - 11.9    Glucose 117 (H) 65 - 99 mg/dL    Bun 11 8 - 22 mg/dL    Creatinine 0.59 0.50 - 1.40 mg/dL    Calcium 9.1 8.5 - 10.5 mg/dL    AST(SGOT) 20 12 - 45 U/L    ALT(SGPT) 12 2 - 50 U/L    Alkaline Phosphatase 102 (H) 30 - 99 U/L    Total Bilirubin 0.4 0.1 - 1.5 mg/dL    Albumin 3.6 3.2 - 4.9 g/dL    Total Protein 7.2 6.0 - 8.2 g/dL    Globulin  3.6 (H) 1.9 - 3.5 g/dL    A-G Ratio 1.0 g/dL   LIPASE   Result Value Ref Range    Lipase 5 (L) 11 - 82 U/L   PROTHROMBIN TIME (INR)   Result Value Ref Range    PT 18.7 (H) 12.0 - 14.6 sec    INR 1.60 (H) 0.87 - 1.13   APTT   Result Value Ref Range    APTT 27.7 24.7 - 36.0 sec   ESTIMATED GFR   Result Value Ref Range    GFR If African American >60 >60 mL/min/1.73 m 2    GFR If Non African American >60 >60 mL/min/1.73 m 2   EKG (ER)   Result Value Ref Range    Report       [facility] Emergency Dept.    Test Date:  2018  Pt Name:    CAITLIN BOYER                Department: ER  MRN:        [id number]                      Room:       [facility]  Gender:     Female                       Technician: [id number]  :        1950                   Requested By:ER TRIAGE PROTOCOL  Order #:    [id number]                    Reading MD: PRASHANT DREW. AMD    Measurements  Intervals                                Axis  Rate:       51                           P:          0  IN:         189                          QRS:        -30  QRSD:       98                           T:          52  QT:         436  QTc:        402    Interpretive Statements  SINUS BRADYCARDIA  LEFT AXIS DEVIATION  CONSIDER ANTERIOR INFARCT  BORDERLINE T ABNORMALITIES, ANTERIOR LEADS  Compared to ECG 2018 20:28:36  Myocardial infarct finding now present  T-wave abnormality now present  Atrial fibrillation no longer present    Electronically Signed On 2018 18:25:14 PD T by PRASHANT DREW. AMD       *Note: Due to a large number of results and/or encounters for the requested time period, some results have not been displayed. A complete set of results can be found in Results Review.      RADIOLOGY/PROCEDURES  CT-CHEST (THORAX) W/O   Final Result      1.  No acute cardiopulmonary disease.      2.  No displaced rib fracture or pneumothorax.      3.  Chronic interstitial reticulonodular opacities in the lung parenchyma bilaterally,  greatest in each right upper lobe.      4.  No pleural effusion.            CT-HEAD W/O   Final Result      1.  No acute intracranial process.      2.  Stable minimal small vessel ischemic change.      DX-CHEST-PORTABLE (1 VIEW)   Final Result      1.  No displaced rib fracture or pneumothorax identified.      2.  Stable opacity in the inferior lateral aspect of the left hemithorax which may represent chronic pleural thickening or residual or recurrent left pleural effusion.      3.  No right lung opacity.            COURSE & MEDICAL DECISION MAKING  Pertinent Labs & Imaging studies reviewed. (See chart for details)  Patient complains of falling, landing on her right chest twice.  She has significant right chest tenderness.  Differential diagnosis chest wall contusion, versus fracture.  She states these are mechanical fall that she is tripping.  She is not getting dizzy or lightheaded or passing out.  She has shortness of breath.  This chest wall pain.  Will was not dyspneic.  She has not had a syncopal episode.  Denies any fevers, chills, cough or other complaints.    The patient was recently in the hospital.  Basic labs were reviewed.    .  Chest x-ray was nondiagnostic.  His followed by chest CT that showed no rib fractures.  There is also no pneumothorax.  I have considered pulmonary embolism but she is chronically anticoagulated.  So I think this is less likely.    A CBC and CMP are unremarkable.  The patient's urinalysis remains pending.  Certainly UTI remains in the differential diagnosis.    Apparently, she is refusing a bladder catheterization.    The patient is chronically anticoagulated.  She did hit her head.  Therefore, did a head CT.  There is no hemorrhage.    The patient has significant pain and difficulty getting up and has a hard time handling her own unassisted.  The nurses don't feel she is safe to be discharged home.  At this point, she'll be admitted for gait instability, pain control and  observation.  She may require further workup.  She is admitted to the hospital for continued workup and treatment      FINAL IMPRESSION  1. Chest wall pain    2. Contusion of left chest wall, initial encounter    3. Gait instability    4. Frequent falls            Electronically signed by: Maurice Patel, 5/4/2018 12:02 PM

## 2018-05-05 ENCOUNTER — HOME HEALTH ADMISSION (OUTPATIENT)
Dept: HOME HEALTH SERVICES | Facility: HOME HEALTHCARE | Age: 68
End: 2018-05-05
Payer: MEDICARE

## 2018-05-05 VITALS
SYSTOLIC BLOOD PRESSURE: 125 MMHG | HEIGHT: 65 IN | RESPIRATION RATE: 20 BRPM | TEMPERATURE: 98.2 F | HEART RATE: 53 BPM | DIASTOLIC BLOOD PRESSURE: 67 MMHG | BODY MASS INDEX: 46.35 KG/M2 | WEIGHT: 278.22 LBS | OXYGEN SATURATION: 97 %
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
## 2018-05-05 LAB
ANION GAP SERPL CALC-SCNC: 5 MMOL/L (ref 0–11.9)
BASOPHILS # BLD AUTO: 0.7 % (ref 0–1.8)
BASOPHILS # BLD: 0.04 K/UL (ref 0–0.12)
BUN SERPL-MCNC: 12 MG/DL (ref 8–22)
CALCIUM SERPL-MCNC: 8.9 MG/DL (ref 8.5–10.5)
CHLORIDE SERPL-SCNC: 105 MMOL/L (ref 96–112)
CO2 SERPL-SCNC: 30 MMOL/L (ref 20–33)
CREAT SERPL-MCNC: 0.62 MG/DL (ref 0.5–1.4)
EOSINOPHIL # BLD AUTO: 0.32 K/UL (ref 0–0.51)
EOSINOPHIL NFR BLD: 5.8 % (ref 0–6.9)
ERYTHROCYTE [DISTWIDTH] IN BLOOD BY AUTOMATED COUNT: 54.2 FL (ref 35.9–50)
GLUCOSE SERPL-MCNC: 113 MG/DL (ref 65–99)
HCT VFR BLD AUTO: 37.8 % (ref 37–47)
HGB BLD-MCNC: 12.4 G/DL (ref 12–16)
IMM GRANULOCYTES # BLD AUTO: 0.02 K/UL (ref 0–0.11)
IMM GRANULOCYTES NFR BLD AUTO: 0.4 % (ref 0–0.9)
INR PPP: 1.28 (ref 0.87–1.13)
LYMPHOCYTES # BLD AUTO: 2.1 K/UL (ref 1–4.8)
LYMPHOCYTES NFR BLD: 38.1 % (ref 22–41)
MCH RBC QN AUTO: 32.8 PG (ref 27–33)
MCHC RBC AUTO-ENTMCNC: 32.8 G/DL (ref 33.6–35)
MCV RBC AUTO: 100 FL (ref 81.4–97.8)
MONOCYTES # BLD AUTO: 0.41 K/UL (ref 0–0.85)
MONOCYTES NFR BLD AUTO: 7.4 % (ref 0–13.4)
NEUTROPHILS # BLD AUTO: 2.62 K/UL (ref 2–7.15)
NEUTROPHILS NFR BLD: 47.6 % (ref 44–72)
NRBC # BLD AUTO: 0 K/UL
NRBC BLD-RTO: 0 /100 WBC
PLATELET # BLD AUTO: 201 K/UL (ref 164–446)
PMV BLD AUTO: 10.4 FL (ref 9–12.9)
POTASSIUM SERPL-SCNC: 4 MMOL/L (ref 3.6–5.5)
PROTHROMBIN TIME: 15.7 SEC (ref 12–14.6)
RBC # BLD AUTO: 3.78 M/UL (ref 4.2–5.4)
SODIUM SERPL-SCNC: 140 MMOL/L (ref 135–145)
WBC # BLD AUTO: 5.5 K/UL (ref 4.8–10.8)

## 2018-05-05 PROCEDURE — G0378 HOSPITAL OBSERVATION PER HR: HCPCS

## 2018-05-05 PROCEDURE — 96372 THER/PROPH/DIAG INJ SC/IM: CPT

## 2018-05-05 PROCEDURE — G8978 MOBILITY CURRENT STATUS: HCPCS | Mod: CK

## 2018-05-05 PROCEDURE — 700111 HCHG RX REV CODE 636 W/ 250 OVERRIDE (IP): Performed by: HOSPITALIST

## 2018-05-05 PROCEDURE — 700102 HCHG RX REV CODE 250 W/ 637 OVERRIDE(OP): Performed by: HOSPITALIST

## 2018-05-05 PROCEDURE — G8980 MOBILITY D/C STATUS: HCPCS | Mod: CK

## 2018-05-05 PROCEDURE — 85025 COMPLETE CBC W/AUTO DIFF WBC: CPT

## 2018-05-05 PROCEDURE — G8979 MOBILITY GOAL STATUS: HCPCS | Mod: CK

## 2018-05-05 PROCEDURE — A9270 NON-COVERED ITEM OR SERVICE: HCPCS | Performed by: HOSPITALIST

## 2018-05-05 PROCEDURE — 85610 PROTHROMBIN TIME: CPT

## 2018-05-05 PROCEDURE — 97162 PT EVAL MOD COMPLEX 30 MIN: CPT

## 2018-05-05 PROCEDURE — 99217 PR OBSERVATION CARE DISCHARGE: CPT | Performed by: HOSPITALIST

## 2018-05-05 PROCEDURE — 80048 BASIC METABOLIC PNL TOTAL CA: CPT

## 2018-05-05 RX ADMIN — OXYCODONE HYDROCHLORIDE 5 MG: 5 TABLET ORAL at 06:31

## 2018-05-05 RX ADMIN — FLUOXETINE HYDROCHLORIDE 20 MG: 20 CAPSULE ORAL at 09:24

## 2018-05-05 RX ADMIN — FAMOTIDINE 20 MG: 20 TABLET ORAL at 09:23

## 2018-05-05 RX ADMIN — ENOXAPARIN SODIUM 120 MG: 150 INJECTION SUBCUTANEOUS at 09:21

## 2018-05-05 RX ADMIN — STANDARDIZED SENNA CONCENTRATE AND DOCUSATE SODIUM 2 TABLET: 8.6; 5 TABLET, FILM COATED ORAL at 09:24

## 2018-05-05 RX ADMIN — GABAPENTIN 900 MG: 300 CAPSULE ORAL at 09:23

## 2018-05-05 RX ADMIN — LEVOTHYROXINE SODIUM 250 MCG: 125 TABLET ORAL at 06:31

## 2018-05-05 RX ADMIN — NICOTINE 14 MG: 14 PATCH, EXTENDED RELEASE TRANSDERMAL at 06:31

## 2018-05-05 RX ADMIN — TIOTROPIUM BROMIDE 1 CAPSULE: 18 CAPSULE ORAL; RESPIRATORY (INHALATION) at 09:25

## 2018-05-05 RX ADMIN — BUDESONIDE AND FORMOTEROL FUMARATE DIHYDRATE 2 PUFF: 160; 4.5 AEROSOL RESPIRATORY (INHALATION) at 09:20

## 2018-05-05 ASSESSMENT — GAIT ASSESSMENTS
DISTANCE (FEET): 25
ASSISTIVE DEVICE: FRONT WHEEL WALKER
GAIT LEVEL OF ASSIST: SUPERVISED

## 2018-05-05 ASSESSMENT — COGNITIVE AND FUNCTIONAL STATUS - GENERAL
MOVING FROM LYING ON BACK TO SITTING ON SIDE OF FLAT BED: A LITTLE
CLIMB 3 TO 5 STEPS WITH RAILING: A LITTLE
WALKING IN HOSPITAL ROOM: A LITTLE
SUGGESTED CMS G CODE MODIFIER MOBILITY: CK
MOBILITY SCORE: 18
STANDING UP FROM CHAIR USING ARMS: A LITTLE
MOVING TO AND FROM BED TO CHAIR: A LITTLE
TURNING FROM BACK TO SIDE WHILE IN FLAT BAD: A LITTLE

## 2018-05-05 ASSESSMENT — PAIN SCALES - GENERAL: PAINLEVEL_OUTOF10: 0

## 2018-05-05 NOTE — DISCHARGE SUMMARY
"CHIEF COMPLAINT ON ADMISSION  Chief Complaint   Patient presents with   • Rib Pain     right side after falling yesterday. states she slipped on the carpet then fell on her right side. per ems right chest/rib tender to touch.   • Shortness of Breath     \"all the time\", but states today having pain during deep inspiration. dx w/pna a month ago.   • Chest Wall Pain     right side       CODE STATUS  Full Code    HPI & HOSPITAL COURSE  This is a 67 y.o. female past medical history significant for ongoing tobacco use, COPD on home O2, YESSICA on CPAP, morbid obesity, history of DVT and A. fib on anticoagulation therapy here with ground-level fall and rib pain. She has recently been treated with pneumonia and was hospitalized 4/5 to 4/11/18. She has been having multiple falls lately stating she fell in a parking lot landing on her right side about a week ago. Then day prior to admission she fell at home after tripping over a rug again landing on her right side. CT chest in the emergency department did not identify any fractures and showed some residual pulmonary infiltrates.  Workup: Pro calcitonin less than 0.05. CBC and BMP essentially unremarkable. INR 1.6 down to 1.28 this morning. She was supposed to start Lovenox therapy prior to this hospitalization in order to bridge with the warfarin for her sub-therapeutic INR but she just picked up the prescription yesterday. She states she is comfortable with resuming the plan of twice-daily Lovenox in addition to her daily warfarin. She already has a Coumadin clinic appointment scheduled for Monday 5/7.  CT head showed no acute abnormalities. Chest x-ray showed no rib fractures or pneumothorax. CT chest showed no acute cardiopulmonary disease, no displaced rib fractures or pneumothorax, chronic interstitial reticulonodular opacities.   12-Lead EKG showed sinus bradycardia with no ST elevations or depressions. Overnight telemetry showed no evidence of arrhythmias, tachycardia, or " roel.   He was evaluated by physical therapy who do not recommend any additional needs at this time. Patient reports having a four-wheel walker, cane, scooter chair and elevating recliner at home. She also has in-home caregivers 3 days a week. A home health referral was placed and someone will contact her Monday. Really she is eager for discharge. She states she is feeling back to her baseline. Greater than 6 minutes was spent on tobacco cessation and patient declines nicotine patches are, this time. She is instructed to call 911 or return to the nearest emergency department for any emergencies.    Therefore, she is discharged in fair and stable condition with close outpatient follow-up.    DISCHARGE PROBLEM LIST  Principal Problem:    Fall POA: Unknown  Active Problems:    History of DVT (deep vein thrombosis) (Chronic) POA: Yes      Overview: Diagnois update 10/1/2016    YESSICA (obstructive sleep apnea) (Chronic) POA: Yes      Overview: CPAP at night at home    COPD (chronic obstructive pulmonary disease) (HCC) (Chronic) POA: Yes      Overview: On home O2      Current smoker.      Significant comorbidity    Chronic anticoagulation POA: Yes    Class 3 obesity with serious comorbidity in adult POA: Yes  Resolved Problems:    * No resolved hospital problems. *      FOLLOW UP  Future Appointments  Date Time Provider Department Center   5/8/2018 4:30 PM [facility] EXAM 5 VMED None   5/9/2018 8:10 AM Carmelo Pat M.D. [facility] C   5/23/2018 4:10 PM Carmelo Pat M.D. [facility] C   6/14/2018 1:15 PM PULMONARY DX 1 IPUL [address]   6/14/2018 1:40 PM ALCIRA Chavez PULM None     MEDICATIONS ON DISCHARGE     Medication List      START taking these medications      Instructions   enoxaparin 120 MG/0.8ML Soln inj  Commonly known as:  LOVENOX   Inject 120 mg as instructed every 12 hours.  Dose:  120 mg        CONTINUE taking these medications      Instructions   albuterol 108 (90 Base) MCG/ACT Aers inhalation  aerosol  Commonly known as:  PROVENTIL HFA   Inhale 2 Puffs by mouth every four hours as needed for Shortness of Breath (Wheezing).  Dose:  2 Puff     amitriptyline 25 MG Tabs  Commonly known as:  ELAVIL   Take 1 Tab by mouth every bedtime.  Dose:  25 mg     budesonide-formoterol 160-4.5 MCG/ACT Aero  Commonly known as:  SYMBICORT   Inhale 2 Puffs by mouth 2 Times a Day. With spacer, rinse mouth after use  Dose:  2 Puff     carBAMazepine 200 MG Tabs  Commonly known as:  TEGRETOL   Take 600 mg by mouth every bedtime.  Dose:  600 mg     FLUoxetine 20 MG Caps  Commonly known as:  PROZAC   Take 20 mg by mouth every day.  Dose:  20 mg     flurazepam 30 MG capsule  Commonly known as:  DALMANE   Take 30 mg by mouth every bedtime.  Dose:  30 mg     gabapentin 300 MG Caps  Commonly known as:  NEURONTIN   TAKE THREE (3) CAPSULES BY MOUTH THREE (3) TIMES DAILY     * levothyroxine 200 MCG Tabs  Commonly known as:  SYNTHROID   Take 250 mcg by mouth Every morning on an empty stomach. x1 200mcg tab + x1 50mcg tab for total dose = 250mcg QD  Dose:  250 mcg     * levothyroxine 50 MCG Tabs  Commonly known as:  SYNTHROID   Take 250 mcg by mouth Every morning on an empty stomach. x1 200mcg tab + x1 50mcg tab for total dose = 250mcg QD  Dose:  250 mcg     raNITidine 150 MG Tabs  Commonly known as:  ZANTAC   Take 150 mg by mouth 2 times a day.  Dose:  150 mg     Tiotropium Bromide Monohydrate 2.5 MCG/ACT Aers  Commonly known as:  SPIRIVA RESPIMAT   Inhale 2 Inhalation by mouth every day. Assemble and prime.  Dose:  2 Inhalation     warfarin 10 MG Tabs  Commonly known as:  COUMADIN   Take 10-15 mg by mouth every evening. 10mg on Wednesdays, 15mg on all other days  Dose:  10-15 mg        * This list has 2 medication(s) that are the same as other medications prescribed for you. Read the directions carefully, and ask your doctor or other care provider to review them with you.                DIET  Orders Placed This Encounter   Procedures   •  Diet Order     Standing Status:   Standing     Number of Occurrences:   1     Order Specific Question:   Diet:     Answer:   Regular [1]       ACTIVITY  As tolerated.  Weight bearing as tolerated      CONSULTATIONS  NA    PROCEDURES  NA    LABORATORY  Lab Results   Component Value Date/Time    SODIUM 140 05/05/2018 03:30 AM    POTASSIUM 4.0 05/05/2018 03:30 AM    CHLORIDE 105 05/05/2018 03:30 AM    CO2 30 05/05/2018 03:30 AM    GLUCOSE 113 (H) 05/05/2018 03:30 AM    BUN 12 05/05/2018 03:30 AM    CREATININE 0.62 05/05/2018 03:30 AM    CREATININE 0.8 04/27/2009 08:33 AM        Lab Results   Component Value Date/Time    WBC 5.5 05/05/2018 03:30 AM    HEMOGLOBIN 12.4 05/05/2018 03:30 AM    HEMATOCRIT 37.8 05/05/2018 03:30 AM    PLATELETCT 201 05/05/2018 03:30 AM        Total time of the discharge process exceeds 40 minutes   STEPHON Rangel.

## 2018-05-05 NOTE — PROGRESS NOTES
1620  Report from ROS Reid.    Assumed care of Pt at 1705 when Pt transferred to T204 via gurney on 3L o2 n/c.  Pt 1 assist to RR, A & O X 4, VSS but HTN with BP of 194/81; Pt c/o R sided pain with requests for pain medications - medicated per MAR.  Pt noted with colostomy bag - Pt did not bring supplies with her for changes.  Bed in low position, call light within reach - will continue to monitor.

## 2018-05-05 NOTE — THERAPY
"Physical Therapy Evaluation completed.   Bed Mobility:  Supine to Sit: Supervised  Transfers: Sit to Stand: Supervised  Gait: Level Of Assist: Supervised with Front-Wheel Walker       Plan of Care: Patient with no further skilled PT needs in the acute care setting at this time and Patient demonstrates safety with mobility in this environment at this time.   Discharge Recommendations: Equipment: No Equipment Needed. Post-acute therapy Discharge to home with outpatient or home health for additional skilled therapy services.    PT evaluation completed. Pt presents to acute PT subsequent to hx of falls with subsequent mild debility, weakness, pain, and impaired functional mobility/endurance. Pt reports ready to d/c home with assist from family/friends as needed. Patient is not being actively followed for therapy services at this time. However, pt may be seen at the rquest of attending provider for an addition visit to address any discharge or equipment neeeds within 30 days from evaluation. Pt living in mildly assisted living with 9 hour per week caregiver and pt already has all equipment that could be beneficial. Pt educated on importanc of safe mobility and preventing falls.    See \"Rehab Therapy-Acute\" Patient Summary Report for complete documentation.     "

## 2018-05-05 NOTE — PROGRESS NOTES
Inpatient Anticoagulation Service Note    Date: 5/4/2018  Reason for Anticoagulation: Deep Vein Thrombosis, Atrial Fibrillation      Hemoglobin Value: 13.2  Hematocrit Value: 41  Lab Platelet Value: 211  Target INR: 2.0 to 3.0    INR from last 7 days     Date/Time INR Value    05/04/18 1212 (!)  1.6        Dose from last 7 days     Date/Time Dose (mg)    05/04/18 1800  15        Average Dose (mg):  10 mg PO on Wednesdays and 15 mg all other days of the week  Significant Interactions: carbamazepine, fluoxetine, amitriptyline   Bridge Therapy: Yes  Date of Last VTE Event: 10/01/16  Bridge Therapy Start Date: 05/04/18  Days of Overlap Therapy: 1     INR Value Greater than 2 Prior to Discontinuation of Parenteral Anticoagulation: Yes     Reversal Agent Administered: Not Applicable    Comments: 66 y/o F on home warfarin for Hx of DVT (patient also has AFib listed on her problem list). The patient is followed by the [facility] anticoagulation clinic (last appointement on 5/1/18). The patient's INR today is subtherapeutic despite the patient denying non-compliance. H/H is stable with no overt sxs of bleeding.     Plan:  Will continue patient's home dose of 15 mg PO every day of the week except 10 mg on Friday and will continue bridge therapy with enoxaparin 1 mg/kg SQ Q12g. Will check INR tomorrow with AM labs. Pharmacy will continue to follow.     Education Material Provided?: No  Pharmacist suggested discharge dosing: TBD based on subsequent INR levels     Grace Fish, PharmD, BCPS

## 2018-05-05 NOTE — DISCHARGE PLANNING
We are currently verifying MD acceptance of your patient. This will be resolved ASAP. If you want this escalated for a faster response please call [phone] and press option #2. Thank you for your patience.    Respectfully,   [facility]

## 2018-05-05 NOTE — DISCHARGE PLANNING
ATTN: Case Management  RE: Referral for Home Health                We would like to take this opportunity to thank you for submitting a referral for your patient to continue care with [facility]. Our skilled team is dedicated to helping all patients recover and gain independence in the home setting.            As of 5/5/18, we have accepted the above patient into our service. A [facility] clinician will be out to see the patient within 48 hours to conduct our initial visit. If you have any questions or concerns regarding the patient’s transition to Home Health, please do not hesitate to contact us. We are open for referrals 7 days a week from 8AM to 5PM at [phone].      We look forward to collaborating with you,  [facility] Team

## 2018-05-05 NOTE — PROGRESS NOTES
RN took Pt's home meds to Pharmacy for safe keeping; 4 security [facility] to [facility].

## 2018-05-05 NOTE — PROGRESS NOTES
Received report from evening RN.  Pt is A/Ox4.  Respirations are even with mild labor on 3L mask.  Pt wears oxygen at home.  Monitors applied, VSS.  Plan of care reviewed, verbalized understanding. Call light within reach.

## 2018-05-05 NOTE — DISCHARGE INSTRUCTIONS
Discharge Instructions    Discharged to home by car with friend. Discharged via wheelchair, hospital escort: Yes.  Special equipment needed: Not Applicable    Be sure to schedule a follow-up appointment with your primary care doctor or any specialists as instructed.     Discharge Plan:   Diet Plan: Discussed  Activity Level: Discussed  Confirmed Follow up Appointment: Appointment Scheduled  Confirmed Symptoms Management: Discussed  Medication Reconciliation Updated: Yes    I understand that a diet low in cholesterol, fat, and sodium is recommended for good health. Unless I have been given specific instructions below for another diet, I accept this instruction as my diet prescription.   Other diet:     Special Instructions: Patient may discharge home.   She has appointment with [facility]in Clinic already scheduled on Monday.       · Is patient discharged on Warfarin / Coumadin?   Yes    You are receiving the drug warfarin. Please understand the importance of monitoring warfarin with scheduled PT/INR blood draws.  Follow-up with the Coumadin Clinic in one week for INR lab..    IMPORTANT: HOW TO USE THIS INFORMATION:  This is a summary and does NOT have all possible information about this product. This information does not assure that this product is safe, effective, or appropriate for you. This information is not individual medical advice and does not substitute for the advice of your health care professional. Always ask your health care professional for complete information about this product and your specific health needs.      WARFARIN - ORAL (WARF-uh-rin)      COMMON BRAND NAME(S): Coumadin      WARNING:  Warfarin can cause very serious (possibly fatal) bleeding. This is more likely to occur when you first start taking this medication or if you take too much warfarin. To decrease your risk for bleeding, your doctor or other health care provider will monitor you closely and check your lab results (INR test) to make  "sure you are not taking too much warfarin. Keep all medical and laboratory appointments. Tell your doctor right away if you notice any signs of serious bleeding. See also Side Effects section.      USES:  This medication is used to treat blood clots (such as in deep vein thrombosis-DVT or pulmonary embolus-PE) and/or to prevent new clots from forming in your body. Preventing harmful blood clots helps to reduce the risk of a stroke or heart attack. Conditions that increase your risk of developing blood clots include a certain type of irregular heart rhythm (atrial fibrillation), heart valve replacement, recent heart attack, and certain surgeries (such as hip/knee replacement). Warfarin is commonly called a \"blood thinner,\" but the more correct term is \"anticoagulant.\" It helps to keep blood flowing smoothly in your body by decreasing the amount of certain substances (clotting proteins) in your blood.      HOW TO USE:  Read the Medication Guide provided by your pharmacist before you start taking warfarin and each time you get a refill. If you have any questions, ask your doctor or pharmacist. Take this medication by mouth with or without food as directed by your doctor or other health care professional, usually once a day. It is very important to take it exactly as directed. Do not increase the dose, take it more frequently, or stop using it unless directed by your doctor. Dosage is based on your medical condition, laboratory tests (such as INR), and response to treatment. Your doctor or other health care provider will monitor you closely while you are taking this medication to determine the right dose for you. Use this medication regularly to get the most benefit from it. To help you remember, take it at the same time each day. It is important to eat a balanced, consistent diet while taking warfarin. Some foods can affect how warfarin works in your body and may affect your treatment and dose. Avoid sudden large " increases or decreases in your intake of foods high in vitamin K (such as broccoli, cauliflower, cabbage, brussels sprouts, kale, spinach, and other green leafy vegetables, liver, green tea, certain vitamin supplements). If you are trying to lose weight, check with your doctor before you try to go on a diet. Cranberry products may also affect how your warfarin works. Limit the amount of cranberry juice (16 ounces/480 milliliters a day) or other cranberry products you may drink or eat.      SIDE EFFECTS:  Nausea, loss of appetite, or stomach/abdominal pain may occur. If any of these effects persist or worsen, tell your doctor or pharmacist promptly. Remember that your doctor has prescribed this medication because he or she has judged that the benefit to you is greater than the risk of side effects. Many people using this medication do not have serious side effects. This medication can cause serious bleeding if it affects your blood clotting proteins too much (shown by unusually high INR lab results). Even if your doctor stops your medication, this risk of bleeding can continue for up to a week. Tell your doctor right away if you have any signs of serious bleeding, including: unusual pain/swelling/discomfort, unusual/easy bruising, prolonged bleeding from cuts or gums, persistent/frequent nosebleeds, unusually heavy/prolonged menstrual flow, pink/dark urine, coughing up blood, vomit that is bloody or looks like coffee grounds, severe headache, dizziness/fainting, unusual or persistent tiredness/weakness, bloody/black/tarry stools, chest pain, shortness of breath, difficulty swallowing. Tell your doctor right away if any of these unlikely but serious side effects occur: persistent nausea/vomiting, severe stomach/abdominal pain, yellowing eyes/skin. This drug rarely has caused very serious (possibly fatal) problems if its effects lead to small blood clots (usually at the beginning of treatment). This can lead to severe  skin/tissue damage that may require surgery or amputation if left untreated. Patients with certain blood conditions (protein C or S deficiency) may be at greater risk. Get medical help right away if any of these rare but serious side effects occur: painful/red/purplish patches on the skin (such as on the toe, breast, abdomen), change in the amount of urine, vision changes, confusion, slurred speech, weakness on one side of the body. A very serious allergic reaction to this drug is rare. However, get medical help right away if you notice any symptoms of a serious allergic reaction, including: rash, itching/swelling (especially of the face/tongue/throat), severe dizziness, trouble breathing. This is not a complete list of possible side effects. If you notice other effects not listed above, contact your doctor or pharmacist. In the [place] - Call your doctor for medical advice about side effects. You may report side effects to FDA at 7-371-AHS-4193. In [place] - Call your doctor for medical advice about side effects. You may report side effects to [facility] [place] at 1-610.910.8502.      PRECAUTIONS:  Before taking warfarin, tell your doctor or pharmacist if you are allergic to it; or if you have any other allergies. This product may contain inactive ingredients, which can cause allergic reactions or other problems. Talk to your pharmacist for more details. Before using this medication, tell your doctor or pharmacist your medical history, especially of: blood disorders (such as anemia, hemophilia), bleeding problems (such as bleeding of the stomach/intestines, bleeding in the brain), blood vessel disorders (such as aneurysms), recent major injury/surgery, liver disease, alcohol use, mental/mood disorders (including memory problems), frequent falls/injuries. It is important that all your doctors and dentists know that you take warfarin. Before having surgery or any medical/dental procedures, tell your doctor or dentist that you  are taking this medication and about all the products you use (including prescription drugs, nonprescription drugs, and herbal products). Avoid getting injections into the muscles. If you must have an injection into a muscle (for example, a flu shot), it should be given in the arm. This way, it will be easier to check for bleeding and/or apply pressure bandages. This medication may cause stomach bleeding. Daily use of alcohol while using this medicine will increase your risk for stomach bleeding and may also affect how this medication works. Limit or avoid alcoholic beverages. If you have not been eating well, if you have an illness or infection that causes fever, vomiting, or diarrhea for more than 2 days, or if you start using any antibiotic medications, contact your doctor or pharmacist immediately because these conditions can affect how warfarin works. This medication can cause heavy bleeding. To lower the chance of getting cut, bruised, or injured, use great caution with sharp objects like safety razors and nail cutters. Use an electric razor when shaving and a soft toothbrush when brushing your teeth. Avoid activities such as contact sports. If you fall or injure yourself, especially if you hit your head, call your doctor immediately. Your doctor may need to check you. The Food & Drug Administration has stated that generic warfarin products are interchangeable. However, consult your doctor or pharmacist before switching warfarin products. Be careful not to take more than one medication that contains warfarin unless specifically directed by the doctor or health care provider who is monitoring your warfarin treatment. Older adults may be at greater risk for bleeding while using this drug. This medication is not recommended for use during pregnancy because of serious (possibly fatal) harm to an unborn baby. Discuss the use of reliable forms of birth control with your doctor. If you become pregnant or think you  "may be pregnant, tell your doctor immediately. If you are planning pregnancy, discuss a plan for managing your condition with your doctor before you become pregnant. Your doctor may switch the type of medication you use during pregnancy. Very small amounts of this medication may pass into breast milk but is unlikely to harm a nursing infant. Consult your doctor before breast-feeding.      DRUG INTERACTIONS:  Drug interactions may change how your medications work or increase your risk for serious side effects. This document does not contain all possible drug interactions. Keep a list of all the products you use (including prescription/nonprescription drugs and herbal products) and share it with your doctor and pharmacist. Do not start, stop, or change the dosage of any medicines without your doctor's approval. Warfarin interacts with many prescription, nonprescription, vitamin, and herbal products. This includes medications that are applied to the skin or inside the vagina or rectum. The interactions with warfarin usually result in an increase or decrease in the \"blood-thinning\" (anticoagulant) effect. Your doctor or other health care professional should closely monitor you to prevent serious bleeding or clotting problems. While taking warfarin, it is very important to tell your doctor or pharmacist of any changes in medications, vitamins, or herbal products that you are taking. Some products that may interact with this drug include: capecitabine, imatinib, mifepristone. Aspirin, aspirin-like drugs (salicylates), and nonsteroidal anti-inflammatory drugs (NSAIDs such as ibuprofen, naproxen, celecoxib) may have effects similar to warfarin. These drugs may increase the risk of bleeding problems if taken during treatment with warfarin. Carefully check all prescription/nonprescription product labels (including drugs applied to the skin such as pain-relieving creams) since the products may contain NSAIDs or salicylates. " Talk to your doctor about using a different medication (such as acetaminophen) to treat pain/fever. Low-dose aspirin and related drugs (such as clopidogrel, ticlopidine) should be continued if prescribed by your doctor for specific medical reasons such as heart attack or stroke prevention. Consult your doctor or pharmacist for more details. Many herbal products interact with warfarin. Tell your doctor before taking any herbal products, especially bromelains, coenzyme Q10, cranberry, danshen, dong quai, fenugreek, garlic, ginkgo biloba, ginseng, and Bennie's wort, among others. This medication may interfere with a certain laboratory test to measure theophylline levels, possibly causing false test results. Make sure laboratory personnel and all your doctors know you use this drug.      OVERDOSE:  If overdose is suspected, contact a poison control center or emergency room immediately. [place] residents can call the [employer] [employer] Poison Hotline at 1-605.894.6236. [place] residents can call a provincial poison control center. Symptoms of overdose may include: bloody/black/tarry stools, pink/dark urine, unusual/prolonged bleeding.      NOTES:  Do not share this medication with others. Laboratory and/or medical tests (such as INR, complete blood count) must be performed periodically to monitor your progress or check for side effects. Consult your doctor for more details.      MISSED DOSE:  For the best possible benefit, do not miss any doses. If you do miss a dose and remember on the same day, take it as soon as you remember. If you remember on the next day, skip the missed dose and resume your usual dosing schedule. Do not double the dose to catch up because this could increase your risk for bleeding. Keep a record of missed doses to give to your doctor or pharmacist. Contact your doctor or pharmacist if you miss 2 or more doses in a row.      STORAGE:  Store at room temperature away from light and moisture. Do not store in the  bathroom. Keep all medications away from children and pets. Do not flush medications down the toilet or pour them into a drain unless instructed to do so. Properly discard this product when it is  or no longer needed. Consult your pharmacist or local waste disposal company for more details about how to safely discard your product.      MEDICAL ALERT:  Your condition and medication can cause complications in a medical emergency. For information about enrolling in MedicAlert, call 1-820.230.7198 ([place]) or 1-572.814.3899 ([place]).      Information last revised 2010 Copyright(c)  [employer]             Depression / Suicide Risk    As you are discharged from this [facility], it is important to learn how to keep safe from harming yourself.    Recognize the warning signs:  · Abrupt changes in personality, positive or negative- including increase in energy   · Giving away possessions  · Change in eating patterns- significant weight changes-  positive or negative  · Change in sleeping patterns- unable to sleep or sleeping all the time   · Unwillingness or inability to communicate  · Depression  · Unusual sadness, discouragement and loneliness  · Talk of wanting to die  · Neglect of personal appearance   · Rebelliousness- reckless behavior  · Withdrawal from people/activities they love  · Confusion- inability to concentrate     If you or a loved one observes any of these behaviors or has concerns about self-harm, here's what you can do:  · Talk about it- your feelings and reasons for harming yourself  · Remove any means that you might use to hurt yourself (examples: pills, rope, extension cords, firearm)  · Get professional help from the community (Mental Health, Substance Abuse, psychological counseling)  · Do not be alone:Call your Safe Contact- someone whom you trust who will be there for you.  · Call your local CRISIS HOTLINE 802-1236 or 348-550-9459  · Call your local Children's  Mobile Crisis Response Team [place] (521) 375-0822 or www.[employer].LIFEmee  · Call the toll free National Suicide Prevention Hotlines   · National Suicide Prevention Lifeline 482-063-TXLK (9397)  · National Hope Line Network 800-SUICIDE (700-0522)

## 2018-05-05 NOTE — DISCHARGE PLANNING
Choice form for [facility] faxed to Yvette KNOWLES.  Pt is calling her neighbor for ride home today.  No other needs verbazlied/id'd by pt/staff/MD/NP

## 2018-05-05 NOTE — DISCHARGE PLANNING
Received choice form from TRACY Sepulveda.  Referral sent to [facility] at 0913 on 05-05-18.

## 2018-05-05 NOTE — H&P
IDENTIFICATION:  This is a 67-year-old female.    PRIMARY CARE PHYSICIAN:  Carmelo Pat MD    CHIEF COMPLAINT:  Falls with right-sided chest pain and dyspnea.    HISTORY OF PRESENT ILLNESS:  This is a 67-year-old female with recent   diagnosis of pneumonia.  She is chronically O2 dependent, has a CPAP at home.    She approximately a week ago telling me that she fell in a parking lot and   landed on her right side, but was able to manage.  Then yesterday again, she   fell at home, tripping over a rug and was trying to brace herself landing on   the right side of his chest.  She complains of significant pain, pain with   respiration and comes to the emergency room with these complaints, here   evaluated.  CT of the chest did not identify any fracture.  She does have some   residual pulmonary infiltrates, but no other significant findings were found.    The patient is chronically on Coumadin for DVT, which is currently   subtherapeutic.  She had difficulty here in the emergency room, getting   situated and stabilized for discharge.  She states that she has had some   significant difficulties with breathing especially addressed.  She reports   that she does have some residual cough, but feels overall improved from a   recent discharge for pneumonia.  The patient states that she has been taking   her Coumadin, although she is subtherapeutic.  She cannot tell me why she has   been falling more frequently recently.  Overall, she will be admitted for   observation, gait stabilization, PT eval and improvement of her oxygenation   hopefully and pain control.  The patient will be an overnight observation   admission.  She has no help at home.  She lives alone.    ALLERGIES:  PENICILLIN.    MEDICATIONS:  Medication regimen includes the followin.  Albuterol 2 puffs q.4 p.r.n.  2.  Amitriptyline 25 mg at bedtime.  3.  Symbicort 2 puffs b.i.d.  4.  Tegretol 600 mg p.o. at bedtime.  5.  Prozac 20 mg daily.  6.  Dalmane 30 mg at  bedtime.  7.  Neurontin 300 mg 3 capsules p.o. t.i.d.  8.  Synthroid 250 mcg daily.  9.  Zantac 150 mg p.o. b.i.d.  10.  Spiriva 2 inhalations daily.  11.  Coumadin 10-15 mg daily.    PAST MEDICAL HISTORY:  1.  COPD, oxygen dependent with 3 years.  2.  Obstructive sleep apnea, on CPAP use.  3.  Obesity hypopnea syndrome.  4.  History of DVT.  5.  Anticoagulation.  6.  Hypertension.  7.  Anemia.  8.  Depression.  9.  Morbid obesity with a BMI of 46.  10.  History of hyperphosphatemia.  11.  History of atrial fibrillation.  12.  History of hypothyroidism.    PAST SURGICAL HISTORY:  1.  History of colon resection with complications and abdominal explorations.  2.  History of hernia repair.  3.  History of total knee arthroplasty.    SOCIAL HISTORY:  Patient smokes for many years.  She is currently down to 3-4   cigarettes daily.  Alcohol none, drugs none.  She is single.  She has a walker   as well as an electric wheelchair, apparently.    FAMILY HISTORY:  Positive for congestive heart failure in her mother and her   father  at age 50 of cirrhosis and emphysema.    REVIEW OF SYSTEMS:  Negative for AMA and CMS criteria other than outlined in   the HPI.  She does have some chronic low back pain.  She does have some cough   with greenish sputum.  She denies any dysuria.  She does have some early   dumping syndrome per her report.    PHYSICAL EXAMINATION:  VITAL SIGNS:  The patient is found with temperature of 36.8, pulse 64,   respirations 18, blood pressure is 141/60.  The patient is saturating 98% on 3   liters.  GENERAL:  This is a morbidly obese female, in no acute distress, no acute   respiratory distress.  Well developed, well nourished.  HEENT:  Normocephalic, atraumatic.  EOMI.  PERRLA.  Mucous membranes are   moist.  Nasopharynx clear.  NECK:  ____ range of motion.  No lymphadenopathy or thyromegaly.  CHEST:  Normal bony structures.  LUNGS:  Clear to auscultation anteriorly, posteriorly with mild rhonchi at  the   bases.  Right-sided chest wall is tender to palpation.  HEART:  Regular rate.  S1 and S2 appear normal.  No S3, S4.  ABDOMEN:  Protuberant.  There is no discrete tenderness, guarding, rebound.  GENITOURINARY:  Normal external female genitalia.  RECTAL:  Exam is deferred.  MUSCULOSKELETAL:  No clubbing, cyanosis.  She has some trace lower extremity   edema.  NEUROLOGIC:  The patient is alert and oriented x4.  Cranial nerves II-XII are   grossly intact.  No sensory loss is elicited.    LABORATORY DATA AND IMAGING:  CBC is essentially normal.  Chemistry abnormal   for glucose of 117, alkaline phosphatase of 102.  She does have a lipase of 5.    INR is 1.6.  CT of the chest, thorax shows no acute fracture.  There is no   acute cardiopulmonary disease, chronic interstitial reticular nodular   opacities in the lung parenchyma bilaterally, greatest in the right upper   lobe, but there is no effusion, otherwise.  Chest x-ray shows similar   findings.  EKG shows a sinus rhythm at a rate of 51 without acute ST-T   changes.  There is some left axis deviation.    ASSESSMENT AND PLAN:  1.  Chest trauma, twice now with right-sided pain and increased dyspnea.  The   patient with difficulty ambulation secondary to pain with dyspnea at rest.    The patient at this time will be admitted for continued medical care   optimization.  She will be admitted to the observation unit for physical   therapy evaluation.  2.  Hypoxemia, which is chronic, 3 liters of oxygen at home and chronic   obstructive pulmonary disease, on CPAP.  The patient at this time will be   continued on her home regimen, respiratory therapy, incentive spirometry, and   close monitoring of her oxygenation status.  4.  Anticoagulation with subtherapeutic INR.  Indication is deep vein   thrombosis and history of atrial fibrillation.  At this time, we will place   the patient on therapeutic Lovenox, which she also recently needed for her   subtherapeutic  anticoagulation.  We will continue with her titration of   Coumadin with the help of pharmacy.  5.  Hypertension, currently controlled.  6.  Anemia, history of.  7.  Depression, history of.  8.  Hypothyroidism, on replacement therapy.  9.  Morbid obesity.  10.  Frequent falls and debility.  The patient does have some assistive   devices at home.  This will be further evaluated once the patient is improved.    For full further details, please refer to the computer system and paper   chart.  The patient is overall medically complex.       ____________________________________     RUFINO KHAN MD    SS / NTS    DD:  05/04/2018 16:10:18  DT:  05/04/2018 17:00:16    D#:  [id number]  Job#:  [id number]

## 2018-05-08 ENCOUNTER — ANTICOAGULATION VISIT (OUTPATIENT)
Dept: VASCULAR LAB | Facility: MEDICAL CENTER | Age: 68
End: 2018-05-08
Attending: INTERNAL MEDICINE
Payer: MEDICARE

## 2018-05-08 DIAGNOSIS — Z79.01 CHRONIC ANTICOAGULATION: ICD-10-CM

## 2018-05-08 DIAGNOSIS — Z86.718 HISTORY OF DVT (DEEP VEIN THROMBOSIS): ICD-10-CM

## 2018-05-08 DIAGNOSIS — Z95.828 S/P IVC FILTER: ICD-10-CM

## 2018-05-08 LAB — INR PPP: 5 (ref 2–3.5)

## 2018-05-08 PROCEDURE — 99212 OFFICE O/P EST SF 10 MIN: CPT

## 2018-05-08 PROCEDURE — 85610 PROTHROMBIN TIME: CPT

## 2018-05-08 NOTE — PROGRESS NOTES
OP Anticoagulation Service Note    Date: 5/8/2018  There were no vitals filed for this visit.    Anticoagulation Summary  As of 5/8/2018    INR goal:   2.0-3.0   TTR:   40.4 % (2.6 y)   Today's INR:   5.0!   Warfarin maintenance plan:   10 mg (10 mg x 1) on Mon, Wed, Fri; 15 mg (10 mg x 1.5) all other days   Weekly warfarin total:   90 mg   Plan last modified:   Bob Dupree, PharmD (5/8/2018)   Next INR check:   5/15/2018   Priority:   Routine   Target end date:   Indefinite    Indications    DVT (deep venous thrombosis) ([facility]) [I82.409]  Chronic anticoagulation [Z79.01]  S/P IVC filter [Z95.828]  History of DVT (deep vein thrombosis) [Z86.718]             Anticoagulation Episode Summary     INR check location:   [facility]    Preferred lab:   [facility]    Send INR reminders to:       Comments:         Anticoagulation Care Providers     Provider Role Specialty Phone number    Carmelo Pat M.D. Referring Family Medicine [phone]    [facility] Antico[facility] Responsible  [phone]        Anticoagulation Patient Findings      HPI:   Atiya Henley seen in clinic today, they are here today for a INR check on anticoagulation therapy with warfarin because they have a PMH of DVTs    The reason for today's visit is to prevent morbidity and mortality from a blood clot  and to reduce the risk of bleeding while on a anticoagulant.     Reason for today's visit (per our collaborative practice policy) is because their last INR was 1.28  on  5/5.   Intervention at the last visit:  She was started on Lovenox on the 5th via the hospital md    Additional education provided today regarding reducing bleed risk and dietary constraints:  none    Any upcoming procedures:   none    Confirmed warfarin dosing regimen  Interval Changes with foods rich in vitamin K: No  Interval Changes in ETOH:   No  Interval Changes in smoking status:  No  Interval Changes in medication:  No   Cost restriction:  No    S/S  of bleeding or bruising:  No  Signs/symptoms  thrombosis since the last appt:  No  Bleed risk is:  high,     3 vitals included with today's appt :No  (BP, HR, weight, ht, RR)     Assessment:   INR  supra-therapeutic.     Possible reason(s) for INR today:   might be diet     Intervention required today to prevent:   They are at a increased risk of bleeding because INR above goal.    They have a TTR of 40.4  which is not at target (TTR target/goal is 100%) and requires close follow up to prevent a adverse event (the lower the TTR the higher risk of clots, strokes, or bleeding).       Plan:  Hold today then decrease weekly warfarin dose as noted and stop the lovenox shots       Follow up:  Follow up appointment in 1 week(s)       Other info:  Pt educated to contact our clinic with any changes in medications or s/s of bleeding or thrombosis    CHEST guidelines recommend frequent INR monitoring at regular intervals (a few days up to a max of 12 weeks) to ensure they are on the proper dose of warfarin and not having any complications from therapy.  INRs can dramatically change over a short time period due to diet, medications, and medical conditions.

## 2018-05-09 ENCOUNTER — HOME CARE VISIT (OUTPATIENT)
Dept: HOME HEALTH SERVICES | Facility: HOME HEALTHCARE | Age: 68
End: 2018-05-09

## 2018-05-21 DIAGNOSIS — K91.1 DUMPING SYNDROME: ICD-10-CM

## 2018-05-21 DIAGNOSIS — Z78.9 DECREASED ACTIVITIES OF DAILY LIVING (ADL): ICD-10-CM

## 2018-05-21 DIAGNOSIS — K94.09 COLOSTOMY FISTULA (HCC): ICD-10-CM

## 2018-05-21 NOTE — TELEPHONE ENCOUNTER
Patient called and said that a new Rx for her ostomy supplies is needed along with office notes and demographics. Patient uses [facility] pharmacy and supplies.   Phone:[phone]  FAX: [phone]

## 2018-05-22 ENCOUNTER — OFFICE VISIT (OUTPATIENT)
Dept: PHYSICAL MEDICINE AND REHAB | Facility: MEDICAL CENTER | Age: 68
End: 2018-05-22
Payer: MEDICARE

## 2018-05-22 VITALS
DIASTOLIC BLOOD PRESSURE: 78 MMHG | WEIGHT: 283 LBS | SYSTOLIC BLOOD PRESSURE: 118 MMHG | HEIGHT: 64 IN | BODY MASS INDEX: 48.32 KG/M2 | TEMPERATURE: 96.6 F

## 2018-05-22 DIAGNOSIS — Z98.890 H/O KNEE SURGERY: ICD-10-CM

## 2018-05-22 DIAGNOSIS — R26.9 IMPAIRED GAIT: ICD-10-CM

## 2018-05-22 DIAGNOSIS — M25.562 LEFT KNEE PAIN, UNSPECIFIED CHRONICITY: ICD-10-CM

## 2018-05-22 PROCEDURE — 99213 OFFICE O/P EST LOW 20 MIN: CPT | Performed by: PHYSICAL MEDICINE & REHABILITATION

## 2018-05-22 RX ORDER — AMMONIUM LACTATE 12 G/100G
LOTION TOPICAL PRN
COMMUNITY
Start: 2018-04-29 | End: 2018-05-25

## 2018-05-22 RX ORDER — TOBRAMYCIN AND DEXAMETHASONE 3; 1 MG/ML; MG/ML
SUSPENSION/ DROPS OPHTHALMIC
COMMUNITY
Start: 2018-02-26 | End: 2018-05-25

## 2018-05-22 NOTE — PROGRESS NOTES
Subjective:      Atiya Henley presents with Follow-Up        Subjective: Ms. Henley returns to the office today for follow-up evaluation of spinal/joint/musculoskeletal pain.     I last saw the patient in 7/2017, reviewed intercurrent medical and social issues.    Regarding today's visit:    The patient was seen by orthopedics regarding the left knee, reviewed report from 5/2018, recommending continued nonsurgical care. The patient is inquiring about second opinion orthopedic surgery consultation.    The patient notes ongoing pain about the left knee, worse with activities, limiting standing and walking tolerance. Has long history of regarding the left knee, including arthroscopy, total knee replacement with complication, infection, orthopedic care per Dr. Hogan, orthopedics. The patient has tried physical therapy and injections, without sustained relief.    The patient notes intermittent left ankle pain.    The patient notes intermittent thoracolumbar pain, controlled, without radicular component.    The patient notes only intermittent hip area pain.    The patient has had prior treatment with medications. She has been to physical therapy. She has tried injections. She has colostomy in place. No bladder incontinence noted. Home exercise program has been limited due to the ongoing pain. The ongoing left knee pain limits her ability to function, including standing and walking tolerance. She is inquiring about additional treatment options.      MEDICAL RECORDS REVIEW/DATA REVIEW: Reviewed in epic.    I reviewed medications. Reviewed medication list.    I reviewed diagnostic studies:     I reviewed radiographs.     Reviewed CT left knee 7/2017, see below. Reviewed left knee x-rays 7/2017, see below.     Reviewed left ankle x-rays 7/2017, showed mild arthritis.    Reviewed CTs cervical and lumbar spine 6/2016. Reviewed CT thoracic spine 3/2015.    Reviewed chest CT 4/2018. Reviewed head CT 4/2018.    I reviewed  lab studies. Reviewed labs 5/2018, including CMP, CBC.    I reviewed medical issues. Followed by consultants, including endocrinology. Note psychiatric disorder, bipolar, anxiety/depression.    I reviewed family history: No neuromuscular disorders noted.    I reviewed social issues. On disability.      7/5/2017 4:47 PM    HISTORY/REASON FOR EXAM:  History of left knee surgery and staph infection.      TECHNIQUE/ EXAM DESCRIPTION AND NUMBER OF VIEWS:  CT scan of the LEFT knee without contrast, with reconstructions.    Noncontrast thin helical sections were obtained from the distal femur through the proximal tibia/fibula. Sagittal and coronal reconstructions were generated from the axial images.    Up to date radiation dose reduction adjustments have been utilized to meet ALARA standards for radiation dose reduction.    COMPARISON:  None.    FINDINGS:  Left knee arthroplasty is noted in place. Prosthetic articular surfaces of the distal femur and tibial plateau appear to be intact. Screws extending into the tibia appear to be intact. No focal malalignment is identified. No acute fracture of the distal   femur is noted. No acute fracture of the proximal tibia is noted. The proximal fibula is intact. The patella appears displaced laterally relative to the distal femur and the femoral prosthesis. No acute fracture or bone erosion involving the patella is   identified. No bone erosion or bone destruction is identified in the distal femur proximal tibia or fibula.         Impression        1.  Left knee arthroplasty is noted in place. Metallic hardware appears to be intact.    2.  Some lateral displacement of the patella is noted relative to the distal femur.    3.  No acute fractures are identified.    4.  No focal areas of bone erosion or bone destruction are identified.           7/5/2017 5:01 PM    HISTORY/REASON FOR EXAM:  Left knee pain. History of left knee arthroplasty.      TECHNIQUE/EXAM DESCRIPTION AND NUMBER OF  VIEWS:  4 views of the LEFT knee.    COMPARISON: 12/11/2014    FINDINGS:  Bone density is osteopenic  There is no evidence of fracture or dislocation.  Left knee arthroplasty is noted in place.  The patella again appears to be displaced somewhat laterally relative to the distal femur. There is no joint effusion.         Impression        1.  No evidence of fracture or dislocation.    2.  Left knee arthroplasty is noted in place.    3.  Unchanged lateral positioning of the patella relative to the distal femur.       PAST MEDICAL HISTORY:   Past Medical History:   Diagnosis Date   • Arthritis    • Bowel obstruction ([facility])    • Cancer ([facility])     cervical   • Chronic autoimmune thyroiditis     + TPO ab = 563 / + ultrasound   • Colostomy in place ([facility]) 2009   • COPD    • Dental disorder     dentures upper   • Diabetes     type 2    • EMPHYSEMA    • Hashimoto's disease    • History of total knee arthroplasty     infection   • Hypertension     stated no longer on meds due to lost 100 lbs   • Hypothyroid 7/7/2009   • Indigestion    • Infectious disease     MRSA,   • Infectious disease     VRE   • Obesity 7/7/2009   • Obstruction     colostomy   • Oxygen dependent     4.5 L NC   • Pain     b/l legs   • Personal history of venous thrombosis and embolism 2009, 2012    arm and leg left side   • Presence of IVC filter    • Psychiatric problem     depression   • Sleep apnea     USES CPAP, 3-4L oxygen   • Snoring    • Status post cholecystectomy 1992   • Weight gain        PAST SURGICAL HISTORY:    Past Surgical History:   Procedure Laterality Date   • PB INJECT RX OTHER PERIPH NERVE Left 8/21/2015    Procedure: NEUROLYTIC DEST-OTHER NERVE;  Surgeon: Kd Galaviz D.O.;  Location: SURGERY SURGICAL [facility] ORS;  Service: Pain Management   • PB FLUOROSCOPIC GUIDANCE NEEDLE PLACEMENT Left 8/21/2015    Procedure: FLOURO GUIDE NEEDLE PLACEMENT;  Surgeon: Kd Galaviz D.O.;  Location: [facility];  Service: Pain  Management   • PB INJECT RX OTHER PERIPH NERVE  8/21/2015    Procedure: NEUROLYTIC DEST-OTHER NERVE;  Surgeon: Kd Galaviz D.O.;  Location: [facility];  Service: Pain Management   • PB INJECT RX OTHER PERIPH NERVE  8/21/2015    Procedure: NEUROLYTIC DEST-OTHER NERVE;  Surgeon: Kd Galaviz D.O.;  Location: [facility];  Service: Pain Management   • EXPLORATORY LAPAROTOMY  12/10/2013    Performed by Sang Castañeda M.D. at [facility]   • WOUND CLOSURE GENERAL  12/10/2013    Performed by Sang Castañeda M.D. at [facility]   • COLOSTOMY  11/26/2013    Performed by Maninder Carter M.D. at [facility]   • EXPLORATORY LAPAROTOMY  11/26/2013    Performed by Maninder Carter M.D. at [facility]   • LYSIS ADHESIONS GENERAL  11/26/2013    Performed by Maninder Carter M.D. at [facility]   • COLON RESECTION  11/26/2013    Performed by Maninder Carter M.D. at [facility]   • BLADDER BIOPSY WITH CYSTOSCOPY  10/10/2013    Performed by Sang Castañeda M.D. at [facility]   • EXPLORATORY LAPAROTOMY  9/2/2013    Performed by Maninder Carter M.D. at [facility]   • PARASTOMAL HERNIA REPAIR   9/2/2013    Performed by Maninder Carter M.D. at [facility]   • IRRIGATION & DEBRIDEMENT ORTHO  2/8/2013    Performed by David Fowler M.D. at [facility]   • IRRIGATION & DEBRIDEMENT GENERAL  12/17/2012    Performed by David Fowler M.D. at [facility]   • IRRIGATION & DEBRIDEMENT ORTHO  7/19/2012    Performed by BERYL LOYD at [facility]   • IRRIGATION & DEBRIDEMENT ORTHO  7/17/2012    Performed by BERYL LOYD at [facility]   • KNEE ARTHROPLASTY TOTAL  6/10/2012    left   • LATERAL RELEASE  7/20/2011    Performed by BERYL LODY at [facility]   • KNEE ARTHROSCOPY  7/20/2011    Performed by RACH  BERYL DAMON at SURGERY [facility] ORS   • MEDIAL MENISCECTOMY  7/20/2011    Performed by BERYL LOYD at SURGERY [facility] ORS   • MENISCECTOMY  7/20/2011    Performed by BERYL LOYD at SURGERY [facility] ORS   • COLONOSCOPY - ENDO  11/17/2010    Performed by JESUSITA SUMNER at ENDOSCOPY [facility] ORS   • TRACHEOSTOMY  3/31/2010    Performed by JESUSITA SUMNER at SURGERY [facility] ORS   • COLOSTOMY  3/16/2010    Performed by CHRISTA BENNETT at SURGERY [facility] ORS   • EXPLORATORY LAPAROTOMY  3/16/2010    Performed by CHRISTA BENNETT at SURGERY [facility] ORS   • COLECTOMY  3/16/2010    Performed by CHRISTA BENNETT at SURGERY [facility] ORS   • LOW ANTERIOR RESECTION  3/10/2010    Performed by JESUSITA SUMNER at SURGERY [facility] ORS   • LOW ANTERIOR RESECTION LAPAROSCOPIC  3/10/2010    Performed by JESUSITA SUMNER at SURGERY [facility] ORS   • MYRINGOTOMY  9/9/2009    Performed by RENETTA GOODSON at SURGERY [facility] DAY [facility]   • CHOLECYSTECTOMY  1992    laparoscopic   • HYSTERECTOMY LAPAROSCOPY  1983   • OTHER ORTHOPEDIC SURGERY      LT KNEE X2       ALLERGIES:  Pcn    MEDICATIONS:    Outpatient Encounter Prescriptions as of 5/22/2018   Medication Sig Dispense Refill   • ammonium lactate (LAC-HYDRIN) 12 % Lotion      • levothyroxine (SYNTHROID) 200 MCG Tab Take 250 mcg by mouth Every morning on an empty stomach. x1 200mcg tab + x1 50mcg tab for total dose = 250mcg QD     • levothyroxine (SYNTHROID) 50 MCG Tab Take 250 mcg by mouth Every morning on an empty stomach. x1 200mcg tab + x1 50mcg tab for total dose = 250mcg QD     • warfarin (COUMADIN) 10 MG Tab Take 10-15 mg by mouth every evening. 10mg on Wednesdays, 15mg on all other days     • albuterol (PROVENTIL HFA) 108 (90 Base) MCG/ACT Aero Soln inhalation aerosol Inhale 2 Puffs by mouth every four hours as needed for Shortness of Breath (Wheezing). 1 Inhaler 11   • amitriptyline (ELAVIL) 25 MG Tab Take 1 Tab by mouth every  bedtime. 30 Tab 0   • raNITidine (ZANTAC) 150 MG Tab Take 150 mg by mouth 2 times a day.     • gabapentin (NEURONTIN) 300 MG Cap TAKE THREE (3) CAPSULES BY MOUTH THREE (3) TIMES DAILY  270 Cap 3   • budesonide-formoterol (SYMBICORT) 160-4.5 MCG/ACT Aerosol Inhale 2 Puffs by mouth 2 Times a Day. With spacer, rinse mouth after use 1 Inhaler 11   • carbamazepine (TEGRETOL) 200 MG Tab Take 600 mg by mouth every bedtime.     • Tiotropium Bromide Monohydrate (SPIRIVA RESPIMAT) 2.5 MCG/ACT Aero Soln Inhale 2 Inhalation by mouth every day. Assemble and prime. 1 Inhaler 6   • flurazepam (DALMANE) 30 MG capsule Take 30 mg by mouth every bedtime.     • fluoxetine (PROZAC) 20 MG Cap Take 20 mg by mouth every day.     • tobramycin-dexamethasone (TOBRADEX) 0.3-0.1 % Suspension      • Misc. Devices Misc 3 boxes of pouches (70332), 2 boxes of wafers (63273), 3 boxes (tubes) paste, 1 box of no string skin prep, 1 box of no string unsolve 5 Container 6   • enoxaparin (LOVENOX) 120 MG/0.8ML Solution inj Inject 120 mg as instructed every 12 hours. 14 Syringe 0     No facility-administered encounter medications on file as of 5/22/2018.        SOCIAL HISTORY:    Social History     Social History   • Marital status:      Spouse name: N/A   • Number of children: N/A   • Years of education: N/A     Social History Main Topics   • Smoking status: Current Every Day Smoker     Packs/day: 0.25     Years: 50.00     Types: Cigarettes   • Smokeless tobacco: Current User      Comment: 1 ppd, 45 yrs   • Alcohol use No   • Drug use: Yes     Types: Marijuana      Comment: 1x per month   • Sexual activity: No     Other Topics Concern   •  Service No   • Blood Transfusions No     unknown   • Caffeine Concern No   • Occupational Exposure No   • Hobby Hazards No   • Sleep Concern Yes     sleep apnea   • Stress Concern Yes   • Weight Concern No   • Special Diet Yes   • Back Care Yes   • Exercise No   • Bike Helmet No     does not ride bike   "  • Seat Belt Yes   • Self-Exams Yes     Social History Narrative   • No narrative on file       Review of Systems  reviewed, no changes noted  Constitutional: Negative for fever and chills.   HENT: Negative for hearing loss and tinnitus.    Eyes: Negative for blurred vision and double vision.   Respiratory: Negative for hemoptysis and sputum production.    Cardiovascular: Negative for palpitations and orthopnea.   Gastrointestinal: Negative for nausea and vomiting.   Genitourinary: Negative for urgency and frequency.   Musculoskeletal: Positive for myalgias, back pain and joint pain.   Skin: Negative.    Endo/Heme/Allergies: Negative.    Psychiatric/Behavioral: Positive for depression. The patient is nervous/anxious and has insomnia.    All other systems reviewed and are negative.        Objective:     /78   Temp 35.9 °C (96.6 °F)   Ht 1.626 m (5' 4\")   Wt (!) 128.4 kg (283 lb)   LMP 07/19/2011   BMI 48.58 kg/m²      Physical Exam  Constitutional: oriented to person, place, and time, appears well-developed and well-nourished.   HEENT: Normocephalic atraumatic, neck supple, no JVD noted, no masses noted, no meningeal signs noted  Lymphadenopathy: no cervical, supraclavicular, or inguinal lymphadenopathy noted  Cardiovascular: Intact distal pulses, including at ankles, mild lower limb swelling noted, negative Homans  Pulmonary: No tachypnea noted, no accessory muscle use noted, no dyspnea noted  Abdominal: Soft, nontender, exhibits no distension, no peritoneal signs, no HSM, healed scars  Musculoskeletal:   Right hip: exhibits only mild tenderness. Minimal pain with range of motion testing  Left hip: exhibits only mild tenderness. Minimal pain with range of motion testing  Lumbar back: exhibits only mild decreased range of motion, mild tenderness and mild pain. negative straight leg testing, trigger points noted  Knee: Tender with palpation about left knee, pain with range of motion testing, no instability " noted, healed scar  Ankle: Mild Tender with palpation, mild pain with range of motion testing, stable with stress testing  Neurological: oriented to person, place, and time. Cranial nerves grossly intact, normal strength. Sensation intact distally. Reflexes 1+ in upper and lower limbs,  No upper motor neuron signs evident  Skin: Skin is intact. no rashes or lesions noted  Psychiatric: normal mood and affect. speech is normal and behavior is normal. Judgment and thought content normal. Cognition and memory are normal.        Assessment/Plan:       ASSESSMENT:    1. Left knee pain, sprain strain, history arthritis, status post multiple left knee surgeries including left total knee replacement with complications, chronic lateral patellar subluxation/dislocation, impaired gait    - Per patient request, submitted second opinion orthopedic surgery consultation, tertiary center    2. Left ankle pain, sprain strain, mild arthritis    - Review orthotics/footwear    3. Thoracolumbar pain, myofascial pain, degenerative disc disease, relatively controlled    4. Spinal/joint/musculoskeletal pain, arthritis    5. Comorbid medical issues, including DVT status post IVC, COPD, status post ostomy, with care per primary care provider, consultants      DISCUSSION/PLAN:    - I discussed management options. I reviewed symptomatic care    - I reviewed home exercise program, with medical precautions. Left knee activity/restrictions per orthopedics    - The patient can consider complementary trials with acupuncture, superficial massage therapy, or TENS unit    - I reviewed medication monitoring.  I reviewed further symptomatic medications. I did not prescribe any medications today    - I reviewed additional diagnostic options, including further/advanced imaging, electrodiagnostic testing, vascular studies, and further lab screen    - I reviewed additional therapeutic options, including injection/interventional therapy and additional  consultative input    - I encourage the patient to stop or decrease cigarette use    - Return after the second opinion orthopedic surgery consultation or an as-needed basis      Please note that this dictation was created using voice recognition software. I have made every reasonable attempt to correct obvious errors but there may be errors of grammar and content that I may have overlooked prior to finalization of this note.

## 2018-05-24 ENCOUNTER — TELEPHONE (OUTPATIENT)
Dept: VASCULAR LAB | Facility: MEDICAL CENTER | Age: 68
End: 2018-05-24

## 2018-05-24 NOTE — TELEPHONE ENCOUNTER
[facility]    Left a voicemail to remind pt to obtain next INR.    Bon Ash, PharmD

## 2018-05-25 ENCOUNTER — APPOINTMENT (OUTPATIENT)
Dept: RADIOLOGY | Facility: MEDICAL CENTER | Age: 68
End: 2018-05-25
Attending: EMERGENCY MEDICINE
Payer: MEDICARE

## 2018-05-25 ENCOUNTER — HOSPITAL ENCOUNTER (OUTPATIENT)
Facility: MEDICAL CENTER | Age: 68
End: 2018-05-27
Attending: EMERGENCY MEDICINE | Admitting: INTERNAL MEDICINE
Payer: MEDICARE

## 2018-05-25 DIAGNOSIS — R82.71 BACTERIURIA: ICD-10-CM

## 2018-05-25 DIAGNOSIS — H53.2 BINOCULAR VISION DISORDER WITH DIPLOPIA: ICD-10-CM

## 2018-05-25 DIAGNOSIS — R40.0 SOMNOLENCE: ICD-10-CM

## 2018-05-25 DIAGNOSIS — E03.8 OTHER SPECIFIED HYPOTHYROIDISM: ICD-10-CM

## 2018-05-25 PROBLEM — D68.318 CIRCULATING ANTICOAGULANTS (HCC): Status: ACTIVE | Noted: 2018-05-25

## 2018-05-25 PROBLEM — G92.8 TOXIC METABOLIC ENCEPHALOPATHY: Status: ACTIVE | Noted: 2018-05-25

## 2018-05-25 LAB
ALBUMIN SERPL BCP-MCNC: 3.5 G/DL (ref 3.2–4.9)
ALBUMIN/GLOB SERPL: 1 G/DL
ALP SERPL-CCNC: 91 U/L (ref 30–99)
ALT SERPL-CCNC: 13 U/L (ref 2–50)
AMMONIA PLAS-SCNC: 29 UMOL/L (ref 11–45)
ANION GAP SERPL CALC-SCNC: 9 MMOL/L (ref 0–11.9)
APPEARANCE UR: ABNORMAL
APTT PPP: 41.9 SEC (ref 24.7–36)
AST SERPL-CCNC: 16 U/L (ref 12–45)
BACTERIA #/AREA URNS HPF: ABNORMAL /HPF
BASE EXCESS BLDA CALC-SCNC: 1 MMOL/L (ref -4–3)
BASOPHILS # BLD AUTO: 0.5 % (ref 0–1.8)
BASOPHILS # BLD: 0.03 K/UL (ref 0–0.12)
BILIRUB SERPL-MCNC: 0.2 MG/DL (ref 0.1–1.5)
BILIRUB UR QL STRIP.AUTO: NEGATIVE
BODY TEMPERATURE: ABNORMAL CENTIGRADE
BUN SERPL-MCNC: 9 MG/DL (ref 8–22)
CALCIUM SERPL-MCNC: 8.5 MG/DL (ref 8.5–10.5)
CARBAMAZEPINE SERPL-MCNC: 13.5 UG/ML (ref 4–12)
CHLORIDE SERPL-SCNC: 104 MMOL/L (ref 96–112)
CO2 SERPL-SCNC: 26 MMOL/L (ref 20–33)
COLOR UR: YELLOW
CREAT SERPL-MCNC: 0.61 MG/DL (ref 0.5–1.4)
EKG IMPRESSION: NORMAL
EOSINOPHIL # BLD AUTO: 0.24 K/UL (ref 0–0.51)
EOSINOPHIL NFR BLD: 4.1 % (ref 0–6.9)
EPI CELLS #/AREA URNS HPF: ABNORMAL /HPF
ERYTHROCYTE [DISTWIDTH] IN BLOOD BY AUTOMATED COUNT: 51.5 FL (ref 35.9–50)
EST. AVERAGE GLUCOSE BLD GHB EST-MCNC: 126 MG/DL
ETHANOL BLD-MCNC: 0 G/DL
GLOBULIN SER CALC-MCNC: 3.6 G/DL (ref 1.9–3.5)
GLUCOSE SERPL-MCNC: 97 MG/DL (ref 65–99)
GLUCOSE UR STRIP.AUTO-MCNC: NEGATIVE MG/DL
HBA1C MFR BLD: 6 % (ref 0–5.6)
HCO3 BLDA-SCNC: 26 MMOL/L (ref 17–25)
HCT VFR BLD AUTO: 42.3 % (ref 37–47)
HGB BLD-MCNC: 13.8 G/DL (ref 12–16)
HYALINE CASTS #/AREA URNS LPF: ABNORMAL /LPF
IMM GRANULOCYTES # BLD AUTO: 0.02 K/UL (ref 0–0.11)
IMM GRANULOCYTES NFR BLD AUTO: 0.3 % (ref 0–0.9)
INHALED O2 FLOW RATE: 2 L/MIN (ref 2–10)
INR PPP: 4.06 (ref 0.87–1.13)
KETONES UR STRIP.AUTO-MCNC: ABNORMAL MG/DL
LEUKOCYTE ESTERASE UR QL STRIP.AUTO: ABNORMAL
LYMPHOCYTES # BLD AUTO: 2.59 K/UL (ref 1–4.8)
LYMPHOCYTES NFR BLD: 44.4 % (ref 22–41)
MCH RBC QN AUTO: 32.5 PG (ref 27–33)
MCHC RBC AUTO-ENTMCNC: 32.6 G/DL (ref 33.6–35)
MCV RBC AUTO: 99.8 FL (ref 81.4–97.8)
MICRO URNS: ABNORMAL
MONOCYTES # BLD AUTO: 0.34 K/UL (ref 0–0.85)
MONOCYTES NFR BLD AUTO: 5.8 % (ref 0–13.4)
NEUTROPHILS # BLD AUTO: 2.61 K/UL (ref 2–7.15)
NEUTROPHILS NFR BLD: 44.9 % (ref 44–72)
NITRITE UR QL STRIP.AUTO: NEGATIVE
NRBC # BLD AUTO: 0 K/UL
NRBC BLD-RTO: 0 /100 WBC
PCO2 BLDA: 44.2 MMHG (ref 26–37)
PH BLDA: 7.39 [PH] (ref 7.4–7.5)
PH UR STRIP.AUTO: 7 [PH]
PLATELET # BLD AUTO: 208 K/UL (ref 164–446)
PMV BLD AUTO: 10.8 FL (ref 9–12.9)
PO2 BLDA: 69 MMHG (ref 64–87)
POTASSIUM SERPL-SCNC: 4.1 MMOL/L (ref 3.6–5.5)
PROT SERPL-MCNC: 7.1 G/DL (ref 6–8.2)
PROT UR QL STRIP: NEGATIVE MG/DL
PROTHROMBIN TIME: 39.2 SEC (ref 12–14.6)
RBC # BLD AUTO: 4.24 M/UL (ref 4.2–5.4)
RBC # URNS HPF: ABNORMAL /HPF
RBC UR QL AUTO: NEGATIVE
SAO2 % BLDA: 92.9 % (ref 93–99)
SODIUM SERPL-SCNC: 139 MMOL/L (ref 135–145)
SP GR UR STRIP.AUTO: 1.02
T4 FREE SERPL-MCNC: 1.37 NG/DL (ref 0.53–1.43)
TROPONIN I SERPL-MCNC: <0.01 NG/ML (ref 0–0.04)
TSH SERPL DL<=0.005 MIU/L-ACNC: 0.06 UIU/ML (ref 0.38–5.33)
TSH SERPL DL<=0.005 MIU/L-ACNC: 0.07 UIU/ML (ref 0.38–5.33)
UROBILINOGEN UR STRIP.AUTO-MCNC: 0.2 MG/DL
VIT B12 SERPL-MCNC: 391 PG/ML (ref 211–911)
WBC # BLD AUTO: 5.8 K/UL (ref 4.8–10.8)
WBC #/AREA URNS HPF: ABNORMAL /HPF

## 2018-05-25 PROCEDURE — 80156 ASSAY CARBAMAZEPINE TOTAL: CPT

## 2018-05-25 PROCEDURE — G0378 HOSPITAL OBSERVATION PER HR: HCPCS

## 2018-05-25 PROCEDURE — 82607 VITAMIN B-12: CPT

## 2018-05-25 PROCEDURE — 80307 DRUG TEST PRSMV CHEM ANLYZR: CPT

## 2018-05-25 PROCEDURE — 93005 ELECTROCARDIOGRAM TRACING: CPT | Performed by: EMERGENCY MEDICINE

## 2018-05-25 PROCEDURE — 84484 ASSAY OF TROPONIN QUANT: CPT

## 2018-05-25 PROCEDURE — 83036 HEMOGLOBIN GLYCOSYLATED A1C: CPT

## 2018-05-25 PROCEDURE — 84443 ASSAY THYROID STIM HORMONE: CPT

## 2018-05-25 PROCEDURE — 81001 URINALYSIS AUTO W/SCOPE: CPT

## 2018-05-25 PROCEDURE — 82803 BLOOD GASES ANY COMBINATION: CPT

## 2018-05-25 PROCEDURE — 82140 ASSAY OF AMMONIA: CPT

## 2018-05-25 PROCEDURE — 84439 ASSAY OF FREE THYROXINE: CPT

## 2018-05-25 PROCEDURE — 85025 COMPLETE CBC W/AUTO DIFF WBC: CPT

## 2018-05-25 PROCEDURE — 700111 HCHG RX REV CODE 636 W/ 250 OVERRIDE (IP): Performed by: EMERGENCY MEDICINE

## 2018-05-25 PROCEDURE — 70450 CT HEAD/BRAIN W/O DYE: CPT

## 2018-05-25 PROCEDURE — A9270 NON-COVERED ITEM OR SERVICE: HCPCS | Performed by: INTERNAL MEDICINE

## 2018-05-25 PROCEDURE — 85730 THROMBOPLASTIN TIME PARTIAL: CPT

## 2018-05-25 PROCEDURE — 96374 THER/PROPH/DIAG INJ IV PUSH: CPT

## 2018-05-25 PROCEDURE — 80053 COMPREHEN METABOLIC PANEL: CPT

## 2018-05-25 PROCEDURE — 99285 EMERGENCY DEPT VISIT HI MDM: CPT

## 2018-05-25 PROCEDURE — 700102 HCHG RX REV CODE 250 W/ 637 OVERRIDE(OP): Performed by: INTERNAL MEDICINE

## 2018-05-25 PROCEDURE — 83880 ASSAY OF NATRIURETIC PEPTIDE: CPT

## 2018-05-25 PROCEDURE — 85610 PROTHROMBIN TIME: CPT

## 2018-05-25 RX ORDER — BUDESONIDE AND FORMOTEROL FUMARATE DIHYDRATE 160; 4.5 UG/1; UG/1
2 AEROSOL RESPIRATORY (INHALATION) 2 TIMES DAILY
Status: DISCONTINUED | OUTPATIENT
Start: 2018-05-25 | End: 2018-05-27 | Stop reason: HOSPADM

## 2018-05-25 RX ORDER — ACETAMINOPHEN 325 MG/1
650 TABLET ORAL EVERY 6 HOURS PRN
Status: DISCONTINUED | OUTPATIENT
Start: 2018-05-25 | End: 2018-05-27 | Stop reason: HOSPADM

## 2018-05-25 RX ORDER — POLYETHYLENE GLYCOL 3350 17 G/17G
1 POWDER, FOR SOLUTION ORAL
Status: DISCONTINUED | OUTPATIENT
Start: 2018-05-25 | End: 2018-05-27 | Stop reason: HOSPADM

## 2018-05-25 RX ORDER — CARBAMAZEPINE 200 MG/1
600 TABLET ORAL
Status: DISCONTINUED | OUTPATIENT
Start: 2018-05-25 | End: 2018-05-27 | Stop reason: HOSPADM

## 2018-05-25 RX ORDER — ATORVASTATIN CALCIUM 80 MG/1
80 TABLET, FILM COATED ORAL EVERY EVENING
Status: DISCONTINUED | OUTPATIENT
Start: 2018-05-25 | End: 2018-05-27 | Stop reason: HOSPADM

## 2018-05-25 RX ORDER — BISACODYL 10 MG
10 SUPPOSITORY, RECTAL RECTAL
Status: DISCONTINUED | OUTPATIENT
Start: 2018-05-25 | End: 2018-05-27 | Stop reason: HOSPADM

## 2018-05-25 RX ORDER — FLUOXETINE HYDROCHLORIDE 40 MG/1
40 CAPSULE ORAL EVERY EVENING
COMMUNITY
End: 2019-02-11

## 2018-05-25 RX ORDER — AMITRIPTYLINE HYDROCHLORIDE 25 MG/1
25 TABLET, FILM COATED ORAL
Status: DISCONTINUED | OUTPATIENT
Start: 2018-05-25 | End: 2018-05-27 | Stop reason: HOSPADM

## 2018-05-25 RX ORDER — FAMOTIDINE 20 MG/1
20 TABLET, FILM COATED ORAL 2 TIMES DAILY
Status: DISCONTINUED | OUTPATIENT
Start: 2018-05-25 | End: 2018-05-27 | Stop reason: HOSPADM

## 2018-05-25 RX ORDER — ALBUTEROL SULFATE 90 UG/1
2 AEROSOL, METERED RESPIRATORY (INHALATION) EVERY 4 HOURS PRN
Status: DISCONTINUED | OUTPATIENT
Start: 2018-05-25 | End: 2018-05-27 | Stop reason: HOSPADM

## 2018-05-25 RX ORDER — CEFTRIAXONE 2 G/1
2 INJECTION, POWDER, FOR SOLUTION INTRAMUSCULAR; INTRAVENOUS ONCE
Status: COMPLETED | OUTPATIENT
Start: 2018-05-25 | End: 2018-05-25

## 2018-05-25 RX ORDER — RANITIDINE 150 MG/1
150 TABLET ORAL 2 TIMES DAILY
Status: DISCONTINUED | OUTPATIENT
Start: 2018-05-25 | End: 2018-05-25

## 2018-05-25 RX ORDER — FLUOXETINE HYDROCHLORIDE 20 MG/1
40 CAPSULE ORAL EVERY EVENING
Status: DISCONTINUED | OUTPATIENT
Start: 2018-05-25 | End: 2018-05-27 | Stop reason: HOSPADM

## 2018-05-25 RX ORDER — AMOXICILLIN 250 MG
2 CAPSULE ORAL 2 TIMES DAILY
Status: DISCONTINUED | OUTPATIENT
Start: 2018-05-26 | End: 2018-05-27 | Stop reason: HOSPADM

## 2018-05-25 RX ORDER — TIOTROPIUM BROMIDE 18 UG/1
1 CAPSULE ORAL; RESPIRATORY (INHALATION) DAILY
Status: DISCONTINUED | OUTPATIENT
Start: 2018-05-26 | End: 2018-05-27 | Stop reason: HOSPADM

## 2018-05-25 RX ORDER — LEVOTHYROXINE SODIUM 0.12 MG/1
250 TABLET ORAL
Status: DISCONTINUED | OUTPATIENT
Start: 2018-05-26 | End: 2018-05-27

## 2018-05-25 RX ADMIN — CEFTRIAXONE SODIUM 2 G: 2 INJECTION, POWDER, FOR SOLUTION INTRAMUSCULAR; INTRAVENOUS at 17:10

## 2018-05-25 RX ADMIN — ASPIRIN 81 MG: 81 TABLET, COATED ORAL at 22:47

## 2018-05-25 RX ADMIN — ATORVASTATIN CALCIUM 80 MG: 80 TABLET, FILM COATED ORAL at 22:52

## 2018-05-25 RX ADMIN — CARBAMAZEPINE 600 MG: 200 TABLET ORAL at 22:49

## 2018-05-25 RX ADMIN — AMITRIPTYLINE HYDROCHLORIDE 25 MG: 25 TABLET, FILM COATED ORAL at 22:47

## 2018-05-25 RX ADMIN — BUDESONIDE AND FORMOTEROL FUMARATE DIHYDRATE 2 PUFF: 160; 4.5 AEROSOL RESPIRATORY (INHALATION) at 22:50

## 2018-05-25 RX ADMIN — FLUOXETINE HYDROCHLORIDE 40 MG: 20 CAPSULE ORAL at 22:47

## 2018-05-25 RX ADMIN — FAMOTIDINE 20 MG: 20 TABLET ORAL at 22:47

## 2018-05-25 ASSESSMENT — COPD QUESTIONNAIRES
DURING THE PAST 4 WEEKS HOW MUCH DID YOU FEEL SHORT OF BREATH: SOME OF THE TIME
HAVE YOU SMOKED AT LEAST 100 CIGARETTES IN YOUR ENTIRE LIFE: YES
DO YOU EVER COUGH UP ANY MUCUS OR PHLEGM?: YES, A FEW DAYS A WEEK OR MONTH
COPD SCREENING SCORE: 7

## 2018-05-25 ASSESSMENT — PAIN SCALES - GENERAL
PAINLEVEL_OUTOF10: 0
PAINLEVEL_OUTOF10: 0

## 2018-05-25 ASSESSMENT — LIFESTYLE VARIABLES: EVER_SMOKED: YES

## 2018-05-25 NOTE — ED PROVIDER NOTES
ED Provider Note    CHIEF COMPLAINT  Chief Complaint   Patient presents with   • Visual Problems   • Diplopia       HPI  Atiya Henley is a 67 y.o. female who presents for evaluation of double vision, onset was early this morning upon waking up, she reports double vision when looking at things with both eyes, no headache, weakness, no numbness, no tingling, she has no neck or back pain, chest pain, no abdominal pain, she has no other complaints. Her home nurse reports that she seems to be more sleepy than usual today with slurred speech patient has an extensive past medical history including diabetes, cervical cancer, a colostomy, COPD, thyroiditis on Synthroid as well as history of DVT on Coumadin with IVC filter.    REVIEW OF SYSTEMS  Negative for fever, rash, chest pain, dyspnea, abdominal pain, nausea, vomiting, diarrhea, headache, focal weakness, focal numbness, focal tingling, back pain. All other systems are negative.     PAST MEDICAL HISTORY  Past Medical History:   Diagnosis Date   • Arthritis    • Bowel obstruction (HCC)    • Cancer (HCC)     cervical   • Chronic autoimmune thyroiditis     + TPO ab = 563 / + ultrasound   • Colostomy in place (HCC) 2009   • COPD    • Dental disorder     dentures upper   • Diabetes     type 2    • EMPHYSEMA    • Hashimoto's disease    • History of total knee arthroplasty     infection   • Hypertension     stated no longer on meds due to lost 100 lbs   • Hypothyroid 7/7/2009   • Indigestion    • Infectious disease     MRSA,   • Infectious disease     VRE   • Obesity 7/7/2009   • Obstruction     colostomy   • Oxygen dependent     4.5 L NC   • Pain     b/l legs   • Personal history of venous thrombosis and embolism 2009, 2012    arm and leg left side   • Presence of IVC filter    • Psychiatric problem     depression   • Sleep apnea     USES CPAP, 3-4L oxygen   • Snoring    • Status post cholecystectomy 1992   • Weight gain        FAMILY HISTORY  Family History   Problem  Relation Age of Onset   • Heart Disease Mother    • Seizures Mother    • Alcohol/Drug Father    • Lung Disease Father    • Hypertension         SOCIAL HISTORY  Social History   Substance Use Topics   • Smoking status: Current Every Day Smoker     Packs/day: 0.25     Years: 50.00     Types: Cigarettes   • Smokeless tobacco: Current User      Comment: 1 ppd, 45 yrs   • Alcohol use No       SURGICAL HISTORY  Past Surgical History:   Procedure Laterality Date   • PB INJECT RX OTHER PERIPH NERVE Left 8/21/2015    Procedure: NEUROLYTIC DEST-OTHER NERVE;  Surgeon: Kd Galaviz D.O.;  Location: SURGERY [facility];  Service: Pain Management   • PB FLUOROSCOPIC GUIDANCE NEEDLE PLACEMENT Left 8/21/2015    Procedure: FLOURO GUIDE NEEDLE PLACEMENT;  Surgeon: Kd Galaviz D.O.;  Location: SURGERY [facility];  Service: Pain Management   • PB INJECT RX OTHER PERIPH NERVE  8/21/2015    Procedure: NEUROLYTIC DEST-OTHER NERVE;  Surgeon: Kd Galaviz D.O.;  Location: SURGERY [facility];  Service: Pain Management   • PB INJECT RX OTHER PERIPH NERVE  8/21/2015    Procedure: NEUROLYTIC DEST-OTHER NERVE;  Surgeon: Kd Galaviz D.O.;  Location: SURGERY [facility];  Service: Pain Management   • EXPLORATORY LAPAROTOMY  12/10/2013    Performed by Sang Castañeda M.D. at [facility]   • WOUND CLOSURE GENERAL  12/10/2013    Performed by Sang Castañeda M.D. at [facility]   • COLOSTOMY  11/26/2013    Performed by Maninder Carter M.D. at [facility]   • EXPLORATORY LAPAROTOMY  11/26/2013    Performed by Maninder Carter M.D. at [facility]   • LYSIS ADHESIONS GENERAL  11/26/2013    Performed by Maninder Carter M.D. at [facility]   • COLON RESECTION  11/26/2013    Performed by Maninder Carter M.D. at [facility]   • BLADDER BIOPSY WITH CYSTOSCOPY  10/10/2013    Performed by Sang Castañeda M.D. at [facility]   •  EXPLORATORY LAPAROTOMY  9/2/2013    Performed by Maninder Carter M.D. at SURGERY [facility]   • PARASTOMAL HERNIA REPAIR   9/2/2013    Performed by Maninder Carter M.D. at SURGERY [facility]   • IRRIGATION & DEBRIDEMENT ORTHO  2/8/2013    Performed by David Fowler M.D. at [facility]   • IRRIGATION & DEBRIDEMENT GENERAL  12/17/2012    Performed by David Fowler M.D. at [facility]   • IRRIGATION & DEBRIDEMENT ORTHO  7/19/2012    Performed by BERYL LOYD at [facility]   • IRRIGATION & DEBRIDEMENT ORTHO  7/17/2012    Performed by BERYL LOYD at [facility]   • KNEE ARTHROPLASTY TOTAL  6/10/2012    left   • LATERAL RELEASE  7/20/2011    Performed by BERYL LOYD at [facility]   • KNEE ARTHROSCOPY  7/20/2011    Performed by BERYL LOYD at [facility]   • MEDIAL MENISCECTOMY  7/20/2011    Performed by BERYL LOYD at [facility]   • MENISCECTOMY  7/20/2011    Performed by BERYL LOYD at [facility]   • COLONOSCOPY - ENDO  11/17/2010    Performed by JESUSITA SUMNER at ENDOSCOPY [facility]   • TRACHEOSTOMY  3/31/2010    Performed by JESUSITA SUMNER at SURGERY [facility]   • COLOSTOMY  3/16/2010    Performed by CHRISTA BENNETT at SURGERY [facility]   • EXPLORATORY LAPAROTOMY  3/16/2010    Performed by CHRISTA BENNETT at SURGERY [facility]   • COLECTOMY  3/16/2010    Performed by CHRISTA BENNETT at SURGERY [facility]   • LOW ANTERIOR RESECTION  3/10/2010    Performed by JESUSITA SUMNER at SURGERY [facility]   • LOW ANTERIOR RESECTION LAPAROSCOPIC  3/10/2010    Performed by JESUSITA SUMNER at SURGERY [facility]   • MYRINGOTOMY  9/9/2009    Performed by RENETTA GOODSON at SURGERY [facility] DAY [facility]   • CHOLECYSTECTOMY  1992    laparoscopic   • HYSTERECTOMY LAPAROSCOPY  1983   • OTHER ORTHOPEDIC SURGERY      LT KNEE X2  "      CURRENT MEDICATIONS  I personally reviewed the medication list in the charting documentation.     ALLERGIES  Allergies   Allergen Reactions   • Pcn [Penicillins] Anaphylaxis and Rash     Reaction; happened as a child. Tolerated cephalosporins and Zosyn       MEDICAL RECORD  I have reviewed patient's medical record and pertinent results are listed above.      PHYSICAL EXAM  VITAL SIGNS: /64   Pulse (!) 56   Temp 36.4 °C (97.5 °F)   Resp 16   Ht 1.626 m (5' 4\")   Wt (!) 128.4 kg (283 lb)   LMP 07/19/2011   SpO2 96%   BMI 48.58 kg/m²    Constitutional: Appears somewhat ill, she somnolent, arousable to vigorous verbal stimulation, has difficulty staying awake  HENT: Mucus membranes moist.    Eyes: No scleral icterus. Normal conjunctiva. Extra ocular motion is intact, gaze seems conjugate  Neck: Supple, comfortable, nonpainful range of motion.   Cardiovascular: Regular heart rate and rhythm.   Thorax & Lungs: Chest is nontender.  Lungs are clear to auscultation with good air movement bilaterally.  No wheeze, rhonchi, nor rales.   Abdomen: Soft, with no tenderness, rebound nor guarding.  No mass or pulsatile mass appreciated. Many surgical scars, unremarkable colostomy in the left lower quadrant  Skin: Warm, dry. No rash appreciated  Extremities/Musculoskeletal: No sign of trauma. No asymmetric calf tenderness, erythema or edema. Normal range of motion   Neurologic: Alert & oriented. No focal deficits observed. Seemingly normal symmetric upper and lower extremity motor and sensory function bilaterally   Psychiatric: Normal affect appropriate for the clinical situation.    DIAGNOSTIC STUDIES / PROCEDURES    EKG  12 Lead EKG interpreted by me to show:    Rate 54  Rhythm: Normal sinus rhythm  Axis: Left axis  ME and QRS Intervals: First-degree AV block  T waves: No acute changes  ST segments: No acute changes  Ectopy: None.    My impression of this EKG: Does not indicate ischemia or arrythmia at this " time.      LABS  Results for orders placed or performed during the hospital encounter of 05/25/18   CBC WITH DIFFERENTIAL   Result Value Ref Range    WBC 5.8 4.8 - 10.8 K/uL    RBC 4.24 4.20 - 5.40 M/uL    Hemoglobin 13.8 12.0 - 16.0 g/dL    Hematocrit 42.3 37.0 - 47.0 %    MCV 99.8 (H) 81.4 - 97.8 fL    MCH 32.5 27.0 - 33.0 pg    MCHC 32.6 (L) 33.6 - 35.0 g/dL    RDW 51.5 (H) 35.9 - 50.0 fL    Platelet Count 208 164 - 446 K/uL    MPV 10.8 9.0 - 12.9 fL    Neutrophils-Polys 44.90 44.00 - 72.00 %    Lymphocytes 44.40 (H) 22.00 - 41.00 %    Monocytes 5.80 0.00 - 13.40 %    Eosinophils 4.10 0.00 - 6.90 %    Basophils 0.50 0.00 - 1.80 %    Immature Granulocytes 0.30 0.00 - 0.90 %    Nucleated RBC 0.00 /100 WBC    Neutrophils (Absolute) 2.61 2.00 - 7.15 K/uL    Lymphs (Absolute) 2.59 1.00 - 4.80 K/uL    Monos (Absolute) 0.34 0.00 - 0.85 K/uL    Eos (Absolute) 0.24 0.00 - 0.51 K/uL    Baso (Absolute) 0.03 0.00 - 0.12 K/uL    Immature Granulocytes (abs) 0.02 0.00 - 0.11 K/uL    NRBC (Absolute) 0.00 K/uL   COMP METABOLIC PANEL   Result Value Ref Range    Sodium 139 135 - 145 mmol/L    Potassium 4.1 3.6 - 5.5 mmol/L    Chloride 104 96 - 112 mmol/L    Co2 26 20 - 33 mmol/L    Anion Gap 9.0 0.0 - 11.9    Glucose 97 65 - 99 mg/dL    Bun 9 8 - 22 mg/dL    Creatinine 0.61 0.50 - 1.40 mg/dL    Calcium 8.5 8.5 - 10.5 mg/dL    AST(SGOT) 16 12 - 45 U/L    ALT(SGPT) 13 2 - 50 U/L    Alkaline Phosphatase 91 30 - 99 U/L    Total Bilirubin 0.2 0.1 - 1.5 mg/dL    Albumin 3.5 3.2 - 4.9 g/dL    Total Protein 7.1 6.0 - 8.2 g/dL    Globulin 3.6 (H) 1.9 - 3.5 g/dL    A-G Ratio 1.0 g/dL   TROPONIN   Result Value Ref Range    Troponin I <0.01 0.00 - 0.04 ng/mL   PROTHROMBIN TIME   Result Value Ref Range    PT 39.2 (H) 12.0 - 14.6 sec    INR 4.06 (H) 0.87 - 1.13   APTT   Result Value Ref Range    APTT 41.9 (H) 24.7 - 36.0 sec   URINALYSIS,CULTURE IF INDICATED   Result Value Ref Range    Color Yellow     Character Turbid (A)     Specific Gravity  1.021 <1.035    Ph 7.0 5.0 - 8.0    Glucose Negative Negative mg/dL    Ketones Trace (A) Negative mg/dL    Protein Negative Negative mg/dL    Bilirubin Negative Negative    Urobilinogen, Urine 0.2 Negative    Nitrite Negative Negative    Leukocyte Esterase Small (A) Negative    Occult Blood Negative Negative    Micro Urine Req Microscopic    DIAGNOSTIC ALCOHOL   Result Value Ref Range    Diagnostic Alcohol 0.00 0.00 g/dL   ESTIMATED GFR   Result Value Ref Range    GFR If African American >60 >60 mL/min/1.73 m 2    GFR If Non African American >60 >60 mL/min/1.73 m 2   URINE MICROSCOPIC (W/UA)   Result Value Ref Range    WBC 2-5 /hpf    RBC 0-2 /hpf    Bacteria Many (A) None /hpf    Epithelial Cells Few /hpf    Hyaline Cast 3-5 (A) /lpf   EKG (ER)   Result Value Ref Range    Report       [facility] Emergency Dept.    Test Date:  2018  Pt Name:    CAITLIN BOYER                Department: ER  MRN:        [id number]                      Room:       [facility]  Gender:     Female                       Technician: [id number]  :        1950                   Requested By:DOM WOMACK  Order #:    [id number]                    Reading MD:    Measurements  Intervals                                Axis  Rate:       54                           P:          46  MN:         204                          QRS:        -33  QRSD:       92                           T:          62  QT:         444  QTc:        421    Interpretive Statements  SLOW SINUS ARRHYTHMIA, RATE  44- 60  LEFT AXIS DEVIATION  BORDERLINE LOW VOLTAGE IN FRONTAL LEADS  BORDERLINE R WAVE PROGRESSION, ANTERIOR LEADS  Compared to ECG 2018 11:15:43  Sinus bradycardia no longer present  Myocardial infarct finding no longer present  T-wave abnormality no longer present       *Note: Due to a large number of results and/or encounters for the requested time period, some results have not been displayed. A complete set of results can be found in  Results Review.         RADIOLOGY  CT-HEAD W/O   Final Result      1.  White matter lucencies most consistent with small vessel ischemic change versus demyelination or gliosis.      2. No evidence of acute cerebral infarction, hemorrhage or mass lesion.            COURSE & MEDICAL DECISION MAKING  I have reviewed any medical record information, laboratory studies and radiographic results as noted above.  Differential diagnoses includes: Dehydration, electrolyte abnormalities, urinary tract infection, intracranial hemorrhage, CVA, DKA,     Encounter Summary: This is a 67 y.o. female with binocular diplopia beginning this morning, 7 hours ago, no other focal neurologic complaints or findings on exam, she is pretty sedate on exam albeit arousable, extensive medical history, but no other focal findings on exam. She is anticoagulated on exam raising concerns for intracranial hemorrhage, she reports some falls recently but she is just a remarkably poor historian. Will obtain a head CT, blood work, urinalysis and ultimately reevaluate ------- this evaluation is largely unremarkable, head CT is normal, blood work is unremarkable, and patient should be admitted to the hospital for further evaluation      DISPOSITION: Admitted in guarded condition      FINAL IMPRESSION  1. Binocular vision disorder with diplopia    2. Somnolence           This dictation was created using voice recognition software. The accuracy of the dictation is limited to the abilities of the software. I expect there may be some errors of grammar and possibly content. The nursing notes were reviewed and certain aspects of this information were incorporated into this note.    Electronically signed by: Jn Escalante, 5/25/2018 3:29 PM

## 2018-05-25 NOTE — ED TRIAGE NOTES
"Atiya Henley    Chief Complaint   Patient presents with   • Visual Problems   • Diplopia       Pt BIBA from home.  Pt states she has had diplopia since 9 am this morning.  Pt denies HA, chest pain.  No facial droop, no drift. AOx4.  Per home health nurse, pts speech is slower than usual.  Pt denies drinking or drug use. PMH: COPD, DM, sleep apnea, depression, HTN, hypothyroidism.  FSBS 121    Blood Pressure : 138/64, Pulse: 61, Respiration: 16, Temperature: 36.4 °C (97.5 °F), Height: 162.6 cm (5' 4\"), Weight: (!) 128.4 kg (283 lb), BMI (Calculated): 48.58, BSA (Calculated): 2.4, Pulse Oximetry: 93 %, O2 Delivery: None (Room Air)      "

## 2018-05-26 ENCOUNTER — APPOINTMENT (OUTPATIENT)
Dept: RADIOLOGY | Facility: MEDICAL CENTER | Age: 68
End: 2018-05-26
Attending: INTERNAL MEDICINE
Payer: MEDICARE

## 2018-05-26 PROBLEM — R73.02 IMPAIRED GLUCOSE TOLERANCE: Status: ACTIVE | Noted: 2018-05-26

## 2018-05-26 LAB
ANION GAP SERPL CALC-SCNC: 7 MMOL/L (ref 0–11.9)
BNP SERPL-MCNC: 13 PG/ML (ref 0–100)
BUN SERPL-MCNC: 10 MG/DL (ref 8–22)
CALCIUM SERPL-MCNC: 8.3 MG/DL (ref 8.5–10.5)
CHLORIDE SERPL-SCNC: 104 MMOL/L (ref 96–112)
CHOLEST SERPL-MCNC: 150 MG/DL (ref 100–199)
CO2 SERPL-SCNC: 28 MMOL/L (ref 20–33)
CREAT SERPL-MCNC: 0.62 MG/DL (ref 0.5–1.4)
ERYTHROCYTE [DISTWIDTH] IN BLOOD BY AUTOMATED COUNT: 52.6 FL (ref 35.9–50)
GLUCOSE SERPL-MCNC: 116 MG/DL (ref 65–99)
HCT VFR BLD AUTO: 40.3 % (ref 37–47)
HDLC SERPL-MCNC: 48 MG/DL
HGB BLD-MCNC: 13 G/DL (ref 12–16)
INR PPP: 5.54 (ref 0.87–1.13)
LDLC SERPL CALC-MCNC: 74 MG/DL
MCH RBC QN AUTO: 32.7 PG (ref 27–33)
MCHC RBC AUTO-ENTMCNC: 32.3 G/DL (ref 33.6–35)
MCV RBC AUTO: 101.5 FL (ref 81.4–97.8)
PLATELET # BLD AUTO: 172 K/UL (ref 164–446)
PMV BLD AUTO: 10.2 FL (ref 9–12.9)
POTASSIUM SERPL-SCNC: 3.4 MMOL/L (ref 3.6–5.5)
PROTHROMBIN TIME: 50.2 SEC (ref 12–14.6)
RBC # BLD AUTO: 3.97 M/UL (ref 4.2–5.4)
SODIUM SERPL-SCNC: 139 MMOL/L (ref 135–145)
TRIGL SERPL-MCNC: 139 MG/DL (ref 0–149)
WBC # BLD AUTO: 5.4 K/UL (ref 4.8–10.8)

## 2018-05-26 PROCEDURE — A6250 SKIN SEAL PROTECT MOISTURIZR: HCPCS | Performed by: INTERNAL MEDICINE

## 2018-05-26 PROCEDURE — 36415 COLL VENOUS BLD VENIPUNCTURE: CPT

## 2018-05-26 PROCEDURE — 71250 CT THORAX DX C-: CPT

## 2018-05-26 PROCEDURE — G0378 HOSPITAL OBSERVATION PER HR: HCPCS

## 2018-05-26 PROCEDURE — A9270 NON-COVERED ITEM OR SERVICE: HCPCS | Performed by: INTERNAL MEDICINE

## 2018-05-26 PROCEDURE — 80061 LIPID PANEL: CPT

## 2018-05-26 PROCEDURE — 302111 WAFER OST 2.25IN N IMG RD 2 PC (BARRIER): Performed by: INTERNAL MEDICINE

## 2018-05-26 PROCEDURE — 85027 COMPLETE CBC AUTOMATED: CPT

## 2018-05-26 PROCEDURE — 93880 EXTRACRANIAL BILAT STUDY: CPT

## 2018-05-26 PROCEDURE — 302255 BARRIER CREAM MOISTURE BAZA PROTECT (ZINC) 5OZ: Performed by: INTERNAL MEDICINE

## 2018-05-26 PROCEDURE — 85610 PROTHROMBIN TIME: CPT

## 2018-05-26 PROCEDURE — 700102 HCHG RX REV CODE 250 W/ 637 OVERRIDE(OP): Performed by: INTERNAL MEDICINE

## 2018-05-26 PROCEDURE — 99226 PR SUBSEQUENT OBSERVATION CARE,LEVEL III: CPT | Performed by: INTERNAL MEDICINE

## 2018-05-26 PROCEDURE — 80048 BASIC METABOLIC PNL TOTAL CA: CPT

## 2018-05-26 PROCEDURE — 302099 OSTOMY PASTE: Performed by: INTERNAL MEDICINE

## 2018-05-26 PROCEDURE — 93880 EXTRACRANIAL BILAT STUDY: CPT | Mod: 26 | Performed by: SURGERY

## 2018-05-26 PROCEDURE — 302109 OSTOMY WAFER 4X4: Performed by: INTERNAL MEDICINE

## 2018-05-26 PROCEDURE — 302108 TAPE SECURITY OSTOMY (PINK): Performed by: INTERNAL MEDICINE

## 2018-05-26 RX ADMIN — TIOTROPIUM BROMIDE 1 CAPSULE: 18 CAPSULE ORAL; RESPIRATORY (INHALATION) at 06:21

## 2018-05-26 RX ADMIN — STANDARDIZED SENNA CONCENTRATE AND DOCUSATE SODIUM 2 TABLET: 8.6; 5 TABLET, FILM COATED ORAL at 06:19

## 2018-05-26 RX ADMIN — ASPIRIN 81 MG: 81 TABLET, COATED ORAL at 17:44

## 2018-05-26 RX ADMIN — FLUOXETINE HYDROCHLORIDE 40 MG: 20 CAPSULE ORAL at 17:37

## 2018-05-26 RX ADMIN — ATORVASTATIN CALCIUM 80 MG: 80 TABLET, FILM COATED ORAL at 17:37

## 2018-05-26 RX ADMIN — AMITRIPTYLINE HYDROCHLORIDE 25 MG: 25 TABLET, FILM COATED ORAL at 17:44

## 2018-05-26 RX ADMIN — BUDESONIDE AND FORMOTEROL FUMARATE DIHYDRATE 2 PUFF: 160; 4.5 AEROSOL RESPIRATORY (INHALATION) at 06:21

## 2018-05-26 RX ADMIN — FAMOTIDINE 20 MG: 20 TABLET ORAL at 06:19

## 2018-05-26 RX ADMIN — CARBAMAZEPINE 600 MG: 200 TABLET ORAL at 17:44

## 2018-05-26 RX ADMIN — BUDESONIDE AND FORMOTEROL FUMARATE DIHYDRATE 2 PUFF: 160; 4.5 AEROSOL RESPIRATORY (INHALATION) at 17:45

## 2018-05-26 RX ADMIN — FAMOTIDINE 20 MG: 20 TABLET ORAL at 17:44

## 2018-05-26 RX ADMIN — LEVOTHYROXINE SODIUM 250 MCG: 125 TABLET ORAL at 06:20

## 2018-05-26 ASSESSMENT — CHA2DS2 SCORE
CHF OR LEFT VENTRICULAR DYSFUNCTION: NO
AGE 75 OR GREATER: NO
VASCULAR DISEASE: NO
DIABETES: NO
CHA2DS2 VASC SCORE: 2
AGE 65 TO 74: YES
SEX: FEMALE
HYPERTENSION: NO
PRIOR STROKE OR TIA OR THROMBOEMBOLISM: NO

## 2018-05-26 ASSESSMENT — COGNITIVE AND FUNCTIONAL STATUS - GENERAL
SUGGESTED CMS G CODE MODIFIER MOBILITY: CM
SUGGESTED CMS G CODE MODIFIER DAILY ACTIVITY: CL
EATING MEALS: A LITTLE
TURNING FROM BACK TO SIDE WHILE IN FLAT BAD: A LOT
TOILETING: TOTAL
CLIMB 3 TO 5 STEPS WITH RAILING: TOTAL
MOVING FROM LYING ON BACK TO SITTING ON SIDE OF FLAT BED: A LOT
STANDING UP FROM CHAIR USING ARMS: TOTAL
WALKING IN HOSPITAL ROOM: TOTAL
MOVING TO AND FROM BED TO CHAIR: A LOT
MOBILITY SCORE: 9
HELP NEEDED FOR BATHING: A LOT
DAILY ACTIVITIY SCORE: 11
DRESSING REGULAR LOWER BODY CLOTHING: TOTAL
PERSONAL GROOMING: A LOT
DRESSING REGULAR UPPER BODY CLOTHING: A LOT

## 2018-05-26 ASSESSMENT — PATIENT HEALTH QUESTIONNAIRE - PHQ9
1. LITTLE INTEREST OR PLEASURE IN DOING THINGS: NOT AT ALL
2. FEELING DOWN, DEPRESSED, IRRITABLE, OR HOPELESS: NOT AT ALL
SUM OF ALL RESPONSES TO PHQ9 QUESTIONS 1 AND 2: 0

## 2018-05-26 ASSESSMENT — PAIN SCALES - GENERAL
PAINLEVEL_OUTOF10: 0
PAINLEVEL_OUTOF10: 0

## 2018-05-26 NOTE — PROGRESS NOTES
Inpatient Anticoagulation Service Note    Date: 5/25/2018  Reason for Anticoagulation: Deep Vein Thrombosis   Hemoglobin Value: 13.8  Hematocrit Value: 42.3  Lab Platelet Value: 208  Target INR: 2.0 to 3.0    INR from last 7 days     Date/Time INR Value    05/25/18 1553 (!)  4.06        Dose from last 7 days     Date/Time Dose (mg)    05/25/18 2200  0        Average Dose (mg):  (Home dose: 10 mg MWF and 15 mg AOD)  Significant Interactions: Aspirin, Statin, Thyroid Medications     Comments: Patient admitted overnight for observation. INR elevated on admit. Will hold dosing tonight and recheck INR in AM. No new DDI. No bleeding noted.    Education Material Provided?: No (chronic warfarin therapy)  Pharmacist suggested discharge dosing: goes to [facility]. INR has been high, would likely benefit from dose reduction to 10 mg PO daily with follow up INR within 72 hours of discharge     Samir Francis, PharmD, BCPS

## 2018-05-26 NOTE — DIETARY
NUTRITION SERVICES: BMI - Pt with BMI >40 (=47.2). Weight loss counseling not appropriate in acute care setting.   RECOMMEND - Referral to outpatient nutrition services for weight management after D/C.

## 2018-05-26 NOTE — RESPIRATORY CARE
COPD EDUCATION by COPD CLINICAL EDUCATOR  5/26/2018 at 6:33 AM by Odalis Burgess     Patient reviewed by COPD education team. Patient does not qualify for COPD program.

## 2018-05-26 NOTE — ED NOTES
The Medication Reconciliation process has been completed via a difficult interview with the patient.  She does not have current dosing from the coag clinic but has continued to take 10 mg on Mon, Wed and Friday and 15 mg all other days.     Allergies have been reviewed  Antibiotic use in 30 days - none    Home Pharmacy:   [facility]

## 2018-05-26 NOTE — PROGRESS NOTES
Pt transferred to 832 bed 2 from ER via ACLS nurse and zoll. Connected pt to tele monitor and monitor room notified. Pt currently SB SR 40s-60s. Bed in low position and locked, treaded socks on pt, all needs met at this time.

## 2018-05-26 NOTE — CARE PLAN
Problem: Knowledge Deficit  Goal: Knowledge of disease process/condition, treatment plan, diagnostic tests, and medications will improve  Outcome: PROGRESSING AS EXPECTED  Pt educated about disease process and plan of care for the day. Answered all questions, Patient verbalized understanding.    Problem: Urinary Elimination:  Goal: Ability to reestablish a normal urinary elimination pattern will improve  Outcome: PROGRESSING SLOWER THAN EXPECTED  Urinary elimination assessed. Pt educated about incontinence and skin breakdown, linens changed as needed.

## 2018-05-26 NOTE — PROGRESS NOTES
Inpatient Anticoagulation Service Note    Date: 5/26/2018  Reason for Anticoagulation: Deep Vein Thrombosis, Atrial Fibrillation   [id number] VASc Score: 2    Hemoglobin Value: 13  Hematocrit Value: 40.3  Lab Platelet Value: 172  Target INR: 2.0 to 3.0    INR from last 7 days     Date/Time INR Value    05/26/18 0826 (!)  5.54    05/25/18 1553 (!)  4.06        Dose from last 7 days     Date/Time Dose (mg)    05/26/18 1100  0    05/25/18 2200  0        Average Dose (mg):  (Home dose: 10 mg MWF and 15 mg AOD)  Significant Interactions: Aspirin, Statin, Thyroid Medications, Other (Comments) (SSRI, carbamazepine)  Bridge Therapy: No    Reversal Agent Administered: Not Applicable  Comments: Patient on chronic antiocoagulation d/t history of DVT, AF, with IVC fliter in place. Last dose of warfarin was 15mg on 5/24. INR elevated at admission, and dose was held. Today INR continues to increase, will continue to hold warfarin. H/h stable, no signs/symptoms of bleeding/bruising. Drug interactions as identified above. Will continue to monitor INR.      Plan:  No warfarin  Education Material Provided?: No (chronic warfarin patient)  Pharmacist suggested discharge dosing: Most likely reduction in home  Dose to warfarin 10 mg daily with close follow up within 48 hours.     Eleanor Banks, RodriguezD

## 2018-05-26 NOTE — PROGRESS NOTES
Bedside report received from ROS Kamara. Initial patient assessment performed, chart and eMAR reviewed. See relevant flowsheet for details. Patient updated on plan of care for the day, with all questions answered. Call light and personal belongings are within reach, and bed is in the lowest position.

## 2018-05-26 NOTE — WOUND TEAM
"[facility]   Inpatient Services   Established Ostomy Management/ troubleshooting  HPI: Reviewed  PMH: Reviewed   SH: Reviewed   Reason for Ostomy nurse consult:  Appliance needs    Subjective: They put on a 4\" last night. Use 2 1/4\" with paste.    Objective:   Ostomy type:  colostomy  Stoma location:  Q  Stoma assessment:    Appearance:   Pink, moist   Size:   approx 1\"   Protrusion: <1\"   Output: none   MC jxn:  NA   Peristomal skin:  NA    Ostomy Appliance (type and size): has 4\" appliance on     Interventions and Education: ordered 2 1/4\" wafers. Had 2 1/4\" pouches in room with [employer] paste. Provided written instructions to nursing.    Ordered Interdry sheets to place under breasts and under pannus. Nursing to place, and use Bree protect to buttock.     Evaluation: patient reports that she is able to change appliance herself, but needs correct size. Prefers paste to flat paste rings.     Plan: nursing to assist as needed.    Anticipated discharge needs: none    "

## 2018-05-26 NOTE — H&P
Hospital Medicine History and Physical    Date of Service  5/25/2018    Chief Complaint  Chief Complaint   Patient presents with   • Visual Problems   • Diplopia       History of Presenting Illness  67 y.o. female who presented 5/25/2018 with Visual Problems and Diplopia  She also is confused so poor historian, but does answer yes or no to some questions.  On record she has diabetes, history of cervical cancer, colostomy, COPD, hypothyroid, history of DVT on Coumadin and IVC filter.  She usually walks with a walker at home. At 0330 today woke up and complained of double vision. 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  She says yes to smoking, O2 and uses CPAP>  At the ED, she is afebrile, hemodynamically stable. Head CT no bleed. Not hypoglycemic. U/A showed bacteruria. ED decided to start antibiotics.  When I saw her at ED, she has cognitive deficits. She says she currently does not see double. Auscultation clear. Somewhat irregular. Neurologically moving extremities    Primary Care Physician  Carmelo Pat M.D.    Consultants      Code Status  full    Review of Systems  Review of Systems   Unable to perform ROS: Mental acuity        Past Medical History  Past Medical History:   Diagnosis Date   • Obesity 7/7/2009   • Hypothyroid 7/7/2009   • Colostomy in place (HCC) 2009   • Status post cholecystectomy 1992   • Arthritis    • Bowel obstruction (HCC)    • Cancer (HCC)     cervical   • Chronic autoimmune thyroiditis     + TPO ab = 563 / + ultrasound   • COPD    • Dental disorder     dentures upper   • Diabetes     type 2    • EMPHYSEMA    • Hashimoto's disease    • History of total knee arthroplasty     infection   • Hypertension     stated no longer on meds due to lost 100 lbs   • Indigestion    • Infectious disease     MRSA,   • Infectious disease     VRE   • Obstruction     colostomy   • Oxygen dependent     4.5 L NC   • Pain      b/l legs   • Personal history of venous thrombosis and embolism 2009, 2012    arm and leg left side   • Presence of IVC filter    • Psychiatric problem     depression   • Sleep apnea     USES CPAP, 3-4L oxygen   • Snoring    • Weight gain        Surgical History  Past Surgical History:   Procedure Laterality Date   • PB INJECT RX OTHER PERIPH NERVE Left 8/21/2015    Procedure: NEUROLYTIC DEST-OTHER NERVE;  Surgeon: Kd Galaviz D.O.;  Location: [facility];  Service: Pain Management   • PB FLUOROSCOPIC GUIDANCE NEEDLE PLACEMENT Left 8/21/2015    Procedure: FLOURO GUIDE NEEDLE PLACEMENT;  Surgeon: Kd Galaviz D.O.;  Location: [facility];  Service: Pain Management   • PB INJECT RX OTHER PERIPH NERVE  8/21/2015    Procedure: NEUROLYTIC DEST-OTHER NERVE;  Surgeon: Kd Galaviz D.O.;  Location: [facility];  Service: Pain Management   • PB INJECT RX OTHER PERIPH NERVE  8/21/2015    Procedure: NEUROLYTIC DEST-OTHER NERVE;  Surgeon: Kd Galaviz D.O.;  Location: [facility];  Service: Pain Management   • EXPLORATORY LAPAROTOMY  12/10/2013    Performed by Sang Castañeda M.D. at [facility]   • WOUND CLOSURE GENERAL  12/10/2013    Performed by Sang Castañeda M.D. at [facility]   • COLOSTOMY  11/26/2013    Performed by Maninder Carter M.D. at [facility]   • EXPLORATORY LAPAROTOMY  11/26/2013    Performed by Maninder Carter M.D. at [facility]   • LYSIS ADHESIONS GENERAL  11/26/2013    Performed by Maninder Carter M.D. at [facility]   • COLON RESECTION  11/26/2013    Performed by Maninder Carter M.D. at [facility]   • BLADDER BIOPSY WITH CYSTOSCOPY  10/10/2013    Performed by Sang Castañeda M.D. at [facility]   • EXPLORATORY LAPAROTOMY  9/2/2013    Performed by Maninder Carter M.D. at [facility]   • PARASTOMAL HERNIA REPAIR   9/2/2013     Performed by Maninder Carter M.D. at SURGERY [facility]   • IRRIGATION & DEBRIDEMENT ORTHO  2/8/2013    Performed by David Fowler M.D. at SURGERY [facility]   • IRRIGATION & DEBRIDEMENT GENERAL  12/17/2012    Performed by David Fowler M.D. at [facility]   • IRRIGATION & DEBRIDEMENT ORTHO  7/19/2012    Performed by BERYL LOYD at [facility]   • IRRIGATION & DEBRIDEMENT ORTHO  7/17/2012    Performed by BERYL LOYD at [facility]   • KNEE ARTHROPLASTY TOTAL  6/10/2012    left   • LATERAL RELEASE  7/20/2011    Performed by BERYL LOYD at [facility]   • KNEE ARTHROSCOPY  7/20/2011    Performed by BERYL LOYD at [facility]   • MEDIAL MENISCECTOMY  7/20/2011    Performed by BERYL LOYD at [facility]   • MENISCECTOMY  7/20/2011    Performed by BERYL LOYD at [facility]   • COLONOSCOPY - ENDO  11/17/2010    Performed by JESUSITA SUMNER at ENDOSCOPY [facility]   • TRACHEOSTOMY  3/31/2010    Performed by JESUSITA SUMNER at SURGERY [facility]   • COLOSTOMY  3/16/2010    Performed by CHRISTA BENNETT at SURGERY [facility]   • EXPLORATORY LAPAROTOMY  3/16/2010    Performed by CHRISTA BENNETT at SURGERY [facility]   • COLECTOMY  3/16/2010    Performed by CHRISTA BENNETT at SURGERY [facility]   • LOW ANTERIOR RESECTION  3/10/2010    Performed by JESUSITA SUMNER at SURGERY [facility]   • LOW ANTERIOR RESECTION LAPAROSCOPIC  3/10/2010    Performed by JESUSITA SUMNER at SURGERY [facility]   • MYRINGOTOMY  9/9/2009    Performed by RENETTA GOODSON at SURGERY [facility] DAY [facility]   • CHOLECYSTECTOMY  1992    laparoscopic   • HYSTERECTOMY LAPAROSCOPY  1983   • OTHER ORTHOPEDIC SURGERY      LT KNEE X2       Medications  No current facility-administered medications on file prior to encounter.      Current Outpatient Prescriptions on File Prior  to Encounter   Medication Sig Dispense Refill   • enoxaparin (LOVENOX) 120 MG/0.8ML Solution inj Inject 120 mg as instructed every 12 hours. 14 Syringe 0   • levothyroxine (SYNTHROID) 200 MCG Tab Take 250 mcg by mouth Every morning on an empty stomach. x1 200mcg tab + x1 50mcg tab for total dose = 250mcg QD     • warfarin (COUMADIN) 10 MG Tab Take 10-15 mg by mouth every evening. 10 mg on Mon, Wed, Fri  15 mg all other days     • albuterol (PROVENTIL HFA) 108 (90 Base) MCG/ACT Aero Soln inhalation aerosol Inhale 2 Puffs by mouth every four hours as needed for Shortness of Breath (Wheezing). 1 Inhaler 11   • amitriptyline (ELAVIL) 25 MG Tab Take 1 Tab by mouth every bedtime. 30 Tab 0   • raNITidine (ZANTAC) 150 MG Tab Take 150 mg by mouth 2 times a day.     • gabapentin (NEURONTIN) 300 MG Cap TAKE THREE (3) CAPSULES BY MOUTH THREE (3) TIMES DAILY  270 Cap 3   • budesonide-formoterol (SYMBICORT) 160-4.5 MCG/ACT Aerosol Inhale 2 Puffs by mouth 2 Times a Day. With spacer, rinse mouth after use 1 Inhaler 11   • carbamazepine (TEGRETOL) 200 MG Tab Take 600 mg by mouth every bedtime.     • Tiotropium Bromide Monohydrate (SPIRIVA RESPIMAT) 2.5 MCG/ACT Aero Soln Inhale 2 Inhalation by mouth every day. Assemble and prime. 1 Inhaler 6   • flurazepam (DALMANE) 30 MG capsule Take 30 mg by mouth every bedtime.         Family History  Family History   Problem Relation Age of Onset   • Heart Disease Mother    • Seizures Mother    • Alcohol/Drug Father    • Lung Disease Father    • Hypertension         Social History  Social History   Substance Use Topics   • Smoking status: Current Every Day Smoker     Packs/day: 0.25     Years: 50.00     Types: Cigarettes   • Smokeless tobacco: Current User      Comment: 1 ppd, 45 yrs   • Alcohol use No       Allergies  Allergies   Allergen Reactions   • Pcn [Penicillins] Anaphylaxis and Rash     Reaction; happened as a child. Tolerated cephalosporins and Zosyn        Physical Exam  Laboratory    Hemodynamics  Temp (24hrs), Av.2 °C (97.1 °F), Min:35.9 °C (96.7 °F), Max:36.4 °C (97.5 °F)   Temperature: 35.9 °C (96.7 °F)  Pulse  Av.1  Min: 49  Max: 61 Heart Rate (Monitored): (!) 52  Blood Pressure : 127/75, NIBP: 124/68      Respiratory      Respiration: 18, Pulse Oximetry: 96 %, O2 Daily Delivery Respiratory : Silicone Nasal Cannula     Work Of Breathing / Effort: Mild;Shallow  RUL Breath Sounds: Diminished, RML Breath Sounds: Diminished, RLL Breath Sounds: Diminished, HUI Breath Sounds: Diminished, LLL Breath Sounds: Diminished    Physical Exam   Constitutional: She appears well-developed and well-nourished.   HENT:   Head: Normocephalic and atraumatic.   Eyes: Conjunctivae and EOM are normal. No scleral icterus.   Neck: Normal range of motion. Neck supple.   Cardiovascular: Normal rate and regular rhythm.  Exam reveals no gallop and no friction rub.    No murmur heard.  Pulmonary/Chest: Effort normal and breath sounds normal. No respiratory distress. She has no wheezes. She has no rales.   Abdominal: Soft. Bowel sounds are normal. She exhibits no distension. There is no tenderness. There is no rebound and no guarding.   Musculoskeletal: She exhibits no edema (Trace LE) or tenderness.   Neurological: She is alert.   Confused   Skin: Skin is warm.   Psychiatric: She has a normal mood and affect. Her behavior is normal.   Odd behavior       Recent Labs      18   1553   WBC  5.8   RBC  4.24   HEMOGLOBIN  13.8   HEMATOCRIT  42.3   MCV  99.8*   MCH  32.5   MCHC  32.6*   RDW  51.5*   PLATELETCT  208   MPV  10.8     Recent Labs      18   1553   SODIUM  139   POTASSIUM  4.1   CHLORIDE  104   CO2  26   GLUCOSE  97   BUN  9   CREATININE  0.61   CALCIUM  8.5     Recent Labs      18   1553   ALTSGPT  13   ASTSGOT  16   ALKPHOSPHAT  91   TBILIRUBIN  0.2   GLUCOSE  97     Recent Labs      18   1553   APTT  41.9*   INR  4.06*             Lab Results   Component Value Date    TROPONINI  <0.01 05/25/2018     Urinalysis:    Lab Results  Component Value Date/Time   SPECGRAVITY 1.021 05/25/2018 1638   GLUCOSEUR Negative 05/25/2018 1638   KETONES Trace (A) 05/25/2018 1638   NITRITE Negative 05/25/2018 1638   WBCURINE 2-5 05/25/2018 1638   RBCURINE 0-2 05/25/2018 1638   BACTERIA Many (A) 05/25/2018 1638   EPITHELCELL Few 05/25/2018 1638        Imaging  Ct-chest (thorax) W/o    Result Date: 5/4/2018 5/4/2018 1:39 PM HISTORY/REASON FOR EXAM: Right-sided rib pain after ground-level fall yesterday. TECHNIQUE/EXAM DESCRIPTION: CT scan of the chest without contrast. Noncontrast helical scanning of the chest was obtained from the lung apices through the adrenal glands. Low dose optimization technique was utilized for this CT exam including automated exposure control and adjustment of the mA and/or kV according to patient size. COMPARISON: CXR 5/4/2018. CT thorax 9/5/2017 FINDINGS: Pulmonary consolidation or pneumothorax is present. Interstitial fibrotic changes with reticular nodular opacities in each upper lobe and bilateral mild bronchiectasis is stable consistent with chronic interstitial pneumonitis or fibrosis. There is focal pleural thickening in the inferior lateral aspect of the lingula which appears unchanged. This focal pleural thickening accounts for the opacity seen on current chest x-ray. No displaced rib fracture is identified. There is no mediastinal, hilar, or axillary adenopathy. The cardiac chambers, great vessels, and aorta are normal in CT appearance. The extrathoracic soft tissues and thoracic cage are normal in appearance. The adrenal glands are normal.  The visualized portions of the liver, spleen, pancreas, and kidneys are normal. The gallbladder is absent. An IVC filter is present. A right-sided hernia or ostomy is partially visualized. There is no upper abdominal adenopathy.     1.  No acute cardiopulmonary disease. 2.  No displaced rib fracture or pneumothorax. 3.  Chronic  interstitial reticulonodular opacities in the lung parenchyma bilaterally, greatest in each right upper lobe. 4.  No pleural effusion.     Ct-head W/o    Result Date: 5/25/2018 5/25/2018 3:29 PM HISTORY/REASON FOR EXAM:  Neurological Deficit. Altered mental status. TECHNIQUE/EXAM DESCRIPTION AND NUMBER OF VIEWS: CT of the head without contrast. The study was performed on a helical multidetector CT scanner. Contiguous 2.5 mm axial sections were obtained from the skull base through the vertex. Up to date radiation dose reduction adjustments have been utilized to meet ALARA standards for radiation dose reduction. COMPARISON:  May 4, 2018 FINDINGS: The calvariae and skull base are unremarkable. There are no extraaxial fluid collections. The ventricular system and basal cisterns are within normal limits. There are areas of hypodensity in the white matter most consistent with small vessel ischemic change versus demyelination or gliosis. There are no hemorrhagic lesions. There are no mass effects or shift of midline structures. The brainstem and posterior fossa structures are unremarkable. Paranasal sinuses in the field of view are unremarkable. There is again opacification and sclerosis of the left mastoid air cells suggesting history of mastoiditis.     1.  White matter lucencies most consistent with small vessel ischemic change versus demyelination or gliosis. 2. No evidence of acute cerebral infarction, hemorrhage or mass lesion.    Ct-head W/o    Result Date: 5/4/2018 5/4/2018 1:39 PM HISTORY/REASON FOR EXAM:  Head pain following ground-level fall. TECHNIQUE/EXAM DESCRIPTION AND NUMBER OF VIEWS: CT of the head without contrast. Up to date radiation dose reduction adjustments have been utilized to meet ALARA standards for radiation dose reduction. COMPARISON:  3/21/2016 FINDINGS: There is no evidence of mass or mass effect. The ventricles and CSF spaces are normal. There is minimal periventricular chronic small  vessel ischemic change present. There is no intracranial hemorrhage seen. The calvarium is intact. There is no scalp injury.     1.  No acute intracranial process. 2.  Stable minimal small vessel ischemic change.    Dx-chest-portable (1 View)    Result Date: 5/4/2018 5/4/2018 12:26 PM HISTORY/REASON FOR EXAM:  Right rib pain. Fall yesterday. TECHNIQUE/EXAM DESCRIPTION AND NUMBER OF VIEWS: Single portable view of the chest. COMPARISON:  4/5/2018 FINDINGS: No displaced rib fracture or pneumothorax is identified. There is unchanged minimal opacity in the lateral aspect of the left lower lobe which may represent chronic pleural thickening or small pleural effusion. The cardiac silhouette is within normal limits. The right lung is clear.     1.  No displaced rib fracture or pneumothorax identified. 2.  Stable opacity in the inferior lateral aspect of the left hemithorax which may represent chronic pleural thickening or residual or recurrent left pleural effusion. 3.  No right lung opacity.     Assessment/Plan     I anticipate this patient is appropriate for observation status at this time.    * Toxic metabolic encephalopathy   Assessment & Plan    History of ongoing tobacco use, COPD on home O2, YESSICA on CPAP, morbid obesity, history of DVT and A. fib on anticoagulation therapy   Recent hospitalization for fall, pneumonia.  Recurrent falls.  Prob multifactorial  Stroke/seizure workup  Also eval resp  Consider calling Neurology for consultation        History of DVT (deep vein thrombosis)- (present on admission)   Assessment & Plan    Noted        S/P IVC filter- (present on admission)   Assessment & Plan    Noted        COPD (chronic obstructive pulmonary disease) (HCC)- (present on admission)   Assessment & Plan    Does not appear to be exacerbating  Resp and O2 per protocol        YESSICA (obstructive sleep apnea)- (present on admission)   Assessment & Plan    Ordered CPAP        Circulating anticoagulants (HCC)    Assessment & Plan    No active bleeding  Continue warfarin as dosed by pharmacy  Supratherapeutic INR so should hold          A-fib (HCC)- (present on admission)   Assessment & Plan    Currently sinus and on anticoagulation  Pharmacy to dose warfarin  Supratherapeutic INR so should hold        Cataract of both eyes- (present on admission)   Assessment & Plan    Noted, likely causing blurriness            VTE prophylaxis: SCDs.    I spent 75 minutes, reviewing the chart, notes, vitals, labs, imaging, ordering labs, evaluating Atiya Henley for assessment, enacting the plan above. Discussed with ED physician and RN staff. Medical decision making is therefore complex. Time was devoted to counseling and coordinating care including review of records, pertinent lab data and studies, as well as discussing diagnostic evaluation and work up, planned therapeutic interventions and future disposition of care. Where indicated, the assessment and plan reflect discussion of patient with consultants, other healthcare providers, family members, and additional research needed to obtain further information in formulating the plan of care for Atiya Henley.

## 2018-05-26 NOTE — PROGRESS NOTES
2 RN skin assessment done with Aric SOMMER. Redness, non-blanching pressure area on coccyx, blistering noted. Redness, moisture rash under pannus and skin in groin. Moderate bruising on abdomen and knees from falls at home per pt. Scabbing and scarring in perineal area. Pink, blanching ears intact under respiratory device bilateral ears. All POA.

## 2018-05-26 NOTE — ASSESSMENT & PLAN NOTE
Patient with multiple medical problems including COPD with chronic respiratory failure, obstructive sleep apnea, recent admission for pneumonia and recurrent falls, chronic A. Fib  Encephalopathy has cleared  Patient has complained of transient visual symptoms and also recent head trauma, CT is negative for bleed  Await MRI

## 2018-05-27 ENCOUNTER — APPOINTMENT (OUTPATIENT)
Dept: RADIOLOGY | Facility: MEDICAL CENTER | Age: 68
End: 2018-05-27
Attending: INTERNAL MEDICINE
Payer: MEDICARE

## 2018-05-27 ENCOUNTER — PATIENT OUTREACH (OUTPATIENT)
Dept: HEALTH INFORMATION MANAGEMENT | Facility: OTHER | Age: 68
End: 2018-05-27

## 2018-05-27 VITALS
OXYGEN SATURATION: 94 % | HEART RATE: 63 BPM | SYSTOLIC BLOOD PRESSURE: 141 MMHG | TEMPERATURE: 97.7 F | WEIGHT: 271.83 LBS | DIASTOLIC BLOOD PRESSURE: 59 MMHG | HEIGHT: 64 IN | RESPIRATION RATE: 22 BRPM | BODY MASS INDEX: 46.41 KG/M2

## 2018-05-27 PROBLEM — Z93.3 COLOSTOMY IN PLACE (HCC): Status: ACTIVE | Noted: 2018-05-27

## 2018-05-27 PROBLEM — J96.11 CHRONIC RESPIRATORY FAILURE WITH HYPOXIA (HCC): Status: ACTIVE | Noted: 2018-05-27

## 2018-05-27 PROBLEM — I48.0 PAROXYSMAL A-FIB (HCC): Status: ACTIVE | Noted: 2018-04-07

## 2018-05-27 LAB
INR PPP: 3.82 (ref 0.87–1.13)
PROTHROMBIN TIME: 37.4 SEC (ref 12–14.6)

## 2018-05-27 PROCEDURE — G0378 HOSPITAL OBSERVATION PER HR: HCPCS

## 2018-05-27 PROCEDURE — A9270 NON-COVERED ITEM OR SERVICE: HCPCS | Performed by: INTERNAL MEDICINE

## 2018-05-27 PROCEDURE — 99217 PR OBSERVATION CARE DISCHARGE: CPT | Performed by: INTERNAL MEDICINE

## 2018-05-27 PROCEDURE — 36415 COLL VENOUS BLD VENIPUNCTURE: CPT

## 2018-05-27 PROCEDURE — 85610 PROTHROMBIN TIME: CPT

## 2018-05-27 PROCEDURE — 700102 HCHG RX REV CODE 250 W/ 637 OVERRIDE(OP): Performed by: INTERNAL MEDICINE

## 2018-05-27 PROCEDURE — 70551 MRI BRAIN STEM W/O DYE: CPT

## 2018-05-27 RX ORDER — LEVOTHYROXINE SODIUM 0.1 MG/1
200 TABLET ORAL
Status: DISCONTINUED | OUTPATIENT
Start: 2018-05-28 | End: 2018-05-27 | Stop reason: HOSPADM

## 2018-05-27 RX ORDER — ASPIRIN 81 MG/1
81 TABLET ORAL
Qty: 30 TAB | COMMUNITY
Start: 2018-05-27 | End: 2018-06-14

## 2018-05-27 RX ORDER — WARFARIN SODIUM 5 MG/1
TABLET ORAL
Qty: 20 TAB | Refills: 2 | Status: SHIPPED | OUTPATIENT
Start: 2018-05-27 | End: 2018-09-19 | Stop reason: SDUPTHER

## 2018-05-27 RX ORDER — WARFARIN SODIUM 10 MG/1
10 TABLET ORAL EVERY EVENING
Qty: 30 TAB | Refills: 3 | Status: SHIPPED | OUTPATIENT
Start: 2018-05-27 | End: 2018-09-19 | Stop reason: SDUPTHER

## 2018-05-27 RX ORDER — LEVOTHYROXINE SODIUM 0.2 MG/1
200 TABLET ORAL
Qty: 30 TAB | Refills: 0
Start: 2018-05-27 | End: 2018-09-28 | Stop reason: SDUPTHER

## 2018-05-27 RX ORDER — ATORVASTATIN CALCIUM 80 MG/1
80 TABLET, FILM COATED ORAL EVERY EVENING
Qty: 30 TAB | Refills: 12 | Status: SHIPPED | OUTPATIENT
Start: 2018-05-27 | End: 2019-06-25 | Stop reason: SDUPTHER

## 2018-05-27 RX ADMIN — LEVOTHYROXINE SODIUM 250 MCG: 125 TABLET ORAL at 06:22

## 2018-05-27 RX ADMIN — BUDESONIDE AND FORMOTEROL FUMARATE DIHYDRATE 2 PUFF: 160; 4.5 AEROSOL RESPIRATORY (INHALATION) at 06:20

## 2018-05-27 RX ADMIN — FAMOTIDINE 20 MG: 20 TABLET ORAL at 06:22

## 2018-05-27 RX ADMIN — TIOTROPIUM BROMIDE 1 CAPSULE: 18 CAPSULE ORAL; RESPIRATORY (INHALATION) at 06:20

## 2018-05-27 ASSESSMENT — ENCOUNTER SYMPTOMS
PALPITATIONS: 0
NAUSEA: 0
FALLS: 1
BLOOD IN STOOL: 0
HEADACHES: 1
COUGH: 1
SPEECH CHANGE: 0
VOMITING: 0
FEVER: 0
DIZZINESS: 1
CHILLS: 0
ABDOMINAL PAIN: 0
FOCAL WEAKNESS: 0
WEAKNESS: 1
SENSORY CHANGE: 0
SHORTNESS OF BREATH: 1
BLURRED VISION: 1
DEPRESSION: 1
CONSTIPATION: 0
DIARRHEA: 1
DIAPHORESIS: 0
SORE THROAT: 0
SPUTUM PRODUCTION: 1

## 2018-05-27 ASSESSMENT — PAIN SCALES - GENERAL
PAINLEVEL_OUTOF10: 5
PAINLEVEL_OUTOF10: 0

## 2018-05-27 NOTE — PROGRESS NOTES
Inpatient Anticoagulation Service Note    Date: 5/27/2018  Reason for Anticoagulation: Deep Vein Thrombosis, Atrial Fibrillation   [id number] VASc Score: 2    Hemoglobin Value: 13  Hematocrit Value: 40.3  Lab Platelet Value: 172  Target INR: 2.0 to 3.0    INR from last 7 days     Date/Time INR Value    05/27/18 0144 (!)  3.82    05/26/18 0826 (!)  5.54    05/25/18 1553 (!)  4.06        Dose from last 7 days     Date/Time Dose (mg)    05/27/18 0900  0    05/26/18 1100  0    05/25/18 2200  0        Average Dose (mg):  (Home dose: 10 mg MWF and 15 mg AOD)  Significant Interactions: Aspirin, Statin, Other (Comments), Thyroid Medications  Bridge Therapy: No    Reversal Agent Administered: Not Applicable    Comments: Patient on chronic anticoagualtion d/t history of DVT, AF, and IVC filter in place. INR continues to be supratherapeutic but decreased since yesterday. H/h is stable, no signs/symptoms of bleeding/bruising. Several drug interactions identified above. Will continue to hold warfarin and monitor INR.    Plan:  No warfarin today  Education Material Provided?: No (chronic warfarin patient)  Pharmacist suggested discharge dosing: Most likely reduction of home dose to warfarin 10 mg daily with close follow up within 48 hours.     Eleanor Banks, RodriguezD

## 2018-05-27 NOTE — DISCHARGE INSTRUCTIONS
Discharge Instructions    Discharged to home by car with friend. Discharged via wheelchair, hospital escort: Refused.  Special equipment needed: Not Applicable    Be sure to schedule a follow-up appointment with your primary care doctor or any specialists as instructed.     Discharge Plan:   Diet Plan: Discussed  Activity Level: Discussed  Confirmed Follow up Appointment: Patient to Call and Schedule Appointment  Confirmed Symptoms Management: Discussed  Medication Reconciliation Updated: Yes  Pneumococcal Vaccine Administered/Refused: Not given - Patient refused pneumococcal vaccine  Influenza Vaccine Indication: Patient Refuses    I understand that a diet low in cholesterol, fat, and sodium is recommended for good health. Unless I have been given specific instructions below for another diet, I accept this instruction as my diet prescription.   Other diet: Cardiac diet = low salt low fat diet    Special Instructions: None    · Is patient discharged on Warfarin / Coumadin?   Yes    You are receiving the drug warfarin. Please understand the importance of monitoring warfarin with scheduled PT/INR blood draws.  Follow-up with a call to your personal Doctor's office in 3 days to schedule a PT/INR. .    IMPORTANT: HOW TO USE THIS INFORMATION:  This is a summary and does NOT have all possible information about this product. This information does not assure that this product is safe, effective, or appropriate for you. This information is not individual medical advice and does not substitute for the advice of your health care professional. Always ask your health care professional for complete information about this product and your specific health needs.      WARFARIN - ORAL (WARF-uh-rin)      COMMON BRAND NAME(S): Coumadin      WARNING:  Warfarin can cause very serious (possibly fatal) bleeding. This is more likely to occur when you first start taking this medication or if you take too much warfarin. To decrease your risk  "for bleeding, your doctor or other health care provider will monitor you closely and check your lab results (INR test) to make sure you are not taking too much warfarin. Keep all medical and laboratory appointments. Tell your doctor right away if you notice any signs of serious bleeding. See also Side Effects section.      USES:  This medication is used to treat blood clots (such as in deep vein thrombosis-DVT or pulmonary embolus-PE) and/or to prevent new clots from forming in your body. Preventing harmful blood clots helps to reduce the risk of a stroke or heart attack. Conditions that increase your risk of developing blood clots include a certain type of irregular heart rhythm (atrial fibrillation), heart valve replacement, recent heart attack, and certain surgeries (such as hip/knee replacement). Warfarin is commonly called a \"blood thinner,\" but the more correct term is \"anticoagulant.\" It helps to keep blood flowing smoothly in your body by decreasing the amount of certain substances (clotting proteins) in your blood.      HOW TO USE:  Read the Medication Guide provided by your pharmacist before you start taking warfarin and each time you get a refill. If you have any questions, ask your doctor or pharmacist. Take this medication by mouth with or without food as directed by your doctor or other health care professional, usually once a day. It is very important to take it exactly as directed. Do not increase the dose, take it more frequently, or stop using it unless directed by your doctor. Dosage is based on your medical condition, laboratory tests (such as INR), and response to treatment. Your doctor or other health care provider will monitor you closely while you are taking this medication to determine the right dose for you. Use this medication regularly to get the most benefit from it. To help you remember, take it at the same time each day. It is important to eat a balanced, consistent diet while taking " warfarin. Some foods can affect how warfarin works in your body and may affect your treatment and dose. Avoid sudden large increases or decreases in your intake of foods high in vitamin K (such as broccoli, cauliflower, cabbage, brussels sprouts, kale, spinach, and other green leafy vegetables, liver, green tea, certain vitamin supplements). If you are trying to lose weight, check with your doctor before you try to go on a diet. Cranberry products may also affect how your warfarin works. Limit the amount of cranberry juice (16 ounces/480 milliliters a day) or other cranberry products you may drink or eat.      SIDE EFFECTS:  Nausea, loss of appetite, or stomach/abdominal pain may occur. If any of these effects persist or worsen, tell your doctor or pharmacist promptly. Remember that your doctor has prescribed this medication because he or she has judged that the benefit to you is greater than the risk of side effects. Many people using this medication do not have serious side effects. This medication can cause serious bleeding if it affects your blood clotting proteins too much (shown by unusually high INR lab results). Even if your doctor stops your medication, this risk of bleeding can continue for up to a week. Tell your doctor right away if you have any signs of serious bleeding, including: unusual pain/swelling/discomfort, unusual/easy bruising, prolonged bleeding from cuts or gums, persistent/frequent nosebleeds, unusually heavy/prolonged menstrual flow, pink/dark urine, coughing up blood, vomit that is bloody or looks like coffee grounds, severe headache, dizziness/fainting, unusual or persistent tiredness/weakness, bloody/black/tarry stools, chest pain, shortness of breath, difficulty swallowing. Tell your doctor right away if any of these unlikely but serious side effects occur: persistent nausea/vomiting, severe stomach/abdominal pain, yellowing eyes/skin. This drug rarely has caused very serious (possibly  fatal) problems if its effects lead to small blood clots (usually at the beginning of treatment). This can lead to severe skin/tissue damage that may require surgery or amputation if left untreated. Patients with certain blood conditions (protein C or S deficiency) may be at greater risk. Get medical help right away if any of these rare but serious side effects occur: painful/red/purplish patches on the skin (such as on the toe, breast, abdomen), change in the amount of urine, vision changes, confusion, slurred speech, weakness on one side of the body. A very serious allergic reaction to this drug is rare. However, get medical help right away if you notice any symptoms of a serious allergic reaction, including: rash, itching/swelling (especially of the face/tongue/throat), severe dizziness, trouble breathing. This is not a complete list of possible side effects. If you notice other effects not listed above, contact your doctor or pharmacist. In the [place] - Call your doctor for medical advice about side effects. You may report side effects to FDA at 3-801-XLZ-9259. In [place] - Call your doctor for medical advice about side effects. You may report side effects to [facility] [place] at [phone].      PRECAUTIONS:  Before taking warfarin, tell your doctor or pharmacist if you are allergic to it; or if you have any other allergies. This product may contain inactive ingredients, which can cause allergic reactions or other problems. Talk to your pharmacist for more details. Before using this medication, tell your doctor or pharmacist your medical history, especially of: blood disorders (such as anemia, hemophilia), bleeding problems (such as bleeding of the stomach/intestines, bleeding in the brain), blood vessel disorders (such as aneurysms), recent major injury/surgery, liver disease, alcohol use, mental/mood disorders (including memory problems), frequent falls/injuries. It is important that all your doctors and dentists  know that you take warfarin. Before having surgery or any medical/dental procedures, tell your doctor or dentist that you are taking this medication and about all the products you use (including prescription drugs, nonprescription drugs, and herbal products). Avoid getting injections into the muscles. If you must have an injection into a muscle (for example, a flu shot), it should be given in the arm. This way, it will be easier to check for bleeding and/or apply pressure bandages. This medication may cause stomach bleeding. Daily use of alcohol while using this medicine will increase your risk for stomach bleeding and may also affect how this medication works. Limit or avoid alcoholic beverages. If you have not been eating well, if you have an illness or infection that causes fever, vomiting, or diarrhea for more than 2 days, or if you start using any antibiotic medications, contact your doctor or pharmacist immediately because these conditions can affect how warfarin works. This medication can cause heavy bleeding. To lower the chance of getting cut, bruised, or injured, use great caution with sharp objects like safety razors and nail cutters. Use an electric razor when shaving and a soft toothbrush when brushing your teeth. Avoid activities such as contact sports. If you fall or injure yourself, especially if you hit your head, call your doctor immediately. Your doctor may need to check you. The Food & Drug Administration has stated that generic warfarin products are interchangeable. However, consult your doctor or pharmacist before switching warfarin products. Be careful not to take more than one medication that contains warfarin unless specifically directed by the doctor or health care provider who is monitoring your warfarin treatment. Older adults may be at greater risk for bleeding while using this drug. This medication is not recommended for use during pregnancy because of serious (possibly fatal) harm to  "an unborn baby. Discuss the use of reliable forms of birth control with your doctor. If you become pregnant or think you may be pregnant, tell your doctor immediately. If you are planning pregnancy, discuss a plan for managing your condition with your doctor before you become pregnant. Your doctor may switch the type of medication you use during pregnancy. Very small amounts of this medication may pass into breast milk but is unlikely to harm a nursing infant. Consult your doctor before breast-feeding.      DRUG INTERACTIONS:  Drug interactions may change how your medications work or increase your risk for serious side effects. This document does not contain all possible drug interactions. Keep a list of all the products you use (including prescription/nonprescription drugs and herbal products) and share it with your doctor and pharmacist. Do not start, stop, or change the dosage of any medicines without your doctor's approval. Warfarin interacts with many prescription, nonprescription, vitamin, and herbal products. This includes medications that are applied to the skin or inside the vagina or rectum. The interactions with warfarin usually result in an increase or decrease in the \"blood-thinning\" (anticoagulant) effect. Your doctor or other health care professional should closely monitor you to prevent serious bleeding or clotting problems. While taking warfarin, it is very important to tell your doctor or pharmacist of any changes in medications, vitamins, or herbal products that you are taking. Some products that may interact with this drug include: capecitabine, imatinib, mifepristone. Aspirin, aspirin-like drugs (salicylates), and nonsteroidal anti-inflammatory drugs (NSAIDs such as ibuprofen, naproxen, celecoxib) may have effects similar to warfarin. These drugs may increase the risk of bleeding problems if taken during treatment with warfarin. Carefully check all prescription/nonprescription product labels " (including drugs applied to the skin such as pain-relieving creams) since the products may contain NSAIDs or salicylates. Talk to your doctor about using a different medication (such as acetaminophen) to treat pain/fever. Low-dose aspirin and related drugs (such as clopidogrel, ticlopidine) should be continued if prescribed by your doctor for specific medical reasons such as heart attack or stroke prevention. Consult your doctor or pharmacist for more details. Many herbal products interact with warfarin. Tell your doctor before taking any herbal products, especially bromelains, coenzyme Q10, cranberry, danshen, dong quai, fenugreek, garlic, ginkgo biloba, ginseng, and Bennie's wort, among others. This medication may interfere with a certain laboratory test to measure theophylline levels, possibly causing false test results. Make sure laboratory personnel and all your doctors know you use this drug.      OVERDOSE:  If overdose is suspected, contact a poison control center or emergency room immediately.  residents can call the [employer] Poison Hotline at 1-684.327.2606. [place] residents can call a provincial poison control center. Symptoms of overdose may include: bloody/black/tarry stools, pink/dark urine, unusual/prolonged bleeding.      NOTES:  Do not share this medication with others. Laboratory and/or medical tests (such as INR, complete blood count) must be performed periodically to monitor your progress or check for side effects. Consult your doctor for more details.      MISSED DOSE:  For the best possible benefit, do not miss any doses. If you do miss a dose and remember on the same day, take it as soon as you remember. If you remember on the next day, skip the missed dose and resume your usual dosing schedule. Do not double the dose to catch up because this could increase your risk for bleeding. Keep a record of missed doses to give to your doctor or pharmacist. Contact your doctor or pharmacist if you  miss 2 or more doses in a row.      STORAGE:  Store at room temperature away from light and moisture. Do not store in the bathroom. Keep all medications away from children and pets. Do not flush medications down the toilet or pour them into a drain unless instructed to do so. Properly discard this product when it is  or no longer needed. Consult your pharmacist or local waste disposal company for more details about how to safely discard your product.      MEDICAL ALERT:  Your condition and medication can cause complications in a medical emergency. For information about enrolling in MedicAlert, call 1-734.748.8105 ([place]) or 1-936.525.8257 ([place]).      Information last revised 2010 Copyright(c)  [employer]             Depression / Suicide Risk    As you are discharged from this [facility] facility, it is important to learn how to keep safe from harming yourself.    Recognize the warning signs:  · Abrupt changes in personality, positive or negative- including increase in energy   · Giving away possessions  · Change in eating patterns- significant weight changes-  positive or negative  · Change in sleeping patterns- unable to sleep or sleeping all the time   · Unwillingness or inability to communicate  · Depression  · Unusual sadness, discouragement and loneliness  · Talk of wanting to die  · Neglect of personal appearance   · Rebelliousness- reckless behavior  · Withdrawal from people/activities they love  · Confusion- inability to concentrate     If you or a loved one observes any of these behaviors or has concerns about self-harm, here's what you can do:  · Talk about it- your feelings and reasons for harming yourself  · Remove any means that you might use to hurt yourself (examples: pills, rope, extension cords, firearm)  · Get professional help from the community (Mental Health, Substance Abuse, psychological counseling)  · Do not be alone:Call your Safe Contact- someone whom you  trust who will be there for you.  · Call your local CRISIS HOTLINE 324-6164 or 523-619-1428  · Call your local Children's Mobile Crisis Response Team [place] (017) 948-9285 or www.[employer]  · Call the toll free National Suicide Prevention Hotlines   · National Suicide Prevention Lifeline 798-373-DTVA (9235)  · National MediVision Line Network 800-SUICIDE (118-1257)

## 2018-05-27 NOTE — PROGRESS NOTES
Pt back on unit at ~2000. Telemetry functioning properly. Fall precautions in place. Will continue to monitor.

## 2018-05-27 NOTE — PROGRESS NOTES
Report received from ROS Faustin. Pt off unit at CT during this RN's assumption of care. Will continue to monitor.

## 2018-05-27 NOTE — PROGRESS NOTES
Pt discharged to home PT states they understand discharge instructions with no further question. Tele monitor and PIV removed. All belongings with pt.

## 2018-05-27 NOTE — PROGRESS NOTES
[facility] Hospitalist Progress Note    Date of Service: 2018    Chief Complaint  67 y.o. female admitted 2018 with blurred vision and altered mentation. She has a complex past medical history including O2 dependent COPD, morbid obesity, sleep apnea, paroxysmal atrial fibrillation, history of DVT and PE, status post IVC filter, on chronic anticoagulation. She was very confused on presentation and not a good historian    Interval Problem Update  Patient is alert and oriented this morning and able to give reasonable history  MRI is pending when seen.  Discussed with RN    Consultants/Specialty  None    Disposition  Home-patient has in-home nursing care        Review of Systems   Constitutional: Positive for malaise/fatigue. Negative for chills, diaphoresis and fever.   HENT: Negative for congestion and sore throat.    Eyes: Positive for blurred vision (transient).   Respiratory: Positive for cough (at baseline), sputum production (normal in the morning light yellow to brown) and shortness of breath (at baseline).    Cardiovascular: Negative for chest pain and palpitations.   Gastrointestinal: Positive for diarrhea (colostomy output has been normal). Negative for abdominal pain, blood in stool, constipation, melena, nausea and vomiting.   Genitourinary: Negative for dysuria and frequency.   Musculoskeletal: Positive for falls.   Skin: Negative for itching and rash.        Denies sores or ulcerations   Neurological: Positive for dizziness, weakness and headaches. Negative for sensory change, speech change and focal weakness.        Has bumped her head a few times recently due to falls   Endo/Heme/Allergies:        Patient is worried because she had not checked her INR recently   Psychiatric/Behavioral: Positive for depression (controlled with medications).      Physical Exam  Laboratory/Imaging   Hemodynamics  Temp (24hrs), Av.6 °C (97.8 °F), Min:36.3 °C (97.3 °F), Max:37.1 °C (98.7 °F)   Temperature: 36.4 °C  (97.5 °F)  Pulse  Av.2  Min: 48  Max: 69   Blood Pressure : 126/54      Respiratory      Respiration: 17, Pulse Oximetry: 96 %        RLL Breath Sounds: Diminished;Clear, LLL Breath Sounds: Diminished;Clear    Fluids    Intake/Output Summary (Last 24 hours) at 18 0814  Last data filed at 18   Gross per 24 hour   Intake              960 ml   Output                0 ml   Net              960 ml       Nutrition  Orders Placed This Encounter   Procedures   • Diet Order     Standing Status:   Standing     Number of Occurrences:   1     Order Specific Question:   Diet:     Answer:   Cardiac [6]     Order Specific Question:   Diet:     Answer:   Consistent Carbohydrate [4]     Physical Exam   Constitutional: She is oriented to person, place, and time. She appears well-developed. No distress.   Markedly obese and mildly disheveled   HENT:   Head: Normocephalic and atraumatic.   Mouth/Throat: Oropharynx is clear and moist.   Eyes: EOM are normal. Pupils are equal, round, and reactive to light. Right eye exhibits no discharge. Left eye exhibits no discharge.   Neck: Neck supple.   Cardiovascular: Normal rate and regular rhythm.    Pulmonary/Chest: Effort normal.   A sounds diminished but otherwise clear   Abdominal: Soft. Bowel sounds are normal. She exhibits no distension. There is no tenderness.   She has extensive abdominal scarring left lower quadrant ostomy with dark center no stool in the bag   Musculoskeletal: She exhibits edema (trace nonpitting). She exhibits no tenderness.   Neurological: She is alert and oriented to person, place, and time. No cranial nerve deficit.   Skin: Skin is warm and dry. She is not diaphoretic.   Psychiatric: She has a normal mood and affect.   Nursing note and vitals reviewed.      Recent Labs      18   1553  18   0254   WBC  5.8  5.4   RBC  4.24  3.97*   HEMOGLOBIN  13.8  13.0   HEMATOCRIT  42.3  40.3   MCV  99.8*  101.5*   MCH  32.5  32.7   MCHC  32.6*   32.3*   RDW  51.5*  52.6*   PLATELETCT  208  172   MPV  10.8  10.2     Recent Labs      05/25/18   1553  05/26/18   0254   SODIUM  139  139   POTASSIUM  4.1  3.4*   CHLORIDE  104  104   CO2  26  28   GLUCOSE  97  116*   BUN  9  10   CREATININE  0.61  0.62   CALCIUM  8.5  8.3*     Recent Labs      05/25/18   1553  05/26/18   0826  05/27/18   0144   APTT  41.9*   --    --    INR  4.06*  5.54*  3.82*     Recent Labs      05/25/18   1553   BNPBTYPENAT  13     Recent Labs      05/26/18   0254   TRIGLYCERIDE  139   HDL  48   LDL  74          Assessment/Plan     * Toxic metabolic encephalopathy   Assessment & Plan    Patient with multiple medical problems including COPD with chronic respiratory failure, obstructive sleep apnea, recent admission for pneumonia and recurrent falls, chronic A. Fib  Encephalopathy has cleared  Patient has complained of transient visual symptoms and also recent head trauma, CT is negative for bleed  Await MRI          History of DVT (deep vein thrombosis)- (present on admission)   Assessment & Plan    On anticoagulation also has filter        S/P IVC filter- (present on admission)   Assessment & Plan    Noted        COPD (chronic obstructive pulmonary disease) ([facility])- (present on admission)   Assessment & Plan    At baseline without exacerbation        YESSICA (obstructive sleep apnea)- (present on admission)   Assessment & Plan    CPAP        Chronic respiratory failure with hypoxia ([facility])   Assessment & Plan    OK on [place] O2        Colostomy in place ([facility])   Assessment & Plan    Per patient is functioning normally        Impaired glucose tolerance   Assessment & Plan    A1c 6, change to consistent carb diet        Circulating anticoagulants ([facility])   Assessment & Plan    Has not had INR checked recently, she is supratherapeutic          Paroxysmal A-fib ([facility])- (present on admission)   Assessment & Plan    Chronic atrial fibrillation  Rate controlled  On warfarin        Cataract of both eyes-  (present on admission)   Assessment & Plan    Noted, likely causing blurriness        Hypothyroidism, acquired, autoimmune- (present on admission)   Assessment & Plan    Last several TSHs have been low, will reduce levothyroxine to 200        Morbid obesity ([facility])- (present on admission)   Assessment & Plan    BMI 46.66          Quality-Core Measures   Reviewed items::  Labs reviewed, Medications reviewed and Radiology images reviewed (apices in the chest x-ray look a little stippled and hazy particularly on the right)  Turner catheter::  No Turner  DVT prophylaxis pharmacological::  Warfarin (Coumadin) (INR high)  Ulcer Prophylaxis::  Not indicated  Assessed for rehabilitation services:  Patient returned to prior level of function, rehabilitation not indicated at this time

## 2018-05-27 NOTE — DISCHARGE PLANNING
Medical SW    Referral: Sw spoke to hospitalist RN.    Pt requesting transportation home from hospital. Pt has Medicaid INS.     Sw called MTM. Sw set up transport for pt.    They will call charge when they are on their way to hospital.    Sw called charge and provided update as above.     Plan: Sw to assist w/ d/c planning as needed.

## 2018-05-28 NOTE — DISCHARGE SUMMARY
CHIEF COMPLAINT ON ADMISSION  Chief Complaint   Patient presents with   • Visual Problems   • Diplopia       CODE STATUS  Prior    HPI & HOSPITAL COURSE  67 y.o. female admitted 5/25/2018 with blurred vision and altered mentation. She has a complex past medical history including O2 dependent COPD, morbid obesity, sleep apnea, paroxysmal atrial fibrillation, history of DVT and PE, status post IVC filter, on chronic anticoagulation. She was very confused on presentation and not a good historian.  It appeared her vision problems have been more chronic. By the following morning patient appeared to be mentally at her baseline. Review of systems had multiple positives but most of these were chronic issues and not worse than baseline. Her right carotid was free of disease however she had mild disease on the left with incidental subclavian stenosis greater than 50%.    MRI obtained this morning and chronic white matter disease consistent with microvascular changes similar to her last MRI in 2014 there was no acute areas of concern. She has chronic left osteo-mastoiditis-unchanged from 2014.    Patient was notified of the negative result, she remained stable overnight. She had some moderate social issues regarding discharge to home but social work was able to resolve these and patient was subsequently discharged to home.    Therefore, she is discharged in good and stable condition with close outpatient follow-up.    SPECIFIC OUTPATIENT FOLLOW-UP  Primary care    DISCHARGE PROBLEM LIST  Principal Problem:    Toxic metabolic encephalopathy POA: Unknown  Active Problems:    YESSICA (obstructive sleep apnea) (Chronic) POA: Yes      Overview: CPAP at night at home    COPD (chronic obstructive pulmonary disease) (HCC) (Chronic) POA: Yes      Overview: On home O2      Current smoker.      Significant comorbidity      Does not appear to be exacerbating      Resp and O2 per protocol    S/P IVC filter POA: Yes    History of DVT (deep vein  thrombosis) (Chronic) POA: Yes      Overview: Diagnois update 10/1/2016    Morbid obesity (HCC) POA: Yes      Overview: BMI 51.8      Significant comorbidity    Hypothyroidism, acquired, autoimmune POA: Yes    Cataract of both eyes POA: Yes      Overview: s/p surgery    Paroxysmal A-fib (HCC) POA: Yes    Circulating anticoagulants (HCC) POA: Unknown    Impaired glucose tolerance POA: Unknown    Colostomy in place (HCC) POA: Unknown    Chronic respiratory failure with hypoxia (HCC) POA: Unknown  Resolved Problems:    * No resolved hospital problems. *      FOLLOW UP  Future Appointments  Date Time Provider Department Center   6/6/2018 3:30 PM Carmelo Pat M.D. [facility]   6/14/2018 1:15 PM PULMONARY DX 1 [facility]   6/14/2018 1:40 PM ALCIRA Chavez PULM None     No follow-up provider specified.    MEDICATIONS ON DISCHARGE   Atiya Henley   Home Medication Instructions [id number]    Printed on:05/27/18 1814   Medication Information                      albuterol (PROVENTIL HFA) 108 (90 Base) MCG/ACT Aero Soln inhalation aerosol  Inhale 2 Puffs by mouth every four hours as needed for Shortness of Breath (Wheezing).             amitriptyline (ELAVIL) 25 MG Tab  Take 1 Tab by mouth every bedtime.             aspirin EC 81 MG EC tablet  Take 1 Tab by mouth every bedtime.             atorvastatin (LIPITOR) 80 MG tablet  Take 1 Tab by mouth every evening.             budesonide-formoterol (SYMBICORT) 160-4.5 MCG/ACT Aerosol  Inhale 2 Puffs by mouth 2 Times a Day. With spacer, rinse mouth after use             carbamazepine (TEGRETOL) 200 MG Tab  Take 600 mg by mouth every bedtime.             fluoxetine (PROZAC) 40 MG capsule  Take 40 mg by mouth every evening.             flurazepam (DALMANE) 30 MG capsule  Take 30 mg by mouth every bedtime.             gabapentin (NEURONTIN) 300 MG Cap  TAKE THREE (3) CAPSULES BY MOUTH THREE (3) TIMES DAILY              levothyroxine (SYNTHROID) 200 MCG  Tab  Take 1 Tab by mouth Every morning on an empty stomach.             raNITidine (ZANTAC) 150 MG Tab  Take 150 mg by mouth 2 times a day.             Tiotropium Bromide Monohydrate (SPIRIVA RESPIMAT) 2.5 MCG/ACT Aero Soln  Inhale 2 Inhalation by mouth every day. Assemble and prime.             warfarin (COUMADIN) 10 MG Tab  Take 1 Tab by mouth every evening. 10 mg on Mon, Wed, Fri ;12.5 mg all other days (10mg tab + 1/2 of 5mg Tab)             warfarin (COUMADIN) 5 MG Tab  Take 2.5 mg (1/2 tab) on  Tues, Thurs, Saturday Sunday; Along with 10mg tab to equal 12.5 mg                 DIET  Cardiac    ACTIVITY  As tolerated      CONSULTATIONS  none    PROCEDURES  none    LABORATORY  Lab Results   Component Value Date/Time    SODIUM 139 05/26/2018 02:54 AM    POTASSIUM 3.4 (L) 05/26/2018 02:54 AM    CHLORIDE 104 05/26/2018 02:54 AM    CO2 28 05/26/2018 02:54 AM    GLUCOSE 116 (H) 05/26/2018 02:54 AM    BUN 10 05/26/2018 02:54 AM    CREATININE 0.62 05/26/2018 02:54 AM    CREATININE 0.8 04/27/2009 08:33 AM        Lab Results   Component Value Date/Time    WBC 5.4 05/26/2018 02:54 AM    HEMOGLOBIN 13.0 05/26/2018 02:54 AM    HEMATOCRIT 40.3 05/26/2018 02:54 AM    PLATELETCT 172 05/26/2018 02:54 AM        Total time of the discharge process exceeds 38 minutes

## 2018-05-30 DIAGNOSIS — J44.89 CHRONIC OBSTRUCTIVE ASTHMA: ICD-10-CM

## 2018-05-30 RX ORDER — ERGOCALCIFEROL 1.25 MG/1
50000 CAPSULE ORAL
Qty: 3 CAP | Refills: 2 | Status: SHIPPED | OUTPATIENT
Start: 2018-05-30 | End: 2018-05-30 | Stop reason: SDUPTHER

## 2018-05-30 NOTE — TELEPHONE ENCOUNTER
Was the patient seen in the last year in this department? Yes     Does patient have an active prescription for medications requested? Yes     Received Request Via: Pharmacy   Future Appointments       Provider [facility]    5/31/2018 4:30 PM Choco Smith M.D. [facility] PHYSIATRY     6/6/2018 3:30 PM Carmelo Pat M.D. [facility]    6/14/2018 1:15 PM PULMONARY DX 1 [facility]    6/14/2018 1:40 PM ALCIRA Chavez [facility] Pulmonary Medicine

## 2018-05-30 NOTE — TELEPHONE ENCOUNTER
Have we ever prescribed this med? Yes.  If yes, what date? 5/22/17    Last OV: 4/26/18- MALATHI Jefferson     Next OV: 6/14/18    DX: Chronic Obstructive Asthma     Medications: Spiriva Respimat 2.5 MCG

## 2018-05-31 ENCOUNTER — OFFICE VISIT (OUTPATIENT)
Dept: PHYSICAL MEDICINE AND REHAB | Facility: MEDICAL CENTER | Age: 68
End: 2018-05-31
Payer: MEDICARE

## 2018-05-31 VITALS
TEMPERATURE: 97.5 F | OXYGEN SATURATION: 90 % | SYSTOLIC BLOOD PRESSURE: 108 MMHG | WEIGHT: 266 LBS | DIASTOLIC BLOOD PRESSURE: 68 MMHG | BODY MASS INDEX: 45.41 KG/M2 | HEART RATE: 78 BPM | HEIGHT: 64 IN

## 2018-05-31 DIAGNOSIS — Z98.890 H/O KNEE SURGERY: ICD-10-CM

## 2018-05-31 DIAGNOSIS — M25.562 LEFT KNEE PAIN, UNSPECIFIED CHRONICITY: ICD-10-CM

## 2018-05-31 RX ORDER — FLUOXETINE HYDROCHLORIDE 20 MG/1
20 CAPSULE ORAL DAILY
COMMUNITY
Start: 2018-04-29 | End: 2021-11-08

## 2018-05-31 RX ORDER — ERGOCALCIFEROL 1.25 MG/1
50000 CAPSULE ORAL
Qty: 3 CAP | Refills: 2 | Status: SHIPPED | OUTPATIENT
Start: 2018-05-31 | End: 2019-02-19 | Stop reason: SDUPTHER

## 2018-05-31 RX ORDER — DIAPER,BRIEF,ADULT, DISPOSABLE
EACH MISCELLANEOUS
Refills: 0 | COMMUNITY
Start: 2018-05-21 | End: 2018-07-19 | Stop reason: SDUPTHER

## 2018-05-31 NOTE — PROGRESS NOTES
The patient was mis-scheduled today. I had seen the patient on 5/22/2018 regarding ongoing left knee pain, prior left knee surgery. At the 5/22/2018 visit, I had referred the patient to [facility] for second opinion, referral currently under review at [facility]. I reviewed intercurrent medical issues, including recent medical hospitalization, outpatient medical follow-up in progress. Follow-up after [facility] second opinion consultation or an as-needed basis.

## 2018-06-01 NOTE — TELEPHONE ENCOUNTER
Pt notified that rx was sent to the [employer] on file. Pt mentioned that the [employer] was turning into [facility].

## 2018-06-06 ENCOUNTER — OFFICE VISIT (OUTPATIENT)
Dept: MEDICAL GROUP | Facility: MEDICAL CENTER | Age: 68
End: 2018-06-06
Attending: FAMILY MEDICINE
Payer: MEDICARE

## 2018-06-06 VITALS
TEMPERATURE: 97.6 F | RESPIRATION RATE: 14 BRPM | OXYGEN SATURATION: 100 % | DIASTOLIC BLOOD PRESSURE: 80 MMHG | BODY MASS INDEX: 46.44 KG/M2 | WEIGHT: 272 LBS | HEART RATE: 78 BPM | HEIGHT: 64 IN | SYSTOLIC BLOOD PRESSURE: 120 MMHG

## 2018-06-06 DIAGNOSIS — Z78.9 DECREASED ACTIVITIES OF DAILY LIVING (ADL): ICD-10-CM

## 2018-06-06 DIAGNOSIS — M54.2 CHRONIC NECK PAIN: ICD-10-CM

## 2018-06-06 DIAGNOSIS — G89.29 CHRONIC NECK PAIN: ICD-10-CM

## 2018-06-06 DIAGNOSIS — M47.27 OSTEOARTHRITIS OF SPINE WITH RADICULOPATHY, LUMBOSACRAL REGION: ICD-10-CM

## 2018-06-06 DIAGNOSIS — M25.569 ARTHRALGIA OF LOWER LEG, UNSPECIFIED LATERALITY: ICD-10-CM

## 2018-06-06 DIAGNOSIS — Z93.3 COLOSTOMY IN PLACE (HCC): ICD-10-CM

## 2018-06-06 PROCEDURE — 99214 OFFICE O/P EST MOD 30 MIN: CPT | Performed by: FAMILY MEDICINE

## 2018-06-06 PROCEDURE — 99213 OFFICE O/P EST LOW 20 MIN: CPT | Performed by: FAMILY MEDICINE

## 2018-06-06 ASSESSMENT — ENCOUNTER SYMPTOMS
COUGH: 0
SPUTUM PRODUCTION: 0
TINGLING: 1
FEVER: 0
SPEECH CHANGE: 0
BACK PAIN: 1
NAUSEA: 0
PALPITATIONS: 0
CHILLS: 0
NECK PAIN: 0
SHORTNESS OF BREATH: 0
TREMORS: 0
ABDOMINAL PAIN: 0
VOMITING: 0
SENSORY CHANGE: 0

## 2018-06-06 NOTE — PROGRESS NOTES
Subjective:      Atiya Henley is a 67 y.o. female who presents with Orders Needed (walker)            Patient here for follow-up recent pneumonia, need for a 4 wheeled walker, history of chronic pain and recurrent inner ear problems. Results of her recent chest CT scan was as follows:    1.  Increasing opacity in the left lower lobe consistent with worsening left lower lobe atelectasis or pneumonia.    2.  Stable reticular nodular interstitial opacity in each upper lobe which represent chronic interstitial lung disease.    3.  No adenopathy or pleural effusion.    4.  Cholecystectomy.    She will be following up with [facility] on the 14th. She states she has been feeling much better after medical management that she had received. She states she will be going to see her pulmonologist and will be receiving another chest x-ray at that time. We'll have her continue to use her medications as directed. Will continue to follow.     She has been following up with Dr. Smith for her pain management. She states that she had been referred to [facility] for for orthopedic surgery since no orthopedic surgeons in the area but willing to see her for her knee problem. She has been using her power wheelchair for mobility but wishes to ambulate as well for short distances. She has tried using her cane but has fallen on multiple occasions doing so. She started using a walker she had, which broke while she was using it. Will write for a heavy duty 4 wheeled walker with seat and will send it to her medical supply carrier. We'll continue to follow.     Current medications, allergies, and problem list reviewed with patient and updated in EPIC.          Review of Systems   Constitutional: Negative for chills and fever.   HENT: Negative for hearing loss and tinnitus.    Respiratory: Negative for cough, sputum production and shortness of breath.    Cardiovascular: Negative for chest pain and palpitations.   Gastrointestinal: Negative for abdominal  "pain, nausea and vomiting.        Intact ostomy   Musculoskeletal: Positive for back pain and joint pain. Negative for neck pain.   Skin: Negative for rash.   Neurological: Positive for tingling. Negative for tremors, sensory change and speech change.          Objective:     /80   Pulse 78   Temp 36.4 °C (97.6 °F)   Resp 14   Ht 1.626 m (5' 4\")   Wt 123.4 kg (272 lb)   LMP 07/19/2011   SpO2 100%   BMI 46.69 kg/m²      Physical Exam   Constitutional: She is oriented to person, place, and time.   BMI 46   HENT:   Head: Normocephalic and atraumatic.   Cardiovascular: Normal rate, regular rhythm and normal heart sounds.  Exam reveals no friction rub.    No murmur heard.  Pulmonary/Chest: Effort normal and breath sounds normal. No respiratory distress. She has no wheezes. She has no rales.   Abdominal: Soft. Bowel sounds are normal. She exhibits no distension. There is no tenderness.   Ostomy intact   Musculoskeletal: She exhibits tenderness. She exhibits no edema.   Decreased ROM of affected extremities    Neurological: She is alert and oriented to person, place, and time.   Skin: Skin is warm and dry.   Psychiatric: She has a normal mood and affect. Her behavior is normal.   Nursing note and vitals reviewed.              Assessment/Plan:     1. Arthralgia of lower leg, unspecified laterality  Will have her continue to use her PMD and walker as directed. An order for a walker will be sent to her [place]. Will continue to follow.    - Misc. Devices Misc; Heavy duty 4 wheeled walker with seat and back rest to use as needed  Dispense: 1 Device; Refill: 0    2. Chronic neck pain  Will have her continue to follow up with pain management. Will continue to follow.  - Misc. Devices Misc; Heavy duty 4 wheeled walker with seat and back rest to use as needed  Dispense: 1 Device; Refill: 0    3. Osteoarthritis of spine with radiculopathy, lumbosacral region  See above plan.   - Misc. Devices Misc; Heavy " duty 4 wheeled walker with seat and back rest to use as needed  Dispense: 1 Device; Refill: 0    4. Decreased activities of daily living (ADL)  See above plan.  - Misc. Devices Misc; Heavy duty 4 wheeled walker with seat and back rest to use as needed  Dispense: 1 Device; Refill: 0

## 2018-06-07 ENCOUNTER — ANTICOAGULATION VISIT (OUTPATIENT)
Dept: VASCULAR LAB | Facility: MEDICAL CENTER | Age: 68
End: 2018-06-07
Attending: INTERNAL MEDICINE
Payer: MEDICARE

## 2018-06-07 VITALS — SYSTOLIC BLOOD PRESSURE: 134 MMHG | HEART RATE: 64 BPM | DIASTOLIC BLOOD PRESSURE: 64 MMHG

## 2018-06-07 DIAGNOSIS — Z86.718 HISTORY OF DVT (DEEP VEIN THROMBOSIS): ICD-10-CM

## 2018-06-07 DIAGNOSIS — Z95.828 S/P IVC FILTER: ICD-10-CM

## 2018-06-07 DIAGNOSIS — Z79.01 CHRONIC ANTICOAGULATION: ICD-10-CM

## 2018-06-07 LAB — INR PPP: 2.9 (ref 2–3.5)

## 2018-06-07 PROCEDURE — 85610 PROTHROMBIN TIME: CPT

## 2018-06-07 PROCEDURE — 99211 OFF/OP EST MAY X REQ PHY/QHP: CPT

## 2018-06-07 NOTE — PROGRESS NOTES
Anticoagulation Summary  As of 6/7/2018    INR goal:   2.0-3.0   TTR:   39.3 % (2.7 y)   Today's INR:   2.9   Warfarin maintenance plan:   10 mg (10 mg x 1) on Mon, Wed, Fri; 12.5 mg (10 mg x 1 and 5 mg x 0.5) all other days   Weekly warfarin total:   80 mg   Plan last modified:   Bon Ash PharmD (6/7/2018)   Next INR check:   6/14/2018   Priority:   Routine   Target end date:   Indefinite    Indications    DVT (deep venous thrombosis) ([facility]) [I82.409]  Chronic anticoagulation [Z79.01]  S/P IVC filter [Z95.828]  History of DVT (deep vein thrombosis) [Z86.718]             Anticoagulation Episode Summary     INR check location:   [facility]    Preferred lab:   [facility]    Send INR reminders to:       Comments:         Anticoagulation Care Providers     Provider Role Specialty Phone number    Carmelo Pat M.D. Referring [facility] Medicine [phone]    [facility] Anticoagulation Services Responsible  [phone]        Anticoagulation Patient Findings      HPI:  Atiya Olamide Watterser seen in clinic today, on anticoagulation therapy with warfarin for DVT.  Changes to current medical/health status since last appt: recently hospitalized, no bleeding complications.  Denies signs/symptoms of bleeding and/or thrombosis since the last appt.    Denies any interval changes to diet  Denies any interval changes to medications since last appt.   Denies any complications or cost restrictions with current therapy.   BP recorded in vitals.  Confirmed dosing regimen.     A/P   INR  therapeutic.   Pt is to continue with new current warfarin dosing regimen.     Follow up appointment in 1 week(s).    Bon Ash, RodriguezD

## 2018-06-08 LAB — INR BLD: 2.9 (ref 0.9–1.2)

## 2018-06-14 ENCOUNTER — OFFICE VISIT (OUTPATIENT)
Dept: PULMONOLOGY | Facility: HOSPICE | Age: 68
End: 2018-06-14
Payer: MEDICARE

## 2018-06-14 ENCOUNTER — APPOINTMENT (OUTPATIENT)
Dept: RADIOLOGY | Facility: IMAGING CENTER | Age: 68
End: 2018-06-14
Attending: NURSE PRACTITIONER
Payer: MEDICARE

## 2018-06-14 VITALS
TEMPERATURE: 98.1 F | RESPIRATION RATE: 16 BRPM | SYSTOLIC BLOOD PRESSURE: 132 MMHG | OXYGEN SATURATION: 94 % | WEIGHT: 270 LBS | DIASTOLIC BLOOD PRESSURE: 82 MMHG | HEIGHT: 64 IN | HEART RATE: 100 BPM | BODY MASS INDEX: 46.1 KG/M2

## 2018-06-14 DIAGNOSIS — R93.89 ABNORMAL CT OF THE CHEST: ICD-10-CM

## 2018-06-14 DIAGNOSIS — Z87.01 HISTORY OF PNEUMONIA: ICD-10-CM

## 2018-06-14 DIAGNOSIS — J44.89 CHRONIC OBSTRUCTIVE ASTHMA: ICD-10-CM

## 2018-06-14 DIAGNOSIS — J18.9 COMMUNITY ACQUIRED PNEUMONIA, UNSPECIFIED LATERALITY: ICD-10-CM

## 2018-06-14 DIAGNOSIS — J30.9 ALLERGIC RHINITIS, UNSPECIFIED SEASONALITY, UNSPECIFIED TRIGGER: ICD-10-CM

## 2018-06-14 DIAGNOSIS — R13.10 DYSPHAGIA, UNSPECIFIED TYPE: ICD-10-CM

## 2018-06-14 DIAGNOSIS — Z72.0 TOBACCO ABUSE: ICD-10-CM

## 2018-06-14 DIAGNOSIS — G47.33 OSA (OBSTRUCTIVE SLEEP APNEA): ICD-10-CM

## 2018-06-14 DIAGNOSIS — R09.02 HYPOXEMIA: ICD-10-CM

## 2018-06-14 PROCEDURE — 71046 X-RAY EXAM CHEST 2 VIEWS: CPT | Mod: TC,FY | Performed by: NURSE PRACTITIONER

## 2018-06-14 PROCEDURE — 99214 OFFICE O/P EST MOD 30 MIN: CPT | Performed by: NURSE PRACTITIONER

## 2018-06-14 RX ORDER — CLINDAMYCIN HYDROCHLORIDE 300 MG/1
300 CAPSULE ORAL 3 TIMES DAILY
Qty: 21 CAP | Refills: 0 | Status: SHIPPED | OUTPATIENT
Start: 2018-06-14 | End: 2018-06-21

## 2018-06-14 NOTE — PROGRESS NOTES
Chief Complaint   Patient presents with   • COPD     [facility] 5/5/18 / CT Scan / Xray in office    • Apnea       HPI:  Atiya Henley is a 67 y.o. year old female here today for follow-up on her chronic obstructive Asthma, YESSICA, Abnormal CT scan of the chest and recent hospitalizations. She was hospitalized in 4/5-4/11/2018 for community acquired pneumonia and a DVT. She was discharged on Coumadin. She states she was treated in hospital with antibiotics, but was not discharged on antibiotics.   Chest xray 4/5/2018 indicated; Hazy pulmonary opacities suggests edema or infiltrate. Atherosclerosis.     She was seen in follow up 4/26/2018. She was hospitalized 2 times since that office visit. 5/4-5/5/2018 for ground level fall then 5/25-5/27/2018 for blurred vision and altered mentation. MRI brain was unchanged. She was discharged home and social work was ordered.     She was referred to lung cancer screening. She had a CT chest 9/5/2017 which indicated;  Diffuse interstitial and groundglass opacities both lungs especially within the upper lobe similar to previous findings. Consistent with mild pneumonitis.  Left lower lobe atelectasis similar to previous findings. No pleural effusion.  No focal mass identified.  She is recommended annual imaging.     CT chest was obtained in hospital 5/26/2018 and indicates;  1.  Increasing opacity in the left lower lobe consistent with worsening left lower lobe atelectasis or pneumonia.  2.  Stable reticular nodular interstitial opacity in each upper lobe which represent chronic interstitial lung disease.  3.  No adenopathy or pleural effusion.  4.  Cholecystectomy.    Chest xray today in the office indicates;   Diffuse interstitial prominence could relate to chronic changes. Superimposed edema would be difficult to exclude.  Stable cardiomegaly.     Echo 4/8/2018 indicated;   Technically difficult due to body habitus, positioning and pt unable to   assume left lateral decubitus  but adequate study for interpretation.   Normal left ventricular wall thickness.  Left ventricular ejection fraction is visually estimated to be 60%.  Mild tricuspid regurgitation.  Right ventricular systolic pressure is estimated to be 32 mmHg with an   estimated RAP of 3 mmHg.       She feels her chronic dyspnea is worse with exertion. She feels better when she is on her oxygen. She does continue to cough and produce mucous. She states the mucous is cream colored. She denies hemoptysis. She notes an occasional wheeze in the morning and with exertion. She denies any fevers or chills. She does have clear sinus drainage which is chronic for her and she feels may be allergy triggered. She does have a Nasonex nasal spray which she uses as needed an feels helps. She notes occasional dysphagia. She notes occasional coughing after drinking. She had seen speech therapy in 2009.      In regards to her Obstructive Sleep Apnea she is compliant on CPAP 9 CM H20 with 3 LPM 02 bleed in. She had been followed by Dr. Hodge in the past, but he retired. She did not bring her compliance chip to her last office visit. However, she felt the pressure was too low and she was waking un refreshed. She underwent a dedicated in lab CPAP titration study in July which indicates adequate titration CPAP of 14 CM H20 with a resultant AHI of 1, mean 02 saturation of 87.5%. She was increased to CPAP of 14 CM H20 with 3 LPM 02 bleed in. Compliance card download today in the office indicates an AHI of 1 with an average use of 4-5 hours at night. She does tolerate the pressure well. She does feel she is sleeping better on therapy and is waking more refreshed. She denies any morning headaches.      She is on oxygen at 3 LPM with exertion. PFT's 5/22/2017 indicated an FEV1 of 1.42 L, 63% predicted with an FEV1/FVC ratio of 84 with a TLC of 78% and a DLCO of 97% predicted. Prior PFT's indicated an FEV1 of 1.55 L, 67% predicted with an FEV1/FVC ratio of  84 with a TLC of 84% predicted with a DLCO of 88% predicted. She does continue to smoke cigarettes and is smoking 1/24-1/2 pack per day. She is working on cutting back.       She is compliant on Symbicort 160/4.5 mcg 2 puffs INH bid, along with a Combivent respimat and Proventil HFA. She was restarted on Spiriva respimat 2.5 mcg 2 puffs INH daily.     She states she has had chronic knee issues she is pending consult at [facility] in July.          Past Medical History:   Diagnosis Date   • Arthritis    • Bowel obstruction (HCC)    • Cancer (HCC)     cervical   • Chronic autoimmune thyroiditis     + TPO ab = 563 / + ultrasound   • Colostomy in place (HCC) 2009   • COPD    • Dental disorder     dentures upper   • Diabetes     type 2    • EMPHYSEMA    • Hashimoto's disease    • History of total knee arthroplasty     infection   • Hypertension     stated no longer on meds due to lost 100 lbs   • Hypothyroid 7/7/2009   • Indigestion    • Infectious disease     MRSA,   • Infectious disease     VRE   • Obesity 7/7/2009   • Obstruction     colostomy   • Oxygen dependent     4.5 L NC   • Pain     b/l legs   • Personal history of venous thrombosis and embolism 2009, 2012    arm and leg left side   • Presence of IVC filter    • Psychiatric problem     depression   • Sleep apnea     USES CPAP, 3-4L oxygen   • Snoring    • Status post cholecystectomy 1992   • Weight gain        Past Surgical History:   Procedure Laterality Date   • PB INJECT RX OTHER PERIPH NERVE Left 8/21/2015    Procedure: NEUROLYTIC DEST-OTHER NERVE;  Surgeon: Kd Galaviz D.O.;  Location: [facility];  Service: Pain Management   • PB FLUOROSCOPIC GUIDANCE NEEDLE PLACEMENT Left 8/21/2015    Procedure: FLOURO GUIDE NEEDLE PLACEMENT;  Surgeon: Kd Galaviz D.O.;  Location: [facility];  Service: Pain Management   • PB INJECT RX OTHER PERIPH NERVE  8/21/2015    Procedure: NEUROLYTIC DEST-OTHER NERVE;  Surgeon: Kd DAMON  CK Galaivz;  Location: [facility];  Service: Pain Management   • PB INJECT RX OTHER PERIPH NERVE  8/21/2015    Procedure: NEUROLYTIC DEST-OTHER NERVE;  Surgeon: Kd Galaviz D.O.;  Location: [facility];  Service: Pain Management   • EXPLORATORY LAPAROTOMY  12/10/2013    Performed by Sang Castañeda M.D. at [facility]   • WOUND CLOSURE GENERAL  12/10/2013    Performed by Sang Castañeda M.D. at [facility]   • COLOSTOMY  11/26/2013    Performed by Maninder Carter M.D. at [facility]   • EXPLORATORY LAPAROTOMY  11/26/2013    Performed by Maninder Carter M.D. at [facility]   • LYSIS ADHESIONS GENERAL  11/26/2013    Performed by Maninder Carter M.D. at [facility]   • COLON RESECTION  11/26/2013    Performed by Maninder Carter M.D. at [facility]   • BLADDER BIOPSY WITH CYSTOSCOPY  10/10/2013    Performed by Sagn Castañeda M.D. at [facility]   • EXPLORATORY LAPAROTOMY  9/2/2013    Performed by Maninder Carter M.D. at [facility]   • PARASTOMAL HERNIA REPAIR   9/2/2013    Performed by Maninder Carter M.D. at [facility]   • IRRIGATION & DEBRIDEMENT ORTHO  2/8/2013    Performed by David Fowler M.D. at [facility]   • IRRIGATION & DEBRIDEMENT GENERAL  12/17/2012    Performed by David Fowler M.D. at [facility]   • IRRIGATION & DEBRIDEMENT ORTHO  7/19/2012    Performed by BERYL LOYD at [facility]   • IRRIGATION & DEBRIDEMENT ORTHO  7/17/2012    Performed by BERYL LOYD at [facility]   • KNEE ARTHROPLASTY TOTAL  6/10/2012    left   • LATERAL RELEASE  7/20/2011    Performed by BERYL LOYD at [facility]   • KNEE ARTHROSCOPY  7/20/2011    Performed by BERYL LOYD at [facility]   • MEDIAL MENISCECTOMY  7/20/2011    Performed by BERYL LOYD at SURGERY  [facility]   • MENISCECTOMY  7/20/2011    Performed by BERYL LOYD at SURGERY [facility]   • COLONOSCOPY - ENDO  11/17/2010    Performed by JESUSITA SUMNER at ENDOSCOPY [facility]   • TRACHEOSTOMY  3/31/2010    Performed by JESUSITA SUMNER at SURGERY [facility]   • COLOSTOMY  3/16/2010    Performed by CHRISTA BENNETT at SURGERY [facility]   • EXPLORATORY LAPAROTOMY  3/16/2010    Performed by CHRISTA BENNETT at SURGERY [facility]   • COLECTOMY  3/16/2010    Performed by CHRISTA BENNETT at SURGERY [facility]   • LOW ANTERIOR RESECTION  3/10/2010    Performed by JESUSITA SUMNER at SURGERY [facility]   • LOW ANTERIOR RESECTION LAPAROSCOPIC  3/10/2010    Performed by JESUSITA SUMNER at SURGERY [facility]   • MYRINGOTOMY  9/9/2009    Performed by RENETTA GOODSON at [facility]ERY [facility]   • CHOLECYSTECTOMY  1992    laparoscopic   • HYSTERECTOMY LAPAROSCOPY  1983   • OTHER ORTHOPEDIC SURGERY      LT KNEE X2       Family History   Problem Relation Age of Onset   • Heart Disease Mother    • Seizures Mother    • Alcohol/Drug Father    • Lung Disease Father    • Hypertension         Social History     Social History   • Marital status:      Spouse name: N/A   • Number of children: N/A   • Years of education: N/A     Occupational History   • Not on file.     Social History Main Topics   • Smoking status: Current Every Day Smoker     Packs/day: 0.25     Years: 50.00     Types: Cigarettes   • Smokeless tobacco: Current User      Comment: 1 ppd, 45 yrs   • Alcohol use No   • Drug use: Yes     Types: Marijuana      Comment: 1x per month   • Sexual activity: No     Other Topics Concern   •  Service No   • Blood Transfusions No     unknown   • Caffeine Concern No   • Occupational Exposure No   • Hobby Hazards No   • Sleep Concern Yes     sleep apnea   • Stress Concern Yes   • Weight Concern No   • Special Diet Yes   • Back Care Yes   • Exercise No   • Bike  Helmet No     does not ride bike    • Seat Belt Yes   • Self-Exams Yes     Social History Narrative   • No narrative on file       ROS:  Constitutional: Denies fevers, chills, sweats, fatigue, weight loss  Eyes: Denies glasses, vision loss, pain, drainage, double vision  Ears/Nose/Mouth/Throat: Denies ear ache, difficulty hearing, sore throat, persistent hoarseness, decayed teeth/toothache  Cardiovascular: Denies chest pain, tightness, palpitations, swelling in feet/legs, fainting, difficulty breathing when laying down  Respiratory: See HPI   GI: Denies heartburn, nausea, vomiting, abdominal pain, diarrhea, constipation. Positive for dysphagia.   : Denies frequent urination, painful urination  Integumentary: Denies rashes, lumps or color changes  MSK: Positive for knee pain   Neurological: Denies frequent headaches, dizziness, weakness  Sleep: See HPI       Current Outpatient Prescriptions   Medication Sig Dispense Refill   • vitamin D, Ergocalciferol, (DRISDOL) 43908 units Cap capsule Take 1 Cap by mouth every 28 days. 3 Cap 2   • Incontinence Supply Disposable (FQ PROTECTIVE UNDERWEAR) Misc USE 1 PULLUP 3 TIMES DAILY  0   • FLUoxetine (PROZAC) 20 MG Cap      • Tiotropium Bromide Monohydrate (SPIRIVA RESPIMAT) 2.5 MCG/ACT Aero Soln Inhale 2 Inhalation by mouth every day. Assemble and prime. 1 Inhaler 6   • warfarin (COUMADIN) 10 MG Tab Take 1 Tab by mouth every evening. 10 mg on Mon, Wed, Fri ;12.5 mg all other days (10mg tab + 1/2 of 5mg Tab) 30 Tab 3   • atorvastatin (LIPITOR) 80 MG tablet Take 1 Tab by mouth every evening. 30 Tab 12   • levothyroxine (SYNTHROID) 200 MCG Tab Take 1 Tab by mouth Every morning on an empty stomach. 30 Tab 0   • warfarin (COUMADIN) 5 MG Tab Take 2.5 mg (1/2 tab) on  Tues, Thurs, Saturday Sunday; Along with 10mg tab to equal 12.5 mg 20 Tab 2   • albuterol (PROVENTIL HFA) 108 (90 Base) MCG/ACT Aero Soln inhalation aerosol Inhale 2 Puffs by mouth every four hours as needed for  "Shortness of Breath (Wheezing). 1 Inhaler 11   • amitriptyline (ELAVIL) 25 MG Tab Take 1 Tab by mouth every bedtime. 30 Tab 0   • raNITidine (ZANTAC) 150 MG Tab Take 150 mg by mouth 2 times a day.     • gabapentin (NEURONTIN) 300 MG Cap TAKE THREE (3) CAPSULES BY MOUTH THREE (3) TIMES DAILY  270 Cap 3   • budesonide-formoterol (SYMBICORT) 160-4.5 MCG/ACT Aerosol Inhale 2 Puffs by mouth 2 Times a Day. With spacer, rinse mouth after use 1 Inhaler 11   • flurazepam (DALMANE) 30 MG capsule Take 30 mg by mouth every bedtime.     • Misc. Devices Misc Heavy duty 4 wheeled walker with seat and back rest to use as needed 1 Device 0   • enoxaparin (LOVENOX) 120 MG/0.8ML Solution inj      • fluoxetine (PROZAC) 40 MG capsule Take 40 mg by mouth every evening.     • carbamazepine (TEGRETOL) 200 MG Tab Take 600 mg by mouth every bedtime.       No current facility-administered medications for this visit.        Allergies   Allergen Reactions   • Pcn [Penicillins] Anaphylaxis and Rash     Reaction; happened as a child. Tolerated cephalosporins and Zosyn       Blood pressure 132/82, pulse 100, temperature 36.7 °C (98.1 °F), resp. rate 16, height 1.626 m (5' 4\"), weight 122.5 kg (270 lb), last menstrual period 07/19/2011, SpO2 94 %.    PE:   Appearance: Well developed, well nourished, no acute distress  Eyes: PERRL, EOM intact, sclera white, conjunctiva moist  Ears: no lesions or deformities  Hearing: grossly intact  Nose: no lesions or deformities  Oropharynx: tongue normal, posterior pharynx without erythema or exudate  Mallampati Classification: class 4  Neck: supple, trachea midline, no masses   Respiratory effort: no intercostal retractions or use of accessory muscles  Lung auscultation: no rales, rhonchi or wheezes, diminished   Heart auscultation: no murmur rub or gallop  Extremities: no cyanosis or edema  Abdomen: soft ,non tender, no masses  Gait and Station: Ambulates with walker   Digits and nails: no clubbing, cyanosis, " petechiae or nodes.  Cranial nerves: grossly intact  Skin: no rashes, lesions or ulcers noted  Orientation: Oriented to time, person and place  Mood and affect: mood and affect appropriate, normal interaction with examiner  Judgement: Intact          Assessment:    1. History of pneumonia  clindamycin (CLEOCIN) 300 MG Cap    DX-ESOPHAGUS - [id number]    CT-CHEST (THORAX) W/O   2. Chronic obstructive asthma (HCC)     3. YESSICA (obstructive sleep apnea)     4. Tobacco abuse     5. Abnormal CT of the chest  CT-CHEST (THORAX) W/O   6. Allergic rhinitis, unspecified seasonality, unspecified trigger     7. Hypoxemia     8. BMI 45.0-49.9, adult ([facility])     9. Dysphagia, unspecified type  clindamycin (CLEOCIN) 300 MG Cap    DX-ESOPHAGUS - [id number]         Plan:    1) I am suspicious for chronic aspiration. Swallow study now. Treat for aspiration pneumonia with Clindamycin. Encouraged to take with probiotic. Encouraged to contact the office if she develops diarrhea.   2) Repeat CT in 3 months for follow up on Pneumonia.  3) Smoking cessation discussed and recommended. She is working on quitting.  3) Continue 02 3 LPM prn exertion.  4) Continue Symbicort 160/4.5 mcg. Continue Spiriva respimat 2.5 mcg 2 puffs INH daily. Continue Proventil HFA inhaler as needed. RX refill sent to her local pharmacy.   5) Continue Nasonex.   6) She is up to date on Prevnar 13, Pneumovax 23 and Influenza vaccines.  7) Weight loss recommended.  8) Sleep hygiene discussed. Continue Trazodone prescribed by Psychiatry.  9) Pending Ortho consult at [place].  10) Follow up in 3 months after swallow study and repeat CT chest, sooner OV if needed.

## 2018-06-14 NOTE — PATIENT INSTRUCTIONS
Plan:    1) I am suspicious for chronic aspiration. Swallow study now. Treat for aspiration pneumonia with Clindamycin. Encouraged to take with probiotic. Encouraged to contact the office if she develops diarrhea.   2) Repeat CT in 3 months for follow up on Pneumonia.  3) Smoking cessation discussed and recommended. She is working on quitting.  3) Continue 02 3 LPM prn exertion.  4) Continue Symbicort 160/4.5 mcg. Continue Spiriva respimat 2.5 mcg 2 puffs INH daily. Continue Proventil HFA inhaler as needed. RX refill sent to her local pharmacy.   5) Continue Nasonex.   6) She is up to date on Prevnar 13, Pneumovax 23 and Influenza vaccines.  7) Weight loss recommended.  8) Sleep hygiene discussed. Continue Trazodone prescribed by Psychiatry.  9) Pending Ortho consult at [place].  10) Follow up in 3 months after swallow study and repeat CT chest, sooner OV if needed.

## 2018-06-26 ENCOUNTER — ANTICOAGULATION MONITORING (OUTPATIENT)
Dept: VASCULAR LAB | Facility: MEDICAL CENTER | Age: 68
End: 2018-06-26

## 2018-06-26 DIAGNOSIS — Z86.718 HISTORY OF DVT (DEEP VEIN THROMBOSIS): ICD-10-CM

## 2018-06-26 DIAGNOSIS — I82.401 ACUTE DEEP VEIN THROMBOSIS (DVT) OF RIGHT LOWER EXTREMITY, UNSPECIFIED VEIN (HCC): ICD-10-CM

## 2018-06-26 DIAGNOSIS — Z79.01 CHRONIC ANTICOAGULATION: ICD-10-CM

## 2018-06-26 DIAGNOSIS — Z95.828 S/P IVC FILTER: ICD-10-CM

## 2018-06-26 LAB — INR PPP: 1.2 (ref 2–3.5)

## 2018-06-26 NOTE — PROGRESS NOTES
Anticoagulation Summary  As of 6/26/2018    INR goal:   2.0-3.0   TTR:   39.5 % (2.8 y)   Today's INR:   1.2!   Warfarin maintenance plan:   10 mg (10 mg x 1) on Mon, Wed, Fri; 12.5 mg (10 mg x 1 and 5 mg x 0.5) all other days   Weekly warfarin total:   80 mg   Plan last modified:   Bon Ash, PharmD (6/7/2018)   Next INR check:   6/29/2018   Priority:   Routine   Target end date:   Indefinite    Indications    DVT (deep venous thrombosis) ([facility]) [I82.409]  Chronic anticoagulation [Z79.01]  S/P IVC filter [Z95.828]  History of DVT (deep vein thrombosis) [Z86.718]             Anticoagulation Episode Summary     [facility] check location[facility]   [facility]    Preferred lab:   [facility]    Send INR reminders to:       Comments:         Anticoagulation Care Providers     Provider Role Specialty Phone number    Carmelo Pat M.D. Referring Family Medicine [phone]    [facility] Anticoagulation Services Responsible  [phone]        Anticoagulation Patient Findings    Spoke with Atiya, who missed a few doses last week.  Will bolus dose with 15mg po qhs x3 then restest INR.  Ruth Munoz, PharmD

## 2018-07-02 RX ORDER — GABAPENTIN 300 MG/1
CAPSULE ORAL
Qty: 270 CAP | Refills: 3 | Status: SHIPPED | OUTPATIENT
Start: 2018-07-02 | End: 2018-09-28 | Stop reason: SDUPTHER

## 2018-07-02 NOTE — TELEPHONE ENCOUNTER
Was the patient seen in the last year in this department? Yes     Does patient have an active prescription for medications requested? Yes     Received Request Via: Pharmacy   Future Appointments       Provider [facility]    7/18/2018 3:30 PM Carmelo Pat M.D. [facility]    9/20/2018 8:30 AM ALCIRA Chavez [facility] Pulmonary Medicine

## 2018-07-03 ENCOUNTER — ANTICOAGULATION MONITORING (OUTPATIENT)
Dept: VASCULAR LAB | Facility: MEDICAL CENTER | Age: 68
End: 2018-07-03

## 2018-07-03 DIAGNOSIS — I82.401 ACUTE DEEP VEIN THROMBOSIS (DVT) OF RIGHT LOWER EXTREMITY, UNSPECIFIED VEIN (HCC): ICD-10-CM

## 2018-07-03 DIAGNOSIS — Z79.01 CHRONIC ANTICOAGULATION: ICD-10-CM

## 2018-07-03 DIAGNOSIS — Z95.828 S/P IVC FILTER: ICD-10-CM

## 2018-07-03 DIAGNOSIS — Z86.718 HISTORY OF DVT (DEEP VEIN THROMBOSIS): ICD-10-CM

## 2018-07-03 LAB — INR PPP: 3 (ref 2–3.5)

## 2018-07-03 NOTE — PROGRESS NOTES
OP Telephone Anticoagulation Service Note    Date: 7/3/2018      Anticoagulation Summary  As of 7/3/2018    INR goal:   2.0-3.0   TTR:   39.6 % (2.8 y)   Today's INR:   3.0   Warfarin maintenance plan:   10 mg (10 mg x 1) on Mon, Wed, Fri; 12.5 mg (10 mg x 1 and 5 mg x 0.5) all other days   Weekly warfarin total:   80 mg   Plan last modified:   Bon Ash, PharmD (6/7/2018)   Next INR check:   7/10/2018   Priority:   Routine   Target end date:   Indefinite    Indications    DVT (deep venous thrombosis) ([facility]) [I82.409]  Chronic anticoagulation [Z79.01]  S/P IVC filter [Z95.828]  History of DVT (deep vein thrombosis) [Z86.718]             Anticoagulation Episode Summary     IN[facility] check [facility]   [facility]    Preferred lab:   [facility]    Send INR reminders to:       Comments:         Anticoagulation Care Providers     Provider Role Specialty Phone number    Carmelo Pat M.D. Referring Family Medicine [phone]    [facility] Anticoagulation Services Responsible  [phone]        Anticoagulation Patient Findings        Plan: Spoke with patient on the phone. Patient is therapeutic today with INR of 3.0. Confirmed dosing. Pt has been having difficulty using her INR machine. Says she had to poke herself 5 times before she could get a reading. Claims she is feeling more competent with using the machine, but it may be a good idea to check with patient again next week to see if she is still having a hard time getting her INR.  No missed tablets in the last week. Patient denies any changes in medications or diet. Patient denies any signs or symptoms of bleeding or clotting. Instructed patient to call clinic if any unusual bleeding or bruising occurs. Will continue dosing as outlined 10 mg Mon, Wed, Fri, and 12.5 mg all other days of the week.Will follow-up with patient in 1 week.      Pt said she still has 10 Lovenox shots left over from past for when her INR levels are sub-theraputic, and seemed to think  that she needed to be using them, saying that she didn't want to give herself the shots because she hated the hard bumps she gets. Told patient not to administer the Lovenox to herself as her INR is within goal. Also, told patient to keep the Lovenox shots on hand just in case there is a need for them in the future.      Latonia Hemphill, Pharmacy Intern    I have reviewed and agree with the plan above on  7/3/2018    Judith Pina, Pharm D

## 2018-07-09 ENCOUNTER — TELEPHONE (OUTPATIENT)
Dept: MEDICAL GROUP | Facility: MEDICAL CENTER | Age: 68
End: 2018-07-09

## 2018-07-09 DIAGNOSIS — B37.2 CANDIDAL DERMATITIS: ICD-10-CM

## 2018-07-09 RX ORDER — NYSTATIN 100000 U/G
OINTMENT TOPICAL
Qty: 1 TUBE | Refills: 11 | Status: SHIPPED | OUTPATIENT
Start: 2018-07-09 | End: 2019-02-11

## 2018-07-09 NOTE — TELEPHONE ENCOUNTER
1. Caller Name: Atiya                                         Call Back Number: [phone] (home)         Patient approves a detailed voicemail message: N\A    Patient states she has a yeast infection under her breasts and on her thighs. She is requesting a rx for nystatin ointment. Please advise.

## 2018-07-17 ENCOUNTER — ANTICOAGULATION MONITORING (OUTPATIENT)
Dept: VASCULAR LAB | Facility: MEDICAL CENTER | Age: 68
End: 2018-07-17

## 2018-07-17 DIAGNOSIS — Z79.01 CHRONIC ANTICOAGULATION: ICD-10-CM

## 2018-07-17 DIAGNOSIS — Z95.828 S/P IVC FILTER: ICD-10-CM

## 2018-07-17 DIAGNOSIS — Z86.718 HISTORY OF DVT (DEEP VEIN THROMBOSIS): ICD-10-CM

## 2018-07-17 LAB — INR PPP: 2.8 (ref 2–3.5)

## 2018-07-17 NOTE — PROGRESS NOTES
Anticoagulation Summary  As of 7/17/2018    INR goal:   2.0-3.0   TTR:   40.5 % (2.8 y)   Today's INR:   2.8   Warfarin maintenance plan:   10 mg (10 mg x 1) on Mon, Wed, Fri; 12.5 mg (10 mg x 1 and 5 mg x 0.5) all other days   Weekly warfarin total:   80 mg   Plan last modified:   Bon Ash, PharmD (6/7/2018)   Next INR check:   7/24/2018   Priority:   Routine   Target end date:   Indefinite    Indications    DVT (deep venous thrombosis) ([facility]) [I82.409]  Chronic anticoagulation [Z79.01]  S/P IVC filter [Z95.828]  History of DVT (deep vein thrombosis) [Z86.718]             Anticoagulation Episode Summary     INR check location[facility]   [facility]    Preferred lab:   [facility]    Send INR reminders to:       Comments:         Anticoagulation Care Providers     Provider Role Specialty Phone number    Carmelo Pat M.D. Referring [facility] Medicine [phone]    [facility] Anticoagulation Services Responsible  [phone]        Anticoagulation Patient Findings    HPI:  Atiya Henley, on anticoagulation therapy with warfarin for DVT.  Changes to current medical/health status since last appt: none  Denies signs/symptoms of bleeding and/or thrombosis since the last appt.    Denies any interval changes to diet  Denies any interval changes to medications since last appt.   Denies any complications or cost restrictions with current therapy.     A/P   INR  therapeutic.   Pt is to continue with current warfarin dosing regimen.     Next INR in 1 week(s).    Bon Ash, PharmD

## 2018-07-18 ENCOUNTER — OFFICE VISIT (OUTPATIENT)
Dept: MEDICAL GROUP | Facility: MEDICAL CENTER | Age: 68
End: 2018-07-18
Attending: FAMILY MEDICINE
Payer: MEDICARE

## 2018-07-18 VITALS
RESPIRATION RATE: 16 BRPM | SYSTOLIC BLOOD PRESSURE: 130 MMHG | BODY MASS INDEX: 46.95 KG/M2 | WEIGHT: 275 LBS | HEIGHT: 64 IN | TEMPERATURE: 97.4 F | DIASTOLIC BLOOD PRESSURE: 80 MMHG | OXYGEN SATURATION: 95 % | HEART RATE: 76 BPM

## 2018-07-18 DIAGNOSIS — I10 ESSENTIAL HYPERTENSION: ICD-10-CM

## 2018-07-18 DIAGNOSIS — M25.562 CHRONIC PAIN OF LEFT KNEE: ICD-10-CM

## 2018-07-18 DIAGNOSIS — Z86.718 HISTORY OF DVT (DEEP VEIN THROMBOSIS): Chronic | ICD-10-CM

## 2018-07-18 DIAGNOSIS — G89.4 CHRONIC PAIN SYNDROME: ICD-10-CM

## 2018-07-18 DIAGNOSIS — G89.29 CHRONIC PAIN OF LEFT KNEE: ICD-10-CM

## 2018-07-18 PROBLEM — G92.8 TOXIC METABOLIC ENCEPHALOPATHY: Status: RESOLVED | Noted: 2018-05-25 | Resolved: 2018-07-18

## 2018-07-18 PROBLEM — D68.318 CIRCULATING ANTICOAGULANTS (HCC): Status: RESOLVED | Noted: 2018-05-25 | Resolved: 2018-07-18
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
## 2018-07-18 PROBLEM — J96.11 CHRONIC RESPIRATORY FAILURE WITH HYPOXIA (HCC): Status: RESOLVED | Noted: 2018-05-27 | Resolved: 2018-07-18

## 2018-07-18 PROBLEM — R73.02 IMPAIRED GLUCOSE TOLERANCE: Status: RESOLVED | Noted: 2018-05-26 | Resolved: 2018-07-18

## 2018-07-18 PROBLEM — B37.9 CANDIDIASIS: Status: RESOLVED | Noted: 2018-04-06 | Resolved: 2018-07-18

## 2018-07-18 PROBLEM — J18.9 CAP (COMMUNITY ACQUIRED PNEUMONIA): Status: RESOLVED | Noted: 2018-04-05 | Resolved: 2018-07-18

## 2018-07-18 PROBLEM — E83.39 HYPOPHOSPHATEMIA: Status: RESOLVED | Noted: 2018-04-07 | Resolved: 2018-07-18

## 2018-07-18 PROCEDURE — 99214 OFFICE O/P EST MOD 30 MIN: CPT | Performed by: FAMILY MEDICINE

## 2018-07-18 PROCEDURE — 99212 OFFICE O/P EST SF 10 MIN: CPT | Performed by: FAMILY MEDICINE

## 2018-07-18 ASSESSMENT — ENCOUNTER SYMPTOMS
FOCAL WEAKNESS: 1
TINGLING: 1
FEVER: 0
CHILLS: 0
SPEECH CHANGE: 0
SHORTNESS OF BREATH: 0
BACK PAIN: 1
PALPITATIONS: 0
SPUTUM PRODUCTION: 0
TREMORS: 0
SENSORY CHANGE: 0
COUGH: 0
NAUSEA: 0
DIARRHEA: 0
VOMITING: 0
ABDOMINAL PAIN: 0

## 2018-07-18 NOTE — PROGRESS NOTES
"Subjective:      Atiya Henley is a 67 y.o. female who presents with Follow-Up (refferals)            Patient 67-year-old female here for follow-up of her chronic pain is always her chronic knee pain.    Patient states she has been following up with Dr. Smith for pain management. She states that she is very satisfied with the treatment she is getting from him. She feels that her pain management has been helping her and she has also been referred to [place] for further management of her left knee. She states her appointment to be seen is on 30 August. We'll continue to follow.    She is also able to do her own INR checks at home now that she has an INR machine. She states that her INRs have been running between 2-3, and she is managing her own Coumadin. She states that she has been using Coumadin 12.5 mg. She has not had any issues with bleeding or unusual bruising. She has also been followed by the Coumadin clinic for assistance in management of her Coumadin. We'll continue to follow.     Current medications, allergies, and problem list reviewed with patient and updated in EPIC.          Review of Systems   Constitutional: Negative for chills and fever.   HENT: Negative for hearing loss and tinnitus.    Respiratory: Negative for cough, sputum production and shortness of breath.    Cardiovascular: Negative for chest pain and palpitations.   Gastrointestinal: Negative for abdominal pain, diarrhea, nausea and vomiting.   Musculoskeletal: Positive for back pain and joint pain.   Neurological: Positive for tingling and focal weakness. Negative for tremors, sensory change and speech change.          Objective:     /80   Pulse 76   Temp 36.3 °C (97.4 °F)   Resp 16   Ht 1.626 m (5' 4\")   Wt 124.7 kg (275 lb)   LMP 07/19/2011   SpO2 95%   BMI 47.20 kg/m²      Physical Exam   Constitutional: She is oriented to person, place, and time.   BMI 47   HENT:   Head: Normocephalic and atraumatic.   Cardiovascular: " Normal rate, regular rhythm and normal heart sounds.  Exam reveals no friction rub.    No murmur heard.  Pulmonary/Chest: Effort normal and breath sounds normal. No respiratory distress. She has no wheezes. She has no rales.   Abdominal: Soft. Bowel sounds are normal. She exhibits no distension. There is no tenderness.   Musculoskeletal:   Decreased ROM of affected extremities using power wheel chair for mobility   Neurological: She is alert and oriented to person, place, and time.   Skin: Skin is warm and dry.   Psychiatric: She has a normal mood and affect. Her behavior is normal.   Nursing note and vitals reviewed.              Assessment/Plan:     1. History of DVT (deep vein thrombosis)  She is currently managing her own coumadin at home in conjuction with coumadin clinic. She has not had any unusual bleeding or bruising. Will continue to follow.    2. Essential hypertension  Will have her continue to use her medication as directed. She has been advised to monitor blood pressure at home and keep notes. If blood pressure elevated or having symptoms of CP, SOB or neurologic changes to go to the er.      3. Chronic pain of left knee  She will be going to [place] for treatment of her L knee on Aug 30th. Will continue to follow.    4. Chronic pain syndrome  Will have her continue to follow up with pain management for assistance in management of her chronic pain issues. Will continue to follow.

## 2018-07-23 RX ORDER — DIAPER,BRIEF,ADULT, DISPOSABLE
EACH MISCELLANEOUS
Qty: 90 EACH | Refills: 0 | Status: SHIPPED | OUTPATIENT
Start: 2018-07-23 | End: 2018-09-10 | Stop reason: SDUPTHER

## 2018-07-24 ENCOUNTER — ANTICOAGULATION MONITORING (OUTPATIENT)
Dept: VASCULAR LAB | Facility: MEDICAL CENTER | Age: 68
End: 2018-07-24

## 2018-07-24 DIAGNOSIS — Z86.718 HISTORY OF DVT (DEEP VEIN THROMBOSIS): ICD-10-CM

## 2018-07-24 DIAGNOSIS — Z79.01 CHRONIC ANTICOAGULATION: ICD-10-CM

## 2018-07-24 DIAGNOSIS — Z95.828 S/P IVC FILTER: ICD-10-CM

## 2018-07-24 LAB — INR PPP: 1.1 (ref 2–3.5)

## 2018-07-24 NOTE — PROGRESS NOTES
Anticoagulation Summary  As of 7/24/2018    INR goal:   2.0-3.0   TTR:   40.5 % (2.8 y)   Today's INR:   1.1!   Warfarin maintenance plan:   10 mg (10 mg x 1) on Mon, Wed, Fri; 12.5 mg (10 mg x 1 and 5 mg x 0.5) all other days   Weekly warfarin total:   80 mg   Plan last modified:   Bon Ash, PharmD (6/7/2018)   Next INR check:   7/27/2018   Priority:   Routine   Target end date:   Indefinite    Indications    DVT (deep venous thrombosis) (HCC) (Resolved) [I82.409]  Chronic anticoagulation [Z79.01]  S/P IVC filter [Z95.828]  History of DVT (deep vein thrombosis) [Z86.718]             Anticoagulation Episode Summary     INR check location[facility]   [facility]    Preferred lab:   [facility]    Send INR reminders to:       Comments:         Anticoagulation Care Providers     Provider Role Specialty Phone number    Carmelo Pat M.D. Referring Family Medicine [phone]    [facility] Anticoagulation Services Responsible  [phone]        Anticoagulation Patient Findings  Patient Findings     Positives:   Change in diet/appetite    Negatives:   Signs/symptoms of thrombosis, Signs/symptoms of bleeding, Laboratory test error suspected, Change in health, Change in alcohol use, Change in activity, Upcoming invasive procedure, Emergency department visit, Upcoming dental procedure, Missed doses, Extra doses, Change in medications, Hospital admission, Bruising, Other complaints    Comments:   Increase in greens this week        Spoke with patient today regarding subtherapeutic INR of 1.1.  Patient denies any signs/symptoms of bruising or bleeding or any changes in diet and medications.  Instructed patient to call clinic with any questions or concerns.  Patient states she has increased her salad intake this week, but denies missing any doses.  Will have her bolus with 15mg X 3, then recheck INR.  Follow up in 3 days, to reduce risk of adverse events related to this high risk medication,  Warfarin.    Nahum Bowens,  PharmD

## 2018-08-06 ENCOUNTER — ANTICOAGULATION MONITORING (OUTPATIENT)
Dept: VASCULAR LAB | Facility: MEDICAL CENTER | Age: 68
End: 2018-08-06

## 2018-08-06 DIAGNOSIS — Z79.01 CHRONIC ANTICOAGULATION: ICD-10-CM

## 2018-08-06 DIAGNOSIS — Z86.718 HISTORY OF DVT (DEEP VEIN THROMBOSIS): ICD-10-CM

## 2018-08-06 DIAGNOSIS — Z95.828 S/P IVC FILTER: ICD-10-CM

## 2018-08-06 LAB — INR PPP: 4.2 (ref 2–3.5)

## 2018-08-06 NOTE — PROGRESS NOTES
Anticoagulation Summary  As of 8/6/2018    INR goal:   2.0-3.0   TTR:   40.4 % (2.9 y)   Today's INR:   4.2!   Warfarin maintenance plan:   10 mg (10 mg x 1) on Mon, Wed, Fri; 12.5 mg (10 mg x 1 and 5 mg x 0.5) all other days   Weekly warfarin total:   80 mg   Plan last modified:   Bon Ash, PharmD (6/7/2018)   Next INR check:   8/13/2018   Priority:   Routine   Target end date:   Indefinite    Indications    DVT (deep venous thrombosis) (HCC) (Resolved) [I82.409]  Chronic anticoagulation [Z79.01]  S/P IVC filter [Z95.828]  History of DVT (deep vein thrombosis) [Z86.718]             Anticoagulation Episode Summary     INR check location:   [facility]    Preferred lab:   [facility]    Send INR reminders to:       Comments:         Anticoagulation Care Providers     Provider Role Specialty Phone number    Carmelo Pat M.D. Referring Family Medicine [phone]    [facility] Anticoagulation Services Responsible  [phone]        Anticoagulation Patient Findings  Patient Findings     Positives:   Change in diet/appetite    Negatives:   Signs/symptoms of thrombosis, Signs/symptoms of bleeding, Laboratory test error suspected, Change in health, Change in alcohol use, Change in activity, Upcoming invasive procedure, Emergency department visit, Upcoming dental procedure, Missed doses, Extra doses, Change in medications, Hospital admission, Bruising, Other complaints    Comments:   Poor appetite over last week, feeling better now        Spoke with patient today regarding supratherapeutic INR of 4.2.  Patient denies any signs/symptoms of bruising or bleeding or any changes in diet and medications.  Instructed patient to call clinic with any questions or concerns.  Instructed patient to HOLD X 1, then resume current warfarin regimen.  Follow up in 1 weeks, to reduce risk of adverse events related to this high risk medication,  Warfarin.    Nahum Bowens, PharmD

## 2018-08-08 ENCOUNTER — TELEPHONE (OUTPATIENT)
Dept: MEDICAL GROUP | Facility: MEDICAL CENTER | Age: 68
End: 2018-08-08

## 2018-08-08 NOTE — TELEPHONE ENCOUNTER
Pt called to say that two nights ago her cat jumped on her abdomen which cause a severe stomach ache. The next day she had what she described as a tarry stool and she collected this from her colostomy bag in case you would like to order a lab that can tell if she is bleeding internally. She has since had no bowel movements; however, she is not currently in any pain. She would like to know what she should do.

## 2018-08-08 NOTE — TELEPHONE ENCOUNTER
If her symptoms continue she should probably go to er for further evaluation. She may need scope in he ostomy.    Thanks

## 2018-08-14 ENCOUNTER — ANTICOAGULATION MONITORING (OUTPATIENT)
Dept: VASCULAR LAB | Facility: MEDICAL CENTER | Age: 68
End: 2018-08-14

## 2018-08-14 DIAGNOSIS — Z79.01 CHRONIC ANTICOAGULATION: ICD-10-CM

## 2018-08-14 DIAGNOSIS — Z95.828 S/P IVC FILTER: ICD-10-CM

## 2018-08-14 DIAGNOSIS — Z86.718 HISTORY OF DVT (DEEP VEIN THROMBOSIS): ICD-10-CM

## 2018-08-14 LAB — INR PPP: 1.1 (ref 2–3.5)

## 2018-08-14 NOTE — PROGRESS NOTES
Anticoagulation Summary  As of 8/14/2018    INR goal:   2.0-3.0   TTR:   40.3 % (2.9 y)   Today's INR:   1.1!   Warfarin maintenance plan:   10 mg (10 mg x 1) on Mon, Wed, Fri; 12.5 mg (10 mg x 1 and 5 mg x 0.5) all other days   Weekly warfarin total:   80 mg   Plan last modified:   Rodriguez MartinezD (6/7/2018)   Next INR check:   8/21/2018   Priority:   Routine   Target end date:   Indefinite    Indications    DVT (deep venous thrombosis) (HCC) (Resolved) [I82.409]  Chronic anticoagulation [Z79.01]  S/P IVC filter [Z95.828]  History of DVT (deep vein thrombosis) [Z86.718]             Anticoagulation Episode Summary     INR [facility] location[facility]   [facility]    Preferred lab:   [facility]    Send INR reminders to:       Comments:         Anticoagulation Care Providers     Provider Role Specialty Phone number    Carmelo Pat M.D. Referring Family Medicine [phone]    [facility] Anticoagulation Services Responsible  [phone]        Anticoagulation Patient Findings    Spoke to patient on the phone.   INR  sub-therapeutic.   Declined lovenox shots  Denies signs/symptoms of bleeding and/or thrombosis.   Denies changes to diet or medications.   Follow up appointment in 3 days     20mg x two days then 12.5mg.       Bob Dupree, PharmD

## 2018-08-17 ENCOUNTER — ANTICOAGULATION MONITORING (OUTPATIENT)
Dept: VASCULAR LAB | Facility: MEDICAL CENTER | Age: 68
End: 2018-08-17

## 2018-08-17 DIAGNOSIS — Z95.828 S/P IVC FILTER: ICD-10-CM

## 2018-08-17 DIAGNOSIS — Z86.718 HISTORY OF DVT (DEEP VEIN THROMBOSIS): ICD-10-CM

## 2018-08-17 DIAGNOSIS — Z79.01 CHRONIC ANTICOAGULATION: ICD-10-CM

## 2018-08-17 LAB — INR PPP: 1.8 (ref 2–3.5)

## 2018-08-17 NOTE — PROGRESS NOTES
Anticoagulation Summary  As of 8/17/2018    INR goal:   2.0-3.0   TTR:   40.2 % (2.9 y)   Today's INR:   1.8!   Warfarin maintenance plan:   10 mg (10 mg x 1) on Mon, Wed, Fri; 12.5 mg (10 mg x 1 and 5 mg x 0.5) all other days   Weekly warfarin total:   80 mg   Plan last modified:   Rodriguez MartinezD (6/7/2018)   Next INR check:   8/20/2018   Priority:   Routine   Target end date:   Indefinite    Indications    DVT (deep venous thrombosis) (HCC) (Resolved) [I82.409]  Chronic anticoagulation [Z79.01]  S/P IVC filter [Z95.828]  History of DVT (deep vein thrombosis) [Z86.718]             Anticoagulation Episode Summary     INR check location[facility]   [facility]    Preferred lab:   [facility]    Send INR reminders to:       Comments:         Anticoagulation Care Providers     Provider Role Specialty Phone number    Carmelo Pat M.D. Referring Family Medicine [phone]    [facility] Anticoagulation Services Responsible  [phone]        Anticoagulation Patient Findings    Spoke to patient on the phone. Patients INR was subtherapeutic today at 1.8 .Patient denied any signs/symptoms of bleeding or bruising. Patient denied any recent changes to medications or diet. Patient denied any missed doses. Patient was recently bolused with 20 mg for 2 days and a 12.5 mg, 4 days ago INR increased from 1.1 to 1.7 in that time frame, I believe INR will continue to increase, therefore I will not increase warfarin at this time, patient was  instructed to continue on current regimen. Follow up in 3 day(s).    Tylor Juares, Pharm.D

## 2018-09-06 ENCOUNTER — ANTICOAGULATION MONITORING (OUTPATIENT)
Dept: VASCULAR LAB | Facility: MEDICAL CENTER | Age: 68
End: 2018-09-06

## 2018-09-06 DIAGNOSIS — Z86.718 HISTORY OF DVT (DEEP VEIN THROMBOSIS): ICD-10-CM

## 2018-09-06 DIAGNOSIS — Z95.828 S/P IVC FILTER: ICD-10-CM

## 2018-09-06 DIAGNOSIS — Z79.01 CHRONIC ANTICOAGULATION: ICD-10-CM

## 2018-09-06 LAB — INR PPP: 2.2 (ref 2–3.5)

## 2018-09-06 NOTE — PROGRESS NOTES
OP Anticoagulation Telephone Note    Date: 9/6/2018  Anticoagulation Summary  As of 9/6/2018    INR goal:   2.0-3.0   TTR:   40.4 % (3 y)   Today's INR:   2.2   Warfarin maintenance plan:   10 mg (10 mg x 1) on Mon, Wed, Fri; 12.5 mg (10 mg x 1 and 5 mg x 0.5) all other days   Weekly warfarin total:   80 mg   No change documented:   Lindy Da Silva, Med Ass't   Plan last modified:   Bon Ash, PharmD (6/7/2018)   Next INR check:   9/13/2018   Priority:   Routine   Target end date:   Indefinite    Indications    DVT (deep venous thrombosis) (HCC) (Resolved) [I82.409]  Chronic anticoagulation [Z79.01]  S/P IVC filter [Z95.828]  History of DVT (deep vein thrombosis) [Z86.718]             Anticoagulation Episode Summary     [facility]   [facility]    [facility]    Send INR reminders to:       Comments:   MD INR      Anticoagulation Care Providers     Provider Role Specialty Phone number    Carmelo Pat M.D. Referring Family Medicine [phone]    [facility] Anticoagulation Services Responsible  [phone]        Anticoagulation Patient Findings  Patient Findings     Negatives:   Signs/symptoms of thrombosis, Signs/symptoms of bleeding, Laboratory test error suspected, Change in health, Change in alcohol use, Change in activity, Upcoming invasive procedure, Emergency department visit, Upcoming dental procedure, Missed doses, Extra doses, Change in medications, Change in diet/appetite, Hospital admission, Bruising, Other complaints      Plan:  Spoke with patient on the phone. Patient is therapeutic today. Patient denies any changes in medications or diet. Patient denies any signs or symptoms of bleeding or clotting. Instructed patient to call clinic if any unusual bleeding or bruising occurs. Will continue dosing as outlined above. Will follow-up with patient in 1 week.    Lindy Da Silva, Medical Assistant    I have reviewed and concur with the above plan     Bob GARCIA  Leia, PharmD

## 2018-09-10 RX ORDER — DIAPER,BRIEF,ADULT, DISPOSABLE
EACH MISCELLANEOUS
Qty: 90 EACH | Refills: 0 | Status: SHIPPED | OUTPATIENT
Start: 2018-09-10 | End: 2019-01-11 | Stop reason: SDUPTHER

## 2018-09-14 ENCOUNTER — TELEPHONE (OUTPATIENT)
Dept: PULMONOLOGY | Facility: HOSPICE | Age: 68
End: 2018-09-14

## 2018-09-14 NOTE — TELEPHONE ENCOUNTER
[facility] for pt to call back regarding Swallow study and CT scan ordered by Val 6/14/18. Neither test has been completed. I left both the [phone] and [phone] so pt can call and schedule testing or speak wit an MA about her apt

## 2018-09-19 ENCOUNTER — ANTICOAGULATION MONITORING (OUTPATIENT)
Dept: VASCULAR LAB | Facility: MEDICAL CENTER | Age: 68
End: 2018-09-19

## 2018-09-19 DIAGNOSIS — Z79.01 CHRONIC ANTICOAGULATION: ICD-10-CM

## 2018-09-19 DIAGNOSIS — Z95.828 S/P IVC FILTER: ICD-10-CM

## 2018-09-19 DIAGNOSIS — Z86.718 HISTORY OF DVT (DEEP VEIN THROMBOSIS): ICD-10-CM

## 2018-09-19 DIAGNOSIS — Z86.718 HISTORY OF DVT (DEEP VEIN THROMBOSIS): Chronic | ICD-10-CM

## 2018-09-19 LAB — INR PPP: 1.7 (ref 2–3.5)

## 2018-09-19 RX ORDER — WARFARIN SODIUM 10 MG/1
10 TABLET ORAL EVERY EVENING
Qty: 30 TAB | Refills: 3 | Status: SHIPPED | OUTPATIENT
Start: 2018-09-19 | End: 2019-08-20 | Stop reason: SDUPTHER

## 2018-09-19 RX ORDER — WARFARIN SODIUM 5 MG/1
TABLET ORAL
Qty: 20 TAB | Refills: 2 | Status: SHIPPED | OUTPATIENT
Start: 2018-09-19 | End: 2019-05-20 | Stop reason: SDUPTHER

## 2018-09-28 ENCOUNTER — ANTICOAGULATION MONITORING (OUTPATIENT)
Dept: VASCULAR LAB | Facility: MEDICAL CENTER | Age: 68
End: 2018-09-28

## 2018-09-28 DIAGNOSIS — Z95.828 S/P IVC FILTER: ICD-10-CM

## 2018-09-28 DIAGNOSIS — Z86.718 HISTORY OF DVT (DEEP VEIN THROMBOSIS): ICD-10-CM

## 2018-09-28 DIAGNOSIS — Z79.01 CHRONIC ANTICOAGULATION: ICD-10-CM

## 2018-09-28 LAB — INR PPP: 5.2 (ref 2–3.5)

## 2018-09-28 NOTE — PROGRESS NOTES
Anticoagulation Summary  As of 9/28/2018    INR goal:   2.0-3.0   TTR:   40.3 % (3 y)   Today's INR:   5.2!   Warfarin maintenance plan:   10 mg (10 mg x 1) on Mon, Wed, Fri; 12.5 mg (10 mg x 1 and 5 mg x 0.5) all other days   Weekly warfarin total:   80 mg   Plan last modified:   Bon Ash, PharmD (6/7/2018)   Next INR check:   10/1/2018   Priority:   Routine   Target end date:   Indefinite    Indications    DVT (deep venous thrombosis) (HCC) (Resolved) [I82.409]  Chronic anticoagulation [Z79.01]  S/P IVC filter [Z95.828]  History of DVT (deep vein thrombosis) [Z86.718]             Anticoagulation Episode Summary     INR check location:   [facility]    Preferred lab:   [facility]    Send INR reminders to:       Comments:   MD INR      Anticoagulation Care Providers     Provider Role Specialty Phone number    Carmelo Pat M.D. Referring [facility] Medicine [phone]    [facility] Anticoagulation Services Responsible  [phone]        Anticoagulation Patient Findings    Left voicemail message to report a supratherapeutic INR of 5.2.  Requested pt contact the clinic for any s/s of unusual bleeding, bruising, clotting or any changes to diet or medication.   Instructed patient to HOLD X 2, then resume current warfarin regimen.  FU 3 days.  Nahum Bowens, PharmD

## 2018-10-01 DIAGNOSIS — J44.89 CHRONIC OBSTRUCTIVE ASTHMA: ICD-10-CM

## 2018-10-01 RX ORDER — GABAPENTIN 300 MG/1
CAPSULE ORAL
Qty: 270 CAP | Refills: 0 | Status: SHIPPED | OUTPATIENT
Start: 2018-10-01 | End: 2018-11-15 | Stop reason: SDUPTHER

## 2018-10-01 RX ORDER — LEVOTHYROXINE SODIUM 0.2 MG/1
200 TABLET ORAL
Qty: 30 TAB | Refills: 0 | Status: SHIPPED | OUTPATIENT
Start: 2018-10-01 | End: 2019-01-03 | Stop reason: SDUPTHER

## 2018-10-01 NOTE — TELEPHONE ENCOUNTER
Have we ever prescribed this med? Yes.  If yes, what date? 4/26/18    Last OV: 6/14/18- Ron[facility]    Next OV: 3 month follow up visit- no appt on file    DX: chronic obstructive asthma     Medications: Albuterol HFA, Combivent respimat

## 2018-10-02 RX ORDER — ALBUTEROL SULFATE 90 UG/1
2 AEROSOL, METERED RESPIRATORY (INHALATION) EVERY 4 HOURS PRN
Qty: 1 INHALER | Refills: 11 | Status: CANCELLED | OUTPATIENT
Start: 2018-10-02

## 2018-10-02 NOTE — TELEPHONE ENCOUNTER
Please clarify medications. She is requesting Dulera? She was on Symbicort at her last office visit.

## 2018-10-04 NOTE — TELEPHONE ENCOUNTER
I spoke w/ the pt and she states that she has received refills for Spiriva and Symbicort but still need Combivent Respimat and Albuterol hfa to be filled.    Message sent to the pool, Val isn't in today.

## 2018-10-09 ENCOUNTER — ANTICOAGULATION MONITORING (OUTPATIENT)
Dept: VASCULAR LAB | Facility: MEDICAL CENTER | Age: 68
End: 2018-10-09

## 2018-10-09 ENCOUNTER — HOSPITAL ENCOUNTER (OUTPATIENT)
Dept: RADIOLOGY | Facility: MEDICAL CENTER | Age: 68
End: 2018-10-09
Attending: NURSE PRACTITIONER
Payer: MEDICARE

## 2018-10-09 DIAGNOSIS — Z79.01 CHRONIC ANTICOAGULATION: ICD-10-CM

## 2018-10-09 DIAGNOSIS — Z87.01 HISTORY OF PNEUMONIA: ICD-10-CM

## 2018-10-09 DIAGNOSIS — R13.10 DYSPHAGIA, UNSPECIFIED TYPE: ICD-10-CM

## 2018-10-09 DIAGNOSIS — Z95.828 S/P IVC FILTER: ICD-10-CM

## 2018-10-09 DIAGNOSIS — R13.12 OROPHARYNGEAL DYSPHAGIA: ICD-10-CM

## 2018-10-09 DIAGNOSIS — Z86.718 HISTORY OF DVT (DEEP VEIN THROMBOSIS): ICD-10-CM

## 2018-10-09 LAB — INR PPP: 1.1 (ref 2–3.5)

## 2018-10-09 PROCEDURE — G8996 SWALLOW CURRENT STATUS: HCPCS | Mod: CI

## 2018-10-09 PROCEDURE — G8997 SWALLOW GOAL STATUS: HCPCS | Mod: CI

## 2018-10-09 PROCEDURE — G8998 SWALLOW D/C STATUS: HCPCS | Mod: CI

## 2018-10-09 PROCEDURE — 92611 MOTION FLUOROSCOPY/SWALLOW: CPT

## 2018-10-09 PROCEDURE — 74230 X-RAY XM SWLNG FUNCJ C+: CPT

## 2018-10-09 NOTE — OP THERAPY EVALUATION
"[facility] Physical Therapy & Rehab  Speech-Language Pathology Department  Modified Barium Swallow Study Summary    Patient: Atiya Henley     Date of Evaluation: 10/9/18    Referring Provider: GER Holden    Radiologist:  Dr. Wetzel    Patient History/Reason for Referral:  Pt was referred for MBS due to \"History of Pneumonia and Dysphagia, unspecified.\" Pt with PMHx notable for tobacco use, Cancer, COPD, PNA, YESSICA, asthma, dyspnea, oxygen dependence, and Hashimoto's Disease.    Pt arrived unaccompanied, ambulating with use of walker. Pt was not wearing O2 and declined to have O2 placed during study. Pt reported that she was aware of recommendations to wear oxygen \"24/7.\" Pt stated that she will occasionally cough after drinking liquids. Pt denied any difficulties chewing/swallowing solid textures and/or swallowing pills (taken 15 at a time with liquid wash). Pt with hx of abnormal CXR and CT Chest results, showing LLL PNA.    Pt reported that she often eats/drinks reclined in bed or in a chair.    Oral Mechanism Exam:   -Dentition: Reduced; upper denture plate only and no bottom teeth/dentures  -Labial: WFL  -Lingual: Reduced strength; dry and brown-tinted (Pt reported lack of oral care since yesterday, and current smoker)  -Palatal Elevation: WFL  -Laryngeal Elevation: Reduced  -Cough: Adequate  -Vocal Quality: Hoarse/raspy    Procedure Results:  The examination was conducted with videofluoroscopy from a   Lateral view.  The following textures were presented:   Pudding, Cookie, Thin Liquid and Nectar Thick Liquid     Note: Unable to obtain ideal image capture do to Pt's large body habitus.     The following observations were made:       Oral Phase Puree Thick Liquids Thin Liquids Solid   Anterior spillage  X  X     Residue in oral cavity after the swallow X  collection X  collection X  trace X  majority   Prolonged mastication    X    Loss of bolus control       Piecemeal deglutition    X    Slow oral " transit time    X    Premature spillage into the valleculae  X  X     Premature spillage into the pyriform sinus          Other Observations:     - Pt with disorganized lingual movement        Pharyngeal Phase Puree Thick Liquids Thin Liquids Solid   Delayed triggering of the pharyngeal swallow X  valleculae      Absent initiation of swallow       Residue on tongue base (BOT)       Residue on posterior pharyngeal wall       Residue in valleculae   X  trace    Residue in pyriform sinus       Penetration  X  X      Aspiration  Unable to completely rule out Unable to completely rule out      Other Observations:       - Pt able to clear oropharyngeal residue with secondary swallow and liquid wash                          Esophageal Phase: Not captured due to Pt's difficulty standing and inability to obtain picture while sitting due to large body habitus                                Impressions:  Pt with penetration with both thin and nectar-thick liquids; at this time, unable to completely rule out aspiration due to lack of ideal image capture secondary to Pt's large stature/body habitus. Pt with no overt coughing and/or complaint of liquid going down the wrong way; however. Oral dysphagia characterized by anterior spillage of thin and nectar-thick liquids, trace-collection residue for liquids and puree, majority residue for regular solids, piecemeal swallow/deglutition for regular solids, slowed oral transit time for regular solids, and premature spillage into the valleculae for thin and nectar-thick liquids. Pharyngeal dysphagia characterized by delayed swallow initiation to the level of the valleculae for pureed solids, trace vallecular residue for thin liquids, and penetration for both thin and nectar-thick liquids. Unable to obtain esophageal swallow phase during this study.     Suspect Pt's hx of PNA to be partially due to poor positioning during PO intake at home. Pt was receptive to suggestion to sit upright  to 90 degrees for eating/drinking, and endorsed willingness to adhere to this recommendation at home. Also, encouraged Pt to use O2 during meals to reduce SOB and possible inhalation of foods/liquids during swallow phase. Pt will benefit from consistent oral care BID.      Recommendations: Diet:     Regular       Liquid:     Thin                                        Medications:    Whole with liquid wash         Compensatory Strategies:   Upright 90 degrees, Small bites/sips, Alternate bites/sips, Slow feeding rate,     Suggestions: Wear O2 while eating/drinking, Complete oral care BID.        Your patient may benefit from a referral for:  Pt would benefit from follow-up MBS study at [facility] due to ability to accommodate Pt's body habitus.            Thank you for the referral.    For further questions, please call [phone].       Nancy Cueva, SLP

## 2018-10-15 NOTE — TELEPHONE ENCOUNTER
Armida Graves P.A.-C.   to Me           10:38 AM   Would need a little more information.  Would rather patient not be on both Spiriva and Combivent.  If she likes prescheduled Albuterol (part of the Combivent) we can stick with combivent otherwise she can do daily spiriva and Albuterol MDI as needed.  If I need to chat with her please let me know.   Thanks       Armida,    I spoke w/ the pt and gave her your last message and she would like to be prescribed the Spiriva and Albuterol MDI as needed.  TY!

## 2018-10-16 RX ORDER — ALBUTEROL SULFATE 90 UG/1
2 AEROSOL, METERED RESPIRATORY (INHALATION) EVERY 4 HOURS PRN
Qty: 1 INHALER | Refills: 11 | Status: SHIPPED | OUTPATIENT
Start: 2018-10-16 | End: 2019-04-03 | Stop reason: SDUPTHER

## 2018-10-18 ENCOUNTER — ANTICOAGULATION MONITORING (OUTPATIENT)
Dept: VASCULAR LAB | Facility: MEDICAL CENTER | Age: 68
End: 2018-10-18

## 2018-10-18 DIAGNOSIS — Z86.718 HISTORY OF DVT (DEEP VEIN THROMBOSIS): ICD-10-CM

## 2018-10-18 DIAGNOSIS — Z95.828 S/P IVC FILTER: ICD-10-CM

## 2018-10-18 DIAGNOSIS — Z79.01 CHRONIC ANTICOAGULATION: ICD-10-CM

## 2018-10-18 LAB — INR PPP: 6.5 (ref 2–3.5)

## 2018-10-18 NOTE — PROGRESS NOTES
OP Telephone Anticoagulation Service Note    Date: 10/18/2018      Anticoagulation Summary  As of 10/18/2018    INR goal:   2.0-3.0   TTR:   40.0 % (3.1 y)   Today's INR:   6.5!   Warfarin maintenance plan:   10 mg (10 mg x 1) on Mon, Wed, Fri; 12.5 mg (10 mg x 1 and 5 mg x 0.5) all other days   Weekly warfarin total:   80 mg   Plan last modified:   Bon Ash, PharmD (6/7/2018)   Next INR check:   10/22/2018   Priority:   Routine   Target end date:   Indefinite    Indications    DVT (deep venous thrombosis) (HCC) (Resolved) [I82.409]  Chronic anticoagulation [Z79.01]  S/P IVC filter [Z95.828]  History of DVT (deep vein thrombosis) [Z86.718]             Anticoagulation Episode Summary     INR check [facility]   [facility]    Preferred lab:   [facility]AL    Send INR reminders to:       Comments:   MD INR      Anticoagulation Care Providers     Provider Role Specialty Phone number    Carmelo Pat M.D. Referring Family Medicine [phone]    [facility] Anticoagulation Services Responsible  [phone]        Anticoagulation Patient Findings  Patient Findings     Positives:   Change in diet/appetite    Negatives:   Signs/symptoms of thrombosis, Signs/symptoms of bleeding, Laboratory test error suspected, Change in health, Change in alcohol use, Change in activity, Upcoming invasive procedure, Emergency department visit, Upcoming dental procedure, Missed doses, Extra doses, Change in medications, Hospital admission, Bruising, Other complaints            Plan: INR 6.5 SUPRA therapeutic. Spoke to patient on phone.   Confirmed patient's current warfarin dose.   No s/s of bleeding per patient.   No new medications per patient.   Patient was juicing the week before but is no longer doing that. And doesn't plan on going back .   Instructed patient HOLD dose for 2 days then resume current warfarin regimen outlined above.   Check INR again in 4 days 10/22/18.   Instructed patient to call clinic at [phone] if  there are any questions.         Kareem Fernández, Pharmacy Intern

## 2018-10-22 ENCOUNTER — ANTICOAGULATION MONITORING (OUTPATIENT)
Dept: VASCULAR LAB | Facility: MEDICAL CENTER | Age: 68
End: 2018-10-22

## 2018-10-22 DIAGNOSIS — Z79.01 CHRONIC ANTICOAGULATION: ICD-10-CM

## 2018-10-22 DIAGNOSIS — Z95.828 S/P IVC FILTER: ICD-10-CM

## 2018-10-22 DIAGNOSIS — Z86.718 HISTORY OF DVT (DEEP VEIN THROMBOSIS): ICD-10-CM

## 2018-10-22 LAB — INR PPP: 1.8 (ref 2–3.5)

## 2018-10-22 NOTE — PROGRESS NOTES
Anticoagulation Summary  As of 10/22/2018    INR goal:   2.0-3.0   TTR:   39.9 % (3.1 y)   Today's INR:   1.8!   Warfarin maintenance plan:   10 mg (10 mg x 1) on Mon, Wed, Fri; 12.5 mg (10 mg x 1 and 5 mg x 0.5) all other days   Weekly warfarin total:   80 mg   Plan last modified:   [place], PharmD (6/7/2018)   Next INR check:   10/29/2018   Priority:   Routine   Target end date:   Indefinite    Indications    DVT (deep venous thrombosis) (HCC) (Resolved) [I82.409]  Chronic anticoagulation [Z79.01]  S/P IVC filter [Z95.828]  History of DVT (deep vein thrombosis) [Z86.718]             Anticoagulation Episode Summary     INR check location:   [facility]    Preferred lab:   [facility]    Send INR reminders to:       Comments:   MD INR      Anticoagulation Care Providers     Provider Role Specialty Phone number    Carmelo Pat M.D. Referring [facility] [phone]    [facility] Services Responsible  [phone]        Anticoagulation Patient Findings    Left voicemail message to report a subtherapeutic INR of 1.8.  Pt to continue with current warfarin dosing regimen. Requested pt contact the clinic for any s/s of unusual bleeding, bruising, clotting or any changes to diet or medication. FU 1 weeks.  Nahum Bowens, PharmD

## 2018-10-30 ENCOUNTER — APPOINTMENT (OUTPATIENT)
Dept: PHYSICAL MEDICINE AND REHAB | Facility: MEDICAL CENTER | Age: 68
End: 2018-10-30
Payer: MEDICARE

## 2018-11-08 ENCOUNTER — OFFICE VISIT (OUTPATIENT)
Dept: PHYSICAL MEDICINE AND REHAB | Facility: MEDICAL CENTER | Age: 68
End: 2018-11-08
Payer: MEDICARE

## 2018-11-08 VITALS
HEART RATE: 73 BPM | WEIGHT: 275 LBS | BODY MASS INDEX: 46.95 KG/M2 | TEMPERATURE: 97.5 F | HEIGHT: 64 IN | SYSTOLIC BLOOD PRESSURE: 134 MMHG | OXYGEN SATURATION: 69 % | DIASTOLIC BLOOD PRESSURE: 84 MMHG

## 2018-11-08 DIAGNOSIS — R26.9 IMPAIRED GAIT: ICD-10-CM

## 2018-11-08 DIAGNOSIS — Z98.890 HISTORY OF KNEE SURGERY: ICD-10-CM

## 2018-11-08 DIAGNOSIS — M25.562 LEFT KNEE PAIN, UNSPECIFIED CHRONICITY: ICD-10-CM

## 2018-11-08 DIAGNOSIS — S83.002S SUBLUXATION OF LEFT PATELLA, SEQUELA: ICD-10-CM

## 2018-11-08 PROCEDURE — 99213 OFFICE O/P EST LOW 20 MIN: CPT | Performed by: PHYSICAL MEDICINE & REHABILITATION

## 2018-11-08 RX ORDER — LORAZEPAM 1 MG/1
TABLET ORAL
COMMUNITY
Start: 2018-09-03 | End: 2019-02-11

## 2018-11-08 RX ORDER — LEVOTHYROXINE SODIUM 0.03 MG/1
TABLET ORAL
COMMUNITY
Start: 2018-09-27 | End: 2019-04-23 | Stop reason: SDUPTHER

## 2018-11-08 RX ORDER — PREDNISOLONE ACETATE 10 MG/ML
SUSPENSION/ DROPS OPHTHALMIC
COMMUNITY
Start: 2018-11-07 | End: 2019-02-11

## 2018-11-09 NOTE — PROGRESS NOTES
Subjective:      Atiya Henley presents with Follow-Up        Subjective: Ms. Henley returns to the office today for follow-up evaluation of spinal/joint/musculoskeletal pain.     I last saw the patient in 5/2018, reviewed intercurrent medical and social issues    Regarding today's visit:    The patient was seen by second opinion orthopedics at [facility] in 8/2018, reviewed report, recommending continued nonsurgical management regarding the left knee.  The patient had previously seen local orthopedist in 5/2018, reviewed report, recommending continued nonsurgical care.     The patient notes ongoing pain about the left knee, worse with activities, limiting standing and walking tolerance.  The patient has a walker as well as scooter to assist with mobility.    The patient has long history of regarding the left knee, including arthroscopy, total knee replacement with complication, infection, orthopedic care per Dr. Hogan, orthopedics. The patient has tried physical therapy and injections, without sustained relief.    The patient notes intermittent left ankle pain.    The patient notes intermittent thoracolumbar pain, controlled, without radicular component.    The patient notes only intermittent hip area pain.    The patient has had prior treatment with medications. She has been to physical therapy. She has tried injections. She has colostomy in place. No bladder incontinence noted.  She is making an effort with home exercise program as tolerated. The ongoing left knee pain limits her ability to function, including standing and walking tolerance. She is inquiring about additional nonsurgical treatment options.      MEDICAL RECORDS REVIEW/DATA REVIEW: Reviewed in epic.    I reviewed medications. Reviewed medication list.    I reviewed diagnostic studies:     I reviewed radiographs.     Reviewed CT left knee 7/2017. Reviewed left knee x-rays 7/2017.     Reviewed left ankle x-rays 7/2017, showed mild  arthritis.    Reviewed CT lumbar spine 6/2016.    Reviewed CT thoracic spine 3/2015.    Reviewed CT cervical 6/2016.     Reviewed chest CT 4/2018.     Reviewed head CT 4/2018.    I reviewed lab studies. Reviewed labs 5/2018, including CMP, CBC.  Reviewed prothrombin time 10/2018.    I reviewed medical issues. Followed by consultants, including endocrinology, psychiatric disorder, bipolar, anxiety/depression.    I reviewed family history: No neuromuscular disorders noted.    I reviewed social issues. On disability.      PAST MEDICAL HISTORY:   Past Medical History:   Diagnosis Date   • Arthritis    • Bowel obstruction (HCC)    • Cancer (HCC)     cervical   • Chronic autoimmune thyroiditis     + TPO ab = 563 / + ultrasound   • Colostomy in place ([facility]) 2009   • COPD    • Dental disorder     dentures upper   • Diabetes     type 2    • EMPHYSEMA    • Hashimoto's disease    • History of total knee arthroplasty     infection   • Hypertension     stated no longer on meds due to lost 100 lbs   • Hypothyroid 7/7/2009   • Indigestion    • Infectious disease     MRSA,   • Infectious disease     VRE   • Obesity 7/7/2009   • Obstruction     colostomy   • Oxygen dependent     4.5 L NC   • Pain     b/l legs   • Personal history of venous thrombosis and embolism 2009, 2012    arm and leg left side   • Presence of IVC filter    • Psychiatric problem     depression   • Sleep apnea     USES CPAP, 3-4L oxygen   • Snoring    • Status post cholecystectomy 1992   • Weight gain        PAST SURGICAL HISTORY:    Past Surgical History:   Procedure Laterality Date   • PB INJECT RX OTHER PERIPH NERVE Left 8/21/2015    Procedure: NEUROLYTIC DEST-OTHER NERVE;  Surgeon: Kd Galaviz D.O.;  [facility];  Service: Pain Management   • PB FLUOROSCOPIC GUIDANCE NEEDLE PLACEMENT Left 8/21/2015    Procedure: FLOURO GUIDE NEEDLE PLACEMENT;  Surgeon: Kd Galaviz D.O.;  Location[facility];  Service: Pain  Management   • PB INJECT RX OTHER PERIPH NERVE  8/21/2015    Procedure: NEUROLYTIC DEST-OTHER NERVE;  Surgeon: Kd Galaviz D.O.;  [place];  Service: Pain Management   • PB INJECT RX OTHER PERIPH NERVE  8/21/2015    Procedure: NEUROLYTIC DEST-OTHER NERVE;  Surgeon: Kd Galaviz D.O.;  [place];  Service: Pain Management   • EXPLORATORY LAPAROTOMY  12/10/2013    Performed by Sang Castañeda M.D. at [facility]   • WOUND CLOSURE GENERAL  12/10/2013    Performed by Sang Castañeda M.D. at [facility]   • COLOSTOMY  11/26/2013    Performed by Maninder Carter M.D. at [facility]   • EXPLORATORY LAPAROTOMY  11/26/2013    Performed by Maninder Carter M.D. at [facility]   • LYSIS ADHESIONS GENERAL  11/26/2013    Performed by Maninder Carter M.D. at [facility]   • COLON RESECTION  11/26/2013    Performed by Maninder Carter M.D. at [facility]   • BLADDER BIOPSY WITH CYSTOSCOPY  10/10/2013    Performed by Sang Castañeda M.D. at [facility]   • EXPLORATORY LAPAROTOMY  9/2/2013    Performed by Maninder Carter M.D. at [facility]   • PARASTOMAL HERNIA REPAIR   9/2/2013    Performed by Maninder Carter M.D. at [facility]   • IRRIGATION & DEBRIDEMENT ORTHO  2/8/2013    Performed by David Fowler M.D. at [facility]   • IRRIGATION & DEBRIDEMENT GENERAL  12/17/2012    Performed by David Fowler M.D. at [facility]   • IRRIGATION & DEBRIDEMENT ORTHO  7/19/2012    Performed by BERYL LOYD at [facility]   • IRRIGATION & DEBRIDEMENT ORTHO  7/17/2012    Performed by BERYL LOYD at [facility]   • KNEE ARTHROPLASTY TOTAL  6/10/2012    left   • LATERAL RELEASE  7/20/2011    Performed by BERYL LOYD at [facility]   • KNEE ARTHROSCOPY  7/20/2011    Performed by RACH  BERYL DAMON at SUR[facility]ERY [facility]   • MEDIAL MENISCECTOMY  7/20/2011    Performed by BERYL LOYD at [facility]   • MENISCECTOMY  7/20/2011    [facility]   • COLONOSCOPY - ENDO  11/17/2010    Performed by JESUSITA SUMNER at ENDOSCOPY [facility]   • TRACHEOSTOMY  3/31/2010    Performed by JESUSITA SUMNER at SURGERY [facility]   • COLOSTOMY  3/16/2010    Performed by CHRISTA BENNETT at SURGERY [facility]   • EXPLORATORY LAPAROTOMY  3/16/2010    Performed by CHRISTA BENNETT at SUR[facility]ERY [facility]   • COLECTOMY  3/16/2010    Performed by CHRISTA BENNETT at SURGERY [facility]   • LOW ANTERIOR RESECTION  3/10/2010    Performed by JESUSITA SUMNER at SURGERY [facility]   • LOW ANTERIOR RESECTION LAPAROSCOPIC  3/10/2010    Performed by JESUSITA SUMNER at SURGERY [facility]   • MYRINGOTOMY  9/9/2009    Performed by RENETTA GOODSON at SURGERY [facility]   • CHOLECYSTECTOMY  1992    laparoscopic   • HYSTERECTOMY LAPAROSCOPY  1983   • OTHER ORTHOPEDIC SURGERY      LT KNEE X2       ALLERGIES:  Pcn    MEDICATIONS:    Outpatient Encounter Prescriptions as of 11/8/2018   Medication Sig Dispense Refill   • prednisoLONE acetate (PRED FORTE) 1 % Suspension      • LORazepam (ATIVAN) 1 MG Tab      • levothyroxine (SYNTHROID) 25 MCG Tab      • Tiotropium Bromide Monohydrate (SPIRIVA RESPIMAT) 2.5 MCG/ACT Aero Soln Inhale 2 Inhalation by mouth every day. Assemble and prime. 1 Inhaler 6   • gabapentin (NEURONTIN) 300 MG Cap TAKE THREE (3) CAPSULES BY MOUTH THREE (3) TIMES DAILY 270 Cap 0   • levothyroxine (SYNTHROID) 200 MCG Tab Take 1 Tab by mouth Every morning on an empty stomach. 30 Tab 0   • warfarin (COUMADIN) 10 MG Tab Take 1 Tab by mouth every evening. 10 mg on Mon, Wed, Fri ;12.5 mg all other days (10mg tab + 1/2 of 5mg Tab) 30 Tab 3   • warfarin (COUMADIN) 5 MG Tab Take 2.5 mg (1/2 tab) on  Tues, Thurs, Saturday  Sunday; Along with 10mg tab to equal 12.5 mg 20 Tab 2   • vitamin D, Ergocalciferol, (DRISDOL) 05694 units Cap capsule Take 1 Cap by mouth every 28 days. 3 Cap 2   • FLUoxetine (PROZAC) 20 MG Cap      • atorvastatin (LIPITOR) 80 MG tablet Take 1 Tab by mouth every evening. 30 Tab 12   • amitriptyline (ELAVIL) 25 MG Tab Take 1 Tab by mouth every bedtime. 30 Tab 0   • raNITidine (ZANTAC) 150 MG Tab Take 150 mg by mouth 2 times a day.     • carbamazepine (TEGRETOL) 200 MG Tab Take 600 mg by mouth every bedtime.     • albuterol (VENTOLIN HFA) 108 (90 Base) MCG/ACT Aero Soln inhalation aerosol Inhale 2 Puffs by mouth every four hours as needed for Shortness of Breath. 1 Inhaler 11   • Incontinence Supply Disposable (FQ PROTECTIVE UNDERWEAR) Misc USE 1 PULLUP 3 TIMES DAILY 90 Each 0   • nystatin (MYCOSTATIN) 219752 UNIT/GM Ointment Apply to affected area under breasts daily for 10 days and as needed. 1 Tube 11   • Misc. Devices Misc Heavy duty 4 wheeled walker with seat and back rest to use as needed 1 Device 0   • enoxaparin (LOVENOX) 120 MG/0.8ML Solution inj      • fluoxetine (PROZAC) 40 MG capsule Take 40 mg by mouth every evening.     • albuterol (PROVENTIL HFA) 108 (90 Base) MCG/ACT Aero Soln inhalation aerosol Inhale 2 Puffs by mouth every four hours as needed for Shortness of Breath (Wheezing). 1 Inhaler 11   • budesonide-formoterol (SYMBICORT) 160-4.5 MCG/ACT Aerosol Inhale 2 Puffs by mouth 2 Times a Day. With spacer, rinse mouth after use 1 Inhaler 11   • flurazepam (DALMANE) 30 MG capsule Take 30 mg by mouth every bedtime.       No facility-administered encounter medications on file as of 11/8/2018.        SOCIAL HISTORY:    Social History     Social History   • Marital status:      Spouse name: N/A   • Number of children: N/A   • Years of education: N/A     Social History Main Topics   • Smoking status: Current Every Day Smoker     Packs/day: 0.25     Years: 50.00     Types: Cigarettes   • Smokeless  "tobacco: Current User      Comment: 1 ppd, 45 yrs   • Alcohol use No   • Drug use: Yes     Types: Marijuana      Comment: 1x per month   • Sexual activity: No     Other Topics Concern   •  Service No   • Blood Transfusions No     unknown   • Caffeine Concern No   • Occupational Exposure No   • Hobby Hazards No   • Sleep Concern Yes     sleep apnea   • Stress Concern Yes   • Weight Concern No   • Special Diet Yes   • Back Care Yes   • Exercise No   • Bike Helmet No     does not ride bike    • Seat Belt Yes   • Self-Exams Yes     Social History Narrative   • No narrative on file       Review of Systems  Reviewed, no changes noted  Constitutional: Negative for fever and chills.   HENT: Negative for hearing loss and tinnitus.    Eyes: Negative for blurred vision and double vision.   Respiratory: Negative for hemoptysis and sputum production.    Cardiovascular: Negative for palpitations and orthopnea.   Gastrointestinal: Negative for nausea and vomiting.   Genitourinary: Negative for urgency and frequency.   Musculoskeletal: Positive for myalgias, back pain and joint pain.   Skin: Negative.    Endo/Heme/Allergies: Negative.    Psychiatric/Behavioral: Positive for depression. The patient is nervous/anxious and has insomnia.    All other systems reviewed and are negative.        Objective:     /84   Pulse 73   Temp 36.4 °C (97.5 °F) (Temporal)   Ht 1.626 m (5' 4\")   Wt 124.7 kg (275 lb)   LMP 07/19/2011   SpO2 (!) 69%   BMI 47.20 kg/m²      Physical Exam  Constitutional: Awake, alert, no acute distress  HEENT: Normocephalic atraumatic, neck supple, no JVD noted, no masses noted, no meningeal signs noted  Lymphadenopathy: no cervical, supraclavicular, or inguinal lymphadenopathy noted  Cardiovascular: Intact distal pulses, including at wrists and ankles, no limb swelling noted  Pulmonary: No tachypnea noted, no accessory muscle use noted, no dyspnea noted  Abdominal: Soft, nontender, exhibits no " distension, no peritoneal signs, no HSM  Musculoskeletal:   Right hip: exhibits only mild tenderness. Minimal pain with range of motion testing  Left hip: exhibits only mild tenderness. Minimal pain with range of motion testing  Lumbar back: exhibits mild decreased range of motion, mild tenderness and mild pain. negative straight leg testing, trigger points noted  Knee: Healed scar, tender with palpation, patellar subluxation noted, mild swelling noted, pain with range of motion testing, no signs of infection  Neurological: oriented. Cranial nerves grossly intact, normal strength. Sensation intact distally. Reflexes 1+ in  lower limbs, Gait mildly wide-based, left knee held in extension position during stance phase, reciprocal, slow  Skin: Skin is intact. no rashes or lesions noted  Psychiatric: normal mood and affect. speech is normal and behavior is normal. Judgment and thought content normal.        Assessment/Plan:       ASSESSMENT:    1. Left knee pain, sprain strain, history arthritis, status post multiple left knee surgeries including left total knee replacement with complications, chronic lateral patellar subluxation/dislocation, impaired gait/mobility    - Recommend trial with custom left knee brace, submitted consultation for orthotist consultation, evaluate left knee brace options, with goal of decreasing pain and improving gait/mobility    2. Left ankle pain, sprain strain, mild arthritis    - Reviewed orthotics/footwear    3. Thoracolumbar pain, myofascial pain, degenerative disc disease, spondylosis    4. Spinal/joint/musculoskeletal pain, arthritis    5. Comorbid medical issues, including DVT status post IVC, COPD, status post ostomy, with care per primary care provider, consultants      DISCUSSION/PLAN:    - I discussed management options. I reviewed symptomatic care    - I reviewed home exercise program, with medical precautions. Left knee activity/restrictions per orthopedics    - The patient can  consider complementary trials with acupuncture, superficial massage therapy, or TENS unit    - I reviewed medication monitoring.  I reviewed further symptomatic medications. I did not prescribe any medications today    - I reviewed additional diagnostic options, including further/advanced imaging, electrodiagnostic testing, vascular studies, and further lab screen    - I reviewed additional therapeutic options, including injection/interventional therapy and additional consultative input    - I encourage the patient to stop or decrease cigarette use    - Return after the left knee brace fitting or an as-needed basis      Please note that this dictation was created using voice recognition software. I have made every reasonable attempt to correct obvious errors but there may be errors of grammar and content that I may have overlooked prior to finalization of this note.

## 2018-11-15 ENCOUNTER — OFFICE VISIT (OUTPATIENT)
Dept: MEDICAL GROUP | Facility: MEDICAL CENTER | Age: 68
End: 2018-11-15
Attending: FAMILY MEDICINE
Payer: MEDICARE

## 2018-11-15 ENCOUNTER — TELEPHONE (OUTPATIENT)
Dept: VASCULAR LAB | Facility: MEDICAL CENTER | Age: 68
End: 2018-11-15

## 2018-11-15 VITALS
WEIGHT: 270 LBS | BODY MASS INDEX: 46.1 KG/M2 | RESPIRATION RATE: 14 BRPM | HEIGHT: 64 IN | HEART RATE: 74 BPM | OXYGEN SATURATION: 98 % | SYSTOLIC BLOOD PRESSURE: 140 MMHG | TEMPERATURE: 97.7 F | DIASTOLIC BLOOD PRESSURE: 90 MMHG

## 2018-11-15 DIAGNOSIS — G89.4 CHRONIC PAIN SYNDROME: ICD-10-CM

## 2018-11-15 DIAGNOSIS — M25.551 RIGHT HIP PAIN: ICD-10-CM

## 2018-11-15 DIAGNOSIS — T84.018A FAILURE OF TOTAL KNEE REPLACEMENT, INITIAL ENCOUNTER (HCC): ICD-10-CM

## 2018-11-15 DIAGNOSIS — M47.27 OSTEOARTHRITIS OF SPINE WITH RADICULOPATHY, LUMBOSACRAL REGION: ICD-10-CM

## 2018-11-15 DIAGNOSIS — Z96.659 FAILURE OF TOTAL KNEE REPLACEMENT, INITIAL ENCOUNTER (HCC): ICD-10-CM

## 2018-11-15 DIAGNOSIS — Z23 NEED FOR INFLUENZA VACCINATION: ICD-10-CM

## 2018-11-15 PROCEDURE — 99214 OFFICE O/P EST MOD 30 MIN: CPT | Performed by: FAMILY MEDICINE

## 2018-11-15 PROCEDURE — 99213 OFFICE O/P EST LOW 20 MIN: CPT | Performed by: FAMILY MEDICINE

## 2018-11-15 PROCEDURE — 90471 IMMUNIZATION ADMIN: CPT

## 2018-11-15 RX ORDER — GABAPENTIN 300 MG/1
CAPSULE ORAL
Qty: 270 CAP | Refills: 0 | Status: SHIPPED | OUTPATIENT
Start: 2018-11-15 | End: 2019-01-03 | Stop reason: SDUPTHER

## 2018-11-15 RX ORDER — OXYCODONE HYDROCHLORIDE 5 MG/1
5-10 TABLET ORAL EVERY 6 HOURS PRN
Qty: 30 TAB | Refills: 0 | Status: SHIPPED | OUTPATIENT
Start: 2018-11-15 | End: 2018-11-22

## 2018-11-15 ASSESSMENT — ENCOUNTER SYMPTOMS
ABDOMINAL PAIN: 0
VERTIGO: 0
TREMORS: 0
NAUSEA: 0
CHILLS: 0
VISUAL CHANGE: 0
HEADACHES: 0
FEVER: 0
SORE THROAT: 0
SPUTUM PRODUCTION: 0
FOCAL WEAKNESS: 1
BACK PAIN: 1
VOMITING: 0
DIAPHORESIS: 0
HALLUCINATIONS: 0
SPEECH CHANGE: 0
ANOREXIA: 0
COUGH: 0
CHANGE IN BOWEL HABIT: 0
PALPITATIONS: 0
MYALGIAS: 1
TINGLING: 1
SHORTNESS OF BREATH: 0
NECK PAIN: 1
FATIGUE: 0
NERVOUS/ANXIOUS: 1
SWOLLEN GLANDS: 0
DEPRESSION: 1
JOINT SWELLING: 1
SENSORY CHANGE: 0
NUMBNESS: 1
INSOMNIA: 0
WEAKNESS: 1
ARTHRALGIAS: 1

## 2018-11-15 ASSESSMENT — LIFESTYLE VARIABLES: SUBSTANCE_ABUSE: 0

## 2018-11-15 NOTE — PROGRESS NOTES
Subjective:      Atiya Henley is a 68 y.o. female who presents with Knee Pain (brace)            Acute pain   This is a recurrent problem.   Patient is complaining of pain x several month(s) located in her L knee, R hip and back.  Pain is constant, described as aching, throbbing, shooting, numbness, tingling and a 10/10 on the pain scale.   Treatments tried include:Tylenol, gabapentin 900 tid     Is the pain medication improving the patient’s symptoms: Yes  Any adverse effects: No  Alcohol or illicit drug use:   She  reports that she does not drink alcohol.  She  reports that she uses drugs, including Marijuana.  Patient has been warned about not continuing to use marijuana since it is a recreational drug and may have additional effects if use along with her pain medications she is also been warned about using her benzodiazepines at the same time as her pain medications.  We will have her continue to follow-up with her mental health provider for additional assistance in management of her anxiety as well as depression.     History of controlled substance used in a way other than prescribed? No     Any early refills of a controlled substance: No  History of lost or stolen controlled substance prescription: No  Any aberrant behavior or intoxication while on a controlled substance: No  Has the patient self-modified their dose or frequency of the medication :No  Compliant with treatment recommendations and plan: Yes  Any major health change to the patient: No  Concerns for misuse, abuse or addiction: No  /NarxCheck report reviewed: Yes  History of abnormal drug screening: No  I have assessed the patient’s risk for abuse, dependency, and addiction using the validated Opioid Risk Tool.     Opioid Risk Score: 1       Interpretation of Opioid Risk Score   Score 0-3 = Low risk of abuse. Do UDS at least once per year.  Score 4-7 = Moderate risk of abuse. Do UDS 1-4 times per year.  Score 8+ = High risk of abuse. Refer  to specialist.     I have conducted a physical exam and documented findings.     I certify that I have obtained and reviewed her medical history. I have also made a good amisha effort to obtain applicable records from other providers who have treated the patient.  I have reviewed the patient's prescription history as maintained by the [place] Prescription Monitoring Program.      Given the above, I believe the benefits of controlled substance therapy outweigh the risks. The reasons for prescribing controlled substances include non-narcotic, oral analgesic alternatives have been inadequate for pain control. Accordingly, I have discussed the risk and benefits, treatment plan, and alternative therapies with the patient. Patient was advised that this medicine is intended for short term (no more than 14 days)/intermittent use only and not intended to be an ongoing prescription.        Knee Pain   This is a recurrent problem. The current episode started more than 1 year ago. The problem occurs constantly. Associated symptoms include arthralgias, joint swelling, myalgias, neck pain, numbness and weakness. Pertinent negatives include no abdominal pain, anorexia, change in bowel habit, chest pain, chills, congestion, coughing, diaphoresis, fatigue, fever, headaches, nausea, rash, sore throat, swollen glands, urinary symptoms, vertigo, visual change or vomiting. Associated symptoms comments: R hip, back and L knee pain. Nothing aggravates the symptoms. Treatments tried: she had been referred to [facility] but she is not a candidate for surgery due to her comorbidities per pt, she will be getting orthotics.       Review of Systems   Constitutional: Positive for malaise/fatigue. Negative for chills, diaphoresis, fatigue and fever.   HENT: Negative for congestion, hearing loss, sore throat and tinnitus.    Respiratory: Negative for cough, sputum production and shortness of breath.    Cardiovascular: Negative for chest pain and  "palpitations.   Gastrointestinal: Negative for abdominal pain, anorexia, change in bowel habit, nausea and vomiting.   Musculoskeletal: Positive for arthralgias, back pain, joint pain, joint swelling, myalgias and neck pain.   Skin: Negative for rash.   Neurological: Positive for tingling, focal weakness, weakness and numbness. Negative for vertigo, tremors, sensory change, speech change and headaches.   Psychiatric/Behavioral: Positive for depression. Negative for hallucinations, substance abuse and suicidal ideas. The patient is nervous/anxious. The patient does not have insomnia.           Objective:     /90 (BP Location: Left arm, Patient Position: Sitting)   Pulse 74   Temp 36.5 °C (97.7 °F)   Resp 14   Ht 1.626 m (5' 4\")   Wt 122.5 kg (270 lb)   LMP 07/19/2011   SpO2 98%   BMI 46.35 kg/m²      Physical Exam   Constitutional: She is oriented to person, place, and time.   BMI 46   HENT:   Head: Normocephalic and atraumatic.   Neck: Normal range of motion. Neck supple.   Cardiovascular: Normal rate, regular rhythm and normal heart sounds.  Exam reveals no friction rub.    No murmur heard.  Pulmonary/Chest: Effort normal and breath sounds normal. No respiratory distress. She has no wheezes. She has no rales.   Abdominal: Soft. Bowel sounds are normal. She exhibits no distension and no mass. There is tenderness. There is no guarding.   Musculoskeletal: She exhibits edema, tenderness and deformity.   Decreased ROM of affected extremities using walker today to ambulate   Neurological: She is alert and oriented to person, place, and time.   Skin: Skin is warm and dry.   Psychiatric: Her behavior is normal.   Depressed affect   Nursing note and vitals reviewed.              Assessment/Plan:     1. Need for influenza vaccination  She would like to get a flu shot, she understands the risks and benefits of the flu shot. No recent fevers, no egg allergies, and no hx of GBS.    - INFLUENZA VACCINE, HIGH DOSE " (65+ ONLY)  - REFERRAL TO PAIN CLINIC  - Controlled Substance Treatment Agreement  - oxyCODONE immediate-release (ROXICODONE) 5 MG Tab; Take 1-2 Tabs by mouth every 6 hours as needed for Severe Pain for up to 7 days.  Dispense: 30 Tab; Refill: 0  - Consent for Opiate Prescription    2. Chronic pain syndrome  We will have her continue to follow-up with her pain management physician as directed.  Since she has been in increased pain and is not getting any pain medications from her pain specialist will prescribe narcotic pain medication today.  Patient has been advised that she will need to follow-up with her pain specialist for any further pain medications.  We will continue to follow.  - INFLUENZA VACCINE, HIGH DOSE (65+ ONLY)  - REFERRAL TO PAIN CLINIC  - Controlled Substance Treatment Agreement  - oxyCODONE immediate-release (ROXICODONE) 5 MG Tab; Take 1-2 Tabs by mouth every 6 hours as needed for Severe Pain for up to 7 days.  Dispense: 30 Tab; Refill: 0  - Consent for Opiate Prescription    3. Failure of total knee replacement, initial encounter (HCC)  See above plan.  - INFLUENZA VACCINE, HIGH DOSE (65+ ONLY)  - REFERRAL TO PAIN CLINIC  - Controlled Substance Treatment Agreement  - oxyCODONE immediate-release (ROXICODONE) 5 MG Tab; Take 1-2 Tabs by mouth every 6 hours as needed for Severe Pain for up to 7 days.  Dispense: 30 Tab; Refill: 0  - Consent for Opiate Prescription    4. Osteoarthritis of spine with radiculopathy, lumbosacral region  See above plan.  - INFLUENZA VACCINE, HIGH DOSE (65+ ONLY)  - REFERRAL TO PAIN CLINIC  - Controlled Substance Treatment Agreement  - oxyCODONE immediate-release (ROXICODONE) 5 MG Tab; Take 1-2 Tabs by mouth every 6 hours as needed for Severe Pain for up to 7 days.  Dispense: 30 Tab; Refill: 0  - Consent for Opiate Prescription    5. Right hip pain  See above plan.  - INFLUENZA VACCINE, HIGH DOSE (65+ ONLY)  - REFERRAL TO PAIN CLINIC  - Controlled Substance Treatment  Agreement  - oxyCODONE immediate-release (ROXICODONE) 5 MG Tab; Take 1-2 Tabs by mouth every 6 hours as needed for Severe Pain for up to 7 days.  Dispense: 30 Tab; Refill: 0  - Consent for Opiate Prescription

## 2018-11-26 ENCOUNTER — ANTICOAGULATION MONITORING (OUTPATIENT)
Dept: VASCULAR LAB | Facility: MEDICAL CENTER | Age: 68
End: 2018-11-26

## 2018-11-26 DIAGNOSIS — Z95.828 S/P IVC FILTER: ICD-10-CM

## 2018-11-26 DIAGNOSIS — Z79.01 CHRONIC ANTICOAGULATION: ICD-10-CM

## 2018-11-26 DIAGNOSIS — Z86.718 HISTORY OF DVT (DEEP VEIN THROMBOSIS): ICD-10-CM

## 2018-11-26 LAB — INR PPP: 1.3 (ref 2–3.5)

## 2018-11-27 NOTE — PROGRESS NOTES
Anticoagulation Summary  As of 11/26/2018    INR goal:   2.0-3.0   TTR:   38.7 % (3.2 y)   Today's INR:   1.3!   Warfarin maintenance plan:   10 mg (10 mg x 1) on Mon, Wed, Fri; 12.5 mg (10 mg x 1 and 5 mg x 0.5) all other days   Weekly warfarin total:   80 mg   Plan last modified:   Bon Ash, PharmD (6/7/2018)   Next INR check:   11/30/2018   Priority:   Routine   Target end date:   Indefinite    Indications    DVT (deep venous thrombosis) (HCC) (Resolved) [I82.409]  Chronic anticoagulation [Z79.01]  S/P IVC filter [Z95.828]  History of DVT (deep vein thrombosis) [Z86.718]             Anticoagulation Episode Summary     [facility]R check location:   [facility]    Preferred lab:   [facility]    Send INR reminders to:       Comments:   MD INR      Anticoagulation Care Providers     Provider Role Specialty Phone number    Carmelo Pat M.D. Referring [facility] Medicine [phone]    [facility] Anticoagulation Services Responsible  [phone]        Anticoagulation Patient Findings  Patient Findings     Positives:   Missed doses    Negatives:   Signs/symptoms of thrombosis, Signs/symptoms of bleeding, Laboratory test error suspected, Change in health, Change in alcohol use, Change in activity, Upcoming invasive procedure, Emergency department visit, Upcoming dental procedure, Extra doses, Change in medications, Change in diet/appetite, Hospital admission, Bruising, Other complaints        Spoke with patient today regarding subtherapeutic INR of 1.3.  Patient denies any signs/symptoms of bruising or bleeding or any changes in diet and medications.  Instructed patient to call clinic with any questions or concerns.  Patient states she has missed multiple doses over the last week.  Hx of labile INR's and noncompliance with testing.  Will have her bolus with 15mg X 2, then resume current warfarin regimen.  Follow up in 4 days, to reduce risk of adverse events related to this high risk medication,  Warfarin.    Nahum  MERCEDES Bowens, PharmD

## 2018-12-03 ENCOUNTER — TELEPHONE (OUTPATIENT)
Dept: PHYSICAL MEDICINE AND REHAB | Facility: MEDICAL CENTER | Age: 68
End: 2018-12-03

## 2018-12-03 NOTE — TELEPHONE ENCOUNTER
Atiya left msg she wanted number to where she is to get Brace.     I called her back and left msg to call [facility].

## 2018-12-07 ENCOUNTER — ANTICOAGULATION MONITORING (OUTPATIENT)
Dept: VASCULAR LAB | Facility: MEDICAL CENTER | Age: 68
End: 2018-12-07

## 2018-12-07 DIAGNOSIS — Z79.01 CHRONIC ANTICOAGULATION: ICD-10-CM

## 2018-12-07 DIAGNOSIS — Z95.828 S/P IVC FILTER: ICD-10-CM

## 2018-12-07 DIAGNOSIS — Z86.718 HISTORY OF DVT (DEEP VEIN THROMBOSIS): ICD-10-CM

## 2018-12-07 LAB — INR PPP: 4.5 (ref 2–3.5)

## 2018-12-07 NOTE — PROGRESS NOTES
Anticoagulation Summary  As of 12/7/2018    INR goal:   2.0-3.0   TTR:   38.6 % (3.2 y)   Today's INR:   4.5!   Warfarin maintenance plan:   10 mg (10 mg x 1) on Mon, Wed, Fri; 12.5 mg (10 mg x 1 and 5 mg x 0.5) all other days   Weekly warfarin total:   80 mg   Plan last modified:   Bon Ash, PharmD (6/7/2018)   Next INR check:   12/14/2018   Priority:   Routine   Target end date:   Indefinite    Indications    DVT (deep venous thrombosis) (HCC) (Resolved) [I82.409]  Chronic anticoagulation [Z79.01]  S/P IVC filter [Z95.828]  History of DVT (deep vein thrombosis) [Z86.718]             Anticoagulation Episode Summary     INR check location[facility]   [facility]    Preferred lab:   [facility]    Send INR reminders to:       Comments:   MD INR      Anticoagulation Care Providers     Provider Role Specialty Phone number    Carmelo Pat M.D. Referring Family Medicine [phone]    [facility] Anticoagulation Services Responsible  [phone]        Anticoagulation Patient Findings  Patient Findings     Negatives:   Signs/symptoms of thrombosis, Signs/symptoms of bleeding, Laboratory test error suspected, Change in health, Change in alcohol use, Change in activity, Upcoming invasive procedure, Emergency department visit, Upcoming dental procedure, Missed doses, Extra doses, Change in medications, Change in diet/appetite, Hospital admission, Bruising, Other complaints        Spoke with patient today regarding supratherapeutic INR of 4.5.  Patient denies any signs/symptoms of bruising or bleeding or any changes in diet and medications.  Instructed patient to call clinic with any questions or concerns.  Instructed patient to HOLD X 1, then resume current warfarin regimen.  Follow up in 1 weeks, to reduce risk of adverse events related to this high risk medication,  Warfarin.    Nahum Bowens, PharmD

## 2018-12-19 ENCOUNTER — ANTICOAGULATION MONITORING (OUTPATIENT)
Dept: VASCULAR LAB | Facility: MEDICAL CENTER | Age: 68
End: 2018-12-19

## 2018-12-19 DIAGNOSIS — Z86.718 HISTORY OF DVT (DEEP VEIN THROMBOSIS): ICD-10-CM

## 2018-12-19 DIAGNOSIS — Z95.828 S/P IVC FILTER: ICD-10-CM

## 2018-12-19 DIAGNOSIS — Z79.01 CHRONIC ANTICOAGULATION: ICD-10-CM

## 2018-12-19 LAB — INR PPP: 1.2 (ref 2–3.5)

## 2018-12-19 NOTE — PROGRESS NOTES
Anticoagulation Summary  As of 12/19/2018    INR goal:   2.0-3.0   TTR:   38.5 % (3.3 y)   Today's INR:   1.2!   Warfarin maintenance plan:   10 mg (10 mg x 1) on Mon, Wed, Fri; 12.5 mg (10 mg x 1 and 5 mg x 0.5) all other days   Weekly warfarin total:   80 mg   Plan last modified:   Bon Ash, PharmD (6/7/2018)   Next INR check:   12/26/2018   Priority:   Routine   Target end date:   Indefinite    Indications    DVT (deep venous thrombosis) (HCC) (Resolved) [I82.409]  Chronic anticoagulation [Z79.01]  S/P IVC filter [Z95.828]  History of DVT (deep vein thrombosis) [Z86.718]             Anticoagulation Episode Summary     [facility] check location[facility]   [facility]    Preferred lab:   [facility]    Send INR reminders to:       Comments:   MD INR      Anticoagulation Care Providers     Provider Role Specialty Phone number    Carmelo Pat M.D. Referring [facility] Medicine [phone]    [facility] Anticoagulation Services Responsible  [phone]        Anticoagulation Patient Findings      Left voicemail message to report a SUB therapeutic INR of 1.2.    Will have pt take a boost dose of warfarin today of 20 mg and then   Pt to continue with current warfarin dosing regimen. Requested pt contact the clinic for any s/s of unusual bleeding, bruising, clotting or any changes to diet or medication.    Asked pt to call back to clarify.    FU INR in 1 week(s).    Porsche Bello, PharmD

## 2018-12-28 ENCOUNTER — HOSPITAL ENCOUNTER (OUTPATIENT)
Dept: RADIOLOGY | Facility: MEDICAL CENTER | Age: 68
End: 2018-12-28
Attending: NURSE PRACTITIONER
Payer: MEDICARE

## 2018-12-28 DIAGNOSIS — R93.89 ABNORMAL CT OF THE CHEST: ICD-10-CM

## 2018-12-28 DIAGNOSIS — Z87.01 HISTORY OF PNEUMONIA: ICD-10-CM

## 2018-12-28 PROCEDURE — 71250 CT THORAX DX C-: CPT

## 2019-01-03 RX ORDER — RANITIDINE 150 MG/1
150 TABLET ORAL 2 TIMES DAILY
Qty: 60 TAB | Refills: 3 | Status: SHIPPED | OUTPATIENT
Start: 2019-01-03 | End: 2019-01-25 | Stop reason: SDUPTHER

## 2019-01-03 RX ORDER — GABAPENTIN 300 MG/1
CAPSULE ORAL
Qty: 270 CAP | Refills: 0 | Status: SHIPPED | OUTPATIENT
Start: 2019-01-03 | End: 2019-05-20 | Stop reason: SDUPTHER

## 2019-01-03 RX ORDER — LEVOTHYROXINE SODIUM 0.2 MG/1
TABLET ORAL
Qty: 30 TAB | Refills: 0 | Status: SHIPPED | OUTPATIENT
Start: 2019-01-03 | End: 2019-04-17 | Stop reason: SDUPTHER

## 2019-01-07 DIAGNOSIS — J44.89 CHRONIC OBSTRUCTIVE ASTHMA: ICD-10-CM

## 2019-01-07 RX ORDER — BUDESONIDE AND FORMOTEROL FUMARATE DIHYDRATE 160; 4.5 UG/1; UG/1
2 AEROSOL RESPIRATORY (INHALATION) 2 TIMES DAILY
Qty: 1 INHALER | Refills: 11 | Status: SHIPPED | OUTPATIENT
Start: 2019-01-07 | End: 2019-04-03 | Stop reason: SDUPTHER

## 2019-01-07 NOTE — TELEPHONE ENCOUNTER
Have we ever prescribed this med? Yes.  If yes, what date? 06/14/2018    Last OV: 06/14/2018 - Val Jefferson    Next OV: 01/23/2019 - Val Jefferson    DX: asthma    Medications: symbicort

## 2019-01-14 RX ORDER — DIAPER,BRIEF,ADULT, DISPOSABLE
EACH MISCELLANEOUS
Qty: 90 EACH | Refills: 0 | Status: SHIPPED | OUTPATIENT
Start: 2019-01-14 | End: 2019-03-21 | Stop reason: SDUPTHER

## 2019-01-23 ENCOUNTER — APPOINTMENT (OUTPATIENT)
Dept: PULMONOLOGY | Facility: HOSPICE | Age: 69
End: 2019-01-23
Payer: MEDICARE

## 2019-01-28 RX ORDER — RANITIDINE 150 MG/1
150 TABLET ORAL 2 TIMES DAILY
Qty: 60 TAB | Refills: 0 | Status: SHIPPED | OUTPATIENT
Start: 2019-01-28 | End: 2019-06-25 | Stop reason: SDUPTHER

## 2019-02-11 ENCOUNTER — OFFICE VISIT (OUTPATIENT)
Dept: MEDICAL GROUP | Facility: MEDICAL CENTER | Age: 69
End: 2019-02-11
Attending: FAMILY MEDICINE
Payer: MEDICARE

## 2019-02-11 VITALS
SYSTOLIC BLOOD PRESSURE: 140 MMHG | RESPIRATION RATE: 16 BRPM | DIASTOLIC BLOOD PRESSURE: 90 MMHG | HEIGHT: 64 IN | OXYGEN SATURATION: 91 % | BODY MASS INDEX: 46.1 KG/M2 | TEMPERATURE: 97.4 F | WEIGHT: 270 LBS | HEART RATE: 68 BPM

## 2019-02-11 DIAGNOSIS — E78.5 HYPERLIPIDEMIA, UNSPECIFIED HYPERLIPIDEMIA TYPE: ICD-10-CM

## 2019-02-11 DIAGNOSIS — E88.810 ABDOMINAL OBESITY AND METABOLIC SYNDROME: ICD-10-CM

## 2019-02-11 DIAGNOSIS — T84.018D FAILURE OF TOTAL KNEE REPLACEMENT, SUBSEQUENT ENCOUNTER: ICD-10-CM

## 2019-02-11 DIAGNOSIS — F31.9 BIPOLAR 1 DISORDER (HCC): ICD-10-CM

## 2019-02-11 DIAGNOSIS — Z78.9 DECREASED ACTIVITIES OF DAILY LIVING (ADL): ICD-10-CM

## 2019-02-11 DIAGNOSIS — I10 ESSENTIAL HYPERTENSION: ICD-10-CM

## 2019-02-11 DIAGNOSIS — E66.9 ABDOMINAL OBESITY AND METABOLIC SYNDROME: ICD-10-CM

## 2019-02-11 DIAGNOSIS — G89.4 CHRONIC PAIN SYNDROME: ICD-10-CM

## 2019-02-11 DIAGNOSIS — Z96.659 FAILURE OF TOTAL KNEE REPLACEMENT, SUBSEQUENT ENCOUNTER: ICD-10-CM

## 2019-02-11 PROCEDURE — 99213 OFFICE O/P EST LOW 20 MIN: CPT | Performed by: FAMILY MEDICINE

## 2019-02-11 PROCEDURE — 99214 OFFICE O/P EST MOD 30 MIN: CPT | Performed by: FAMILY MEDICINE

## 2019-02-11 RX ORDER — LISINOPRIL 5 MG/1
5 TABLET ORAL DAILY
Qty: 30 TAB | Refills: 3 | Status: SHIPPED | OUTPATIENT
Start: 2019-02-11 | End: 2019-06-25 | Stop reason: SDUPTHER

## 2019-02-11 RX ORDER — LISINOPRIL 5 MG/1
5 TABLET ORAL DAILY
Qty: 30 TAB | Refills: 3 | Status: SHIPPED | OUTPATIENT
Start: 2019-02-11 | End: 2019-02-11 | Stop reason: SDUPTHER

## 2019-02-11 ASSESSMENT — ENCOUNTER SYMPTOMS
INSOMNIA: 0
SENSORY CHANGE: 0
DEPRESSION: 1
NAUSEA: 0
FEVER: 0
HEADACHES: 1
BACK PAIN: 1
TREMORS: 0
SEIZURES: 0
SPEECH CHANGE: 0
SHORTNESS OF BREATH: 0
SPUTUM PRODUCTION: 0
NERVOUS/ANXIOUS: 1
PALPITATIONS: 0
HALLUCINATIONS: 0
COUGH: 0
ABDOMINAL PAIN: 0
FOCAL WEAKNESS: 0
NECK PAIN: 0
VOMITING: 0
TINGLING: 1
CHILLS: 0

## 2019-02-11 ASSESSMENT — LIFESTYLE VARIABLES: SUBSTANCE_ABUSE: 0

## 2019-02-11 NOTE — PROGRESS NOTES
Subjective:      Atiya Henley is a 68 y.o. female who presents with Dizziness            Patient 68-year-old female here for follow-up of her chronic knee pain, decreased activities of daily living, hypothyroidism, hypertension, mood disorder, metabolic syndrome and hyperlipidemia.    Recently her power wheelchair has been giving out on her.  She states that the battery power is discharging before she is actually reaching her destination.  She is also been having difficulty with the tires as well as the breaks on her power wheelchair.  Will send a request for repairs to be made to her power wheelchair.  We will request a new battery, new tiredness and a brake job to be done.  We will continue to follow.    She is also been having more stress in her life with a few of her friends having recently passed away.  She has been receiving her medications and also receiving counseling from her mental health providers.  She is not having any urges to harm herself or others, but she has been having more difficulty dealing with her daily life.  We will have her continue to take her medications as directed, and she has an appointment to see her mental health providers coming up in the next week.  If she has any sudden worsening of her current symptoms we will have her go to the emergency room for further assistance in management.    Her blood pressure today is slightly elevated at 140/90.  We will have her start lisinopril at 5 mg daily.  She is not having any chest pain, shortness of breath or other cardiac or neurologic symptoms.  We will have her check her blood pressures at home and keep notes.    Since she has not had any recent blood work done we will reorder a thyroid function, lipid panel, metabolic panel, and complete blood count.  She has been taking her medications as previously directed without any issues.  We will continue to follow.     Current medications, allergies, and problem list reviewed with patient  "and updated in EPIC.          Review of Systems   Constitutional: Negative for chills and fever.   HENT: Negative for hearing loss and tinnitus.    Respiratory: Negative for cough, sputum production and shortness of breath.    Cardiovascular: Negative for chest pain and palpitations.   Gastrointestinal: Negative for abdominal pain, nausea and vomiting.   Musculoskeletal: Positive for back pain and joint pain. Negative for neck pain.   Skin: Negative for rash.   Neurological: Positive for tingling and headaches. Negative for tremors, sensory change, speech change, focal weakness and seizures.   Psychiatric/Behavioral: Positive for depression. Negative for hallucinations, substance abuse and suicidal ideas. The patient is nervous/anxious. The patient does not have insomnia.           Objective:     /90 (BP Location: Left arm, Patient Position: Sitting)   Pulse 68   Temp 36.3 °C (97.4 °F)   Resp 16   Ht 1.626 m (5' 4\")   Wt 122.5 kg (270 lb)   LMP 07/19/2011   SpO2 91%   BMI 46.35 kg/m²      Physical Exam   Constitutional: She is oriented to person, place, and time.   BMI 46.35   HENT:   Head: Normocephalic and atraumatic.   Cardiovascular: Normal rate, regular rhythm and normal heart sounds.  Exam reveals no friction rub.    No murmur heard.  Pulmonary/Chest: Effort normal and breath sounds normal. No respiratory distress. She has no wheezes. She has no rales.   Abdominal: Soft. Bowel sounds are normal. She exhibits no distension. There is no tenderness.   Musculoskeletal: She exhibits tenderness. She exhibits no edema.   Using walker to ambulate   Neurological: She is alert and oriented to person, place, and time.   Skin: Skin is warm and dry.   Psychiatric: She has a normal mood and affect. Her behavior is normal.   Nursing note and vitals reviewed.              Assessment/Plan:     1. Chronic pain syndrome  We will have her continue to use her medications as directed.  An order for you tires, new " battery break repair have been sent to her [facility].  We will continue to follow.  - Misc. Devices Misc; Pt requesting new battery, new tires and new brakes for power chair  Dispense: 1 Device; Refill: 0    2. Failure of total knee replacement, subsequent encounter  See above plan.  - Misc. Devices Misc; Pt requesting new battery, new tires and new brakes for power chair  Dispense: 1 Device; Refill: 0    3. Decreased activities of daily living (ADL)  See above plan.  - Misc. Devices Misc; Pt requesting new battery, new tires and new brakes for power chair  Dispense: 1 Device; Refill: 0    4. Essential hypertension  We will have her start lisinopril as directed. She has been advised to monitor blood pressure at home and keep notes. If blood pressure elevated or having symptoms of CP, SOB or neurologic changes to go to the er.      5. Bipolar disorder  We will have her continue to take her medications as directed by her mental health providers.  We will continue to follow.    6. Abdominal obesity and metabolic syndrome  Blood work has been ordered to recheck her metabolic function as well as thyroid function and lipid panel.  We will have her continue to take her medications as previously directed.  We will continue to follow.  - TSH WITH REFLEX TO FT4; Future  - Comp Metabolic Panel; Future  - Lipid Profile; Future  - CBC WITH DIFFERENTIAL; Future  - VITAMIN D,25 HYDROXY; Future    7. Hyperlipidemia, unspecified hyperlipidemia type  We will have her continue to take her medication as directed. She has been advised to increase the fibers in his diet, avoid fatty/fried foods and try fish oil supplements if not allergic to seafood. Also advised to exercise as tolerated.     - Lipid Profile; Future

## 2019-02-13 ENCOUNTER — TELEPHONE (OUTPATIENT)
Dept: VASCULAR LAB | Facility: MEDICAL CENTER | Age: 69
End: 2019-02-13

## 2019-02-13 NOTE — TELEPHONE ENCOUNTER
[facility]    Pt's home INR monitor is no longer working.  She is contacting the supplier for the machine.  Scheduled pt to be seen in the clinic.      Bon Ash, RodriguezD

## 2019-02-14 ENCOUNTER — APPOINTMENT (OUTPATIENT)
Dept: PULMONOLOGY | Facility: HOSPICE | Age: 69
End: 2019-02-14
Payer: MEDICARE

## 2019-02-19 RX ORDER — ERGOCALCIFEROL 1.25 MG/1
50000 CAPSULE ORAL
Qty: 3 CAP | Refills: 0 | Status: SHIPPED | OUTPATIENT
Start: 2019-02-19 | End: 2019-06-25 | Stop reason: SDUPTHER

## 2019-02-20 ENCOUNTER — ANTICOAGULATION VISIT (OUTPATIENT)
Dept: VASCULAR LAB | Facility: MEDICAL CENTER | Age: 69
End: 2019-02-20
Attending: INTERNAL MEDICINE
Payer: MEDICARE

## 2019-02-20 DIAGNOSIS — Z86.718 HISTORY OF DVT (DEEP VEIN THROMBOSIS): ICD-10-CM

## 2019-02-20 DIAGNOSIS — Z79.01 CHRONIC ANTICOAGULATION: ICD-10-CM

## 2019-02-20 DIAGNOSIS — Z95.828 S/P IVC FILTER: ICD-10-CM

## 2019-02-20 LAB
INR BLD: 1.4 (ref 0.9–1.2)
INR PPP: 1.4 (ref 2–3.5)

## 2019-02-20 PROCEDURE — 85610 PROTHROMBIN TIME: CPT

## 2019-02-20 PROCEDURE — 99212 OFFICE O/P EST SF 10 MIN: CPT | Performed by: PHARMACIST

## 2019-02-20 NOTE — PROGRESS NOTES
"Anticoagulation Summary  As of 2019    INR goal:   2.0-3.0   TTR:   36.6 % (3.4 y)   INR used for dosin.4! (2019)   Warfarin maintenance plan:   10 mg (10 mg x 1) every Mon, Wed, Fri; 12.5 mg (10 mg x 1 and 5 mg x 0.5) all other days   Weekly warfarin total:   80 mg   Plan last modified:   Bon Ash, PharmD (2018)   Next INR check:   2019   Priority:   Routine   Target end date:   Indefinite    Indications    DVT (deep venous thrombosis) (HCC) (Resolved) [I82.409]  Chronic anticoagulation [Z79.01]  S/P IVC filter [Z95.828]  History of DVT (deep vein thrombosis) [Z86.718]             Anticoagulation Episode Summary     [facility]   [facility]    Preferred lab:   [facility]    Send INR reminders to:       Comments:   MD INR      Anticoagulation Care Providers     Provider Role Specialty Phone number    Carmelo Pat M.D. Referring Family Medicine [phone]    [facility] Anticoagulation Services Responsible  [phone]        Anticoagulation Patient Findings  Patient Findings     Positives:   Change in diet/appetite    Negatives:   Signs/symptoms of thrombosis, Signs/symptoms of bleeding, Laboratory test error suspected, Change in health, Change in alcohol use, Change in activity, Upcoming invasive procedure, Emergency department visit, Upcoming dental procedure, Missed doses, Extra doses, Change in medications, Hospital admission, Bruising, Other complaints    Comments:   Diet inconsistent, eating \"avocado a day on occasion\"          HPI:  Atiya Henley seen in clinic today, on anticoagulation therapy with warfarin for blood clot prevention for hx of multiple VTE events.  Changes to current medical/health status since last appt: Diet has been inconsistent, sounds like large amounts of greens on occasion  Denies signs/symptoms of bleeding and/or thrombosis since the last appt.    Denies any interval changes to diet  Denies any interval changes to medications since " "last appt.   Denies any complications or cost restrictions with current therapy.   BP recorded in vitals.  Taking lower dose than documented in calendar.    A/P   INR  sub-therapeutic at 1.4.  Home meter \"malfunctioning\", there will have patient to clinic going forward.   Instructed patient to bolus with 15mg X 2, then increase weekly warfarin regimen to previous documented dose.    Follow up appointment in 1 week(s).    Nahum Bowens, PharmD           "

## 2019-03-05 DIAGNOSIS — J44.89 CHRONIC OBSTRUCTIVE ASTHMA: ICD-10-CM

## 2019-03-05 RX ORDER — TIOTROPIUM BROMIDE INHALATION SPRAY 3.12 UG/1
SPRAY, METERED RESPIRATORY (INHALATION)
Qty: 1 INHALER | Refills: 5 | Status: SHIPPED | OUTPATIENT
Start: 2019-03-05 | End: 2019-04-03 | Stop reason: SDUPTHER

## 2019-03-05 NOTE — TELEPHONE ENCOUNTER
Have we ever prescribed this med? Yes.  If yes, what date? 10/16/2018    Last OV: 06/14/2018    Next OV: 04/03/2019    DX: Chronic obstructive asthma (HCC) (J44.9)    Medications: Tiotropium Bromide Monohydrate (SPIRIVA RESPIMAT) 2.5 MCG/ACT Aero Soln [[id number]]

## 2019-03-07 ENCOUNTER — TELEPHONE (OUTPATIENT)
Dept: MEDICAL GROUP | Facility: MEDICAL CENTER | Age: 69
End: 2019-03-07

## 2019-03-07 DIAGNOSIS — K94.09 COLOSTOMY FISTULA (HCC): ICD-10-CM

## 2019-03-07 DIAGNOSIS — K91.1 DUMPING SYNDROME: ICD-10-CM

## 2019-03-07 DIAGNOSIS — Z78.9 DECREASED ACTIVITIES OF DAILY LIVING (ADL): ICD-10-CM

## 2019-03-07 NOTE — TELEPHONE ENCOUNTER
1. Caller Name: Atiya Henley                                         Call Back Number: [phone] (home)         Patient approves a detailed voicemail message: yes    Patient called and stated that she is in need of Ostomy supplies and needs an order faxed to [facility] asap. Patient states that they need clinical notes mentioning that her ostomy is nonreversible and there have been no changes.     Patient needs 3 boxes of pouches, 3 boxes of wafers and 2 tubes of paste.

## 2019-03-07 NOTE — TELEPHONE ENCOUNTER
Phone Number Called: [phone] (home)       Message: Called patient and left message asking her to call back asap. We do not have any recent clinical notes talking about her ostomy. Patient needs to schedule an appointment so that we can document and send a new order to [facility].     Left Message for patient to call back: yes

## 2019-03-08 ENCOUNTER — ANTICOAGULATION MONITORING (OUTPATIENT)
Dept: VASCULAR LAB | Facility: MEDICAL CENTER | Age: 69
End: 2019-03-08

## 2019-03-08 DIAGNOSIS — Z86.718 HISTORY OF DVT (DEEP VEIN THROMBOSIS): ICD-10-CM

## 2019-03-08 DIAGNOSIS — Z95.828 S/P IVC FILTER: ICD-10-CM

## 2019-03-08 DIAGNOSIS — Z79.01 CHRONIC ANTICOAGULATION: ICD-10-CM

## 2019-03-08 LAB — INR PPP: 3.7 (ref 2–3.5)

## 2019-03-08 NOTE — PROGRESS NOTES
Anticoagulation Summary  As of 3/8/2019    INR goal:   2.0-3.0   TTR:   36.7 % (3.5 y)   INR used for dosing:   3.7! (3/8/2019)   Warfarin maintenance plan:   10 mg (10 mg x 1) every Mon, Wed, Fri; 12.5 mg (10 mg x 1 and 5 mg x 0.5) all other days   Weekly warfarin total:   80 mg   Plan last modified:   Rodriguez MartinezD (6/7/2018)   Next INR check:   3/15/2019   Priority:   Routine   Target end date:   Indefinite    Indications    DVT (deep venous thrombosis) (HCC) (Resolved) [I82.409]  Chronic anticoagulation [Z79.01]  S/P IVC filter [Z95.828]  History of DVT (deep vein thrombosis) [Z86.718]             Anticoagulation Episode Summary     [facility] check location:   [facility]    Preferred lab:   "[facility] GENERAL    Send INR reminders to:       Comments:   MD INR      Anticoagulation Care Providers     Provider Role Specialty Phone number    Carmelo Pat M.D. Referring [facility] Medicine [phone]    [facility] Anticoagulation Services Responsible  [phone]        Anticoagulation Patient Findings    Left voicemail message to report a supratherapeutic INR of 3.7.  Requested pt contact the clinic for any s/s of unusual bleeding, bruising, clotting or any changes to diet or medication.   Instructed patient to decrease today's dose to 5mg, then resume current warfarin regimen.  FU 1 weeks.  Nahum Bowens, RodriguezD

## 2019-03-11 ENCOUNTER — OFFICE VISIT (OUTPATIENT)
Dept: MEDICAL GROUP | Facility: MEDICAL CENTER | Age: 69
End: 2019-03-11
Attending: FAMILY MEDICINE
Payer: MEDICARE

## 2019-03-11 VITALS
HEART RATE: 66 BPM | OXYGEN SATURATION: 100 % | HEIGHT: 63 IN | SYSTOLIC BLOOD PRESSURE: 126 MMHG | TEMPERATURE: 98.3 F | DIASTOLIC BLOOD PRESSURE: 82 MMHG | WEIGHT: 259 LBS | BODY MASS INDEX: 45.89 KG/M2 | RESPIRATION RATE: 18 BRPM

## 2019-03-11 DIAGNOSIS — K91.1 DUMPING SYNDROME: ICD-10-CM

## 2019-03-11 DIAGNOSIS — Z98.890 HISTORY OF HERNIA REPAIR: ICD-10-CM

## 2019-03-11 DIAGNOSIS — Z87.19 HISTORY OF HERNIA REPAIR: ICD-10-CM

## 2019-03-11 DIAGNOSIS — Z78.9 DECREASED ACTIVITIES OF DAILY LIVING (ADL): ICD-10-CM

## 2019-03-11 DIAGNOSIS — K94.09 COLOSTOMY FISTULA (HCC): ICD-10-CM

## 2019-03-11 PROCEDURE — 99213 OFFICE O/P EST LOW 20 MIN: CPT | Performed by: FAMILY MEDICINE

## 2019-03-11 PROCEDURE — 99214 OFFICE O/P EST MOD 30 MIN: CPT | Performed by: FAMILY MEDICINE

## 2019-03-11 RX ORDER — LORAZEPAM 1 MG/1
2 TABLET ORAL NIGHTLY PRN
COMMUNITY
Start: 2019-02-13 | End: 2022-02-07

## 2019-03-11 ASSESSMENT — ENCOUNTER SYMPTOMS
BACK PAIN: 1
FOCAL WEAKNESS: 1
SENSORY CHANGE: 0
SHORTNESS OF BREATH: 0
PALPITATIONS: 0
SPUTUM PRODUCTION: 0
DEPRESSION: 1
FEVER: 0
NAUSEA: 0
NERVOUS/ANXIOUS: 1
TINGLING: 1
VOMITING: 0
COUGH: 0
TREMORS: 0
HALLUCINATIONS: 0
CHILLS: 0
SPEECH CHANGE: 0

## 2019-03-11 ASSESSMENT — LIFESTYLE VARIABLES: SUBSTANCE_ABUSE: 0

## 2019-03-11 NOTE — PROGRESS NOTES
Subjective:      Atiya Henley is a 68 y.o. female who presents with GI Problem (colostmy supplies )            Patient 68-year-old female here for follow-up of a colostomy she has in place.    She has a colostomy on L side that is irreversible and will need to have her colostomy indefinitely due to past complications of her abdominal surgery.  She states that she was told that a significant amount of her bowel had been removed and will not be able to be fully attached.  She has been doing well with her current colostomy. She has dumping syndrome due to her shortened intestine.  Supplies for her colostomy has been ordered.  We will continue to follow.     Patient will be changing her pouch is on a daily basis, changing her wafers every other day, and using her supplies on a daily basis.    She has also been following up with her psychologist and psychiatrist as directed.  She has been doing well with her mental health management.  She is not having any urges to harm himself or others.  We will continue to follow.    She will also be receiving a new battery, tires and an armrest for her power wheelchair.  We will continue to follow.     Current medications, allergies, and problem list reviewed with patient and updated in EPIC.          Review of Systems   Constitutional: Negative for chills and fever.   HENT: Negative for hearing loss and tinnitus.    Respiratory: Negative for cough, sputum production and shortness of breath.    Cardiovascular: Negative for chest pain and palpitations.   Gastrointestinal: Negative for nausea and vomiting.   Musculoskeletal: Positive for back pain and joint pain.   Skin: Negative for rash.   Neurological: Positive for tingling and focal weakness. Negative for tremors, sensory change and speech change.   Psychiatric/Behavioral: Positive for depression. Negative for hallucinations, substance abuse and suicidal ideas. The patient is nervous/anxious.           Objective:     BP  "126/82 (BP Location: Other (Comment), Patient Position: Sitting, BP Cuff Size: Large adult) Comment (BP Location): right forearm  Pulse 66   Temp 36.8 °C (98.3 °F) (Temporal)   Resp 18   Ht 1.6 m (5' 3\")   Wt 117.5 kg (259 lb)   LMP 07/19/2011   SpO2 100%   BMI 45.88 kg/m²      Physical Exam   Constitutional: She is oriented to person, place, and time.   BMI 45.88   HENT:   Head: Normocephalic and atraumatic.   Neck: Normal range of motion. Neck supple.   Cardiovascular: Normal rate, regular rhythm and normal heart sounds.  Exam reveals no friction rub.    No murmur heard.  Pulmonary/Chest: Effort normal and breath sounds normal. No respiratory distress. She has no wheezes. She has no rales.   Abdominal: Soft. Bowel sounds are normal. She exhibits no distension. There is no tenderness.   Her stoma site is clean and moist with pink mucosa.   Musculoskeletal:   In power wheelchair   Neurological: She is alert and oriented to person, place, and time.   Skin: Skin is warm and dry.   Psychiatric: She has a normal mood and affect. Her behavior is normal.   Nursing note and vitals reviewed.              Assessment/Plan:     1. Dumping syndrome  In order for her colostomy supplies has been made.  We will have her continue to follow-up with her other specialists as directed.  We will continue to follow.    - Misc. Devices Misc; 3 boxes of pouches (98219), 2 boxes of wafers (51965), 3 boxes (tubes) paste, 1 box of no string skin prep, 1 box of no string unsolve  Dispense: 5 Container; Refill: 6    2. Colostomy fistula (HCC)  See above plan.    - Misc. Devices Misc; 3 boxes of pouches (79540), 2 boxes of wafers (00617), 3 boxes (tubes) paste, 1 box of no string skin prep, 1 box of no string unsolve  Dispense: 5 Container; Refill: 6    3. Decreased activities of daily living (ADL)  We will have her continue to use her supplies and wheelchair as directed.  We will continue to follow.    - Misc. Devices Misc; 3 boxes of " pouches (40081), 2 boxes of wafers (13117), 3 boxes (tubes) paste, 1 box of no string skin prep, 1 box of no string unsolve  Dispense: 5 Container; Refill: 6    4. History of hernia repair  See above plan.

## 2019-03-15 ENCOUNTER — ANTICOAGULATION MONITORING (OUTPATIENT)
Dept: VASCULAR LAB | Facility: MEDICAL CENTER | Age: 69
End: 2019-03-15

## 2019-03-15 DIAGNOSIS — Z95.828 S/P IVC FILTER: ICD-10-CM

## 2019-03-15 DIAGNOSIS — Z86.718 HISTORY OF DVT (DEEP VEIN THROMBOSIS): ICD-10-CM

## 2019-03-15 DIAGNOSIS — Z79.01 CHRONIC ANTICOAGULATION: ICD-10-CM

## 2019-03-15 LAB — INR PPP: 3.5 (ref 2–3.5)

## 2019-03-15 NOTE — PROGRESS NOTES
Anticoagulation Summary  As of 3/15/2019    INR goal:   2.0-3.0   TTR:   36.5 % (3.5 y)   INR used for dosing:   3.5! (3/15/2019)   Warfarin maintenance plan:   12.5 mg (10 mg x 1 and 5 mg x 0.5) every Tue, Thu; 10 mg (10 mg x 1) all other days   Weekly warfarin total:   75 mg   Plan last modified:   Rodriguez FeltonD (3/15/2019)   Next INR check:   3/22/2019   Priority:   Routine   Target end date:   Indefinite    Indications    DVT (deep venous thrombosis) (HCC) (Resolved) [I82.409]  Chronic anticoagulation [Z79.01]  S/P IVC filter [Z95.828]  History of DVT (deep vein thrombosis) [Z86.718]             Anticoagulation Episode Summary     [facility]   [facility]    Preferred lab:   [facility] GENERAL    Send INR reminders to:       Comments:   MD INR      Anticoagulation Care Providers     Provider Role Specialty Phone number    Carmelo Pat M.D. Referring [facility] Medicine [phone]    [facility] Anticoagulation Services Responsible  [phone]        Anticoagulation Patient Findings      Left voicemail message to report a SUPRA therapeutic INR of 3.5.    Will have pt take a decreased dose of warfarin today of 5 mg and then   Pt to start a 6% reduced warfarin dosing regimen.  Requested pt contact the clinic for any s/s of unusual bleeding, bruising, clotting or any changes to diet or medication.    FU INR in 1 week(s).    Porsche Bello, PharmD

## 2019-03-27 ENCOUNTER — APPOINTMENT (OUTPATIENT)
Dept: RADIOLOGY | Facility: MEDICAL CENTER | Age: 69
End: 2019-03-27
Attending: OTOLARYNGOLOGY
Payer: MEDICARE

## 2019-03-28 ENCOUNTER — HOSPITAL ENCOUNTER (OUTPATIENT)
Dept: RADIOLOGY | Facility: MEDICAL CENTER | Age: 69
End: 2019-03-28
Attending: OTOLARYNGOLOGY
Payer: MEDICARE

## 2019-03-28 ENCOUNTER — HOSPITAL ENCOUNTER (OUTPATIENT)
Dept: LAB | Facility: MEDICAL CENTER | Age: 69
End: 2019-03-28
Attending: NURSE PRACTITIONER
Payer: MEDICARE

## 2019-03-28 ENCOUNTER — HOSPITAL ENCOUNTER (OUTPATIENT)
Dept: LAB | Facility: MEDICAL CENTER | Age: 69
End: 2019-03-28
Attending: FAMILY MEDICINE
Payer: MEDICARE

## 2019-03-28 DIAGNOSIS — E78.5 HYPERLIPIDEMIA, UNSPECIFIED HYPERLIPIDEMIA TYPE: ICD-10-CM

## 2019-03-28 DIAGNOSIS — E88.810 ABDOMINAL OBESITY AND METABOLIC SYNDROME: ICD-10-CM

## 2019-03-28 DIAGNOSIS — E66.9 ABDOMINAL OBESITY AND METABOLIC SYNDROME: ICD-10-CM

## 2019-03-28 DIAGNOSIS — H90.6 MIXED CONDUCTIVE AND SENSORINEURAL HEARING LOSS, BILATERAL: ICD-10-CM

## 2019-03-28 LAB
25(OH)D3 SERPL-MCNC: 21 NG/ML (ref 30–100)
ALBUMIN SERPL BCP-MCNC: 3.6 G/DL (ref 3.2–4.9)
ALBUMIN SERPL BCP-MCNC: 3.7 G/DL (ref 3.2–4.9)
ALBUMIN/GLOB SERPL: 1.1 G/DL
ALBUMIN/GLOB SERPL: 1.2 G/DL
ALP SERPL-CCNC: 89 U/L (ref 30–99)
ALP SERPL-CCNC: 89 U/L (ref 30–99)
ALT SERPL-CCNC: 11 U/L (ref 2–50)
ALT SERPL-CCNC: 12 U/L (ref 2–50)
ANION GAP SERPL CALC-SCNC: 2 MMOL/L (ref 0–11.9)
ANION GAP SERPL CALC-SCNC: 4 MMOL/L (ref 0–11.9)
AST SERPL-CCNC: 15 U/L (ref 12–45)
AST SERPL-CCNC: 16 U/L (ref 12–45)
BASOPHILS # BLD AUTO: 0.9 % (ref 0–1.8)
BASOPHILS # BLD: 0.05 K/UL (ref 0–0.12)
BILIRUB SERPL-MCNC: 0.2 MG/DL (ref 0.1–1.5)
BILIRUB SERPL-MCNC: 0.3 MG/DL (ref 0.1–1.5)
BUN SERPL-MCNC: 9 MG/DL (ref 8–22)
BUN SERPL-MCNC: 9 MG/DL (ref 8–22)
CALCIUM SERPL-MCNC: 8.4 MG/DL (ref 8.5–10.5)
CALCIUM SERPL-MCNC: 8.4 MG/DL (ref 8.5–10.5)
CHLORIDE SERPL-SCNC: 106 MMOL/L (ref 96–112)
CHLORIDE SERPL-SCNC: 106 MMOL/L (ref 96–112)
CHOLEST SERPL-MCNC: 185 MG/DL (ref 100–199)
CHOLEST SERPL-MCNC: 191 MG/DL (ref 100–199)
CO2 SERPL-SCNC: 30 MMOL/L (ref 20–33)
CO2 SERPL-SCNC: 31 MMOL/L (ref 20–33)
CREAT SERPL-MCNC: 0.67 MG/DL (ref 0.5–1.4)
CREAT SERPL-MCNC: 0.72 MG/DL (ref 0.5–1.4)
EOSINOPHIL # BLD AUTO: 0.21 K/UL (ref 0–0.51)
EOSINOPHIL NFR BLD: 3.9 % (ref 0–6.9)
ERYTHROCYTE [DISTWIDTH] IN BLOOD BY AUTOMATED COUNT: 54.5 FL (ref 35.9–50)
FASTING STATUS PATIENT QL REPORTED: NORMAL
GLOBULIN SER CALC-MCNC: 3.2 G/DL (ref 1.9–3.5)
GLOBULIN SER CALC-MCNC: 3.2 G/DL (ref 1.9–3.5)
GLUCOSE SERPL-MCNC: 104 MG/DL (ref 65–99)
GLUCOSE SERPL-MCNC: 105 MG/DL (ref 65–99)
HCT VFR BLD AUTO: 41.2 % (ref 37–47)
HDLC SERPL-MCNC: 54 MG/DL
HDLC SERPL-MCNC: 55 MG/DL
HGB BLD-MCNC: 12.8 G/DL (ref 12–16)
IMM GRANULOCYTES # BLD AUTO: 0.02 K/UL (ref 0–0.11)
IMM GRANULOCYTES NFR BLD AUTO: 0.4 % (ref 0–0.9)
LDLC SERPL CALC-MCNC: 102 MG/DL
LDLC SERPL CALC-MCNC: 107 MG/DL
LYMPHOCYTES # BLD AUTO: 1.78 K/UL (ref 1–4.8)
LYMPHOCYTES NFR BLD: 33.1 % (ref 22–41)
MCH RBC QN AUTO: 31.9 PG (ref 27–33)
MCHC RBC AUTO-ENTMCNC: 31.1 G/DL (ref 33.6–35)
MCV RBC AUTO: 102.7 FL (ref 81.4–97.8)
MONOCYTES # BLD AUTO: 0.29 K/UL (ref 0–0.85)
MONOCYTES NFR BLD AUTO: 5.4 % (ref 0–13.4)
NEUTROPHILS # BLD AUTO: 3.02 K/UL (ref 2–7.15)
NEUTROPHILS NFR BLD: 56.3 % (ref 44–72)
NRBC # BLD AUTO: 0 K/UL
NRBC BLD-RTO: 0 /100 WBC
PHOSPHATE SERPL-MCNC: 2.8 MG/DL (ref 2.5–4.5)
PLATELET # BLD AUTO: 215 K/UL (ref 164–446)
PMV BLD AUTO: 10.5 FL (ref 9–12.9)
POTASSIUM SERPL-SCNC: 4.1 MMOL/L (ref 3.6–5.5)
POTASSIUM SERPL-SCNC: 4.2 MMOL/L (ref 3.6–5.5)
PROT SERPL-MCNC: 6.8 G/DL (ref 6–8.2)
PROT SERPL-MCNC: 6.9 G/DL (ref 6–8.2)
RBC # BLD AUTO: 4.01 M/UL (ref 4.2–5.4)
SODIUM SERPL-SCNC: 139 MMOL/L (ref 135–145)
SODIUM SERPL-SCNC: 140 MMOL/L (ref 135–145)
T4 FREE SERPL-MCNC: 0.46 NG/DL (ref 0.53–1.43)
T4 FREE SERPL-MCNC: 0.5 NG/DL (ref 0.53–1.43)
TRIGL SERPL-MCNC: 144 MG/DL (ref 0–149)
TRIGL SERPL-MCNC: 145 MG/DL (ref 0–149)
TSH SERPL DL<=0.005 MIU/L-ACNC: 45.18 UIU/ML (ref 0.38–5.33)
WBC # BLD AUTO: 5.4 K/UL (ref 4.8–10.8)

## 2019-03-28 PROCEDURE — 36415 COLL VENOUS BLD VENIPUNCTURE: CPT

## 2019-03-28 PROCEDURE — 84443 ASSAY THYROID STIM HORMONE: CPT

## 2019-03-28 PROCEDURE — 84439 ASSAY OF FREE THYROXINE: CPT | Mod: 91

## 2019-03-28 PROCEDURE — 80307 DRUG TEST PRSMV CHEM ANLYZR: CPT | Mod: GA

## 2019-03-28 PROCEDURE — 82306 VITAMIN D 25 HYDROXY: CPT | Mod: GA

## 2019-03-28 PROCEDURE — 80053 COMPREHEN METABOLIC PANEL: CPT

## 2019-03-28 PROCEDURE — 84439 ASSAY OF FREE THYROXINE: CPT

## 2019-03-28 PROCEDURE — 85025 COMPLETE CBC W/AUTO DIFF WBC: CPT

## 2019-03-28 PROCEDURE — 80053 COMPREHEN METABOLIC PANEL: CPT | Mod: 91

## 2019-03-28 PROCEDURE — 80061 LIPID PANEL: CPT | Mod: GA

## 2019-03-28 PROCEDURE — 70480 CT ORBIT/EAR/FOSSA W/O DYE: CPT

## 2019-03-28 PROCEDURE — 83036 HEMOGLOBIN GLYCOSYLATED A1C: CPT | Mod: GA

## 2019-03-28 PROCEDURE — 84100 ASSAY OF PHOSPHORUS: CPT

## 2019-03-28 PROCEDURE — 80061 LIPID PANEL: CPT | Mod: 91

## 2019-03-29 LAB
EST. AVERAGE GLUCOSE BLD GHB EST-MCNC: 126 MG/DL
HBA1C MFR BLD: 6 % (ref 0–5.6)

## 2019-03-31 LAB
AMPHETAMINES UR QL: NEGATIVE NG/ML
BARBITURATES UR QL: NEGATIVE NG/ML
BENZODIAZ UR QL: NEGATIVE NG/ML
CANNABINOIDS UR QL SCN: POSITIVE NG/ML
COCAINE UR QL: NEGATIVE NG/ML
DRUG SCREEN COMMENT UR-IMP: NORMAL
MDMA CTO UR SCN-MCNC: NEGATIVE NG/ML
METHADONE UR QL: NEGATIVE NG/ML
OPIATES UR QL: NEGATIVE NG/ML
OXYCODONE CTO UR SCN-MCNC: NEGATIVE NG/ML
PCP UR QL SCN: NEGATIVE NG/ML
PROPOXYPH UR QL: NEGATIVE NG/ML

## 2019-04-03 ENCOUNTER — OFFICE VISIT (OUTPATIENT)
Dept: PULMONOLOGY | Facility: HOSPICE | Age: 69
End: 2019-04-03
Payer: MEDICARE

## 2019-04-03 VITALS
RESPIRATION RATE: 16 BRPM | HEIGHT: 63 IN | BODY MASS INDEX: 45.89 KG/M2 | DIASTOLIC BLOOD PRESSURE: 72 MMHG | HEART RATE: 68 BPM | SYSTOLIC BLOOD PRESSURE: 130 MMHG | OXYGEN SATURATION: 90 % | WEIGHT: 259 LBS

## 2019-04-03 DIAGNOSIS — J44.89 CHRONIC OBSTRUCTIVE ASTHMA: ICD-10-CM

## 2019-04-03 DIAGNOSIS — Z72.0 TOBACCO ABUSE: ICD-10-CM

## 2019-04-03 DIAGNOSIS — R13.10 DYSPHAGIA, UNSPECIFIED TYPE: ICD-10-CM

## 2019-04-03 DIAGNOSIS — G47.33 OSA (OBSTRUCTIVE SLEEP APNEA): ICD-10-CM

## 2019-04-03 DIAGNOSIS — R93.89 ABNORMAL CT OF THE CHEST: ICD-10-CM

## 2019-04-03 DIAGNOSIS — J44.9 CHRONIC OBSTRUCTIVE PULMONARY DISEASE, UNSPECIFIED COPD TYPE (HCC): ICD-10-CM

## 2019-04-03 DIAGNOSIS — Z87.01 HISTORY OF PNEUMONIA: ICD-10-CM

## 2019-04-03 DIAGNOSIS — J30.9 ALLERGIC RHINITIS, UNSPECIFIED SEASONALITY, UNSPECIFIED TRIGGER: ICD-10-CM

## 2019-04-03 DIAGNOSIS — R09.02 HYPOXEMIA: ICD-10-CM

## 2019-04-03 PROCEDURE — 99214 OFFICE O/P EST MOD 30 MIN: CPT | Performed by: NURSE PRACTITIONER

## 2019-04-03 RX ORDER — ALBUTEROL SULFATE 90 UG/1
2 AEROSOL, METERED RESPIRATORY (INHALATION) EVERY 4 HOURS PRN
Qty: 3 INHALER | Refills: 3 | Status: SHIPPED | OUTPATIENT
Start: 2019-04-03 | End: 2019-10-30 | Stop reason: SDUPTHER

## 2019-04-03 RX ORDER — BUDESONIDE AND FORMOTEROL FUMARATE DIHYDRATE 160; 4.5 UG/1; UG/1
2 AEROSOL RESPIRATORY (INHALATION) 2 TIMES DAILY
Qty: 3 INHALER | Refills: 3 | Status: SHIPPED | OUTPATIENT
Start: 2019-04-03 | End: 2020-10-19 | Stop reason: SDUPTHER

## 2019-04-03 NOTE — PROGRESS NOTES
Chief Complaint   Patient presents with   • Results     CT// SWALLOW STUDY        HPI:  Atiya Henley is a 68 y.o. year old female here today for follow-up on her chronic obstructive Asthma, YESSICA and Abnormal CT scan of the chest. She was hospitalized in 4/5-4/11/2018 for community acquired pneumonia and a DVT. She was discharged on Coumadin. She states she was treated in hospital with antibiotics, but was not discharged on antibiotics.   Chest xray 4/5/2018 indicated; Hazy pulmonary opacities suggests edema or infiltrate. Atherosclerosis.      She was referred to lung cancer screening. She had a CT chest 9/5/2017 which indicated;  Diffuse interstitial and groundglass opacities both lungs especially within the upper lobe similar to previous findings. Consistent with mild pneumonitis.  Left lower lobe atelectasis similar to previous findings. No pleural effusion.  No focal mass identified.  She is recommended annual imaging.      CT chest was obtained in hospital 5/26/2018 and indicates;  1.  Increasing opacity in the left lower lobe consistent with worsening left lower lobe atelectasis or pneumonia.  2.  Stable reticular nodular interstitial opacity in each upper lobe which represent chronic interstitial lung disease.  3.  No adenopathy or pleural effusion.  4.  Cholecystectomy.    Chest xray at her last office visit indicated;   Diffuse interstitial prominence could relate to chronic changes. Superimposed edema would be difficult to exclude.  Stable cardiomegaly.    There was questions about chronic aspiration at her last office visit. She noted occasional dysphagia. She noted occasional coughing after drinking. She had seen speech therapy in 2009. She was sent for a repeat swallow study after her last office visit in June 2018. She saw speech pathology 10/9/2018. Results of the swallow study indicated it was difficult to completely rule out aspiration due to lack of ideal image capture due to patients large  body habitus. There is suspicion that Pneumonia may be secondary to aspiration and partially due to poor positioning during po intake at home. Pt was recommended sitting upright at 90 degrees for eating an drinking.     Repeat CT imaging was completed 12/28/2018 and indicates;  1.  Interval improvement of LEFT lower lobe atelectasis.  2.  Bilateral reticulonodular interstitial opacities again seen consistent with chronic interstitial lung disease, possibly granulomatous disease.  3.  Interval improvement of patchy apical groundglass opacity suggesting resolving pneumonitis.      Echo 4/8/2018 indicated;   Technically difficult due to body habitus, positioning and pt unable to   assume left lateral decubitus but adequate study for interpretation.   Normal left ventricular wall thickness.  Left ventricular ejection fraction is visually estimated to be 60%.  Mild tricuspid regurgitation.  Right ventricular systolic pressure is estimated to be 32 mmHg with an   estimated RAP of 3 mmHg.     In regards to her Obstructive Sleep Apnea she is compliant on CPAP 9 CM H20 with 3 LPM 02 bleed in. She had been followed by Dr. Hodge in the past, but he retired. She did not bring her compliance chip to her last office visit. However, she felt the pressure was too low and she was waking un refreshed. She underwent a dedicated in lab CPAP titration study in July which indicates adequate titration CPAP of 14 CM H20 with a resultant AHI of 1, mean 02 saturation of 87.5%. She was increased to CPAP of 14 CM H20 with 3 LPM 02 bleed in. Compliance card download at her last office visit indicated an AHI of 1 with an average use of 4-5 hours at night. She did not bring her chip to today's office visit. She tolerates the pressure well. She is using her machine nightly. She does feel she sleeps better on therapy and wakes more refreshed. She denies any morning headaches.      She is on oxygen at 3 LPM with exertion. PFT's 5/22/2017 indicated  an FEV1 of 1.42 L, 63% predicted with an FEV1/FVC ratio of 84 with a TLC of 78% and a DLCO of 97% predicted. Prior PFT's indicated an FEV1 of 1.55 L, 67% predicted with an FEV1/FVC ratio of 84 with a TLC of 84% predicted with a DLCO of 88% predicted. She does continue to smoke cigarettes and is smoking 1/24-1/2 pack per day. She is working on [employer] back.    She does have a history of sinus drainage felt to be allergy triggered. She does have a Nasonex nasal spray which she uses as needed an feels helps.      She is compliant on Symbicort 160/4.5 mcg 2 puffs INH bid, along with a Combivent respimat and Proventil HFA. She was restarted on Spiriva respimat 2.5 mcg 2 puffs INH daily.      She feels her dyspnea is worse with exertion, but overall stable. She states she has been more active at home. She does have an intermittent cough worse in the morning which is productive with white mucous. She denies hemoptysis. She denies current wheezing. She denies any fevers or chills. She denies any recent respiratory infections.          Past Medical History:   Diagnosis Date   • Arthritis    • Bowel obstruction (HCC)    • Cancer (HCC)     cervical   • Chronic autoimmune thyroiditis     + TPO ab = 563 / + ultrasound   • Colostomy in place (HCC) 2009   • COPD    • Dental disorder     dentures upper   • Diabetes     type 2    • EMPHYSEMA    • Hashimoto's disease    • History of total knee arthroplasty     infection   • Hypertension     stated no longer on meds due to lost 100 lbs   • Hypothyroid 7/7/2009   • Indigestion    • Infectious disease     MRSA,   • Infectious disease     VRE   • Obesity 7/7/2009   • Obstruction     colostomy   • Oxygen dependent     4.5 L NC   • Pain     b/l legs   • Personal history of venous thrombosis and embolism 2009, 2012    arm and leg left side   • Presence of IVC filter    • Psychiatric problem     depression   • Sleep apnea     USES CPAP, 3-4L oxygen   • Snoring    • Status post  cholecystectomy 1992   • Weight gain        Past Surgical History:   Procedure Laterality Date   • PB INJECT RX OTHER PERIPH NERVE Left 8/21/2015    Procedure: NEUROLYTIC DEST-OTHER NERVE;  Surgeon: Kd Galaviz D.O.;  Location: [facility];  Service: Pain Management   • PB FLUOROSCOPIC GUIDANCE NEEDLE PLACEMENT Left 8/21/2015    Procedure: FLOURO GUIDE NEEDLE PLACEMENT;  Surgeon: Kd Galaviz D.O.;  Location: [facility];  Service: Pain Management   • PB INJECT RX OTHER PERIPH NERVE  8/21/2015    Procedure: NEUROLYTIC DEST-OTHER NERVE;  Surgeon: Kd Galaviz D.O.;  Location: [facility];  Service: Pain Management   • PB INJECT RX OTHER PERIPH NERVE  8/21/2015    Procedure: NEUROLYTIC DEST-OTHER NERVE;  Surgeon: Kd Galaviz D.O.;  Location: [facility];  Service: Pain Management   • EXPLORATORY LAPAROTOMY  12/10/2013    Performed by Sang Castañeda M.D. at [facility]   • WOUND CLOSURE GENERAL  12/10/2013    Performed by Sang Castañeda M.D. at [facility]   • COLOSTOMY  11/26/2013    Performed by Maninder Carter M.D. at [facility]   • EXPLORATORY LAPAROTOMY  11/26/2013    Performed by Maninder Carter M.D. at [facility]   • LYSIS ADHESIONS GENERAL  11/26/2013    Performed by Maninder Carter M.D. at [facility]   • COLON RESECTION  11/26/2013    Performed by Maninder Carter M.D. at [facility]   • BLADDER BIOPSY WITH CYSTOSCOPY  10/10/2013    Performed by Sang Castañeda M.D. at [facility]   • EXPLORATORY LAPAROTOMY  9/2/2013    Performed by Maninder Carter M.D. at [facility]   • PARASTOMAL HERNIA REPAIR   9/2/2013    Performed by Maninder Carter M.D. at [facility]   • IRRIGATION & DEBRIDEMENT ORTHO  2/8/2013    Performed by David Fowler M.D. at [facility]   • IRRIGATION & DEBRIDEMENT GENERAL  12/17/2012     Performed by David Fowler M.D. at SURGERY [facility]   • IRRIGATION & DEBRIDEMENT ORTHO  7/19/2012    Performed by BERYL LOYD at [facility]   • IRRIGATION & DEBRIDEMENT ORTHO  7/17/2012    Performed by BERYL LOYD at [facility]   • KNEE ARTHROPLASTY TOTAL  6/10/2012    left   • LATERAL RELEASE  7/20/2011    Performed by BERYL LOYD at [facility]   • KNEE ARTHROSCOPY  7/20/2011    Performed by BERYL LOYD at [facility]   • MEDIAL MENISCECTOMY  7/20/2011    Performed by BERYL LOYD at [facility]   • MENISCECTOMY  7/20/2011    Performed by BERYL LOYD at [facility]   • COLONOSCOPY - ENDO  11/17/2010    Performed by JESUSITA SUMNER at [facility]OS[facility]   • TRACHEOSTOMY  3/31/2010    Performed by JESUSITA SUMNER at SURGERY [facility]   • COLOSTOMY  3/16/2010    Performed by CHRISTA BENNETT at SURGERY [facility]   • EXPLORATORY LAPAROTOMY  3/16/2010    Performed by CHRISTA BENNETT at SURGERY [facility]   • COLECTOMY  3/16/2010    Performed by CHRISTA BENNETT at SURGERY [facility]   • LOW ANTERIOR RESECTION  3/10/2010    Performed by JESUSITA SUMNER at SURGERY [facility]   • LOW ANTERIOR RESECTION LAPAROSCOPIC  3/10/2010    Performed by JESUSITA SUMNER at SURGERY [facility]   • MYRINGOTOMY  9/9/2009    Performed by RENETTA GOODSON at SURGERY [facility]   • CHOLECYSTECTOMY  1992    laparoscopic   • HYSTERECTOMY LAPAROSCOPY  1983   • OTHER ORTHOPEDIC SURGERY      LT KNEE X2       Family History   Problem Relation Age of Onset   • Heart Disease Mother    • Seizures Mother    • Alcohol/Drug Father    • Lung Disease Father    • Hypertension Unknown        Social History     Social History   • Marital status:      Spouse name: N/A   • Number of children: N/A   • Years of education: N/A     Occupational History   • Not on file.      Social History Main Topics   • Smoking status: Current Every Day Smoker     Packs/day: 0.25     Years: 50.00     Types: Cigarettes   • Smokeless tobacco: Current User      Comment: 1 ppd, 45 yrs   • Alcohol use No   • Drug use: Yes     Types: Marijuana      Comment: 1x per month   • Sexual activity: No     Other Topics Concern   •  Service No   • Blood Transfusions No     unknown   • Caffeine Concern No   • Occupational Exposure No   • Hobby Hazards No   • Sleep Concern Yes     sleep apnea   • Stress Concern Yes   • Weight Concern No   • Special Diet Yes   • Back Care Yes   • Exercise No   • Bike Helmet No     does not ride bike    • Seat Belt Yes   • Self-Exams Yes     Social History Narrative   • No narrative on file     ROS:  Constitutional: Denies fevers, chills, sweats,  weight loss  Eyes: Denies glasses, vision loss, pain, drainage, double vision  Ears/Nose/Mouth/Throat: Denies ear ache, difficulty hearing, sore throat, persistent hoarseness, decayed teeth/toothache  Cardiovascular: Denies chest pain, tightness, palpitations, swelling in feet/legs, fainting, difficulty breathing when laying down  Respiratory: See HPI   GI: Denies heartburn, nausea, vomiting, abdominal pain, diarrhea, constipation  : Denies frequent urination, painful urination  Integumentary: Denies rashes, lumps or color changes  MSK: Positive for chronic knee pain   Neurological: Denies frequent headaches, dizziness, weakness  Sleep: See HPI       Current Outpatient Prescriptions   Medication Sig Dispense Refill   • Incontinence Supply Disposable (FQ PROTECTIVE UNDERWEAR) Misc USE 1 PULLUP 3 TIMES DAILY 90 Each 0   • LORazepam (ATIVAN) 1 MG Tab      • SPIRIVA RESPIMAT 2.5 MCG/ACT Aero Soln INHALE TWO PUFFS BY MOUTH EVERY DAY 1 Inhaler 5   • vitamin D, Ergocalciferol, (DRISDOL) 51722 units Cap capsule Take 1 Cap by mouth every 28 days. 3 Cap 0   • lisinopril (PRINIVIL) 5 MG Tab Take 1 Tab by mouth every day. 30 Tab 3   •  raNITidine (ZANTAC) 150 MG Tab Take 1 Tab by mouth 2 times a day. 60 Tab 0   • budesonide-formoterol (SYMBICORT) 160-4.5 MCG/ACT Aerosol Inhale 2 Puffs by mouth 2 Times a Day. With spacer, rinse mouth after use 1 Inhaler 11   • levothyroxine (SYNTHROID) 200 MCG Tab TAKE ONE TABLET BY MOUTH EVERY MORNING ON AN EMPTY STOMACH 30 Tab 0   • gabapentin (NEURONTIN) 300 MG Cap TAKE THREE CAPSULES BY MOUTH THREE TIMES A  Cap 0   • levothyroxine (SYNTHROID) 25 MCG Tab      • albuterol (VENTOLIN HFA) 108 (90 Base) MCG/ACT Aero Soln inhalation aerosol Inhale 2 Puffs by mouth every four hours as needed for Shortness of Breath. 1 Inhaler 11   • warfarin (COUMADIN) 10 MG Tab Take 1 Tab by mouth every evening. 10 mg on Mon, Wed, Fri ;12.5 mg all other days (10mg tab + 1/2 of 5mg Tab) 30 Tab 3   • warfarin (COUMADIN) 5 MG Tab Take 2.5 mg (1/2 tab) on  Tues, Thurs, Saturday Sunday; Along with 10mg tab to equal 12.5 mg 20 Tab 2   • FLUoxetine (PROZAC) 20 MG Cap      • atorvastatin (LIPITOR) 80 MG tablet Take 1 Tab by mouth every evening. 30 Tab 12   • albuterol (PROVENTIL HFA) 108 (90 Base) MCG/ACT Aero Soln inhalation aerosol Inhale 2 Puffs by mouth every four hours as needed for Shortness of Breath (Wheezing). 1 Inhaler 11   • amitriptyline (ELAVIL) 25 MG Tab Take 1 Tab by mouth every bedtime. 30 Tab 0   • carbamazepine (TEGRETOL) 200 MG Tab Take 600 mg by mouth every bedtime.     • Misc. Devices Misc 3 boxes of pouches (65381), 2 boxes of wafers (43715), 3 boxes (tubes) paste, 1 box of no string skin prep, 1 box of no string unsolve 5 Container 6   • Misc. Devices Misc Pt requesting new battery, new tires and new brakes for power chair 1 Device 0   • Misc. Devices Misc Heavy duty 4 wheeled walker with seat and back rest to use as needed (Patient not taking: Reported on 4/3/2019) 1 Device 0     No current facility-administered medications for this visit.        Allergies   Allergen Reactions   • Pcn [Penicillins] Anaphylaxis  "and Rash     Reaction; happened as a child. Tolerated cephalosporins and Zosyn       /72 (BP Location: Left arm, Patient Position: Sitting, BP Cuff Size: Adult)   Pulse 68   Resp 16   Ht 1.6 m (5' 3\")   Wt 117.5 kg (259 lb)   SpO2 90%     PE:   Appearance: Obese female, no acute distress  Eyes: PERRL, EOM intact, sclera white, conjunctiva moist  Ears: no lesions or deformities  Hearing: grossly intact  Nose: no lesions or deformities  Oropharynx: tongue normal, posterior pharynx without erythema or exudate  Mallampati Classification: Class 4   Neck: supple, trachea midline, no masses   Respiratory effort: no intercostal retractions or use of accessory muscles  Lung auscultation: no rales, rhonchi or wheezes  Heart auscultation: no murmur rub or gallop  Extremities: no cyanosis or edema  Abdomen: soft ,non tender, no masses  Gait and Station: W/C  Digits and nails: no clubbing, cyanosis, petechiae or nodes.  Cranial nerves: grossly intact  Skin: no rashes, lesions or ulcers noted  Orientation: Oriented to time, person and place  Mood and affect: mood and affect appropriate, normal interaction with examiner  Judgement: Intact          Assessment:    1. History of pneumonia  CT-CHEST (THORAX) W/O   2. Chronic obstructive pulmonary disease, unspecified COPD type ([facility])     3. YESSICA (obstructive sleep apnea)     4. Tobacco abuse  CT-CHEST (THORAX) W/O   5. Abnormal CT of the chest  CT-CHEST (THORAX) W/O   6. Allergic rhinitis, unspecified seasonality, unspecified trigger     7. Hypoxemia     8. Dysphagia, unspecified type     9. BMI 45.0-49.9, adult ([facility])     10. Chronic obstructive asthma ([facility])  Tiotropium Bromide Monohydrate (SPIRIVA RESPIMAT) 2.5 MCG/ACT Aero Soln    budesonide-formoterol (SYMBICORT) 160-4.5 MCG/ACT Aerosol    albuterol (VENTOLIN HFA) 108 (90 Base) MCG/ACT Aero Soln inhalation aerosol         Plan:    1) Reviewed results of swallow study and repeat CT imaging. She denies current dysphagia. She " is careful when she is eating or drinking to avoid aspiration. She does have ongoing tobacco abuse. Order to repeat CT in December 2019.   2) Smoking cessation discussed and recommended. She is working on quitting.  3) Continue 02 3 LPM prn exertion.  4) Continue Symbicort 160/4.5 mcg. Continue Spiriva respimat 2.5 mcg 2 puffs INH daily. Continue Proventil HFA inhaler as needed. RX refills sent to her local pharmacy.   5) Continue Nasonex.   6) She is up to date on Prevnar 13, Pneumovax 23 and Influenza vaccines.  7) Weight loss recommended.  8) Sleep hygiene discussed. Continue Trazodone prescribed by Psychiatry.  9) Continue CPAP 14 CM H20 with 3 LPM 02 bleed in. Sleep hygiene discussed. Encouraged to bring compliance chip to follow up visit   10) Follow up in 6 months, sooner OV if needed.

## 2019-04-12 LAB
INR PPP: 1 (ref 2–3.5)
INR PPP: 1 (ref 2–3.5)

## 2019-04-15 ENCOUNTER — ANTICOAGULATION MONITORING (OUTPATIENT)
Dept: VASCULAR LAB | Facility: MEDICAL CENTER | Age: 69
End: 2019-04-15

## 2019-04-15 ENCOUNTER — ANTICOAGULATION MONITORING (OUTPATIENT)
Dept: MEDICAL GROUP | Facility: PHYSICIAN GROUP | Age: 69
End: 2019-04-15

## 2019-04-15 DIAGNOSIS — Z86.718 HISTORY OF DVT (DEEP VEIN THROMBOSIS): ICD-10-CM

## 2019-04-15 DIAGNOSIS — Z95.828 S/P IVC FILTER: ICD-10-CM

## 2019-04-15 DIAGNOSIS — Z79.01 CHRONIC ANTICOAGULATION: ICD-10-CM

## 2019-04-15 NOTE — PROGRESS NOTES
Anticoagulation Summary  As of 4/15/2019    INR goal:   2.0-3.0   TTR:   36.6 % (3.6 y)   INR used for dosin.0! (2019)   Warfarin maintenance plan:   12.5 mg (10 mg x 1 and 5 mg x 0.5) every Tue, Thu; 10 mg (10 mg x 1) all other days   Weekly warfarin total:   75 mg   Plan last modified:   Porsche Bello, PharmD (3/15/2019)   Next INR check:   2019   Priority:   Routine   Target end date:   Indefinite    Indications    DVT (deep venous thrombosis) (HCC) (Resolved) [I82.409]  Chronic anticoagulation [Z79.01]  S/P IVC filter [Z95.828]  History of DVT (deep vein thrombosis) [Z86.718]             Anticoagulation Episode Summary     INR check location:   [facility]    Preferred lab:   [facility]    Send INR reminders to:       Comments:   MD INR      Anticoagulation Care Providers     Provider Role Specialty Phone number    Carmelo Pat M.D. Referring [facility] Medicine [phone]    [facility] Anticoagulation Services Responsible  [phone]        Anticoagulation Patient Findings    Left voicemail message to report a subtherapeutic INR of 1.0.  Requested pt contact the clinic for any s/s of unusual bleeding, bruising, clotting or any changes to diet or medication.   Instructed patient to bolus with 15mg X 2, then resume current warfarin regimen.  FU 3 days.  Nahum Bowens, PharmD

## 2019-04-17 RX ORDER — LEVOTHYROXINE SODIUM 0.2 MG/1
TABLET ORAL
Qty: 30 TAB | Refills: 0 | Status: SHIPPED | OUTPATIENT
Start: 2019-04-17 | End: 2019-05-20 | Stop reason: SDUPTHER

## 2019-04-23 ENCOUNTER — OFFICE VISIT (OUTPATIENT)
Dept: MEDICAL GROUP | Facility: MEDICAL CENTER | Age: 69
End: 2019-04-23
Attending: FAMILY MEDICINE
Payer: MEDICARE

## 2019-04-23 VITALS
HEART RATE: 60 BPM | RESPIRATION RATE: 20 BRPM | HEIGHT: 64 IN | WEIGHT: 270 LBS | SYSTOLIC BLOOD PRESSURE: 130 MMHG | TEMPERATURE: 96.5 F | BODY MASS INDEX: 46.1 KG/M2 | DIASTOLIC BLOOD PRESSURE: 90 MMHG | OXYGEN SATURATION: 100 %

## 2019-04-23 DIAGNOSIS — I48.0 PAROXYSMAL A-FIB (HCC): ICD-10-CM

## 2019-04-23 DIAGNOSIS — H25.013 CORTICAL AGE-RELATED CATARACT OF BOTH EYES: ICD-10-CM

## 2019-04-23 DIAGNOSIS — E06.3 HYPOTHYROIDISM, ACQUIRED, AUTOIMMUNE: ICD-10-CM

## 2019-04-23 DIAGNOSIS — Z86.718 HISTORY OF DVT (DEEP VEIN THROMBOSIS): Chronic | ICD-10-CM

## 2019-04-23 DIAGNOSIS — Z95.828 S/P IVC FILTER: ICD-10-CM

## 2019-04-23 PROCEDURE — 99213 OFFICE O/P EST LOW 20 MIN: CPT | Performed by: FAMILY MEDICINE

## 2019-04-23 PROCEDURE — 99214 OFFICE O/P EST MOD 30 MIN: CPT | Performed by: FAMILY MEDICINE

## 2019-04-23 RX ORDER — LEVOTHYROXINE SODIUM 0.03 MG/1
25 TABLET ORAL
Qty: 30 TAB | Refills: 3 | Status: SHIPPED | OUTPATIENT
Start: 2019-04-23 | End: 2019-05-08

## 2019-04-23 ASSESSMENT — ENCOUNTER SYMPTOMS
NECK PAIN: 0
TINGLING: 1
NAUSEA: 0
PHOTOPHOBIA: 0
ABDOMINAL PAIN: 0
FOCAL WEAKNESS: 0
ORTHOPNEA: 0
VOMITING: 0
PALPITATIONS: 1
DEPRESSION: 1
COUGH: 1
FEVER: 0
NERVOUS/ANXIOUS: 1
CHILLS: 0
TREMORS: 0
DOUBLE VISION: 0
SPEECH CHANGE: 0
HEARTBURN: 1
SPUTUM PRODUCTION: 0
EYE PAIN: 0
SHORTNESS OF BREATH: 0
SENSORY CHANGE: 0
HALLUCINATIONS: 0
BACK PAIN: 1
BLURRED VISION: 1

## 2019-04-23 ASSESSMENT — LIFESTYLE VARIABLES: SUBSTANCE_ABUSE: 0

## 2019-04-23 NOTE — PROGRESS NOTES
Subjective:      Atiya Henley is a 68 y.o. female who presents with Results (labs)            Patient 68-year-old female here for follow-up of her recent blood work.  The results of her blood work are as follows:    3/28/2019 11:09  WBC: 5.4  RBC: 4.01 (L)  Hemoglobin: 12.8  Hematocrit: 41.2  MCV: 102.7 (H)  MCH: 31.9  MCHC: 31.1 (L)  RDW: 54.5 (H)  Platelet Count: 215  MPV: 10.5  Neutrophils-Polys: 56.30  Neutrophils (Absolute): 3.02  Lymphocytes: 33.10  Lymphs (Absolute): 1.78  Monocytes: 5.40  Monos (Absolute): 0.29  Eosinophils: 3.90  Eos (Absolute): 0.21  Basophils: 0.90  Baso (Absolute): 0.05  Immature Granulocytes: 0.40  Immature Granulocytes (abs): 0.02  Nucleated RBC: 0.00  NRBC (Absolute): 0.00  Sodium: 139  Potassium: 4.2  Chloride: 106  Co2: 31  Anion Gap: 2.0  Glucose: 105 (H)  Bun: 9  Creatinine: 0.72  GFR If : >60  GFR If Non : >60  Calcium: 8.4 (L)  AST(SGOT): 15  ALT(SGPT): 11  Alkaline Phosphatase: 89  Total Bilirubin: 0.3  Albumin: 3.7  Total Protein: 6.9  Globulin: 3.2  A-G Ratio: 1.2  Fasting Status: Fasting  Cholesterol,Tot: 191  Triglycerides: 144  HDL: 55  LDL: 107 (H)  25-Hydroxy   Vitamin D 25: 21 (L)  TSH: 45.180 (H)  Free T-4: 0.46 (L)    3/28/2019 12:28  CT-POST-FOSSA-EAR W/O: Rpt    4/12/2019 00:00  INR: 1.0    Her TSH was 45.18 with a free T4 of 0.46.  She is currently on 200 mcg of levothyroxine.  She has not seen her endocrinologist in some time so a referral will be made back to endocrine for her to follow-up with her endocrinologist.  The meantime will increase her levothyroxine by 25 mcg to a total of 225 mcg of levothyroxine.  Repeat TSH and T4 level will be ordered.  We will continue to follow.    Her vitamin D level was slightly low at 21, but she has been taking her vitamin D as directed.  We will continue to follow.    She has a history of chronic DVTs as well as atrial fibrillation and is currently on Coumadin.  She is being followed by  the Coumadin clinic for management of her Coumadin levels.  Her last INR was at 1.0.  At this level she is currently subtherapeutic.  She is currently in regular rhythm but neurologically goes into A. fib.  She states that her last episode of A. fib was just a few weeks ago.  She is not having any chest pain, shortness of breath or syncope.  Since she does have paroxysmal atrial fibrillation and states she is not currently seeing a cardiologist a cardiology referral has been made today.  We will also have her continue following up with the Coumadin clinic to get her back to therapeutic levels.  We will continue to follow.    She also has a history of cataracts in both eyes, with one having been operated on.  She states she is still having recurrent blurriness of both eyes despite her previous procedure.  A referral to ophthalmology will be made today for a further evaluation and possible assistance in management of her cataracts.  We will continue to follow.     Current medications, allergies, and problem list reviewed with patient and updated in EPIC.          Review of Systems   Constitutional: Negative for chills and fever.   HENT: Negative for hearing loss and tinnitus.    Eyes: Positive for blurred vision. Negative for double vision, photophobia and pain.   Respiratory: Positive for cough. Negative for sputum production and shortness of breath.    Cardiovascular: Positive for palpitations. Negative for chest pain and orthopnea.   Gastrointestinal: Positive for heartburn. Negative for abdominal pain, nausea and vomiting.   Musculoskeletal: Positive for back pain and joint pain. Negative for neck pain.   Neurological: Positive for tingling. Negative for tremors, sensory change, speech change and focal weakness.   Psychiatric/Behavioral: Positive for depression. Negative for hallucinations, substance abuse and suicidal ideas. The patient is nervous/anxious.           Objective:     /90 (BP Location: Left  "arm, Patient Position: Sitting)   Pulse 60   Temp 35.8 °C (96.5 °F)   Resp 20   Ht 1.626 m (5' 4\")   Wt 122.5 kg (270 lb)   LMP 07/19/2011   SpO2 100%   BMI 46.35 kg/m²      Physical Exam   Constitutional: She is oriented to person, place, and time.   BMI 46.35 using a walker to ambulate   HENT:   Head: Normocephalic and atraumatic.   Neck: Normal range of motion. Neck supple.   Cardiovascular: Normal rate, regular rhythm and normal heart sounds.  Exam reveals no friction rub.    No murmur heard.  Pulmonary/Chest: Effort normal and breath sounds normal. No respiratory distress. She has no wheezes. She has no rales.   Abdominal: Soft. Bowel sounds are normal. She exhibits no distension. There is no tenderness.   Musculoskeletal:   Decreased range of motion of affected extremities and back in flexion, using a walker to ambulate   Neurological: She is alert and oriented to person, place, and time.   Skin: Skin is warm and dry.   Psychiatric: She has a normal mood and affect. Her behavior is normal.   Nursing note and vitals reviewed.              Assessment/Plan:     1. Hypothyroidism, acquired, autoimmune  We will have her increase her levothyroxine to 225 mcg daily.  We will recheck her TSH and T4 levels but a referral back to endocrine will be made today.  We will continue to follow.  - REFERRAL TO ENDOCRINOLOGY  - levothyroxine (SYNTHROID) 25 MCG Tab; Take 1 Tab by mouth Every morning on an empty stomach.  Dispense: 30 Tab; Refill: 3    2. Cortical age-related cataract of both eyes  We will have her referred to ophthalmology for a further evaluation and possible assistance in management of her cataracts.  We will continue to follow.  - REFERRAL TO OPHTHALMOLOGY    3. S/P IVC filter  We will have her continue to follow-up with the Coumadin clinic for assistance in management of her Coumadin.  She is currently subtherapeutic.  She has been advised to be aware of any chest pain, shortness of breath or leg " swelling.  If she has any of these problems she is to go to the emergency room for further assistance and management.    4. Paroxysmal A-fib (HCC)  A referral to cardiology has been made today for a further assistance in management of her history of paroxysmal atrial fibrillation.  Discussed the possibility of atrial fibrillation with her increased dose of levothyroxine.  ER precautions once again have been given to patient.  - REFERRAL TO CARDIOLOGY    5. History of DVT (deep vein thrombosis)  See above plan.

## 2019-04-30 ENCOUNTER — ANTICOAGULATION MONITORING (OUTPATIENT)
Dept: VASCULAR LAB | Facility: MEDICAL CENTER | Age: 69
End: 2019-04-30

## 2019-04-30 DIAGNOSIS — Z79.01 CHRONIC ANTICOAGULATION: ICD-10-CM

## 2019-04-30 DIAGNOSIS — Z86.718 HISTORY OF DVT (DEEP VEIN THROMBOSIS): ICD-10-CM

## 2019-04-30 DIAGNOSIS — Z95.828 S/P IVC FILTER: ICD-10-CM

## 2019-04-30 NOTE — PROGRESS NOTES
[facility]    Unable to reach pt by phone, left message with emergency contact.  Contact will try reaching out to pt to remind her to obtain next INR.     Bon [place], PharmD

## 2019-05-02 ENCOUNTER — ANTICOAGULATION MONITORING (OUTPATIENT)
Dept: MEDICAL GROUP | Facility: MEDICAL CENTER | Age: 69
End: 2019-05-02

## 2019-05-02 DIAGNOSIS — Z95.828 S/P IVC FILTER: ICD-10-CM

## 2019-05-02 DIAGNOSIS — Z86.718 HISTORY OF DVT (DEEP VEIN THROMBOSIS): ICD-10-CM

## 2019-05-02 DIAGNOSIS — Z79.01 CHRONIC ANTICOAGULATION: ICD-10-CM

## 2019-05-02 LAB — INR PPP: 2.5 (ref 2–3.5)

## 2019-05-03 NOTE — PROGRESS NOTES
Anticoagulation Summary  As of 2019    INR goal:   2.0-3.0   TTR:   36.5 % (3.6 y)   INR used for dosin.50 (2019)   Warfarin maintenance plan:   12.5 mg (10 mg x 1 and 5 mg x 0.5) every Tue, Thu; 10 mg (10 mg x 1) all other days   Weekly warfarin total:   75 mg   No change documented:   More Kelsey, Med Ass't   Plan last modified:   Porsche Bello, PharmD (3/15/2019)   Next INR check:   2019   Priority:   Routine   Target end date:   Indefinite    Indications    DVT (deep venous thrombosis) (HCC) (Resolved) [I82.409]  Chronic anticoagulation [Z79.01]  S/P IVC filter [Z95.828]  History of DVT (deep vein thrombosis) [Z86.718]             Anticoagulation Episode Summary     INR check location:   [facility]    Preferred lab:   [facility]    Send INR reminders to:       Comments:   MD INR      Anticoagulation Care Providers     Provider Role Specialty Phone number    Carmelo Pat M.D. Referring Family Medicine [phone]    [facility] Anticoagulation Services Responsible  [phone]        Anticoagulation Patient Findings  Patient Findings     Negatives:   Signs/symptoms of thrombosis, Signs/symptoms of bleeding, Laboratory test error suspected, Change in health, Change in alcohol use, Change in activity, Upcoming invasive procedure, Emergency department visit, Upcoming dental procedure, Missed doses, Extra doses, Change in medications, Change in diet/appetite, Hospital admission, Bruising, Other complaints      Spoke with Atiya to report a therapeutic INR of 2.5. Continue current dosing regimen.  Follow up in 1 weeks, to reduce the risk of adverse events related to this high risk medication, warfarin.    Ruth Munoz, Clinical Pharmacist

## 2019-05-08 ENCOUNTER — OFFICE VISIT (OUTPATIENT)
Dept: CARDIOLOGY | Facility: MEDICAL CENTER | Age: 69
End: 2019-05-08
Payer: MEDICARE

## 2019-05-08 VITALS
OXYGEN SATURATION: 87 % | HEIGHT: 63 IN | BODY MASS INDEX: 46.25 KG/M2 | DIASTOLIC BLOOD PRESSURE: 78 MMHG | HEART RATE: 80 BPM | SYSTOLIC BLOOD PRESSURE: 132 MMHG | WEIGHT: 261 LBS

## 2019-05-08 DIAGNOSIS — Z86.718 HISTORY OF DVT (DEEP VEIN THROMBOSIS): Chronic | ICD-10-CM

## 2019-05-08 DIAGNOSIS — I48.0 PAROXYSMAL A-FIB (HCC): ICD-10-CM

## 2019-05-08 DIAGNOSIS — Z79.01 CHRONIC ANTICOAGULATION: ICD-10-CM

## 2019-05-08 PROCEDURE — 99203 OFFICE O/P NEW LOW 30 MIN: CPT | Performed by: INTERNAL MEDICINE

## 2019-05-08 ASSESSMENT — ENCOUNTER SYMPTOMS
BLURRED VISION: 0
NAUSEA: 0
BRUISES/BLEEDS EASILY: 0
CHILLS: 0
FEVER: 0
HEARTBURN: 0
HEADACHES: 0
DIZZINESS: 0
MYALGIAS: 0
DEPRESSION: 0
PND: 0
EYE DISCHARGE: 0
BACK PAIN: 1
SHORTNESS OF BREATH: 1
PALPITATIONS: 0
NERVOUS/ANXIOUS: 0
COUGH: 0

## 2019-05-08 NOTE — LETTER
[facility]   [address] [place] [address] [address] [place] [address]  Phone: [phone]  Fax: [phone]              Atiya Henley  1950    Encounter Date: 5/8/2019    Alexander Carrasquillo M.D.          PROGRESS NOTE:  Chief Complaint   Patient presents with   • Shortness of Breath       Subjective:   Atiya Henley is a 68 y.o. female who presents today in consultation at the request of Dr. Pat for follow-up of paroxysmal atrial fibrillation.    This patient with morbid obesity, incomplete treatment of sleep apnea continued tobacco with use of at least 1/2 pack/day is seen to discuss the possibility that she is having PAF.    History of PAF has been on her chart for many years, possibly since there was complications greater than 8 years ago from colon resection with multiple complications thereafter.  She also has DVT and a remote IVC filter.    She states that she gets short of breath frequently and fairly easily but notes no chest pressure or palpitations.  She states that in general her ankles are not particularly swollen.  She has trouble using her CPAP device because of multiple problems with the device.    Her last EKG 1 year ago demonstrated sinus rhythm with left axis deviation.  Apparently she was in sinus rhythm when she last saw her primary care provider.  Her last echocardiogram was April 8, 2018 demonstrating a normal left ventricular ejection fraction and mild elevation of pulmonary artery pressure of 32 mmHg.    She currently is on increasing doses of levothyroxine for hypothyroidism.  She is currently on chronic Coumadin therapy and had subtherapeutic INR in mid April.  Most recent INRs have been midtherapeutic.  No known bleeding complications from chronic Coumadin usage but she does have easy bruisability of her forearms.    Past Medical History:   Diagnosis Date   • Arthritis    • Bowel obstruction (HCC)    • Cancer (HCC)     cervical   •  Chronic autoimmune thyroiditis     + TPO ab = 563 / + ultrasound   • Colostomy in place ([facility]) 2009   • COPD    • Dental disorder     dentures upper   • Diabetes     type 2    • EMPHYSEMA    • Hashimoto's disease    • History of total knee arthroplasty     infection   • Hypertension     stated no longer on meds due to lost 100 lbs   • Hypothyroid 7/7/2009   • Indigestion    • Infectious disease     MRSA,   • Infectious disease     VRE   • Obesity 7/7/2009   • Obstruction     colostomy   • Oxygen dependent     4.5 L NC   • Pain     b/l legs   • Personal history of venous thrombosis and embolism 2009, 2012    arm and leg left side   • Presence of IVC filter    • Psychiatric problem     depression   • Sleep apnea     USES CPAP, 3-4L oxygen   • Snoring    • Status post cholecystectomy 1992   • Weight gain      Past Surgical History:   Procedure Laterality Date   • PB INJECT RX OTHER PERIPH NERVE Left 8/21/2015    Procedure: NEUROLYTIC DEST-OTHER NERVE;  Surgeon: Kd Galaviz D.O.;  Location: [facility];  Service: Pain Management   • PB FLUOROSCOPIC GUIDANCE NEEDLE PLACEMENT Left 8/21/2015    Procedure: FLOURO GUIDE NEEDLE PLACEMENT;  Surgeon: Kd Galaviz D.O.;  Location: [facility];  Service: Pain Management   • PB INJECT RX OTHER PERIPH NERVE  8/21/2015    Procedure: NEUROLYTIC DEST-OTHER NERVE;  Surgeon: Kd Galaviz D.O.;  Location: [facility];  Service: Pain Management   • PB INJECT RX OTHER PERIPH NERVE  8/21/2015    Procedure: NEUROLYTIC DEST-OTHER NERVE;  Surgeon: Kd Galaviz D.O.;  Location: [facility];  Service: Pain Management   • EXPLORATORY LAPAROTOMY  12/10/2013    Performed by Sang Castañeda M.D. at [facility]   • WOUND CLOSURE GENERAL  12/10/2013    Performed by Sang Castañeda M.D. at [facility]   • COLOSTOMY  11/26/2013    Performed by Maninder Carter M.D. at [facility]   •  EXPLORATORY LAPAROTOMY  11/26/2013    Performed by Maninder Carter M.D. at SURGERY [facility]   • LYSIS ADHESIONS GENERAL  11/26/2013    Performed by Maninder Carter M.D. at [facility]   • COLON RESECTION  11/26/2013    Performed by Maninder aCrter M.D. at [facility]   • BLADDER BIOPSY WITH CYSTOSCOPY  10/10/2013    Performed by Sang Castañeda M.D. at [facility]   • EXPLORATORY LAPAROTOMY  9/2/2013    Performed by Maninder Carter M.D. at [facility]   • PARASTOMAL HERNIA REPAIR   9/2/2013    Performed by Maninder Carter M.D. at [facility]   • IRRIGATION & DEBRIDEMENT ORTHO  2/8/2013    Performed by David Fowler M.D. at [facility]   • IRRIGATION & DEBRIDEMENT GENERAL  12/17/2012    Performed by David Fowler M.D. at [facility]   • IRRIGATION & DEBRIDEMENT ORTHO  7/19/2012    Performed by BERYL LOYD at [facility]   • IRRIGATION & DEBRIDEMENT ORTHO  7/17/2012    Performed by BEYRL LOYD at [facility]   • KNEE ARTHROPLASTY TOTAL  6/10/2012    left   • LATERAL RELEASE  7/20/2011    Performed by BERYL LOYD at [facility]   • KNEE ARTHROSCOPY  7/20/2011    Performed by BERYL LOYD at [facility]   • MEDIAL MENISCECTOMY  7/20/2011    Performed by BERYL LOYD at [facility]   • MENISCECTOMY  7/20/2011    Performed by BERYL LOYD at [facility]   • COLONOSCOPY - ENDO  11/17/2010    Performed by JESUSITA SUMNER at ENDOSCOPY [facility]   • TRACHEOSTOMY  3/31/2010    Performed by JESUSITA SUMNER at [facility]   • COLOSTOMY  3/16/2010    Performed by CHRISTA BENNETT at [facility]   • EXPLORATORY LAPAROTOMY  3/16/2010    Performed by CHRISTA BENNETT at SURGERY [facility]   • COLECTOMY  3/16/2010    Performed by CHRISTA BENNETT at [facility]   • LOW  ANTERIOR RESECTION  3/10/2010    Performed by JESUSITA SUMNER at SURGERY [facility]   • LOW ANTERIOR RESECTION LAPAROSCOPIC  3/10/2010    Performed by JESUSITA SUMNER at SURGERY [facility]S   • MYRINGOTOMY  9/9/2009    Performed by RENETTA GOODSON at SURGERY SAME DAY [facility]S   • CHOLECYSTECTOMY  1992    laparoscopic   • HYSTERECTOMY LAPAROSCOPY  1983   • OTHER ORTHOPEDIC SURGERY      LT KNEE X2     Family History   Problem Relation Age of Onset   • Heart Disease Mother    • Seizures Mother    • Alcohol/Drug Father    • Lung Disease Father    • Hypertension Unknown      Social History     Social History   • Marital status:      Spouse name: N/A   • Number of children: N/A   • Years of education: N/A     Occupational History   • Not on file.     Social History Main Topics   • Smoking status: Current Every Day Smoker     Packs/day: 0.25     Years: 50.00     Types: Cigarettes   • Smokeless tobacco: Current User      Comment: 1 ppd, 45 yrs   • Alcohol use No   • Drug use: Yes     Types: Marijuana      Comment: 1x per month   • Sexual activity: No     Other Topics Concern   •  Service No   • Blood Transfusions No     unknown   • Caffeine Concern No   • Occupational Exposure No   • Hobby Hazards No   • Sleep Concern Yes     sleep apnea   • Stress Concern Yes   • Weight Concern No   • Special Diet Yes   • Back Care Yes   • Exercise No   • Bike Helmet No     does not ride bike    • Seat Belt Yes   • Self-Exams Yes     Social History Narrative   • No narrative on file     Allergies   Allergen Reactions   • Pcn [Penicillins] Anaphylaxis and Rash     Reaction; happened as a child. Tolerated cephalosporins and Zosyn     Outpatient Encounter Prescriptions as of 5/8/2019   Medication Sig Dispense Refill   • levothyroxine (SYNTHROID) 200 MCG Tab TAKE ONE TABLET BY MOUTH EVERY DAY IN THE MORNING ON AN EMPTY STOMACH 30 Tab 0   • Tiotropium Bromide Monohydrate (SPIRIVA RESPIMAT) 2.5 MCG/ACT Aero Soln  Inhale 2 Puffs by mouth every day. 3 Inhaler 3   • budesonide-formoterol (SYMBICORT) 160-4.5 MCG/ACT Aerosol Inhale 2 Puffs by mouth 2 Times a Day. With spacer, rinse mouth after use 3 Inhaler 3   • albuterol (VENTOLIN HFA) 108 (90 Base) MCG/ACT Aero Soln inhalation aerosol Inhale 2 Puffs by mouth every four hours as needed for Shortness of Breath. 3 Inhaler 3   • Incontinence Supply Disposable (FQ PROTECTIVE UNDERWEAR) Misc USE 1 PULLUP 3 TIMES DAILY 90 Each 0   • LORazepam (ATIVAN) 1 MG Tab      • Misc. Devices Misc 3 boxes of pouches (85240), 2 boxes of wafers (21289), 3 boxes (tubes) paste, 1 box of no string skin prep, 1 box of no string unsolve 5 Container 6   • vitamin D, Ergocalciferol, (DRISDOL) 42547 units Cap capsule Take 1 Cap by mouth every 28 days. 3 Cap 0   • Misc. Devices Misc Pt requesting new battery, new tires and new brakes for power chair 1 Device 0   • lisinopril (PRINIVIL) 5 MG Tab Take 1 Tab by mouth every day. 30 Tab 3   • raNITidine (ZANTAC) 150 MG Tab Take 1 Tab by mouth 2 times a day. 60 Tab 0   • gabapentin (NEURONTIN) 300 MG Cap TAKE THREE CAPSULES BY MOUTH THREE TIMES A  Cap 0   • warfarin (COUMADIN) 10 MG Tab Take 1 Tab by mouth every evening. 10 mg on Mon, Wed, Fri ;12.5 mg all other days (10mg tab + 1/2 of 5mg Tab) 30 Tab 3   • warfarin (COUMADIN) 5 MG Tab Take 2.5 mg (1/2 tab) on  Tues, Thurs, Saturday Sunday; Along with 10mg tab to equal 12.5 mg 20 Tab 2   • Misc. Devices Misc Heavy duty 4 wheeled walker with seat and back rest to use as needed 1 Device 0   • FLUoxetine (PROZAC) 20 MG Cap      • atorvastatin (LIPITOR) 80 MG tablet Take 1 Tab by mouth every evening. 30 Tab 12   • amitriptyline (ELAVIL) 25 MG Tab Take 1 Tab by mouth every bedtime. 30 Tab 0   • carbamazepine (TEGRETOL) 200 MG Tab Take 600 mg by mouth every bedtime.     • [DISCONTINUED] levothyroxine (SYNTHROID) 25 MCG Tab Take 1 Tab by mouth Every morning on an empty stomach. (Patient not taking: Reported on  "5/8/2019) 30 Tab 3   • [DISCONTINUED] albuterol (PROVENTIL HFA) 108 (90 Base) MCG/ACT Aero Soln inhalation aerosol Inhale 2 Puffs by mouth every four hours as needed for Shortness of Breath (Wheezing). (Patient not taking: Reported on 5/8/2019) 1 Inhaler 11     No facility-administered encounter medications on file as of 5/8/2019.      Review of Systems   Constitutional: Negative for chills, fever and malaise/fatigue.   Eyes: Negative for blurred vision and discharge.   Respiratory: Positive for shortness of breath. Negative for cough.    Cardiovascular: Negative for chest pain, palpitations, leg swelling and PND.   Gastrointestinal: Negative for heartburn and nausea.   Genitourinary: Positive for frequency. Negative for dysuria and urgency.   Musculoskeletal: Positive for back pain and joint pain. Negative for myalgias.   Skin: Negative for itching and rash.   Neurological: Negative for dizziness and headaches.   Endo/Heme/Allergies: Negative for environmental allergies. Does not bruise/bleed easily.   Psychiatric/Behavioral: Negative for depression. The patient is not nervous/anxious.         Objective:   /78 (BP Location: Left arm, Patient Position: Sitting, BP Cuff Size: Adult)   Pulse 80   Ht 1.6 m (5' 3\")   Wt 118.4 kg (261 lb)   LMP 07/19/2011   SpO2 (!) 87%   BMI 46.23 kg/m²      Physical Exam   Constitutional: She is oriented to person, place, and time.   Pleasant morbidly obese lady using a walker for ambulation.  Alert.  Oriented.  Good historian.  She appears older than stated age.   Neck: No JVD present.   Cardiovascular: Normal rate and regular rhythm.  Exam reveals no gallop and no friction rub.    No murmur heard.  No carotid bruits   Pulmonary/Chest: Effort normal and breath sounds normal.   Abdominal: Soft. There is no tenderness.   Musculoskeletal: She exhibits no edema.   Neurological: She is alert and oriented to person, place, and time.   Skin: Skin is warm and dry.       "       Assessment:     1. Paroxysmal A-fib (HCC)     2. History of DVT (deep vein thrombosis)     3. Chronic anticoagulation         Medical Decision Making:  Today's Assessment / Status / Plan:   I recommend no monitoring and no further evaluation for her remote history of PAF.  It is not clear to me that she is having symptoms of atrial fibrillation or if she is having any paroxysms at all.  The importance of chronic anticoagulation with a mid therapeutic pro time/INR was discussed with her and she understands the need for stroke prevention.  There is no evidence of right heart failure clinically.    She does not need routine follow-up here.  Thank you for this consultation.      Carmelo Pat M.D.  [address] [place] [id number]  VIA In Basket

## 2019-05-08 NOTE — PROGRESS NOTES
Chief Complaint   Patient presents with   • Shortness of Breath       Subjective:   Atiya Henley is a 68 y.o. female who presents today in consultation at the request of Dr. Pat for follow-up of paroxysmal atrial fibrillation.    This patient with morbid obesity, incomplete treatment of sleep apnea continued tobacco with use of at least 1/2 pack/day is seen to discuss the possibility that she is having PAF.    History of PAF has been on her chart for many years, possibly since there was complications greater than 8 years ago from colon resection with multiple complications thereafter.  She also has DVT and a remote IVC filter.    She states that she gets short of breath frequently and fairly easily but notes no chest pressure or palpitations.  She states that in general her ankles are not particularly swollen.  She has trouble using her CPAP device because of multiple problems with the device.    Her last EKG 1 year ago demonstrated sinus rhythm with left axis deviation.  Apparently she was in sinus rhythm when she last saw her primary care provider.  Her last echocardiogram was April 8, 2018 demonstrating a normal left ventricular ejection fraction and mild elevation of pulmonary artery pressure of 32 mmHg.    She currently is on increasing doses of levothyroxine for hypothyroidism.  She is currently on chronic Coumadin therapy and had subtherapeutic INR in mid April.  Most recent INRs have been midtherapeutic.  No known bleeding complications from chronic Coumadin usage but she does have easy bruisability of her forearms.    Past Medical History:   Diagnosis Date   • Arthritis    • Bowel obstruction (HCC)    • Cancer (HCC)     cervical   • Chronic autoimmune thyroiditis     + TPO ab = 563 / + ultrasound   • Colostomy in place (HCC) 2009   • COPD    • Dental disorder     dentures upper   • Diabetes     type 2    • EMPHYSEMA    • Hashimoto's disease    • History of total knee arthroplasty     infection   •  Hypertension     stated no longer on meds due to lost 100 lbs   • Hypothyroid 7/7/2009   • Indigestion    • Infectious disease     MRSA,   • Infectious disease     VRE   • Obesity 7/7/2009   • Obstruction     colostomy   • Oxygen dependent     4.5 L NC   • Pain     b/l legs   • Personal history of venous thrombosis and embolism 2009, 2012    arm and leg left side   • Presence of IVC filter    • Psychiatric problem     depression   • Sleep apnea     USES CPAP, 3-4L oxygen   • Snoring    • Status post cholecystectomy 1992   • Weight gain      Past Surgical History:   Procedure Laterality Date   • PB INJECT RX OTHER PERIPH NERVE Left 8/21/2015    Procedure: NEUROLYTIC DEST-OTHER NERVE;  Surgeon: Kd Galaviz D.O.;  Location: [facility];  Service: Pain Management   • PB FLUOROSCOPIC GUIDANCE NEEDLE PLACEMENT Left 8/21/2015    Procedure: FLOURO GUIDE NEEDLE PLACEMENT;  Surgeon: Kd Galaviz D.O.;  Location: [facility];  Service: Pain Management   • PB INJECT RX OTHER PERIPH NERVE  8/21/2015    Procedure: NEUROLYTIC DEST-OTHER NERVE;  Surgeon: Kd Galaviz D.O.;  Location: [facility];  Service: Pain Management   • PB INJECT RX OTHER PERIPH NERVE  8/21/2015    Procedure: NEUROLYTIC DEST-OTHER NERVE;  Surgeon: Kd Galaviz D.O.;  Location: [facility];  Service: Pain Management   • EXPLORATORY LAPAROTOMY  12/10/2013    Performed by Sang Castañeda M.D. at [facility]   • WOUND CLOSURE GENERAL  12/10/2013    Performed by Sang Castañeda M.D. at [facility]   • COLOSTOMY  11/26/2013    Performed by Maninder Carter M.D. at [facility]   • EXPLORATORY LAPAROTOMY  11/26/2013    Performed by Maninder Carter M.D. at [facility]   • LYSIS ADHESIONS GENERAL  11/26/2013    Performed by Maninder Carter M.D. at [facility]   • COLON RESECTION  11/26/2013    Performed by Maninder Carter M.D. at  [facility]ERY [facility]   • BLADDER BIOPSY WITH CYSTOSCOPY  10/10/2013    Performed by Sang Castañeda M.D. at SUR[facility]ERY [facility]   • EXPLORATORY LAPAROTOMY  9/2/2013    Performed by Maninder Carter M.D. at [facility]   • PARASTOMAL HERNIA REPAIR   9/2/2013    Performed by Maninder Carter M.D. at [facility]   • IRRIGATION & DEBRIDEMENT ORTHO  2/8/2013    Performed by David Fowler M.D. at [facility]   • IRRIGATION & DEBRIDEMENT GENERAL  12/17/2012    Performed by David Fowler M.D. at [facility]   • IRRIGATION & DEBRIDEMENT ORTHO  7/19/2012    Performed by BERYL OLYD at [facility]   • IRRIGATION & DEBRIDEMENT ORTHO  7/17/2012    Performed by BERYL LOYD at [facility]   • KNEE ARTHROPLASTY TOTAL  6/10/2012    left   • LATERAL RELEASE  7/20/2011    Performed by BERYL LOYD at [facility]   • KNEE ARTHROSCOPY  7/20/2011    Performed by BERYL LOYD at [facility]   • MEDIAL MENISCECTOMY  7/20/2011    Performed by BERYL LOYD at [facility]   • MENISCECTOMY  7/20/2011    Performed by BERYL LOYD at [facility]   • COLONOSCOPY - ENDO  11/17/2010    Performed by JESUSITA SUMNER at [facility]   • TRACHEOSTOMY  3/31/2010    Performed by JESUSITA SUMNER at SUR[facility]ERY [facility]   • COLOSTOMY  3/16/2010    Performed by CHRISTA BENNETT at SUR[facility]ERY [facility]   • EXPLORATORY LAPAROTOMY  3/16/2010    Performed by CHRISTA BENNETT at SUR[facility]   • COLECTOMY  3/16/2010    Performed by CHRISTA BENNETT at SUR[facility]   • LOW ANTERIOR RESECTION  3/10/2010    Performed by JESUSITA SUMNER at [facility]   • LOW ANTERIOR RESECTION LAPAROSCOPIC  3/10/2010    Performed by JESUSITA SUMNER at [facility]   • MYRINGOTOMY  9/9/2009    Performed by RENETTA GOODSON at SURGERY [facility] DAY  [facility]S   • CHOLECYSTECTOMY  1992    laparoscopic   • HYSTERECTOMY LAPAROSCOPY  1983   • OTHER ORTHOPEDIC SURGERY      LT KNEE X2     Family History   Problem Relation Age of Onset   • Heart Disease Mother    • Seizures Mother    • Alcohol/Drug Father    • Lung Disease Father    • Hypertension Unknown      Social History     Social History   • Marital status:      Spouse name: N/A   • Number of children: N/A   • Years of education: N/A     Occupational History   • Not on file.     Social History Main Topics   • Smoking status: Current Every Day Smoker     Packs/day: 0.25     Years: 50.00     Types: Cigarettes   • Smokeless tobacco: Current User      Comment: 1 ppd, 45 yrs   • Alcohol use No   • Drug use: Yes     Types: Marijuana      Comment: 1x per month   • Sexual activity: No     Other Topics Concern   •  Service No   • Blood Transfusions No     unknown   • Caffeine Concern No   • Occupational Exposure No   • Hobby Hazards No   • Sleep Concern Yes     sleep apnea   • Stress Concern Yes   • Weight Concern No   • Special Diet Yes   • Back Care Yes   • Exercise No   • Bike Helmet No     does not ride bike    • Seat Belt Yes   • Self-Exams Yes     Social History Narrative   • No narrative on file     Allergies   Allergen Reactions   • Pcn [Penicillins] Anaphylaxis and Rash     Reaction; happened as a child. Tolerated cephalosporins and Zosyn     Outpatient Encounter Prescriptions as of 5/8/2019   Medication Sig Dispense Refill   • levothyroxine (SYNTHROID) 200 MCG Tab TAKE ONE TABLET BY MOUTH EVERY DAY IN THE MORNING ON AN EMPTY STOMACH 30 Tab 0   • Tiotropium Bromide Monohydrate (SPIRIVA RESPIMAT) 2.5 MCG/ACT Aero Soln Inhale 2 Puffs by mouth every day. 3 Inhaler 3   • budesonide-formoterol (SYMBICORT) 160-4.5 MCG/ACT Aerosol Inhale 2 Puffs by mouth 2 Times a Day. With spacer, rinse mouth after use 3 Inhaler 3   • albuterol (VENTOLIN HFA) 108 (90 Base) MCG/ACT Aero Soln inhalation aerosol Inhale  2 Puffs by mouth every four hours as needed for Shortness of Breath. 3 Inhaler 3   • Incontinence Supply Disposable (FQ PROTECTIVE UNDERWEAR) Misc USE 1 PULLUP 3 TIMES DAILY 90 Each 0   • LORazepam (ATIVAN) 1 MG Tab      • Misc. Devices Misc 3 boxes of pouches (73352), 2 boxes of wafers (22988), 3 boxes (tubes) paste, 1 box of no string skin prep, 1 box of no string unsolve 5 Container 6   • vitamin D, Ergocalciferol, (DRISDOL) 89248 units Cap capsule Take 1 Cap by mouth every 28 days. 3 Cap 0   • Misc. Devices Misc Pt requesting new battery, new tires and new brakes for power chair 1 Device 0   • lisinopril (PRINIVIL) 5 MG Tab Take 1 Tab by mouth every day. 30 Tab 3   • raNITidine (ZANTAC) 150 MG Tab Take 1 Tab by mouth 2 times a day. 60 Tab 0   • gabapentin (NEURONTIN) 300 MG Cap TAKE THREE CAPSULES BY MOUTH THREE TIMES A  Cap 0   • warfarin (COUMADIN) 10 MG Tab Take 1 Tab by mouth every evening. 10 mg on Mon, Wed, Fri ;12.5 mg all other days (10mg tab + 1/2 of 5mg Tab) 30 Tab 3   • warfarin (COUMADIN) 5 MG Tab Take 2.5 mg (1/2 tab) on  Tues, Thurs, Saturday Sunday; Along with 10mg tab to equal 12.5 mg 20 Tab 2   • Misc. Devices Misc Heavy duty 4 wheeled walker with seat and back rest to use as needed 1 Device 0   • FLUoxetine (PROZAC) 20 MG Cap      • atorvastatin (LIPITOR) 80 MG tablet Take 1 Tab by mouth every evening. 30 Tab 12   • amitriptyline (ELAVIL) 25 MG Tab Take 1 Tab by mouth every bedtime. 30 Tab 0   • carbamazepine (TEGRETOL) 200 MG Tab Take 600 mg by mouth every bedtime.     • [DISCONTINUED] levothyroxine (SYNTHROID) 25 MCG Tab Take 1 Tab by mouth Every morning on an empty stomach. (Patient not taking: Reported on 5/8/2019) 30 Tab 3   • [DISCONTINUED] albuterol (PROVENTIL HFA) 108 (90 Base) MCG/ACT Aero Soln inhalation aerosol Inhale 2 Puffs by mouth every four hours as needed for Shortness of Breath (Wheezing). (Patient not taking: Reported on 5/8/2019) 1 Inhaler 11     No  "facility-administered encounter medications on file as of 5/8/2019.      Review of Systems   Constitutional: Negative for chills, fever and malaise/fatigue.   Eyes: Negative for blurred vision and discharge.   Respiratory: Positive for shortness of breath. Negative for cough.    Cardiovascular: Negative for chest pain, palpitations, leg swelling and PND.   Gastrointestinal: Negative for heartburn and nausea.   Genitourinary: Positive for frequency. Negative for dysuria and urgency.   Musculoskeletal: Positive for back pain and joint pain. Negative for myalgias.   Skin: Negative for itching and rash.   Neurological: Negative for dizziness and headaches.   Endo/Heme/Allergies: Negative for environmental allergies. Does not bruise/bleed easily.   Psychiatric/Behavioral: Negative for depression. The patient is not nervous/anxious.         Objective:   /78 (BP Location: Left arm, Patient Position: Sitting, BP Cuff Size: Adult)   Pulse 80   Ht 1.6 m (5' 3\")   Wt 118.4 kg (261 lb)   LMP 07/19/2011   SpO2 (!) 87%   BMI 46.23 kg/m²     Physical Exam   Constitutional: She is oriented to person, place, and time.   Pleasant morbidly obese lady using a walker for ambulation.  Alert.  Oriented.  Good historian.  She appears older than stated age.   Neck: No JVD present.   Cardiovascular: Normal rate and regular rhythm.  Exam reveals no gallop and no friction rub.    No murmur heard.  No carotid bruits   Pulmonary/Chest: Effort normal and breath sounds normal.   Abdominal: Soft. There is no tenderness.   Musculoskeletal: She exhibits no edema.   Neurological: She is alert and oriented to person, place, and time.   Skin: Skin is warm and dry.       Assessment:     1. Paroxysmal A-fib (HCC)     2. History of DVT (deep vein thrombosis)     3. Chronic anticoagulation         Medical Decision Making:  Today's Assessment / Status / Plan:   I recommend no monitoring and no further evaluation for her remote history of PAF.  It " is not clear to me that she is having symptoms of atrial fibrillation or if she is having any paroxysms at all.  The importance of chronic anticoagulation with a mid therapeutic pro time/INR was discussed with her and she understands the need for stroke prevention.  There is no evidence of right heart failure clinically.    She does not need routine follow-up here.  Thank you for this consultation.

## 2019-05-20 RX ORDER — WARFARIN SODIUM 5 MG/1
TABLET ORAL
Qty: 15 TAB | Refills: 1 | Status: ON HOLD | OUTPATIENT
Start: 2019-05-20 | End: 2019-07-23

## 2019-05-23 DIAGNOSIS — Z79.01 CHRONIC ANTICOAGULATION: ICD-10-CM

## 2019-05-30 ENCOUNTER — ANTICOAGULATION MONITORING (OUTPATIENT)
Dept: VASCULAR LAB | Facility: MEDICAL CENTER | Age: 69
End: 2019-05-30

## 2019-05-30 DIAGNOSIS — Z95.828 S/P IVC FILTER: ICD-10-CM

## 2019-05-30 DIAGNOSIS — Z86.718 HISTORY OF DVT (DEEP VEIN THROMBOSIS): ICD-10-CM

## 2019-05-30 DIAGNOSIS — Z79.01 CHRONIC ANTICOAGULATION: ICD-10-CM

## 2019-05-30 LAB — INR PPP: 3.2 (ref 2–3.5)

## 2019-05-30 NOTE — PROGRESS NOTES
Anticoagulation Summary  As of 5/30/2019    INR goal:   2.0-3.0   TTR:   37.2 % (3.7 y)   INR used for dosing:   3.20! (5/30/2019)   Warfarin maintenance plan:   12.5 mg (10 mg x 1 and 5 mg x 0.5) every Tue, Fri, Sat; 10 mg (10 mg x 1) all other days   Weekly warfarin total:   77.5 mg   Plan last modified:   Ruth M Filter (5/3/2019)   Next INR check:   6/13/2019   Priority:   Routine   Target end date:   Indefinite    Indications    DVT (deep venous thrombosis) (HCC) (Resolved) [I82.409]  Chronic anticoagulation [Z79.01]  S/P IVC filter [Z95.828]  History of DVT (deep vein thrombosis) [Z86.718]             Anticoagulation Episode Summary     [facility]   [facility]    Preferred lab:   [facility]    Send INR reminders to:       Comments:   MD INR      Anticoagulation Care Providers     Provider Role Specialty Phone number    Carmelo Pat M.D. Referring [facility] Medicine [phone]    [facility] Anticoagulation Services Responsible  [phone]        Anticoagulation Patient Findings          Left a message for pt about supra therapeutic INR. Instructed pt to call us if she has any s/sx of bleeding or changes to medication or diet.     Instructed pt to take 5 mg today and then continue normal regimen listed above.  Follow up in 2 weeks, to reduce risk of adverse events related to this high risk medication,  Warfarin.    Aramis Kaplan, Pharmacy Intern    I have reviewed and concur with the above plan     Bob Dupree, PharmD

## 2019-06-03 ENCOUNTER — ANTICOAGULATION MONITORING (OUTPATIENT)
Dept: VASCULAR LAB | Facility: MEDICAL CENTER | Age: 69
End: 2019-06-03

## 2019-06-03 DIAGNOSIS — Z79.01 CHRONIC ANTICOAGULATION: ICD-10-CM

## 2019-06-03 DIAGNOSIS — Z95.828 S/P IVC FILTER: ICD-10-CM

## 2019-06-03 DIAGNOSIS — Z86.718 HISTORY OF DVT (DEEP VEIN THROMBOSIS): ICD-10-CM

## 2019-06-03 LAB — INR PPP: 3.2 (ref 2–3.5)

## 2019-06-03 NOTE — PROGRESS NOTES
Anticoagulation Summary  As of 6/3/2019    INR goal:   2.0-3.0   TTR:   37.1 % (3.7 y)   INR used for dosing:   3.20! (6/3/2019)   Warfarin maintenance plan:   12.5 mg (10 mg x 1 and 5 mg x 0.5) every Tue, Sat; 10 mg (10 mg x 1) all other days   Weekly warfarin total:   75 mg   Plan last modified:   Aramis Kaplan, Pharmacy Intern (6/3/2019)   Next INR check:   6/14/2019   Priority:   Routine   Target end date:   Indefinite    Indications    DVT (deep venous thrombosis) (HCC) (Resolved) [I82.409]  Chronic anticoagulation [Z79.01]  S/P IVC filter [Z95.828]  History of DVT (deep vein thrombosis) [Z86.718]             Anticoagulation Episode Summary     [facility]   [facility]    Preferred lab:   [facility]    Send INR reminders to:       Comments:   MD INR      Anticoagulation Care Providers     Provider Role Specialty Phone number    Carmelo Pat M.D. Referring [facility] Medicine [phone]    [facility] Anticoagulation Services Responsible  [phone]        Anticoagulation Patient Findings        Left a message for pt about supra therapeutic INR. Instructed pt to call if she has any s/sx of bleeding or clotting as well as any changes to diet or medications.     Pt to take 5 mg today then start a 3% reduced regimen outlined above.    Follow up in 1 weeks, to reduce risk of adverse events related to this high risk medication,  Warfarin.    Aramis Kaplan, Pharmacy Intern

## 2019-06-24 ENCOUNTER — TELEPHONE (OUTPATIENT)
Dept: VASCULAR LAB | Facility: MEDICAL CENTER | Age: 69
End: 2019-06-24

## 2019-06-24 NOTE — TELEPHONE ENCOUNTER
Received call from patient's [facility] pharmacy.  They are unable to refill her warfarin 5mg tablets.    Requested pt call back to discuss using the 2.5mg tablet strength.    Ruth Munoz, Clinical Pharmacist, CDE, CACP

## 2019-06-25 ENCOUNTER — OFFICE VISIT (OUTPATIENT)
Dept: MEDICAL GROUP | Facility: MEDICAL CENTER | Age: 69
End: 2019-06-25
Attending: FAMILY MEDICINE
Payer: MEDICARE

## 2019-06-25 ENCOUNTER — ANTICOAGULATION MONITORING (OUTPATIENT)
Dept: MEDICAL GROUP | Facility: MEDICAL CENTER | Age: 69
End: 2019-06-25

## 2019-06-25 VITALS
TEMPERATURE: 98.4 F | RESPIRATION RATE: 16 BRPM | BODY MASS INDEX: 46.07 KG/M2 | OXYGEN SATURATION: 94 % | SYSTOLIC BLOOD PRESSURE: 115 MMHG | HEIGHT: 63 IN | HEART RATE: 76 BPM | DIASTOLIC BLOOD PRESSURE: 70 MMHG | WEIGHT: 260 LBS

## 2019-06-25 DIAGNOSIS — H02.403 EYELID DROOPING DISEASE, BILATERAL: ICD-10-CM

## 2019-06-25 DIAGNOSIS — E55.9 VITAMIN D DEFICIENCY DISEASE: ICD-10-CM

## 2019-06-25 DIAGNOSIS — Z79.01 CHRONIC ANTICOAGULATION: ICD-10-CM

## 2019-06-25 DIAGNOSIS — Z95.828 S/P IVC FILTER: ICD-10-CM

## 2019-06-25 DIAGNOSIS — Z86.718 HISTORY OF DVT (DEEP VEIN THROMBOSIS): ICD-10-CM

## 2019-06-25 DIAGNOSIS — I10 ESSENTIAL HYPERTENSION: ICD-10-CM

## 2019-06-25 DIAGNOSIS — K21.9 GASTROESOPHAGEAL REFLUX DISEASE, ESOPHAGITIS PRESENCE NOT SPECIFIED: ICD-10-CM

## 2019-06-25 DIAGNOSIS — H54.3 DECREASED VISION IN BOTH EYES: ICD-10-CM

## 2019-06-25 LAB — INR PPP: 5.4 (ref 2–3.5)

## 2019-06-25 PROCEDURE — 99214 OFFICE O/P EST MOD 30 MIN: CPT | Performed by: FAMILY MEDICINE

## 2019-06-25 PROCEDURE — 99212 OFFICE O/P EST SF 10 MIN: CPT | Performed by: FAMILY MEDICINE

## 2019-06-25 RX ORDER — ATORVASTATIN CALCIUM 80 MG/1
80 TABLET, FILM COATED ORAL EVERY EVENING
Qty: 90 TAB | Refills: 1 | Status: SHIPPED | OUTPATIENT
Start: 2019-06-25 | End: 2019-10-08 | Stop reason: SDUPTHER

## 2019-06-25 RX ORDER — RANITIDINE 150 MG/1
150 TABLET ORAL 2 TIMES DAILY
Qty: 180 TAB | Refills: 1 | Status: SHIPPED | OUTPATIENT
Start: 2019-06-25 | End: 2019-09-25

## 2019-06-25 RX ORDER — ERGOCALCIFEROL 1.25 MG/1
50000 CAPSULE ORAL
Qty: 3 CAP | Refills: 4 | Status: SHIPPED | OUTPATIENT
Start: 2019-06-25 | End: 2022-02-07

## 2019-06-25 RX ORDER — LISINOPRIL 5 MG/1
5 TABLET ORAL DAILY
Qty: 90 TAB | Refills: 1 | Status: SHIPPED | OUTPATIENT
Start: 2019-06-25 | End: 2019-10-30 | Stop reason: SDUPTHER

## 2019-06-25 ASSESSMENT — ENCOUNTER SYMPTOMS
DEPRESSION: 0
TREMORS: 0
INSOMNIA: 0
SHORTNESS OF BREATH: 0
ABDOMINAL PAIN: 0
CHILLS: 0
HEARTBURN: 1
FEVER: 0
COUGH: 0
NAUSEA: 0
TINGLING: 1
SPEECH CHANGE: 0
HEADACHES: 0
BACK PAIN: 1
HALLUCINATIONS: 0
SPUTUM PRODUCTION: 0
NERVOUS/ANXIOUS: 1
PALPITATIONS: 0
SENSORY CHANGE: 0
VOMITING: 0

## 2019-06-25 ASSESSMENT — LIFESTYLE VARIABLES: SUBSTANCE_ABUSE: 0

## 2019-06-25 NOTE — PROGRESS NOTES
Subjective:      Atiya Henley is a 68 y.o. female who presents with Follow-Up            Patient 68-year-old female here for problems with her drooping eyelids, decreased vision in both eyes, vitamin D deficiency, hypertension and gastroesophageal reflux disease.    Today patient is requesting a referral to plastic surgeries to fix her drooping eyelids, which she feels has been causing her to have a decreased vision in both eyes.  She also feels that her drooping eyelids are contributing to her recurrent dizziness.  Today patient is insisting on the referral to plastic surgery so a referral will be made for a further evaluation and possible assistance in management.  She has already been seen by ophthalmology and was told that her eyes are not affecting her vision.  We will continue to follow.    She will also need her vitamin D prescription refilled today.  She has been using them as directed.  We will continue to follow.    She is also requesting her blood pressure medication to be refilled today.  Refill be sent to her pharmacy for her to continue using as directed.  Her blood pressure today is within normal limits at 158/70.  She is not having any chest pain, shortness of breath or other cardiac or neurologic symptom.  We will also have her check her blood pressures at home and keep notes.    She will also need her ranitidine sent to her pharmacy today.  She will continue using them as directed.  We will continue to follow.     Current medications, allergies, and problem list reviewed with patient and updated in EPIC.          Review of Systems   Constitutional: Negative for chills and fever.   HENT: Negative for hearing loss and tinnitus.    Respiratory: Negative for cough, sputum production and shortness of breath.    Cardiovascular: Negative for chest pain and palpitations.   Gastrointestinal: Positive for heartburn. Negative for abdominal pain, nausea and vomiting.   Musculoskeletal: Positive for back  "pain and joint pain.   Skin: Negative for rash.   Neurological: Positive for tingling. Negative for tremors, sensory change, speech change and headaches.   Psychiatric/Behavioral: Negative for depression, hallucinations, substance abuse and suicidal ideas. The patient is nervous/anxious. The patient does not have insomnia.           Objective:     /70 (BP Location: Left arm, Patient Position: Sitting)   Pulse 76   Temp 36.9 °C (98.4 °F)   Resp 16   Ht 1.6 m (5' 3\")   Wt 117.9 kg (260 lb)   LMP 07/19/2011   SpO2 94%   BMI 46.06 kg/m²      Physical Exam   Constitutional: She is oriented to person, place, and time.   BMI 46.06   HENT:   Head: Normocephalic and atraumatic.   Eyes: Pupils are equal, round, and reactive to light. Conjunctivae and EOM are normal.   Drooping eyelids partially obstructing field-of-view   Cardiovascular: Normal rate, regular rhythm and normal heart sounds.  Exam reveals no friction rub.    No murmur heard.  Pulmonary/Chest: Effort normal and breath sounds normal. No respiratory distress. She has no wheezes. She has no rales.   Abdominal: Soft. Bowel sounds are normal. She exhibits no distension. There is no tenderness.   Musculoskeletal:   Using power mobility device for mobility   Neurological: She is alert and oriented to person, place, and time.   Skin: Skin is warm and dry.   Psychiatric: She has a normal mood and affect. Her behavior is normal.   Nursing note and vitals reviewed.              Assessment/Plan:     1. Eyelid drooping disease, bilateral  A referral to plastic surgery was made today for correction of her drooping eyelids.  We will continue to follow.  - REFERRAL TO PLASTIC SURGERY    2. Decreased vision in both eyes  See above plan.  - REFERRAL TO PLASTIC SURGERY    3. Vitamin D deficiency disease  We will have her continue to take her vitamin D as directed.  We will continue to follow.    4. Essential hypertension  We will have her continue to take her blood " pressure medication as directed.  She will also check her blood pressures at home and keep notes.  We will continue to follow.    5. Gastroesophageal reflux disease, esophagitis presence not specified  We will have her continue to take her stomach medication as directed.  We will continue to follow.

## 2019-07-10 ENCOUNTER — ANTICOAGULATION MONITORING (OUTPATIENT)
Dept: VASCULAR LAB | Facility: MEDICAL CENTER | Age: 69
End: 2019-07-10

## 2019-07-10 DIAGNOSIS — Z79.01 CHRONIC ANTICOAGULATION: ICD-10-CM

## 2019-07-10 DIAGNOSIS — Z86.718 HISTORY OF DVT (DEEP VEIN THROMBOSIS): ICD-10-CM

## 2019-07-10 DIAGNOSIS — Z95.828 S/P IVC FILTER: ICD-10-CM

## 2019-07-10 LAB — INR PPP: 1.9 (ref 2–3.5)

## 2019-07-10 RX ORDER — LEVOTHYROXINE SODIUM 0.2 MG/1
TABLET ORAL
Qty: 30 TAB | Refills: 0 | Status: SHIPPED | OUTPATIENT
Start: 2019-07-10 | End: 2019-08-05 | Stop reason: SDUPTHER

## 2019-07-10 NOTE — PROGRESS NOTES
Anticoagulation Summary  As of 7/10/2019    INR goal:   2.0-3.0   TTR:   36.5 % (3.8 y)   INR used for dosin.90! (7/10/2019)   Warfarin maintenance plan:   10 mg (10 mg x 1) every day   Weekly warfarin total:   70 mg   Plan last modified:   Judith Pina PharmD (2019)   Next INR check:   2019   Priority:   Routine   Target end date:   Indefinite    Indications    DVT (deep venous thrombosis) (HCC) (Resolved) [I82.409]  Chronic anticoagulation [Z79.01]  S/P IVC filter [Z95.828]  History of DVT (deep vein thrombosis) [Z86.718]             Anticoagulation Episode Summary     [facility]   [facility]    Preferred lab:   [facility]    Send INR reminders to:       Comments:   MD INR      Anticoagulation Care Providers     Provider Role Specialty Phone number    Carmelo Pat M.D. Referring Family Medicine [phone]    [facility] Anticoagulation Services Responsible  [phone]        Anticoagulation Patient Findings  Patient Findings     Negatives:   Signs/symptoms of thrombosis, Signs/symptoms of bleeding, Laboratory test error suspected, Change in health, Change in alcohol use, Change in activity, Upcoming invasive procedure, Emergency department visit, Upcoming dental procedure, Missed doses, Extra doses, Change in medications, Change in diet/appetite, Hospital admission, Bruising, Other complaints        Spoke with patient today regarding subtherapeutic INR of 1.9.  Patient denies any signs/symptoms of bruising or bleeding or any changes in diet and medications.  Instructed patient to call clinic with any questions or concerns.  Given hx of labile INR's and CHEST guidelines recommendation of no adjustments with INR's within 0.1 of goal, will have patient continue with current warfarin regimen at this point.  Follow up in 1 weeks, to reduce risk of adverse events related to this high risk medication,  Warfarin.    Nahum Bowens, PharmD, BCACP

## 2019-07-15 RX ORDER — DIAPER,BRIEF,ADULT, DISPOSABLE
EACH MISCELLANEOUS
Qty: 90 EACH | Refills: 0 | Status: SHIPPED | OUTPATIENT
Start: 2019-07-15 | End: 2019-09-24 | Stop reason: SDUPTHER

## 2019-07-16 ENCOUNTER — ANTICOAGULATION MONITORING (OUTPATIENT)
Dept: VASCULAR LAB | Facility: MEDICAL CENTER | Age: 69
End: 2019-07-16

## 2019-07-16 DIAGNOSIS — Z79.01 CHRONIC ANTICOAGULATION: ICD-10-CM

## 2019-07-16 DIAGNOSIS — Z86.718 HISTORY OF DVT (DEEP VEIN THROMBOSIS): ICD-10-CM

## 2019-07-16 DIAGNOSIS — Z95.828 S/P IVC FILTER: ICD-10-CM

## 2019-07-16 LAB — INR PPP: 2.8 (ref 2–3.5)

## 2019-07-16 NOTE — PROGRESS NOTES
Anticoagulation Summary  As of 2019    INR goal:   2.0-3.0   TTR:   36.7 % (3.8 y)   INR used for dosin.80 (2019)   Warfarin maintenance plan:   10 mg (10 mg x 1) every day   Weekly warfarin total:   70 mg   Plan last modified:   Judith Pina, PharmD (2019)   Next INR check:   2019   Priority:   Routine   Target end date:   Indefinite    Indications    DVT (deep venous thrombosis) (HCC) (Resolved) [I82.409]  Chronic anticoagulation [Z79.01]  S/P IVC filter [Z95.828]  History of DVT (deep vein thrombosis) [Z86.718]             Anticoagulation Episode Summary     INR check [facility]   [facility]    Preferred lab:   [facility]    Send INR reminders to:       Comments:   MD INR      Anticoagulation Care Providers     Provider Role Specialty Phone number    Carmelo Pat M.D. Referring Family Medicine [phone]    [facility] Anticoagulation Services Responsible  [phone]        Anticoagulation Patient Findings    Spoke with patient to inform the last INR reading of 2.8.   Patient is being treated for DVT.     Patient is therapeutic and within goal range of 2.0-3.0 and was told to continue current regimen of 10mg daily.     Patient reports having no S&S of bleeding. Patient reports no changes in diet and medications.     Patient should follow up in 1 week(s) for INR and monitoring.    Geneva James, Pharmacy Intern

## 2019-07-23 ENCOUNTER — ANESTHESIA (OUTPATIENT)
Dept: SURGERY | Facility: MEDICAL CENTER | Age: 69
End: 2019-07-23
Payer: MEDICARE

## 2019-07-23 ENCOUNTER — HOSPITAL ENCOUNTER (OUTPATIENT)
Facility: MEDICAL CENTER | Age: 69
End: 2019-07-24
Attending: OPHTHALMOLOGY | Admitting: OPHTHALMOLOGY
Payer: MEDICARE

## 2019-07-23 ENCOUNTER — ANESTHESIA EVENT (OUTPATIENT)
Dept: SURGERY | Facility: MEDICAL CENTER | Age: 69
End: 2019-07-23
Payer: MEDICARE

## 2019-07-23 PROBLEM — R53.83 LETHARGY: Status: ACTIVE | Noted: 2019-07-23

## 2019-07-23 PROBLEM — H33.22 LEFT RETINAL DETACHMENT: Status: ACTIVE | Noted: 2019-07-23

## 2019-07-23 LAB
ANION GAP SERPL CALC-SCNC: 6 MMOL/L (ref 0–11.9)
BUN SERPL-MCNC: 9 MG/DL (ref 8–22)
CALCIUM SERPL-MCNC: 9 MG/DL (ref 8.5–10.5)
CHLORIDE SERPL-SCNC: 103 MMOL/L (ref 96–112)
CO2 SERPL-SCNC: 31 MMOL/L (ref 20–33)
CREAT SERPL-MCNC: 0.76 MG/DL (ref 0.5–1.4)
EKG IMPRESSION: NORMAL
GLUCOSE BLD-MCNC: 90 MG/DL (ref 65–99)
GLUCOSE SERPL-MCNC: 105 MG/DL (ref 65–99)
INR PPP: 3.23 (ref 0.87–1.13)
POTASSIUM SERPL-SCNC: 3.8 MMOL/L (ref 3.6–5.5)
PROTHROMBIN TIME: 34.3 SEC (ref 12–14.6)
SODIUM SERPL-SCNC: 140 MMOL/L (ref 135–145)

## 2019-07-23 PROCEDURE — 700111 HCHG RX REV CODE 636 W/ 250 OVERRIDE (IP)

## 2019-07-23 PROCEDURE — G0378 HOSPITAL OBSERVATION PER HR: HCPCS

## 2019-07-23 PROCEDURE — 700105 HCHG RX REV CODE 258: Performed by: OPHTHALMOLOGY

## 2019-07-23 PROCEDURE — 160009 HCHG ANES TIME/MIN: Performed by: OPHTHALMOLOGY

## 2019-07-23 PROCEDURE — 160041 HCHG SURGERY MINUTES - EA ADDL 1 MIN LEVEL 4: Performed by: OPHTHALMOLOGY

## 2019-07-23 PROCEDURE — 85610 PROTHROMBIN TIME: CPT

## 2019-07-23 PROCEDURE — 99220 PR INITIAL OBSERVATION CARE,LEVL III: CPT | Performed by: HOSPITALIST

## 2019-07-23 PROCEDURE — A6410 STERILE EYE PAD: HCPCS | Performed by: OPHTHALMOLOGY

## 2019-07-23 PROCEDURE — 501836 HCHG SUTURE EYE: Performed by: OPHTHALMOLOGY

## 2019-07-23 PROCEDURE — A9270 NON-COVERED ITEM OR SERVICE: HCPCS | Performed by: HOSPITALIST

## 2019-07-23 PROCEDURE — 700101 HCHG RX REV CODE 250

## 2019-07-23 PROCEDURE — 160036 HCHG PACU - EA ADDL 30 MINS PHASE I: Performed by: OPHTHALMOLOGY

## 2019-07-23 PROCEDURE — 82962 GLUCOSE BLOOD TEST: CPT

## 2019-07-23 PROCEDURE — 700102 HCHG RX REV CODE 250 W/ 637 OVERRIDE(OP): Performed by: HOSPITALIST

## 2019-07-23 PROCEDURE — 93010 ELECTROCARDIOGRAM REPORT: CPT | Performed by: INTERNAL MEDICINE

## 2019-07-23 PROCEDURE — 93005 ELECTROCARDIOGRAM TRACING: CPT | Performed by: OPHTHALMOLOGY

## 2019-07-23 PROCEDURE — 501838 HCHG SUTURE GENERAL: Performed by: OPHTHALMOLOGY

## 2019-07-23 PROCEDURE — 700101 HCHG RX REV CODE 250: Performed by: ANESTHESIOLOGY

## 2019-07-23 PROCEDURE — 80048 BASIC METABOLIC PNL TOTAL CA: CPT

## 2019-07-23 PROCEDURE — 700101 HCHG RX REV CODE 250: Performed by: OPHTHALMOLOGY

## 2019-07-23 PROCEDURE — 500558 HCHG EYE SHIELD W/GARTER (FOX): Performed by: OPHTHALMOLOGY

## 2019-07-23 PROCEDURE — 700111 HCHG RX REV CODE 636 W/ 250 OVERRIDE (IP): Performed by: OPHTHALMOLOGY

## 2019-07-23 PROCEDURE — 160029 HCHG SURGERY MINUTES - 1ST 30 MINS LEVEL 4: Performed by: OPHTHALMOLOGY

## 2019-07-23 PROCEDURE — 160002 HCHG RECOVERY MINUTES (STAT): Performed by: OPHTHALMOLOGY

## 2019-07-23 PROCEDURE — 160048 HCHG OR STATISTICAL LEVEL 1-5: Performed by: OPHTHALMOLOGY

## 2019-07-23 PROCEDURE — 700111 HCHG RX REV CODE 636 W/ 250 OVERRIDE (IP): Performed by: ANESTHESIOLOGY

## 2019-07-23 PROCEDURE — 160035 HCHG PACU - 1ST 60 MINS PHASE I: Performed by: OPHTHALMOLOGY

## 2019-07-23 RX ORDER — ONDANSETRON 2 MG/ML
INJECTION INTRAMUSCULAR; INTRAVENOUS PRN
Status: DISCONTINUED | OUTPATIENT
Start: 2019-07-23 | End: 2019-07-23 | Stop reason: SURG

## 2019-07-23 RX ORDER — BISACODYL 10 MG
10 SUPPOSITORY, RECTAL RECTAL
Status: DISCONTINUED | OUTPATIENT
Start: 2019-07-23 | End: 2019-07-24 | Stop reason: HOSPADM

## 2019-07-23 RX ORDER — DEXAMETHASONE SODIUM PHOSPHATE 4 MG/ML
INJECTION, SOLUTION INTRA-ARTICULAR; INTRALESIONAL; INTRAMUSCULAR; INTRAVENOUS; SOFT TISSUE
Status: DISCONTINUED | OUTPATIENT
Start: 2019-07-23 | End: 2019-07-23 | Stop reason: HOSPADM

## 2019-07-23 RX ORDER — SODIUM CHLORIDE, SODIUM LACTATE, POTASSIUM CHLORIDE, AND CALCIUM CHLORIDE .6; .31; .03; .02 G/100ML; G/100ML; G/100ML; G/100ML
500 INJECTION, SOLUTION INTRAVENOUS ONCE
Status: DISCONTINUED | OUTPATIENT
Start: 2019-07-23 | End: 2019-07-23 | Stop reason: HOSPADM

## 2019-07-23 RX ORDER — AMOXICILLIN 250 MG
2 CAPSULE ORAL 2 TIMES DAILY
Status: DISCONTINUED | OUTPATIENT
Start: 2019-07-24 | End: 2019-07-24 | Stop reason: HOSPADM

## 2019-07-23 RX ORDER — OXYCODONE HCL 5 MG/5 ML
10 SOLUTION, ORAL ORAL
Status: DISCONTINUED | OUTPATIENT
Start: 2019-07-23 | End: 2019-07-23 | Stop reason: HOSPADM

## 2019-07-23 RX ORDER — FAMOTIDINE 20 MG/1
20 TABLET, FILM COATED ORAL 2 TIMES DAILY
Status: DISCONTINUED | OUTPATIENT
Start: 2019-07-23 | End: 2019-07-24 | Stop reason: HOSPADM

## 2019-07-23 RX ORDER — WARFARIN SODIUM 5 MG/1
5 TABLET ORAL
Status: COMPLETED | OUTPATIENT
Start: 2019-07-23 | End: 2019-07-23

## 2019-07-23 RX ORDER — ONDANSETRON 2 MG/ML
4 INJECTION INTRAMUSCULAR; INTRAVENOUS
Status: DISCONTINUED | OUTPATIENT
Start: 2019-07-23 | End: 2019-07-23 | Stop reason: HOSPADM

## 2019-07-23 RX ORDER — LABETALOL HYDROCHLORIDE 5 MG/ML
5 INJECTION, SOLUTION INTRAVENOUS
Status: DISCONTINUED | OUTPATIENT
Start: 2019-07-23 | End: 2019-07-23 | Stop reason: HOSPADM

## 2019-07-23 RX ORDER — VANCOMYCIN HYDROCHLORIDE 500 MG/10ML
INJECTION, POWDER, LYOPHILIZED, FOR SOLUTION INTRAVENOUS
Status: COMPLETED | OUTPATIENT
Start: 2019-07-23 | End: 2019-07-23

## 2019-07-23 RX ORDER — DEXAMETHASONE SODIUM PHOSPHATE 4 MG/ML
INJECTION, SOLUTION INTRA-ARTICULAR; INTRALESIONAL; INTRAMUSCULAR; INTRAVENOUS; SOFT TISSUE PRN
Status: DISCONTINUED | OUTPATIENT
Start: 2019-07-23 | End: 2019-07-23 | Stop reason: SURG

## 2019-07-23 RX ORDER — PHENYLEPHRINE HYDROCHLORIDE 25 MG/ML
SOLUTION/ DROPS OPHTHALMIC
Status: COMPLETED
Start: 2019-07-23 | End: 2019-07-23

## 2019-07-23 RX ORDER — OXYCODONE HCL 5 MG/5 ML
5 SOLUTION, ORAL ORAL
Status: DISCONTINUED | OUTPATIENT
Start: 2019-07-23 | End: 2019-07-23 | Stop reason: HOSPADM

## 2019-07-23 RX ORDER — WARFARIN SODIUM 10 MG/1
10 TABLET ORAL
Status: DISCONTINUED | OUTPATIENT
Start: 2019-07-24 | End: 2019-07-24 | Stop reason: HOSPADM

## 2019-07-23 RX ORDER — BALANCED SALT SOLUTION 6.4; .75; .48; .3; 3.9; 1.7 MG/ML; MG/ML; MG/ML; MG/ML; MG/ML; MG/ML
SOLUTION OPHTHALMIC
Status: DISCONTINUED | OUTPATIENT
Start: 2019-07-23 | End: 2019-07-23 | Stop reason: HOSPADM

## 2019-07-23 RX ORDER — CARBAMAZEPINE 200 MG/1
600 TABLET ORAL
Status: DISCONTINUED | OUTPATIENT
Start: 2019-07-23 | End: 2019-07-24 | Stop reason: HOSPADM

## 2019-07-23 RX ORDER — TRIAMCINOLONE ACETONIDE 40 MG/ML
INJECTION, SUSPENSION INTRA-ARTICULAR; INTRAMUSCULAR
Status: DISCONTINUED | OUTPATIENT
Start: 2019-07-23 | End: 2019-07-23 | Stop reason: HOSPADM

## 2019-07-23 RX ORDER — POLYETHYLENE GLYCOL 3350 17 G/17G
1 POWDER, FOR SOLUTION ORAL
Status: DISCONTINUED | OUTPATIENT
Start: 2019-07-23 | End: 2019-07-24 | Stop reason: HOSPADM

## 2019-07-23 RX ORDER — NEOMYCIN SULFATE, POLYMYXIN B SULFATE, AND DEXAMETHASONE 3.5; 10000; 1 MG/G; [USP'U]/G; MG/G
OINTMENT OPHTHALMIC
Status: DISPENSED
Start: 2019-07-23 | End: 2019-07-23

## 2019-07-23 RX ORDER — LEVOTHYROXINE SODIUM 0.05 MG/1
200 TABLET ORAL
Status: DISCONTINUED | OUTPATIENT
Start: 2019-07-24 | End: 2019-07-24 | Stop reason: HOSPADM

## 2019-07-23 RX ORDER — CEFAZOLIN SODIUM 1 G/3ML
INJECTION, POWDER, FOR SOLUTION INTRAMUSCULAR; INTRAVENOUS
Status: DISCONTINUED | OUTPATIENT
Start: 2019-07-23 | End: 2019-07-23 | Stop reason: HOSPADM

## 2019-07-23 RX ORDER — ATORVASTATIN CALCIUM 20 MG/1
80 TABLET, FILM COATED ORAL EVERY EVENING
Status: DISCONTINUED | OUTPATIENT
Start: 2019-07-23 | End: 2019-07-24 | Stop reason: HOSPADM

## 2019-07-23 RX ORDER — ALBUTEROL SULFATE 90 UG/1
2 AEROSOL, METERED RESPIRATORY (INHALATION) EVERY 4 HOURS PRN
Status: DISCONTINUED | OUTPATIENT
Start: 2019-07-23 | End: 2019-07-24 | Stop reason: HOSPADM

## 2019-07-23 RX ORDER — BALANCED SALT SOLUTION ENRICHED WITH BICARBONATE, DEXTROSE, AND GLUTATHIONE
KIT INTRAOCULAR
Status: DISCONTINUED | OUTPATIENT
Start: 2019-07-23 | End: 2019-07-23 | Stop reason: HOSPADM

## 2019-07-23 RX ORDER — TIOTROPIUM BROMIDE 18 UG/1
1 CAPSULE ORAL; RESPIRATORY (INHALATION)
Status: DISCONTINUED | OUTPATIENT
Start: 2019-07-24 | End: 2019-07-24 | Stop reason: HOSPADM

## 2019-07-23 RX ORDER — SODIUM CHLORIDE, SODIUM LACTATE, POTASSIUM CHLORIDE, CALCIUM CHLORIDE 600; 310; 30; 20 MG/100ML; MG/100ML; MG/100ML; MG/100ML
INJECTION, SOLUTION INTRAVENOUS CONTINUOUS
Status: ACTIVE | OUTPATIENT
Start: 2019-07-23 | End: 2019-07-23

## 2019-07-23 RX ORDER — PHENYLEPHRINE HYDROCHLORIDE 10 MG/ML
INJECTION, SOLUTION INTRAMUSCULAR; INTRAVENOUS; SUBCUTANEOUS PRN
Status: DISCONTINUED | OUTPATIENT
Start: 2019-07-23 | End: 2019-07-23 | Stop reason: SURG

## 2019-07-23 RX ORDER — BALANCED SALT SOLUTION ENRICHED WITH BICARBONATE, DEXTROSE, AND GLUTATHIONE
KIT INTRAOCULAR
Status: DISPENSED
Start: 2019-07-23 | End: 2019-07-23

## 2019-07-23 RX ORDER — CYCLOPENTOLATE HYDROCHLORIDE 10 MG/ML
SOLUTION/ DROPS OPHTHALMIC
Status: COMPLETED
Start: 2019-07-23 | End: 2019-07-23

## 2019-07-23 RX ORDER — TROPICAMIDE 10 MG/ML
SOLUTION/ DROPS OPHTHALMIC
Status: COMPLETED
Start: 2019-07-23 | End: 2019-07-23

## 2019-07-23 RX ORDER — MOXIFLOXACIN 5 MG/ML
SOLUTION/ DROPS OPHTHALMIC
Status: COMPLETED
Start: 2019-07-23 | End: 2019-07-23

## 2019-07-23 RX ORDER — GABAPENTIN 300 MG/1
900 CAPSULE ORAL 3 TIMES DAILY
Status: DISCONTINUED | OUTPATIENT
Start: 2019-07-23 | End: 2019-07-24 | Stop reason: HOSPADM

## 2019-07-23 RX ORDER — NEOMYCIN SULFATE, POLYMYXIN B SULFATE, AND DEXAMETHASONE 3.5; 10000; 1 MG/G; [USP'U]/G; MG/G
OINTMENT OPHTHALMIC
Status: DISCONTINUED | OUTPATIENT
Start: 2019-07-23 | End: 2019-07-23 | Stop reason: HOSPADM

## 2019-07-23 RX ORDER — BUDESONIDE AND FORMOTEROL FUMARATE DIHYDRATE 160; 4.5 UG/1; UG/1
2 AEROSOL RESPIRATORY (INHALATION) 2 TIMES DAILY
Status: DISCONTINUED | OUTPATIENT
Start: 2019-07-23 | End: 2019-07-24 | Stop reason: HOSPADM

## 2019-07-23 RX ORDER — RANITIDINE 150 MG/1
150 TABLET ORAL 2 TIMES DAILY
Status: DISCONTINUED | OUTPATIENT
Start: 2019-07-23 | End: 2019-07-23

## 2019-07-23 RX ORDER — DIPHENHYDRAMINE HYDROCHLORIDE 50 MG/ML
12.5 INJECTION INTRAMUSCULAR; INTRAVENOUS
Status: DISCONTINUED | OUTPATIENT
Start: 2019-07-23 | End: 2019-07-23 | Stop reason: HOSPADM

## 2019-07-23 RX ORDER — HYDRALAZINE HYDROCHLORIDE 20 MG/ML
5 INJECTION INTRAMUSCULAR; INTRAVENOUS
Status: DISCONTINUED | OUTPATIENT
Start: 2019-07-23 | End: 2019-07-23 | Stop reason: HOSPADM

## 2019-07-23 RX ORDER — ONDANSETRON 2 MG/ML
4 INJECTION INTRAMUSCULAR; INTRAVENOUS EVERY 4 HOURS PRN
Status: DISCONTINUED | OUTPATIENT
Start: 2019-07-23 | End: 2019-07-24 | Stop reason: HOSPADM

## 2019-07-23 RX ORDER — SODIUM CHLORIDE, SODIUM LACTATE, POTASSIUM CHLORIDE, CALCIUM CHLORIDE 600; 310; 30; 20 MG/100ML; MG/100ML; MG/100ML; MG/100ML
INJECTION, SOLUTION INTRAVENOUS CONTINUOUS
Status: DISCONTINUED | OUTPATIENT
Start: 2019-07-23 | End: 2019-07-23 | Stop reason: HOSPADM

## 2019-07-23 RX ORDER — ONDANSETRON 4 MG/1
4 TABLET, ORALLY DISINTEGRATING ORAL EVERY 4 HOURS PRN
Status: DISCONTINUED | OUTPATIENT
Start: 2019-07-23 | End: 2019-07-24 | Stop reason: HOSPADM

## 2019-07-23 RX ORDER — FLURBIPROFEN SODIUM 0.3 MG/ML
SOLUTION/ DROPS OPHTHALMIC
Status: COMPLETED
Start: 2019-07-23 | End: 2019-07-23

## 2019-07-23 RX ORDER — FLUOXETINE HYDROCHLORIDE 20 MG/1
20 CAPSULE ORAL DAILY
Status: DISCONTINUED | OUTPATIENT
Start: 2019-07-23 | End: 2019-07-24 | Stop reason: HOSPADM

## 2019-07-23 RX ORDER — ACETAMINOPHEN 325 MG/1
650 TABLET ORAL EVERY 6 HOURS PRN
Status: DISCONTINUED | OUTPATIENT
Start: 2019-07-23 | End: 2019-07-24 | Stop reason: HOSPADM

## 2019-07-23 RX ORDER — LISINOPRIL 10 MG/1
5 TABLET ORAL DAILY
Status: DISCONTINUED | OUTPATIENT
Start: 2019-07-24 | End: 2019-07-24 | Stop reason: HOSPADM

## 2019-07-23 RX ORDER — METOCLOPRAMIDE HYDROCHLORIDE 5 MG/ML
INJECTION INTRAMUSCULAR; INTRAVENOUS PRN
Status: DISCONTINUED | OUTPATIENT
Start: 2019-07-23 | End: 2019-07-23 | Stop reason: SURG

## 2019-07-23 RX ADMIN — PHENYLEPHRINE HYDROCHLORIDE 100 MCG: 10 INJECTION INTRAVENOUS at 09:50

## 2019-07-23 RX ADMIN — DEXAMETHASONE SODIUM PHOSPHATE 10 MG: 4 INJECTION, SOLUTION INTRA-ARTICULAR; INTRALESIONAL; INTRAMUSCULAR; INTRAVENOUS; SOFT TISSUE at 10:01

## 2019-07-23 RX ADMIN — ONDANSETRON 4 MG: 2 INJECTION INTRAMUSCULAR; INTRAVENOUS at 10:01

## 2019-07-23 RX ADMIN — ATROPINE SULFATE 0.8 MG: 0.4 INJECTION, SOLUTION INTRAMUSCULAR; INTRAVENOUS; SUBCUTANEOUS at 09:44

## 2019-07-23 RX ADMIN — SUGAMMADEX 300 MG: 100 INJECTION, SOLUTION INTRAVENOUS at 10:24

## 2019-07-23 RX ADMIN — ATORVASTATIN CALCIUM 80 MG: 20 TABLET, FILM COATED ORAL at 21:26

## 2019-07-23 RX ADMIN — MOXIFLOXACIN HYDROCHLORIDE 1 DROP: 5 SOLUTION/ DROPS OPHTHALMIC at 07:46

## 2019-07-23 RX ADMIN — FENTANYL CITRATE 100 MCG: 50 INJECTION, SOLUTION INTRAMUSCULAR; INTRAVENOUS at 09:42

## 2019-07-23 RX ADMIN — PHENYLEPHRINE HYDROCHLORIDE 1 DROP: 2.5 SOLUTION/ DROPS OPHTHALMIC at 07:45

## 2019-07-23 RX ADMIN — LIDOCAINE HYDROCHLORIDE 100 MG: 20 INJECTION, SOLUTION INTRAVENOUS at 09:42

## 2019-07-23 RX ADMIN — ROCURONIUM BROMIDE 50 MG: 10 INJECTION, SOLUTION INTRAVENOUS at 09:42

## 2019-07-23 RX ADMIN — WARFARIN SODIUM 5 MG: 5 TABLET ORAL at 21:28

## 2019-07-23 RX ADMIN — METOCLOPRAMIDE 10 MG: 5 INJECTION, SOLUTION INTRAMUSCULAR; INTRAVENOUS at 09:33

## 2019-07-23 RX ADMIN — PROPOFOL 200 MG: 10 INJECTION, EMULSION INTRAVENOUS at 09:42

## 2019-07-23 RX ADMIN — CARBAMAZEPINE 600 MG: 200 TABLET ORAL at 21:27

## 2019-07-23 RX ADMIN — FAMOTIDINE 20 MG: 20 TABLET ORAL at 21:27

## 2019-07-23 RX ADMIN — ALBUTEROL SULFATE 2.5 MG: 2.5 SOLUTION RESPIRATORY (INHALATION) at 10:44

## 2019-07-23 RX ADMIN — SODIUM CHLORIDE, POTASSIUM CHLORIDE, SODIUM LACTATE AND CALCIUM CHLORIDE: 600; 310; 30; 20 INJECTION, SOLUTION INTRAVENOUS at 09:33

## 2019-07-23 RX ADMIN — FLURBIPROFEN SODIUM 1 DROP: 0.3 SOLUTION/ DROPS OPHTHALMIC at 07:43

## 2019-07-23 RX ADMIN — TROPICAMIDE 1 DROP: 10 SOLUTION/ DROPS OPHTHALMIC at 07:56

## 2019-07-23 RX ADMIN — MIDAZOLAM HYDROCHLORIDE 2 MG: 1 INJECTION, SOLUTION INTRAMUSCULAR; INTRAVENOUS at 09:33

## 2019-07-23 RX ADMIN — EPHEDRINE SULFATE 50 MG: 50 INJECTION INTRAMUSCULAR; INTRAVENOUS; SUBCUTANEOUS at 09:50

## 2019-07-23 RX ADMIN — CYCLOPENTOLATE HYDROCHLORIDE 1 DROP: 10 SOLUTION/ DROPS OPHTHALMIC at 07:44

## 2019-07-23 RX ADMIN — BUDESONIDE AND FORMOTEROL FUMARATE DIHYDRATE 2 PUFF: 160; 4.5 AEROSOL RESPIRATORY (INHALATION) at 21:28

## 2019-07-23 RX ADMIN — GABAPENTIN 900 MG: 300 CAPSULE ORAL at 21:26

## 2019-07-23 ASSESSMENT — PAIN SCALES - GENERAL: PAIN_LEVEL: 0

## 2019-07-23 ASSESSMENT — LIFESTYLE VARIABLES
ALCOHOL_USE: NO
EVER_SMOKED: YES

## 2019-07-23 ASSESSMENT — PATIENT HEALTH QUESTIONNAIRE - PHQ9
1. LITTLE INTEREST OR PLEASURE IN DOING THINGS: NOT AT ALL
SUM OF ALL RESPONSES TO PHQ9 QUESTIONS 1 AND 2: 0
2. FEELING DOWN, DEPRESSED, IRRITABLE, OR HOPELESS: NOT AT ALL

## 2019-07-23 NOTE — OR NURSING
0757 Dr. Morris and Dr. Sawant notified by phone of lab results, specifically PT INR. Pt states last dose of coumadin was yesterday night. She states she sees the coumadin clinic for treatment and they were unaware of this surgery.

## 2019-07-23 NOTE — ASSESSMENT & PLAN NOTE
Likely secondary to delayed clearance of sedating agents during her surgery with history of sleep apnea and chronic respiratory failure    Avoid sedating agents  Continue close clinical monitoring  We will ask RT to resume CPAP  at bedtime and with naps

## 2019-07-23 NOTE — ANESTHESIA PROCEDURE NOTES
Airway  Date/Time: 7/23/2019 9:43 AM  Performed by: AMAURI DUMONT  Authorized by: AMAURI DUMONT     Location:  OR  Urgency:  Elective  Indications for Airway Management:  Anesthesia  Spontaneous Ventilation: absent    Sedation Level:  Deep  Preoxygenated: Yes    Patient Position:  Sniffing  Mask Difficulty Assessment:  1 - vent by mask  Final Airway Type:  Endotracheal airway  Final Endotracheal Airway:  ETT  Cuffed: Yes    Technique Used for Successful ETT Placement:  Direct laryngoscopy  Insertion Site:  Oral  Blade Type:  Pereira  Laryngoscope Blade/Videolaryngoscope Blade Size:  2  ETT Size (mm):  7.0  Measured from:  Teeth  ETT to Teeth (cm):  21  Placement Verified by: auscultation and capnometry    Cormack-Lehane Classification:  Grade I - full view of glottis  Number of Attempts at Approach:  1

## 2019-07-23 NOTE — OR NURSING
1031 Patient arrived from OR on [facility]. Report received from RN and Anesthesia. Patient's VS WNL, patient arousable, stable, breathing even/non labored. Eyeshield on L eye. Blood glucose tested per DR. Sawant, BG was 90. Per report from OR ROS Renee, pt to be face down, sleep on L side post op.   1047 Nebulizer treatment done per Dr. Sawant' order.   1057 Pt repositioned to L side lying.   1128 Pt meets criteria for phase 2.   1245 Pt weaned off of oxygen.   1313 Report to ROS Cornell for lunch coverage.   1430 Patient still desats to low 80's when sleeping, Patient did not bring her CPAP, patient arrousable but still very drowsy. Pt states that she lives alone. Spoke to Dr. Sawant and DR. Morris about this, they agreed to admit patient overnight. Called neighbor Ever and updated.   1445 Pt placed back on O2.   1455 Spoke to hospitalist Dr. Dannielle Chisholm, he is to speak to Dr. Morris.   1523 Dr. Dannielle Chisholm at bedside.   1745 pt voided in bed pan.   1800 pt tolerating tuna sandwich.   1828 Hand-off to ROS Singh.

## 2019-07-23 NOTE — ASSESSMENT & PLAN NOTE
Discussed with ophthalmology patient to lay on her left side when in bed   keep eye patch on and if clinically stable for discharge follow-up with Dr. Morris in his office tomorrow

## 2019-07-23 NOTE — ANESTHESIA TIME REPORT
Anesthesia Start and Stop Event Times     Date Time Event    7/23/2019 0933 Anesthesia Start     1034 Anesthesia Stop        Responsible Staff  07/23/19    Name Role Begin End    Sang Sawant M.D. Anesth 0933 1034        Preop Diagnosis (Free Text):  Pre-op Diagnosis     RETINAL DETACHMENT WITH MACULA OFF        Preop Diagnosis (Codes):  Diagnosis Information     Diagnosis Code(s):         Post op Diagnosis  Retinal detachment      Premium Reason  Non-Premium    Comments:

## 2019-07-23 NOTE — ASSESSMENT & PLAN NOTE
No acute exacerbation  Continue Symbicort and Spiriva and bronchodilators per RT protocol  Oxygen to keep saturation 90 to 92%

## 2019-07-23 NOTE — OR NURSING
1313 Report from ROS Cruz. Pt resting, appears comfortable. Respirations even and unlabored. No needs at this time. Requires frequent encourage to deep breathe, pt desats to mid 70s when falls asleep.    1342 Report back to ROS Cruz.

## 2019-07-23 NOTE — H&P
Hospital Medicine History & Physical Note    Date of Service  7/23/2019    Primary Care Physician  Carmelo Pat M.D.    Consultants  Dr Calhoun    Code Status  Full    Chief Complaint  Lethargy postop    History of Presenting Illness  68 y.o. female who presented 7/23/2019 with multiple medical problems including sleep apnea for which she uses CPAP at home was admitted and underwent surgery for left retinal detachment.  She received propofol and fentanyl during her procedure.  Postop the patient has been monitored in the PACU and remains very somnolent.  The patient lives by herself her neighbor was going to be her ride home but otherwise she has no help at home.  On my exam she is asleep she arouses to verbal and tactile stimulation, she is oriented x3, she still feels very tired and somnolent.  She denies any chest pain or dyspnea.  She denies any abdominal pain.  She has a left lower quadrant stoma secondary to complications from surgery for diverticulitis.  She has been compliant with her anticoagulation for prior history of DVT    Review of Systems  Review of Systems   All other systems reviewed and are negative.      Past Medical History   has a past medical history of Anxiety disorder; Arthritis; Bipolar affective (HCC); Bladder incontinence; Bowel obstruction ([facility]); Breath shortness; Cancer ([facility]); Chronic autoimmune thyroiditis; Colostomy in place ([facility]) (2009); COPD; Dental disorder; Diabetes ([facility]); EMPHYSEMA; Hashimoto's disease; High cholesterol; History of total knee arthroplasty; Hypertension; Hypothyroid (7/7/2009); Indigestion; Infectious disease; Infectious disease; Obesity (7/7/2009); Obstruction; Oxygen dependent; Pain; Personal history of venous thrombosis and embolism (2009, 2012); Presence of IVC filter; Psychiatric problem; Sleep apnea; Snoring; Status post cholecystectomy (1992); and Weight gain. She also has no past medical history of Urinary bladder disorder.    Surgical History   has a past  surgical history that includes myringotomy (9/9/2009); low anterior resection (3/10/2010); low anterior resection laparoscopic (3/10/2010); colostomy (3/16/2010); tracheostomy (3/31/2010); cholecystectomy (1992); exploratory laparotomy (3/16/2010); colectomy (3/16/2010); hysterectomy laparoscopy (1983); colonoscopy - endo (11/17/2010); lateral release (7/20/2011); other orthopedic surgery; knee arthroscopy (7/20/2011); medial meniscectomy (7/20/2011); meniscectomy (7/20/2011); irrigation & debridement ortho (7/17/2012); irrigation & debridement ortho (7/19/2012); knee arthroplasty total (6/10/2012); irrigation & debridement general (12/17/2012); irrigation & debridement ortho (2/8/2013); exploratory laparotomy (9/2/2013); parastomal hernia repair  (9/2/2013); bladder biopsy with cystoscopy (10/10/2013); colostomy (11/26/2013); exploratory laparotomy (11/26/2013); lysis adhesions general (11/26/2013); colon resection (11/26/2013); exploratory laparotomy (12/10/2013); wound closure general (12/10/2013); pr inject rx other periph nerve (Left, 8/21/2015); pr fluoroscopic guidance needle placement (Left, 8/21/2015); pr inject rx other periph nerve (8/21/2015); and pr inject rx other periph nerve (8/21/2015).     Family History  family history includes Alcohol/Drug in her father; Heart Disease in her mother; Hypertension in her unknown relative; Lung Disease in her father; Seizures in her mother.     Social History   reports that she has been smoking Cigarettes.  She has a 25.00 pack-year smoking history. She uses smokeless tobacco. She reports that she uses drugs, including Marijuana and Inhaled. She reports that she does not drink alcohol.    Allergies  Allergies   Allergen Reactions   • Pcn [Penicillins] Anaphylaxis and Rash     Reaction; happened as a child. Tolerated cephalosporins and Zosyn       Medications  Prior to Admission Medications   Prescriptions Last Dose Informant Patient Reported? Taking?   FLUoxetine  (PROZAC) 20 MG Cap 7/22/2019 at Unknown time  Yes No   Sig: Take 20 mg by mouth every day.   Incontinence Supply Disposable (FQ PROTECTIVE UNDERWEAR) Misc 7/23/2019 at Unknown time  No No   Sig: USE 1 PULLUP 3 TIMES DAILY   LORazepam (ATIVAN) 1 MG Tab 7/22/2019 at Unknown time  Yes No   Sig: Take 2 mg by mouth at bedtime as needed for Anxiety.   Misc. Devices Misc 7/22/2019 at Unknown time  No No   Sig: Heavy duty 4 wheeled walker with seat and back rest to use as needed   Misc. Devices Misc 7/22/2019 at Unknown time  No No   Sig: Pt requesting new battery, new tires and new brakes for power chair   Misc. Devices Misc 7/22/2019 at Unknown time  No No   Sig: 3 boxes of pouches (20114), 2 boxes of wafers (69263), 3 boxes (tubes) paste, 1 box of no string skin prep, 1 box of no string unsolve   Tiotropium Bromide Monohydrate (SPIRIVA RESPIMAT) 2.5 MCG/ACT Aero Soln 7/22/2019 at Unknown time  No No   Sig: Inhale 2 Puffs by mouth every day.   albuterol (VENTOLIN HFA) 108 (90 Base) MCG/ACT Aero Soln inhalation aerosol 7/22/2019 at Unknown time  No No   Sig: Inhale 2 Puffs by mouth every four hours as needed for Shortness of Breath.   amitriptyline (ELAVIL) 25 MG Tab 7/22/2019 at Unknown time Patient No No   Sig: Take 1 Tab by mouth every bedtime.   atorvastatin (LIPITOR) 80 MG tablet 7/22/2019 at Unknown time  No No   Sig: Take 1 Tab by mouth every evening.   budesonide-formoterol (SYMBICORT) 160-4.5 MCG/ACT Aerosol 7/22/2019 at pm  No No   Sig: Inhale 2 Puffs by mouth 2 Times a Day. With spacer, rinse mouth after use   carbamazepine (TEGRETOL) 200 MG Tab 7/22/2019 at Unknown time Patient Yes No   Sig: Take 600 mg by mouth every bedtime.   gabapentin (NEURONTIN) 300 MG Cap 7/22/2019 at Unknown time  No No   Sig: TAKE THREE CAPSULES BY MOUTH THREE TIMES A DAY   levothyroxine (SYNTHROID) 200 MCG Tab 7/22/2019 at Unknown time  No No   Sig: TAKE ONE TABLET BY MOUTH EVERY MORNING ON AN EMPTY STOMACH.   lisinopril (PRINIVIL) 5  MG Tab 7/22/2019 at Unknown time  No No   Sig: Take 1 Tab by mouth every day.   raNITidine (ZANTAC) 150 MG Tab 7/22/2019 at Unknown time  No No   Sig: Take 1 Tab by mouth 2 times a day.   vitamin D, Ergocalciferol, (DRISDOL) 74825 units Cap capsule 7/9/2019  No No   Sig: Take 1 Cap by mouth every 28 days.   warfarin (COUMADIN) 10 MG Tab 7/22/2019 at 0600  No No   Sig: Take 1 Tab by mouth every evening. 10 mg on Mon, Wed, Fri ;12.5 mg all other days (10mg tab + 1/2 of 5mg Tab)      Facility-Administered Medications: None       Physical Exam  Temp:  [36.4 °C (97.6 °F)-36.7 °C (98 °F)] 36.4 °C (97.6 °F)  Pulse:  [52-79] 53  Resp:  [14-22] 14  SpO2:  [90 %-98 %] 90 %    Physical Exam   Constitutional: She is oriented to person, place, and time. She appears well-developed and well-nourished.     Obese   HENT:   Head: Normocephalic and atraumatic.   Mouth/Throat: No oropharyngeal exudate.   Eyes: Conjunctivae are normal. Right eye exhibits no discharge. Left eye exhibits no discharge. No scleral icterus.   Left eye patch   Neck: Neck supple. No JVD present. No tracheal deviation present.   Cardiovascular: Normal rate and regular rhythm.  Exam reveals no gallop and no friction rub.    No murmur heard.  Pulmonary/Chest: Effort normal and breath sounds normal. No stridor. No respiratory distress. She has no wheezes. She has no rales. She exhibits no tenderness.   Abdominal: Soft. Bowel sounds are normal. She exhibits no distension. There is no tenderness. There is no rebound.   LLQ Stoma    Musculoskeletal: She exhibits edema. She exhibits no tenderness.   Neurological: She is oriented to person, place, and time. No cranial nerve deficit. She exhibits normal muscle tone.   Somnolent arousable   Skin: Skin is warm and dry. She is not diaphoretic. No cyanosis. Nails show no clubbing.   Psychiatric: Her speech is delayed. She is slowed.   Nursing note and vitals reviewed.      Laboratory:      Recent Labs      07/23/19   0704    SODIUM  140   POTASSIUM  3.8   CHLORIDE  103   CO2  31   GLUCOSE  105*   BUN  9   CREATININE  0.76   CALCIUM  9.0     Recent Labs      07/23/19   0704   GLUCOSE  105*     Recent Labs      07/23/19   0704   INR  3.23*     No results for input(s): NTPROBNP in the last 72 hours.      No results for input(s): TROPONINT in the last 72 hours.    Urinalysis:    No results found     Imaging:  No orders to display         Assessment/Plan:  I anticipate this patient is appropriate for observation status at this time.    * Lethargy   Assessment & Plan    Likely secondary to delayed clearance of sedating agents during her surgery with history of sleep apnea and chronic respiratory failure    Avoid sedating agents  Continue close clinical monitoring  We will ask RT to resume CPAP  at bedtime and with naps     History of DVT (deep vein thrombosis)- (present on admission)   Assessment & Plan    Monitor INR and resume anticoagulation when less than 3     COPD (chronic obstructive pulmonary disease) ([facility])- (present on admission)   Assessment & Plan    No acute exacerbation  Continue Symbicort and Spiriva and bronchodilators per RT protocol  Oxygen to keep saturation 90 to 92%     YESSICA (obstructive sleep apnea)- (present on admission)   Assessment & Plan    Minimize use of sedating agents  CPAP at bedtime     Left retinal detachment   Assessment & Plan    Discussed with ophthalmology patient to lay on her left side when in bed   keep eye patch on and if clinically stable for discharge follow-up with Dr. Morris in his office tomorrow     Osteoarthritis of spine with radiculopathy, lumbosacral region- (present on admission)   Assessment & Plan    Continue gabapentin  We will hold amitriptyline tonight controlled she is more alert     Colostomy in place ([facility])- (present on admission)   Assessment & Plan    Stoma care             VTE prophylaxis: warfarin

## 2019-07-23 NOTE — OP REPORT
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    POSTOPERATIVE DIAGNOSIS:  Rhegmatogenous retinal detachment with multiple   retinal tears, left eye.    PROCEDURE:  A 23-gauge pars plana vitrectomy, Endolaser, 12% C3F8, left eye.    SURGEON:  Ernst Morris MD    ASSISTANT:  None.    ANESTHESIOLOGIST:  Sang Sawant MD    ANESTHESIA:  General endotracheal.    FLUIDS:  See anesthesia note.    COMPLICATIONS:  None.    INDICATIONS:  The patient with decreased vision due to multiple retinal tears   and retinal detachment.  Risks, benefits and alternatives of surgery were   discussed with the patient.  Patient consented for the procedure.    DETAILS OF THE PROCEDURE:  The correct eye was marked in the preop area.    Patient was taken to the operating room.  General anesthesia was induced by   anesthesiology and the patient was prepped and draped in the usual ophthalmic   fashion.  Site was marked 3 mm from the limbus in the inferotemporal quadrant.    A 23-gauge trocar was used in a beveled fashion to enter the vitreous   cavity.  Infusion was placed in the eye, visualized with the light pipe and   turned on.  One site superonasally another site superotemporally were then   marked 3 mm from the limbus.  A23-gauge trocar was used in a beveled fashion   to enter the vitreous cavity.  We performed good core and peripheral   vitrectomy to remove the vitreous.  We noticed multiple superior and nasal   retinal tears.  Air fluid exchange was then performed.  Retina was flat.    Endolaser was performed around those tears.  A 12% C3F8 was placed in the eye.    Trocars were then removed.  There was no leak.  Postop Ancef and   dexamethasone were injected subconjunctivally.  The drapes were then removed.    Patient's face was cleaned, ointment applied.  The eye was patched and   shield.  The patient was taken to the recovery room in good  condition.  There   were no complications.       ____________________________________     MD WHIT BOSCH / ANTONIO    DD:  07/23/2019 10:37:07  DT:  07/23/2019 10:46:56    D#:  [id number]  Job#:  [id number]

## 2019-07-23 NOTE — ANESTHESIA PREPROCEDURE EVALUATION
Relevant Problems   (+) COPD (chronic obstructive pulmonary disease) (HCC)   (+) Chronic obstructive asthma (HCC)   (+) GERD (gastroesophageal reflux disease)   (+) HTN (hypertension)   (+) YESSICA (obstructive sleep apnea)       Physical Exam    Airway   Mallampati: II  TM distance: >3 FB  Neck ROM: full       Cardiovascular - normal exam  Rhythm: regular  Rate: normal  (+) peripheral edema  (-) murmur     Dental - normal exam         Pulmonary - normal exam  Breath sounds clear to auscultation  (+) decreased breath sounds     Abdominal   (+) obese     Neurological - normal exam                 Anesthesia Plan    ASA 4 - emergent (impending blindness)   ASA physical status 4 criteria: respiratory failureASA physical status emergent criteria: other (comment)    Plan - general       Airway plan will be ETT  (Possible post op vent and or icu .  All questions answered)      Induction: intravenous    Postoperative Plan: Postoperative administration of opioids is intended.    Pertinent diagnostic labs and testing reviewed    Informed Consent:    Anesthetic plan and risks discussed with patient.    Use of blood products discussed with: patient whom consented to blood products.

## 2019-07-23 NOTE — ANESTHESIA QCDR
2019 [facility] Clinical Data Registry (for Quality Improvement)     Postoperative nausea/vomiting risk protocol (Adult = 18 yrs and Pediatric 3-17 yrs)- (430 and 463)  General inhalation anesthetic (NOT TIVA) with PONV risk factors: Yes  Provision of anti-emetic therapy with at least 2 different classes of agents: Yes   Patient DID NOT receive anti-emetic therapy and reason is documented in Medical Record:  N/A    Multimodal Pain Management- (AQI59)  Patient undergoing Elective Surgery (i.e. Outpatient, or ASC, or Prescheduled Surgery prior to Hospital Admission): Yes  Use of Multimodal Pain Management, two or more drugs and/or interventions, NOT including systemic opioids: Yes   Exception: Documented allergy to multiple classes of analgesics:  N/A    PACU assessment of acute postoperative pain prior to Anesthesia Care End- Applies to Patients Age = 18- (ABG7)  Initial PACU pain score is which of the following: < 7/10  Patient unable to report pain score: N/A    Post-anesthetic transfer of care checklist/protocol to PACU/ICU- (426 and 427)  Upon conclusion of case, patient transferred to which of the following locations: PACU/Non-ICU  Use of transfer checklist/protocol: Yes  Exclusion: Service Performed in Patient Hospital Room (and thus did not require transfer): N/A    PACU Reintubation- (AQI31)  General anesthesia requiring endotracheal intubation (ETT) along with subsequent extubation in OR or PACU: Yes  Required reintubation in the PACU: No   Extubation was a planned trial documented in the medical record prior to removal of the original airway device:  N/A    Unplanned admission to ICU related to anesthesia service up through end of PACU care- (MD51)  Unplanned admission to ICU (not initially anticipated at anesthesia start time): No

## 2019-07-24 ENCOUNTER — PATIENT OUTREACH (OUTPATIENT)
Dept: HEALTH INFORMATION MANAGEMENT | Facility: OTHER | Age: 69
End: 2019-07-24

## 2019-07-24 VITALS
OXYGEN SATURATION: 98 % | RESPIRATION RATE: 16 BRPM | DIASTOLIC BLOOD PRESSURE: 61 MMHG | BODY MASS INDEX: 45.77 KG/M2 | HEART RATE: 67 BPM | WEIGHT: 268.08 LBS | HEIGHT: 64 IN | TEMPERATURE: 97 F | SYSTOLIC BLOOD PRESSURE: 137 MMHG

## 2019-07-24 LAB
ANION GAP SERPL CALC-SCNC: 6 MMOL/L (ref 0–11.9)
BASOPHILS # BLD AUTO: 0.1 % (ref 0–1.8)
BASOPHILS # BLD: 0.01 K/UL (ref 0–0.12)
BUN SERPL-MCNC: 10 MG/DL (ref 8–22)
CALCIUM SERPL-MCNC: 8.4 MG/DL (ref 8.5–10.5)
CHLORIDE SERPL-SCNC: 107 MMOL/L (ref 96–112)
CO2 SERPL-SCNC: 28 MMOL/L (ref 20–33)
CREAT SERPL-MCNC: 0.67 MG/DL (ref 0.5–1.4)
EOSINOPHIL # BLD AUTO: 0.02 K/UL (ref 0–0.51)
EOSINOPHIL NFR BLD: 0.2 % (ref 0–6.9)
ERYTHROCYTE [DISTWIDTH] IN BLOOD BY AUTOMATED COUNT: 57.6 FL (ref 35.9–50)
GLUCOSE SERPL-MCNC: 112 MG/DL (ref 65–99)
HCT VFR BLD AUTO: 40.1 % (ref 37–47)
HGB BLD-MCNC: 12.3 G/DL (ref 12–16)
IMM GRANULOCYTES # BLD AUTO: 0.02 K/UL (ref 0–0.11)
IMM GRANULOCYTES NFR BLD AUTO: 0.2 % (ref 0–0.9)
INR PPP: 2.85 (ref 0.87–1.13)
LYMPHOCYTES # BLD AUTO: 2.13 K/UL (ref 1–4.8)
LYMPHOCYTES NFR BLD: 25.1 % (ref 22–41)
MCH RBC QN AUTO: 32.2 PG (ref 27–33)
MCHC RBC AUTO-ENTMCNC: 30.7 G/DL (ref 33.6–35)
MCV RBC AUTO: 105 FL (ref 81.4–97.8)
MONOCYTES # BLD AUTO: 0.43 K/UL (ref 0–0.85)
MONOCYTES NFR BLD AUTO: 5.1 % (ref 0–13.4)
NEUTROPHILS # BLD AUTO: 5.87 K/UL (ref 2–7.15)
NEUTROPHILS NFR BLD: 69.3 % (ref 44–72)
NRBC # BLD AUTO: 0 K/UL
NRBC BLD-RTO: 0 /100 WBC
PLATELET # BLD AUTO: 210 K/UL (ref 164–446)
PMV BLD AUTO: 10.2 FL (ref 9–12.9)
POTASSIUM SERPL-SCNC: 4 MMOL/L (ref 3.6–5.5)
PROTHROMBIN TIME: 31 SEC (ref 12–14.6)
RBC # BLD AUTO: 3.82 M/UL (ref 4.2–5.4)
SODIUM SERPL-SCNC: 141 MMOL/L (ref 135–145)
WBC # BLD AUTO: 8.5 K/UL (ref 4.8–10.8)

## 2019-07-24 PROCEDURE — 85025 COMPLETE CBC W/AUTO DIFF WBC: CPT

## 2019-07-24 PROCEDURE — A9270 NON-COVERED ITEM OR SERVICE: HCPCS | Performed by: HOSPITALIST

## 2019-07-24 PROCEDURE — G0378 HOSPITAL OBSERVATION PER HR: HCPCS

## 2019-07-24 PROCEDURE — 85610 PROTHROMBIN TIME: CPT

## 2019-07-24 PROCEDURE — 36415 COLL VENOUS BLD VENIPUNCTURE: CPT

## 2019-07-24 PROCEDURE — 99217 PR OBSERVATION CARE DISCHARGE: CPT | Performed by: INTERNAL MEDICINE

## 2019-07-24 PROCEDURE — 700102 HCHG RX REV CODE 250 W/ 637 OVERRIDE(OP): Performed by: HOSPITALIST

## 2019-07-24 PROCEDURE — 80048 BASIC METABOLIC PNL TOTAL CA: CPT

## 2019-07-24 RX ADMIN — FAMOTIDINE 20 MG: 20 TABLET ORAL at 06:08

## 2019-07-24 RX ADMIN — BUDESONIDE AND FORMOTEROL FUMARATE DIHYDRATE 2 PUFF: 160; 4.5 AEROSOL RESPIRATORY (INHALATION) at 07:31

## 2019-07-24 RX ADMIN — GABAPENTIN 900 MG: 300 CAPSULE ORAL at 06:07

## 2019-07-24 RX ADMIN — LEVOTHYROXINE SODIUM 200 MCG: 50 TABLET ORAL at 06:06

## 2019-07-24 RX ADMIN — FLUOXETINE 20 MG: 20 CAPSULE ORAL at 06:08

## 2019-07-24 RX ADMIN — LISINOPRIL 5 MG: 10 TABLET ORAL at 06:08

## 2019-07-24 RX ADMIN — TIOTROPIUM BROMIDE 1 CAPSULE: 18 CAPSULE ORAL; RESPIRATORY (INHALATION) at 07:34

## 2019-07-24 ASSESSMENT — PATIENT HEALTH QUESTIONNAIRE - PHQ9
2. FEELING DOWN, DEPRESSED, IRRITABLE, OR HOPELESS: NOT AT ALL
1. LITTLE INTEREST OR PLEASURE IN DOING THINGS: NOT AT ALL
SUM OF ALL RESPONSES TO PHQ9 QUESTIONS 1 AND 2: 0

## 2019-07-24 NOTE — DISCHARGE INSTRUCTIONS
Discharge Instructions    Discharged to home by car with relative. Discharged via wheelchair, hospital escort: Yes.  Special equipment needed: Cane    Be sure to schedule a follow-up appointment with your primary care doctor or any specialists as instructed.     Discharge Plan:   Diet Plan: Discussed  Activity Level: Discussed  Confirmed Follow up Appointment: Appointment Scheduled  Confirmed Symptoms Management: Discussed  Medication Reconciliation Updated: Yes  Influenza Vaccine Indication: Indicated: Not available from distributor/    I understand that a diet low in cholesterol, fat, and sodium is recommended for good health. Unless I have been given specific instructions below for another diet, I accept this instruction as my diet prescription.   Other diet: heart healthy     Special Instructions: None    · Is patient discharged on Warfarin / Coumadin?   No     Depression / Suicide Risk    As you are discharged from this [facility] facility, it is important to learn how to keep safe from harming yourself.    Recognize the warning signs:  · Abrupt changes in personality, positive or negative- including increase in energy   · Giving away possessions  · Change in eating patterns- significant weight changes-  positive or negative  · Change in sleeping patterns- unable to sleep or sleeping all the time   · Unwillingness or inability to communicate  · Depression  · Unusual sadness, discouragement and loneliness  · Talk of wanting to die  · Neglect of personal appearance   · Rebelliousness- reckless behavior  · Withdrawal from people/activities they love  · Confusion- inability to concentrate     If you or a loved one observes any of these behaviors or has concerns about self-harm, here's what you can do:  · Talk about it- your feelings and reasons for harming yourself  · Remove any means that you might use to hurt yourself (examples: pills, rope, extension cords, firearm)  · Get professional help from  the community (Mental Health, Substance Abuse, psychological counseling)  · Do not be alone:Call your Safe Contact- someone whom you trust who will be there for you.  · Call your local CRISIS HOTLINE 667-3525 or 038-393-4043  · Call your local Children's Mobile Crisis Response Team [place] (104) 581-6126 or www.Unbound  · Call the toll free National Suicide Prevention Hotlines   · National Suicide Prevention Lifeline 108-986-BDOA (8222)  · National Hope Line Network 800-SUICIDE (948-7493)                  ACTIVITY: Rest and take it easy for the first 24 hours.  A responsible adult is recommended to remain with you during that time.  It is normal to feel sleepy.  We encourage you to not do anything that requires balance, judgment or coordination.    MILD FLU-LIKE SYMPTOMS ARE NORMAL. YOU MAY EXPERIENCE GENERALIZED MUSCLE ACHES, THROAT IRRITATION, HEADACHE AND/OR SOME NAUSEA.    FOR 24 HOURS DO NOT:  Drive, operate machinery or run household appliances.  Drink beer or alcoholic beverages.   Make important decisions or sign legal documents.    SPECIAL INSTRUCTIONS: Keep eyeshield in place, keep it clean and dry. Keep face down when upright, sleep on left side.     DIET: To avoid nausea, slowly advance diet as tolerated, avoiding spicy or greasy foods for the first day.  Add more substantial food to your diet according to your physician's instructions.  Babies can be fed formula or breast milk as soon as they are hungry.  INCREASE FLUIDS AND FIBER TO AVOID CONSTIPATION.    SURGICAL DRESSING/BATHING: Keep eyeshield in place.     FOLLOW-UP APPOINTMENT:  A follow-up appointment should be arranged with your doctor; call to schedule.    You should CALL YOUR PHYSICIAN if you develop:  Fever greater than 101 degrees F.  Pain not relieved by medication, or persistent nausea or vomiting.  Excessive bleeding (blood soaking through dressing) or unexpected drainage from the wound.  Extreme redness or swelling around  the incision site, drainage of pus or foul smelling drainage.  Inability to urinate or empty your bladder within 8 hours.  Problems with breathing or chest pain.    You should call 911 if you develop problems with breathing or chest pain.  If you are unable to contact your doctor or surgical center, you should go to the nearest emergency room or urgent care center.  Physician's telephone #: DR. Morris [phone]    If any questions arise, call your doctor.  If your doctor is not available, please feel free to call the Surgical Center at [phone].  The Center is open Monday through Friday from 7AM to 7PM.  You can also call the HEALTH HOTLINE open 24 hours/day, 7 days/week and speak to a nurse at [phone], or toll free at [phone].    A registered nurse may call you a few days after your surgery to see how you are doing after your procedure.    MEDICATIONS: Resume taking daily medication.  Take prescribed pain medication with food.  If no medication is prescribed, you may take non-aspirin pain medication if needed.  PAIN MEDICATION CAN BE VERY CONSTIPATING.  Take a stool softener or laxative such as senokot, pericolace, or milk of magnesia if needed.    Prescription given for none.  Last pain medication given at none .    If your physician has prescribed pain medication that includes Acetaminophen (Tylenol), do not take additional Acetaminophen (Tylenol) while taking the prescribed medication.    Depression / Suicide Risk    As you are discharged from this [facility] facility, it is important to learn how to keep safe from harming yourself.    Recognize the warning signs:  · Abrupt changes in personality, positive or negative- including increase in energy   · Giving away possessions  · Change in eating patterns- significant weight changes-  positive or negative  · Change in sleeping patterns- unable to sleep or sleeping all the time   · Unwillingness or inability to  communicate  · Depression  · Unusual sadness, discouragement and loneliness  · Talk of wanting to die  · Neglect of personal appearance   · Rebelliousness- reckless behavior  · Withdrawal from people/activities they love  · Confusion- inability to concentrate     If you or a loved one observes any of these behaviors or has concerns about self-harm, here's what you can do:  · Talk about it- your feelings and reasons for harming yourself  · Remove any means that you might use to hurt yourself (examples: pills, rope, extension cords, firearm)  · Get professional help from the community (Mental Health, Substance Abuse, psychological counseling)  · Do not be alone:Call your Safe Contact- someone whom you trust who will be there for you.  · Call your local CRISIS HOTLINE 812-0443 or 094-725-8408  · Call your local Children's Mobile Crisis Response Team [place] (390) 766-5234 or www.Shooger  · Call the toll free National Suicide Prevention Hotlines   · National Suicide Prevention Lifeline 526-262-UFOV (5252)  · National Hope Line Network 800-SUICIDE (840-9459)

## 2019-07-24 NOTE — PROGRESS NOTES
Inpatient Anticoagulation Service Note    Date: 7/24/2019    Reason for Anticoagulation: Deep Vein Thrombosis   Target INR: 2.0 to 3.0     Hemoglobin Value: 12.3  Hematocrit Value: 40.1    INR from last 7 days     Date/Time INR Value    07/24/19 0623 (!)  2.85    07/23/19 0704 (!)  3.23        Dose from last 7 days     Date/Time Dose (mg)    07/24/19 0806  10    07/23/19 2033  5        Average Dose (mg): 10  Significant Interactions: Statin, Thyroid Medications, Other: fluoxetine, carbamazepine  Bridge Therapy: No  Reversal Agent Administered: Not Applicable    Comments: Patient is on home warfarin for history of DVT and is followed by [facility] outpatient anticoagulation clinic, His INR today is therapeutic and his H/H remains stable w/out overt sxs of bleeding per chart review. No new DDIs.     Plan:  Will continue home regimen of warfarin 10 mg PO daily at this time and obtain an INR with AM labs tomorrow. Pharmacy will continue to follow.      Education Material Provided?: No (on home warfarin )  Pharmacist suggested discharge dosing: Continue 10 mg PO daily, with follow up at anticoagulation clinic 2-3 days after discharge.      Grace Fish, PharmD, BCPS

## 2019-07-24 NOTE — PROGRESS NOTES
Admit from PACU via joel,pt a/o,assessment completed,poc discussed,verbalized understanding, left eye patch in place,ambulated to the bathroom using cane with sba,voiding without any difficulty,tolerating po well, colostomy left quadrant noted,educated on laying on left side when sleeping or when sitting up or ambulating to look down - nose to toes,understood, call button within reach,will continue to monitor.

## 2019-07-24 NOTE — DISCHARGE SUMMARY
Discharge Summary    CHIEF COMPLAINT ON ADMISSION  No chief complaint on file.      Reason for Admission  RETINAL DETACHMENT WITH MACULA OFF     Admission Date  7/23/2019    CODE STATUS  Full Code    HPI & HOSPITAL COURSE  This is a 68 y.o. female here for elective surgery of retinal detachment with Dr. Morris.     Postoperatively, patient was noted to be lethargic and somnolent.  Given this, it was decided to observe the patient overnight in the CDU prior to discharging home.  Of note, patient on significant amount of sedatives at home including Ativan for anxiety, amitriptyline, carbamazepine, gabapentin.  At baseline on anticoagulation, chronic debility walks with a cane and has a motorized scooter/device at home.  There is by herself, neighbors help with issues and have good social support.  Upon evaluation on July 24, 2019, patient is alert and oriented x4 feels her baseline and is eager to discharge home.  Chronic hypoxemic respiratory failure on home oxygen.  Patient reports that she was very anxious prior to her surgery, she was severely worried and hence she doubled down on her Ativan the night before.  Subsequently, post surgery/anesthesia she was noted to be increasingly somnolent/sedated and hence she was monitored.  At this time, she is alert and oriented x4, doing well and has no complaints.  She is eager to discharge home and is at her baseline.  She reports that her neighbor will be picking her up and has adequate social support at home.  Remains at her baseline, no other acute concerns and advised to maintain close outpatient follow-up with her primary care physician.  Otherwise ongoing management of underlying comorbid concerns per PCP following discharge.    Schedulers informed to arrange outpatient follow-up with primary care physician.    Therefore, she is discharged in good and stable condition to home with close outpatient follow-up.    Discharge Date  07/24/19    FOLLOW UP ITEMS POST  DISCHARGE  F/U PCP     DISCHARGE DIAGNOSES  Principal Problem:    Lethargy POA: Unknown  Active Problems:    YESSICA (obstructive sleep apnea) (Chronic) POA: Yes      Overview: CPAP at night at home    COPD (chronic obstructive pulmonary disease) (HCC) (Chronic) POA: Yes      Overview: On home O2      Current smoker.      Significant comorbidity      Does not appear to be exacerbating      Resp and O2 per protocol    History of DVT (deep vein thrombosis) (Chronic) POA: Yes      Overview: Diagnois update 10/1/2016    Colostomy in place (HCC) POA: Yes    Osteoarthritis of spine with radiculopathy, lumbosacral region POA: Yes    Left retinal detachment POA: Unknown  Resolved Problems:    * No resolved hospital problems. *      FOLLOW UP  Future Appointments  Date Time Provider Department Center   10/3/2019 2:15 PM KARIME Wang None     No follow-up provider specified.    MEDICATIONS ON DISCHARGE     Medication List      CONTINUE taking these medications      Instructions   albuterol 108 (90 Base) MCG/ACT Aers inhalation aerosol  Commonly known as:  VENTOLIN HFA   Inhale 2 Puffs by mouth every four hours as needed for Shortness of Breath.  Dose:  2 Puff     amitriptyline 25 MG Tabs  Commonly known as:  ELAVIL   Take 1 Tab by mouth every bedtime.  Dose:  25 mg     atorvastatin 80 MG tablet  Commonly known as:  LIPITOR   Take 1 Tab by mouth every evening.  Dose:  80 mg     budesonide-formoterol 160-4.5 MCG/ACT Aero  Commonly known as:  SYMBICORT   Inhale 2 Puffs by mouth 2 Times a Day. With spacer, rinse mouth after use  Dose:  2 Puff     carBAMazepine 200 MG Tabs  Commonly known as:  TEGRETOL   Take 600 mg by mouth every bedtime.  Dose:  600 mg     FLUoxetine 20 MG Caps  Commonly known as:  PROZAC   Take 20 mg by mouth every day.  Dose:  20 mg     FQ PROTECTIVE UNDERWEAR Misc   USE 1 PULLUP 3 TIMES DAILY     gabapentin 300 MG Caps  Commonly known as:  NEURONTIN   TAKE THREE CAPSULES BY MOUTH THREE TIMES A  DAY     levothyroxine 200 MCG Tabs  Commonly known as:  SYNTHROID   TAKE ONE TABLET BY MOUTH EVERY MORNING ON AN EMPTY STOMACH.     lisinopril 5 MG Tabs  Commonly known as:  PRINIVIL   Take 1 Tab by mouth every day.  Dose:  5 mg     LORazepam 1 MG Tabs  Commonly known as:  ATIVAN   Take 2 mg by mouth at bedtime as needed for Anxiety.  Dose:  2 mg     * Misc. Devices Misc   Heavy duty 4 wheeled walker with seat and back rest to use as needed     * Misc. Devices Misc   Pt requesting new battery, new tires and new brakes for power chair     * Misc. Devices Misc   Doctor's comments:  She has a permanent ostomy and is permanent due to dumping per pt  3 boxes of pouches (14730), 2 boxes of wafers (03726), 3 boxes (tubes) paste, 1 box of no string skin prep, 1 box of no string unsolve     raNITidine 150 MG Tabs  Commonly known as:  ZANTAC   Take 1 Tab by mouth 2 times a day.  Dose:  150 mg     Tiotropium Bromide Monohydrate 2.5 MCG/ACT Aers  Commonly known as:  SPIRIVA RESPIMAT   Inhale 2 Puffs by mouth every day.  Dose:  2 Puff     vitamin D (Ergocalciferol) 58988 units Caps capsule  Commonly known as:  DRISDOL   Take 1 Cap by mouth every 28 days.  Dose:  87418 Units     warfarin 10 MG Tabs  Commonly known as:  COUMADIN   Doctor's comments:  This rx was submitted by a pharmacist working under a collaborative practice agreement.  Take 1 Tab by mouth every evening. 10 mg on Mon, Wed, Fri ;12.5 mg all other days (10mg tab + 1/2 of 5mg Tab)  Dose:  10 mg        * This list has 3 medication(s) that are the same as other medications prescribed for you. Read the directions carefully, and ask your doctor or other care provider to review them with you.                Allergies  Allergies   Allergen Reactions   • Pcn [Penicillins] Anaphylaxis and Rash     Reaction; happened as a child. Tolerated cephalosporins and Zosyn       DIET  Orders Placed This Encounter   Procedures   • Diet Order Cardiac     Standing Status:   Standing      Number of Occurrences:   1     Order Specific Question:   Diet:     Answer:   Cardiac [6]       ACTIVITY  As tolerated.  Weight bearing as tolerated    CONSULTATIONS  None    PROCEDURES  As noted - eye surgery     LABORATORY  Lab Results   Component Value Date    SODIUM 141 07/24/2019    POTASSIUM 4.0 07/24/2019    CHLORIDE 107 07/24/2019    CO2 28 07/24/2019    GLUCOSE 112 (H) 07/24/2019    BUN 10 07/24/2019    CREATININE 0.67 07/24/2019    CREATININE 0.8 04/27/2009        Lab Results   Component Value Date    WBC 8.5 07/24/2019    HEMOGLOBIN 12.3 07/24/2019    HEMATOCRIT 40.1 07/24/2019    PLATELETCT 210 07/24/2019

## 2019-07-24 NOTE — PROGRESS NOTES
Inpatient Anticoagulation Service Note    Date: 7/23/2019    Reason for Anticoagulation: Deep Vein Thrombosis   Target INR: 2.0 to 3.0              INR from last 7 days     Date/Time INR Value    07/23/19 0704 (!)  3.23        Dose from last 7 days     Date/Time Dose (mg)    07/23/19 2033  5        Average Dose (mg): 10  Significant Interactions: Statin, Thyroid Medications  Bridge Therapy: No     Reversal Agent Administered: Not Applicable  Comments: Patient is on warfarin at home for DVT. The patient is followed by [facility]'s outpatient anticoagulation clinic. The patient is supratherapetutic today with and INR = 3.23. Patient takes 10 mg daily. Plan to give reduced dose tonight of 5 mg and plan to resume home dose of warfarin starting tomorrow. H/H is stable with no signs of bruising or bleeding noted    Plan:  Give reduced dose tonight of 5 mg, resume home dose of 10 mg tomorrow.   Education Material Provided?: No  Pharmacist suggested discharge dosing: 10 mg daily, follow up with anticoagulation clinic 2-3 days after discharge.      Noe Richmond, RodriguezD

## 2019-07-24 NOTE — OR NURSING
1828-assumed care of patient. Bedside report received from ROS Cruz. Pt. Resting quietly on left side.    1935-Report to ROS Paulino.    1953-trans. To room on [facility] with transport in stable condition.  Belongings with patient.

## 2019-07-30 ENCOUNTER — OFFICE VISIT (OUTPATIENT)
Dept: MEDICAL GROUP | Facility: MEDICAL CENTER | Age: 69
End: 2019-07-30
Attending: FAMILY MEDICINE
Payer: MEDICARE

## 2019-07-30 VITALS
TEMPERATURE: 98.8 F | DIASTOLIC BLOOD PRESSURE: 70 MMHG | RESPIRATION RATE: 16 BRPM | HEIGHT: 63 IN | BODY MASS INDEX: 46.25 KG/M2 | SYSTOLIC BLOOD PRESSURE: 124 MMHG | HEART RATE: 76 BPM | WEIGHT: 261 LBS | OXYGEN SATURATION: 92 %

## 2019-07-30 DIAGNOSIS — H57.12 PAIN OF LEFT EYE: ICD-10-CM

## 2019-07-30 DIAGNOSIS — Z98.890 STATUS POST SURGERY: ICD-10-CM

## 2019-07-30 DIAGNOSIS — H33.22 DETACHED RETINA, LEFT: ICD-10-CM

## 2019-07-30 PROCEDURE — 99214 OFFICE O/P EST MOD 30 MIN: CPT | Performed by: FAMILY MEDICINE

## 2019-07-30 PROCEDURE — 99213 OFFICE O/P EST LOW 20 MIN: CPT | Performed by: FAMILY MEDICINE

## 2019-07-30 RX ORDER — WARFARIN SODIUM 5 MG/1
TABLET ORAL
COMMUNITY
Start: 2019-07-27 | End: 2019-08-20

## 2019-07-30 RX ORDER — OXYCODONE HYDROCHLORIDE 5 MG/1
5 TABLET ORAL EVERY 4 HOURS PRN
Qty: 30 TAB | Refills: 0 | Status: SHIPPED | OUTPATIENT
Start: 2019-07-30 | End: 2019-08-06

## 2019-07-30 RX ORDER — PREDNISOLONE ACETATE 10 MG/ML
SUSPENSION/ DROPS OPHTHALMIC
COMMUNITY
Start: 2019-07-25 | End: 2021-11-08

## 2019-07-30 ASSESSMENT — ENCOUNTER SYMPTOMS
COUGH: 0
VOMITING: 0
CHILLS: 0
FEVER: 0
PSYCHIATRIC NEGATIVE: 1
SENSORY CHANGE: 0
TINGLING: 1
BACK PAIN: 1
PALPITATIONS: 0
FOCAL WEAKNESS: 0
BLURRED VISION: 1
EYE PAIN: 1
SPEECH CHANGE: 0
TREMORS: 0
EYE DISCHARGE: 0
SHORTNESS OF BREATH: 0
DOUBLE VISION: 0
SPUTUM PRODUCTION: 0
NECK PAIN: 1
HEADACHES: 1
ABDOMINAL PAIN: 0
NAUSEA: 0
PHOTOPHOBIA: 1

## 2019-07-30 NOTE — PROGRESS NOTES
Subjective:      Atiya Henley is a 68 y.o. female who presents with Hospital Follow-up (eye surgery)            Patient recently admitted to the hospital for a detached retina in her left eye.    In the hospital she was seen by a retinal specialist and was taken to surgery to reattach her retina.  A vitrectomy as well as laser therapy was used to reattach her retina.  She will be following up with her ophthalmologist tomorrow in regards to her surgery.  Today she has been having a significant amount of discomfort in her left eye as well as her other chronic pain issues.  She states that bright lights significantly cause her pain in her affected eye, so pain medications will be provided to her today for a temporary amount of time.  Patient does understand that long-term pain medications are not allowed under the new [place] law.  She will try to use her pain medications only as needed.    Acute pain   This is a new problem.   Patient is complaining of pain x 7 day(s) located in her L eye.  Pain is constant, described as aching and a 8/10 on the pain scale.   Treatments tried include:Tylenol     Is the pain medication improving the patient's symptoms: Never prescribed  Any adverse effects: Never prescribed  Alcohol or illicit drug use:   She  reports that she does not drink alcohol.  She  reports that she uses drugs, including Marijuana and Inhaled.     History of controlled substance used in a way other than prescribed? No     Any early refills of a controlled substance: No  History of lost or stolen controlled substance prescription: No  Any aberrant behavior or intoxication while on a controlled substance: No  Has the patient self-modified their dose or frequency of the medication :No  Compliant with treatment recommendations and plan: Yes  Any major health change to the patient: No  Concerns for misuse, abuse or addiction: No  /NarxCheck report reviewed: Yes  History of abnormal drug screening: No  I have  assessed the patient's risk for abuse, dependency, and addiction using the validated Opioid Risk Tool.     Opioid Risk Score: 1       Interpretation of Opioid Risk Score   Score 0-3 = Low risk of abuse. Do UDS at least once per year.  Score 4-7 = Moderate risk of abuse. Do UDS 1-4 times per year.  Score 8+ = High risk of abuse. Refer to specialist.     I have conducted a physical exam and documented findings.     I certify that I have obtained and reviewed her medical history. I have also made a good amisha effort to obtain applicable records from other providers who have treated the patient.  I have reviewed the patient's prescription history as maintained by the [place] Prescription Monitoring Program.      Given the above, I believe the benefits of controlled substance therapy outweigh the risks. The reasons for prescribing controlled substances include non-narcotic, oral analgesic alternatives have been inadequate for pain control. Accordingly, I have discussed the risk and benefits, treatment plan, and alternative therapies with the patient. Patient was advised that this medicine is intended for short term (no more than 14 days)/intermittent use only and not intended to be an ongoing prescription.          Review of Systems   Constitutional: Negative for chills and fever.   HENT: Negative for hearing loss and tinnitus.    Eyes: Positive for blurred vision, photophobia and pain. Negative for double vision and discharge.   Respiratory: Negative for cough, sputum production and shortness of breath.    Cardiovascular: Negative for chest pain and palpitations.   Gastrointestinal: Negative for abdominal pain, nausea and vomiting.   Musculoskeletal: Positive for back pain, joint pain and neck pain.   Skin: Negative for rash.   Neurological: Positive for tingling and headaches. Negative for tremors, sensory change, speech change and focal weakness.   Psychiatric/Behavioral: Negative.           Objective:     /70  "(BP Location: Left arm, Patient Position: Sitting, BP Cuff Size: Adult)   Pulse 76   Temp 37.1 °C (98.8 °F) (Temporal)   Resp 16   Ht 1.6 m (5' 3\")   Wt 118.4 kg (261 lb)   LMP 07/19/2011   SpO2 92%   BMI 46.23 kg/m²      Physical Exam   Constitutional: She is oriented to person, place, and time.   BMI 46.23   HENT:   Head: Normocephalic and atraumatic.   Eyes:   Wearing dark glasses   Cardiovascular: Normal rate, regular rhythm and normal heart sounds.  Exam reveals no friction rub.    No murmur heard.  Pulmonary/Chest: Effort normal and breath sounds normal. No respiratory distress. She has no wheezes. She has no rales.   Abdominal: Soft. Bowel sounds are normal. She exhibits distension. There is no tenderness.   Ventral hernia   Musculoskeletal:   Using walker to ambulate   Neurological: She is alert and oriented to person, place, and time.   Skin: Skin is warm and dry.   Psychiatric: She has a normal mood and affect. Her behavior is normal.   Nursing note and vitals reviewed.              Assessment/Plan:     1. Detached retina, left  She has an appointment with her ophthalmologist (retinal specialist) scheduled for tomorrow.  We will have her continue to follow his directions for continued management of her retinal detachment status post vitrectomy and laser treatment.  We will continue to follow.    2. Pain of left eye  Patient will be provided pain medications on a short-term basis.  GUERLINE X check was done today.  We will continue to follow.  - oxyCODONE immediate-release (ROXICODONE) 5 MG Tab; Take 1 Tab by mouth every four hours as needed for Severe Pain for up to 7 days.  Dispense: 30 Tab; Refill: 0    3. Status post surgery  See above plan.  - oxyCODONE immediate-release (ROXICODONE) 5 MG Tab; Take 1 Tab by mouth every four hours as needed for Severe Pain for up to 7 days.  Dispense: 30 Tab; Refill: 0      "

## 2019-08-05 RX ORDER — GABAPENTIN 300 MG/1
CAPSULE ORAL
Qty: 810 CAP | Refills: 0 | Status: SHIPPED | OUTPATIENT
Start: 2019-08-05 | End: 2019-10-30 | Stop reason: SDUPTHER

## 2019-08-05 RX ORDER — LEVOTHYROXINE SODIUM 0.2 MG/1
TABLET ORAL
Qty: 90 TAB | Refills: 0 | Status: SHIPPED | OUTPATIENT
Start: 2019-08-05 | End: 2019-10-30 | Stop reason: SDUPTHER

## 2019-08-05 NOTE — TELEPHONE ENCOUNTER
Per Pharmacy, Pt is requesting 90 days supplies. Please advise.    Was the patient seen in the last year in this department? Yes    Does patient have an active prescription for medications requested? No     Received Request Via: Pharmacy

## 2019-08-14 ENCOUNTER — ANTICOAGULATION MONITORING (OUTPATIENT)
Dept: VASCULAR LAB | Facility: MEDICAL CENTER | Age: 69
End: 2019-08-14

## 2019-08-14 DIAGNOSIS — Z79.01 CHRONIC ANTICOAGULATION: ICD-10-CM

## 2019-08-14 DIAGNOSIS — Z86.718 HISTORY OF DVT (DEEP VEIN THROMBOSIS): ICD-10-CM

## 2019-08-14 DIAGNOSIS — Z95.828 S/P IVC FILTER: ICD-10-CM

## 2019-08-14 NOTE — PROGRESS NOTES
[facility]    Unable to leave VM to remind pt to get next INR.    [place], PharmD

## 2019-08-20 ENCOUNTER — OFFICE VISIT (OUTPATIENT)
Dept: MEDICAL GROUP | Facility: MEDICAL CENTER | Age: 69
End: 2019-08-20
Attending: FAMILY MEDICINE
Payer: MEDICARE

## 2019-08-20 VITALS
RESPIRATION RATE: 16 BRPM | TEMPERATURE: 100.2 F | HEIGHT: 63 IN | DIASTOLIC BLOOD PRESSURE: 80 MMHG | BODY MASS INDEX: 45.89 KG/M2 | HEART RATE: 88 BPM | SYSTOLIC BLOOD PRESSURE: 118 MMHG | OXYGEN SATURATION: 95 % | WEIGHT: 259 LBS

## 2019-08-20 DIAGNOSIS — Z95.828 S/P IVC FILTER: ICD-10-CM

## 2019-08-20 DIAGNOSIS — Z86.69 HISTORY OF RETINAL DETACHMENT: ICD-10-CM

## 2019-08-20 DIAGNOSIS — Z78.9 DECREASED ACTIVITIES OF DAILY LIVING (ADL): ICD-10-CM

## 2019-08-20 DIAGNOSIS — I48.0 PAROXYSMAL A-FIB (HCC): ICD-10-CM

## 2019-08-20 DIAGNOSIS — G89.4 CHRONIC PAIN SYNDROME: ICD-10-CM

## 2019-08-20 PROCEDURE — 99214 OFFICE O/P EST MOD 30 MIN: CPT | Performed by: FAMILY MEDICINE

## 2019-08-20 RX ORDER — WARFARIN SODIUM 10 MG/1
TABLET ORAL
Qty: 30 TAB | Refills: 5 | Status: SHIPPED | OUTPATIENT
Start: 2019-08-20 | End: 2020-01-15 | Stop reason: SDUPTHER

## 2019-08-20 ASSESSMENT — ENCOUNTER SYMPTOMS
BACK PAIN: 1
NECK PAIN: 1
COUGH: 0
BLURRED VISION: 1
PALPITATIONS: 0
VOMITING: 0
SPEECH CHANGE: 0
FOCAL WEAKNESS: 1
NERVOUS/ANXIOUS: 1
ABDOMINAL PAIN: 0
HALLUCINATIONS: 0
SENSORY CHANGE: 0
CHILLS: 0
DOUBLE VISION: 0
EYE PAIN: 0
SPUTUM PRODUCTION: 0
SHORTNESS OF BREATH: 0
TINGLING: 1
FEVER: 0
PHOTOPHOBIA: 0
DEPRESSION: 1
NAUSEA: 0

## 2019-08-20 ASSESSMENT — LIFESTYLE VARIABLES: SUBSTANCE_ABUSE: 0

## 2019-08-20 NOTE — PROGRESS NOTES
Subjective:      Atiya Henley is a 68 y.o. female who presents with Hospital Follow-up (eye surgery)            Patient 68-year-old female here for follow-up of her recent retinal detachment of her left eye, chronic pain causing her to have decreased ADLs and mobility.  She is requesting a form filled out for RTC access.    A form for RTC access has been filled out for patient today.  She states that she had been given a car about a year ago but has not been able to drive it due to all the issues in regards to her health that have arise.  Also the car that have been given to her has many mechanical issues that she has been unable to manage.  She will be donating her car, so she will need transportation.  We will have patient sign up with RTC access for transportation to and from her medical appointments as well as other appointments she needs to make.  We will continue to follow.    She recently had surgery for her left eye for a retinal detachment.  Patient has been doing well since her surgery.  She is still having some difficulty with her binocular vision due to not fully being able to see out of her left eye yet.  She has been following up with her retinal specialist in regards to her eye.  We will continue to follow.     Current medications, allergies, and problem list reviewed with patient and updated in EPIC.        Review of Systems   Constitutional: Negative for chills and fever.   HENT: Negative for hearing loss and tinnitus.    Eyes: Positive for blurred vision. Negative for double vision, photophobia and pain.        Left eye   Respiratory: Negative for cough, sputum production and shortness of breath.    Cardiovascular: Negative for chest pain and palpitations.   Gastrointestinal: Negative for abdominal pain, nausea and vomiting.   Musculoskeletal: Positive for back pain, joint pain and neck pain.   Skin: Negative for rash.   Neurological: Positive for tingling and focal weakness. Negative for  "sensory change and speech change.   Psychiatric/Behavioral: Positive for depression. Negative for hallucinations, substance abuse and suicidal ideas. The patient is nervous/anxious.           Objective:     /80 (BP Location: Left arm, Patient Position: Sitting, BP Cuff Size: Adult)   Pulse 88   Temp 37.9 °C (100.2 °F) (Temporal)   Resp 16   Ht 1.6 m (5' 3\")   Wt 117.5 kg (259 lb)   LMP 07/19/2011   SpO2 95%   BMI 45.88 kg/m²      Physical Exam   Constitutional: She is oriented to person, place, and time.   BMI 45.88, using walker to ambulate   HENT:   Head: Normocephalic and atraumatic.   Eyes:   Recent surgery to her left eye secondary to retinal detachment   Neck: Normal range of motion. Neck supple.   Cardiovascular: Normal rate, regular rhythm and normal heart sounds. Exam reveals no friction rub.   No murmur heard.  Pulmonary/Chest: Effort normal and breath sounds normal. No stridor. No respiratory distress. She has no wheezes.   Abdominal: Soft. Bowel sounds are normal. She exhibits no distension. There is no tenderness.   Multiple hernias with ostomy   Musculoskeletal:   Using walker for mobility   Neurological: She is alert and oriented to person, place, and time.   Skin: Skin is warm and dry.   Psychiatric: She has a normal mood and affect. Her behavior is normal.   Nursing note and vitals reviewed.              Assessment/Plan:     1. Decreased activities of daily living (ADL)  A form has been filled out for RTC axis for patient to be provided with transportation to and from her appointments.  She has history of chronic pain in her back and extremities and uses a walker for mobility.  We will continue to follow.    2. Chronic pain syndrome  See above plan.    3. History of retinal detachment  She recently underwent retinal detachment surgery and has been doing well since then.  She is following up with her ophthalmologist for continued management of her retinal detachment history.  We will " continue to follow.

## 2019-08-22 ENCOUNTER — ANTICOAGULATION MONITORING (OUTPATIENT)
Dept: VASCULAR LAB | Facility: MEDICAL CENTER | Age: 69
End: 2019-08-22

## 2019-08-22 DIAGNOSIS — Z79.01 CHRONIC ANTICOAGULATION: ICD-10-CM

## 2019-08-22 DIAGNOSIS — Z86.718 HISTORY OF DVT (DEEP VEIN THROMBOSIS): ICD-10-CM

## 2019-08-22 DIAGNOSIS — Z95.828 S/P IVC FILTER: ICD-10-CM

## 2019-08-22 LAB — INR PPP: 4.5 (ref 2–3.5)

## 2019-08-22 NOTE — PROGRESS NOTES
Anticoagulation Summary  As of 2019    INR goal:   2.0-3.0   TTR:   36.2 % (3.9 y)   INR used for dosin.50! (2019)   Warfarin maintenance plan:   10 mg (10 mg x 1) every day   Weekly warfarin total:   70 mg   Plan last modified:   Judith Pina, PharmD (2019)   Next INR check:   2019   Priority:   Routine   Target end date:   Indefinite    Indications    DVT (deep venous thrombosis) (HCC) (Resolved) [I82.409]  Chronic anticoagulation [Z79.01]  S/P IVC filter [Z95.828]  History of DVT (deep vein thrombosis) [Z86.718]             Anticoagulation Episode Summary     INR check location:   Anticoagulation Clinic    Preferred lab:   [facility]    Send INR reminders to:       Comments:   MD INR      Anticoagulation Care Providers     Provider Role Specialty Phone number    Carmelo Pat M.D. Referring [facility] Medicine [phone]    [facility] Anticoagulation Services Responsible  [phone]        Anticoagulation Patient Findings    Spoke to patient on the phone to report a Supratherapeutic INR of 4.5.  Pt to continue 10 mg every day.  Patient recently had emergency eye surgery. Reports no changes in medications or diet. Reports no S/S of bleeding or bruising. Follow up in 1 week.    Discussed therapy plan with Porsche Bello.     Oniel Phoenix, Pharmacy Intern

## 2019-08-30 ENCOUNTER — ANTICOAGULATION MONITORING (OUTPATIENT)
Dept: VASCULAR LAB | Facility: MEDICAL CENTER | Age: 69
End: 2019-08-30

## 2019-08-30 DIAGNOSIS — Z86.718 HISTORY OF DVT (DEEP VEIN THROMBOSIS): ICD-10-CM

## 2019-08-30 DIAGNOSIS — Z95.828 S/P IVC FILTER: ICD-10-CM

## 2019-08-30 DIAGNOSIS — Z79.01 CHRONIC ANTICOAGULATION: ICD-10-CM

## 2019-08-30 LAB — INR PPP: 2.5 (ref 2–3.5)

## 2019-08-30 NOTE — PROGRESS NOTES
Anticoagulation Summary  As of 2019    INR goal:   2.0-3.0   TTR:   36.1 % (3.9 y)   INR used for dosin.50 (2019)   Warfarin maintenance plan:   10 mg (10 mg x 1) every day   Weekly warfarin total:   70 mg   Plan last modified:   Judith Pina, PharmD (2019)   Next INR check:   2019   Priority:   Routine   Target end date:   Indefinite    Indications    DVT (deep venous thrombosis) (HCC) (Resolved) [I82.409]  Chronic anticoagulation [Z79.01]  S/P IVC filter [Z95.828]  History of DVT (deep vein thrombosis) [Z86.718]             Anticoagulation Episode Summary     INR check location:   Anticoagulation Clinic    Preferred lab:   [facility]    Send INR reminders to:       Comments:   MD INR      Anticoagulation Care Providers     Provider Role Specialty Phone number    Carmelo Pat M.D. Referring [facility] Medicine [phone]    [facility] Anticoagulation Services Responsible  [phone]        Anticoagulation Patient Findings   Spoke with Atiya to report a therapeutic INR of 2.5. Continue current dosing regimen.  Follow up in 1 weeks, to reduce the risk of adverse events related to this high risk medication, warfarin.    Ruth Munoz, Clinical Pharmacist

## 2019-09-16 ENCOUNTER — ANTICOAGULATION MONITORING (OUTPATIENT)
Dept: VASCULAR LAB | Facility: MEDICAL CENTER | Age: 69
End: 2019-09-16

## 2019-09-16 DIAGNOSIS — Z79.01 CHRONIC ANTICOAGULATION: ICD-10-CM

## 2019-09-16 DIAGNOSIS — Z95.828 S/P IVC FILTER: ICD-10-CM

## 2019-09-16 DIAGNOSIS — Z86.718 HISTORY OF DVT (DEEP VEIN THROMBOSIS): ICD-10-CM

## 2019-09-16 LAB — INR PPP: 1.4 (ref 2–3.5)

## 2019-09-17 NOTE — PROGRESS NOTES
Unable to leave voicemail message. voicemailbox is not yet set up  Ruth Munoz, Clinical Pharmacist, CDE, CACP

## 2019-09-17 NOTE — PROGRESS NOTES
Anticoagulation Summary  As of 2019    INR goal:   2.0-3.0   TTR:   36.2 % (4 y)   INR used for dosin.40! (2019)   Warfarin maintenance plan:   10 mg (10 mg x 1) every day   Weekly warfarin total:   70 mg   Plan last modified:   Judith iPna, PharmD (2019)   Next INR check:   2019   Priority:   Routine   Target end date:   Indefinite    Indications    DVT (deep venous thrombosis) (HCC) (Resolved) [I82.409]  Chronic anticoagulation [Z79.01]  S/P IVC filter [Z95.828]  History of DVT (deep vein thrombosis) [Z86.718]             Anticoagulation Episode Summary     INR check location:   Anticoagulation Clinic    Preferred lab:   [facility] GENERAL    Send INR reminders to:       Comments:   MD INR      Anticoagulation Care Providers     Provider Role Specialty Phone number    Carmelo Pat M.D. Referring [facility] Medicine [phone]    [facility] Anticoagulation Services Responsible  [phone]        Anticoagulation Patient Findings          Spoke with Atiya to report a sub therapeutic INR of 1.4.  Pt reports that she may have missed a dose or two.  Will bolus dose with 15mg po qhs x3 then resume current dosing regimen. Follow up in 1 weeks, to reduce the risk of adverse events related to this high risk medication, warfarin.    Ruth Munoz, Clinical Pharmacist

## 2019-09-20 ENCOUNTER — TELEPHONE (OUTPATIENT)
Dept: VASCULAR LAB | Facility: MEDICAL CENTER | Age: 69
End: 2019-09-20

## 2019-09-20 NOTE — TELEPHONE ENCOUNTER
[facility]    We received a message the pt has an upcoming eye procedure and the physician would like to hold warfarin. Tried to contact pt regarding perioperative anticoagulation, but unable to reach pt or leave a VM.    Will try contacting pt again at a later time.    Bon Ash, PharmD

## 2019-09-23 ENCOUNTER — ANTICOAGULATION MONITORING (OUTPATIENT)
Dept: VASCULAR LAB | Facility: MEDICAL CENTER | Age: 69
End: 2019-09-23

## 2019-09-23 ENCOUNTER — TELEPHONE (OUTPATIENT)
Dept: VASCULAR LAB | Facility: MEDICAL CENTER | Age: 69
End: 2019-09-23

## 2019-09-23 DIAGNOSIS — Z86.718 HISTORY OF DVT (DEEP VEIN THROMBOSIS): ICD-10-CM

## 2019-09-23 DIAGNOSIS — Z79.01 CHRONIC ANTICOAGULATION: ICD-10-CM

## 2019-09-23 DIAGNOSIS — Z95.828 S/P IVC FILTER: ICD-10-CM

## 2019-09-23 LAB — INR PPP: 3.5 (ref 2–3.5)

## 2019-09-23 NOTE — PROGRESS NOTES
[facility]    No VM available, try again later.    Bon Ash PharmD     9/24/19 at 2:31 pm, again unable to reach the pt.    Bon Ash PharmD     Anticoagulation Summary  As of 9/23/2019    INR goal:   2.0-3.0   TTR:   36.3 % (4 y)   INR used for dosing:   3.50! (9/23/2019)   Warfarin maintenance plan:   10 mg (10 mg x 1) every day   Weekly warfarin total:   70 mg   Plan last modified:   Judith Pina PharmD (6/25/2019)   Next INR check:   9/30/2019   Priority:   Routine   Target end date:   Indefinite    Indications    DVT (deep venous thrombosis) (HCC) (Resolved) [I82.409]  Chronic anticoagulation [Z79.01]  S/P IVC filter [Z95.828]  History of DVT (deep vein thrombosis) [Z86.718]             Anticoagulation Episode Summary     INR check location:   Anticoagulation Clinic    [email or web address]    Send INR reminders to:       Comments:   MD INR      Anticoagulation Care Providers     Provider Role Specialty Phone number    Carmelo Pat M.D. Referring Family Medicine [phone]    [facility] Anticoagulation Services Responsible  [phone]        Anticoagulation Patient Findings    HPI:  Atiya Henley, on anticoagulation therapy with warfarin for DVT  Changes to current medical/health status since last appt: none  Denies signs/symptoms of bleeding and/or thrombosis since the last appt.    Denies any interval changes to diet  Denies any interval changes to medications since last appt.   Denies any complications or cost restrictions with current therapy.     A/P   INR  SUPRA-therapeutic.   Pt to continue with reduced regimen compared to last week.    She has an eye procedure (she is uncertain of the type of procedure).  Physician is requesting her to hold warfarin 2 days prior.  Procedure date TBD.  Given last VTE >2 years ago, agree with holding warfarin 2 days prior to procedure.     Next INR in 1 week(s).    Bon Ash, RodriguezD

## 2019-09-23 NOTE — TELEPHONE ENCOUNTER
[facility]     We received a message the pt has an upcoming eye procedure and the physician would like to hold warfarin. Tried to contact pt regarding perioperative anticoagulation, but unable to reach pt or leave a VM again today.      Will try contacting pt again at a later time.     Bon Ash, PharmD

## 2019-09-25 RX ORDER — DIAPER,BRIEF,ADULT, DISPOSABLE
EACH MISCELLANEOUS
Qty: 90 EACH | Refills: 0 | Status: SHIPPED | OUTPATIENT
Start: 2019-09-25 | End: 2019-10-31 | Stop reason: SDUPTHER

## 2019-09-25 RX ORDER — RANITIDINE 150 MG/1
TABLET ORAL
Qty: 60 TAB | Refills: 3 | Status: SHIPPED | OUTPATIENT
Start: 2019-09-25 | End: 2019-09-26

## 2019-09-26 RX ORDER — RANITIDINE 150 MG/1
TABLET ORAL
Qty: 60 TAB | Refills: 3 | Status: SHIPPED | OUTPATIENT
Start: 2019-09-26 | End: 2020-10-05

## 2019-09-27 RX ORDER — RANITIDINE 150 MG/1
TABLET ORAL
Qty: 60 TAB | Refills: 3 | Status: SHIPPED | OUTPATIENT
Start: 2019-09-27 | End: 2020-10-05

## 2019-10-02 ENCOUNTER — PATIENT OUTREACH (OUTPATIENT)
Dept: HEALTH INFORMATION MANAGEMENT | Facility: OTHER | Age: 69
End: 2019-10-02

## 2019-10-03 ENCOUNTER — APPOINTMENT (OUTPATIENT)
Dept: PULMONOLOGY | Facility: HOSPICE | Age: 69
End: 2019-10-03
Payer: MEDICARE

## 2019-10-08 RX ORDER — ATORVASTATIN CALCIUM 80 MG/1
80 TABLET, FILM COATED ORAL EVERY EVENING
Qty: 90 TAB | Refills: 1 | Status: SHIPPED | OUTPATIENT
Start: 2019-10-08 | End: 2020-10-05

## 2019-10-30 DIAGNOSIS — J44.89 CHRONIC OBSTRUCTIVE ASTHMA: ICD-10-CM

## 2019-10-30 RX ORDER — ALBUTEROL SULFATE 90 UG/1
2 AEROSOL, METERED RESPIRATORY (INHALATION) EVERY 4 HOURS PRN
Qty: 3 INHALER | Refills: 0 | Status: SHIPPED | OUTPATIENT
Start: 2019-10-30 | End: 2020-10-19 | Stop reason: SDUPTHER

## 2019-10-30 NOTE — TELEPHONE ENCOUNTER
Have we ever prescribed this med? Yes.  If yes, what date? 4/3/19    Last OV: 4/3/19 KRUPA APODACA     Next OV: No follow up on file; Pt was due back 10/3/19    DX: Chronic obstructive asthma (HCC) (J44.9)    Medications: albuterol (VENTOLIN HFA) 108 (90 Base) MCG/ACT Aero Soln inhalation aerosol       We received this Rx refill request via fax from [facility].

## 2019-10-31 RX ORDER — LISINOPRIL 5 MG/1
TABLET ORAL
Qty: 90 TAB | Refills: 0 | Status: SHIPPED | OUTPATIENT
Start: 2019-10-31 | End: 2022-02-07

## 2019-10-31 RX ORDER — GABAPENTIN 300 MG/1
CAPSULE ORAL
Qty: 270 CAP | Refills: 0 | Status: SHIPPED | OUTPATIENT
Start: 2019-10-31 | End: 2023-03-14

## 2019-10-31 RX ORDER — RANITIDINE 150 MG/1
TABLET ORAL
Qty: 180 TAB | Refills: 0 | Status: SHIPPED | OUTPATIENT
Start: 2019-10-31 | End: 2020-10-05

## 2019-10-31 RX ORDER — LEVOTHYROXINE SODIUM 0.2 MG/1
TABLET ORAL
Qty: 90 TAB | Refills: 0 | Status: SHIPPED | OUTPATIENT
Start: 2019-10-31 | End: 2021-11-08

## 2019-11-01 RX ORDER — DIAPER,BRIEF,ADULT, DISPOSABLE
EACH MISCELLANEOUS
Qty: 90 EACH | Refills: 0 | Status: SHIPPED | OUTPATIENT
Start: 2019-11-01 | End: 2023-03-14

## 2019-11-14 ENCOUNTER — ANTICOAGULATION MONITORING (OUTPATIENT)
Dept: VASCULAR LAB | Facility: MEDICAL CENTER | Age: 69
End: 2019-11-14

## 2019-11-14 DIAGNOSIS — Z95.828 S/P IVC FILTER: ICD-10-CM

## 2019-11-14 DIAGNOSIS — Z86.718 HISTORY OF DVT (DEEP VEIN THROMBOSIS): ICD-10-CM

## 2019-11-14 DIAGNOSIS — Z79.01 CHRONIC ANTICOAGULATION: ICD-10-CM

## 2019-11-26 ENCOUNTER — TELEPHONE (OUTPATIENT)
Dept: PULMONOLOGY | Facility: HOSPICE | Age: 69
End: 2019-11-26

## 2019-11-26 NOTE — TELEPHONE ENCOUNTER
Patient calling she missed her last appointment that was scheduled with Armida MCKEON on 10/3/19 BECAUSE she had emergency eye surgery for a detached retina. Patient states she will call back in December to reschedule follow up. Patient just wanted me to document why she missed her last appointment. So recorded.

## 2019-12-17 ENCOUNTER — ANTICOAGULATION MONITORING (OUTPATIENT)
Dept: VASCULAR LAB | Facility: MEDICAL CENTER | Age: 69
End: 2019-12-17

## 2019-12-17 DIAGNOSIS — Z86.718 HISTORY OF DVT (DEEP VEIN THROMBOSIS): ICD-10-CM

## 2019-12-17 DIAGNOSIS — Z95.828 S/P IVC FILTER: ICD-10-CM

## 2019-12-17 DIAGNOSIS — Z79.01 CHRONIC ANTICOAGULATION: ICD-10-CM

## 2019-12-17 LAB — INR PPP: 2.6 (ref 2–3.5)

## 2019-12-17 NOTE — PROGRESS NOTES
Anticoagulation Summary  As of 2019    INR goal:   2.0-3.0   TTR:   36.7 % (4.2 y)   INR used for dosin.60 (2019)   Warfarin maintenance plan:   10 mg (10 mg x 1) every day   Weekly warfarin total:   70 mg   Plan last modified:   Jduith Pina PharmD (2019)   Next INR check:   2019   Priority:   Routine   Target end date:   Indefinite    Indications    DVT (deep venous thrombosis) (HCC) (Resolved) [I82.409]  Chronic anticoagulation [Z79.01]  S/P IVC filter [Z95.828]  History of DVT (deep vein thrombosis) [Z86.718]             Anticoagulation Episode Summary     INR check location:   Anticoagulation Clinic    Preferred lab:   [facility]    Send INR reminders to:       Comments:   MD INR      Anticoagulation Care Providers     Provider Role Specialty Phone number    Carmelo Pat M.D. Referring Family Medicine [phone]    [facility] Anticoagulation Services Responsible  [phone]        Anticoagulation Patient Findings        Spoke to patient on the phone.   INR  therapeutic.   Denies signs/symptoms of bleeding and/or thrombosis.   Denies changes to diet or medications.   Follow up appointment in 1-2 week(s).    Continue weekly warfarin dose as noted      Bob Dupree, PharmD, MS, BCACP, LCC    This note was created using voice recognition software (Dragon). The accuracy of the dictation is limited by the abilities of the software. I have reviewed the note prior to signing, however some errors in grammar and context are still possible. If you have any questions related to this note please do not hesitate to contact our office.

## 2020-01-03 ENCOUNTER — ANTICOAGULATION MONITORING (OUTPATIENT)
Dept: MEDICAL GROUP | Facility: PHYSICIAN GROUP | Age: 70
End: 2020-01-03

## 2020-01-03 DIAGNOSIS — Z79.01 CHRONIC ANTICOAGULATION: ICD-10-CM

## 2020-01-03 DIAGNOSIS — Z95.828 S/P IVC FILTER: ICD-10-CM

## 2020-01-03 DIAGNOSIS — Z86.718 HISTORY OF DVT (DEEP VEIN THROMBOSIS): ICD-10-CM

## 2020-01-03 LAB — INR PPP: 1.4 (ref 2–3.5)

## 2020-01-03 NOTE — PROGRESS NOTES
Anticoagulation Summary  As of 1/3/2020    INR goal:   2.0-3.0   TTR:   36.9 % (4.3 y)   INR used for dosin.40! (1/3/2020)   Warfarin maintenance plan:   10 mg (10 mg x 1) every day   Weekly warfarin total:   70 mg   Plan last modified:   Judith Pina PharmD (2019)   Next INR check:   1/10/2020   Priority:   Routine   Target end date:   Indefinite    Indications    DVT (deep venous thrombosis) (HCC) (Resolved) [I82.409]  Chronic anticoagulation [Z79.01]  S/P IVC filter [Z95.828]  History of DVT (deep vein thrombosis) [Z86.718]             Anticoagulation Episode Summary     INR check location:   Anticoagulation Clinic    Preferred lab:   [facility]    Send INR reminders to:       Comments:   MD INR      Anticoagulation Care Providers     Provider Role Specialty Phone number    Carmelo Pat M.D. Referring [facility] Medicine [phone]    [facility] Anticoagulation Services Responsible  [phone]        Anticoagulation Patient Findings      INR  sub-therapeutic.   Left a voice message for the patient, asked patient to please call the anticoagulation clinic if they have any signs/symptoms of bleeding and/or thrombosis or any changes to diet or medications.    Follow up appointment in 1 week(s)    15mg x two days then Continue weekly warfarin dose as noted      Bob Dupree, PharmD, MS, BCACP, LCC    This note was created using voice recognition software (Dragon). The accuracy of the dictation is limited by the abilities of the software. I have reviewed the note prior to signing, however some errors in grammar and context are still possible. If you have any questions related to this note please do not hesitate to contact our office.

## 2020-01-15 DIAGNOSIS — Z86.718 HISTORY OF DVT (DEEP VEIN THROMBOSIS): ICD-10-CM

## 2020-01-15 RX ORDER — WARFARIN SODIUM 10 MG/1
TABLET ORAL
Qty: 30 TAB | Refills: 5 | Status: SHIPPED | OUTPATIENT
Start: 2020-01-15 | End: 2020-03-10 | Stop reason: SDUPTHER

## 2020-03-06 ENCOUNTER — TELEPHONE (OUTPATIENT)
Dept: VASCULAR LAB | Facility: MEDICAL CENTER | Age: 70
End: 2020-03-06

## 2020-03-10 ENCOUNTER — ANTICOAGULATION MONITORING (OUTPATIENT)
Dept: VASCULAR LAB | Facility: MEDICAL CENTER | Age: 70
End: 2020-03-10

## 2020-03-10 DIAGNOSIS — Z95.828 S/P IVC FILTER: ICD-10-CM

## 2020-03-10 DIAGNOSIS — Z86.718 HISTORY OF DVT (DEEP VEIN THROMBOSIS): ICD-10-CM

## 2020-03-10 DIAGNOSIS — Z79.01 CHRONIC ANTICOAGULATION: ICD-10-CM

## 2020-03-10 LAB — INR PPP: 1 (ref 2–3.5)

## 2020-03-10 RX ORDER — WARFARIN SODIUM 10 MG/1
TABLET ORAL
Qty: 90 TAB | Refills: 1 | Status: SHIPPED | OUTPATIENT
Start: 2020-03-10 | End: 2020-08-31 | Stop reason: SDUPTHER

## 2020-03-10 NOTE — PROGRESS NOTES
Anticoagulation Summary  As of 3/10/2020    INR goal:   2.0-3.0   TTR:   35.4 % (4.5 y)   INR used for dosin.00! (3/10/2020)   Warfarin maintenance plan:   10 mg (10 mg x 1) every day   Weekly warfarin total:   70 mg   Plan last modified:   Ruth SERVIN Filter (3/10/2020)   Next INR check:   3/16/2020   Priority:   Routine   Target end date:   Indefinite    Indications    DVT (deep venous thrombosis) (HCC) (Resolved) [I82.409]  Chronic anticoagulation [Z79.01]  S/P IVC filter [Z95.828]  History of DVT (deep vein thrombosis) [Z86.718]             Anticoagulation Episode Summary     INR check location:   Anticoagulation Clinic    Preferred lab:   [facility]    Send INR reminders to:       Comments:   MD INR      Anticoagulation Care Providers     Provider Role Specialty Phone number    Carmelo Pat M.D. Referring [facility] Medicine [phone]    [facility] Anticoagulation Services Responsible  [phone]        Anticoagulation Patient Findings          Spoke with Atiya to report a critically subtherapeutic INR of 1.0.  Pt ran out of warfarin two weeks ago, however neglected to call us for a refill.  Will resume current dosing regimen and retest in 7 days  Ruth Munoz, Clinical Pharmacist, CDE, CACP

## 2020-03-18 ENCOUNTER — ANTICOAGULATION MONITORING (OUTPATIENT)
Dept: VASCULAR LAB | Facility: MEDICAL CENTER | Age: 70
End: 2020-03-18

## 2020-03-18 DIAGNOSIS — Z86.718 HISTORY OF DVT (DEEP VEIN THROMBOSIS): ICD-10-CM

## 2020-03-18 DIAGNOSIS — Z95.828 S/P IVC FILTER: ICD-10-CM

## 2020-03-18 DIAGNOSIS — Z79.01 CHRONIC ANTICOAGULATION: ICD-10-CM

## 2020-03-18 LAB — INR PPP: 1.9 (ref 2–3.5)

## 2020-03-18 NOTE — PROGRESS NOTES
Anticoagulation Summary  As of 3/18/2020    INR goal:   2.0-3.0   TTR:   35.2 % (4.5 y)   INR used for dosin.90! (3/18/2020)   Warfarin maintenance plan:   10 mg (10 mg x 1) every day   Weekly warfarin total:   70 mg   Plan last modified:   Ruth M Filter (3/10/2020)   Next INR check:   3/25/2020   Priority:   Routine   Target end date:   Indefinite    Indications    DVT (deep venous thrombosis) (HCC) (Resolved) [I82.409]  Chronic anticoagulation [Z79.01]  S/P IVC filter [Z95.828]  History of DVT (deep vein thrombosis) [Z86.718]             Anticoagulation Episode Summary     INR check location:   Anticoagulation Clinic    Preferred lab:   [facility] GENERAL    Send INR reminders to:       Comments:   MD INR      Anticoagulation Care Providers     Provider Role Specialty Phone number    Carmelo Pat M.D. Referring [facility] Medicine [phone]    [facility] Anticoagulation Services Responsible  [phone]        Anticoagulation Patient Findings      Spoke with patient.  INR is sub therapeutic.   Pt denies any unusual s/s of bleeding, bruising, clotting or any changes to diet or medications. Denies any etoh, cranberries, supplements, or illness.   Pt verifies warfarin weekly dosing.     Will have pt take a warfarin boost dose 15mg today and then her normal warfarin dosing.     Repeat INR in 1 week(s).     Porsche Bello, PharmD

## 2020-03-25 LAB — INR PPP: 2.2 (ref 2–3.5)

## 2020-03-26 ENCOUNTER — ANTICOAGULATION MONITORING (OUTPATIENT)
Dept: VASCULAR LAB | Facility: MEDICAL CENTER | Age: 70
End: 2020-03-26

## 2020-03-26 DIAGNOSIS — Z95.828 S/P IVC FILTER: ICD-10-CM

## 2020-03-26 DIAGNOSIS — Z79.01 CHRONIC ANTICOAGULATION: ICD-10-CM

## 2020-03-26 DIAGNOSIS — Z86.718 HISTORY OF DVT (DEEP VEIN THROMBOSIS): ICD-10-CM

## 2020-03-26 NOTE — PROGRESS NOTES
"Anticoagulation Summary  As of 3/26/2020    INR goal:   2.0-3.0   TTR:   35.3 % (4.5 y)   INR used for dosin.20 (3/25/2020)   Warfarin maintenance plan:   10 mg (10 mg x 1) every day   Weekly warfarin total:   70 mg   Plan last modified:   Ruth M Filter (3/10/2020)   Next INR check:   2020   Priority:   Routine   Target end date:   Indefinite    Indications    DVT (deep venous thrombosis) (HCC) (Resolved) [I82.409]  Chronic anticoagulation [Z79.01]  S/P IVC filter [Z95.828]  History of DVT (deep vein thrombosis) [Z86.718]             Anticoagulation Episode Summary     INR check location:   Anticoagulation Clinic    Preferred lab:   [facility] GENER[facility]    Send INR reminders to:       Comments:   MD INR      Anticoagulation Care Providers     Provider Role Specialty Phone number    Carmelo Pat M.D. Referring Family Medicine [phone]    [facility] Anticoagulation Services Responsible  [phone]        Anticoagulation Patient Findings  Patient Findings     Negatives:   Signs/symptoms of thrombosis, Signs/symptoms of bleeding, Laboratory test error suspected, Change in health, Change in alcohol use, Change in activity, Upcoming invasive procedure, Emergency department visit, Upcoming dental procedure, Missed doses, Extra doses, Change in medications, Change in diet/appetite, Hospital admission, Bruising, Other complaints        Spoke with the patient on the phone today, reporting a therapeutic INR of 2.2. Confirmed the current warfarin dosing regimen and patient compliance. Patient denies any interval changes to medications. Patient denies any signs/symptoms of bleeding or clotting. Patient did report that she \"ate a whole can of spinach last night since her number was good\" and I explained that it will likely affect her nu,slava next time. Eating a whole can was discouraged.  Patient instructed to continue with the current warfarin dosing regimen for now, and asked to follow up again in 1 week.    Ramírez" Vern FriasD

## 2020-04-02 ENCOUNTER — ANTICOAGULATION MONITORING (OUTPATIENT)
Dept: VASCULAR LAB | Facility: MEDICAL CENTER | Age: 70
End: 2020-04-02

## 2020-04-02 DIAGNOSIS — Z86.718 HISTORY OF DVT (DEEP VEIN THROMBOSIS): ICD-10-CM

## 2020-04-02 DIAGNOSIS — Z79.01 CHRONIC ANTICOAGULATION: ICD-10-CM

## 2020-04-02 DIAGNOSIS — Z95.828 S/P IVC FILTER: ICD-10-CM

## 2020-04-02 LAB — INR PPP: 2.1 (ref 2–3.5)

## 2020-04-02 NOTE — PROGRESS NOTES
Anticoagulation Summary  As of 2020    INR goal:   2.0-3.0   TTR:   35.6 % (4.5 y)   INR used for dosin.10 (2020)   Warfarin maintenance plan:   10 mg (10 mg x 1) every day   Weekly warfarin total:   70 mg   Plan last modified:   Ruth M Filter (3/10/2020)   Next INR check:   2020   Priority:   Routine   Target end date:   Indefinite    Indications    DVT (deep venous thrombosis) (HCC) (Resolved) [I82.409]  Chronic anticoagulation [Z79.01]  S/P IVC filter [Z95.828]  History of DVT (deep vein thrombosis) [Z86.718]             Anticoagulation Episode Summary     INR check location:   Anticoagulation Clinic    Preferred lab:   [facility] GENER[facility]    Send INR reminders to:       Comments:   MD INR      Anticoagulation Care Providers     Provider Role Specialty Phone number    Carmelo Pat M.D. Referring [facility] Medicine [phone]    [facility] Anticoagulation Services Responsible  [phone]        Anticoagulation Patient Findings      Spoke with patient to report a therapeutic INR.    Pt instructed to continue with current warfarin dosing regimen, confirms dosing.   Pt denies any s/s of bleeding, bruising, clotting or any changes to diet or medication.    Will follow up in 1 week(s).     Porsche Bello, PharmD

## 2020-04-24 ENCOUNTER — TELEPHONE (OUTPATIENT)
Dept: VASCULAR LAB | Facility: MEDICAL CENTER | Age: 70
End: 2020-04-24

## 2020-04-29 ENCOUNTER — ANTICOAGULATION MONITORING (OUTPATIENT)
Dept: VASCULAR LAB | Facility: MEDICAL CENTER | Age: 70
End: 2020-04-29

## 2020-04-29 DIAGNOSIS — Z79.01 CHRONIC ANTICOAGULATION: ICD-10-CM

## 2020-04-29 DIAGNOSIS — Z95.828 S/P IVC FILTER: ICD-10-CM

## 2020-04-29 DIAGNOSIS — Z86.718 HISTORY OF DVT (DEEP VEIN THROMBOSIS): ICD-10-CM

## 2020-04-29 LAB — INR PPP: 5.2 (ref 2–3.5)

## 2020-04-29 NOTE — PROGRESS NOTES
Anticoagulation Summary  As of 2020    INR goal:   2.0-3.0   TTR:   35.5 % (4.6 y)   INR used for dosin.20! (2020)   Warfarin maintenance plan:   10 mg (10 mg x 1) every day   Weekly warfarin total:   70 mg   Plan last modified:   Ruth M Filter (3/10/2020)   Next INR check:      Priority:   Routine   Target end date:   Indefinite    Indications    DVT (deep venous thrombosis) (HCC) (Resolved) [I82.409]  Chronic anticoagulation [Z79.01]  S/P IVC filter [Z95.828]  History of DVT (deep vein thrombosis) [Z86.718]             Anticoagulation Episode Summary     INR check location:   Anticoagulation Clinic    Preferred lab:   [facility] GENERAL    Send INR reminders to:       Comments:   MD INR      Anticoagulation Care Providers     Provider Role Specialty Phone number    Carmelo Pat M.D. Referring Family Medicine [phone]    [facility] Anticoagulation Services Responsible  [phone]        Anticoagulation Patient Findings      Spoke with patient.  INR is SUPRA therapeutic.   Pt denies any unusual s/s of bleeding, bruising, clotting or any changes to diet or medications. Denies any etoh, cranberries, supplements, or illness.   Pt verifies warfarin weekly dosing.     Will have pt HOLD warfarin today and tomorrow and then recheck INR in 2 days.     Not clear why INR has elevated.     Repeat INR in 2 days with home monitor.      Porsche Bello, PharmD

## 2020-05-01 ENCOUNTER — ANTICOAGULATION MONITORING (OUTPATIENT)
Dept: VASCULAR LAB | Facility: MEDICAL CENTER | Age: 70
End: 2020-05-01

## 2020-05-01 DIAGNOSIS — Z86.718 HISTORY OF DVT (DEEP VEIN THROMBOSIS): ICD-10-CM

## 2020-05-01 DIAGNOSIS — Z79.01 CHRONIC ANTICOAGULATION: ICD-10-CM

## 2020-05-01 DIAGNOSIS — Z95.828 S/P IVC FILTER: ICD-10-CM

## 2020-05-01 LAB — INR PPP: 4.9 (ref 2–3.5)

## 2020-05-01 NOTE — PROGRESS NOTES
Anticoagulation Summary  As of 2020    INR goal:   2.0-3.0   TTR:   35.5 % (4.6 y)   INR used for dosin.90! (2020)   Warfarin maintenance plan:   10 mg (10 mg x 1) every day   Weekly warfarin total:   70 mg   Plan last modified:   Ruth M Filter (3/10/2020)   Next INR check:   2020   Priority:   Routine   Target end date:   Indefinite    Indications    DVT (deep venous thrombosis) (HCC) (Resolved) [I82.409]  Chronic anticoagulation [Z79.01]  S/P IVC filter [Z95.828]  History of DVT (deep vein thrombosis) [Z86.718]             Anticoagulation Episode Summary     INR check location:   Anticoagulation Clinic    Preferred lab:   [facility]    Send INR reminders to:       Comments:   MD INR      Anticoagulation Care Providers     Provider Role Specialty Phone number    Carmelo Pat M.D. Referring Family Medicine [phone]    [facility] Anticoagulation Services Responsible  [phone]        Anticoagulation Patient Findings  Patient Findings     Negatives:   Signs/symptoms of thrombosis, Signs/symptoms of bleeding, Laboratory test error suspected, Change in health, Change in alcohol use, Change in activity, Upcoming invasive procedure, Emergency department visit, Upcoming dental procedure, Missed doses, Extra doses, Change in medications, Change in diet/appetite, Hospital admission, Bruising, Other complaints        Spoke with patient today regarding supratherapeutic INR of 4.9.  Patient denies any signs/symptoms of bruising or bleeding or any changes in diet and medications.  Instructed patient to call clinic with any questions or concerns.  Instructed patient to continue holding warfarin for two more days, then resume current dosing regimen.  Follow up in 3 days, to reduce risk of adverse events related to this high risk medication,  Warfarin.    Nahum Bowens, PharmD, BCACP

## 2020-05-11 ENCOUNTER — ANTICOAGULATION MONITORING (OUTPATIENT)
Dept: VASCULAR LAB | Facility: MEDICAL CENTER | Age: 70
End: 2020-05-11

## 2020-05-11 DIAGNOSIS — Z86.718 HISTORY OF DVT (DEEP VEIN THROMBOSIS): ICD-10-CM

## 2020-05-11 DIAGNOSIS — Z79.01 CHRONIC ANTICOAGULATION: ICD-10-CM

## 2020-05-11 DIAGNOSIS — Z95.828 S/P IVC FILTER: ICD-10-CM

## 2020-05-11 LAB — INR PPP: 2.4 (ref 2–3.5)

## 2020-05-11 NOTE — PROGRESS NOTES
Anticoagulation Summary  As of 2020    INR goal:   2.0-3.0   TTR:   35.4 % (4.6 y)   INR used for dosin.40 (2020)   Warfarin maintenance plan:   10 mg (10 mg x 1) every day   Weekly warfarin total:   70 mg   Plan last modified:   Ruth M Filter (3/10/2020)   Next INR check:   2020   Priority:   Routine   Target end date:   Indefinite    Indications    DVT (deep venous thrombosis) (HCC) (Resolved) [I82.409]  Chronic anticoagulation [Z79.01]  S/P IVC filter [Z95.828]  History of DVT (deep vein thrombosis) [Z86.718]             Anticoagulation Episode Summary     INR check location:   Anticoagulation Clinic    Preferred lab:   [facility]    Send INR reminders to:       Comments:   MD INR      Anticoagulation Care Providers     Provider Role Specialty Phone number    Carmelo Pat M.D. Referring Family Medicine [phone]    [facility] Anticoagulation Services Responsible  [phone]        Anticoagulation Patient Findings        Spoke to patient on the phone.   INR  therapeutic.   Denies signs/symptoms of bleeding and/or thrombosis.   Denies changes to diet or medications.   Follow up appointment in 1 week(s).    Continue weekly warfarin dose as noted      Bob Dupree, PharmD, MS, BCACP, LCC    This note was created using voice recognition software (Dragon). The accuracy of the dictation is limited by the abilities of the software. I have reviewed the note prior to signing, however some errors in grammar and context are still possible. If you have any questions related to this note please do not hesitate to contact our office.

## 2020-05-13 ENCOUNTER — TELEPHONE (OUTPATIENT)
Dept: SLEEP MEDICINE | Facility: MEDICAL CENTER | Age: 70
End: 2020-05-13

## 2020-05-13 NOTE — TELEPHONE ENCOUNTER
Last Seen 04/03/19 Joana PAULIE    Plan:     1) Reviewed results of swallow study and repeat CT imaging. She denies current dysphagia. She is careful when she is eating or drinking to avoid aspiration. She does have ongoing tobacco abuse. Order to repeat CT in December 2019.   2) Smoking cessation discussed and recommended. She is working on quitting.  3) Continue 02 3 LPM prn exertion.  4) Continue Symbicort 160/4.5 mcg. Continue Spiriva respimat 2.5 mcg 2 puffs INH daily. Continue Proventil HFA inhaler as needed. RX refills sent to her local pharmacy.   5) Continue Nasonex.   6) She is up to date on Prevnar 13, Pneumovax 23 and Influenza vaccines.  7) Weight loss recommended.  8) Sleep hygiene discussed. Continue Trazodone prescribed by Psychiatry.  9) Continue CPAP 14 CM H20 with 3 LPM 02 bleed in. Sleep hygiene discussed. Encouraged to bring compliance chip to follow up visit   10) Follow up in 6 months, sooner OV if needed.     No Pending order for Chest CT, would you like pt to complete prior to appt or will this be ordered at OV tomorrow 05/13/20

## 2020-05-14 ENCOUNTER — OFFICE VISIT (OUTPATIENT)
Dept: PULMONOLOGY | Facility: HOSPICE | Age: 70
End: 2020-05-14
Payer: MEDICARE

## 2020-05-14 VITALS
DIASTOLIC BLOOD PRESSURE: 70 MMHG | BODY MASS INDEX: 45.24 KG/M2 | OXYGEN SATURATION: 94 % | RESPIRATION RATE: 18 BRPM | WEIGHT: 265 LBS | SYSTOLIC BLOOD PRESSURE: 138 MMHG | HEART RATE: 87 BPM | HEIGHT: 64 IN

## 2020-05-14 DIAGNOSIS — G47.33 OSA (OBSTRUCTIVE SLEEP APNEA): Chronic | ICD-10-CM

## 2020-05-14 DIAGNOSIS — J44.9 CHRONIC OBSTRUCTIVE PULMONARY DISEASE, UNSPECIFIED COPD TYPE (HCC): ICD-10-CM

## 2020-05-14 DIAGNOSIS — R93.89 NONSPECIFIC ABNORMAL FINDINGS ON RADIOLOGICAL AND EXAMINATION OF INTRATHORACIC ORGANS: ICD-10-CM

## 2020-05-14 PROCEDURE — 99214 OFFICE O/P EST MOD 30 MIN: CPT | Performed by: INTERNAL MEDICINE

## 2020-05-14 RX ORDER — ZOLPIDEM TARTRATE 5 MG/1
5 TABLET ORAL NIGHTLY PRN
Qty: 2 TAB | Refills: 0 | Status: SHIPPED | OUTPATIENT
Start: 2020-05-14 | End: 2020-05-16

## 2020-05-14 ASSESSMENT — FIBROSIS 4 INDEX: FIB4 SCORE: 1.44

## 2020-05-14 NOTE — PROGRESS NOTES
Chief Complaint   Patient presents with   • Apnea     CPAP 14 with 3L bleed in, pt is needing replacement , Last Seen 04/03/19       HPI: This patient is a 69 y.o. female presents after a prolonged hiatus for follow-up of sleep apnea and asthma/COPD.  Her last visit was over a year ago.  She has history of YESSICA diagnosed by Dr. Hodge >5 years ago, on CPAP: 8 cm of water.  Her sleep study results are unavailable.  She requires a new CPAP machine and supplies. She has difficulty tolerating current CPAP pressure settings.  Compliance card shows only 30% CPAP usage over the past 30 days.  AHI is 1.2.  She snores and sleeps poorly, with frequent awakenings and fatigue.    She has a history of chronic obstructive asthma/COPD, quit smoking 3 days ago.  Has a history of abnormal chest CAT scan with interstitial and groundglass changes noted by chest imaging in 2018.  She feels her exertional dyspnea has worsened significantly over the past year.  She has periodic cough, denies chest pain, hemoptysis or edema.  Uses Symbicort 160 mcg and Spiriva inhalers.  She has not had any imaging studies with pulmonary function testing in the past year.    Past Medical History:   Diagnosis Date   • Anxiety disorder    • Arthritis    • Bipolar affective (HCC)    • Bladder incontinence    • Bowel obstruction (HCC)    • Breath shortness     with activity   • Cancer (HCC)     cervical   • Chronic autoimmune thyroiditis     + TPO ab = 563 / + ultrasound   • Colostomy in place (HCC) 2009   • COPD    • Dental disorder     dentures upper   • Diabetes (HCC)     Pt denies DM 7/23/19   • EMPHYSEMA    • Hashimoto's disease    • High cholesterol    • History of total knee arthroplasty     infection   • Hypertension     stated no longer on meds due to lost 100 lbs   • Hypothyroid 7/7/2009   • Indigestion    • Infectious disease     MRSA,   • Infectious disease     VRE   • Obesity 7/7/2009   • Obstruction     colostomy   • Oxygen dependent     4.5 L NC    • Pain     b/l legs   • Personal history of venous thrombosis and embolism 2009, 2012    arm and leg left side   • Presence of IVC filter    • Psychiatric problem     depression, denies currently 7/23/19   • Sleep apnea     USES CPAP, 3-4L oxygen   • Snoring    • Status post cholecystectomy 1992   • Weight gain        Social History     Socioeconomic History   • Marital status:      Spouse name: Not on file   • Number of children: Not on file   • Years of education: Not on file   • Highest education level: Not on file   Occupational History   • Not on file   Social Needs   • Financial resource strain: Not on file   • Food insecurity     Worry: Not on file     Inability: Not on file   • Transportation needs     Medical: Not on file     Non-medical: Not on file   Tobacco Use   • Smoking status: Current Every Day Smoker     Packs/day: 0.50     Years: 50.00     Pack years: 25.00     Types: Cigarettes   • Smokeless tobacco: Never Used   • Tobacco comment: 1 ppd, 43 yrs   Substance and Sexual Activity   • Alcohol use: No     Alcohol/week: 0.0 oz   • Drug use: Yes     Types: Marijuana, Inhaled     Comment: 1-2 daily, last 7/22/19  at 1530   • Sexual activity: Never   Lifestyle   • Physical activity     Days per week: Not on file     Minutes per session: Not on file   • Stress: Not on file   Relationships   • Social connections     Talks on phone: Not on file     Gets together: Not on file     Attends [employer] service: Not on file     Active member of club or organization: Not on file     Attends meetings of clubs or organizations: Not on file     Relationship status: Not on file   • Intimate partner violence     Fear of current or ex partner: Not on file     Emotionally abused: Not on file     Physically abused: Not on file     Forced sexual activity: Not on file   Other Topics Concern   •  Service No   • Blood Transfusions No     Comment: unknown   • Caffeine Concern No   • Occupational Exposure No   •  Hobby Hazards No   • Sleep Concern Yes     Comment: sleep apnea   • Stress Concern Yes   • Weight Concern No   • Special Diet Yes   • Back Care Yes   • Exercise No   • Bike Helmet No     Comment: does not ride bike    • Seat Belt Yes   • Self-Exams Yes   Social History Narrative   • Not on file       Family History   Problem Relation Age of Onset   • Heart Disease Mother    • Seizures Mother    • Alcohol/Drug Father    • Lung Disease Father    • Hypertension Other        Current Outpatient Medications on File Prior to Visit   Medication Sig Dispense Refill   • warfarin (COUMADIN) 10 MG Tab Take 1 tablet by mouth daily as directed by Anticoagulation Program 90 Tab 1   • lisinopril (PRINIVIL) 5 MG Tab TAKE ONE TABLET BY MOUTH EVERY DAY 90 Tab 0   • gabapentin (NEURONTIN) 300 MG Cap TAKE THREE CAPSULES BY MOUTH THREE TIMES A  Cap 0   • levothyroxine (SYNTHROID) 200 MCG Tab TAKE ONE TABLET BY MOUTH EVERY MORNING ON AN EMPTY STOMACH. 90 Tab 0   • albuterol (VENTOLIN HFA) 108 (90 Base) MCG/ACT Aero Soln inhalation aerosol Inhale 2 Puffs by mouth every four hours as needed for Shortness of Breath. 3 Inhaler 0   • vitamin D, Ergocalciferol, (DRISDOL) 16736 units Cap capsule Take 1 Cap by mouth every 28 days. 3 Cap 4   • Tiotropium Bromide Monohydrate (SPIRIVA RESPIMAT) 2.5 MCG/ACT Aero Soln Inhale 2 Puffs by mouth every day. 3 Inhaler 3   • budesonide-formoterol (SYMBICORT) 160-4.5 MCG/ACT Aerosol Inhale 2 Puffs by mouth 2 Times a Day. With spacer, rinse mouth after use 3 Inhaler 3   • LORazepam (ATIVAN) 1 MG Tab Take 2 mg by mouth at bedtime as needed for Anxiety.     • FLUoxetine (PROZAC) 20 MG Cap Take 20 mg by mouth every day.     • carbamazepine (TEGRETOL) 200 MG Tab Take 600 mg by mouth every bedtime.     • Incontinence Supply Disposable (FQ PROTECTIVE UNDERWEAR) Misc USE 1 PULLUP 3 TIMES DAILY 90 Each 0   • raNITidine (ZANTAC) 150 MG Tab TAKE 1 TABLET BY MOUTH TWO TIMES A DAY (Patient not taking: Reported on  5/14/2020) 180 Tab 0   • atorvastatin (LIPITOR) 80 MG tablet Take 1 Tab by mouth every evening. (Patient not taking: Reported on 5/14/2020) 90 Tab 1   • raNITidine (ZANTAC) 150 MG Tab TAKE 1 TABLET BY MOUTH TWO TIMES A DAY (Patient not taking: Reported on 5/14/2020) 60 Tab 3   • raNITidine (ZANTAC) 150 MG Tab TAKE 1 TABLET BY MOUTH TWO TIMES A DAY (Patient not taking: Reported on 5/14/2020) 60 Tab 3   • Incontinence Supply Disposable (FQ PROTECTIVE UNDERWEAR) Misc USE 1 PULLUP 3 TIMES DAILY 90 Each 3   • prednisoLONE acetate (PRED FORTE) 1 % Suspension      • Misc. Devices Misc 3 boxes of pouches (23558), 2 boxes of wafers (09690), 3 boxes (tubes) paste, 1 box of no string skin prep, 1 box of no string unsolve 5 Container 6   • Misc. Devices Misc Pt requesting new battery, new tires and new brakes for power chair 1 Device 0   • Misc. Devices Misc Heavy duty 4 wheeled walker with seat and back rest to use as needed 1 Device 0   • amitriptyline (ELAVIL) 25 MG Tab Take 1 Tab by mouth every bedtime. (Patient not taking: Reported on 5/14/2020) 30 Tab 0     No current facility-administered medications on file prior to visit.        Allergies: Pcn [penicillins]    ROS:   Constitutional: Denies fevers, chills, night sweats, fatigue or weight loss  Eyes: +vision loss, pain, denies drainage, double vision  Ears, Nose, Throat: +earache, denies difficulty hearing, tinnitus, nasal congestion, hoarseness  Cardiovascular: Denies chest pain, tightness, palpitations, orthopnea or edema  Respiratory:As in HPI  Sleep: Denies daytime sleepiness, snoring, apneas, insomnia, morning headaches  GI: Denies heartburn, dysphagia, nausea, abdominal pain, diarrhea or constipation  : Denies frequent urination, hematuria, discharge or painful urination  Musculoskeletal: Denies back pain, +painful joints, denies sore muscles  Neurological: Denies weakness or headaches  Skin: No rashes    /70 (BP Location: Right arm, Patient Position:  "Sitting, BP Cuff Size: Large adult)   Pulse 87   Resp 18   Ht 1.626 m (5' 4\")   Wt 120.2 kg (265 lb)   SpO2 94%     Physical Exam:  Appearance: Well-nourished, well-developed, in no acute distress  HEENT: Normocephalic, atraumatic, white sclera, PERRLA, oropharynx clear  Neck: No adenopathy or masses  Respiratory: no intercostal retractions or accessory muscle use  Lungs auscultation: diminished BS,   Cardiovascular: Regular rate rhythm. No murmurs, rubs or gallops.  No LE edema  Abdomen: soft, nondistended  Gait: Normal  Digits: No clubbing, cyanosis  Motor: No focal deficits  Orientation: Oriented to time, person and place    Diagnosis:  1. YESSICA (obstructive sleep apnea)  Polysomnography, 4 or More    zolpidem (AMBIEN) 5 MG Tab   2. Chronic obstructive pulmonary disease, unspecified COPD type (HCC)  CT-CHEST (THORAX) W/O    PULMONARY FUNCTION TESTS -Test requested: Complete Pulmonary Function Test   3. Nonspecific abnormal findings on radiological and examination of intrathoracic organs         Plan:  In terms of the patient's sleep apnea, we do not have her sleep study results from Dr. Hodge.  She requires an updated polysomnogram in the sleep laboratory for evaluation of YESSICA and accurate calibration of CPAP pressures.  She should be eligible for a new CPAP machine.    We will schedule her sleep study immediately.  She has progressive exertional dyspnea, and also requires updated chest imaging and pulmonary function testing to sort out progressive COPD versus interstitial lung disease.    She recently quit smoking, smoking abstinence is encouraged.    Continue Symbicort 160 mcg and Spiriva inhalers pending testing.  Return for after sleep study, after CT scan, after PFT.      "

## 2020-05-20 ENCOUNTER — TELEPHONE (OUTPATIENT)
Dept: PULMONOLOGY | Facility: HOSPICE | Age: 70
End: 2020-05-20

## 2020-05-20 DIAGNOSIS — G47.33 OSA (OBSTRUCTIVE SLEEP APNEA): ICD-10-CM

## 2020-05-20 NOTE — TELEPHONE ENCOUNTER
Per notes pt was ordered a split night because we did not have copy of original dx study    We actually do have a copy and I have verified with her DME it is still valid    Would you like to change this to just a titration?    I have attached the order to sign for a titration if you agree and I will adjust the appt

## 2020-05-21 ENCOUNTER — APPOINTMENT (OUTPATIENT)
Dept: RADIOLOGY | Facility: MEDICAL CENTER | Age: 70
End: 2020-05-21
Attending: INTERNAL MEDICINE
Payer: MEDICARE

## 2020-05-26 ENCOUNTER — ANTICOAGULATION MONITORING (OUTPATIENT)
Dept: VASCULAR LAB | Facility: MEDICAL CENTER | Age: 70
End: 2020-05-26

## 2020-05-26 ENCOUNTER — APPOINTMENT (OUTPATIENT)
Dept: PULMONOLOGY | Facility: HOSPICE | Age: 70
End: 2020-05-26
Attending: INTERNAL MEDICINE
Payer: MEDICARE

## 2020-05-26 DIAGNOSIS — Z95.828 S/P IVC FILTER: ICD-10-CM

## 2020-05-26 DIAGNOSIS — Z86.718 HISTORY OF DVT (DEEP VEIN THROMBOSIS): ICD-10-CM

## 2020-05-26 DIAGNOSIS — Z79.01 CHRONIC ANTICOAGULATION: ICD-10-CM

## 2020-05-26 LAB — INR PPP: 3.4 (ref 2–3.5)

## 2020-05-26 NOTE — PROGRESS NOTES
Anticoagulation Summary  As of 5/26/2020    INR goal:   2.0-3.0   TTR:   35.6 % (4.7 y)   INR used for dosing:   3.40! (5/26/2020)   Warfarin maintenance plan:   10 mg (10 mg x 1) every day   Weekly warfarin total:   70 mg   Plan last modified:   Ruth M Filter (3/10/2020)   Next INR check:   6/2/2020   Priority:   Routine   Target end date:   Indefinite    Indications    DVT (deep venous thrombosis) (HCC) (Resolved) [I82.409]  Chronic anticoagulation [Z79.01]  S/P IVC filter [Z95.828]  History of DVT (deep vein thrombosis) [Z86.718]             Anticoagulation Episode Summary     INR check location:   Anticoagulation Clinic    Preferred lab:   [facility]    Send INR reminders to:       Comments:   MD INR      Anticoagulation Care Providers     Provider Role Specialty Phone number    Carmelo Pat M.D. Referring Family Medicine [phone]    [facility] Anticoagulation Services Responsible  [phone]        Anticoagulation Patient Findings  Patient Findings     Negatives:   Signs/symptoms of thrombosis, Signs/symptoms of bleeding, Laboratory test error suspected, Change in health, Change in alcohol use, Change in activity, Upcoming invasive procedure, Emergency department visit, Upcoming dental procedure, Missed doses, Extra doses, Change in medications, Change in diet/appetite, Hospital admission, Bruising, Other complaints         Spoke with patient today regarding supratherapeutic INR of 3.4.  Patient denies any signs/symptoms of bruising or bleeding or any changes in diet and medications.  Instructed patient to call clinic with any questions or concerns.  Instructed patient to decrease today's dose to 5mg, then resume current warfarin regimen.  Follow up in 1 weeks, to reduce risk of adverse events related to this high risk medication,  Warfarin.    Nahum Bowens, PharmD, BCACP

## 2020-06-01 ENCOUNTER — NON-PROVIDER VISIT (OUTPATIENT)
Dept: PULMONOLOGY | Facility: HOSPICE | Age: 70
End: 2020-06-01
Attending: INTERNAL MEDICINE
Payer: MEDICARE

## 2020-06-01 ENCOUNTER — HOSPITAL ENCOUNTER (OUTPATIENT)
Dept: RADIOLOGY | Facility: MEDICAL CENTER | Age: 70
End: 2020-06-01
Attending: INTERNAL MEDICINE
Payer: MEDICARE

## 2020-06-01 VITALS — WEIGHT: 260 LBS | BODY MASS INDEX: 44.63 KG/M2

## 2020-06-01 DIAGNOSIS — J44.9 CHRONIC OBSTRUCTIVE PULMONARY DISEASE, UNSPECIFIED COPD TYPE (HCC): ICD-10-CM

## 2020-06-01 PROCEDURE — 94726 PLETHYSMOGRAPHY LUNG VOLUMES: CPT | Performed by: INTERNAL MEDICINE

## 2020-06-01 PROCEDURE — 94729 DIFFUSING CAPACITY: CPT | Performed by: INTERNAL MEDICINE

## 2020-06-01 PROCEDURE — 94060 EVALUATION OF WHEEZING: CPT | Performed by: INTERNAL MEDICINE

## 2020-06-01 PROCEDURE — 71250 CT THORAX DX C-: CPT

## 2020-06-01 ASSESSMENT — PULMONARY FUNCTION TESTS
FVC: 1.8
FEV1/FVC_PERCENT_CHANGE: 2
FEV1/FVC_PERCENT_PREDICTED: 108
FVC_PREDICTED: 2.74
FEV1/FVC_PERCENT_PREDICTED: 107
FEV1_PERCENT_PREDICTED: 61
FEV1/FVC_PERCENT_PREDICTED: 106
FEV1_PERCENT_CHANGE: 17
FEV1_LLN: 1.79
FEV1/FVC_PERCENT_LLN: 66
FEV1/FVC_PERCENT_CHANGE: 121
FEV1/FVC: 86
FVC: 1.57
FEV1/FVC_PERCENT_PREDICTED: 78
FVC_PERCENT_PREDICTED: 65
FVC_PERCENT_PREDICTED: 57
FEV1: 1.32
FEV1_PERCENT_CHANGE: 14
FVC_LLN: 2.29
FEV1_PREDICTED: 2.15
FEV1/FVC: 84
FEV1/FVC: 86.11
FEV1/FVC_PERCENT_PREDICTED: 109
FEV1: 1.55
FEV1/FVC: 84
FEV1/FVC_PREDICTED: 79
FEV1_PERCENT_PREDICTED: 71

## 2020-06-01 ASSESSMENT — FIBROSIS 4 INDEX: FIB4 SCORE: 1.44

## 2020-06-01 NOTE — PROCEDURES
Technician: Carmen Romero RRT   Good patient effort & cooperation.  The results of this test meet the ATS/ERS standards for acceptability & reproducibility.  Test was performed on the [employer] Plethysmograph-[facility] DX system.  Predicted equations for Spirometry are GLI-2012, ITS for lung volumes, and GLI-2017 for DLCO.  The DLCO was uncorrected for Hgb.  A bronchodilator of Ventolin HFA -2puffs via spacer administered.  DLCO performed during dilation period.    Interpretation;   1.  Baseline spirometry shows mainly restriction with FVC of 1.5 cm of 57% predicted and FEV1 of 1.32 L or 61% predicted.  FEV1/FVC ratio is 84.  2.  There is significant bronchodilator response with improvement in both FEV1 and FVC.  3.  Prolonged opacities within normal limits at 88% predicted.  Residual volume is suggestive of mild air trapping.  4.  DLCO is within normal limits.  Pulmonary function testing shows mixed restrictive obstructive process with positive bronchodilator response which may be seen in reactive airways disease or COPD and obesity.  Suggest clinical correlation.

## 2020-06-09 ENCOUNTER — SLEEP STUDY (OUTPATIENT)
Dept: SLEEP MEDICINE | Facility: MEDICAL CENTER | Age: 70
End: 2020-06-09
Attending: INTERNAL MEDICINE
Payer: MEDICARE

## 2020-06-09 DIAGNOSIS — G47.33 OSA (OBSTRUCTIVE SLEEP APNEA): ICD-10-CM

## 2020-06-10 PROCEDURE — 95811 POLYSOM 6/>YRS CPAP 4/> PARM: CPT | Performed by: INTERNAL MEDICINE

## 2020-06-10 NOTE — PROCEDURES
Comments:  The patient underwent a diagnostic polysomnogram using the standard montage for measurement of parameters of sleep, respiratory events, movement abnormalities, and heart rate and rhythm.   A microphone was used to monitor snoring.  Interpretation:  Study start time was 08:32:27 PM. Diagnostic recording time was 8h 29.5m with a total sleep time of 7h 12.0m resulting in a sleep efficiency of 84.79%%.   Sleep latency from the start of the study was 64 minutes and the latency from sleep to REM was 144 minutes.  In total,20 arousals were scored for an arousal index of 2.8.  Respiratory:  There were a total of 4 apneas consisting of 2 obstructive apneas, 0 mixed apneas, and 2 central apneas. A total of 25 hypopneas were scored.  The apnea index was 0.56 per hour and the hypopnea index was 3.47 per hour resulting in an overall AHI of 4.03.  AHI during rem was 11.5 and AHI while supine was 4.03.  Oximetry:  There was a mean oxygen saturation of 84.0% with a minimum oxygen saturation of 72.0%. Time spent with oxygen saturations below 89% was 445.2 minutes.  Cardiac:  The highest heart rate seen while awake was 102 BPM while the highest heart rate during sleep was 86 BPM with an average sleeping heart rate of 57 BPM.  Limb Movements:  There were a total of 204 PLMs during sleep, of which 1 were PLMS arousals. This resulted in a PLMS index of 28.3 and a PLMS arousal index of 0.1.    CPAP was tried [id number] to 16cm H2O.    The patient slept supine throughout testing.  Sleep efficiency was normal at 85%.  Sleep architecture normal sleep stages.  Sleep latency was 64 minutes elevated periodic limb movements with index 28/h however normal arousal index.    CPAP was started at 10 cm of water and titrated up to 16 cm of water, with resultant AHI 1.4.  There were mild persistent oxygen desaturations noted in the mid 80s%.    Interpretation:  Successful CPAP titration at 16 cm of water, with persistent hypoxia  noted.    Recommendations:  AutoPap:15-18 cm H20 with 2 L of oxygen using a small ResMed F30 mask.

## 2020-06-11 ENCOUNTER — ANTICOAGULATION MONITORING (OUTPATIENT)
Dept: VASCULAR LAB | Facility: MEDICAL CENTER | Age: 70
End: 2020-06-11

## 2020-06-11 DIAGNOSIS — Z95.828 S/P IVC FILTER: ICD-10-CM

## 2020-06-11 DIAGNOSIS — Z79.01 CHRONIC ANTICOAGULATION: ICD-10-CM

## 2020-06-11 DIAGNOSIS — Z86.718 HISTORY OF DVT (DEEP VEIN THROMBOSIS): ICD-10-CM

## 2020-06-11 LAB — INR PPP: 2.7 (ref 2–3.5)

## 2020-06-11 NOTE — PROGRESS NOTES
OP   Telephone Anticoagulation Service Note      Anticoagulation Summary  As of 2020    INR goal:   2.0-3.0   TTR:   35.7 % (4.7 y)   INR used for dosin.70 (2020)   Warfarin maintenance plan:   10 mg (10 mg x 1) every day   Weekly warfarin total:   70 mg   Plan last modified:   Ruth M Filter (3/10/2020)   Next INR check:   2020   Priority:   Routine   Target end date:   Indefinite    Indications    DVT (deep venous thrombosis) (HCC) (Resolved) [I82.409]  Chronic anticoagulation [Z79.01]  S/P IVC filter [Z95.828]  History of DVT (deep vein thrombosis) [Z86.718]             Anticoagulation Episode Summary     INR check location:   Anticoagulation Clinic    Preferred lab:   [facility]    Send INR reminders to:       Comments:   MD INR      Anticoagulation Care Providers     Provider Role Specialty Phone number    Carmelo Pat M.D. Referring Family Medicine [phone]    [facility] Anticoagulation Services Responsible  [phone]        Anticoagulation Patient Findings  Patient Findings     Negatives:   Signs/symptoms of thrombosis, Signs/symptoms of bleeding, Laboratory test error suspected, Change in health, Change in alcohol use, Change in activity, Upcoming invasive procedure, Emergency department visit, Upcoming dental procedure, Missed doses, Extra doses, Change in medications, Change in diet/appetite, Hospital admission, Bruising, Other complaints        Spoke with the patient on the phone today, reporting a therapeutic INR of  2.7. Confirmed the current warfarin dosing regimen and patient compliance. Patient denies any interval changes to diet and/or medications. Patient denies any signs/symptoms of bleeding or clotting.  Patient instructed to continue with the current warfarin dosing regimen, and asked to follow up again in 1 week.     Ramírez FriasD

## 2020-06-19 ENCOUNTER — ANTICOAGULATION MONITORING (OUTPATIENT)
Dept: VASCULAR LAB | Facility: MEDICAL CENTER | Age: 70
End: 2020-06-19

## 2020-06-19 DIAGNOSIS — Z79.01 CHRONIC ANTICOAGULATION: ICD-10-CM

## 2020-06-19 DIAGNOSIS — Z95.828 S/P IVC FILTER: ICD-10-CM

## 2020-06-19 DIAGNOSIS — Z86.718 HISTORY OF DVT (DEEP VEIN THROMBOSIS): ICD-10-CM

## 2020-06-19 LAB — INR PPP: 2.9 (ref 2–3.5)

## 2020-06-19 NOTE — PROGRESS NOTES
Anticoagulation Summary  As of 2020    INR goal:   2.0-3.0   TTR:   36.0 % (4.8 y)   INR used for dosin.90 (2020)   Warfarin maintenance plan:   10 mg (10 mg x 1) every day   Weekly warfarin total:   70 mg   Plan last modified:   Ruth M Filter (3/10/2020)   Next INR check:   2020   Priority:   Routine   Target end date:   Indefinite    Indications    DVT (deep venous thrombosis) (HCC) (Resolved) [I82.409]  Chronic anticoagulation [Z79.01]  S/P IVC filter [Z95.828]  History of DVT (deep vein thrombosis) [Z86.718]             Anticoagulation Episode Summary     INR check location:   Anticoagulation Clinic    Preferred lab:   [facility]    Send INR reminders to:       Comments:   MD INR      Anticoagulation Care Providers     Provider Role Specialty Phone number    Carmelo Pat M.D. Referring Family Medicine [phone]    [facility] Anticoagulation Services Responsible  [phone]        Anticoagulation Patient Findings      Spoke with patient  to report a therapeutic INR.    Pt instructed to continue with current warfarin dosing regimen, confirms dosing.   Pt denies any s/s of bleeding, bruising, clotting or any changes to diet or medication.    Will follow up in 1 week(s).     Alicia Adams, PharmD

## 2020-06-23 ENCOUNTER — TELEPHONE (OUTPATIENT)
Dept: PULMONOLOGY | Facility: HOSPICE | Age: 70
End: 2020-06-23

## 2020-06-23 NOTE — TELEPHONE ENCOUNTER
Advised Delmy (as I have in the past) that she can not initiate the order process coming from the DME  The pt will need to call herself to eliceo results over the phone or wait until her next OV in July for the machine to be ordered

## 2020-06-23 NOTE — TELEPHONE ENCOUNTER
Received call from Delmy from [facility] requesting status of DME order with patient. Last spoke with Shabanm approximately 1 month ago and was asking for her during call.    Please review pt's status of DME encounter. Thank you.    Delmy from [facility]: [phone]

## 2020-07-02 ENCOUNTER — ANTICOAGULATION MONITORING (OUTPATIENT)
Dept: VASCULAR LAB | Facility: MEDICAL CENTER | Age: 70
End: 2020-07-02

## 2020-07-02 DIAGNOSIS — Z86.718 HISTORY OF DVT (DEEP VEIN THROMBOSIS): ICD-10-CM

## 2020-07-02 DIAGNOSIS — Z95.828 S/P IVC FILTER: ICD-10-CM

## 2020-07-02 DIAGNOSIS — Z79.01 CHRONIC ANTICOAGULATION: ICD-10-CM

## 2020-07-02 LAB — INR PPP: 2.7 (ref 2–3.5)

## 2020-07-02 NOTE — PROGRESS NOTES
Anticoagulation Summary  As of 2020    INR goal:   2.0-3.0   TTR:   36.5 % (4.8 y)   INR used for dosin.70 (2020)   Warfarin maintenance plan:   10 mg (10 mg x 1) every day   Weekly warfarin total:   70 mg   Plan last modified:   Ruth M Filter (3/10/2020)   Next INR check:   2020   Priority:   Routine   Target end date:   Indefinite    Indications    DVT (deep venous thrombosis) (HCC) (Resolved) [I82.409]  Chronic anticoagulation [Z79.01]  S/P IVC filter [Z95.828]  History of DVT (deep vein thrombosis) [Z86.718]             Anticoagulation Episode Summary     INR check location:   Anticoagulation Clinic    Preferred lab:   [facility]    Send INR reminders to:       Comments:   MD INR      Anticoagulation Care Providers     Provider Role Specialty Phone number    Carmelo Pat M.D. Referring Family Medicine [phone]    [facility] Anticoagulation Services Responsible  [phone]        Anticoagulation Patient Findings  Patient Findings     Positives:   Missed doses (Pt reports that she missed a dose about a week ago but does not remember which night.)    Negatives:   Signs/symptoms of thrombosis, Signs/symptoms of bleeding, Laboratory test error suspected, Change in health, Change in alcohol use, Change in activity, Upcoming invasive procedure, Emergency department visit, Upcoming dental procedure, Extra doses, Change in medications, Change in diet/appetite, Hospital admission, Bruising, Other complaints          Spoke with patient to report a therapeutic INR.    Pt instructed to continue with current warfarin dosing regimen, confirms dosing.   Pt denies any s/s of bleeding, bruising, clotting or any changes to diet or medication.    Will follow up in 1 week(s).     Matthew Duarte, Pharmacy Intern

## 2020-07-07 ENCOUNTER — TELEPHONE (OUTPATIENT)
Dept: PULMONOLOGY | Facility: HOSPICE | Age: 70
End: 2020-07-07

## 2020-07-07 DIAGNOSIS — G47.33 OSA (OBSTRUCTIVE SLEEP APNEA): ICD-10-CM

## 2020-07-09 ENCOUNTER — ANTICOAGULATION MONITORING (OUTPATIENT)
Dept: VASCULAR LAB | Facility: MEDICAL CENTER | Age: 70
End: 2020-07-09

## 2020-07-09 DIAGNOSIS — Z79.01 CHRONIC ANTICOAGULATION: ICD-10-CM

## 2020-07-09 DIAGNOSIS — Z86.718 HISTORY OF DVT (DEEP VEIN THROMBOSIS): ICD-10-CM

## 2020-07-09 DIAGNOSIS — Z95.828 S/P IVC FILTER: ICD-10-CM

## 2020-07-09 LAB — INR PPP: 2.9 (ref 2–3.5)

## 2020-07-09 NOTE — TELEPHONE ENCOUNTER
Pt requesting results of titration study     Pt currently on CPAP 8 with 3lpm bleed in but eligible for new machine    Please place order

## 2020-07-09 NOTE — PROGRESS NOTES
Anticoagulation Summary  As of 2020    INR goal:   2.0-3.0   TTR:   36.7 % (4.8 y)   INR used for dosin.90 (2020)   Warfarin maintenance plan:   10 mg (10 mg x 1) every day   Weekly warfarin total:   70 mg   Plan last modified:   Ruth M Filter (3/10/2020)   Next INR check:   2020   Priority:   Routine   Target end date:   Indefinite    Indications    DVT (deep venous thrombosis) (HCC) (Resolved) [I82.409]  Chronic anticoagulation [Z79.01]  S/P IVC filter [Z95.828]  History of DVT (deep vein thrombosis) [Z86.718]             Anticoagulation Episode Summary     INR check location:   Anticoagulation Clinic    Preferred lab:   [facility]    Send INR reminders to:       Comments:   MD INR      Anticoagulation Care Providers     Provider Role Specialty Phone number    Carmelo Pat M.D. Referring Family Medicine [phone]    [facility] Anticoagulation Services Responsible  [phone]        Anticoagulation Patient Findings  Patient Findings     Negatives:   Signs/symptoms of thrombosis, Signs/symptoms of bleeding, Laboratory test error suspected, Change in health, Change in alcohol use, Change in activity, Upcoming invasive procedure, Emergency department visit, Upcoming dental procedure, Missed doses, Extra doses, Change in medications, Change in diet/appetite, Hospital admission, Bruising, Other complaints          Spoke with patient to report a therapeutic INR.    Pt instructed to continue with current warfarin dosing regimen, confirms dosing.   Pt denies any s/s of bleeding, bruising, clotting or any changes to diet or medication.    Will follow up in 1 week(s).     Mirza Duarte, Pharmacy Intern

## 2020-07-19 LAB — INR PPP: 3 (ref 2–3.5)

## 2020-07-20 ENCOUNTER — ANTICOAGULATION MONITORING (OUTPATIENT)
Dept: MEDICAL GROUP | Facility: PHYSICIAN GROUP | Age: 70
End: 2020-07-20

## 2020-07-20 DIAGNOSIS — Z86.718 HISTORY OF DVT (DEEP VEIN THROMBOSIS): ICD-10-CM

## 2020-07-20 DIAGNOSIS — Z79.01 CHRONIC ANTICOAGULATION: ICD-10-CM

## 2020-07-20 DIAGNOSIS — Z95.828 S/P IVC FILTER: ICD-10-CM

## 2020-07-20 NOTE — PROGRESS NOTES
OP   Telephone Anticoagulation Service Note      Anticoagulation Summary  As of 7/20/2020    INR goal:   2.0-3.0   TTR:   37.1 % (4.8 y)   INR used for dosing:   3.00 (7/19/2020)   Warfarin maintenance plan:   10 mg (10 mg x 1) every day   Weekly warfarin total:   70 mg   No change documented:   Zahida Porras   Plan last modified:   Ruth M Filter (3/10/2020)   Next INR check:   7/27/2020   Priority:   Routine   Target end date:   Indefinite    Indications    DVT (deep venous thrombosis) (HCC) (Resolved) [I82.409]  Chronic anticoagulation [Z79.01]  S/P IVC filter [Z95.828]  History of DVT (deep vein thrombosis) [Z86.718]             Anticoagulation Episode Summary     INR check location:   Anticoagulation Clinic    Preferred lab:   [facility]    Send INR reminders to:       Comments:   MD INR      Anticoagulation Care Providers     Provider Role Specialty Phone number    Carmelo Pat M.D. Referring [facility] Medicine [phone]    [facility] Anticoagulation Services Responsible  [phone]        Anticoagulation Patient Findings  Patient Findings     Negatives:   Signs/symptoms of thrombosis, Signs/symptoms of bleeding, Laboratory test error suspected, Change in health, Change in alcohol use, Change in activity, Upcoming invasive procedure, Emergency department visit, Upcoming dental procedure, Missed doses, Extra doses, Change in medications, Change in diet/appetite, Hospital admission, Bruising, Other complaints        Spoke with the patient on the phone today, reporting a therapeutic INR of 3.0. Confirmed the current warfarin dosing regimen and patient compliance. Patient denies any interval changes to diet and/or medications. Patient denies any signs/symptoms of bleeding or clotting.  Patient instructed to continue with the current warfarin dosing regimen, and asked to follow up again in 1 week.     Ramírez Porras  PharmD

## 2020-07-28 ENCOUNTER — APPOINTMENT (OUTPATIENT)
Dept: PULMONOLOGY | Facility: HOSPICE | Age: 70
End: 2020-07-28
Payer: MEDICARE

## 2020-08-04 ENCOUNTER — APPOINTMENT (OUTPATIENT)
Dept: PULMONOLOGY | Facility: HOSPICE | Age: 70
End: 2020-08-04
Payer: MEDICARE

## 2020-08-17 LAB — INR PPP: 5.8 (ref 2–3.5)

## 2020-08-18 ENCOUNTER — ANTICOAGULATION MONITORING (OUTPATIENT)
Dept: VASCULAR LAB | Facility: MEDICAL CENTER | Age: 70
End: 2020-08-18

## 2020-08-18 DIAGNOSIS — Z95.828 S/P IVC FILTER: ICD-10-CM

## 2020-08-18 DIAGNOSIS — Z86.718 HISTORY OF DVT (DEEP VEIN THROMBOSIS): ICD-10-CM

## 2020-08-18 DIAGNOSIS — Z79.01 CHRONIC ANTICOAGULATION: ICD-10-CM

## 2020-08-18 NOTE — PROGRESS NOTES
OP   Telephone Anticoagulation Service Note      Anticoagulation Summary  As of 2020    INR goal:  2.0-3.0   TTR:  36.5 % (4.9 y)   INR used for dosin.80 (2020)   Warfarin maintenance plan:  5 mg (10 mg x 0.5) every Tue, Thu; 10 mg (10 mg x 1) all other days   Weekly warfarin total:  60 mg   Plan last modified:  Zahida Porras (2020)   Next INR check:  2020   Priority:  Routine   Target end date:  Indefinite    Indications    DVT (deep venous thrombosis) (HCC) (Resolved) [I82.409]  Chronic anticoagulation [Z79.01]  S/P IVC filter [Z95.828]  History of DVT (deep vein thrombosis) [Z86.718]             Anticoagulation Episode Summary     INR check location:  Anticoagulation Clinic    Preferred lab:  [facility]    Send INR reminders to:      Comments:  MD INR      Anticoagulation Care Providers     Provider Role Specialty Phone number    Carmelo Pat M.D. Referring Family Medicine [phone]    [facility] Anticoagulation Services Responsible  [phone]        Anticoagulation Patient Findings  Patient Findings     Positives:  Change in diet/appetite    Negatives:  Signs/symptoms of thrombosis, Signs/symptoms of bleeding, Laboratory test error suspected, Change in health, Change in alcohol use, Change in activity, Upcoming invasive procedure, Emergency department visit, Upcoming dental procedure, Missed doses, Extra doses, Change in medications, Hospital admission, Bruising, Other complaints    Comments:  Stop eating spinach        Spoke with the patient on the phone today, reporting a SUPRA-therapeutic INR of 5.8. Confirmed the current warfarin dosing regimen and patient compliance.  Patient reports the only interval change is she quit eating spinach like she used to. Patient denies any interval changes to medications. Patient denies any signs/symptoms of bleeding or clotting.  Patient instructed to HOLD warfarin x 2 days, then to begin reduced regimen of 5mg on Tues and Thurs  and 10mg ROW. Patient will retest again in 1 week (per pt preference).     Ramírez FriasD

## 2020-08-24 ENCOUNTER — ANTICOAGULATION MONITORING (OUTPATIENT)
Dept: MEDICAL GROUP | Facility: PHYSICIAN GROUP | Age: 70
End: 2020-08-24

## 2020-08-24 DIAGNOSIS — Z95.828 S/P IVC FILTER: ICD-10-CM

## 2020-08-24 DIAGNOSIS — Z79.01 CHRONIC ANTICOAGULATION: ICD-10-CM

## 2020-08-24 DIAGNOSIS — Z86.718 HISTORY OF DVT (DEEP VEIN THROMBOSIS): ICD-10-CM

## 2020-08-24 LAB — INR PPP: 1.1 (ref 2–3.5)

## 2020-08-24 NOTE — PROGRESS NOTES
OP   Telephone Anticoagulation Service Note      Anticoagulation Summary  As of 2020    INR goal:  2.0-3.0   TTR:  36.4 % (4.9 y)   INR used for dosin.10 (2020)   Warfarin maintenance plan:  5 mg (10 mg x 0.5) every Tue, Thu; 10 mg (10 mg x 1) all other days   Weekly warfarin total:  60 mg   Plan last modified:  Zahida Porras (2020)   Next INR check:  2020   Priority:  Routine   Target end date:  Indefinite    Indications    DVT (deep venous thrombosis) (HCC) (Resolved) [I82.409]  Chronic anticoagulation [Z79.01]  S/P IVC filter [Z95.828]  History of DVT (deep vein thrombosis) [Z86.718]             Anticoagulation Episode Summary     INR check location:  Anticoagulation Clinic    Preferred lab:  [facility]    Send INR reminders to:      Comments:  MD INR      Anticoagulation Care Providers     Provider Role Specialty Phone number    Carmelo Pat M.D. Referring Family Medicine [phone]    [facility] Anticoagulation Services Responsible  [phone]        Anticoagulation Patient Findings  Patient Findings     Positives:  Missed doses    Negatives:  Signs/symptoms of thrombosis, Signs/symptoms of bleeding, Laboratory test error suspected, Change in health, Change in alcohol use, Change in activity, Upcoming invasive procedure, Emergency department visit, Upcoming dental procedure, Extra doses, Change in medications, Change in diet/appetite, Hospital admission, Bruising, Other complaints        Spoke with the patient on the phone today, reporting a SUB-therapeutic INR of 1.1. Confirmed the current warfarin dosing regimen and patient compliance.  Patient misunderstood and HELD for 2 days, then took 5mg DAILY instead of 5mg on Thurs only and 10mg ROW.  Patient denies any interval changes to diet and/or medications. Patient denies any signs/symptoms of bleeding or clotting.  Patient instructed to bolus with 15mg x 2 days, then resume her current regimen and retest again in 1  week.     Ramírez FriasD

## 2020-08-31 ENCOUNTER — ANTICOAGULATION MONITORING (OUTPATIENT)
Dept: VASCULAR LAB | Facility: MEDICAL CENTER | Age: 70
End: 2020-08-31

## 2020-08-31 DIAGNOSIS — Z95.828 S/P IVC FILTER: ICD-10-CM

## 2020-08-31 DIAGNOSIS — Z79.01 CHRONIC ANTICOAGULATION: ICD-10-CM

## 2020-08-31 DIAGNOSIS — Z86.718 HISTORY OF DVT (DEEP VEIN THROMBOSIS): ICD-10-CM

## 2020-08-31 LAB — INR PPP: 2.4 (ref 2–3.5)

## 2020-08-31 RX ORDER — WARFARIN SODIUM 10 MG/1
TABLET ORAL
Qty: 90 TAB | Refills: 1 | Status: SHIPPED | OUTPATIENT
Start: 2020-08-31 | End: 2020-09-10 | Stop reason: SDUPTHER

## 2020-09-01 ENCOUNTER — APPOINTMENT (OUTPATIENT)
Dept: PULMONOLOGY | Facility: HOSPICE | Age: 70
End: 2020-09-01
Payer: MEDICARE

## 2020-09-10 DIAGNOSIS — Z86.718 HISTORY OF DVT (DEEP VEIN THROMBOSIS): ICD-10-CM

## 2020-09-10 RX ORDER — WARFARIN SODIUM 10 MG/1
TABLET ORAL
Qty: 90 TAB | Refills: 1 | Status: SHIPPED | OUTPATIENT
Start: 2020-09-10 | End: 2021-03-11 | Stop reason: SDUPTHER

## 2020-09-11 ENCOUNTER — ANTICOAGULATION MONITORING (OUTPATIENT)
Dept: VASCULAR LAB | Facility: MEDICAL CENTER | Age: 70
End: 2020-09-11

## 2020-09-11 DIAGNOSIS — Z79.01 CHRONIC ANTICOAGULATION: ICD-10-CM

## 2020-09-11 DIAGNOSIS — Z95.828 S/P IVC FILTER: ICD-10-CM

## 2020-09-11 DIAGNOSIS — Z86.718 HISTORY OF DVT (DEEP VEIN THROMBOSIS): ICD-10-CM

## 2020-09-11 LAB — INR PPP: 2.2 (ref 2–3.5)

## 2020-09-11 NOTE — PROGRESS NOTES
Anticoagulation Summary  As of 2020    INR goal:  2.0-3.0   TTR:  36.8 % (5 y)   INR used for dosin.20 (2020)   Warfarin maintenance plan:  5 mg (10 mg x 0.5) every Tue, Thu; 10 mg (10 mg x 1) all other days   Weekly warfarin total:  60 mg   Plan last modified:  Zahida Porras (2020)   Next INR check:  2020   Priority:  Routine   Target end date:  Indefinite    Indications    DVT (deep venous thrombosis) (HCC) (Resolved) [I82.409]  Chronic anticoagulation [Z79.01]  S/P IVC filter [Z95.828]  History of DVT (deep vein thrombosis) [Z86.718]             Anticoagulation Episode Summary     INR check location:  Anticoagulation Clinic    Preferred lab:  [facility]    Send INR reminders to:      Comments:  MD INR      Anticoagulation Care Providers     Provider Role Specialty Phone number    Carmelo Pat M.D. Referring Family Medicine [phone]    [facility] Anticoagulation Services Responsible  [phone]        Anticoagulation Patient Findings      Spoke to patient on the phone.   INR  therapeutic.   Denies signs/symptoms of bleeding and/or thrombosis.   Denies changes to diet or medications.   Follow up appointment in 1 week(s).    Continue the same warfarin dose, as noted above.       Bob Dupree, PharmD, MS, BCACP, LCC    This note was created using voice recognition software (Dragon). The accuracy of the dictation is limited by the abilities of the software. I have reviewed the note prior to signing, however some errors in grammar and context are still possible. If you have any questions related to this note please do not hesitate to contact our office.

## 2020-09-22 ENCOUNTER — ANTICOAGULATION MONITORING (OUTPATIENT)
Dept: VASCULAR LAB | Facility: MEDICAL CENTER | Age: 70
End: 2020-09-22

## 2020-09-22 DIAGNOSIS — Z86.718 HISTORY OF DVT (DEEP VEIN THROMBOSIS): ICD-10-CM

## 2020-09-22 DIAGNOSIS — Z95.828 S/P IVC FILTER: ICD-10-CM

## 2020-09-22 DIAGNOSIS — Z79.01 CHRONIC ANTICOAGULATION: ICD-10-CM

## 2020-09-22 LAB — INR PPP: 2 (ref 2–3.5)

## 2020-09-22 NOTE — PROGRESS NOTES
OP   Telephone Anticoagulation Service Note      Anticoagulation Summary  As of 2020    INR goal:  2.0-3.0   TTR:  37.2 % (5 y)   INR used for dosin.00 (2020)   Warfarin maintenance plan:  5 mg (10 mg x 0.5) every Tue, Thu; 10 mg (10 mg x 1) all other days   Weekly warfarin total:  60 mg   No change documented:  Zahida Porras   Plan last modified:  Zahida Porras (2020)   Next INR check:  2020   Priority:  Routine   Target end date:  Indefinite    Indications    DVT (deep venous thrombosis) (HCC) (Resolved) [I82.409]  Chronic anticoagulation [Z79.01]  S/P IVC filter [Z95.828]  History of DVT (deep vein thrombosis) [Z86.718]             Anticoagulation Episode Summary     INR check location:  Anticoagulation Clinic    Preferred lab:  [facility]    Send INR reminders to:      Comments:  MD INR      Anticoagulation Care Providers     Provider Role Specialty Phone number    Carmelo Pat M.D. Referring [facility] Medicine [phone]    [facility] Anticoagulation Services Responsible  [phone]        Anticoagulation Patient Findings  Patient Findings     Positives:  Missed doses    Comments:  Patient had trouble getting refill of warfarin and missed a few days        Spoke with the patient on the phone today, reporting a therapeutic INR of 2.0. Confirmed the current warfarin dosing regimen and patient compliance. Patient reports having trouble getting her refill of warfarin, so she missed a few days, and tried to make up for it, but likely why her INR is on low end. Patient denies any interval changes to diet and/or medications. Patient denies any signs/symptoms of bleeding or clotting.  Patient instructed to continue with the current warfarin dosing regimen, and asked to follow up again in 1 week.     Ramírez Porras  PharmD

## 2020-09-30 ENCOUNTER — ANTICOAGULATION MONITORING (OUTPATIENT)
Dept: VASCULAR LAB | Facility: MEDICAL CENTER | Age: 70
End: 2020-09-30

## 2020-09-30 DIAGNOSIS — Z79.01 CHRONIC ANTICOAGULATION: ICD-10-CM

## 2020-09-30 DIAGNOSIS — Z95.828 S/P IVC FILTER: ICD-10-CM

## 2020-09-30 DIAGNOSIS — Z86.718 HISTORY OF DVT (DEEP VEIN THROMBOSIS): ICD-10-CM

## 2020-09-30 LAB — INR PPP: 1.4 (ref 2–3.5)

## 2020-09-30 NOTE — PROGRESS NOTES
Anticoagulation Summary  As of 2020    INR goal:  2.0-3.0   TTR:  37.0 % (5 y)   INR used for dosin.40 (2020)   Warfarin maintenance plan:  5 mg (10 mg x 0.5) every Tue, Thu; 10 mg (10 mg x 1) all other days   Weekly warfarin total:  60 mg   Plan last modified:  Zahida Porras (2020)   Next INR check:  10/6/2020   Priority:  Routine   Target end date:  Indefinite    Indications    DVT (deep venous thrombosis) (HCC) (Resolved) [I82.409]  Chronic anticoagulation [Z79.01]  S/P IVC filter [Z95.828]  History of DVT (deep vein thrombosis) [Z86.718]             Anticoagulation Episode Summary     INR check location:  Anticoagulation Clinic    Preferred lab:  [facility]    Send INR reminders to:      Comments:  MD INR      Anticoagulation Care Providers     Provider Role Specialty Phone number    Carmelo Pat M.D. Referring [facility] Medicine [phone]    [facility] Anticoagulation Services Responsible  [phone]        Anticoagulation Patient Findings      Spoke with pt.  INR is subtherapeutic.   Pt denies any unusual s/s of clotting or any changes to diet. Denies any etoh, cranberries, supplements, or illness.   Pt has some bruising on her forearm nothing out of the ordinary. About a week ago pt clipped her toe nail too short and it was bleeding for about 20 minutes. Pt instructed to go to the ER for any bleeding longer than 30 minutes. Pt started Vit d 50,000 units weekly about 3 weeks ago.  Pt verifies warfarin weekly dosing and denies missed doses.      Will have pt take 15 mg tonight and 10 mg tomorrow then continue with current regimen.    Repeat INR in 6 days per pt.    Noelle Tarango, Pharmacy Intern

## 2020-10-05 ENCOUNTER — OFFICE VISIT (OUTPATIENT)
Dept: PULMONOLOGY | Facility: HOSPICE | Age: 70
End: 2020-10-05
Payer: MEDICARE

## 2020-10-05 VITALS
DIASTOLIC BLOOD PRESSURE: 80 MMHG | OXYGEN SATURATION: 95 % | SYSTOLIC BLOOD PRESSURE: 130 MMHG | HEIGHT: 64 IN | RESPIRATION RATE: 16 BRPM | TEMPERATURE: 97.9 F | WEIGHT: 259 LBS | HEART RATE: 64 BPM | BODY MASS INDEX: 44.22 KG/M2

## 2020-10-05 DIAGNOSIS — I48.0 PAROXYSMAL A-FIB (HCC): ICD-10-CM

## 2020-10-05 DIAGNOSIS — Z86.718 HISTORY OF DVT (DEEP VEIN THROMBOSIS): Chronic | ICD-10-CM

## 2020-10-05 DIAGNOSIS — Z95.828 S/P IVC FILTER: ICD-10-CM

## 2020-10-05 DIAGNOSIS — Z23 ENCOUNTER FOR IMMUNIZATION: ICD-10-CM

## 2020-10-05 DIAGNOSIS — G47.33 OSA (OBSTRUCTIVE SLEEP APNEA): Chronic | ICD-10-CM

## 2020-10-05 DIAGNOSIS — J44.9 CHRONIC OBSTRUCTIVE PULMONARY DISEASE, UNSPECIFIED COPD TYPE (HCC): Chronic | ICD-10-CM

## 2020-10-05 PROCEDURE — 99214 OFFICE O/P EST MOD 30 MIN: CPT | Performed by: INTERNAL MEDICINE

## 2020-10-05 RX ORDER — NYSTATIN 100000 U/G
OINTMENT TOPICAL
COMMUNITY
Start: 2020-08-24 | End: 2022-02-07

## 2020-10-05 ASSESSMENT — FIBROSIS 4 INDEX: FIB4 SCORE: 1.44

## 2020-10-05 NOTE — PROGRESS NOTES
Atiya Henley is a 69 y.o. female here for COPD and sleep apnea. Patient was referred by her primary.    History of Present Illness:    Atiya comes in today to follow-up on her COPD, bronchospasm and sleep apnea.    She needs mask refit, I sent an order to her company, currently has a small and she thinks she needs a medium.  I also advised that she bring in her chip so that we can look at her download and compliance data.  She needs to see the sleep center periodically and that is advised as well    She is doing well, does have daily cough coarse rhonchi no fever and is isolating away from the virus effectively.    Health maintenance includes elevated body mass index she needs portion control gentle exercise but has a walker, activity is limited.  Smoking continues less than half pack per day, rolls her own cigarettes, avoids the chemicals but still has significant risk related to this and understands the importance of reducing as much as possible.  Flu vaccine is advised she can get this from her pharmacy or her local care facility    Maintenance medications for pulmonary are reviewed, we have adjusted her pharmacy log sheet and we will see her back in about 6 months.  Constitutional ROS: No unexpected change in weight, No unexplained fevers  Eyes: No change in vision or blurring or double vision  Mouth/Throat ROS: No sore throat, No recent change in voice or hoarseness  Pulmonary ROS: See present history for pertinent positives  Cardiovascular ROS: No chest pain to suggest acute coronary syndrome  Gastrointestinal ROS: No abdominal pain to suggest peptic disease  Musculoskeletal/Extremities ROS: no acute artritis or unusual swelling  Hematologic/Lymphatic ROS: No easy bleeding or unusual lymph node swelling  Neurologic ROS: No new or unusual weakness  Psychiatric ROS: No hallucinations  Allergic/Immunologic: No  urticaria or allergic rash      Current Outpatient Medications   Medication Sig Dispense Refill    • warfarin (COUMADIN) 10 MG Tab Take 1 tablet by mouth daily as directed by Anticoagulation Program 90 Tab 1   • lisinopril (PRINIVIL) 5 MG Tab TAKE ONE TABLET BY MOUTH EVERY DAY 90 Tab 0   • gabapentin (NEURONTIN) 300 MG Cap TAKE THREE CAPSULES BY MOUTH THREE TIMES A  Cap 0   • levothyroxine (SYNTHROID) 200 MCG Tab TAKE ONE TABLET BY MOUTH EVERY MORNING ON AN EMPTY STOMACH. 90 Tab 0   • albuterol (VENTOLIN HFA) 108 (90 Base) MCG/ACT Aero Soln inhalation aerosol Inhale 2 Puffs by mouth every four hours as needed for Shortness of Breath. 3 Inhaler 0   • prednisoLONE acetate (PRED FORTE) 1 % Suspension      • Tiotropium Bromide Monohydrate (SPIRIVA RESPIMAT) 2.5 MCG/ACT Aero Soln Inhale 2 Puffs by mouth every day. 3 Inhaler 3   • budesonide-formoterol (SYMBICORT) 160-4.5 MCG/ACT Aerosol Inhale 2 Puffs by mouth 2 Times a Day. With spacer, rinse mouth after use 3 Inhaler 3   • LORazepam (ATIVAN) 1 MG Tab Take 2 mg by mouth at bedtime as needed for Anxiety.     • FLUoxetine (PROZAC) 20 MG Cap Take 20 mg by mouth every day.     • carbamazepine (TEGRETOL) 200 MG Tab Take 600 mg by mouth every bedtime.     • nystatin (MYCOSTATIN) 541935 UNIT/GM Ointment      • Incontinence Supply Disposable (FQ PROTECTIVE UNDERWEAR) Misc USE 1 PULLUP 3 TIMES DAILY 90 Each 0   • Incontinence Supply Disposable (FQ PROTECTIVE UNDERWEAR) Misc USE 1 PULLUP 3 TIMES DAILY 90 Each 3   • vitamin D, Ergocalciferol, (DRISDOL) 82100 units Cap capsule Take 1 Cap by mouth every 28 days. 3 Cap 4   • Misc. Devices Misc 3 boxes of pouches (73438), 2 boxes of wafers (10987), 3 boxes (tubes) paste, 1 box of no string skin prep, 1 box of no string unsolve 5 Container 6   • Misc. Devices Misc Pt requesting new battery, new tires and new brakes for power chair 1 Device 0   • Misc. Devices Misc Heavy duty 4 wheeled walker with seat and back rest to use as needed 1 Device 0     No current facility-administered medications for this visit.        Social  History     Tobacco Use   • Smoking status: Current Every Day Smoker     Packs/day: 0.50     Years: 50.00     Pack years: 25.00     Types: Cigarettes   • Smokeless tobacco: Never Used   • Tobacco comment: 1 ppd, 45 yrs   Substance Use Topics   • Alcohol use: No     Alcohol/week: 0.0 oz   • Drug use: Yes     Types: Marijuana, Inhaled     Comment: 1-2 daily, last 7/22/19  at 1530        Past Medical History:   Diagnosis Date   • Anxiety disorder    • Arthritis    • Bipolar affective (HCC)    • Bladder incontinence    • Bowel obstruction (HCC)    • Breath shortness     with activity   • Cancer (HCC)     cervical   • Chronic autoimmune thyroiditis     + TPO ab = 563 / + ultrasound   • Colostomy in place ([facility]) 2009   • COPD    • Dental disorder     dentures upper   • Diabetes ([facility])     Pt denies DM 7/23/19   • EMPHYSEMA    • Hashimoto's disease    • High cholesterol    • History of total knee arthroplasty     infection   • Hypertension     stated no longer on meds due to lost 100 lbs   • Hypothyroid 7/7/2009   • Indigestion    • Infectious disease     MRSA,   • Infectious disease     VRE   • Obesity 7/7/2009   • Obstruction     colostomy   • Oxygen dependent     4.5 L NC   • Pain     b/l legs   • Personal history of venous thrombosis and embolism 2009, 2012    arm and leg left side   • Presence of IVC filter    • Psychiatric problem     depression, denies currently 7/23/19   • Sleep apnea     USES CPAP, 3-4L oxygen   • Snoring    • Status post cholecystectomy 1992   • Weight gain        Past Surgical History:   Procedure Laterality Date   • VITRECTOMY POSTERIOR Left 7/23/2019    Procedure: REMOVAL, VITREOUS BODY, POSTERIOR PORTION- MEMBRANE PEEL LASER, GAS;  Surgeon: Ernst Morris M.D.;  Location: SURGERY SAME DAY [facility] ORS;  Service: Ophthalmology   • PB INJECT RX OTHER PERIPH NERVE Left 8/21/2015    Procedure: NEUROLYTIC DEST-OTHER NERVE;  Surgeon: Kd Galaviz D.O.;  Location: SURGERY SURGICAL [facility] ORS;   Service: Pain Management   • PB FLUOROSCOPIC GUIDANCE NEEDLE PLACEMENT Left 8/21/2015    Procedure: FLOURO GUIDE NEEDLE PLACEMENT;  Surgeon: Kd Galaviz D.O.;  Location: [facility];  Service: Pain Management   • PB INJECT RX OTHER PERIPH NERVE  8/21/2015    Procedure: NEUROLYTIC DEST-OTHER NERVE;  Surgeon: Kd Galaviz D.O.;  Location: [facility];  Service: Pain Management   • PB INJECT RX OTHER PERIPH NERVE  8/21/2015    Procedure: NEUROLYTIC DEST-OTHER NERVE;  Surgeon: Kd Galaviz D.O.;  Location: [facility];  Service: Pain Management   • EXPLORATORY LAPAROTOMY  12/10/2013    Performed by Sang Castañeda M.D. at [facility]   • WOUND CLOSURE GENERAL  12/10/2013    Performed by Sang Castañeda M.D. at [facility]   • COLOSTOMY  11/26/2013    Performed by Maninder Carter M.D. at [facility]   • EXPLORATORY LAPAROTOMY  11/26/2013    Performed by Maninder Carter M.D. at [facility]   • LYSIS ADHESIONS GENERAL  11/26/2013    Performed by Maninder Carter M.D. at [facility]   • COLON RESECTION  11/26/2013    Performed by Maninder Carter M.D. at [facility]   • BLADDER BIOPSY WITH CYSTOSCOPY  10/10/2013    Performed by Sang Castañeda M.D. at [facility]   • EXPLORATORY LAPAROTOMY  9/2/2013    Performed by Maninder Carter M.D. at [facility]   • PARASTOMAL HERNIA REPAIR   9/2/2013    Performed by Maninder Carter M.D. at [facility]   • IRRIGATION & DEBRIDEMENT ORTHO  2/8/2013    Performed by David Fowler M.D. at [facility]   • IRRIGATION & DEBRIDEMENT GENERAL  12/17/2012    Performed by David Fowler M.D. at [facility]   • IRRIGATION & DEBRIDEMENT ORTHO  7/19/2012    Performed by BERYL LOYD at [facility]   • IRRIGATION & DEBRIDEMENT ORTHO  7/17/2012    Performed by BERYL LOYD at  "SURGERY [facility] ORS   • KNEE ARTHROPLASTY TOTAL  6/10/2012    left   • LATERAL RELEASE  7/20/2011    Performed by BERYL LOYD at SURGERY [facility]   • KNEE ARTHROSCOPY  7/20/2011    Performed by BERYL LOYD at [facility]   • MEDIAL MENISCECTOMY  7/20/2011    Performed by BERYL LOYD at [facility]   • MENISCECTOMY  7/20/2011    Performed by BERYL LOYD at [facility]   • COLONOSCOPY - ENDO  11/17/2010    Performed by JESUSITA SUMNER at ENDOSCOPY [facility] ORS   • TRACHEOSTOMY  3/31/2010    Performed by JESUSITA SUMNER at SURGERY [facility] ORS   • COLOSTOMY  3/16/2010    Performed by CHRISTA BENNETT at SURGERY [facility] ORS   • EXPLORATORY LAPAROTOMY  3/16/2010    Performed by CHRISTA BENNETT at SURGERY [facility] ORS   • COLECTOMY  3/16/2010    Performed by CHRISTA BENNETT at SURGERY [facility] ORS   • LOW ANTERIOR RESECTION  3/10/2010    Performed by JESUSITA SUMNER at SURGERY [facility] ORS   • LOW ANTERIOR RESECTION LAPAROSCOPIC  3/10/2010    Performed by JESUSITA SUMNER at SURGERY [facility] ORS   • MYRINGOTOMY  9/9/2009    Performed by RENETTA GOODSON at SURGERY SAME DAY [facility]   • CHOLECYSTECTOMY  1992    laparoscopic   • HYSTERECTOMY LAPAROSCOPY  1983   • OTHER ORTHOPEDIC SURGERY      LT KNEE X2       Allergies: Pcn [penicillins]    Family History   Problem Relation Age of Onset   • Heart Disease Mother    • Seizures Mother    • Alcohol/Drug Father    • Lung Disease Father    • Hypertension Other        Physical Examination    Vitals:    10/05/20 0852   Height: 1.626 m (5' 4\")   Weight: 117.5 kg (259 lb)   Weight % change since last entry.: 0 %   BP: 130/80   Pulse: 64   BMI (Calculated): 44.46   Resp: 16   Temp: 36.6 °C (97.9 °F)   TempSrc: Temporal       General Appearance: alert, no distress  Skin: Skin color, texture, turgor normal. No rashes or lesions.  Eyes: negative  Oropharynx: Lips, mucosa, and tongue " normal. Teeth and gums normal. Oropharynx moist and without lesion  Lungs: positive findings: Scattered rhonchi without wheezes  Heart: negative. RRR without murmur, gallop, or rubs.  No ectopy.  Abdomen: Abdomen soft, non-tender. . No masses,  No organomegaly  Extremities:  No deformities, edema, or skin discoloration  Joints: No acute arthritis  Peripheral Pulses:perfused  Neurologic: intact grossly  No clubbing or cyanosis      Imaging: None today    PFTS: None today      Assessment and Plan  1. Chronic obstructive pulmonary disease, unspecified COPD type (HCC)  On Symbicort Spiriva and rescue inhaler  - INFLUENZA VACCINE, HIGH DOSE (65+ ONLY)  - DME Mask and Supplies    2. YESSICA (obstructive sleep apnea)  Utilizes a nasal spray she does not know the name of, would like refills, she will contact us to let us know the name and we will entered on her pharmacy log sheet  - INFLUENZA VACCINE, HIGH DOSE (65+ ONLY)  - DME Mask and Supplies    3. Paroxysmal A-fib (HCC)  Anticoagulated  - INFLUENZA VACCINE, HIGH DOSE (65+ ONLY)  - DME Mask and Supplies    4. History of DVT (deep vein thrombosis)  - DME Mask and Supplies    5. Encounter for immunization   - INFLUENZA VACCINE, HIGH DOSE (65+ ONLY)    6. BMI 40.0-44.9, adult ([facility])    7. S/P IVC filter      Followup Return in about 6 months (around 4/5/2021) for With MD or APRN.

## 2020-10-05 NOTE — PATIENT INSTRUCTIONS
Atiya comes in today to follow-up on her COPD, bronchospasm and sleep apnea.    She needs mask refit, I sent an order to her company, currently has a small and she thinks she needs a medium.  I also advised that she bring in her chip so that we can look at her download and compliance data.  She needs to see the sleep center periodically and that is advised as well    She is doing well, does have daily cough coarse rhonchi no fever and is isolating away from the virus effectively.    Health maintenance includes elevated body mass index she needs portion control gentle exercise but has a walker, activity is limited.  Smoking continues less than half pack per day, rolls her own cigarettes, avoids the chemicals but still has significant risk related to this and understands the importance of reducing as much as possible.  Flu vaccine is advised she can get this from her pharmacy or her local care facility    Maintenance medications for pulmonary are reviewed, we have adjusted her pharmacy log sheet and we will see her back in about 6 months.

## 2020-10-09 ENCOUNTER — ANTICOAGULATION MONITORING (OUTPATIENT)
Dept: VASCULAR LAB | Facility: MEDICAL CENTER | Age: 70
End: 2020-10-09

## 2020-10-09 DIAGNOSIS — Z95.828 S/P IVC FILTER: ICD-10-CM

## 2020-10-09 DIAGNOSIS — Z86.718 HISTORY OF DVT (DEEP VEIN THROMBOSIS): ICD-10-CM

## 2020-10-09 DIAGNOSIS — Z79.01 CHRONIC ANTICOAGULATION: ICD-10-CM

## 2020-10-09 LAB — INR PPP: 1.7 (ref 2–3.5)

## 2020-10-09 NOTE — PROGRESS NOTES
OP   Telephone Anticoagulation Service Note      Anticoagulation Summary  As of 10/9/2020    INR goal:  2.0-3.0   TTR:  36.8 % (5.1 y)   INR used for dosin.70 (10/9/2020)   Warfarin maintenance plan:  5 mg (10 mg x 0.5) every Thu; 10 mg (10 mg x 1) all other days   Weekly warfarin total:  65 mg   Plan last modified:  Zahida Porras (10/9/2020)   Next INR check:  10/14/2020   Priority:  Routine   Target end date:  Indefinite    Indications    DVT (deep venous thrombosis) (HCC) (Resolved) [I82.409]  Chronic anticoagulation [Z79.01]  S/P IVC filter [Z95.828]  History of DVT (deep vein thrombosis) [Z86.718]             Anticoagulation Episode Summary     INR check location:  Anticoagulation Clinic    Preferred lab:  [facility] GENER[facility]    Send INR reminders to:      Comments:  MD INR      Anticoagulation Care Providers     Provider Role Specialty Phone number    Carmelo Pat M.D. Referring [facility] Medicine [phone]    [facility] Anticoagulation Services Responsible  [phone]        Anticoagulation Patient Findings  Patient Findings     Positives:  Change in diet/appetite    Comments:  Patient changed diet significantly and is eating more healthy including more fruits and veggies         Spoke with the patient on the phone today, reporting a SUB-therapeutic INR of 1.7. Confirmed the current warfarin dosing regimen and patient compliance. Patient reports that she took an INR test on Tuesday, and it was 1.7 (but no one called her- apparently we never got those results) so she SELF DOSED with 10mg QD and retested again today. INR has not changed since Tuesday despite the dose increase.   Patient reports that she is eating a much better diet now containing more fruits and veggies.   Patient denies any interval changes to medications. Patient denies any signs/symptoms of bleeding or clotting.  Patient instructed to bolus with 15mg TONIGHT ONLY, as a one time boost dose, then to take 10mg warfarin daily and  retest again in 5 days.     Ramírez FriasD

## 2020-10-19 DIAGNOSIS — J44.89 CHRONIC OBSTRUCTIVE ASTHMA (HCC): ICD-10-CM

## 2020-10-19 RX ORDER — BUDESONIDE AND FORMOTEROL FUMARATE DIHYDRATE 160; 4.5 UG/1; UG/1
2 AEROSOL RESPIRATORY (INHALATION) 2 TIMES DAILY
Qty: 3 EACH | Refills: 3 | Status: SHIPPED | OUTPATIENT
Start: 2020-10-19 | End: 2021-11-08 | Stop reason: SDUPTHER

## 2020-10-19 RX ORDER — TIOTROPIUM BROMIDE INHALATION SPRAY 3.12 UG/1
2 SPRAY, METERED RESPIRATORY (INHALATION)
Qty: 3 EACH | Refills: 3 | Status: SHIPPED | OUTPATIENT
Start: 2020-10-19 | End: 2021-11-08 | Stop reason: SDUPTHER

## 2020-10-19 RX ORDER — ALBUTEROL SULFATE 90 UG/1
2 AEROSOL, METERED RESPIRATORY (INHALATION) EVERY 4 HOURS PRN
Qty: 1 EACH | Refills: 11 | Status: SHIPPED | OUTPATIENT
Start: 2020-10-19 | End: 2021-11-08 | Stop reason: SDUPTHER

## 2020-10-19 NOTE — TELEPHONE ENCOUNTER
Have we ever prescribed this med? Yes.  If yes, what date? 04/03/19    Last OV: 10/05/20 - Smith    Next OV: 11/22/20 - Randy    DX: copd    Medications: spiriva, symbicort, albuterol,

## 2020-10-21 ENCOUNTER — ANTICOAGULATION MONITORING (OUTPATIENT)
Dept: VASCULAR LAB | Facility: MEDICAL CENTER | Age: 70
End: 2020-10-21

## 2020-10-21 DIAGNOSIS — Z95.828 S/P IVC FILTER: ICD-10-CM

## 2020-10-21 DIAGNOSIS — Z86.718 HISTORY OF DVT (DEEP VEIN THROMBOSIS): ICD-10-CM

## 2020-10-21 DIAGNOSIS — Z79.01 CHRONIC ANTICOAGULATION: ICD-10-CM

## 2020-10-21 LAB — INR PPP: 2.1 (ref 2–3.5)

## 2020-10-21 NOTE — PROGRESS NOTES
Anticoagulation Summary  As of 10/21/2020    INR goal:  2.0-3.0   TTR:  36.7 % (5.1 y)   INR used for dosin.10 (10/21/2020)   Warfarin maintenance plan:  5 mg (10 mg x 0.5) every Thu; 10 mg (10 mg x 1) all other days   Weekly warfarin total:  65 mg   Plan last modified:  Zahida Porras (10/9/2020)   Next INR check:  10/28/2020   Priority:  Routine   Target end date:  Indefinite    Indications    DVT (deep venous thrombosis) (HCC) (Resolved) [I82.409]  Chronic anticoagulation [Z79.01]  S/P IVC filter [Z95.828]  History of DVT (deep vein thrombosis) [Z86.718]             Anticoagulation Episode Summary     INR check location:  Anticoagulation Clinic    Preferred lab:  [email or web address] GENER[facility]    Send INR reminders to:      Comments:  MD INR      Anticoagulation Care Providers     Provider Role Specialty Phone number    Carmelo Pat M.D. Referring [facility] Medicine [phone]    [facility] Anticoagulation Services Responsible  [phone]        Anticoagulation Patient Findings    Left voicemail message to report a therapeutic INR of 2.1.  Pt to continue with current warfarin dosing regimen. Requested pt contact the clinic for any s/s of unusual bleeding, bruising, clotting or any changes to diet or medication. FU 1 weeks.  Nahum Bowens, PharmD, BCACP

## 2020-10-22 ENCOUNTER — APPOINTMENT (OUTPATIENT)
Dept: PULMONOLOGY | Facility: HOSPICE | Age: 70
End: 2020-10-22
Payer: MEDICARE

## 2020-11-11 ENCOUNTER — ANTICOAGULATION MONITORING (OUTPATIENT)
Dept: VASCULAR LAB | Facility: MEDICAL CENTER | Age: 70
End: 2020-11-11

## 2020-11-11 DIAGNOSIS — Z79.01 CHRONIC ANTICOAGULATION: ICD-10-CM

## 2020-11-11 DIAGNOSIS — Z95.828 S/P IVC FILTER: ICD-10-CM

## 2020-11-11 DIAGNOSIS — Z86.718 HISTORY OF DVT (DEEP VEIN THROMBOSIS): ICD-10-CM

## 2020-11-11 LAB — INR PPP: 1.3 (ref 2–3.5)

## 2020-11-12 NOTE — PROGRESS NOTES
Anticoagulation Summary  As of 2020    INR goal:  2.0-3.0   TTR:  36.5 % (5.2 y)   INR used for dosin.30 (2020)   Warfarin maintenance plan:  5 mg (10 mg x 0.5) every Thu; 10 mg (10 mg x 1) all other days   Weekly warfarin total:  65 mg   Plan last modified:  Zahida Porras (10/9/2020)   Next INR check:  2020   Priority:  Routine   Target end date:  Indefinite    Indications    DVT (deep venous thrombosis) (HCC) (Resolved) [I82.409]  Chronic anticoagulation [Z79.01]  S/P IVC filter [Z95.828]  History of DVT (deep vein thrombosis) [Z86.718]             Anticoagulation Episode Summary     INR check location:  Anticoagulation Clinic    Preferred lab:  [facility] GENERAL    Send INR reminders to:      Comments:  MD INR      Anticoagulation Care Providers     Provider Role Specialty Phone number    Carmelo Pat M.D. Referring [facility] Medicine [phone]    [facility] Anticoagulation Services Responsible  [phone]        Anticoagulation Patient Findings          Left voicemail message to report a SUB therapeutic INR of 1.3.  Pt to bolus 15 mg warfarin daily for 48 hours. Requested pt contact the clinic for any s/s of unusual bleeding, bruising, clotting or any changes to diet or medication. FU 2 days.     Bon Ash, PharmD

## 2020-11-20 ENCOUNTER — ANTICOAGULATION MONITORING (OUTPATIENT)
Dept: MEDICAL GROUP | Facility: PHYSICIAN GROUP | Age: 70
End: 2020-11-20

## 2020-11-20 DIAGNOSIS — Z79.01 CHRONIC ANTICOAGULATION: ICD-10-CM

## 2020-11-20 DIAGNOSIS — Z95.828 S/P IVC FILTER: ICD-10-CM

## 2020-11-20 DIAGNOSIS — Z86.718 HISTORY OF DVT (DEEP VEIN THROMBOSIS): ICD-10-CM

## 2020-11-20 LAB — INR PPP: 4 (ref 2–3.5)

## 2020-11-20 NOTE — PROGRESS NOTES
OP   Telephone Anticoagulation Service Note      Anticoagulation Summary  As of 2020    INR goal:  2.0-3.0   TTR:  36.5 % (5.2 y)   INR used for dosin.00 (2020)   Warfarin maintenance plan:  5 mg (10 mg x 0.5) every Thu; 10 mg (10 mg x 1) all other days   Weekly warfarin total:  65 mg   Plan last modified:  Zahida Porras (10/9/2020)   Next INR check:  2020   Priority:  Routine   Target end date:  Indefinite    Indications    DVT (deep venous thrombosis) (HCC) (Resolved) [I82.409]  Chronic anticoagulation [Z79.01]  S/P IVC filter [Z95.828]  History of DVT (deep vein thrombosis) [Z86.718]             Anticoagulation Episode Summary     INR check location:  Anticoagulation Clinic    Preferred lab:  [facility]    Send INR reminders to:      Comments:  MD INR      Anticoagulation Care Providers     Provider Role Specialty Phone number    Carmelo Pat M.D. Referring [facility] Medicine [phone]    [facility] Anticoagulation Services Responsible  [phone]        Anticoagulation Patient Findings     Left a message on the patient's voicemail today, informing the patient of a SUPRA-therapeutic INR of 4.0.   Instructed the patient to call the clinic with any changes to diet or medications, with any signs/sx of bleeding or clotting or with any questions or concerns.     Patient instructed to HOLD warfarin TONIGHT ONLY, then to resume her current dosing regimen. Patient asked to retest again in 1 week.     Ramírez FriasD

## 2020-12-01 ENCOUNTER — ANTICOAGULATION MONITORING (OUTPATIENT)
Dept: MEDICAL GROUP | Facility: PHYSICIAN GROUP | Age: 70
End: 2020-12-01

## 2020-12-01 DIAGNOSIS — Z95.828 S/P IVC FILTER: ICD-10-CM

## 2020-12-01 DIAGNOSIS — Z79.01 CHRONIC ANTICOAGULATION: ICD-10-CM

## 2020-12-01 DIAGNOSIS — Z86.718 HISTORY OF DVT (DEEP VEIN THROMBOSIS): ICD-10-CM

## 2020-12-01 LAB — INR PPP: 1.3 (ref 2–3.5)

## 2020-12-01 NOTE — PROGRESS NOTES
Anticoagulation Summary  As of 2020    INR goal:  2.0-3.0   TTR:  36.5 % (5.2 y)   INR used for dosin.30 (2020)   Warfarin maintenance plan:  5 mg (10 mg x 0.5) every Thu; 10 mg (10 mg x 1) all other days   Weekly warfarin total:  65 mg   Plan last modified:  Zahida Porras (10/9/2020)   Next INR check:  2020   Priority:  Routine   Target end date:  Indefinite    Indications    DVT (deep venous thrombosis) (HCC) (Resolved) [I82.409]  Chronic anticoagulation [Z79.01]  S/P IVC filter [Z95.828]  History of DVT (deep vein thrombosis) [Z86.718]             Anticoagulation Episode Summary     INR check location:  Anticoagulation Clinic    Preferred lab:  [facility]    Send INR reminders to:      Comments:  MD INR      Anticoagulation Care Providers     Provider Role Specialty Phone number    Carmelo Pat M.D. Referring [facility] Medicine [phone]    [facility] Anticoagulation Services Responsible  [phone]        Anticoagulation Patient Findings  Patient Findings     Negatives:  Signs/symptoms of thrombosis, Signs/symptoms of bleeding, Laboratory test error suspected, Change in health, Change in alcohol use, Change in activity, Upcoming invasive procedure, Emergency department visit, Upcoming dental procedure, Missed doses, Extra doses, Change in medications, Change in diet/appetite, Hospital admission, Bruising, Other complaints        Spoke with patient today regarding subtherapeutic INR of 1.3.  Patient denies any signs/symptoms of bruising or bleeding or any changes in diet and medications.  Instructed patient to call clinic with any questions or concerns.  Instructed patient to bolus with 15mg X 1, then take 10mg daily until next INR.  Follow up in 3 days, to reduce risk of adverse events related to this high risk medication,  Warfarin.    Nahum Bowens, PharmD, BCACP

## 2020-12-14 ENCOUNTER — ANTICOAGULATION MONITORING (OUTPATIENT)
Dept: VASCULAR LAB | Facility: MEDICAL CENTER | Age: 70
End: 2020-12-14

## 2020-12-14 DIAGNOSIS — Z95.828 S/P IVC FILTER: ICD-10-CM

## 2020-12-14 DIAGNOSIS — Z79.01 CHRONIC ANTICOAGULATION: ICD-10-CM

## 2020-12-14 DIAGNOSIS — Z86.718 HISTORY OF DVT (DEEP VEIN THROMBOSIS): ICD-10-CM

## 2020-12-14 LAB — INR PPP: 1.5 (ref 2–3.5)

## 2020-12-14 NOTE — PROGRESS NOTES
Anticoagulation Summary  As of 2020    INR goal:  2.0-3.0   TTR:  36.2 % (5.2 y)   INR used for dosin.50 (2020)   Warfarin maintenance plan:  15 mg (10 mg x 1.5) every Mon; 10 mg (10 mg x 1) all other days   Weekly warfarin total:  75 mg   Plan last modified:  Bob Dupree PharmD (2020)   Next INR check:  2020   Priority:  Routine   Target end date:  Indefinite    Indications    DVT (deep venous thrombosis) (HCC) (Resolved) [I82.409]  Chronic anticoagulation [Z79.01]  S/P IVC filter [Z95.828]  History of DVT (deep vein thrombosis) [Z86.718]             Anticoagulation Episode Summary     INR check location:  Anticoagulation Clinic    Preferred lab:  [facility] GENERAL    Send INR reminders to:      Comments:  MD INR      Anticoagulation Care Providers     Provider Role Specialty Phone number    Carmelo Pat M.D. Referring Family Medicine [phone]    [facility] Anticoagulation Services Responsible  [phone]        Anticoagulation Patient Findings      Spoke to patient on the phone.   INR  sub-therapeutic.   Denies signs/symptoms of bleeding and/or thrombosis.   Denies changes to diet or medications.   Follow up appointment in 1 week(s).    Increase weekly warfarin dose as noted      Bob Dupree PharmD, MS, BCACP, LCC    This note was created using voice recognition software (Dragon). The accuracy of the dictation is limited by the abilities of the software. I have reviewed the note prior to signing, however some errors in grammar and context are still possible. If you have any questions related to this note please do not hesitate to contact our office.

## 2020-12-24 ENCOUNTER — ANTICOAGULATION MONITORING (OUTPATIENT)
Dept: VASCULAR LAB | Facility: MEDICAL CENTER | Age: 70
End: 2020-12-24

## 2020-12-24 DIAGNOSIS — Z95.828 S/P IVC FILTER: ICD-10-CM

## 2020-12-24 DIAGNOSIS — Z86.718 HISTORY OF DVT (DEEP VEIN THROMBOSIS): ICD-10-CM

## 2020-12-24 DIAGNOSIS — Z79.01 CHRONIC ANTICOAGULATION: ICD-10-CM

## 2020-12-24 LAB — INR PPP: 2 (ref 2–3.5)

## 2020-12-25 NOTE — PROGRESS NOTES
OP Anticoagulation Service Note    Date: 2020    Anticoagulation Summary  As of 2020    INR goal:  2.0-3.0   TTR:  36.0 % (5.3 y)   INR used for dosin.00 (2020)   Warfarin maintenance plan:  15 mg (10 mg x 1.5) every Mon; 10 mg (10 mg x 1) all other days   Weekly warfarin total:  75 mg   Plan last modified:  Bob Dupree, PharmD (2020)   Next INR check:  2020   Priority:  Routine   Target end date:  Indefinite    Indications    DVT (deep venous thrombosis) (HCC) (Resolved) [I82.409]  Chronic anticoagulation [Z79.01]  S/P IVC filter [Z95.828]  History of DVT (deep vein thrombosis) [Z86.718]             Anticoagulation Episode Summary     INR check location:  Anticoagulation Clinic    Preferred lab:  [facility]    Send INR reminders to:      Comments:  MD INR      Anticoagulation Care Providers     Provider Role Specialty Phone number    Carmelo Pat M.D. Referring Family Medicine [phone]    [facility] Anticoagulation Services Responsible  [phone]        Anticoagulation Patient Findings      HPI:     Left a voice message for the patient, asked patient to please call the anticoagulation clinic if they have any signs/symptoms of bleeding and/or thrombosis or any changes to diet or medications.      The reason for today's visit is to prevent morbidity and mortality from a blood clot and/or stroke and to reduce the risk of bleeding while on a anticoagulant.     PCP:  TAMAR Calix  [address] [place] [id number]      Assessment:     • INR  therapeutic.   • Is pt on antiplatelet therapy: NO      Current Outpatient Medications:   •  budesonide-formoterol, 2 Puff, Inhalation, BID  •  albuterol, 2 Puff, Inhalation, Q4HRS PRN  •  Spiriva Respimat, 2 Puff, Inhalation, QDAY  •  nystatin,   •  warfarin, Take 1 tablet by mouth daily as directed by Anticoagulation Program  •  FQ Protective Underwear, USE 1 PULLUP 3 TIMES DAILY  •  lisinopril, TAKE ONE TABLET BY  MOUTH EVERY DAY  •  gabapentin, TAKE THREE CAPSULES BY MOUTH THREE TIMES A DAY  •  levothyroxine, TAKE ONE TABLET BY MOUTH EVERY MORNING ON AN EMPTY STOMACH.  •  FQ Protective Underwear, USE 1 PULLUP 3 TIMES DAILY  •  prednisoLONE acetate,   •  vitamin D (Ergocalciferol), 50,000 Units, Oral, Q28 DAYS  •  LORazepam, 2 mg, Oral, HS PRN  •  Misc. Devices, 3 boxes of pouches (14693), 2 boxes of wafers (15700), 3 boxes (tubes) paste, 1 box of no string skin prep, 1 box of no string unsolve  •  Misc. Devices, Pt requesting new battery, new tires and new brakes for power chair  •  Misc. Devices, Heavy duty 4 wheeled walker with seat and back rest to use as needed  •  FLUoxetine, 20 mg, Oral, DAILY  •  carBAMazepine, 600 mg, Oral, QHS      Plan:     • Continue the same warfarin dose, as noted above.   •     Follow-up:     • Our protocol suggests we test in 1 weeks.        Additional information discussed with patient:     • Pt educated to contact our clinic with any changes in medications or s/s of bleeding or thrombosis.  • Education was provided today regarding tips to reduce their bleed risk and dietary constraints while on a anticoagulant.    National recommendations regarding anticoagulation therapy:     The CHEST guidelines recommends frequent INR monitoring at regular intervals (a few days up to a max of 12 weeks) to ensure patients are on the proper dose of warfarin, and patients are not having any complications from therapy.  INRs can dramatically change over a short time period due to diet, medications, and medical conditions.       Bob Dupree, PharmD, MS, BCACP, LCC  [facility]  Phone [phone] fax [phone]    This note was created using voice recognition software (Dragon). The accuracy of the dictation is limited by the abilities of the software. I have reviewed the note prior to signing, however some errors in grammar and context are still possible. If you have  any questions related to this note please do not hesitate to contact our office.

## 2021-01-08 ENCOUNTER — ANTICOAGULATION MONITORING (OUTPATIENT)
Dept: VASCULAR LAB | Facility: MEDICAL CENTER | Age: 71
End: 2021-01-08

## 2021-01-08 DIAGNOSIS — Z79.01 CHRONIC ANTICOAGULATION: ICD-10-CM

## 2021-01-08 DIAGNOSIS — Z86.718 HISTORY OF DVT (DEEP VEIN THROMBOSIS): ICD-10-CM

## 2021-01-08 DIAGNOSIS — Z95.828 S/P IVC FILTER: ICD-10-CM

## 2021-01-08 LAB — INR PPP: 2.6 (ref 2–3.5)

## 2021-01-09 NOTE — PROGRESS NOTES
Anticoagulation Summary  As of 2021    INR goal:  2.0-3.0   TTR:  36.5 % (5.3 y)   INR used for dosin.60 (2021)   Warfarin maintenance plan:  10 mg (10 mg x 1) every day   Weekly warfarin total:  70 mg   Plan last modified:  Nahum Bowens, PharmD (2021)   Next INR check:  2021   Priority:  Routine   Target end date:  Indefinite    Indications    DVT (deep venous thrombosis) (HCC) (Resolved) [I82.409]  Chronic anticoagulation [Z79.01]  S/P IVC filter [Z95.828]  History of DVT (deep vein thrombosis) [Z86.718]             Anticoagulation Episode Summary     INR check location:  Anticoagulation Clinic    Preferred lab:  [facility]    Send INR reminders to:      Comments:  MD INR      Anticoagulation Care Providers     Provider Role Specialty Phone number    Carmelo Pat M.D. Referring Family Medicine [phone]    [facility] Anticoagulation Services Responsible  [phone]        Anticoagulation Patient Findings  Patient Findings     Negatives:  Signs/symptoms of thrombosis, Signs/symptoms of bleeding, Laboratory test error suspected, Change in health, Change in alcohol use, Change in activity, Upcoming invasive procedure, Emergency department visit, Upcoming dental procedure, Missed doses, Extra doses, Change in medications, Change in diet/appetite, Hospital admission, Bruising, Other complaints        Spoke with patient today regarding therapeutic INR of 2.6.  Patient denies any signs/symptoms of bruising or bleeding or any changes in diet and medications.  Instructed patient to call clinic with any questions or concerns.  Pt is to continue with current warfarin dosing regimen.  Pt is to continue with current warfarin dosing regimen.  Follow up in 2 weeks, to reduce risk of adverse events related to this high risk medication,  Warfarin.    Nahum Bowens, PharmD, BCACP

## 2021-01-18 ENCOUNTER — ANTICOAGULATION MONITORING (OUTPATIENT)
Dept: VASCULAR LAB | Facility: MEDICAL CENTER | Age: 71
End: 2021-01-18

## 2021-01-18 DIAGNOSIS — Z95.828 S/P IVC FILTER: ICD-10-CM

## 2021-01-18 DIAGNOSIS — Z79.01 CHRONIC ANTICOAGULATION: ICD-10-CM

## 2021-01-18 DIAGNOSIS — Z86.718 HISTORY OF DVT (DEEP VEIN THROMBOSIS): ICD-10-CM

## 2021-01-18 LAB — INR PPP: 4.3 (ref 2–3.5)

## 2021-01-18 NOTE — PROGRESS NOTES
OP   Telephone Anticoagulation Service Note      Anticoagulation Summary  As of 2021    INR goal:  2.0-3.0   TTR:  36.5 % (5.3 y)   INR used for dosin.30 (2021)   Warfarin maintenance plan:  10 mg (10 mg x 1) every day   Weekly warfarin total:  70 mg   Plan last modified:  Nahum Bowens PharmD (2021)   Next INR check:  2021   Priority:  Routine   Target end date:  Indefinite    Indications    DVT (deep venous thrombosis) (HCC) (Resolved) [I82.409]  Chronic anticoagulation [Z79.01]  S/P IVC filter [Z95.828]  History of DVT (deep vein thrombosis) [Z86.718]             Anticoagulation Episode Summary     INR check location:  Anticoagulation Clinic    Preferred lab:  [facility]    Send INR reminders to:      Comments:  MD INR      Anticoagulation Care Providers     Provider Role Specialty Phone number    Carmelo Pat M.D. Referring Family Medicine [phone]    [facility] Anticoagulation Services Responsible  [phone]        Anticoagulation Patient Findings  Patient Findings     Positives:  Change in diet/appetite         Spoke with the patient on the phone today, reporting a SUPRA-therapeutic INR of 4.3.   Confirmed the current warfarin dosing regimen and patient compliance.  Patient reports having a bunch of cranberries recently as she was unaware of the effects on her INR. She will d/c eating them.   Patient denies any interval changes to medications. Patient denies any signs/symptoms of bleeding or clotting.  Patient will HOLD warfarin TONIGHT, then reduce dose to 5mg tomorrow, then resume her current dosing regimen.   Patient will retest again in 1 week.     Ramírez FriasD

## 2021-02-03 ENCOUNTER — ANTICOAGULATION MONITORING (OUTPATIENT)
Dept: VASCULAR LAB | Facility: MEDICAL CENTER | Age: 71
End: 2021-02-03

## 2021-02-03 DIAGNOSIS — Z79.01 CHRONIC ANTICOAGULATION: ICD-10-CM

## 2021-02-03 DIAGNOSIS — Z86.718 HISTORY OF DVT (DEEP VEIN THROMBOSIS): ICD-10-CM

## 2021-02-03 DIAGNOSIS — Z95.828 S/P IVC FILTER: ICD-10-CM

## 2021-02-03 LAB — INR PPP: 3.5 (ref 2–3.5)

## 2021-02-03 NOTE — PROGRESS NOTES
Anticoagulation Summary  As of 2/3/2021    INR goal:  2.0-3.0   TTR:  36.2 % (5.4 y)   INR used for dosing:  3.50 (2/3/2021)   Warfarin maintenance plan:  5 mg (10 mg x 0.5) every Wed; 10 mg (10 mg x 1) all other days   Weekly warfarin total:  65 mg   Plan last modified:  Ruth Munoz (2/3/2021)   Next INR check:  2/10/2021   Priority:  Routine   Target end date:  Indefinite    Indications    DVT (deep venous thrombosis) (HCC) (Resolved) [I82.409]  Chronic anticoagulation [Z79.01]  S/P IVC filter [Z95.828]  History of DVT (deep vein thrombosis) [Z86.718]             Anticoagulation Episode Summary     INR check location:  Anticoagulation Clinic    Preferred lab:  [facility]    Send INR reminders to:      Comments:  MD INR      Anticoagulation Care Providers     Provider Role Specialty Phone number    Carmelo Pat M.D. Referring [facility] [phone]    [facility] Anticoagulation Services Responsible  [phone]        Anticoagulation Patient Findings          Spoke with Atiya to report a supra therapeutic INR of 3.5  Will reduce weekly dose by 7% Follow up in 1 weeks, to reduce the risk of adverse events related to this high risk medication, warfarin.    Ruth Munoz, Clinical Pharmacist

## 2021-02-11 ENCOUNTER — ANTICOAGULATION MONITORING (OUTPATIENT)
Dept: VASCULAR LAB | Facility: MEDICAL CENTER | Age: 71
End: 2021-02-11

## 2021-02-11 DIAGNOSIS — Z95.828 S/P IVC FILTER: ICD-10-CM

## 2021-02-11 DIAGNOSIS — Z86.718 HISTORY OF DVT (DEEP VEIN THROMBOSIS): ICD-10-CM

## 2021-02-11 DIAGNOSIS — Z79.01 CHRONIC ANTICOAGULATION: ICD-10-CM

## 2021-02-11 LAB — INR PPP: 6.4 (ref 2–3.5)

## 2021-02-11 NOTE — PROGRESS NOTES
OP   Telephone Anticoagulation Service Note      Anticoagulation Summary  As of 2021    INR goal:  2.0-3.0   TTR:  36.0 % (5.4 y)   INR used for dosin.40 (2021)   Warfarin maintenance plan:  5 mg (10 mg x 0.5) every Wed; 10 mg (10 mg x 1) all other days   Weekly warfarin total:  65 mg   Plan last modified:  Ruth M Filter (2/3/2021)   Next INR check:  2/15/2021   Priority:  Routine   Target end date:  Indefinite    Indications    DVT (deep venous thrombosis) (HCC) (Resolved) [I82.409]  Chronic anticoagulation [Z79.01]  S/P IVC filter [Z95.828]  History of DVT (deep vein thrombosis) [Z86.718]             Anticoagulation Episode Summary     INR check location:  Anticoagulation Clinic    Preferred lab:  [facility]    Send INR reminders to:      Comments:  MD INR      Anticoagulation Care Providers     Provider Role Specialty Phone number    Carmelo Pat M.D. Referring [facility] Medicine [phone]    [facility] Anticoagulation Services Responsible  [phone]        Anticoagulation Patient Findings  Patient Findings     Positives:  Change in diet/appetite         Spoke with the patient on the phone today, reporting a SUPRA-therapeutic INR of 6.4.  Confirmed the current warfarin dosing regimen and patient compliance.  Patient reports that she has not had much food lately, and NO greens in over a week but planning on picking some up to eat tonight.  Patient denies any interval changes to medications. Patient denies any signs/symptoms of bleeding or clotting.  Patient instructed to HOLD x 2 days, then to reduce dose to 5mg on Sat and then resume her current dosing.   Patient asked to retest again on Monday.     Ramírez FriasD

## 2021-02-24 ENCOUNTER — ANTICOAGULATION MONITORING (OUTPATIENT)
Dept: VASCULAR LAB | Facility: MEDICAL CENTER | Age: 71
End: 2021-02-24

## 2021-02-24 DIAGNOSIS — Z95.828 S/P IVC FILTER: ICD-10-CM

## 2021-02-24 DIAGNOSIS — Z79.01 CHRONIC ANTICOAGULATION: ICD-10-CM

## 2021-02-24 DIAGNOSIS — Z86.718 HISTORY OF DVT (DEEP VEIN THROMBOSIS): ICD-10-CM

## 2021-02-24 LAB — INR PPP: 2.6 (ref 2–3.5)

## 2021-02-24 NOTE — PROGRESS NOTES
Anticoagulation Summary  As of 2021    INR goal:  2.0-3.0   TTR:  35.9 % (5.4 y)   INR used for dosin.60 (2021)   Warfarin maintenance plan:  5 mg (10 mg x 0.5) every Wed; 10 mg (10 mg x 1) all other days   Weekly warfarin total:  65 mg   Plan last modified:  Ruth Munoz (2021)   Next INR check:  3/3/2021   Priority:  Routine   Target end date:  Indefinite    Indications    DVT (deep venous thrombosis) (HCC) (Resolved) [I82.409]  Chronic anticoagulation [Z79.01]  S/P IVC filter [Z95.828]  History of DVT (deep vein thrombosis) [Z86.718]             Anticoagulation Episode Summary     INR check location:  Anticoagulation Clinic    Preferred lab:  [facility]    Send INR reminders to:      Comments:  MD INR      Anticoagulation Care Providers     Provider Role Specialty Phone number    Carmelo Pat M.D. Referring [facility] [phone]    [facility] Anticoagulation Services Responsible  [phone]        Anticoagulation Patient Findings          Spoke with Atiya to report a therapeutic INR of 2.6. Continue current dosing regimen.  Follow up in 1 weeks, to reduce the risk of adverse events related to this high risk medication, warfarin.    Ruth Munoz, Clinical Pharmacist

## 2021-03-03 DIAGNOSIS — Z23 NEED FOR VACCINATION: ICD-10-CM

## 2021-03-07 LAB — INR PPP: 2.8 (ref 2–3.5)

## 2021-03-08 ENCOUNTER — ANTICOAGULATION MONITORING (OUTPATIENT)
Dept: VASCULAR LAB | Facility: MEDICAL CENTER | Age: 71
End: 2021-03-08

## 2021-03-08 DIAGNOSIS — Z86.718 HISTORY OF DVT (DEEP VEIN THROMBOSIS): ICD-10-CM

## 2021-03-08 DIAGNOSIS — Z95.828 S/P IVC FILTER: ICD-10-CM

## 2021-03-08 DIAGNOSIS — Z79.01 CHRONIC ANTICOAGULATION: ICD-10-CM

## 2021-03-08 NOTE — PROGRESS NOTES
Anticoagulation Summary  As of 3/8/2021    INR goal:  2.0-3.0   TTR:  36.2 % (5.5 y)   INR used for dosin.80 (3/7/2021)   Warfarin maintenance plan:  5 mg (10 mg x 0.5) every Wed; 10 mg (10 mg x 1) all other days   Weekly warfarin total:  65 mg   No change documented:  Maki NAVARRO'Jessica, Med Ass't   Plan last modified:  Ruth M Filter (2021)   Next INR check:  3/21/2021   Target end date:  Indefinite    Indications    DVT (deep venous thrombosis) (HCC) (Resolved) [I82.409]  Chronic anticoagulation [Z79.01]  S/P IVC filter [Z95.828]  History of DVT (deep vein thrombosis) [Z86.718]             Anticoagulation Episode Summary     INR check location:  Anticoagulation Clinic    Preferred lab:  [facility]    Send INR reminders to:      Comments:  MD INR      Anticoagulation Care Providers     Provider Role Specialty Phone number    Carmelo Pat M.D. Referring Family Medicine [phone]    [facility] Anticoagulation Services Responsible  [phone]        Anticoagulation Patient Findings  Patient Findings     Negatives:  Signs/symptoms of thrombosis, Signs/symptoms of bleeding, Laboratory test error suspected, Change in health, Change in alcohol use, Change in activity, Upcoming invasive procedure, Emergency department visit, Upcoming dental procedure, Missed doses, Extra doses, Change in medications, Change in diet/appetite, Hospital admission, Bruising, Other complaints        Spoke with patient to report a therapeutic INR.  Pt instructed to continue with current warfarin dosing regimen. Pt denies any s/s of bleeding, bruising, clotting or any changes to diet or medication.  Will follow up in 2 weeks.    Maki Roman, Med Ass't    I have reviewed and am in agreement with the above stated plan on 3-8-21.  Nahum Bowens, PharmD, BCACP

## 2021-03-11 DIAGNOSIS — Z86.718 HISTORY OF DVT (DEEP VEIN THROMBOSIS): ICD-10-CM

## 2021-03-11 RX ORDER — WARFARIN SODIUM 10 MG/1
TABLET ORAL
Qty: 90 TABLET | Refills: 1 | Status: SHIPPED | OUTPATIENT
Start: 2021-03-11 | End: 2021-11-08 | Stop reason: SDUPTHER

## 2021-03-19 ENCOUNTER — ANTICOAGULATION MONITORING (OUTPATIENT)
Dept: VASCULAR LAB | Facility: MEDICAL CENTER | Age: 71
End: 2021-03-19

## 2021-03-19 DIAGNOSIS — Z86.718 HISTORY OF DVT (DEEP VEIN THROMBOSIS): ICD-10-CM

## 2021-03-19 DIAGNOSIS — Z79.01 CHRONIC ANTICOAGULATION: ICD-10-CM

## 2021-03-19 DIAGNOSIS — Z95.828 S/P IVC FILTER: ICD-10-CM

## 2021-03-19 LAB — INR PPP: 2.9 (ref 2–3.5)

## 2021-03-20 NOTE — PROGRESS NOTES
OP Anticoagulation Service Note    Date: 3/19/2021    Anticoagulation Summary  As of 3/19/2021    INR goal:  2.0-3.0   TTR:  36.6 % (5.5 y)   INR used for dosin.90 (3/19/2021)   Warfarin maintenance plan:  5 mg (10 mg x 0.5) every Wed; 10 mg (10 mg x 1) all other days   Weekly warfarin total:  65 mg   Plan last modified:  Ruth M Filter (2021)   Next INR check:  3/26/2021   Target end date:  Indefinite    Indications    DVT (deep venous thrombosis) (HCC) (Resolved) [I82.409]  Chronic anticoagulation [Z79.01]  S/P IVC filter [Z95.828]  History of DVT (deep vein thrombosis) [Z86.718]             Anticoagulation Episode Summary     INR check location:  Anticoagulation Clinic    Preferred lab:  [facility]    Send INR reminders to:      Comments:  MD INR      Anticoagulation Care Providers     Provider Role Specialty Phone number    Carmelo Pat M.D. Referring Family Medicine [phone]    [facility] Anticoagulation Services Responsible  [phone]        Anticoagulation Patient Findings      This appointment was conducted over the phone.     HPI:   The reason for today's call is to prevent morbidity and mortality from a blood clot and/or stroke and to reduce the risk of bleeding while on a anticoagulant.     PCP:  TAMAR Calix  [address] [place] [id number]    Assessment:     • INR  therapeutic.       Current Outpatient Medications:   •  warfarin, Take one-half to one (1/2-1) tablet daily as directed by [facility] Anticoagulation services  •  budesonide-formoterol, 2 Puff, Inhalation, BID  •  albuterol, 2 Puff, Inhalation, Q4HRS PRN  •  Spiriva Respimat, 2 Puff, Inhalation, QDAY  •  nystatin,   •  FQ Protective Underwear, USE 1 PULLUP 3 TIMES DAILY  •  lisinopril, TAKE ONE TABLET BY MOUTH EVERY DAY  •  gabapentin, TAKE THREE CAPSULES BY MOUTH THREE TIMES A DAY  •  levothyroxine, TAKE ONE TABLET BY MOUTH EVERY MORNING ON AN EMPTY STOMACH.  •  FQ Protective Underwear, USE 1 PULLUP 3  TIMES DAILY  •  prednisoLONE acetate,   •  vitamin D (Ergocalciferol), 50,000 Units, Oral, Q28 DAYS  •  LORazepam, 2 mg, Oral, HS PRN  •  Misc. Devices, 3 boxes of pouches (85921), 2 boxes of wafers (51614), 3 boxes (tubes) paste, 1 box of no string skin prep, 1 box of no string unsolve  •  Misc. Devices, Pt requesting new battery, new tires and new brakes for power chair  •  Misc. Devices, Heavy duty 4 wheeled walker with seat and back rest to use as needed  •  FLUoxetine, 20 mg, Oral, DAILY  •  carBAMazepine, 600 mg, Oral, QHS      Plan:     • Continue the same warfarin dose, as noted above.       Follow-up:     • Our protocol suggests we test in 1 weeks.        Additional information discussed with patient:     • Asked patient to please call the anticoagulation clinic if they have any signs/symptoms of bleeding and/or thrombosis or any changes to diet or medications.      National recommendations regarding anticoagulation therapy:     The CHEST guidelines recommends frequent INR monitoring at regular intervals (a few days up to a max of 12 weeks) to ensure patients are on the proper dose of warfarin, and patients are not having any complications from therapy.  INRs can dramatically change over a short time period due to diet, medications, and medical conditions.       Bob Dupree, PharmD, MS, BCACP, [facility]  Phone [phone] fax [phone]    This note was created using voice recognition software (Dragon). The accuracy of the dictation is limited by the abilities of the software. I have reviewed the note prior to signing, however some errors in grammar and context are still possible. If you have any questions related to this note please do not hesitate to contact our office.

## 2021-03-30 ENCOUNTER — ANTICOAGULATION MONITORING (OUTPATIENT)
Dept: VASCULAR LAB | Facility: MEDICAL CENTER | Age: 71
End: 2021-03-30

## 2021-03-30 DIAGNOSIS — Z79.01 CHRONIC ANTICOAGULATION: ICD-10-CM

## 2021-03-30 DIAGNOSIS — Z86.718 HISTORY OF DVT (DEEP VEIN THROMBOSIS): ICD-10-CM

## 2021-03-30 DIAGNOSIS — Z95.828 S/P IVC FILTER: ICD-10-CM

## 2021-03-30 LAB — INR PPP: 1.7 (ref 2–3.5)

## 2021-03-30 NOTE — PROGRESS NOTES
Anticoagulation Summary  As of 3/30/2021    INR goal:  2.0-3.0   TTR:  36.8 % (5.5 y)   INR used for dosin.70 (3/30/2021)   Warfarin maintenance plan:  5 mg (10 mg x 0.5) every Wed; 10 mg (10 mg x 1) all other days   Weekly warfarin total:  65 mg   Plan last modified:  Ruth M Filter (2021)   Next INR check:  2021   Target end date:  Indefinite    Indications    DVT (deep venous thrombosis) (HCC) (Resolved) [I82.409]  Chronic anticoagulation [Z79.01]  S/P IVC filter [Z95.828]  History of DVT (deep vein thrombosis) [Z86.718]             Anticoagulation Episode Summary     INR check location:  Anticoagulation Clinic    Preferred lab:  [facility]    Send INR reminders to:      Comments:  MD INR      Anticoagulation Care Providers     Provider Role Specialty Phone number    Carmelo Pat M.D. Referring [facility] Medicine [phone]    [facility] Anticoagulation Services Responsible  [phone]        Anticoagulation Patient Findings  Patient Findings     Positives:  Change in medications (fluoxetine changed to rexulti)    Negatives:  Signs/symptoms of thrombosis, Signs/symptoms of bleeding, Laboratory test error suspected, Change in health, Change in alcohol use, Change in activity, Upcoming invasive procedure, Emergency department visit, Upcoming dental procedure, Missed doses, Extra doses, Change in diet/appetite, Hospital admission, Bruising, Other complaints         Spoke with patient today regarding subtherapeutic INR of 1.7.  Patient denies any signs/symptoms of bruising or bleeding or any changes in diet and medications.  Instructed patient to call clinic with any questions or concerns.  Instructed patient to bolus with 15mg X 1, then resume current warfarin regimen.  Follow up in 1 weeks, to reduce risk of adverse events related to this high risk medication,  Warfarin.    Nahum Bowens, PharmD, BCACP

## 2021-04-07 LAB — INR PPP: 4.3 (ref 2–3.5)

## 2021-04-08 ENCOUNTER — ANTICOAGULATION MONITORING (OUTPATIENT)
Dept: VASCULAR LAB | Facility: MEDICAL CENTER | Age: 71
End: 2021-04-08

## 2021-04-08 DIAGNOSIS — Z95.828 S/P IVC FILTER: ICD-10-CM

## 2021-04-08 DIAGNOSIS — Z86.718 HISTORY OF DVT (DEEP VEIN THROMBOSIS): ICD-10-CM

## 2021-04-08 DIAGNOSIS — Z79.01 CHRONIC ANTICOAGULATION: ICD-10-CM

## 2021-04-08 NOTE — PROGRESS NOTES
Anticoagulation Summary  As of 2021    INR goal:  2.0-3.0   TTR:  36.8 % (5.6 y)   INR used for dosin.30 (2021)   Warfarin maintenance plan:  5 mg (10 mg x 0.5) every Wed; 10 mg (10 mg x 1) all other days   Weekly warfarin total:  65 mg   Plan last modified:  Ruth M Filter (2021)   Next INR check:  2021   Target end date:  Indefinite    Indications    DVT (deep venous thrombosis) (HCC) (Resolved) [I82.409]  Chronic anticoagulation [Z79.01]  S/P IVC filter [Z95.828]  History of DVT (deep vein thrombosis) [Z86.718]             Anticoagulation Episode Summary     INR check location:  Anticoagulation Clinic    Preferred lab:  [facility]    Send INR reminders to:      Comments:  MD INR      Anticoagulation Care Providers     Provider Role Specialty Phone number    Carmelo Pat M.D. Referring [facility] Medicine [phone]    [facility] Anticoagulation Services Responsible  [phone]        Anticoagulation Patient Findings    Spoke with patient today regarding supra-therapeutic INR of 4.30.  Patient denies any signs/symptoms of bruising or bleeding or any changes in medications. Patient had a can of spinach last week.      Instructed patient to hold today's dose and decrease tomorrows dose to 5 mg and continue normal warfarin regimen on other days. Instructed to call clinic with any questions or concerns.    Follow up in 1 weeks, to reduce risk of adverse events related to this high risk medication,  Warfarin.    Discussed with Mauricio Redd PharmD.   Yessy Chapa - Student Pharmacist

## 2021-04-15 ENCOUNTER — ANTICOAGULATION MONITORING (OUTPATIENT)
Dept: VASCULAR LAB | Facility: MEDICAL CENTER | Age: 71
End: 2021-04-15

## 2021-04-15 DIAGNOSIS — Z86.718 HISTORY OF DVT (DEEP VEIN THROMBOSIS): ICD-10-CM

## 2021-04-15 DIAGNOSIS — Z95.828 S/P IVC FILTER: ICD-10-CM

## 2021-04-15 DIAGNOSIS — Z79.01 CHRONIC ANTICOAGULATION: ICD-10-CM

## 2021-04-15 LAB — INR PPP: 1.7 (ref 2–3.5)

## 2021-04-16 NOTE — PROGRESS NOTES
Anticoagulation Summary  As of 4/15/2021    INR goal:  2.0-3.0   TTR:  36.8 % (5.6 y)   INR used for dosin.70 (4/15/2021)   Warfarin maintenance plan:  5 mg (10 mg x 0.5) every Wed; 10 mg (10 mg x 1) all other days   Weekly warfarin total:  65 mg   Plan last modified:  Ruth Munoz (2021)   Next INR check:  2021   Target end date:  Indefinite    Indications    DVT (deep venous thrombosis) (HCC) (Resolved) [I82.409]  Chronic anticoagulation [Z79.01]  S/P IVC filter [Z95.828]  History of DVT (deep vein thrombosis) [Z86.718]             Anticoagulation Episode Summary     INR check location:  Anticoagulation Clinic    Preferred lab:  [facility]    Send INR reminders to:      Comments:  MD INR      Anticoagulation Care Providers     Provider Role Specialty Phone number    Carmelo Pat M.D. Referring [facility] [phone]    [facility] Anticoagulation Services Responsible  [phone]        Anticoagulation Patient Findings          Left voicemail message to report a sub therapeutic INR of 1.7.  INRs have been labile.Pt to continue with current warfarin dosing regimen. Requested pt contact the clinic for any s/s of unusual bleeding, bruising, clotting or any changes to diet or medication.  Follow up in 1 weeks, to reduce the risk of adverse events related to this high risk medication, warfarin.    Ruth Munoz, Clinical Pharmacist

## 2021-04-26 ENCOUNTER — ANTICOAGULATION MONITORING (OUTPATIENT)
Dept: MEDICAL GROUP | Facility: MEDICAL CENTER | Age: 71
End: 2021-04-26

## 2021-04-26 DIAGNOSIS — Z79.01 CHRONIC ANTICOAGULATION: ICD-10-CM

## 2021-04-26 DIAGNOSIS — Z86.718 HISTORY OF DVT (DEEP VEIN THROMBOSIS): ICD-10-CM

## 2021-04-26 DIAGNOSIS — Z95.828 S/P IVC FILTER: ICD-10-CM

## 2021-04-26 LAB — INR PPP: 3.7 (ref 2–3.5)

## 2021-04-26 NOTE — PROGRESS NOTES
OP Anticoagulation Service Note    Date: 4/26/2021    Anticoagulation Summary  As of 4/26/2021    INR goal:  2.0-3.0   TTR:  36.9 % (5.6 y)   INR used for dosing:  3.70 (4/26/2021)   Warfarin maintenance plan:  5 mg (10 mg x 0.5) every Wed; 10 mg (10 mg x 1) all other days   Weekly warfarin total:  65 mg   Plan last modified:  Ruth M Filter (2/24/2021)   Next INR check:  5/3/2021   Target end date:  Indefinite    Indications    DVT (deep venous thrombosis) (HCC) (Resolved) [I82.409]  Chronic anticoagulation [Z79.01]  S/P IVC filter [Z95.828]  History of DVT (deep vein thrombosis) [Z86.718]             Anticoagulation Episode Summary     INR check location:  Anticoagulation Clinic    Preferred lab:  [facility]    Send INR reminders to:      Comments:  MD INR      Anticoagulation Care Providers     Provider Role Specialty Phone number    Carmelo Pat M.D. Referring Family Medicine [phone]    [facility] Anticoagulation Services Responsible  [phone]        Anticoagulation Patient Findings        HPI:   The reason for today's call is to prevent morbidity and mortality from a blood clot and/or stroke and to reduce the risk of bleeding while on a anticoagulant.     PCP:  TAMAR Calix  [address] [place] [phone]    Assessment:     • INR  supra-therapeutic.       Current Outpatient Medications:   •  warfarin, Take one-half to one (1/2-1) tablet daily as directed by [facility] Anticoagulation services  •  budesonide-formoterol, 2 Puff, Inhalation, BID  •  albuterol, 2 Puff, Inhalation, Q4HRS PRN  •  Spiriva Respimat, 2 Puff, Inhalation, QDAY  •  nystatin,   •  FQ Protective Underwear, USE 1 PULLUP 3 TIMES DAILY  •  lisinopril, TAKE ONE TABLET BY MOUTH EVERY DAY  •  gabapentin, TAKE THREE CAPSULES BY MOUTH THREE TIMES A DAY  •  levothyroxine, TAKE ONE TABLET BY MOUTH EVERY MORNING ON AN EMPTY STOMACH.  •  FQ Protective Underwear, USE 1 PULLUP 3 TIMES DAILY  •  prednisoLONE acetate,   •   vitamin D (Ergocalciferol), 50,000 Units, Oral, Q28 DAYS  •  LORazepam, 2 mg, Oral, HS PRN  •  Misc. Devices, 3 boxes of pouches (01620), 2 boxes of wafers (45659), 3 boxes (tubes) paste, 1 box of no string skin prep, 1 box of no string unsolve  •  Misc. Devices, Pt requesting new battery, new tires and new brakes for power chair  •  Misc. Devices, Heavy duty 4 wheeled walker with seat and back rest to use as needed  •  FLUoxetine, 20 mg, Oral, DAILY  •  carBAMazepine, 600 mg, Oral, QHS      Plan:     • Hold today then continue the same warfarin dose, as noted above.       Follow-up:     • Our protocol suggests we test in 1 weeks.        Additional information discussed with patient:     • Asked patient to please call the anticoagulation clinic if they have any signs/symptoms of bleeding and/or thrombosis or any changes to diet or medications.      National recommendations regarding anticoagulation therapy:     The CHEST guidelines recommends frequent INR monitoring at regular intervals (a few days up to a max of 12 weeks) to ensure patients are on the proper dose of warfarin, and patients are not having any complications from therapy.  INRs can dramatically change over a short time period due to diet, medications, and medical conditions.       Bob Dupree, PharmD, MS, BCACP, [facility]  Phone [phone] fax [phone]    This note was created using voice recognition software (Dragon). The accuracy of the dictation is limited by the abilities of the software. I have reviewed the note prior to signing, however some errors in grammar and context are still possible. If you have any questions related to this note please do not hesitate to contact our office.

## 2021-05-06 ENCOUNTER — ANTICOAGULATION MONITORING (OUTPATIENT)
Dept: VASCULAR LAB | Facility: MEDICAL CENTER | Age: 71
End: 2021-05-06

## 2021-05-06 DIAGNOSIS — Z79.01 CHRONIC ANTICOAGULATION: ICD-10-CM

## 2021-05-06 DIAGNOSIS — Z86.718 HISTORY OF DVT (DEEP VEIN THROMBOSIS): ICD-10-CM

## 2021-05-06 DIAGNOSIS — Z95.828 S/P IVC FILTER: ICD-10-CM

## 2021-05-06 LAB — INR PPP: 1.7 (ref 2–3.5)

## 2021-05-07 NOTE — PROGRESS NOTES
Anticoagulation Summary  As of 2021    INR goal:  2.0-3.0   TTR:  37.0 % (5.6 y)   INR used for dosin.70 (2021)   Warfarin maintenance plan:  5 mg (10 mg x 0.5) every Wed; 10 mg (10 mg x 1) all other days   Weekly warfarin total:  65 mg   Plan last modified:  Bob Dupree PharmD (2021)   Next INR check:  2021   Target end date:  Indefinite    Indications    DVT (deep venous thrombosis) (HCC) (Resolved) [I82.409]  Chronic anticoagulation [Z79.01]  S/P IVC filter [Z95.828]  History of DVT (deep vein thrombosis) [Z86.718]             Anticoagulation Episode Summary     INR check location:  Anticoagulation Clinic    Preferred lab:  [facility]    Send INR reminders to:      Comments:  MD INR      Anticoagulation Care Providers     Provider Role Specialty Phone number    Carmelo Pat M.D. Referring [facility] Medicine [phone]    [facility] Anticoagulation Services Responsible  [phone]        Anticoagulation Patient Findings      Spoke with pt.  INR is subtherapeutic.   Pt denies any unusual s/s of bleeding, bruising, clotting or any changes to diet or medications. Denies any etoh, cranberries, supplements, or illness.   Pt verifies warfarin weekly dosing.     Will have pt continue regimen as INR was previously supratherapeutic    Repeat INR in 1 week(s).     Alicia Adams, PharmD

## 2021-05-13 ENCOUNTER — ANTICOAGULATION MONITORING (OUTPATIENT)
Dept: VASCULAR LAB | Facility: MEDICAL CENTER | Age: 71
End: 2021-05-13

## 2021-05-13 DIAGNOSIS — Z79.01 CHRONIC ANTICOAGULATION: ICD-10-CM

## 2021-05-13 DIAGNOSIS — Z86.718 HISTORY OF DVT (DEEP VEIN THROMBOSIS): ICD-10-CM

## 2021-05-13 DIAGNOSIS — Z95.828 S/P IVC FILTER: ICD-10-CM

## 2021-05-13 LAB — INR PPP: 2.4 (ref 2–3.5)

## 2021-05-14 NOTE — PROGRESS NOTES
OP   Telephone Anticoagulation Service Note      Anticoagulation Summary  As of 2021    INR goal:  2.0-3.0   TTR:  37.0 % (5.7 y)   INR used for dosin.40 (2021)   Warfarin maintenance plan:  5 mg (10 mg x 0.5) every Wed; 10 mg (10 mg x 1) all other days   Weekly warfarin total:  65 mg   Plan last modified:  Rodriguez MeadowsD (2021)   Next INR check:  2021   Target end date:  Indefinite    Indications    DVT (deep venous thrombosis) (HCC) (Resolved) [I82.409]  Chronic anticoagulation [Z79.01]  S/P IVC filter [Z95.828]  History of DVT (deep vein thrombosis) [Z86.718]             Anticoagulation Episode Summary     INR check location:  Anticoagulation Clinic    Preferred lab:  [facility]    Send INR reminders to:      Comments:  MD INR      Anticoagulation Care Providers     Provider Role Specialty Phone number    Carmelo Pat M.D. Referring Family Medicine [phone]    [facility] Anticoagulation Services Responsible  [phone]        Anticoagulation Patient Findings  Patient Findings     Negatives:  Signs/symptoms of thrombosis, Signs/symptoms of bleeding, Laboratory test error suspected, Change in health, Change in alcohol use, Change in activity, Upcoming invasive procedure, Emergency department visit, Upcoming dental procedure, Missed doses, Extra doses, Change in medications, Change in diet/appetite, Hospital admission, Bruising, Other complaints          Spoke with the patient on the phone today, reporting a therapeutic INR of 2.4.   Confirmed the current warfarin dosing regimen and patient compliance.  Patient denies any interval changes to diet and/or medications. Patient denies any signs/symptoms of bleeding or clotting.  Patient instructed to continue with the current warfarin dosing regimen, and asked to follow up again in 1 week.     Ramírez FriasD

## 2021-05-24 ENCOUNTER — ANTICOAGULATION MONITORING (OUTPATIENT)
Dept: VASCULAR LAB | Facility: MEDICAL CENTER | Age: 71
End: 2021-05-24

## 2021-05-24 DIAGNOSIS — Z95.828 S/P IVC FILTER: ICD-10-CM

## 2021-05-24 DIAGNOSIS — Z86.718 HISTORY OF DVT (DEEP VEIN THROMBOSIS): ICD-10-CM

## 2021-05-24 DIAGNOSIS — Z79.01 CHRONIC ANTICOAGULATION: ICD-10-CM

## 2021-05-24 LAB — INR PPP: 2.2 (ref 2–3.5)

## 2021-05-24 NOTE — PROGRESS NOTES
Anticoagulation Summary  As of 2021    INR goal:  2.0-3.0   TTR:  37.4 % (5.7 y)   INR used for dosin.20 (2021)   Warfarin maintenance plan:  5 mg (10 mg x 0.5) every Wed; 10 mg (10 mg x 1) all other days   Weekly warfarin total:  65 mg   Plan last modified:  Bob Dupree, PharmD (2021)   Next INR check:     Target end date:  Indefinite    Indications    DVT (deep venous thrombosis) (HCC) (Resolved) [I82.409]  Chronic anticoagulation [Z79.01]  S/P IVC filter [Z95.828]  History of DVT (deep vein thrombosis) [Z86.718]             Anticoagulation Episode Summary     INR check location:  Anticoagulation Clinic    Preferred lab:  [facility]    Send INR reminders to:      Comments:  MD INR      Anticoagulation Care Providers     Provider Role Specialty Phone number    Carmelo Pat M.D. Referring Family Medicine [phone]    [facility] Anticoagulation Services Responsible  [phone]        Anticoagulation Patient Findings          Spoke with patient today regarding therapeutic INR of 2.2.  Patient denies any signs/symptoms of bruising or bleeding or any changes in diet and medications.  Instructed patient to call clinic with any questions or concerns.    Pt is to continue with current warfarin dosing regimen.    Follow up in 1 week, to reduce risk of adverse events related to this high risk medication,  Warfarin.    Marcelino Aranda, Pharmacy Intern

## 2021-06-10 ENCOUNTER — ANTICOAGULATION MONITORING (OUTPATIENT)
Dept: VASCULAR LAB | Facility: MEDICAL CENTER | Age: 71
End: 2021-06-10

## 2021-06-10 DIAGNOSIS — Z86.718 HISTORY OF DVT (DEEP VEIN THROMBOSIS): ICD-10-CM

## 2021-06-10 DIAGNOSIS — Z79.01 CHRONIC ANTICOAGULATION: ICD-10-CM

## 2021-06-10 DIAGNOSIS — Z95.828 S/P IVC FILTER: ICD-10-CM

## 2021-06-10 LAB — INR PPP: 2.3 (ref 2–3.5)

## 2021-06-10 NOTE — PROGRESS NOTES
Anticoagulation Summary  As of 6/10/2021    INR goal:  2.0-3.0   TTR:  37.9 % (5.7 y)   INR used for dosin.30 (6/10/2021)   Warfarin maintenance plan:  5 mg (10 mg x 0.5) every Wed; 10 mg (10 mg x 1) all other days   Weekly warfarin total:  65 mg   No change documented:  Maki Roman, Med Ass't   Plan last modified:  Bob Dupree, PharmD (2021)   Next INR check:     Target end date:  Indefinite    Indications    DVT (deep venous thrombosis) (HCC) (Resolved) [I82.409]  Chronic anticoagulation [Z79.01]  S/P IVC filter [Z95.828]  History of DVT (deep vein thrombosis) [Z86.718]             Anticoagulation Episode Summary     INR check location:  Anticoagulation Clinic    Preferred lab:  [facility]    Send INR reminders to:      Comments:  MD INR      Anticoagulation Care Providers     Provider Role Specialty Phone number    Carmelo Pat M.D. Referring [facility] Medicine [phone]    [facility] Anticoagulation Services Responsible  [phone]        Anticoagulation Patient Findings  Patient Findings     Negatives:  Signs/symptoms of thrombosis, Signs/symptoms of bleeding, Laboratory test error suspected, Change in health, Change in alcohol use, Change in activity, Upcoming invasive procedure, Emergency department visit, Upcoming dental procedure, Missed doses, Extra doses, Change in medications, Change in diet/appetite, Hospital admission, Bruising, Other complaints        Spoke with patient to report a therapeutic INR.  Pt instructed to continue with current warfarin dosing regimen. Pt denies any s/s of bleeding, bruising, clotting or any changes to diet or medication.  Will follow up in 1 weeks.    Maki Roman, Med Ass't     I have reviewed and concur with the above plan on 6/10/2021.  Ruth Munoz, Clinical Pharmacist, CDE, CACP

## 2021-06-23 ENCOUNTER — ANTICOAGULATION MONITORING (OUTPATIENT)
Dept: VASCULAR LAB | Facility: MEDICAL CENTER | Age: 71
End: 2021-06-23

## 2021-06-23 DIAGNOSIS — Z79.01 CHRONIC ANTICOAGULATION: ICD-10-CM

## 2021-06-23 DIAGNOSIS — Z95.828 S/P IVC FILTER: ICD-10-CM

## 2021-06-23 DIAGNOSIS — Z86.718 HISTORY OF DVT (DEEP VEIN THROMBOSIS): ICD-10-CM

## 2021-06-23 LAB — INR PPP: 1.2 (ref 2–3.5)

## 2021-06-23 NOTE — PROGRESS NOTES
Anticoagulation Summary  As of 2021    INR goal:  2.0-3.0   TTR:  37.8 % (5.8 y)   INR used for dosin.20 (2021)   Warfarin maintenance plan:  5 mg (10 mg x 0.5) every Wed; 10 mg (10 mg x 1) all other days   Weekly warfarin total:  65 mg   Plan last modified:  Rodriguez MeadowsD (2021)   Next INR check:  2021   Target end date:  Indefinite    Indications    DVT (deep venous thrombosis) (HCC) (Resolved) [I82.409]  Chronic anticoagulation [Z79.01]  S/P IVC filter [Z95.828]  History of DVT (deep vein thrombosis) [Z86.718]             Anticoagulation Episode Summary     INR check location:  Anticoagulation Clinic    Preferred lab:  [facility]    Send INR reminders to:      Comments:  MD INR      Anticoagulation Care Providers     Provider Role Specialty Phone number    Carmelo Pat M.D. Referring [facility] Medicine [phone]    [facility] Anticoagulation Services Responsible  [phone]        Anticoagulation Patient Findings     PT got a 1.1 on Friday and took 15 mg to try and raise it.      Spoke with patient today regarding subtherapeutic INR of 1.2.  Patient denies any signs/symptoms of bruising or bleeding or any changes in diet and medications.  Instructed patient to call clinic with any questions or concerns.    PT is to bolus today (total 15 mg) and tomorrow (total 15 mg) then continue with current regimen as detailed above.     Follow up in 2 days, to reduce risk of adverse events related to this high risk medication,  Warfarin.    Marcelino Aranda, Pharmacy Intern  Discussed with Mauricio FriasD

## 2021-07-02 LAB — INR PPP: 2.2 (ref 2–3.5)

## 2021-07-06 ENCOUNTER — ANTICOAGULATION MONITORING (OUTPATIENT)
Dept: VASCULAR LAB | Facility: MEDICAL CENTER | Age: 71
End: 2021-07-06

## 2021-07-06 DIAGNOSIS — Z95.828 S/P IVC FILTER: ICD-10-CM

## 2021-07-06 DIAGNOSIS — Z79.01 CHRONIC ANTICOAGULATION: ICD-10-CM

## 2021-07-06 DIAGNOSIS — Z86.718 HISTORY OF DVT (DEEP VEIN THROMBOSIS): ICD-10-CM

## 2021-07-06 NOTE — PROGRESS NOTES
Anticoagulation Summary  As of 2021    INR goal:  2.0-3.0   TTR:  37.7 % (5.8 y)   INR used for dosin.20 (2021)   Warfarin maintenance plan:  5 mg (10 mg x 0.5) every Wed; 10 mg (10 mg x 1) all other days   Weekly warfarin total:  65 mg   Plan last modified:  Bob Dupree PharmD (2021)   Next INR check:  2021   Target end date:  Indefinite    Indications    DVT (deep venous thrombosis) (HCC) (Resolved) [I82.409]  Chronic anticoagulation [Z79.01]  S/P IVC filter [Z95.828]  History of DVT (deep vein thrombosis) [Z86.718]             Anticoagulation Episode Summary     INR check location:  Anticoagulation Clinic    Preferred lab:  [facility]    Send INR reminders to:      Comments:  MD INR      Anticoagulation Care Providers     Provider Role Specialty Phone number    Carmelo Pat M.D. Referring [facility] Medicine [phone]    [facility] Anticoagulation Services Responsible  [phone]        Anticoagulation Patient Findings      Spoke with patient  to report a therapeutic INR.      Pt denies any s/s of bleeding, bruising, clotting or any changes to diet or medication.      Pt instructed to continue with current warfarin dosing regimen, confirms dosing.   Will follow up in 2 days.     Alicia Adams PharmD

## 2021-07-09 LAB — INR PPP: 3.2 (ref 2–3.5)

## 2021-07-13 ENCOUNTER — ANTICOAGULATION MONITORING (OUTPATIENT)
Dept: VASCULAR LAB | Facility: MEDICAL CENTER | Age: 71
End: 2021-07-13

## 2021-07-13 DIAGNOSIS — Z86.718 HISTORY OF DVT (DEEP VEIN THROMBOSIS): ICD-10-CM

## 2021-07-13 DIAGNOSIS — Z95.828 S/P IVC FILTER: ICD-10-CM

## 2021-07-13 DIAGNOSIS — Z79.01 CHRONIC ANTICOAGULATION: ICD-10-CM

## 2021-07-13 NOTE — PROGRESS NOTES
Anticoagulation Summary  As of 7/13/2021    INR goal:  2.0-3.0   TTR:  37.9 % (5.8 y)   INR used for dosing:  3.20 (7/9/2021)   Warfarin maintenance plan:  5 mg (10 mg x 0.5) every Wed; 10 mg (10 mg x 1) all other days   Weekly warfarin total:  65 mg   Plan last modified:  Bob Dupree, PharmD (4/26/2021)   Next INR check:  7/16/2021   Target end date:  Indefinite    Indications    DVT (deep venous thrombosis) (HCC) (Resolved) [I82.409]  Chronic anticoagulation [Z79.01]  S/P IVC filter [Z95.828]  History of DVT (deep vein thrombosis) [Z86.718]             Anticoagulation Episode Summary     INR check location:  Anticoagulation Clinic    Preferred lab:  [facility]    Send INR reminders to:      Comments:  MD INR      Anticoagulation Care Providers     Provider Role Specialty Phone number    Carmelo Pat M.D. Referring [facility] Medicine [phone]    [facility] Anticoagulation Services Responsible  [phone]        Anticoagulation Patient Findings  Patient Findings     Positives:  Bruising (bumping elbow a lot, nothing concerning per patient)    Negatives:  Signs/symptoms of thrombosis, Signs/symptoms of bleeding, Laboratory test error suspected, Change in health, Change in alcohol use, Change in activity, Upcoming invasive procedure, Emergency department visit, Upcoming dental procedure, Missed doses, Extra doses, Change in medications, Change in diet/appetite, Hospital admission, Other complaints        Spoke with patient today regarding supratherapeutic INR of 3.20.  Patient denies any signs/symptoms of bleeding or any changes in diet and medications.  Instructed patient to call clinic with any questions or concerns.    Pt reports bumping her elbow a lot which causes some minor bruising, but nothing concerning per patient.    Pt is to take half dose tonight then continue with current warfarin dosing regimen.    Follow up in 1 weeks, to reduce risk of adverse events related to this high risk medication,   Warfarin.    Jerri Cameron, Pharmacy Intern     Discussed with Keyonna Elmore

## 2021-07-20 LAB — INR PPP: 2.8 (ref 2–3.5)

## 2021-07-21 ENCOUNTER — ANTICOAGULATION MONITORING (OUTPATIENT)
Dept: VASCULAR LAB | Facility: MEDICAL CENTER | Age: 71
End: 2021-07-21

## 2021-07-21 DIAGNOSIS — Z86.718 HISTORY OF DVT (DEEP VEIN THROMBOSIS): ICD-10-CM

## 2021-07-21 DIAGNOSIS — Z95.828 S/P IVC FILTER: ICD-10-CM

## 2021-07-21 DIAGNOSIS — Z79.01 CHRONIC ANTICOAGULATION: ICD-10-CM

## 2021-07-21 NOTE — PROGRESS NOTES
Anticoagulation Summary  As of 2021    INR goal:  2.0-3.0   TTR:  37.9 % (5.8 y)   INR used for dosin.80 (2021)   Warfarin maintenance plan:  5 mg (10 mg x 0.5) every Wed; 10 mg (10 mg x 1) all other days   Weekly warfarin total:  65 mg   Plan last modified:  Bob Dupree, PharmD (2021)   Next INR check:  2021   Target end date:  Indefinite    Indications    DVT (deep venous thrombosis) (HCC) (Resolved) [I82.409]  Chronic anticoagulation [Z79.01]  S/P IVC filter [Z95.828]  History of DVT (deep vein thrombosis) [Z86.718]             Anticoagulation Episode Summary     INR check location:  Anticoagulation Clinic    Preferred lab:  [facility]    Send INR reminders to:      Comments:  MD INR      Anticoagulation Care Providers     Provider Role Specialty Phone number    Carmelo Pat M.D. Referring Family Medicine [phone]    [facility] Anticoagulation Services Responsible  [phone]        Anticoagulation Patient Findings  Patient Findings     Negatives:  Signs/symptoms of thrombosis, Signs/symptoms of bleeding, Laboratory test error suspected, Change in health, Change in alcohol use, Change in activity, Upcoming invasive procedure, Emergency department visit, Upcoming dental procedure, Missed doses, Extra doses, Change in medications, Change in diet/appetite, Hospital admission, Bruising, Other complaints           Spoke with patient to report a therapeutic INR.      Pt denies any s/s of bleeding, bruising, clotting or any changes to diet or medication.      Pt instructed to continue with current warfarin dosing regimen, confirms dosing.   Will follow up in 1 week(s).     Zachary Gundersen, Pharmacy Intern

## 2021-07-30 ENCOUNTER — ANTICOAGULATION MONITORING (OUTPATIENT)
Dept: VASCULAR LAB | Facility: MEDICAL CENTER | Age: 71
End: 2021-07-30

## 2021-07-30 DIAGNOSIS — Z95.828 S/P IVC FILTER: ICD-10-CM

## 2021-07-30 DIAGNOSIS — Z86.718 HISTORY OF DVT (DEEP VEIN THROMBOSIS): ICD-10-CM

## 2021-07-30 DIAGNOSIS — Z79.01 CHRONIC ANTICOAGULATION: ICD-10-CM

## 2021-07-30 LAB — INR PPP: 2.2 (ref 2–3.5)

## 2021-07-30 NOTE — PROGRESS NOTES
Anticoagulation Summary  As of 2021    INR goal:  2.0-3.0   TTR:  38.2 % (5.9 y)   INR used for dosin.20 (2021)   Warfarin maintenance plan:  5 mg (10 mg x 0.5) every Wed; 10 mg (10 mg x 1) all other days   Weekly warfarin total:  65 mg   Plan last modified:  Rodriguez eMadowsD (2021)   Next INR check:  2021   Target end date:  Indefinite    Indications    DVT (deep venous thrombosis) (HCC) (Resolved) [I82.409]  Chronic anticoagulation [Z79.01]  S/P IVC filter [Z95.828]  History of DVT (deep vein thrombosis) [Z86.718]             Anticoagulation Episode Summary     INR check location:  Anticoagulation Clinic    Preferred lab:  [facility]    Send INR reminders to:      Comments:  MD INR      Anticoagulation Care Providers     Provider Role Specialty Phone number    Carmelo Pat M.D. Referring Family Medicine [phone]    [facility] Anticoagulation Services Responsible  [phone]        Anticoagulation Patient Findings  Patient Findings     Positives:  Missed doses (Has been taking 1/2 tablet (5mg) every Tuesday)    Negatives:  Signs/symptoms of thrombosis, Signs/symptoms of bleeding, Laboratory test error suspected, Change in health, Change in alcohol use, Change in activity, Upcoming invasive procedure, Emergency department visit, Upcoming dental procedure, Extra doses, Change in medications, Change in diet/appetite, Hospital admission, Bruising, Other complaints          Spoke with patient to report a therapeutic INR of 2.2.      Pt denies any s/s of bleeding, bruising, clotting or any changes to diet or medication.    Pt NOT on antiplatelet therapy.    Last week's INR value of 2.8 prompted patient to decrease dose to 5mg on  and . Pt confirmed no other changes and agreed to continue with previously documented warfarin dosing regimen of 5mg on Wednesday and 10mg every other day.     Will follow up in 1 week(s).     Blanca Foster, PharmD

## 2021-08-11 ENCOUNTER — ANTICOAGULATION MONITORING (OUTPATIENT)
Dept: VASCULAR LAB | Facility: MEDICAL CENTER | Age: 71
End: 2021-08-11

## 2021-08-11 DIAGNOSIS — Z95.828 S/P IVC FILTER: ICD-10-CM

## 2021-08-11 DIAGNOSIS — Z79.01 CHRONIC ANTICOAGULATION: ICD-10-CM

## 2021-08-11 DIAGNOSIS — Z86.718 HISTORY OF DVT (DEEP VEIN THROMBOSIS): ICD-10-CM

## 2021-08-11 LAB — INR PPP: 4.1 (ref 2–3.5)

## 2021-08-11 NOTE — PROGRESS NOTES
Anticoagulation Summary  As of 2021    INR goal:  2.0-3.0   TTR:  38.2 % (5.9 y)   INR used for dosin.10 (2021)   Warfarin maintenance plan:  5 mg (10 mg x 0.5) every Mon, Wed, Fri; 10 mg (10 mg x 1) all other days   Weekly warfarin total:  55 mg   Plan last modified:  Wellington Thompson PharmD (2021)   Next INR check:  2021   Target end date:  Indefinite    Indications    DVT (deep venous thrombosis) (HCC) (Resolved) [I82.409]  Chronic anticoagulation [Z79.01]  S/P IVC filter [Z95.828]  History of DVT (deep vein thrombosis) [Z86.718]             Anticoagulation Episode Summary     INR check location:  Anticoagulation Clinic    Preferred lab:  [email or web address]    Send INR reminders to:      Comments:  MD INR      Anticoagulation Care Providers     Provider Role Specialty Phone number    Carmelo Pat M.D. Referring Family Medicine [phone]    [facility] Anticoagulation Services Responsible  [phone]          Refer to Anticoagulation Patient Findings for HPI  Patient Findings     Positives:  Change in diet/appetite (Patient has stopped eating three meals a day and reports signficant decrease in appetite due to the heat. )    Negatives:  Signs/symptoms of thrombosis, Signs/symptoms of bleeding, Laboratory test error suspected, Change in health, Change in alcohol use, Change in activity, Upcoming invasive procedure, Emergency department visit, Upcoming dental procedure, Missed doses, Extra doses, Change in medications, Hospital admission, Bruising, Other complaints        Spoke with Atiya Henley.  INR is supra therapeutic. Patient denies any symptoms of bleeding.    Pt verifies warfarin weekly dosing.     Pt is NOT on antiplatelet therapy   Will have pt hold warfarin tonight, then decrease weekly regimen by ~15%.    Repeat INR in 1 week.     Wellington Thompson, PharmD   Discussed with Alicia Adams PharmD

## 2021-08-18 ENCOUNTER — ANTICOAGULATION MONITORING (OUTPATIENT)
Dept: VASCULAR LAB | Facility: MEDICAL CENTER | Age: 71
End: 2021-08-18

## 2021-08-18 DIAGNOSIS — Z95.828 S/P IVC FILTER: ICD-10-CM

## 2021-08-18 DIAGNOSIS — Z86.718 HISTORY OF DVT (DEEP VEIN THROMBOSIS): ICD-10-CM

## 2021-08-18 DIAGNOSIS — Z79.01 CHRONIC ANTICOAGULATION: ICD-10-CM

## 2021-08-18 LAB — INR PPP: 2 (ref 2–3.5)

## 2021-08-18 NOTE — PROGRESS NOTES
OP Telephone Anticoagulation Service Note    Date: 2021      Anticoagulation Summary  As of 2021    INR goal:  2.0-3.0   TTR:  38.3 % (5.9 y)   INR used for dosin.00 (2021)   Warfarin maintenance plan:  5 mg (10 mg x 0.5) every Mon, Wed, Fri; 10 mg (10 mg x 1) all other days   Weekly warfarin total:  55 mg   Plan last modified:  Wellington Thompson, PharmD (2021)   Next INR check:  2021   Target end date:  Indefinite    Indications    DVT (deep venous thrombosis) (HCC) (Resolved) [I82.409]  Chronic anticoagulation [Z79.01]  S/P IVC filter [Z95.828]  History of DVT (deep vein thrombosis) [Z86.718]             Anticoagulation Episode Summary     INR check location:  Anticoagulation Clinic    Preferred lab:  [facility]    Send INR reminders to:      Comments:  MD INR      Anticoagulation Care Providers     Provider Role Specialty Phone number    Carmelo Pat M.D. Referring Family Medicine [phone]    [facility] Anticoagulation Services Responsible  [phone]        Anticoagulation Patient Findings  Patient Findings     Positives:  Change in diet/appetite (eating more than last week)    Negatives:  Signs/symptoms of thrombosis, Signs/symptoms of bleeding, Laboratory test error suspected, Change in health, Change in alcohol use, Change in activity, Upcoming invasive procedure, Emergency department visit, Upcoming dental procedure, Missed doses, Extra doses, Change in medications, Hospital admission, Bruising, Other complaints          INR therapeutic at 2.1.  Spoke w/ pt on phone.  Verified regimen w/ pt.  Instructed pt to continue current warfarin reigmen.  NO s/s bleeding reported per pt.  NO changes in diet reported per pt.  NO changes in medications reported per pt.  Check INR in 1 week(s).  Instructed pt to call clinic at [phone] if there are any questions.  Pt stated understanding.    Pt is not on antiplatelet therapy.    Tiffanie Buckner, Pharmacy Intern  Discussed with Alicia  Ibarreta PharmD

## 2021-08-31 ENCOUNTER — HOSPITAL ENCOUNTER (EMERGENCY)
Facility: MEDICAL CENTER | Age: 71
End: 2021-09-01
Attending: EMERGENCY MEDICINE
Payer: MEDICARE

## 2021-08-31 ENCOUNTER — APPOINTMENT (OUTPATIENT)
Dept: RADIOLOGY | Facility: MEDICAL CENTER | Age: 71
End: 2021-08-31
Attending: EMERGENCY MEDICINE
Payer: MEDICARE

## 2021-08-31 DIAGNOSIS — R53.1 GENERALIZED WEAKNESS: ICD-10-CM

## 2021-08-31 DIAGNOSIS — R00.1 BRADYCARDIA: ICD-10-CM

## 2021-08-31 DIAGNOSIS — E03.9 HYPOTHYROIDISM, UNSPECIFIED TYPE: ICD-10-CM

## 2021-08-31 LAB
ALBUMIN SERPL BCP-MCNC: 3.4 G/DL (ref 3.2–4.9)
ALBUMIN/GLOB SERPL: 1.2 G/DL
ALP SERPL-CCNC: 78 U/L (ref 30–99)
ALT SERPL-CCNC: 9 U/L (ref 2–50)
ANION GAP SERPL CALC-SCNC: 9 MMOL/L (ref 7–16)
AST SERPL-CCNC: 12 U/L (ref 12–45)
BASOPHILS # BLD AUTO: 0.7 % (ref 0–1.8)
BASOPHILS # BLD: 0.05 K/UL (ref 0–0.12)
BILIRUB SERPL-MCNC: 0.3 MG/DL (ref 0.1–1.5)
BUN SERPL-MCNC: 8 MG/DL (ref 8–22)
CALCIUM SERPL-MCNC: 7.7 MG/DL (ref 8.5–10.5)
CHLORIDE SERPL-SCNC: 105 MMOL/L (ref 96–112)
CO2 SERPL-SCNC: 25 MMOL/L (ref 20–33)
CREAT SERPL-MCNC: 0.62 MG/DL (ref 0.5–1.4)
EKG IMPRESSION: NORMAL
EOSINOPHIL # BLD AUTO: 0.09 K/UL (ref 0–0.51)
EOSINOPHIL NFR BLD: 1.3 % (ref 0–6.9)
ERYTHROCYTE [DISTWIDTH] IN BLOOD BY AUTOMATED COUNT: 57.5 FL (ref 35.9–50)
GLOBULIN SER CALC-MCNC: 2.9 G/DL (ref 1.9–3.5)
GLUCOSE SERPL-MCNC: 90 MG/DL (ref 65–99)
HCT VFR BLD AUTO: 41.4 % (ref 37–47)
HGB BLD-MCNC: 13.3 G/DL (ref 12–16)
IMM GRANULOCYTES # BLD AUTO: 0.02 K/UL (ref 0–0.11)
IMM GRANULOCYTES NFR BLD AUTO: 0.3 % (ref 0–0.9)
LYMPHOCYTES # BLD AUTO: 2.08 K/UL (ref 1–4.8)
LYMPHOCYTES NFR BLD: 31.1 % (ref 22–41)
MCH RBC QN AUTO: 33.5 PG (ref 27–33)
MCHC RBC AUTO-ENTMCNC: 32.1 G/DL (ref 33.6–35)
MCV RBC AUTO: 104.3 FL (ref 81.4–97.8)
MONOCYTES # BLD AUTO: 0.38 K/UL (ref 0–0.85)
MONOCYTES NFR BLD AUTO: 5.7 % (ref 0–13.4)
NEUTROPHILS # BLD AUTO: 4.07 K/UL (ref 2–7.15)
NEUTROPHILS NFR BLD: 60.9 % (ref 44–72)
NRBC # BLD AUTO: 0 K/UL
NRBC BLD-RTO: 0 /100 WBC
PLATELET # BLD AUTO: 242 K/UL (ref 164–446)
PMV BLD AUTO: 9.9 FL (ref 9–12.9)
POTASSIUM SERPL-SCNC: 3.8 MMOL/L (ref 3.6–5.5)
PROT SERPL-MCNC: 6.3 G/DL (ref 6–8.2)
RBC # BLD AUTO: 3.97 M/UL (ref 4.2–5.4)
SODIUM SERPL-SCNC: 139 MMOL/L (ref 135–145)
T4 FREE SERPL-MCNC: 0.47 NG/DL (ref 0.93–1.7)
TROPONIN T SERPL-MCNC: 15 NG/L (ref 6–19)
TSH SERPL DL<=0.005 MIU/L-ACNC: 74.5 UIU/ML (ref 0.38–5.33)
WBC # BLD AUTO: 6.7 K/UL (ref 4.8–10.8)

## 2021-08-31 PROCEDURE — 84484 ASSAY OF TROPONIN QUANT: CPT

## 2021-08-31 PROCEDURE — 36415 COLL VENOUS BLD VENIPUNCTURE: CPT

## 2021-08-31 PROCEDURE — 84443 ASSAY THYROID STIM HORMONE: CPT

## 2021-08-31 PROCEDURE — 84439 ASSAY OF FREE THYROXINE: CPT

## 2021-08-31 PROCEDURE — 93005 ELECTROCARDIOGRAM TRACING: CPT | Performed by: EMERGENCY MEDICINE

## 2021-08-31 PROCEDURE — 85025 COMPLETE CBC W/AUTO DIFF WBC: CPT

## 2021-08-31 PROCEDURE — 99285 EMERGENCY DEPT VISIT HI MDM: CPT

## 2021-08-31 PROCEDURE — 80053 COMPREHEN METABOLIC PANEL: CPT

## 2021-08-31 PROCEDURE — 71045 X-RAY EXAM CHEST 1 VIEW: CPT

## 2021-08-31 RX ORDER — LEVOTHYROXINE SODIUM 0.15 MG/1
150 TABLET ORAL
Qty: 30 TABLET | Refills: 0 | Status: SHIPPED | OUTPATIENT
Start: 2021-08-31 | End: 2022-02-07

## 2021-08-31 ASSESSMENT — PAIN DESCRIPTION - PAIN TYPE: TYPE: ACUTE PAIN

## 2021-09-01 ENCOUNTER — ANTICOAGULATION MONITORING (OUTPATIENT)
Dept: CARDIOLOGY | Facility: PHYSICIAN GROUP | Age: 71
End: 2021-09-01

## 2021-09-01 ENCOUNTER — PATIENT OUTREACH (OUTPATIENT)
Dept: HEALTH INFORMATION MANAGEMENT | Facility: OTHER | Age: 71
End: 2021-09-01

## 2021-09-01 VITALS
OXYGEN SATURATION: 90 % | WEIGHT: 260 LBS | BODY MASS INDEX: 44.39 KG/M2 | HEIGHT: 64 IN | RESPIRATION RATE: 18 BRPM | SYSTOLIC BLOOD PRESSURE: 169 MMHG | TEMPERATURE: 98.3 F | DIASTOLIC BLOOD PRESSURE: 71 MMHG | HEART RATE: 66 BPM

## 2021-09-01 DIAGNOSIS — Z86.718 HISTORY OF DVT (DEEP VEIN THROMBOSIS): ICD-10-CM

## 2021-09-01 DIAGNOSIS — Z79.01 CHRONIC ANTICOAGULATION: ICD-10-CM

## 2021-09-01 DIAGNOSIS — Z95.828 S/P IVC FILTER: ICD-10-CM

## 2021-09-01 LAB — INR PPP: 2.3 (ref 2–3.5)

## 2021-09-01 NOTE — ED NOTES
Pt d/c from ED a/o x 4 GCS 15, pt left in wheel chair per request, pt walker with her while in ED. Pt given d/c instructions and verbalized understanding. PIV removed by ED staff RN.

## 2021-09-01 NOTE — ED PROVIDER NOTES
ED Provider Note    Scribed for Mily Graham M.D. by Josue Cortez-Reyes. 8/31/2021  6:55 PM    Primary care provider: TAMAR Calix  Means of arrival: EMS  History obtained from: Patient  History limited by: None    CHIEF COMPLAINT  Chief Complaint   Patient presents with   • Weakness     pt bib remsa c/o intermittent generalized weakness x 1.5 months. denies fall.    • Nausea       HPI  Atiya Henley is a 70 y.o. female who presents to the Emergency Department for evaluation of generalized weakness. The patient states that she believes her symptoms are secondary to vertigo as she feels as if though the room is spinning. She notes that she has had these symptoms for about a year; declaring that she believes it is secondary to a change in dosage of her thyroid medication. She states that at one point she was completely taken off of her thyroid medication; noting that her dosage has been changed over the past year. She is currently taking 75mcg of synthroid daily. The patient endorses additional shortness of breath (chronic) and nausea but denies any chest pain or unilateral weakness. The patient notes that she is supposed to use a CPAP machine when she sleeps; however, she has not been using it as she does not have a clean hose for it. She has chronic copd. She declares that she has peripheral neuropathy adding that she is not diabetic. The patient has been vaccinated for COVID. The patient declares that she does smoke but denies alcohol consumption.    I verified that the patient was wearing a mask and I was wearing appropriate PPE every time I entered the room. The patient's mask was on the patient at all times during my encounter except for a brief view of the oropharynx.    REVIEW OF SYSTEMS  CARDIAC: no chest pain  PULMONARY: Shortness of breath (chronic)   GI: Positive for nausea   Neuro: No unilateral weakness    See history of present illness. All other systems are negative. C.    PAST  MEDICAL HISTORY   has a past medical history of Anxiety disorder, Arthritis, Bipolar affective (HCC), Bladder incontinence, Bowel obstruction (HCC), Breath shortness, Cancer (HCC), Chronic autoimmune thyroiditis, Colostomy in place (HCC) (2009), COPD, Dental disorder, Diabetes (HCC), EMPHYSEMA, Hashimoto's disease, High cholesterol, History of total knee arthroplasty, Hypertension, Hypothyroid (7/7/2009), Indigestion, Infectious disease, Infectious disease, Obesity (7/7/2009), Obstruction, Oxygen dependent, Pain, Personal history of venous thrombosis and embolism (2009, 2012), Presence of IVC filter, Psychiatric problem, Sleep apnea, Snoring, Status post cholecystectomy (1992), and Weight gain.    SURGICAL HISTORY   has a past surgical history that includes myringotomy (9/9/2009); low anterior resection (3/10/2010); low anterior resection laparoscopic (3/10/2010); colostomy (3/16/2010); tracheostomy (3/31/2010); cholecystectomy (1992); exploratory laparotomy (3/16/2010); colectomy (3/16/2010); hysterectomy laparoscopy (1983); colonoscopy - endo (11/17/2010); lateral release (7/20/2011); other orthopedic surgery; knee arthroscopy (7/20/2011); medial meniscectomy (7/20/2011); meniscectomy (7/20/2011); irrigation & debridement ortho (7/17/2012); irrigation & debridement ortho (7/19/2012); knee arthroplasty total (6/10/2012); irrigation & debridement general (12/17/2012); irrigation & debridement ortho (2/8/2013); exploratory laparotomy (9/2/2013); parastomal hernia repair (9/2/2013); bladder biopsy with cystoscopy (10/10/2013); colostomy (11/26/2013); exploratory laparotomy (11/26/2013); lysis adhesions general (11/26/2013); colon resection (11/26/2013); exploratory laparotomy (12/10/2013); wound closure general (12/10/2013); inject rx other periph nerve (Left, 8/21/2015); fluoroscopic guidance needle placement (Left, 8/21/2015); inject rx other periph nerve (8/21/2015); inject rx other periph nerve (8/21/2015); and  vitrectomy posterior (Left, 7/23/2019).    SOCIAL HISTORY  Social History     Tobacco Use   • Smoking status: Current Every Day Smoker     Packs/day: 0.50     Years: 50.00     Pack years: 25.00     Types: Cigarettes   • Smokeless tobacco: Never Used   • Tobacco comment: 1 ppd, 45 yrs   Vaping Use   • Vaping Use: Never used   Substance Use Topics   • Alcohol use: No     Alcohol/week: 0.0 oz   • Drug use: Yes     Types: Marijuana, Inhaled     Comment: 1-2 daily, last 7/22/19  at 1530      Social History     Substance and Sexual Activity   Drug Use Yes   • Types: Marijuana, Inhaled    Comment: 1-2 daily, last 7/22/19  at 1530       FAMILY HISTORY  Family History   Problem Relation Age of Onset   • Heart Disease Mother    • Seizures Mother    • Alcohol/Drug Father    • Lung Disease Father    • Hypertension Other        CURRENT MEDICATIONS  Home Medications     Reviewed by Jenny Gonzalez R.N. (Registered Nurse) on 08/31/21 at 1209  Med List Status: Partial   Medication Last Dose Status   albuterol (VENTOLIN HFA) 108 (90 Base) MCG/ACT Aero Soln inhalation aerosol  Active   budesonide-formoterol (SYMBICORT) 160-4.5 MCG/ACT Aerosol  Active   carbamazepine (TEGRETOL) 200 MG Tab  Active   FLUoxetine (PROZAC) 20 MG Cap  Active   gabapentin (NEURONTIN) 300 MG Cap  Active   Incontinence Supply Disposable (FQ PROTECTIVE UNDERWEAR) Misc  Active   Incontinence Supply Disposable (FQ PROTECTIVE UNDERWEAR) Misc  Active   levothyroxine (SYNTHROID) 200 MCG Tab  Active   lisinopril (PRINIVIL) 5 MG Tab  Active   LORazepam (ATIVAN) 1 MG Tab  Active   Misc. Devices Misc  Active   Misc. Devices Misc  Active   Misc. Devices Misc  Active   nystatin (MYCOSTATIN) 126154 UNIT/GM Ointment  Active   prednisoLONE acetate (PRED FORTE) 1 % Suspension  Active   Tiotropium Bromide Monohydrate (SPIRIVA RESPIMAT) 2.5 MCG/ACT Aero Soln  Active   vitamin D, Ergocalciferol, (DRISDOL) 66613 units Cap capsule  Active   warfarin (COUMADIN) 10 MG Tab  Active               Current Outpatient Medications   Medication Instructions   • albuterol (VENTOLIN HFA) 108 (90 Base) MCG/ACT Aero Soln inhalation aerosol 2 Puffs, Inhalation, EVERY 4 HOURS PRN   • budesonide-formoterol (SYMBICORT) 160-4.5 MCG/ACT Aerosol 2 Puffs, Inhalation, 2 TIMES DAILY, With spacer, rinse mouth after use   • carBAMazepine (TEGRETOL) 600 mg, Oral, EVERY BEDTIME   • FLUoxetine (PROZAC) 20 mg, Oral, DAILY   • gabapentin (NEURONTIN) 300 MG Cap TAKE THREE CAPSULES BY MOUTH THREE TIMES A DAY   • Incontinence Supply Disposable (FQ PROTECTIVE UNDERWEAR) Misc USE 1 PULLUP 3 TIMES DAILY   • Incontinence Supply Disposable (FQ PROTECTIVE UNDERWEAR) Misc USE 1 PULLUP 3 TIMES DAILY   • levothyroxine (SYNTHROID) 200 MCG Tab TAKE ONE TABLET BY MOUTH EVERY MORNING ON AN EMPTY STOMACH.   • lisinopril (PRINIVIL) 5 MG Tab TAKE ONE TABLET BY MOUTH EVERY DAY   • LORazepam (ATIVAN) 2 mg, Oral, NIGHTLY PRN   • Misc. Devices Misc Heavy duty 4 wheeled walker with seat and back rest to use as needed   • Misc. Devices Misc Pt requesting new battery, new tires and new brakes for power chair   • Misc. Devices Misc 3 boxes of pouches (31138), 2 boxes of wafers (80274), 3 boxes (tubes) paste, 1 box of no string skin prep, 1 box of no string unsolve   • nystatin (MYCOSTATIN) 268730 UNIT/GM Ointment No dose, route, or frequency recorded.   • prednisoLONE acetate (PRED FORTE) 1 % Suspension No dose, route, or frequency recorded.   • Tiotropium Bromide Monohydrate (SPIRIVA RESPIMAT) 2.5 MCG/ACT Aero Soln 2 Puffs, Inhalation, EVERY DAY   • vitamin D (Ergocalciferol) (DRISDOL) 50,000 Units, Oral, EVERY 28 DAYS   • warfarin (COUMADIN) 10 MG Tab Take one-half to one (1/2-1) tablet daily as directed by [facility] Anticoagulation services       ALLERGIES  Allergies   Allergen Reactions   • Pcn [Penicillins] Anaphylaxis and Rash     Reaction; happened as a child. Tolerated cephalosporins and Zosyn       PHYSICAL EXAM  VITAL SIGNS: /79   Pulse  "61   Temp 36.8 °C (98.3 °F) (Temporal)   Resp 12   Ht 1.626 m (5' 4\")   Wt 118 kg (260 lb)   LMP 07/19/2011   SpO2 91%   BMI 44.63 kg/m²     Constitutional: Well developed, Well nourished, No acute distress, Non-toxic appearance. Elevated BMI. Patient has a jerking motion of her head.   HEENT: Normocephalic, Atraumatic,  external ears normal, pharynx pink,  Mucous  Membranes moist, No rhinorrhea or mucosal edema  Eyes: PERRL, EOMI, Conjunctiva normal, No discharge.   Neck: Normal range of motion, No tenderness, Supple, No stridor.   Lymphatic: No lymphadenopathy    Cardiovascular: Regular Rate and Rhythm, No murmurs,  rubs, or gallops.   Thorax & Lungs: Lungs clear to auscultation bilaterally, No respiratory distress, Diffuse scattered wheezes, rhales or rhonchi, No chest wall tenderness.   Abdomen: Elevated BMI, Colostomy, Bowel sounds normal, Soft, non tender, non distended,  No pulsatile masses., no rebound guarding or peritoneal signs.   Skin: Warm, Dry, No erythema, No rash,   Back:  No CVA tenderness,  No spinal tenderness, bony crepitance, step offs, or instability.   Neurologic: Alert & oriented clear speech no focal deficits, no nystagmus  Extremities: Equal, intact distal pulses, trace bilateral edema, full range of motion on extremities, No cyanosis, clubbing, No tenderness.   Musculoskeletal: Good range of motion in all major joints. No tenderness to palpation or major deformities noted.     DIAGNOSTIC STUDIES / PROCEDURES    LABS  Results for orders placed or performed during the hospital encounter of 08/31/21   CBC WITH DIFFERENTIAL   Result Value Ref Range    WBC 6.7 4.8 - 10.8 K/uL    RBC 3.97 (L) 4.20 - 5.40 M/uL    Hemoglobin 13.3 12.0 - 16.0 g/dL    Hematocrit 41.4 37.0 - 47.0 %    .3 (H) 81.4 - 97.8 fL    MCH 33.5 (H) 27.0 - 33.0 pg    MCHC 32.1 (L) 33.6 - 35.0 g/dL    RDW 57.5 (H) 35.9 - 50.0 fL    Platelet Count 242 164 - 446 K/uL    MPV 9.9 9.0 - 12.9 fL    Neutrophils-Polys " 60.90 44.00 - 72.00 %    Lymphocytes 31.10 22.00 - 41.00 %    Monocytes 5.70 0.00 - 13.40 %    Eosinophils 1.30 0.00 - 6.90 %    Basophils 0.70 0.00 - 1.80 %    Immature Granulocytes 0.30 0.00 - 0.90 %    Nucleated RBC 0.00 /100 WBC    Neutrophils (Absolute) 4.07 2.00 - 7.15 K/uL    Lymphs (Absolute) 2.08 1.00 - 4.80 K/uL    Monos (Absolute) 0.38 0.00 - 0.85 K/uL    Eos (Absolute) 0.09 0.00 - 0.51 K/uL    Baso (Absolute) 0.05 0.00 - 0.12 K/uL    Immature Granulocytes (abs) 0.02 0.00 - 0.11 K/uL    NRBC (Absolute) 0.00 K/uL   TSH WITH REFLEX TO FT4   Result Value Ref Range    TSH 74.500 (H) 0.380 - 5.330 uIU/mL   Comp Metabolic Panel   Result Value Ref Range    Sodium 139 135 - 145 mmol/L    Potassium 3.8 3.6 - 5.5 mmol/L    Chloride 105 96 - 112 mmol/L    Co2 25 20 - 33 mmol/L    Anion Gap 9.0 7.0 - 16.0    Glucose 90 65 - 99 mg/dL    Bun 8 8 - 22 mg/dL    Creatinine 0.62 0.50 - 1.40 mg/dL    Calcium 7.7 (L) 8.5 - 10.5 mg/dL    AST(SGOT) 12 12 - 45 U/L    ALT(SGPT) 9 2 - 50 U/L    Alkaline Phosphatase 78 30 - 99 U/L    Total Bilirubin 0.3 0.1 - 1.5 mg/dL    Albumin 3.4 3.2 - 4.9 g/dL    Total Protein 6.3 6.0 - 8.2 g/dL    Globulin 2.9 1.9 - 3.5 g/dL    A-G Ratio 1.2 g/dL   TROPONIN   Result Value Ref Range    Troponin T 15 6 - 19 ng/L   FREE THYROXINE   Result Value Ref Range    Free T-4 0.47 (L) 0.93 - 1.70 ng/dL   ESTIMATED GFR   Result Value Ref Range    GFR If African American >60 >60 mL/min/1.73 m 2    GFR If Non African American >60 >60 mL/min/1.73 m 2   EKG (NOW)   Result Value Ref Range    Report       [facility] Emergency Dept.    Test Date:  2021  Pt Name:    CAITLIN BOYER                Department: ER  MRN:        [id number]                      Room:        13  Gender:     Female                       Technician: [id number]  :        1950                   Requested By:CLAU KAMINSKI  Order #:    [id number]                    Reading MD: CLAU KAMINSKI,  MD    Measurements  Intervals                                Axis  Rate:       57                           P:          0  NV:         268                          QRS:        -67  QRSD:       98                           T:          94  QT:         491  QTc:        478    Interpretive Statements  Sinus bradycardia  Prolonged NV interval  Left anterior fascicular block  Probable anterior infarct, age indeterminate  Compared to ECG 07/23/2019 07:09:30  First degree AV block now present  Myocardial infarct finding now present  Sinus arrhythmia no longer present  T-wave abnormality no longer present  Electron ically Signed On 8- 19:41:59 PDT by CLAU KAMINSKI MD       *Note: Due to a large number of results and/or encounters for the requested time period, some results have not been displayed. A complete set of results can be found in Results Review.       All labs reviewed by me.    EKG  12 lead EKG; Interpreted by emergency department physician as noted above.     RADIOLOGY  DX-CHEST-PORTABLE (1 VIEW)   Final Result      1.  Cardiomegaly with diffuse interstitial edema or pneumonia pattern.      2.  Focal opacity in the left lower lobe which represent atelectasis or pneumonia with possible small left pleural effusion.      3.  No right pleural effusion.        The radiologist's interpretation of all radiological studies have been reviewed by me.    COURSE & MEDICAL DECISION MAKING  Nursing notes, VS, PMSFHx reviewed in chart.    6:55 PM Patient seen and examined at bedside. Ordered DX-Chest, TSH with reflex, CBC with differential, CMP, Troponin, and EKG to evaluate her symptoms. The differential diagnoses include but are not limited to: Narcolepsy, Electrolyte abnormality, and Thyroid disease.     9:00 PM - I reviewed the patient's labs noting that her TSH levels were elevated at 74.5. I discussed these findings with pharmacy who determined that the patient's correct dose for her thyroid medication is 150  micrograms and will need to be increased slowly back to 200mcg daily by her primary care physicain.    10:48 PM - I reevaluated the patient at bedside. I updated her on her lab and imaging results noted above. I discussed plan for discharge and follow up as outlined below. The patient verbalizes they feel comfortable going home. The patient is stable for discharge at this time and will return for any new or worsening symptoms. Patient verbalizes understanding and support with my plan for discharge.     11:39 PM I left a message with ER scheduling to get the patient a new pcp.     The patient will return for new or worsening symptoms and is stable at the time of discharge.    The patient is referred to a primary physician for blood pressure management, diabetic screening, and for all other preventative health concerns.    DISPOSITION:  Patient will be discharged home in stable condition.    FOLLOW UP:  referred for new PCP    OUTPATIENT MEDICATIONS:  New Prescriptions    LEVOTHYROXINE (SYNTHROID) 150 MCG TAB    Take 1 Tablet by mouth every morning on an empty stomach.         FINAL IMPRESSION  1. Hypothyroidism, unspecified type    2. Bradycardia    3. Generalized weakness          I, Josue Cortez-Reyes (Aideeibe), am scribing for, and in the presence of, Mily Graham M.D..    Electronically signed by: Josue Cortez-Reyes (Scribe), 8/31/2021    Mily KYLE M.D. personally performed the services described in this documentation, as scribed by Josue Cortez-Reyes in my presence, and it is both accurate and complete. C.    The note accurately reflects work and decisions made by me.  Mily Graham M.D.  8/31/2021  11:47 PM

## 2021-09-01 NOTE — PROGRESS NOTES
9/1/2021  CHW received incoming call from [facility]. CHW called patient to follow up after ED visit. Patient wants new PCP. CHW left voice message with contact information for [facility].     9/2/2021  Patient was scheduled at [address] [facility] [address] on Thursday 9.9.2021 at 11am with Dr Lio Deluca. CHW sent text message for patient with appointment information and contact information for [facility].Patient will have transportation to appointment. CHW will no longer follow as patient has no other needs at this time.

## 2021-09-01 NOTE — PROGRESS NOTES
Anticoagulation Summary  As of 2021    INR goal:  2.0-3.0   TTR:  38.7 % (6 y)   INR used for dosin.30 (2021)   Warfarin maintenance plan:  5 mg (10 mg x 0.5) every Mon, Wed, Fri; 10 mg (10 mg x 1) all other days   Weekly warfarin total:  55 mg   Plan last modified:  Wellington Thompson, PharmD (2021)   Next INR check:  2021   Target end date:  Indefinite    Indications    DVT (deep venous thrombosis) (HCC) (Resolved) [I82.409]  Chronic anticoagulation [Z79.01]  S/P IVC filter [Z95.828]  History of DVT (deep vein thrombosis) [Z86.718]             Anticoagulation Episode Summary     INR check location:  Anticoagulation Clinic    Preferred lab:  [facility]    Send INR reminders to:      Comments:  MD INR      Anticoagulation Care Providers     Provider Role Specialty Phone number    Carmelo Pat M.D. Referring Family Medicine [phone]    [facility] Anticoagulation Services Responsible  [phone]        Anticoagulation Patient Findings  Patient Findings     Positives:  Change in medications (levothyroxine dose was increased on ), Change in diet/appetite (Stated that she wanted to increase vegetable and fruit intake.)    Negatives:  Signs/symptoms of thrombosis, Signs/symptoms of bleeding, Laboratory test error suspected, Change in health, Change in alcohol use, Change in activity, Upcoming invasive procedure, Emergency department visit, Upcoming dental procedure, Missed doses, Extra doses, Hospital admission, Bruising, Other complaints         Spoke with patient today regarding a therapeutic INR of 2.3.  Patient denies any signs/symptoms of bruising or bleeding or any changes in diet and medications.  Instructed patient to call clinic with any questions or concerns.     Pt is to continue with current warfarin dosing regimen.    Pt stated that she had went to the ER recently due to her thyroid.     Asked patient to check INR next week due to all of the recent changes with her  health.    Follow up in 1 week, to reduce risk of adverse events related to this high risk medication,  Warfarin.    Harinder Tripathi, Pharmacy Intern

## 2021-09-02 ENCOUNTER — APPOINTMENT (OUTPATIENT)
Dept: SLEEP MEDICINE | Facility: MEDICAL CENTER | Age: 71
End: 2021-09-02
Payer: MEDICARE

## 2021-09-09 ENCOUNTER — ANTICOAGULATION MONITORING (OUTPATIENT)
Dept: VASCULAR LAB | Facility: MEDICAL CENTER | Age: 71
End: 2021-09-09

## 2021-09-09 DIAGNOSIS — Z79.01 CHRONIC ANTICOAGULATION: ICD-10-CM

## 2021-09-09 DIAGNOSIS — Z86.718 HISTORY OF DVT (DEEP VEIN THROMBOSIS): ICD-10-CM

## 2021-09-09 DIAGNOSIS — Z95.828 S/P IVC FILTER: ICD-10-CM

## 2021-09-09 LAB — INR PPP: 1.4 (ref 2–3.5)

## 2021-09-09 NOTE — PROGRESS NOTES
Anticoagulation Summary  As of 2021    INR goal:  2.0-3.0   TTR:  38.6 % (6 y)   INR used for dosin.40 (2021)   Warfarin maintenance plan:  5 mg (10 mg x 0.5) every Mon, Wed, Fri; 10 mg (10 mg x 1) all other days   Weekly warfarin total:  55 mg   Plan last modified:  Wellington Thompson, PharmD (2021)   Next INR check:  2021   Target end date:  Indefinite    Indications    DVT (deep venous thrombosis) (HCC) (Resolved) [I82.409]  Chronic anticoagulation [Z79.01]  S/P IVC filter [Z95.828]  History of DVT (deep vein thrombosis) [Z86.718]             Anticoagulation Episode Summary     INR check location:  Anticoagulation Clinic    Preferred lab:  [facility]    Send INR reminders to:      Comments:  MD INR      Anticoagulation Care Providers     Provider Role Specialty Phone number    Carmelo Pat M.D. Referring Family Medicine [phone]    [facility] Anticoagulation Services Responsible  [phone]        Anticoagulation Patient Findings  Patient Findings     Positives:  Missed doses (pt states she missed two days last week)    Negatives:  Signs/symptoms of thrombosis, Signs/symptoms of bleeding, Laboratory test error suspected, Change in health, Change in alcohol use, Change in activity, Upcoming invasive procedure, Emergency department visit, Upcoming dental procedure, Extra doses, Change in medications, Change in diet/appetite, Hospital admission, Bruising, Other complaints         Spoke with patient today regarding Subtherapeutic INR of 1.4.  Patient denies any signs/symptoms of bruising or bleeding or any changes in diet and medications.  Instructed patient to call clinic with any questions or concerns.     Instructed pt to take a bolus of 15 mg today, then continue with regimen thereafter.     Follow up in 1 week, to reduce risk of adverse events related to this high risk medication,  Warfarin.    Dr. Adams appreciated my recommendation    Harinder Tripathi, Pharmacy Intern

## 2021-09-15 ENCOUNTER — ANTICOAGULATION MONITORING (OUTPATIENT)
Dept: VASCULAR LAB | Facility: MEDICAL CENTER | Age: 71
End: 2021-09-15

## 2021-09-15 DIAGNOSIS — Z86.718 HISTORY OF DVT (DEEP VEIN THROMBOSIS): ICD-10-CM

## 2021-09-15 DIAGNOSIS — Z79.01 CHRONIC ANTICOAGULATION: ICD-10-CM

## 2021-09-15 DIAGNOSIS — Z95.828 S/P IVC FILTER: ICD-10-CM

## 2021-09-15 LAB — INR PPP: 1.8 (ref 2–3.5)

## 2021-09-15 NOTE — PROGRESS NOTES
Anticoagulation Summary  As of 9/15/2021    INR goal:  2.0-3.0   TTR:  38.5 % (6 y)   INR used for dosin.80 (9/15/2021)   Warfarin maintenance plan:  5 mg (10 mg x 0.5) every Mon, Wed, Fri; 10 mg (10 mg x 1) all other days   Weekly warfarin total:  55 mg   Plan last modified:  Wellington Thompson, PharmD (2021)   Next INR check:  2021   Target end date:  Indefinite    Indications    DVT (deep venous thrombosis) (HCC) (Resolved) [I82.409]  Chronic anticoagulation [Z79.01]  S/P IVC filter [Z95.828]  History of DVT (deep vein thrombosis) [Z86.718]             Anticoagulation Episode Summary     INR check location:  Anticoagulation Clinic    Preferred lab:  [facility]    Send INR reminders to:      Comments:  MD INR      Anticoagulation Care Providers     Provider Role Specialty Phone number    Carmelo Pat M.D. Referring Family Medicine [phone]    [facility] Anticoagulation Services Responsible  [phone]        Anticoagulation Patient Findings  Patient Findings     Positives:  Change in medications (add levothyroxine )    Negatives:  Signs/symptoms of thrombosis, Signs/symptoms of bleeding, Laboratory test error suspected, Change in health, Change in alcohol use, Change in activity, Upcoming invasive procedure, Emergency department visit, Upcoming dental procedure, Missed doses, Extra doses, Change in diet/appetite, Hospital admission, Bruising, Other complaints        Spoke with patient today regarding subtherapeutic INR of 1.8.  Patient denies any signs/symptoms of bruising or bleeding or any changes in diet and medications.  Instructed patient to call clinic with any questions or concerns.    Instructed pt to take a bolus dose of 10 mg today, then continue with regular regimen thereafter. May need to increase weekly regimen next week d/t levothyroxine    Dr. Adams appreciated my recommendation    Harinder Tripathi, Pharmacy Intern

## 2021-09-24 ENCOUNTER — ANTICOAGULATION MONITORING (OUTPATIENT)
Dept: VASCULAR LAB | Facility: MEDICAL CENTER | Age: 71
End: 2021-09-24

## 2021-09-24 DIAGNOSIS — Z95.828 S/P IVC FILTER: ICD-10-CM

## 2021-09-24 DIAGNOSIS — Z79.01 CHRONIC ANTICOAGULATION: ICD-10-CM

## 2021-09-24 DIAGNOSIS — Z86.718 HISTORY OF DVT (DEEP VEIN THROMBOSIS): ICD-10-CM

## 2021-09-24 LAB — INR PPP: 2.7 (ref 2–3.5)

## 2021-09-24 NOTE — PROGRESS NOTES
OP   Telephone Anticoagulation Service Note      Anticoagulation Summary  As of 2021    INR goal:  2.0-3.0   TTR:  38.7 % (6 y)   INR used for dosin.70 (2021)   Warfarin maintenance plan:  5 mg (10 mg x 0.5) every Mon, Wed, Fri; 10 mg (10 mg x 1) all other days   Weekly warfarin total:  55 mg   Plan last modified:  Rodriguez CaseyD (2021)   Next INR check:  10/1/2021   Target end date:  Indefinite    Indications    DVT (deep venous thrombosis) (HCC) (Resolved) [I82.409]  Chronic anticoagulation [Z79.01]  S/P IVC filter [Z95.828]  History of DVT (deep vein thrombosis) [Z86.718]             Anticoagulation Episode Summary     INR check location:  Anticoagulation Clinic    Preferred lab:  [facility]    Send INR reminders to:      Comments:  MD INR      Anticoagulation Care Providers     Provider Role Specialty Phone number    Carmelo Pat M.D. Referring Family Medicine [phone]    [facility] Anticoagulation Services Responsible  [phone]        Anticoagulation Patient Findings  Patient Findings     Negatives:  Signs/symptoms of thrombosis, Signs/symptoms of bleeding, Laboratory test error suspected, Change in health, Change in alcohol use, Change in activity, Upcoming invasive procedure, Emergency department visit, Upcoming dental procedure, Missed doses, Extra doses, Change in medications, Change in diet/appetite, Hospital admission, Bruising, Other complaints         Spoke with the patient on the phone today, reporting a therapeutic INR of 2.7.   Confirmed the current warfarin dosing regimen and patient compliance.  Patient denies any interval changes to diet and/or medications. Patient denies any signs/symptoms of bleeding or clotting.  Patient instructed to continue with the current warfarin dosing regimen, and asked to follow up again in 1 week.     Ramírez FriasD

## 2021-10-10 LAB — INR PPP: 1.9 (ref 2–3.5)

## 2021-10-11 ENCOUNTER — ANTICOAGULATION MONITORING (OUTPATIENT)
Dept: VASCULAR LAB | Facility: MEDICAL CENTER | Age: 71
End: 2021-10-11

## 2021-10-11 DIAGNOSIS — Z95.828 S/P IVC FILTER: ICD-10-CM

## 2021-10-11 DIAGNOSIS — Z79.01 CHRONIC ANTICOAGULATION: ICD-10-CM

## 2021-10-11 DIAGNOSIS — Z86.718 HISTORY OF DVT (DEEP VEIN THROMBOSIS): ICD-10-CM

## 2021-10-19 ENCOUNTER — ANTICOAGULATION MONITORING (OUTPATIENT)
Dept: VASCULAR LAB | Facility: MEDICAL CENTER | Age: 71
End: 2021-10-19

## 2021-10-19 DIAGNOSIS — Z95.828 S/P IVC FILTER: ICD-10-CM

## 2021-10-19 DIAGNOSIS — Z79.01 CHRONIC ANTICOAGULATION: ICD-10-CM

## 2021-10-19 DIAGNOSIS — Z86.718 HISTORY OF DVT (DEEP VEIN THROMBOSIS): ICD-10-CM

## 2021-10-19 LAB — INR PPP: 2.7 (ref 2–3.5)

## 2021-10-19 NOTE — PROGRESS NOTES
Anticoagulation Summary  As of 10/19/2021    INR goal:  2.0-3.0   TTR:  39.2 % (6.1 y)   INR used for dosin.70 (10/18/2021)   Warfarin maintenance plan:  5 mg (10 mg x 0.5) every Mon, Wed, Fri; 10 mg (10 mg x 1) all other days   Weekly warfarin total:  55 mg   Plan last modified:  Wellington Thompson PharmD (2021)   Next INR check:  10/25/2021   Target end date:  Indefinite    Indications    DVT (deep venous thrombosis) (HCC) (Resolved) [I82.409]  Chronic anticoagulation [Z79.01]  S/P IVC filter [Z95.828]  History of DVT (deep vein thrombosis) [Z86.718]             Anticoagulation Episode Summary     INR check location:  Anticoagulation Clinic    Preferred lab:  [facility] GENERAL    Send INR reminders to:      Comments:  MD INR      Anticoagulation Care Providers     Provider Role Specialty Phone number    Carmelo Pat M.D. Referring Family Medicine [phone]    [facility] Anticoagulation Services Responsible  [phone]          Refer to Anticoagulation Patient Findings for HPI  Patient Findings     Negatives:  Signs/symptoms of thrombosis, Signs/symptoms of bleeding, Laboratory test error suspected, Change in health, Change in alcohol use, Change in activity, Upcoming invasive procedure, Emergency department visit, Upcoming dental procedure, Missed doses, Extra doses, Change in medications, Change in diet/appetite, Hospital admission, Bruising, Other complaints        Spoke with patient Atiya to report a therapeutic INR.      Pt is NOT on antiplatelet therapy.    Pt instructed to continue with current warfarin dosing regimen, confirms dosing.   Will follow up in 1 week(s).     Wellington Thompson, RodriguezD

## 2021-10-28 ENCOUNTER — ANTICOAGULATION MONITORING (OUTPATIENT)
Dept: VASCULAR LAB | Facility: MEDICAL CENTER | Age: 71
End: 2021-10-28

## 2021-10-28 DIAGNOSIS — Z86.718 HISTORY OF DVT (DEEP VEIN THROMBOSIS): ICD-10-CM

## 2021-10-28 DIAGNOSIS — Z79.01 CHRONIC ANTICOAGULATION: ICD-10-CM

## 2021-10-28 DIAGNOSIS — Z95.828 S/P IVC FILTER: ICD-10-CM

## 2021-10-28 LAB — INR PPP: 1.4 (ref 2–3.5)

## 2021-10-28 NOTE — PROGRESS NOTES
Anticoagulation Summary  As of 10/28/2021    INR goal:  2.0-3.0   TTR:  39.3 % (6.1 y)   INR used for dosin.40 (10/28/2021)   Warfarin maintenance plan:  5 mg (10 mg x 0.5) every Mon, Wed, Fri; 10 mg (10 mg x 1) all other days   Weekly warfarin total:  55 mg   Plan last modified:  Wellington Thompson PharmD (2021)   Next INR check:  2021   Target end date:  Indefinite    Indications    DVT (deep venous thrombosis) (HCC) (Resolved) [I82.409]  Chronic anticoagulation [Z79.01]  S/P IVC filter [Z95.828]  History of DVT (deep vein thrombosis) [Z86.718]             Anticoagulation Episode Summary     INR check location:  Anticoagulation Clinic    Preferred lab:  [facility] GENERAL    Send INR reminders to:      Comments:  MD INR      Anticoagulation Care Providers     Provider Role Specialty Phone number    Carmelo Pat M.D. Referring [facility] Medicine [phone]    [facility] Anticoagulation Services Responsible  [phone]        Anticoagulation Patient Findings      Left voicemail message to report a subtherapeutic INR of 1.4.    Will have pt take bolus dose of warfarin today of 15 mg and then   Pt to continue with current warfarin dosing regimen. Requested pt contact the clinic for any s/s of unusual bleeding, bruising, clotting or any changes to diet or medication.    FU INR in 1 week(s).    Alicia dAams, [place]

## 2021-11-08 ENCOUNTER — OFFICE VISIT (OUTPATIENT)
Dept: SLEEP MEDICINE | Facility: MEDICAL CENTER | Age: 71
End: 2021-11-08

## 2021-11-08 ENCOUNTER — ANTICOAGULATION VISIT (OUTPATIENT)
Dept: VASCULAR LAB | Facility: MEDICAL CENTER | Age: 71
End: 2021-11-08
Attending: INTERNAL MEDICINE
Payer: MEDICARE

## 2021-11-08 ENCOUNTER — HOSPITAL ENCOUNTER (OUTPATIENT)
Dept: LAB | Facility: MEDICAL CENTER | Age: 71
End: 2021-11-08
Attending: COLON & RECTAL SURGERY
Payer: MEDICARE

## 2021-11-08 VITALS
DIASTOLIC BLOOD PRESSURE: 82 MMHG | HEIGHT: 64 IN | WEIGHT: 259 LBS | RESPIRATION RATE: 18 BRPM | HEART RATE: 80 BPM | OXYGEN SATURATION: 82 % | SYSTOLIC BLOOD PRESSURE: 106 MMHG | BODY MASS INDEX: 44.22 KG/M2

## 2021-11-08 DIAGNOSIS — Z86.718 HISTORY OF DVT (DEEP VEIN THROMBOSIS): ICD-10-CM

## 2021-11-08 DIAGNOSIS — G47.33 OSA (OBSTRUCTIVE SLEEP APNEA): ICD-10-CM

## 2021-11-08 DIAGNOSIS — Z95.828 S/P IVC FILTER: ICD-10-CM

## 2021-11-08 DIAGNOSIS — J44.89 CHRONIC OBSTRUCTIVE ASTHMA (HCC): ICD-10-CM

## 2021-11-08 DIAGNOSIS — R09.02 HYPOXIA: ICD-10-CM

## 2021-11-08 DIAGNOSIS — Z79.01 CHRONIC ANTICOAGULATION: ICD-10-CM

## 2021-11-08 DIAGNOSIS — J44.9 CHRONIC OBSTRUCTIVE PULMONARY DISEASE, UNSPECIFIED COPD TYPE (HCC): Chronic | ICD-10-CM

## 2021-11-08 LAB
ALBUMIN SERPL BCP-MCNC: 3.9 G/DL (ref 3.2–4.9)
ALBUMIN/GLOB SERPL: 1 G/DL
ALP SERPL-CCNC: 103 U/L (ref 30–99)
ALT SERPL-CCNC: 12 U/L (ref 2–50)
ANION GAP SERPL CALC-SCNC: 10 MMOL/L (ref 7–16)
AST SERPL-CCNC: 13 U/L (ref 12–45)
BASOPHILS # BLD AUTO: 0.4 % (ref 0–1.8)
BASOPHILS # BLD: 0.02 K/UL (ref 0–0.12)
BILIRUB SERPL-MCNC: 0.3 MG/DL (ref 0.1–1.5)
BUN SERPL-MCNC: 10 MG/DL (ref 8–22)
CALCIUM SERPL-MCNC: 9.1 MG/DL (ref 8.5–10.5)
CHLORIDE SERPL-SCNC: 101 MMOL/L (ref 96–112)
CHOLEST SERPL-MCNC: 192 MG/DL (ref 100–199)
CO2 SERPL-SCNC: 30 MMOL/L (ref 20–33)
CREAT SERPL-MCNC: 0.66 MG/DL (ref 0.5–1.4)
EOSINOPHIL # BLD AUTO: 0.16 K/UL (ref 0–0.51)
EOSINOPHIL NFR BLD: 3 % (ref 0–6.9)
ERYTHROCYTE [DISTWIDTH] IN BLOOD BY AUTOMATED COUNT: 54 FL (ref 35.9–50)
EST. AVERAGE GLUCOSE BLD GHB EST-MCNC: 117 MG/DL
FASTING STATUS PATIENT QL REPORTED: NORMAL
FOLATE SERPL-MCNC: 4.6 NG/ML
GLOBULIN SER CALC-MCNC: 4.1 G/DL (ref 1.9–3.5)
GLUCOSE SERPL-MCNC: 104 MG/DL (ref 65–99)
HBA1C MFR BLD: 5.7 % (ref 4–5.6)
HCT VFR BLD AUTO: 43.6 % (ref 37–47)
HDLC SERPL-MCNC: 54 MG/DL
HGB BLD-MCNC: 14.1 G/DL (ref 12–16)
IMM GRANULOCYTES # BLD AUTO: 0.02 K/UL (ref 0–0.11)
IMM GRANULOCYTES NFR BLD AUTO: 0.4 % (ref 0–0.9)
INR PPP: 1.7 (ref 2–3.5)
LDLC SERPL CALC-MCNC: 121 MG/DL
LYMPHOCYTES # BLD AUTO: 1.61 K/UL (ref 1–4.8)
LYMPHOCYTES NFR BLD: 30.6 % (ref 22–41)
MCH RBC QN AUTO: 33.1 PG (ref 27–33)
MCHC RBC AUTO-ENTMCNC: 32.3 G/DL (ref 33.6–35)
MCV RBC AUTO: 102.3 FL (ref 81.4–97.8)
MONOCYTES # BLD AUTO: 0.38 K/UL (ref 0–0.85)
MONOCYTES NFR BLD AUTO: 7.2 % (ref 0–13.4)
NEUTROPHILS # BLD AUTO: 3.07 K/UL (ref 2–7.15)
NEUTROPHILS NFR BLD: 58.4 % (ref 44–72)
NRBC # BLD AUTO: 0 K/UL
NRBC BLD-RTO: 0 /100 WBC
PLATELET # BLD AUTO: 298 K/UL (ref 164–446)
PMV BLD AUTO: 10.2 FL (ref 9–12.9)
POTASSIUM SERPL-SCNC: 4.4 MMOL/L (ref 3.6–5.5)
PROT SERPL-MCNC: 8 G/DL (ref 6–8.2)
RBC # BLD AUTO: 4.26 M/UL (ref 4.2–5.4)
SODIUM SERPL-SCNC: 141 MMOL/L (ref 135–145)
TRIGL SERPL-MCNC: 85 MG/DL (ref 0–149)
TSH SERPL DL<=0.005 MIU/L-ACNC: 29.4 UIU/ML (ref 0.38–5.33)
VIT B12 SERPL-MCNC: 390 PG/ML (ref 211–911)
WBC # BLD AUTO: 5.3 K/UL (ref 4.8–10.8)

## 2021-11-08 PROCEDURE — 82607 VITAMIN B-12: CPT

## 2021-11-08 PROCEDURE — 80053 COMPREHEN METABOLIC PANEL: CPT

## 2021-11-08 PROCEDURE — 80061 LIPID PANEL: CPT

## 2021-11-08 PROCEDURE — 85025 COMPLETE CBC W/AUTO DIFF WBC: CPT

## 2021-11-08 PROCEDURE — 36415 COLL VENOUS BLD VENIPUNCTURE: CPT

## 2021-11-08 PROCEDURE — 84443 ASSAY THYROID STIM HORMONE: CPT

## 2021-11-08 PROCEDURE — 99214 OFFICE O/P EST MOD 30 MIN: CPT | Performed by: NURSE PRACTITIONER

## 2021-11-08 PROCEDURE — 83036 HEMOGLOBIN GLYCOSYLATED A1C: CPT | Mod: GA

## 2021-11-08 PROCEDURE — 99212 OFFICE O/P EST SF 10 MIN: CPT

## 2021-11-08 PROCEDURE — 85610 PROTHROMBIN TIME: CPT

## 2021-11-08 PROCEDURE — 82746 ASSAY OF FOLIC ACID SERUM: CPT

## 2021-11-08 RX ORDER — BUDESONIDE AND FORMOTEROL FUMARATE DIHYDRATE 160; 4.5 UG/1; UG/1
2 AEROSOL RESPIRATORY (INHALATION) 2 TIMES DAILY
Qty: 3 EACH | Refills: 3 | Status: SHIPPED | OUTPATIENT
Start: 2021-11-08

## 2021-11-08 RX ORDER — ALBUTEROL SULFATE 90 UG/1
2 AEROSOL, METERED RESPIRATORY (INHALATION) EVERY 4 HOURS PRN
Qty: 1 EACH | Refills: 11 | Status: SHIPPED | OUTPATIENT
Start: 2021-11-08

## 2021-11-08 RX ORDER — TIOTROPIUM BROMIDE INHALATION SPRAY 3.12 UG/1
5 SPRAY, METERED RESPIRATORY (INHALATION) DAILY
Qty: 3 EACH | Refills: 3 | Status: SHIPPED | OUTPATIENT
Start: 2021-11-08 | End: 2023-03-14

## 2021-11-08 RX ORDER — FLUTICASONE PROPIONATE 50 MCG
1-2 SPRAY, SUSPENSION (ML) NASAL DAILY
Qty: 16 G | Refills: 6 | Status: SHIPPED | OUTPATIENT
Start: 2021-11-08 | End: 2022-08-16

## 2021-11-08 RX ORDER — WARFARIN SODIUM 10 MG/1
TABLET ORAL
Qty: 90 TABLET | Refills: 1 | Status: SHIPPED | OUTPATIENT
Start: 2021-11-08 | End: 2022-02-07

## 2021-11-08 ASSESSMENT — FIBROSIS 4 INDEX: FIB4 SCORE: 1.14

## 2021-11-08 NOTE — PROGRESS NOTES
Chief Complaint   Patient presents with   • Follow-Up     YESSICA/COPD       HPI:  Atiya Henley is a 71 y.o. year old female here today for follow-up on COPD and YESSICA.   Last seen by Dr. Padilla October 2020 and previously by  May 2020.    She has a history of chronic obstructive asthma/COPD, still actively smoking nonfiltered cigarettes approximately 2-3 daily.  Has a history of abnormal chest CAT scan with interstitial and groundglass changes noted by chest imaging in 2018.  She feels her exertional dyspnea has worsened significantly over the past year.  She has periodic cough, denies chest pain, hemoptysis or edema.  She previously was using Symbicort 160 mcg and Spiriva inhalers.  She ran out of all medications and has not been using anything. She has not had any imaging studies with pulmonary function testing in the past year.    She denies any new or worsening dyspnea or shortness of breath.  SPO2 on room air was 82% at rest.  She was successfully titrated for 2 L of supplemental oxygen with a resultant SPO2 of 93%.  Patient is chair bound at this time.  She denies any fever, chills, sore throat, loss of taste or smell, or travel.  She does have IVC filter in place for chronic clot.  She is also having inspiratory and expiratory wheezing noted.    Regards to YESSICA, her machine is recalled and she has not been using the device lately.  She has never used so clean device to clean her mask or supplies only mild soap and water or vinegar and water.  Currently she is endorsing excessive daytime sleepiness, her oxygen needs have changed, and she does have morning headaches.    Sleep history:  Titration study (6/9/2020):  CPAP was started at 10 cm of water and titrated up to 16 cm of water, with resultant AHI 1.4.  There were mild persistent oxygen desaturations noted in the mid 80s%.   Recommendations:  AutoPap:15-18 cm H20 with 2 L of oxygen    Sleep titration study (7/21/2017):  The technician initiated  treatment with CPAP at 9 cm of pressure and gradually increased the pressure to a maximum of CPAP at 14 cm. The patient never slept in the supine position. The patient appeared to do best on CPAP at 14 cm with an AHI of 1.0, a low saturation of 86%, and a mean saturation of 87.5%.    ROS: As per HPI and otherwise negative if not stated.    Past Medical History:   Diagnosis Date   • Anxiety disorder    • Arthritis    • Bipolar affective ([facility])    • Bladder incontinence    • Bowel obstruction ([facility])    • Breath shortness     with activity   • Cancer ([facility])     cervical   • Chronic autoimmune thyroiditis     + TPO ab = 563 / + ultrasound   • Colostomy in place ([facility]) 2009   • COPD    • Dental disorder     dentures upper   • Diabetes ([facility])     Pt denies DM 7/23/19   • EMPHYSEMA    • Hashimoto's disease    • High cholesterol    • History of total knee arthroplasty     infection   • Hypertension     stated no longer on meds due to lost 100 lbs   • Hypothyroid 7/7/2009   • Indigestion    • Infectious disease     MRSA,   • Infectious disease     VRE   • Obesity 7/7/2009   • Obstruction     colostomy   • Oxygen dependent     4.5 L NC   • Pain     b/l legs   • Personal history of venous thrombosis and embolism 2009, 2012    arm and leg left side   • Presence of IVC filter    • Psychiatric problem     depression, denies currently 7/23/19   • Sleep apnea     USES CPAP, 3-4L oxygen   • Snoring    • Status post cholecystectomy 1992   • Weight gain        Past Surgical History:   Procedure Laterality Date   • VITRECTOMY POSTERIOR Left 7/23/2019    Procedure: REMOVAL, VITREOUS BODY, POSTERIOR PORTION- MEMBRANE PEEL LASER, GAS;  Surgeon: Ernst Morris M.D.;  Location: SURGERY SAME DAY [facility] ORS;  Service: Ophthalmology   • PB INJECT RX OTHER PERIPH NERVE Left 8/21/2015    Procedure: NEUROLYTIC DEST-OTHER NERVE;  Surgeon: Kd Galaviz D.O.;  Location[facility] SUR[facility];  Service: Pain Management   • [place] FLUOROSCOPIC  GUIDANCE NEEDLE PLACEMENT Left 8/21/2015    Procedure: FLOURO GUIDE NEEDLE PLACEMENT;  Surgeon: Kd Galaviz D.O.;  Location: [facility];  Service: Pain Management   • PB INJECT RX OTHER PERIPH NERVE  8/21/2015    Procedure: NEUROLYTIC DEST-OTHER NERVE;  Surgeon: Kd Galaviz D.O.;  Location: [facility];  Service: Pain Management   • PB INJECT RX OTHER PERIPH NERVE  8/21/2015    Procedure: NEUROLYTIC DEST-OTHER NERVE;  Surgeon: Kd Galaviz D.O.;  Location: [facility];  Service: Pain Management   • EXPLORATORY LAPAROTOMY  12/10/2013    Performed by Sang Castañeda M.D. at [facility]   • WOUND CLOSURE GENERAL  12/10/2013    Performed by Sang Castañeda M.D. at [facility]   • COLOSTOMY  11/26/2013    Performed by Maninder Carter M.D. at [facility]   • EXPLORATORY LAPAROTOMY  11/26/2013    Performed by Maninder Carter M.D. at [facility]   • LYSIS ADHESIONS GENERAL  11/26/2013    Performed by Maninder Carter M.D. at [facility]   • COLON RESECTION  11/26/2013    Performed by Maninder Carter M.D. at [facility]   • BLADDER BIOPSY WITH CYSTOSCOPY  10/10/2013    Performed by Sang Castañeda M.D. at [facility]   • EXPLORATORY LAPAROTOMY  9/2/2013    Performed by Maninder Carter M.D. at [facility]   • PARASTOMAL HERNIA REPAIR   9/2/2013    Performed by Maninder Carter M.D. at [facility]   • IRRIGATION & DEBRIDEMENT ORTHO  2/8/2013    Performed by David Fowler M.D. at [facility]   • IRRIGATION & DEBRIDEMENT GENERAL  12/17/2012    Performed by David Fowler M.D. at [facility]   • IRRIGATION & DEBRIDEMENT ORTHO  7/19/2012    Performed by BERYL LOYD at [facility]   • IRRIGATION & DEBRIDEMENT ORTHO  7/17/2012    Performed by BERYL LOYD at [facility]   • KNEE ARTHROPLASTY  "TOTAL  6/10/2012    left   • LATERAL RELEASE  7/20/2011    Performed by BERYL LOYD at SURGERY [facility] ORS   • KNEE ARTHROSCOPY  7/20/2011    Performed by BERYL LOYD at SURGERY [facility]   • MEDIAL MENISCECTOMY  7/20/2011    Performed by BERYL LOYD at SURGERY [facility]   • MENISCECTOMY  7/20/2011    Performed by BERYL LOYD at SURGERY [facility]   • COLONOSCOPY - ENDO  11/17/2010    Performed by JESUSITA SUMNER at ENDOSCOPY [facility] ORS   • TRACHEOSTOMY  3/31/2010    Performed by JESUSITA SUMNER at SURGERY [facility] ORS   • COLOSTOMY  3/16/2010    Performed by CHRISTA BENNETT at SURGERY [facility] ORS   • EXPLORATORY LAPAROTOMY  3/16/2010    Performed by CHRISTA BENNETT at SURGERY [facility] ORS   • COLECTOMY  3/16/2010    Performed by CHRISTA BENNETT at SURGERY [facility] ORS   • LOW ANTERIOR RESECTION  3/10/2010    Performed by JESUSITA SUMNER at SURGERY [facility] ORS   • LOW ANTERIOR RESECTION LAPAROSCOPIC  3/10/2010    Performed by JESUSITA SUMNER at SURGERY [facility] ORS   • MYRINGOTOMY  9/9/2009    Performed by RENETTA GOODSON at SURGERY SAME DAY [facility]   • CHOLECYSTECTOMY  1992    laparoscopic   • HYSTERECTOMY LAPAROSCOPY  1983   • OTHER ORTHOPEDIC SURGERY      LT KNEE X2       Family History   Problem Relation Age of Onset   • Heart Disease Mother    • Seizures Mother    • Alcohol/Drug Father    • Lung Disease Father    • Hypertension Other        Allergies as of 11/08/2021 - Reviewed 11/08/2021   Allergen Reaction Noted   • Pcn [penicillins] Anaphylaxis and Rash 07/10/2014        Vitals:  /82 (BP Location: Left arm, Patient Position: Sitting, BP Cuff Size: Large adult)   Pulse 80   Resp 18   Ht 1.626 m (5' 4\")   Wt 117 kg (259 lb)   SpO2 (!) 82%     Current medications as of today   Current Outpatient Medications   Medication Sig Dispense Refill   • tiotropium (SPIRIVA RESPIMAT) 2.5 mcg/Act Aero Soln Inhale 2 Inhalation " every day. 3 Each 3   • budesonide-formoterol (SYMBICORT) 160-4.5 MCG/ACT Aerosol Inhale 2 Puffs 2 times a day. With spacer, rinse mouth after use 3 Each 3   • albuterol (VENTOLIN HFA) 108 (90 Base) MCG/ACT Aero Soln inhalation aerosol Inhale 2 Puffs every four hours as needed for Shortness of Breath. 1 Each 11   • fluticasone (FLONASE) 50 MCG/ACT nasal spray Administer 1-2 Sprays into affected nostril(S) every day. Each nostril. 16 g 6   • Fluticasone-Umeclidin-Vilant (TRELEGY ELLIPTA) 200-62.5-25 MCG/INH AEROSOL POWDER, BREATH ACTIVATED Inhale 200 mcg every day. 1 Each 0   • levothyroxine (SYNTHROID) 150 MCG Tab Take 1 Tablet by mouth every morning on an empty stomach. 30 Tablet 0   • warfarin (COUMADIN) 10 MG Tab Take one-half to one (1/2-1) tablet daily as directed by [facility] Anticoagulation services 90 tablet 1   • nystatin (MYCOSTATIN) 259913 UNIT/GM Ointment      • Incontinence Supply Disposable (FQ PROTECTIVE UNDERWEAR) Misc USE 1 PULLUP 3 TIMES DAILY 90 Each 0   • lisinopril (PRINIVIL) 5 MG Tab TAKE ONE TABLET BY MOUTH EVERY DAY 90 Tab 0   • gabapentin (NEURONTIN) 300 MG Cap TAKE THREE CAPSULES BY MOUTH THREE TIMES A  Cap 0   • Incontinence Supply Disposable (FQ PROTECTIVE UNDERWEAR) Misc USE 1 PULLUP 3 TIMES DAILY 90 Each 3   • vitamin D, Ergocalciferol, (DRISDOL) 51242 units Cap capsule Take 1 Cap by mouth every 28 days. 3 Cap 4   • LORazepam (ATIVAN) 1 MG Tab Take 2 mg by mouth at bedtime as needed for Anxiety.     • carbamazepine (TEGRETOL) 200 MG Tab Take 600 mg by mouth every bedtime.       No current facility-administered medications for this visit.         Physical Exam:   Gen:           Alert and oriented, No apparent distress. Mood and affect appropriate, normal interaction with examiner.  Eyes:          PERRL, EOM intact, sclere white, conjunctive moist.  Ears:          Not examined.   Hearing:     Grossly intact.  Nose:          Normal, no lesions or deformities.  Dentition:    Good  dentition.  Oropharynx:   Tongue normal, posterior pharynx without erythema or exudate  Neck:        Supple, trachea midline, no masses.  Respiratory Effort: No intercostal retractions or use of accessory muscles.   Lung Auscultation:      Inspiratory and expiratory wheezing noted to auscultation bilaterally; no rales, or rhonchi.  CV:            Regular rate and rhythm. No murmurs, rubs or gallops.  Abd:           Not examined.   Lymphadenopathy: Not examined.  Gait and Station: Normal.  Digits and Nails: No clubbing, cyanosis, petechiae, or nodes.   Cranial Nerves: II-XII grossly intact.  Skin:        No rashes, lesions or ulcers noted.               Ext:           No cyanosis or edema.      Assessment:  1. YESSICA (obstructive sleep apnea)  DME Mask and Supplies   2. Chronic obstructive asthma (HCC)  tiotropium (SPIRIVA RESPIMAT) 2.5 mcg/Act Aero Soln    budesonide-formoterol (SYMBICORT) 160-4.5 MCG/ACT Aerosol    albuterol (VENTOLIN HFA) 108 (90 Base) MCG/ACT Aero Soln inhalation aerosol    Multiple Oximetry    PULMONARY FUNCTION TESTS -Test requested: Complete Pulmonary Function Test   3. Hypoxia  DME O2 New Set Up   4. Chronic obstructive pulmonary disease, unspecified COPD type (HCC)         Immunizations:    Pneumovax 23: 11/14/2016  Prevnar 13: 4/6/2018  COVID-19: [place] SARS-CoV-2, 4/26/2021    Plan:  1.  Patient presents for annual follow-up.  In regards to COPD, she states no change or worsening breathing, however she has ran out of her inhalers.  Today I am refilling all inhalers and we will update PFTs with a follow-up in 3 months.  Ambulatory multiple oximetry done in clinic today showed room air saturation of 82%.  She was successfully titrated to 2 L/min.  Today I am ordering continuous oxygen at 2 L/min 24/7.  While she is waiting for this oxygen, I advised that she should use her oxygen concentrator that she already has at home.  This could be due to her not using her CPAP at night or running out of  medications.  She denies any fevers chills chest pain or new or worsening dyspnea or cough.  Plan will be for updated PFTs in 3 months reassessment, after starting CPAP again, if any imaging is needed we can order at the next visit.    Please note that this dictation was created using voice recognition software. I have made every reasonable attempt to correct obvious errors, but it is possible there are errors of grammar and possibly content that I did not discover before finalizing the note.

## 2021-11-08 NOTE — PATIENT INSTRUCTIONS
1.  Patient presents for annual follow-up.  In regards to COPD, she states no change or worsening breathing, however she has ran out of her inhalers.  Today I am refilling all inhalers and we will update PFTs with a follow-up in 3 months.  Ambulatory multiple oximetry done in clinic today showed room air saturation of 82%.  She was successfully titrated to 2 L/min.  Today I am ordering continuous oxygen at 2 L/min 24/7.  This could be due to her not using her CPAP at night or running out of medications.  She denies any fevers chills chest pain or new or worsening dyspnea or cough.  Plan will be for updated PFTs in 3 months reassessment, after starting CPAP again, if any imaging is needed we can order at the next visit.

## 2021-11-09 NOTE — PROGRESS NOTES
Anticoagulation Summary  As of 2021    INR goal:  2.0-3.0   TTR:  39.1 % (6.1 y)   INR used for dosin.70 (2021)   Warfarin maintenance plan:  5 mg (10 mg x 0.5) every Mon, Wed, Fri; 10 mg (10 mg x 1) all other days   Weekly warfarin total:  55 mg   Plan last modified:  Wellington Thompson, PharmD (2021)   Next INR check:  11/15/2021   Target end date:  Indefinite    Indications    DVT (deep venous thrombosis) (HCC) (Resolved) [I82.409]  Chronic anticoagulation [Z79.01]  S/P IVC filter [Z95.828]  History of DVT (deep vein thrombosis) [Z86.718]             Anticoagulation Episode Summary     INR check location:  Anticoagulation Clinic    Preferred lab:  [facility] GENERAL    Send INR reminders to:      Comments:  MD INR      Anticoagulation Care Providers     Provider Role Specialty Phone number    Carmelo Pat M.D. Referring Family Medicine [phone]    [facility] Anticoagulation Services Responsible  [phone]              Refer to Patient Findings for HPI:    There were no vitals filed for this visit.  The pt declined vitals today    Patient seen in clinic today for follow up on anticoagulation therapy with warfarin (a high risk medication) for hx of DVT   Verified current warfarin dosing schedule.  Patient denies any missed doses of warfarin.  Patient is having issues with her HM so came in today to get INR tested.     Medications reconciled   Pt is not on antiplatelet therapy      A/P   INR is SUB-therapeutic today at 1.7.     Warfarin dosing recommendation: Patient will bolus with 10mg TONIGHT, as a one time boost dose, then will resume her current dosing regimen.   Patient will retest again in 1 week.     Pt educated to contact our clinic with any changes in medications or s/s of bleeding or thrombosis. Pt is aware to seek immediate medical attention for falls, head injury or deep cuts.    Follow up appointment in 1 week via HM. She will call the company who supplies her meter to get help  with it.       Ramírez FriasD

## 2021-11-09 NOTE — PROCEDURES
Multi-Ox Readings  Multi Ox #1 Room air   O2 sat % at rest 82   O2 sat % on exertion     O2 sat average on exertion     Multi Ox #2 2 LPM   O2 sat % at rest 93   O2 sat % on exertion 91   O2 sat average on exertion       Oxygen Use 2   Oxygen Frequency Nocturnal in conjunction with CPAP   Duration of need     Is the patient mobile within the home?     CPAP Use? yes   BIPAP Use?     Servo Titration

## 2021-11-12 ENCOUNTER — ANTICOAGULATION MONITORING (OUTPATIENT)
Dept: VASCULAR LAB | Facility: MEDICAL CENTER | Age: 71
End: 2021-11-12

## 2021-11-12 DIAGNOSIS — Z86.718 HISTORY OF DVT (DEEP VEIN THROMBOSIS): ICD-10-CM

## 2021-11-12 DIAGNOSIS — Z95.828 S/P IVC FILTER: ICD-10-CM

## 2021-11-12 DIAGNOSIS — Z79.01 CHRONIC ANTICOAGULATION: ICD-10-CM

## 2021-11-12 LAB — INR PPP: 1.9 (ref 2–3.5)

## 2021-11-13 NOTE — PROGRESS NOTES
Anticoagulation Summary  As of 2021    INR goal:  2.0-3.0   TTR:  39.0 % (6.2 y)   INR used for dosin.90 (2021)   Warfarin maintenance plan:  10 mg (10 mg x 1) every day   Weekly warfarin total:  70 mg   Plan last modified:  Bon Ash PharmD (2021)   Next INR check:  11/15/2021   Target end date:  Indefinite    Indications    DVT (deep venous thrombosis) (HCC) (Resolved) [I82.409]  Chronic anticoagulation [Z79.01]  S/P IVC filter [Z95.828]  History of DVT (deep vein thrombosis) [Z86.718]             Anticoagulation Episode Summary     INR check location:  Anticoagulation Clinic    Preferred lab:  [facility] GENERAL    Send INR reminders to:      Comments:  MD INR      Anticoagulation Care Providers     Provider Role Specialty Phone number    Carmelo Pat M.D. Referring [facility] Medicine [phone]    [facility] Anticoagulation Services Responsible  [phone]        Anticoagulation Patient Findings        Left voicemail message to report a SUB therapeutic INR of 1.9.  Pt tobegin increased warfarin dosing regimen. Requested pt contact the clinic for any s/s of unusual bleeding, bruising, clotting or any changes to diet or medication. FU 3 days.     Bon Ash, PharmD

## 2021-11-22 LAB — INR PPP: 2.7 (ref 2–3.5)

## 2021-11-23 ENCOUNTER — ANTICOAGULATION MONITORING (OUTPATIENT)
Dept: VASCULAR LAB | Facility: MEDICAL CENTER | Age: 71
End: 2021-11-23

## 2021-11-23 DIAGNOSIS — Z95.828 S/P IVC FILTER: ICD-10-CM

## 2021-11-23 DIAGNOSIS — Z86.718 HISTORY OF DVT (DEEP VEIN THROMBOSIS): ICD-10-CM

## 2021-11-23 DIAGNOSIS — Z79.01 CHRONIC ANTICOAGULATION: ICD-10-CM

## 2021-11-23 NOTE — PROGRESS NOTES
Anticoagulation Summary  As of 2021    INR goal:  2.0-3.0   TTR:  39.2 % (6.2 y)   INR used for dosin.70 (2021)   Warfarin maintenance plan:  5 mg (10 mg x 0.5) every Mon, Wed, Fri; 10 mg (10 mg x 1) all other days   Weekly warfarin total:  55 mg   Plan last modified:  Nahum Bowens PharmD (2021)   Next INR check:  2021   Target end date:  Indefinite    Indications    DVT (deep venous thrombosis) (HCC) (Resolved) [I82.409]  Chronic anticoagulation [Z79.01]  S/P IVC filter [Z95.828]  History of DVT (deep vein thrombosis) [Z86.718]             Anticoagulation Episode Summary     INR check location:  Anticoagulation Clinic    Preferred lab:  [facility] GENERAL    Send INR reminders to:      Comments:  MD INR      Anticoagulation Care Providers     Provider Role Specialty Phone number    Carmelo Pat M.D. Referring [facility] Medicine [phone]    [facility] Anticoagulation Services Responsible  [phone]        Anticoagulation Patient Findings  Patient Findings     Positives:  Change in medications    Negatives:  Signs/symptoms of thrombosis, Signs/symptoms of bleeding, Laboratory test error suspected, Change in health, Change in alcohol use, Change in activity, Upcoming invasive procedure, Emergency department visit, Upcoming dental procedure, Missed doses, Extra doses, Change in diet/appetite, Hospital admission, Bruising, Other complaints        Spoke with patient today regarding therapeutic INR of 2.7.  Patient denies any signs/symptoms of bruising or bleeding or any changes in diet and medications.  Instructed patient to call clinic with any questions or concerns.  She has been dosing at lower weekly regimen than documented, updated dosing calendar.    Pt is not on antiplatelet therapy    Pt is to continue with current warfarin dosing regimen.  Follow up in 1 weeks, to reduce risk of adverse events related to this high risk medication,  Warfarin.    Nahum Bowens, PharmD,  BCACP

## 2021-12-10 ENCOUNTER — ANTICOAGULATION MONITORING (OUTPATIENT)
Dept: VASCULAR LAB | Facility: MEDICAL CENTER | Age: 71
End: 2021-12-10

## 2021-12-10 DIAGNOSIS — Z95.828 S/P IVC FILTER: ICD-10-CM

## 2021-12-10 DIAGNOSIS — Z79.01 CHRONIC ANTICOAGULATION: ICD-10-CM

## 2021-12-10 DIAGNOSIS — Z86.718 HISTORY OF DVT (DEEP VEIN THROMBOSIS): ICD-10-CM

## 2021-12-10 LAB — INR PPP: 2.7 (ref 2–3.5)

## 2021-12-11 NOTE — PROGRESS NOTES
Anticoagulation Summary  As of 12/10/2021    INR goal:  2.0-3.0   TTR:  39.7 % (6.2 y)   INR used for dosin.70 (12/10/2021)   Warfarin maintenance plan:  5 mg (10 mg x 0.5) every Mon, Wed, Fri; 10 mg (10 mg x 1) all other days   Weekly warfarin total:  55 mg   Plan last modified:  Nahum Bowens, PharmD (2021)   Next INR check:  2021   Target end date:  Indefinite    Indications    DVT (deep venous thrombosis) (HCC) (Resolved) [I82.409]  Chronic anticoagulation [Z79.01]  S/P IVC filter [Z95.828]  History of DVT (deep vein thrombosis) [Z86.718]             Anticoagulation Episode Summary     INR check location:  Anticoagulation Clinic    Preferred lab:  [facility] GENERAL    Send INR reminders to:      Comments:  MD INR      Anticoagulation Care Providers     Provider Role Specialty Phone number    Carmelo Pat M.D. Referring [facility] Medicine [phone]    [facility] Anticoagulation Services Responsible  [phone]        Anticoagulation Patient Findings      Left voicemail message to report a therapeutic INR of 2.70.      Pt to continue with current warfarin dosing regimen. Requested pt contact the clinic for any s/s of unusual bleeding, bruising, clotting or any changes to diet or medication.    FU INR in 1 week(s).    Rose Juan, Pharmacy Intern        
None

## 2021-12-13 ENCOUNTER — PATIENT MESSAGE (OUTPATIENT)
Dept: HEALTH INFORMATION MANAGEMENT | Facility: OTHER | Age: 71
End: 2021-12-13

## 2021-12-23 ENCOUNTER — TELEPHONE (OUTPATIENT)
Dept: HEALTH INFORMATION MANAGEMENT | Facility: OTHER | Age: 71
End: 2021-12-23

## 2021-12-29 ENCOUNTER — ANTICOAGULATION MONITORING (OUTPATIENT)
Dept: VASCULAR LAB | Facility: MEDICAL CENTER | Age: 71
End: 2021-12-29

## 2021-12-29 DIAGNOSIS — Z95.828 S/P IVC FILTER: ICD-10-CM

## 2021-12-29 DIAGNOSIS — Z86.718 HISTORY OF DVT (DEEP VEIN THROMBOSIS): ICD-10-CM

## 2021-12-29 DIAGNOSIS — Z79.01 CHRONIC ANTICOAGULATION: ICD-10-CM

## 2021-12-29 LAB — INR PPP: 2.5 (ref 2–3.5)

## 2021-12-30 NOTE — PROGRESS NOTES
Anticoagulation Summary  As of 2021    INR goal:  2.0-3.0   TTR:  40.2 % (6.3 y)   INR used for dosin.50 (2021)   Warfarin maintenance plan:  5 mg (10 mg x 0.5) every Mon, Wed, Fri; 10 mg (10 mg x 1) all other days   Weekly warfarin total:  55 mg   Plan last modified:  Rodriguez RondonD (2021)   Next INR check:  2022   Target end date:  Indefinite    Indications    DVT (deep venous thrombosis) (HCC) (Resolved) [I82.409]  Chronic anticoagulation [Z79.01]  S/P IVC filter [Z95.828]  History of DVT (deep vein thrombosis) [Z86.718]             Anticoagulation Episode Summary     INR check location:  Anticoagulation Clinic    Preferred lab:  [facility]    Send INR reminders to:      Comments:  MD INR      Anticoagulation Care Providers     Provider Role Specialty Phone number    Carmelo Pat M.D. Referring Family Medicine [phone]    [facility] Responsible  [phone]          Refer to Anticoagulation Patient Findings for HPI  Patient Findings     Negatives:  Signs/symptoms of thrombosis, Signs/symptoms of bleeding, Laboratory test error suspected, Change in health, Change in alcohol use, Change in activity, Upcoming invasive procedure, Emergency department visit, Upcoming dental procedure, Missed doses, Extra doses, Change in medications, Change in diet/appetite, Hospital admission, Bruising, Other complaints          Spoke with patient to report a therapeutic INR.        Pt instructed to continue with current warfarin dosing regimen, confirms dosing.   Will follow up in 1 week(s).     Som Youngblood, RodriguezD

## 2022-01-19 ENCOUNTER — ANTICOAGULATION MONITORING (OUTPATIENT)
Dept: VASCULAR LAB | Facility: MEDICAL CENTER | Age: 72
End: 2022-01-19

## 2022-01-19 DIAGNOSIS — Z95.828 S/P IVC FILTER: ICD-10-CM

## 2022-01-19 DIAGNOSIS — Z79.01 CHRONIC ANTICOAGULATION: ICD-10-CM

## 2022-01-19 DIAGNOSIS — Z86.718 HISTORY OF DVT (DEEP VEIN THROMBOSIS): ICD-10-CM

## 2022-01-19 LAB — INR PPP: 2.1 (ref 2–3.5)

## 2022-01-20 NOTE — PROGRESS NOTES
Anticoagulation Summary  As of 2022    INR goal:  2.0-3.0   TTR:  40.8 % (6.3 y)   INR used for dosin.10 (2022)   Warfarin maintenance plan:  5 mg (10 mg x 0.5) every Mon, Wed, Fri; 10 mg (10 mg x 1) all other days   Weekly warfarin total:  55 mg   Plan last modified:  Rodriguez RondonD (2021)   Next INR check:  2022   Target end date:  Indefinite    Indications    DVT (deep venous thrombosis) (HCC) (Resolved) [I82.409]  Chronic anticoagulation [Z79.01]  S/P IVC filter [Z95.828]  History of DVT (deep vein thrombosis) [Z86.718]             Anticoagulation Episode Summary     INR check location:  Anticoagulation Clinic    Preferred lab:  [facility]    Send INR reminders to:      Comments:  MD INR      Anticoagulation Care Providers     Provider Role Specialty Phone number    Carmelo Pat M.D. Referring Family Medicine [phone]    [facility] Responsible  [phone]          Refer to Anticoagulation Patient Findings for HPI  Patient Findings     Negatives:  Signs/symptoms of thrombosis, Signs/symptoms of bleeding, Laboratory test error suspected, Change in health, Change in alcohol use, Change in activity, Upcoming invasive procedure, Emergency department visit, Upcoming dental procedure, Missed doses, Extra doses, Change in medications, Change in diet/appetite, Hospital admission, Bruising, Other complaints          Spoke with patient to report a therapeutic INR.      Pt instructed to continue with current warfarin dosing regimen, confirms dosing.   Will follow up in 1 week(s).     Alicia Parada, RodriguezD

## 2022-02-07 ENCOUNTER — APPOINTMENT (OUTPATIENT)
Dept: RADIOLOGY | Facility: MEDICAL CENTER | Age: 72
End: 2022-02-07
Attending: EMERGENCY MEDICINE
Payer: MEDICARE

## 2022-02-07 ENCOUNTER — APPOINTMENT (OUTPATIENT)
Dept: RADIOLOGY | Facility: MEDICAL CENTER | Age: 72
End: 2022-02-07
Attending: ORTHOPAEDIC SURGERY
Payer: MEDICARE

## 2022-02-07 ENCOUNTER — ANESTHESIA EVENT (OUTPATIENT)
Dept: SURGERY | Facility: MEDICAL CENTER | Age: 72
End: 2022-02-07
Payer: MEDICARE

## 2022-02-07 ENCOUNTER — ANESTHESIA (OUTPATIENT)
Dept: SURGERY | Facility: MEDICAL CENTER | Age: 72
End: 2022-02-07
Payer: MEDICARE

## 2022-02-07 ENCOUNTER — HOSPITAL ENCOUNTER (OUTPATIENT)
Facility: MEDICAL CENTER | Age: 72
End: 2022-02-15
Attending: EMERGENCY MEDICINE | Admitting: STUDENT IN AN ORGANIZED HEALTH CARE EDUCATION/TRAINING PROGRAM
Payer: MEDICARE

## 2022-02-07 DIAGNOSIS — J44.9 CHRONIC OBSTRUCTIVE PULMONARY DISEASE, UNSPECIFIED COPD TYPE (HCC): ICD-10-CM

## 2022-02-07 DIAGNOSIS — Z99.81 DEPENDENCE ON CONTINUOUS SUPPLEMENTAL OXYGEN: ICD-10-CM

## 2022-02-07 DIAGNOSIS — E66.01 CLASS 3 SEVERE OBESITY DUE TO EXCESS CALORIES WITH SERIOUS COMORBIDITY AND BODY MASS INDEX (BMI) OF 40.0 TO 44.9 IN ADULT (HCC): ICD-10-CM

## 2022-02-07 DIAGNOSIS — S82.812A CLOSED TORUS FRACTURE OF PROXIMAL END OF LEFT FIBULA, INITIAL ENCOUNTER: ICD-10-CM

## 2022-02-07 DIAGNOSIS — S82.892A CLOSED LEFT ANKLE FRACTURE, INITIAL ENCOUNTER: ICD-10-CM

## 2022-02-07 DIAGNOSIS — S82.892A CLOSED FRACTURE OF LEFT ANKLE, INITIAL ENCOUNTER: ICD-10-CM

## 2022-02-07 DIAGNOSIS — E66.01 MORBID OBESITY (HCC): ICD-10-CM

## 2022-02-07 DIAGNOSIS — S83.005A CLOSED PATELLAR DISLOCATION, LEFT, INITIAL ENCOUNTER: ICD-10-CM

## 2022-02-07 DIAGNOSIS — S93.05XA ANKLE DISLOCATION, LEFT, INITIAL ENCOUNTER: ICD-10-CM

## 2022-02-07 PROBLEM — S82.891A CLOSED RIGHT ANKLE FRACTURE, INITIAL ENCOUNTER: Status: ACTIVE | Noted: 2022-02-07

## 2022-02-07 LAB
25(OH)D3 SERPL-MCNC: 41 NG/ML (ref 30–100)
ANION GAP SERPL CALC-SCNC: 9 MMOL/L (ref 7–16)
BASOPHILS # BLD AUTO: 0.3 % (ref 0–1.8)
BASOPHILS # BLD: 0.03 K/UL (ref 0–0.12)
BUN SERPL-MCNC: 7 MG/DL (ref 8–22)
CALCIUM SERPL-MCNC: 8.9 MG/DL (ref 8.5–10.5)
CHLORIDE SERPL-SCNC: 104 MMOL/L (ref 96–112)
CO2 SERPL-SCNC: 28 MMOL/L (ref 20–33)
CREAT SERPL-MCNC: 0.67 MG/DL (ref 0.5–1.4)
EKG IMPRESSION: NORMAL
EOSINOPHIL # BLD AUTO: 0.07 K/UL (ref 0–0.51)
EOSINOPHIL NFR BLD: 0.8 % (ref 0–6.9)
ERYTHROCYTE [DISTWIDTH] IN BLOOD BY AUTOMATED COUNT: 58 FL (ref 35.9–50)
EXTERNAL QUALITY CONTROL: NORMAL
GLUCOSE SERPL-MCNC: 135 MG/DL (ref 65–99)
HCT VFR BLD AUTO: 39.1 % (ref 37–47)
HGB BLD-MCNC: 12.5 G/DL (ref 12–16)
IMM GRANULOCYTES # BLD AUTO: 0.03 K/UL (ref 0–0.11)
IMM GRANULOCYTES NFR BLD AUTO: 0.3 % (ref 0–0.9)
INR PPP: 2.62 (ref 0.87–1.13)
LYMPHOCYTES # BLD AUTO: 1.13 K/UL (ref 1–4.8)
LYMPHOCYTES NFR BLD: 12.9 % (ref 22–41)
MCH RBC QN AUTO: 31.7 PG (ref 27–33)
MCHC RBC AUTO-ENTMCNC: 32 G/DL (ref 33.6–35)
MCV RBC AUTO: 99.2 FL (ref 81.4–97.8)
MONOCYTES # BLD AUTO: 0.57 K/UL (ref 0–0.85)
MONOCYTES NFR BLD AUTO: 6.5 % (ref 0–13.4)
NEUTROPHILS # BLD AUTO: 6.91 K/UL (ref 2–7.15)
NEUTROPHILS NFR BLD: 79.2 % (ref 44–72)
NRBC # BLD AUTO: 0 K/UL
NRBC BLD-RTO: 0 /100 WBC
PLATELET # BLD AUTO: 235 K/UL (ref 164–446)
PMV BLD AUTO: 10.3 FL (ref 9–12.9)
POTASSIUM SERPL-SCNC: 4.2 MMOL/L (ref 3.6–5.5)
PROTHROMBIN TIME: 27.2 SEC (ref 12–14.6)
RBC # BLD AUTO: 3.94 M/UL (ref 4.2–5.4)
SARS-COV+SARS-COV-2 AG RESP QL IA.RAPID: NEGATIVE
SODIUM SERPL-SCNC: 141 MMOL/L (ref 135–145)
VIT B12 SERPL-MCNC: 453 PG/ML (ref 211–911)
WBC # BLD AUTO: 8.7 K/UL (ref 4.8–10.8)

## 2022-02-07 PROCEDURE — 502000 HCHG MISC OR IMPLANTS RC 0278: Performed by: ORTHOPAEDIC SURGERY

## 2022-02-07 PROCEDURE — 96374 THER/PROPH/DIAG INJ IV PUSH: CPT | Mod: XU

## 2022-02-07 PROCEDURE — 73610 X-RAY EXAM OF ANKLE: CPT | Mod: LT

## 2022-02-07 PROCEDURE — 160009 HCHG ANES TIME/MIN: Performed by: ORTHOPAEDIC SURGERY

## 2022-02-07 PROCEDURE — G0378 HOSPITAL OBSERVATION PER HR: HCPCS

## 2022-02-07 PROCEDURE — 700105 HCHG RX REV CODE 258: Performed by: ANESTHESIOLOGY

## 2022-02-07 PROCEDURE — 302875 HCHG BANDAGE ACE 4 OR 6""

## 2022-02-07 PROCEDURE — 700111 HCHG RX REV CODE 636 W/ 250 OVERRIDE (IP): Performed by: ORTHOPAEDIC SURGERY

## 2022-02-07 PROCEDURE — 73562 X-RAY EXAM OF KNEE 3: CPT | Mod: LT

## 2022-02-07 PROCEDURE — 29515 APPLICATION SHORT LEG SPLINT: CPT

## 2022-02-07 PROCEDURE — 700111 HCHG RX REV CODE 636 W/ 250 OVERRIDE (IP): Performed by: HOSPITALIST

## 2022-02-07 PROCEDURE — C1713 ANCHOR/SCREW BN/BN,TIS/BN: HCPCS | Performed by: ORTHOPAEDIC SURGERY

## 2022-02-07 PROCEDURE — 85025 COMPLETE CBC W/AUTO DIFF WBC: CPT

## 2022-02-07 PROCEDURE — 29125 APPL SHORT ARM SPLINT STATIC: CPT

## 2022-02-07 PROCEDURE — 700102 HCHG RX REV CODE 250 W/ 637 OVERRIDE(OP): Performed by: STUDENT IN AN ORGANIZED HEALTH CARE EDUCATION/TRAINING PROGRAM

## 2022-02-07 PROCEDURE — 99285 EMERGENCY DEPT VISIT HI MDM: CPT

## 2022-02-07 PROCEDURE — 27560 TREAT KNEECAP DISLOCATION: CPT

## 2022-02-07 PROCEDURE — A9270 NON-COVERED ITEM OR SERVICE: HCPCS | Performed by: ANESTHESIOLOGY

## 2022-02-07 PROCEDURE — 36415 COLL VENOUS BLD VENIPUNCTURE: CPT

## 2022-02-07 PROCEDURE — 27829 TREAT LOWER LEG JOINT: CPT | Mod: 80ROC | Performed by: STUDENT IN AN ORGANIZED HEALTH CARE EDUCATION/TRAINING PROGRAM

## 2022-02-07 PROCEDURE — A9270 NON-COVERED ITEM OR SERVICE: HCPCS | Performed by: STUDENT IN AN ORGANIZED HEALTH CARE EDUCATION/TRAINING PROGRAM

## 2022-02-07 PROCEDURE — 87426 SARSCOV CORONAVIRUS AG IA: CPT | Performed by: ORTHOPAEDIC SURGERY

## 2022-02-07 PROCEDURE — 80048 BASIC METABOLIC PNL TOTAL CA: CPT

## 2022-02-07 PROCEDURE — 500054 HCHG BANDAGE, ELASTIC 6: Performed by: ORTHOPAEDIC SURGERY

## 2022-02-07 PROCEDURE — 160029 HCHG SURGERY MINUTES - 1ST 30 MINS LEVEL 4: Performed by: ORTHOPAEDIC SURGERY

## 2022-02-07 PROCEDURE — 27829 TREAT LOWER LEG JOINT: CPT | Mod: LT | Performed by: ORTHOPAEDIC SURGERY

## 2022-02-07 PROCEDURE — A9270 NON-COVERED ITEM OR SERVICE: HCPCS | Performed by: HOSPITALIST

## 2022-02-07 PROCEDURE — 82306 VITAMIN D 25 HYDROXY: CPT

## 2022-02-07 PROCEDURE — 160048 HCHG OR STATISTICAL LEVEL 1-5: Performed by: ORTHOPAEDIC SURGERY

## 2022-02-07 PROCEDURE — 93005 ELECTROCARDIOGRAM TRACING: CPT | Performed by: EMERGENCY MEDICINE

## 2022-02-07 PROCEDURE — 700101 HCHG RX REV CODE 250: Performed by: EMERGENCY MEDICINE

## 2022-02-07 PROCEDURE — 85610 PROTHROMBIN TIME: CPT

## 2022-02-07 PROCEDURE — 501838 HCHG SUTURE GENERAL: Performed by: ORTHOPAEDIC SURGERY

## 2022-02-07 PROCEDURE — 99152 MOD SED SAME PHYS/QHP 5/>YRS: CPT

## 2022-02-07 PROCEDURE — 96375 TX/PRO/DX INJ NEW DRUG ADDON: CPT | Mod: XU

## 2022-02-07 PROCEDURE — 99220 PR INITIAL OBSERVATION CARE,LEVL III: CPT | Performed by: STUDENT IN AN ORGANIZED HEALTH CARE EDUCATION/TRAINING PROGRAM

## 2022-02-07 PROCEDURE — 73590 X-RAY EXAM OF LOWER LEG: CPT | Mod: LT

## 2022-02-07 PROCEDURE — 700101 HCHG RX REV CODE 250: Performed by: ANESTHESIOLOGY

## 2022-02-07 PROCEDURE — A6454 SELF-ADHER BAND W>=3" <5"/YD: HCPCS | Performed by: ORTHOPAEDIC SURGERY

## 2022-02-07 PROCEDURE — 160041 HCHG SURGERY MINUTES - EA ADDL 1 MIN LEVEL 4: Performed by: ORTHOPAEDIC SURGERY

## 2022-02-07 PROCEDURE — 99222 1ST HOSP IP/OBS MODERATE 55: CPT | Mod: 57 | Performed by: ORTHOPAEDIC SURGERY

## 2022-02-07 PROCEDURE — 160035 HCHG PACU - 1ST 60 MINS PHASE I: Performed by: ORTHOPAEDIC SURGERY

## 2022-02-07 PROCEDURE — 500881 HCHG PACK, EXTREMITY: Performed by: ORTHOPAEDIC SURGERY

## 2022-02-07 PROCEDURE — 27792 TREATMENT OF ANKLE FRACTURE: CPT | Mod: LT | Performed by: ORTHOPAEDIC SURGERY

## 2022-02-07 PROCEDURE — 700102 HCHG RX REV CODE 250 W/ 637 OVERRIDE(OP): Performed by: ANESTHESIOLOGY

## 2022-02-07 PROCEDURE — 160002 HCHG RECOVERY MINUTES (STAT): Performed by: ORTHOPAEDIC SURGERY

## 2022-02-07 PROCEDURE — 27792 TREATMENT OF ANKLE FRACTURE: CPT | Mod: 80ROC,LT | Performed by: STUDENT IN AN ORGANIZED HEALTH CARE EDUCATION/TRAINING PROGRAM

## 2022-02-07 PROCEDURE — 82607 VITAMIN B-12: CPT

## 2022-02-07 PROCEDURE — 700102 HCHG RX REV CODE 250 W/ 637 OVERRIDE(OP): Performed by: HOSPITALIST

## 2022-02-07 PROCEDURE — 27840 TREAT ANKLE DISLOCATION: CPT

## 2022-02-07 PROCEDURE — 94799 UNLISTED PULMONARY SVC/PX: CPT

## 2022-02-07 PROCEDURE — 700111 HCHG RX REV CODE 636 W/ 250 OVERRIDE (IP): Performed by: ANESTHESIOLOGY

## 2022-02-07 DEVICE — IMPLANTABLE DEVICE: Type: IMPLANTABLE DEVICE | Site: ANKLE | Status: FUNCTIONAL

## 2022-02-07 DEVICE — SCREW BONE VARIAX T10 FULL THREAD L16 MM OD3.5 MM FOOT ANKLE LOCK STARDRIVE NONSTERILE: Type: IMPLANTABLE DEVICE | Site: ANKLE | Status: FUNCTIONAL

## 2022-02-07 DEVICE — PLATE BONE VARIAX TITANIUM 113 MM X 10 MM X H2 MM 16 MM X 1.3MM FIBULA LATERAL 6 HOLE POLYAXIAL LOCK: Type: IMPLANTABLE DEVICE | Site: ANKLE | Status: FUNCTIONAL

## 2022-02-07 DEVICE — SCREW BONE VARIAX T10 FULL THREAD L12 MM OD3.5 MM FOOT ANKLE STARDRIVE NONSTERILE: Type: IMPLANTABLE DEVICE | Site: ANKLE | Status: FUNCTIONAL

## 2022-02-07 DEVICE — SCREW BONE VARIAX T10 FULL THREAD L14 MM OD3.5 MM FOOT ANKLE LOCK STARDRIVE NONSTERILE: Type: IMPLANTABLE DEVICE | Site: ANKLE | Status: FUNCTIONAL

## 2022-02-07 RX ORDER — HYDROMORPHONE HYDROCHLORIDE 1 MG/ML
0.4 INJECTION, SOLUTION INTRAMUSCULAR; INTRAVENOUS; SUBCUTANEOUS
Status: DISCONTINUED | OUTPATIENT
Start: 2022-02-07 | End: 2022-02-07 | Stop reason: HOSPADM

## 2022-02-07 RX ORDER — MORPHINE SULFATE 4 MG/ML
4 INJECTION INTRAVENOUS
Status: DISCONTINUED | OUTPATIENT
Start: 2022-02-07 | End: 2022-02-15 | Stop reason: HOSPADM

## 2022-02-07 RX ORDER — SODIUM CHLORIDE, SODIUM LACTATE, POTASSIUM CHLORIDE, CALCIUM CHLORIDE 600; 310; 30; 20 MG/100ML; MG/100ML; MG/100ML; MG/100ML
INJECTION, SOLUTION INTRAVENOUS
Status: DISCONTINUED | OUTPATIENT
Start: 2022-02-07 | End: 2022-02-07 | Stop reason: SURG

## 2022-02-07 RX ORDER — METOPROLOL TARTRATE 1 MG/ML
1 INJECTION, SOLUTION INTRAVENOUS
Status: DISCONTINUED | OUTPATIENT
Start: 2022-02-07 | End: 2022-02-07 | Stop reason: HOSPADM

## 2022-02-07 RX ORDER — DIPHENHYDRAMINE HYDROCHLORIDE 50 MG/ML
12.5 INJECTION INTRAMUSCULAR; INTRAVENOUS
Status: DISCONTINUED | OUTPATIENT
Start: 2022-02-07 | End: 2022-02-07 | Stop reason: HOSPADM

## 2022-02-07 RX ORDER — LEVOTHYROXINE SODIUM 0.2 MG/1
200 TABLET ORAL
COMMUNITY

## 2022-02-07 RX ORDER — OXYCODONE AND ACETAMINOPHEN 10; 325 MG/1; MG/1
1 TABLET ORAL EVERY 4 HOURS PRN
Qty: 20 TABLET | Refills: 0 | Status: SHIPPED | OUTPATIENT
Start: 2022-02-07 | End: 2022-02-15 | Stop reason: SDUPTHER

## 2022-02-07 RX ORDER — MIDAZOLAM HYDROCHLORIDE 1 MG/ML
1 INJECTION INTRAMUSCULAR; INTRAVENOUS
Status: DISCONTINUED | OUTPATIENT
Start: 2022-02-07 | End: 2022-02-07 | Stop reason: HOSPADM

## 2022-02-07 RX ORDER — HYDROMORPHONE HYDROCHLORIDE 1 MG/ML
0.1 INJECTION, SOLUTION INTRAMUSCULAR; INTRAVENOUS; SUBCUTANEOUS
Status: DISCONTINUED | OUTPATIENT
Start: 2022-02-07 | End: 2022-02-07 | Stop reason: HOSPADM

## 2022-02-07 RX ORDER — LABETALOL HYDROCHLORIDE 5 MG/ML
10 INJECTION, SOLUTION INTRAVENOUS EVERY 4 HOURS PRN
Status: DISCONTINUED | OUTPATIENT
Start: 2022-02-07 | End: 2022-02-15 | Stop reason: HOSPADM

## 2022-02-07 RX ORDER — BISACODYL 10 MG
10 SUPPOSITORY, RECTAL RECTAL
Status: DISCONTINUED | OUTPATIENT
Start: 2022-02-07 | End: 2022-02-15 | Stop reason: HOSPADM

## 2022-02-07 RX ORDER — HYDROMORPHONE HYDROCHLORIDE 1 MG/ML
0.2 INJECTION, SOLUTION INTRAMUSCULAR; INTRAVENOUS; SUBCUTANEOUS
Status: DISCONTINUED | OUTPATIENT
Start: 2022-02-07 | End: 2022-02-07 | Stop reason: HOSPADM

## 2022-02-07 RX ORDER — CARBAMAZEPINE 200 MG/1
600 TABLET ORAL
Status: DISCONTINUED | OUTPATIENT
Start: 2022-02-07 | End: 2022-02-15 | Stop reason: HOSPADM

## 2022-02-07 RX ORDER — ONDANSETRON 2 MG/ML
INJECTION INTRAMUSCULAR; INTRAVENOUS PRN
Status: DISCONTINUED | OUTPATIENT
Start: 2022-02-07 | End: 2022-02-07 | Stop reason: SURG

## 2022-02-07 RX ORDER — ONDANSETRON 2 MG/ML
4 INJECTION INTRAMUSCULAR; INTRAVENOUS PRN
Status: CANCELLED | OUTPATIENT
Start: 2022-02-07 | End: 2022-02-07

## 2022-02-07 RX ORDER — LEVOTHYROXINE SODIUM 0.2 MG/1
200 TABLET ORAL
Status: DISCONTINUED | OUTPATIENT
Start: 2022-02-07 | End: 2022-02-15 | Stop reason: HOSPADM

## 2022-02-07 RX ORDER — SODIUM CHLORIDE, SODIUM LACTATE, POTASSIUM CHLORIDE, CALCIUM CHLORIDE 600; 310; 30; 20 MG/100ML; MG/100ML; MG/100ML; MG/100ML
INJECTION, SOLUTION INTRAVENOUS CONTINUOUS
Status: DISCONTINUED | OUTPATIENT
Start: 2022-02-07 | End: 2022-02-07 | Stop reason: HOSPADM

## 2022-02-07 RX ORDER — LISINOPRIL 2.5 MG/1
2.5 TABLET ORAL DAILY
Status: DISCONTINUED | OUTPATIENT
Start: 2022-02-07 | End: 2022-02-15 | Stop reason: HOSPADM

## 2022-02-07 RX ORDER — ROCURONIUM BROMIDE 10 MG/ML
INJECTION, SOLUTION INTRAVENOUS PRN
Status: DISCONTINUED | OUTPATIENT
Start: 2022-02-07 | End: 2022-02-07 | Stop reason: SURG

## 2022-02-07 RX ORDER — MEPERIDINE HYDROCHLORIDE 25 MG/ML
12.5 INJECTION INTRAMUSCULAR; INTRAVENOUS; SUBCUTANEOUS
Status: DISCONTINUED | OUTPATIENT
Start: 2022-02-07 | End: 2022-02-07 | Stop reason: HOSPADM

## 2022-02-07 RX ORDER — HYDRALAZINE HYDROCHLORIDE 20 MG/ML
5 INJECTION INTRAMUSCULAR; INTRAVENOUS
Status: DISCONTINUED | OUTPATIENT
Start: 2022-02-07 | End: 2022-02-07 | Stop reason: HOSPADM

## 2022-02-07 RX ORDER — KETOROLAC TROMETHAMINE 30 MG/ML
INJECTION, SOLUTION INTRAMUSCULAR; INTRAVENOUS PRN
Status: DISCONTINUED | OUTPATIENT
Start: 2022-02-07 | End: 2022-02-07 | Stop reason: SURG

## 2022-02-07 RX ORDER — OXYCODONE HYDROCHLORIDE 5 MG/1
5 TABLET ORAL
Status: DISCONTINUED | OUTPATIENT
Start: 2022-02-07 | End: 2022-02-15 | Stop reason: HOSPADM

## 2022-02-07 RX ORDER — OXYCODONE HYDROCHLORIDE 5 MG/1
5 TABLET ORAL EVERY 4 HOURS PRN
Status: DISCONTINUED | OUTPATIENT
Start: 2022-02-07 | End: 2022-02-07

## 2022-02-07 RX ORDER — AMITRIPTYLINE HYDROCHLORIDE 25 MG/1
75 TABLET, FILM COATED ORAL NIGHTLY
Status: ON HOLD | COMMUNITY
End: 2022-02-15 | Stop reason: SDUPTHER

## 2022-02-07 RX ORDER — HALOPERIDOL 5 MG/ML
1 INJECTION INTRAMUSCULAR
Status: DISCONTINUED | OUTPATIENT
Start: 2022-02-07 | End: 2022-02-07 | Stop reason: HOSPADM

## 2022-02-07 RX ORDER — OXYCODONE HCL 5 MG/5 ML
10 SOLUTION, ORAL ORAL
Status: DISCONTINUED | OUTPATIENT
Start: 2022-02-07 | End: 2022-02-07 | Stop reason: HOSPADM

## 2022-02-07 RX ORDER — OXYCODONE HYDROCHLORIDE 10 MG/1
10 TABLET ORAL
Status: DISCONTINUED | OUTPATIENT
Start: 2022-02-07 | End: 2022-02-15 | Stop reason: HOSPADM

## 2022-02-07 RX ORDER — VANCOMYCIN HYDROCHLORIDE 1 G/20ML
INJECTION, POWDER, LYOPHILIZED, FOR SOLUTION INTRAVENOUS
Status: COMPLETED | OUTPATIENT
Start: 2022-02-07 | End: 2022-02-07

## 2022-02-07 RX ORDER — KETAMINE HYDROCHLORIDE 50 MG/ML
2 INJECTION, SOLUTION INTRAMUSCULAR; INTRAVENOUS ONCE
Status: COMPLETED | OUTPATIENT
Start: 2022-02-07 | End: 2022-02-07

## 2022-02-07 RX ORDER — LISINOPRIL 2.5 MG/1
2.5 TABLET ORAL DAILY
COMMUNITY
Start: 2022-01-27 | End: 2022-02-26

## 2022-02-07 RX ORDER — AMOXICILLIN 250 MG
2 CAPSULE ORAL 2 TIMES DAILY
Status: DISCONTINUED | OUTPATIENT
Start: 2022-02-07 | End: 2022-02-15 | Stop reason: HOSPADM

## 2022-02-07 RX ORDER — ROPIVACAINE HYDROCHLORIDE 5 MG/ML
INJECTION, SOLUTION EPIDURAL; INFILTRATION; PERINEURAL
Status: DISCONTINUED | OUTPATIENT
Start: 2022-02-07 | End: 2022-02-07 | Stop reason: HOSPADM

## 2022-02-07 RX ORDER — MIRTAZAPINE 15 MG/1
15 TABLET, FILM COATED ORAL
COMMUNITY

## 2022-02-07 RX ORDER — ACETAMINOPHEN 325 MG/1
650 TABLET ORAL EVERY 6 HOURS PRN
Status: DISCONTINUED | OUTPATIENT
Start: 2022-02-07 | End: 2022-02-15 | Stop reason: HOSPADM

## 2022-02-07 RX ORDER — CEFAZOLIN SODIUM 1 G/3ML
INJECTION, POWDER, FOR SOLUTION INTRAMUSCULAR; INTRAVENOUS PRN
Status: DISCONTINUED | OUTPATIENT
Start: 2022-02-07 | End: 2022-02-07 | Stop reason: SURG

## 2022-02-07 RX ORDER — OXYCODONE HCL 5 MG/5 ML
5 SOLUTION, ORAL ORAL
Status: DISCONTINUED | OUTPATIENT
Start: 2022-02-07 | End: 2022-02-07 | Stop reason: HOSPADM

## 2022-02-07 RX ORDER — BUDESONIDE AND FORMOTEROL FUMARATE DIHYDRATE 160; 4.5 UG/1; UG/1
2 AEROSOL RESPIRATORY (INHALATION) 2 TIMES DAILY
Status: DISCONTINUED | OUTPATIENT
Start: 2022-02-07 | End: 2022-02-15 | Stop reason: HOSPADM

## 2022-02-07 RX ORDER — ALBUTEROL SULFATE 90 UG/1
2 AEROSOL, METERED RESPIRATORY (INHALATION) EVERY 4 HOURS PRN
Status: DISCONTINUED | OUTPATIENT
Start: 2022-02-07 | End: 2022-02-15 | Stop reason: HOSPADM

## 2022-02-07 RX ORDER — ACETAMINOPHEN 500 MG
1000 TABLET ORAL ONCE
Status: COMPLETED | OUTPATIENT
Start: 2022-02-07 | End: 2022-02-07

## 2022-02-07 RX ORDER — PROPOFOL 10 MG/ML
INJECTION, EMULSION INTRAVENOUS PRN
Status: DISCONTINUED | OUTPATIENT
Start: 2022-02-07 | End: 2022-02-07 | Stop reason: SURG

## 2022-02-07 RX ORDER — MULTIVIT-MIN/IRON/FOLIC ACID/K 18-600-40
2000 CAPSULE ORAL DAILY
COMMUNITY
End: 2023-03-14

## 2022-02-07 RX ORDER — HYDROMORPHONE HYDROCHLORIDE 2 MG/ML
INJECTION, SOLUTION INTRAMUSCULAR; INTRAVENOUS; SUBCUTANEOUS PRN
Status: DISCONTINUED | OUTPATIENT
Start: 2022-02-07 | End: 2022-02-07 | Stop reason: SURG

## 2022-02-07 RX ORDER — LIDOCAINE HYDROCHLORIDE 20 MG/ML
INJECTION, SOLUTION EPIDURAL; INFILTRATION; INTRACAUDAL; PERINEURAL PRN
Status: DISCONTINUED | OUTPATIENT
Start: 2022-02-07 | End: 2022-02-07 | Stop reason: SURG

## 2022-02-07 RX ORDER — LABETALOL HYDROCHLORIDE 5 MG/ML
5 INJECTION, SOLUTION INTRAVENOUS
Status: DISCONTINUED | OUTPATIENT
Start: 2022-02-07 | End: 2022-02-07 | Stop reason: HOSPADM

## 2022-02-07 RX ORDER — ONDANSETRON 2 MG/ML
4 INJECTION INTRAMUSCULAR; INTRAVENOUS
Status: DISCONTINUED | OUTPATIENT
Start: 2022-02-07 | End: 2022-02-07 | Stop reason: HOSPADM

## 2022-02-07 RX ORDER — DEXAMETHASONE SODIUM PHOSPHATE 4 MG/ML
INJECTION, SOLUTION INTRA-ARTICULAR; INTRALESIONAL; INTRAMUSCULAR; INTRAVENOUS; SOFT TISSUE PRN
Status: DISCONTINUED | OUTPATIENT
Start: 2022-02-07 | End: 2022-02-07 | Stop reason: SURG

## 2022-02-07 RX ORDER — MORPHINE SULFATE 4 MG/ML
4 INJECTION INTRAVENOUS EVERY 4 HOURS PRN
Status: DISCONTINUED | OUTPATIENT
Start: 2022-02-07 | End: 2022-02-07

## 2022-02-07 RX ORDER — WARFARIN SODIUM 10 MG/1
5-10 TABLET ORAL
COMMUNITY
End: 2022-05-19 | Stop reason: SDUPTHER

## 2022-02-07 RX ORDER — POLYETHYLENE GLYCOL 3350 17 G/17G
1 POWDER, FOR SOLUTION ORAL
Status: DISCONTINUED | OUTPATIENT
Start: 2022-02-07 | End: 2022-02-15 | Stop reason: HOSPADM

## 2022-02-07 RX ORDER — SODIUM CHLORIDE 9 MG/ML
500 INJECTION, SOLUTION INTRAVENOUS
Status: CANCELLED | OUTPATIENT
Start: 2022-02-07 | End: 2022-02-07

## 2022-02-07 RX ADMIN — ONDANSETRON 4 MG: 2 INJECTION INTRAMUSCULAR; INTRAVENOUS at 16:21

## 2022-02-07 RX ADMIN — MORPHINE SULFATE 4 MG: 4 INJECTION INTRAVENOUS at 11:15

## 2022-02-07 RX ADMIN — ACETAMINOPHEN 1000 MG: 500 TABLET ORAL at 15:17

## 2022-02-07 RX ADMIN — BUDESONIDE AND FORMOTEROL FUMARATE DIHYDRATE 2 PUFF: 160; 4.5 AEROSOL RESPIRATORY (INHALATION) at 18:26

## 2022-02-07 RX ADMIN — ROCURONIUM BROMIDE 50 MG: 10 INJECTION, SOLUTION INTRAVENOUS at 15:33

## 2022-02-07 RX ADMIN — KETAMINE HYDROCHLORIDE 75 MG: 50 INJECTION INTRAMUSCULAR; INTRAVENOUS at 05:59

## 2022-02-07 RX ADMIN — DEXAMETHASONE SODIUM PHOSPHATE 4 MG: 4 INJECTION, SOLUTION INTRA-ARTICULAR; INTRALESIONAL; INTRAMUSCULAR; INTRAVENOUS; SOFT TISSUE at 15:33

## 2022-02-07 RX ADMIN — SUGAMMADEX 200 MG: 100 INJECTION, SOLUTION INTRAVENOUS at 16:21

## 2022-02-07 RX ADMIN — LIDOCAINE HYDROCHLORIDE 100 MG: 20 INJECTION, SOLUTION EPIDURAL; INFILTRATION; INTRACAUDAL at 15:33

## 2022-02-07 RX ADMIN — FENTANYL CITRATE 100 MCG: 50 INJECTION, SOLUTION INTRAMUSCULAR; INTRAVENOUS at 15:30

## 2022-02-07 RX ADMIN — PROPOFOL 150 MG: 10 INJECTION, EMULSION INTRAVENOUS at 15:33

## 2022-02-07 RX ADMIN — KETOROLAC TROMETHAMINE 30 MG: 30 INJECTION, SOLUTION INTRAMUSCULAR at 16:21

## 2022-02-07 RX ADMIN — CEFAZOLIN 2 G: 330 INJECTION, POWDER, FOR SOLUTION INTRAMUSCULAR; INTRAVENOUS at 15:33

## 2022-02-07 RX ADMIN — HYDROMORPHONE HYDROCHLORIDE 0.5 MG: 2 INJECTION INTRAMUSCULAR; INTRAVENOUS; SUBCUTANEOUS at 16:09

## 2022-02-07 RX ADMIN — CARBAMAZEPINE 600 MG: 200 TABLET ORAL at 22:33

## 2022-02-07 RX ADMIN — DOCUSATE SODIUM 50 MG AND SENNOSIDES 8.6 MG 2 TABLET: 8.6; 5 TABLET, FILM COATED ORAL at 17:57

## 2022-02-07 RX ADMIN — OXYCODONE 5 MG: 5 TABLET ORAL at 22:22

## 2022-02-07 RX ADMIN — SODIUM CHLORIDE, POTASSIUM CHLORIDE, SODIUM LACTATE AND CALCIUM CHLORIDE: 600; 310; 30; 20 INJECTION, SOLUTION INTRAVENOUS at 15:26

## 2022-02-07 ASSESSMENT — PAIN DESCRIPTION - PAIN TYPE
TYPE: SURGICAL PAIN
TYPE: SURGICAL PAIN
TYPE: ACUTE PAIN
TYPE: ACUTE PAIN
TYPE: SURGICAL PAIN
TYPE: SURGICAL PAIN

## 2022-02-07 ASSESSMENT — ENCOUNTER SYMPTOMS
ABDOMINAL PAIN: 0
HEADACHES: 0
VOMITING: 0
SHORTNESS OF BREATH: 0
SORE THROAT: 0
FEVER: 0
NAUSEA: 0
DIARRHEA: 0
NERVOUS/ANXIOUS: 1
CHILLS: 0
COUGH: 0
DIZZINESS: 0
FALLS: 1

## 2022-02-07 ASSESSMENT — PAIN SCALES - GENERAL: PAIN_LEVEL: 0

## 2022-02-07 ASSESSMENT — FIBROSIS 4 INDEX: FIB4 SCORE: 0.87

## 2022-02-07 ASSESSMENT — LIFESTYLE VARIABLES: DO YOU DRINK ALCOHOL: NO

## 2022-02-07 NOTE — ED TRIAGE NOTES
"Chief Complaint   Patient presents with   • GLF     Pt BIB [facility] for glf at home when pt was trying to go to the bathroom and tripped and twisted her ankle, didnt hit her head, -LOC, +blood thinners. Pt c/o LLE pain, arrived with splint in place. PTA meds 200mcg fentanyl total, 35 ketamine, arrived with NRB, placed on NC 3L.    /65   Pulse 74   Temp 35.8 °C (96.5 °F) (Tympanic)   Resp 16   Ht 1.626 m (5' 4\")   Wt 117 kg (259 lb)   LMP 07/19/2011   SpO2 92%   BMI 44.46 kg/m²     Pt alert and oriented,  +PMS to LLE.   "

## 2022-02-07 NOTE — ANESTHESIA PREPROCEDURE EVALUATION
Case: [id number] Date/Time: 02/07/22 1436    Procedure: ORIF, ANKLE (Left )    Location: [facility] OR  / SURGERY [facility]    Surgeons: Hussein Khoury M.D.          Relevant Problems   ANESTHESIA   (positive) YESSICA (obstructive sleep apnea)      PULMONARY   (positive) COPD (chronic obstructive pulmonary disease) ([facility])   (positive) Chronic obstructive asthma (HCC)      NEURO   (positive) History of DVT (deep vein thrombosis)      CARDIAC   (positive) Paroxysmal A-fib ([facility])   (positive) Primary hypertension      GI   (positive) GERD (gastroesophageal reflux disease)      ENDO   (positive) Hypothyroidism, acquired, autoimmune      Other   (positive) Osteoarthritis of spine with radiculopathy, lumbosacral region       Physical Exam    Airway   Mallampati: III  TM distance: >3 FB  Neck ROM: full       Cardiovascular - normal exam  Rhythm: regular  Rate: normal  (-) murmur     Dental - normal exam  Comments: edentulous         Pulmonary - normal exam  Breath sounds clear to auscultation     Abdominal   (+) obese     Neurological - normal exam                 Anesthesia Plan    ASA 3   ASA physical status 3 criteria: COPD and morbid obesity - BMI greater than or equal to 40    Plan - general       Airway plan will be ETT          Induction: intravenous    Postoperative Plan: Postoperative administration of opioids is intended.    Pertinent diagnostic labs and testing reviewed    Informed Consent:    Anesthetic plan and risks discussed with patient.    Use of blood products discussed with: patient whom consented to blood products.

## 2022-02-07 NOTE — ED NOTES
Pt arrived with splint in placed to LLE, pt had sling on right arm as well as bruising noted to face, pt states these injuries are from about a week ago when she fell.

## 2022-02-07 NOTE — ED NOTES
Room set up and prepared for sedation etco2 monitoring in place, suction available, ambu-bag and emergency equipment available, MD discussing informed consent at bedside.

## 2022-02-07 NOTE — ANESTHESIA PROCEDURE NOTES
Airway    Date/Time: 2/7/2022 3:35 PM  Performed by: Eliud Landry M.D.  Authorized by: Eliud Landry M.D.     Location:  OR  Urgency:  Elective  Indications for Airway Management:  Anesthesia      Spontaneous Ventilation: absent    Sedation Level:  Deep  Preoxygenated: Yes    Patient Position:  Sniffing  Mask Difficulty Assessment:  1 - vent by mask  Final Airway Type:  Endotracheal airway  Final Endotracheal Airway:  ETT  Cuffed: Yes    Technique Used for Successful ETT Placement:  Direct laryngoscopy    Insertion Site:  Oral  Blade Type:  Pedro  Laryngoscope Blade/Videolaryngoscope Blade Size:  3  ETT Size (mm):  7.0  Measured from:  Teeth  ETT to Teeth (cm):  22  Placement Verified by: auscultation and capnometry    Cormack-Lehane Classification:  Grade IIa - partial view of glottis  Number of Attempts at Approach:  1

## 2022-02-07 NOTE — ED PROVIDER NOTES
ED Provider Note    Scribed for Gavino Anthony by Bon Murphy. 2/7/2022  3:56 AM    Primary care provider: Lio Deluca M.D.  Means of arrival: EMS  History obtained from: Patient  History limited by: None    CHIEF COMPLAINT  Chief Complaint   Patient presents with   • GLF     Pt BIB [facility] for glf at home when pt was trying to go to the bathroom and tripped and twisted her ankle, didnt hit her head, -LOC, +blood thinners. Pt c/o LLE pain, arrived with splint in place. PTA meds 200mcg fentanyl total, 35 ketamine, arrived with NRB, placed on NC 3L.        HPI  Atiya Henley is a 71 y.o. female who presents to the Emergency Department via EMS for evaluation after a ground level fall that occurred just prior to arrival. Nursing staff states patient fell while trying to go to the bathroom. She twisted her left ankle. She was found sideways. Nursing staff notes patient was off her oxygen at the time. Patient states she is usually on 2 L oxygen due to history of COPD. Patient does not recall hitting her head. She reports left lower leg pain, left ankle pain, and left knee pain. No fever. Patient notes she is starting to fall more frequently due to history of neuropathy. She is working on getting a scooter to help her ambulate. She does smoke cigarettes. Denies alcohol consumption or any other drug use.     Quality: Sharp  Duration: 1 hour  Severity: Moderate  Associated sx: left lower leg pain, left knee pain, left ankle pain    REVIEW OF SYSTEMS  As above, all other systems reviewed and are negative.   See HPI for further details.     PAST MEDICAL HISTORY   has a past medical history of Anxiety disorder, Arthritis, Bipolar affective (Formerly [facility] [place]), Bladder incontinence, Bowel obstruction (Formerly [facility] [place]), Breath shortness, Cancer (Formerly [facility] [place]), Chronic autoimmune thyroiditis, Colostomy in place (Formerly [facility] [place]) (2009), COPD, Dental disorder, Diabetes (Formerly [facility] [place]), EMPHYSEMA, Hashimoto's disease, High cholesterol, History of total knee  arthroplasty, Hypertension, Hypothyroid (7/7/2009), Indigestion, Infectious disease, Infectious disease, Obesity (7/7/2009), Obstruction, Oxygen dependent, Pain, Personal history of venous thrombosis and embolism (2009, 2012), Presence of IVC filter, Psychiatric problem, Sleep apnea, Snoring, Status post cholecystectomy (1992), and Weight gain.  SURGICAL HISTORY   has a past surgical history that includes myringotomy (9/9/2009); low anterior resection (3/10/2010); low anterior resection laparoscopic (3/10/2010); colostomy (3/16/2010); tracheostomy (3/31/2010); cholecystectomy (1992); exploratory laparotomy (3/16/2010); colectomy (3/16/2010); hysterectomy laparoscopy (1983); colonoscopy - endo (11/17/2010); lateral release (7/20/2011); other orthopedic surgery; knee arthroscopy (7/20/2011); meniscectomy, knee, medial (7/20/2011); meniscectomy (7/20/2011); irrigation & debridement ortho (7/17/2012); irrigation & debridement ortho (7/19/2012); knee arthroplasty total (6/10/2012); irrigation & debridement general (12/17/2012); irrigation & debridement ortho (2/8/2013); exploratory laparotomy (9/2/2013); parastomal hernia repair (9/2/2013); bladder biopsy with cystoscopy (10/10/2013); colostomy (11/26/2013); exploratory laparotomy (11/26/2013); lysis adhesions general (11/26/2013); colon resection (11/26/2013); exploratory laparotomy (12/10/2013); wound closure general (12/10/2013); inject rx other periph nerve (Left, 8/21/2015); fluoroscopic guidance needle placement (Left, 8/21/2015); inject rx other periph nerve (8/21/2015); inject rx other periph nerve (8/21/2015); and vitrectomy posterior (Left, 7/23/2019).  SOCIAL HISTORY  Social History     Tobacco Use   • Smoking status: Current Every Day Smoker     Packs/day: 0.50     Years: 50.00     Pack years: 25.00     Types: Cigarettes   • Smokeless tobacco: Never Used   • Tobacco comment: 1 ppd, 45 yrs   Vaping Use   • Vaping Use: Never used   Substance Use Topics   •  Alcohol use: No     Alcohol/week: 0.0 oz   • Drug use: Yes     Types: Marijuana, Inhaled     Comment: 1-2 daily, last 7/22/19  at 1530      Social History     Substance and Sexual Activity   Drug Use Yes   • Types: Marijuana, Inhaled    Comment: 1-2 daily, last 7/22/19  at 1530     FAMILY HISTORY  Family History   Problem Relation Age of Onset   • Heart Disease Mother    • Seizures Mother    • Alcohol/Drug Father    • Lung Disease Father    • Hypertension Other      CURRENT MEDICATIONS  No current facility-administered medications on file prior to encounter.     Current Outpatient Medications on File Prior to Encounter   Medication Sig Dispense Refill   • tiotropium (SPIRIVA RESPIMAT) 2.5 mcg/Act Aero Soln Inhale 2 Inhalation every day. 3 Each 3   • budesonide-formoterol (SYMBICORT) 160-4.5 MCG/ACT Aerosol Inhale 2 Puffs 2 times a day. With spacer, rinse mouth after use 3 Each 3   • albuterol (VENTOLIN HFA) 108 (90 Base) MCG/ACT Aero Soln inhalation aerosol Inhale 2 Puffs every four hours as needed for Shortness of Breath. 1 Each 11   • fluticasone (FLONASE) 50 MCG/ACT nasal spray Administer 1-2 Sprays into affected nostril(S) every day. Each nostril. 16 g 6   • Fluticasone-Umeclidin-Vilant (TRELEGY ELLIPTA) 200-62.5-25 MCG/INH AEROSOL POWDER, BREATH ACTIVATED Inhale 200 mcg every day. 1 Each 0   • warfarin (COUMADIN) 10 MG Tab Take one-half to one (1/2-1) tablet daily as directed by [facility] Anticoagulation services 90 Tablet 1   • levothyroxine (SYNTHROID) 150 MCG Tab Take 1 Tablet by mouth every morning on an empty stomach. 30 Tablet 0   • nystatin (MYCOSTATIN) 185986 UNIT/GM Ointment      • Incontinence Supply Disposable (FQ PROTECTIVE UNDERWEAR) Misc USE 1 PULLUP 3 TIMES DAILY 90 Each 0   • lisinopril (PRINIVIL) 5 MG Tab TAKE ONE TABLET BY MOUTH EVERY DAY 90 Tab 0   • gabapentin (NEURONTIN) 300 MG Cap TAKE THREE CAPSULES BY MOUTH THREE TIMES A  Cap 0   • Incontinence Supply Disposable (FQ PROTECTIVE  UNDERWEAR) Misc USE 1 PULLUP 3 TIMES DAILY 90 Each 3   • vitamin D, Ergocalciferol, (DRISDOL) 32554 units Cap capsule Take 1 Cap by mouth every 28 days. 3 Cap 4   • LORazepam (ATIVAN) 1 MG Tab Take 2 mg by mouth at bedtime as needed for Anxiety.     • carbamazepine (TEGRETOL) 200 MG Tab Take 600 mg by mouth every bedtime.          ALLERGIES  Allergies   Allergen Reactions   • Pcn [Penicillins] Anaphylaxis and Rash     Reaction; happened as a child. Tolerated cephalosporins and Zosyn       PHYSICAL EXAM    VITAL SIGNS:   Vitals:    02/07/22 0446 02/07/22 0500 02/07/22 0550 02/07/22 0551   BP: 112/57 123/57 120/65 137/74   Pulse: 60 (!) 58 76 77   Resp: (!) 21 20 (!) 33 (!) 57   Temp:       TempSrc:       SpO2: 94% 95% 90% 92%   Weight:       Height:           Vitals: My interpretation: hypertensive, not tachycardic, afebrile, not hypoxic    Reinterpretation of vitals: Unchanged    Cardiac Monitor Interpretation: The cardiac monitor revealed normal Sinus Rhythm as interpreted by me. The cardiac monitor was ordered secondary to the patient's history of COPD, oxygen dependence and to monitor for dysrhythmia and/or tachycardia.    PE:   Constitutional: Well developed, Well nourished, No acute distress, Non-toxic appearance.   HENT: Normocephalic, Atraumatic, Bilateral external ears normal, Oropharynx is clear mucous membranes are moist. No oral exudates or nasal discharge.   Eyes: Pupils are equal round and reactive, EOMI, Conjunctiva normal, No discharge.   Neck: Normal range of motion, No tenderness, Supple, No stridor. No meningismus.  Lymphatic: No lymphadenopathy noted.   Cardiovascular: Regular rate and rhythm without murmur rub or gallop.  Thorax & Lungs: Clear breath sounds bilaterally without wheezes, rhonchi or rales. There is no chest wall tenderness.   Abdomen: Soft non-tender non-distended. There is no rebound or guarding. No organomegaly is appreciated. Bowel sounds are normal.  Skin: Normal without rash.    Back: No CVA or spinal tenderness.   Extremities: Intact distal pulses, No edema, No tenderness, No cyanosis, No clubbing. Capillary refill is less than 2 seconds.  Musculoskeletal: Deformity to left ankle, capillary refill normal, strong pedal pulse posterior tibialis. Tenderness to left ankle, left lower leg, and left knee.   Neurologic: Alert & oriented x 3, Normal motor function, Normal sensory function, No focal deficits noted. Reflexes are normal.  Psychiatric: Affect normal, Judgment normal, Mood normal. There is no suicidal ideation or patient reported hallucinations.     DIAGNOSTIC STUDIES / PROCEDURES    LABS  Results for orders placed or performed during the hospital encounter of 10/22/12   CBC WITH DIFFERENTIAL   Result Value Ref Range    WBC 6.8 4.8 - 10.8 K/uL    RBC 4.30 4.20 - 5.40 M/uL    Hemoglobin 12.9 12.0 - 16.0 g/dL    Hematocrit 39.2 37.0 - 47.0 %    MCV 91.2 77.0 - 97.0 fL    MCH 30.0 27.0 - 33.0 pg    MCHC 32.9 30.0 - 35.0 g/dL    RDW 15.1 12.0 - 16.2 %    Platelet Count 337 164 - 446 K/uL    MPV 7.4 6.7 - 10.4 fL    Neutrophils-Polys 58.3 44.0 - 72.0 %    Lymphocytes 33.8 22.0 - 41.0 %    Monocytes 3.4 1.0 - 9.0 %    Eosinophils 4.0 0.0 - 6.0 %    Basophils 0.4 0.0 - 2.0 %    Neutrophils (Absolute) 4.0 1.8 - 7.7 K/uL    Lymphs (Absolute) 2.3 1.0 - 4.8 K/uL   BMP   Result Value Ref Range    Sodium 139 135 - 145 mmol/L    Potassium 3.6 3.6 - 5.5 mmol/L    Chloride 108 96 - 112 mmol/L    Co2 26 20 - 33 mmol/L    Glucose 99 65 - 99 mg/dL    Bun 13 8 - 22 mg/dL    Creatinine 0.58 0.50 - 1.40 mg/dL    Calcium 8.7 8.4 - 10.2 mg/dL    Anion Gap 5.0 0.0 - 11.9   APTT   Result Value Ref Range    APTT 26.2 23.5 - 33.5 sec   PT   Result Value Ref Range    PT 23.2 (H) 10.0 - 13.0 sec    INR 2.11 (H) 0.86 - 1.14   RBC MORPHOLOGY   Result Value Ref Range    RBC Morphology Normal    ESTIMATED GFR   Result Value Ref Range    GFR If African American >60 mL/min/1.73 m 2    GFR If Non African American >60  mL/min/1.73 m 2     *Note: Due to a large number of results and/or encounters for the requested time period, some results have not been displayed. A complete set of results can be found in Results Review.      All labs reviewed by me. Significant for pending    RADIOLOGY  DX-KNEE 3 VIEWS LEFT   Final Result         1.  Spiral proximal fibular diaphyseal fracture   2.  Lateral subluxation of the patella, concerning for patellar dislocation. Could be further evaluated with sunrise view.      DX-TIBIA AND FIBULA LEFT   Final Result         1.  Proximal fibular diaphyseal fracture.   2.  Distal fibular metaphyseal fracture      DX-ANKLE 3+ VIEWS LEFT   Final Result         1.  Lateral malleolus fracture with lateral subluxation of the ankle mortise joint.   2.  Bony fracture fragments adjacent to the medial malleolus compatible with ligamentous avulsion fracture fragments.           The radiologist's interpretation of all radiological studies have been reviewed by me.      Conscious Sedation Procedure Note    Indication: ankle dislocation and fracture dislocation    Consent: I have discussed with the patient and/or the patient representative the indication, alternatives, and the possible risks and/or complications of the planned procedure and the anesthesia methods. The patient and/or patient representative appear to understand and agree to proceed.    Physician Involvement: The attending physician was present and supervising this procedure.    Pre-Sedation Documentation and Exam: I have personally completed a history, physical exam & review of systems for this patient (see notes).  Vital signs have been reviewed (see flow sheet for vitals).  I have reviewed the patient's history and review of systems.  Airway Assessment: Mallampati Class II - (soft palate, fauces & uvula are visible)  f3  Prior History of Anesthesia Complications: none    ASA Classification: Class 3 - A patient with severe systemic disease that limits  "activity but is not incapacitating    Sedation/ Anesthesia Plan: intravenous sedation    Medications Used: ketamine intravenously    Monitoring and Safety: The patient was placed on a cardiac monitor and vital signs, pulse oximetry and level of consciousness were continuously evaluated throughout the procedure. The patient was closely monitored until recovery from the medications was complete and the patient had returned to baseline status. Respiratory therapy was on standby at all times during the procedure.      (The following sections must be completed)  Post-Sedation Vital Signs: Vital signs were reviewed and were stable after the procedure (see flow sheet for vitals)            Intraservice Time: Greater than 10 minutes    Post-Sedation Exam: Lungs: clear to auscultation bilaterally without crackles or wheezing and Cardiovascular: regular rate and rhythm, no murmurs rubs or gallops           Complications: none    I provided both the sedation and procedure, a nurse was present at the bedside for the entire procedure.          Fracture Reduction ankle        Performed by: Gavino Anthony MD      Consent: Verbal consent obtained.      Risks and benefits: risks, benefits and alternatives were discussed      Consent given by: patient and/or guardian      Patient understanding: patient/guardian states understanding of the procedure being performed      Patient consent: the patient/guardian's understanding of the procedure matches consent given      Patient identity confirmed: verbally with patient and arm band      Time out: Immediately prior to procedure a \"time out\" was called to verify the correct patient, procedure, equipment, support staff and site/side marked as required.      Location details: left ankle      Reduction Procedure: axial pull, traction and closed manipulation      Results: Post reduction alignement improved      Complication: Tolerated well without complication. Tendons intact and " "neurovascularly intact post procedure.    Splint Application and Check        Authorized by: Gavino Anthony       Consent: Verbal consent obtained.      Risks and benefits: risks, benefits and alternatives were discussed      Consent given by: patient      Patient understanding: patient states understanding of the procedure being performed      Patient consent: the patient's understanding of the procedure matches consent given      Patient identity confirmed: verbally with patient and arm band      Time out: Immediately prior to procedure a \"time out\" was called to verify the correct patient, procedure, equipment, support staff and site/side marked as required.      Location details: left ankle      Post-procedure: The splinted body part was neurovascularly unchanged following the procedure.      Patient tolerance: Patient tolerated the procedure well with no immediate complications.       Fracture Reduction patella        Performed by: Gavino Anthony MD      Consent: Verbal consent obtained.      Risks and benefits: risks, benefits and alternatives were discussed      Consent given by: patient and/or guardian      Patient understanding: patient/guardian states understanding of the procedure being performed      Patient consent: the patient/guardian's understanding of the procedure matches consent given      Patient identity confirmed: verbally with patient and arm band      Time out: Immediately prior to procedure a \"time out\" was called to verify the correct patient, procedure, equipment, support staff and site/side marked as required.      Location details: left patella      Reduction Procedure: axial pull, traction and closed manipulation      Results: Post reduction alignement improved      Complication: Tolerated well without complication. Tendons intact and neurovascularly intact post procedure.    COURSE & MEDICAL DECISION MAKING  Nursing notes, VS, PMSFHx, labs, imaging, EKG reviewed in " chart.    Heart Score: NA     MDM: 3:56 AM Atiya Henley is a 71 y.o. female who presented with mechanical fall leading up to use the bathroom, resulted in a left ankle deformity, and x-ray showing proximal and distal fibular fracture, ankle subluxation and fracture, possible patellar dislocation.  Pulses are intact however upon arrival.  Patient is already splinted upon arrival and position of comfort with an air splint.  Vital signs unremarkable.  She is oxygen dependent from COPD but has no complaints of chest pain or shortness of breath at this time.  Discussed with orthopedic surgery and they agree to admission for surgical fixation in the morning patient is admitted to hospitalist.  Ankle was reduced and splinted under procedural sedation, see procedure notes.  Patient tolerated well.  Pulses intact after the reduction.  Patient resting comfortably with plan for admission and surgical fixation.  X-ray did demonstrate patellar dislocation which was easily relocated, see procedure note.    FINAL IMPRESSION  1. Closed fracture of left ankle, initial encounter Acute   2. Ankle dislocation, left, initial encounter Acute   3. Morbid obesity (HCC) Acute   4. Chronic obstructive pulmonary disease, unspecified COPD type (HCC) Acute   5. Dependence on continuous supplemental oxygen Acute   6. Closed torus fracture of proximal end of left fibula, initial encounter Acute   7. Closed patellar dislocation, left, initial encounter Acute      Conscious sedation and reduction procedures, see above.     Bon KYLE (Scribe), am scribing for, and in the presence of, Gavino Anthony.    Electronically signed by: Bon Murphy (Aideeibe), 2/7/2022    IGavino personally performed the services described in this documentation, as scribed by Bon Murphy in my presence, and it is both accurate and complete.    The note accurately reflects work and decisions made by me.  Gavino Anthony   2/7/2022  5:26 AM

## 2022-02-07 NOTE — PROGRESS NOTES
4 Eyes Skin Assessment Completed by ROS Allen and ROS Evans.    Head Bruising and Redness   Both eyes   Ears WDL  Nose WDL  Mouth WDL  Neck WDL  Breast/Chest Redness and Rash  Shoulder Blades WDL  Spine WDL  (R) Arm/Elbow/Hand Bruising  (L) Arm/Elbow/Hand Bruising  Abdomen Scar, midline, right side, left side   Ostomy bag, left upper quadrant  Groin Redness and Rash  Scrotum/Coccyx/Buttocks Redness and Blanching  (R) Leg Scar and Bruising   Scar on the knee  (L) Leg Scar and Bruising   Scar on the knee  (R) Heel/Foot/Toe Bruising and Scab  (L) Heel/Foot/Toe unable to assess due to splint in place          Devices In Places Blood Pressure Cuff, Pulse Ox and Nasal Cannula  Ostomy bag    Interventions In Place Gray Ear Foams, TAP System and Pillows    Possible Skin Injury No    Pictures Uploaded Into Epic Yes  Wound Consult Placed N/A  RN Wound Prevention Protocol Ordered No

## 2022-02-07 NOTE — PROGRESS NOTES
[facility] Medicine Daily Progress Note    Date of Service  2/7/2022    Chief Complaint  Atiya Henley is a 71 y.o. female admitted 2/7/2022 with left ankle fracture    Hospital Course  No notes on file  71 y.o. female who presented 2/7/2022 with fall. PMH of COPD on 2L 02, HTN, tobacco use, YESSICA, on CPAP, Hx of DVT on warfarin, anxiety, hypothyroid. Lives in nursing home.  Found down in the bathroom and complaining of pain on her left lower extremity.  Did not hit her head, no LOC.  Says she also fell a few weeks ago and hit her head, ecchymosis below both eyes bilaterally.  She was found to have left ankle fracture and dislocation. Ortho was consulted and plans to take the patient to the OR for repair.    Interval Problem Update  2/7: N.p.o. pending ortho OR repair    I have personally seen and examined the patient at bedside. I discussed the plan of care with patient.    Consultants/Specialty  orthopedics    Code Status  DNAR/DNI    Disposition  Patient is not medically cleared for discharge.   Anticipate discharge to to skilled nursing facility.  I have placed the appropriate orders for post-discharge needs.    Review of Systems  Review of Systems   Constitutional: Positive for malaise/fatigue.   Musculoskeletal: Positive for joint pain.   Psychiatric/Behavioral: The patient is nervous/anxious.         Physical Exam  Temp:  [35.8 °C (96.5 °F)] 35.8 °C (96.5 °F)  Pulse:  [52-78] 52  Resp:  [13-57] 13  BP: (112-169)/(57-74) 169/74  SpO2:  [90 %-97 %] 97 %    Physical Exam  Constitutional:       Appearance: She is obese.   Cardiovascular:      Rate and Rhythm: Bradycardia present.   Musculoskeletal:      Comments: Splint over ankle         Fluids  No intake or output data in the 24 hours ending 02/07/22 1031    Laboratory  Recent Labs     02/07/22  0648   WBC 8.7   RBC 3.94*   HEMOGLOBIN 12.5   HEMATOCRIT 39.1   MCV 99.2*   MCH 31.7   MCHC 32.0*   RDW 58.0*   PLATELETCT 235   MPV 10.3     Recent Labs      02/07/22  0648   SODIUM 141   POTASSIUM 4.2   CHLORIDE 104   CO2 28   GLUCOSE 135*   BUN 7*   CREATININE 0.67   CALCIUM 8.9     Recent Labs     02/07/22  0648   INR 2.62*               Imaging  DX-ANKLE 3+ VIEWS LEFT   Final Result         1.  Distal fibular metaphyseal fracture   2.  Medial malleolus fracture   3.  Interval reduction of lateral ankle subluxation         DX-KNEE 3 VIEWS LEFT   Final Result         1.  Spiral proximal fibular diaphyseal fracture   2.  Lateral subluxation of the patella, concerning for patellar dislocation. Could be further evaluated with sunrise view.      DX-TIBIA AND FIBULA LEFT   Final Result         1.  Proximal fibular diaphyseal fracture.   2.  Distal fibular metaphyseal fracture      DX-ANKLE 3+ VIEWS LEFT   Final Result         1.  Lateral malleolus fracture with lateral subluxation of the ankle mortise joint.   2.  Bony fracture fragments adjacent to the medial malleolus compatible with ligamentous avulsion fracture fragments.         DX-ANKLE 2- VIEWS LEFT    (Results Pending)   DX-PORTABLE FLUORO > 1 HOUR    (Results Pending)        Assessment/Plan  * Closed left ankle fracture, initial encounter- (present on admission)  Assessment & Plan  Left ankle fracture dislocation following fall.    Fracture from ground-level fall.  Vitamin D level checked and found to be unremarkable  EDP spoke with Ortho who will take patient to the OR    Class 3 severe obesity due to excess calories with serious comorbidity and body mass index (BMI) of 40.0 to 44.9 in adult (HCC)- (present on admission)  Assessment & Plan  BMI 44.46.  Ground-level fall with fracture  PT, OT    Hypothyroidism, acquired, autoimmune- (present on admission)  Assessment & Plan  Continue levothyroxine    History of DVT (deep vein thrombosis)- (present on admission)  Assessment & Plan  On warfarin.  Hold due to expected surgery  Resume when appropriate    Tobacco abuse- (present on admission)  Assessment & Plan  Says  she is down to few cigarettes a week.  Says she does not need nicotine patch    COPD (chronic obstructive pulmonary disease) (HCC)- (present on admission)  Assessment & Plan  Nonacute exacerbation, on baseline 2 L  Continue Trelegy Ellipta    Primary hypertension- (present on admission)  Assessment & Plan  Continue lisinopril    YESSICA (obstructive sleep apnea)- (present on admission)  Assessment & Plan  Nocturnal CPAP ordered       VTE prophylaxis: SCDs/TEDs    I have performed a physical exam and reviewed and updated ROS and Plan today (2/7/2022). In review of yesterday's note (2/6/2022), there are no changes except as documented above.

## 2022-02-07 NOTE — PROGRESS NOTES
Left ankle fracture   Plan for ORIF today  See recent Consult note, no changes      Brandon Hemphill MD  Orthopedic Trauma Fellow

## 2022-02-07 NOTE — H&P
Hospital Medicine History & Physical Note    Date of Service  2/7/2022    Primary Care Physician  Lio Deluca M.D.    Consultants  orthopedics    Code Status  DNAR/DNI    Chief Complaint  Chief Complaint   Patient presents with   • GLF     Pt BIB [facility] for glf at home when pt was trying to go to the bathroom and tripped and twisted her ankle, didnt hit her head, -LOC, +blood thinners. Pt c/o LLE pain, arrived with splint in place. PTA meds 200mcg fentanyl total, 35 ketamine, arrived with NRB, placed on NC 3L.        History of Presenting Illness  Atiya Henley is a 71 y.o. female who presented 2/7/2022 with fall. PMH of COPD on 2L 02, HTN, tobacco use, YESSICA, on CPAP, Hx of DVT on warfarin, anxiety, hypothyroid. Lives in nursing home.  Found down in the bathroom and complaining of pain on her left lower extremity.  Did not hit her head, no LOC.  Says she also fell a few weeks ago and hit her head, ecchymosis below both eyes bilaterally.  Denies any other acute complaints, on baseline O2 requirements.    Imaging in the ED shows left ankle fracture and dislocation.  EDP spoke with Ortho who will take to the OR in the morning.    I discussed the plan of care with patient and bedside RN.    Review of Systems  Review of Systems   Constitutional: Negative for chills and fever.   HENT: Negative for sore throat.    Respiratory: Negative for cough and shortness of breath.    Cardiovascular: Negative for chest pain.   Gastrointestinal: Negative for abdominal pain, diarrhea, nausea and vomiting.   Genitourinary: Negative for dysuria and urgency.   Musculoskeletal: Positive for falls and joint pain.   Neurological: Negative for dizziness and headaches.   All other systems reviewed and are negative.    Past Medical History   has a past medical history of Anxiety disorder, Arthritis, Bipolar affective (HCC), Bladder incontinence, Bowel obstruction (HCC), Breath shortness, Cancer (HCC), Chronic autoimmune thyroiditis,  Colostomy in place ([facility]) (2009), COPD, Dental disorder, Diabetes (HCC), EMPHYSEMA, Hashimoto's disease, High cholesterol, History of total knee arthroplasty, Hypertension, Hypothyroid (7/7/2009), Indigestion, Infectious disease, Infectious disease, Obesity (7/7/2009), Obstruction, Oxygen dependent, Pain, Personal history of venous thrombosis and embolism (2009, 2012), Presence of IVC filter, Psychiatric problem, Sleep apnea, Snoring, Status post cholecystectomy (1992), and Weight gain.    Surgical History   has a past surgical history that includes myringotomy (9/9/2009); low anterior resection (3/10/2010); low anterior resection laparoscopic (3/10/2010); colostomy (3/16/2010); tracheostomy (3/31/2010); cholecystectomy (1992); exploratory laparotomy (3/16/2010); colectomy (3/16/2010); hysterectomy laparoscopy (1983); colonoscopy - endo (11/17/2010); lateral release (7/20/2011); other orthopedic surgery; knee arthroscopy (7/20/2011); meniscectomy, knee, medial (7/20/2011); meniscectomy (7/20/2011); irrigation & debridement ortho (7/17/2012); irrigation & debridement ortho (7/19/2012); knee arthroplasty total (6/10/2012); irrigation & debridement general (12/17/2012); irrigation & debridement ortho (2/8/2013); exploratory laparotomy (9/2/2013); parastomal hernia repair  (9/2/2013); bladder biopsy with cystoscopy (10/10/2013); colostomy (11/26/2013); exploratory laparotomy (11/26/2013); lysis adhesions general (11/26/2013); colon resection (11/26/2013); exploratory laparotomy (12/10/2013); wound closure general (12/10/2013); pr inject rx other periph nerve (Left, 8/21/2015); pr fluoroscopic guidance needle placement (Left, 8/21/2015); pr inject rx other periph nerve (8/21/2015); pr inject rx other periph nerve (8/21/2015); and vitrectomy posterior (Left, 7/23/2019).     Family History  family history includes Alcohol/Drug in her father; Heart Disease in her mother; Hypertension in an other family member; Lung Disease  in her father; Seizures in her mother.   Family history reviewed with patient. There is no family history that is pertinent to the chief complaint.     Social History   reports that she has been smoking cigarettes. She has a 25.00 pack-year smoking history. She has never used smokeless tobacco. She reports current drug use. Drugs: Marijuana and Inhaled. She reports that she does not drink alcohol.    Allergies  Allergies   Allergen Reactions   • Pcn [Penicillins] Anaphylaxis and Rash     Reaction; happened as a child. Tolerated cephalosporins and Zosyn       Medications  Prior to Admission Medications   Prescriptions Last Dose Informant Patient Reported? Taking?   Fluticasone-Umeclidin-Vilant (TRELEGY ELLIPTA) 200-62.5-25 MCG/INH AEROSOL POWDER, BREATH ACTIVATED   No No   Sig: Inhale 200 mcg every day.   Incontinence Supply Disposable (FQ PROTECTIVE UNDERWEAR) Misc   No No   Sig: USE 1 PULLUP 3 TIMES DAILY   Incontinence Supply Disposable (FQ PROTECTIVE UNDERWEAR) Misc   No No   Sig: USE 1 PULLUP 3 TIMES DAILY   LORazepam (ATIVAN) 1 MG Tab   Yes No   Sig: Take 2 mg by mouth at bedtime as needed for Anxiety.   albuterol (VENTOLIN HFA) 108 (90 Base) MCG/ACT Aero Soln inhalation aerosol   No No   Sig: Inhale 2 Puffs every four hours as needed for Shortness of Breath.   budesonide-formoterol (SYMBICORT) 160-4.5 MCG/ACT Aerosol   No No   Sig: Inhale 2 Puffs 2 times a day. With spacer, rinse mouth after use   carbamazepine (TEGRETOL) 200 MG Tab  Patient Yes No   Sig: Take 600 mg by mouth every bedtime.   fluticasone (FLONASE) 50 MCG/ACT nasal spray   No No   Sig: Administer 1-2 Sprays into affected nostril(S) every day. Each nostril.   gabapentin (NEURONTIN) 300 MG Cap   No No   Sig: TAKE THREE CAPSULES BY MOUTH THREE TIMES A DAY   levothyroxine (SYNTHROID) 150 MCG Tab   No No   Sig: Take 1 Tablet by mouth every morning on an empty stomach.   lisinopril (PRINIVIL) 5 MG Tab   No No   Sig: TAKE ONE TABLET BY MOUTH EVERY DAY    nystatin (MYCOSTATIN) 015719 UNIT/GM Ointment   Yes No   tiotropium (SPIRIVA RESPIMAT) 2.5 mcg/Act Aero Soln   No No   Sig: Inhale 2 Inhalation every day.   vitamin D, Ergocalciferol, (DRISDOL) 21523 units Cap capsule   No No   Sig: Take 1 Cap by mouth every 28 days.   warfarin (COUMADIN) 10 MG Tab   No No   Sig: Take one-half to one (1/2-1) tablet daily as directed by [facility]      Facility-Administered Medications: None       Physical Exam  Temp:  [35.8 °C (96.5 °F)] 35.8 °C (96.5 °F)  Pulse:  [58-74] 58  Resp:  [16-21] 20  BP: (112-123)/(57-65) 123/57  SpO2:  [92 %-95 %] 95 %  Blood Pressure : 123/57   Temperature: 35.8 °C (96.5 °F)   Pulse: (!) 58   Respiration: 20   Pulse Oximetry: 95 %       Physical Exam  Constitutional:       Appearance: She is obese.   HENT:      Head: Normocephalic and atraumatic.      Mouth/Throat:      Mouth: Mucous membranes are moist.      Pharynx: No oropharyngeal exudate or posterior oropharyngeal erythema.   Eyes:      General: No scleral icterus.     Comments: Ecchymosis below both eyes bilaterally   Cardiovascular:      Rate and Rhythm: Normal rate and regular rhythm.      Pulses: Normal pulses.      Heart sounds: Normal heart sounds. No murmur heard.      Pulmonary:      Effort: Pulmonary effort is normal. No respiratory distress.      Breath sounds: Normal breath sounds.   Abdominal:      General: There is no distension.      Palpations: Abdomen is soft.      Tenderness: There is no abdominal tenderness.   Musculoskeletal:         General: Tenderness present. Normal range of motion.      Cervical back: Normal range of motion and neck supple.      Comments: LLE tender   Skin:     General: Skin is warm and dry.   Neurological:      General: No focal deficit present.      Mental Status: She is alert and oriented to person, place, and time.   Psychiatric:         Mood and Affect: Mood normal.         Laboratory:          No results for input(s): ALTSGPT,  ASTSGOT, ALKPHOSPHAT, TBILIRUBIN, DBILIRUBIN, GAMMAGT, AMYLASE, LIPASE, ALB, PREALBUMIN, GLUCOSE in the last 72 hours.      No results for input(s): NTPROBNP in the last 72 hours.      No results for input(s): TROPONINT in the last 72 hours.    Imaging:  DX-KNEE 3 VIEWS LEFT   Final Result         1.  Spiral proximal fibular diaphyseal fracture   2.  Lateral subluxation of the patella, concerning for patellar dislocation. Could be further evaluated with sunrise view.      DX-TIBIA AND FIBULA LEFT   Final Result         1.  Proximal fibular diaphyseal fracture.   2.  Distal fibular metaphyseal fracture      DX-ANKLE 3+ VIEWS LEFT   Final Result         1.  Lateral malleolus fracture with lateral subluxation of the ankle mortise joint.   2.  Bony fracture fragments adjacent to the medial malleolus compatible with ligamentous avulsion fracture fragments.             X-Ray:  I have personally reviewed the images and compared with prior images.    Assessment/Plan:  I anticipate this patient is appropriate for observation status at this time.    * Closed left ankle fracture, initial encounter- (present on admission)  Assessment & Plan  Left ankle fracture dislocation following fall.  EDP spoke with Ortho who will take patient to the OR  N.p.o. and pain management    Fracture from ground-level fall.  Check vitamin D level  Start vitamin D and calcium on discharge.  Outpatient work-up/treatment for osteoporosis    History of DVT (deep vein thrombosis)- (present on admission)  Assessment & Plan  On warfarin.  Hold due to expected surgery  Check INR and replete with vitamin K if needed per Ortho    Class 3 severe obesity due to excess calories with serious comorbidity and body mass index (BMI) of 40.0 to 44.9 in adult ([facility])- (present on admission)  Assessment & Plan  BMI 44.46.  Ground-level fall with fracture  PT, OT    COPD (chronic obstructive pulmonary disease) ([facility])- (present on admission)  Assessment & Plan  Nonacute  exacerbation, on baseline 2 L  Continue Trelegy Ellipta    Hypothyroidism, acquired, autoimmune- (present on admission)  Assessment & Plan  Continue levothyroxine    Tobacco abuse- (present on admission)  Assessment & Plan  Says she is down to few cigarettes a week.  Says she does not need nicotine patch    Primary hypertension- (present on admission)  Assessment & Plan  Continue lisinopril    YESSICA (obstructive sleep apnea)- (present on admission)  Assessment & Plan  Nocturnal CPAP ordered      VTE prophylaxis: therapeutic anticoagulation with warfarin

## 2022-02-07 NOTE — ASSESSMENT & PLAN NOTE
Left ankle fracture dislocation following fall.    Fracture from ground-level fall.  Vitamin D level checked and found to be unremarkable  S/p surgical repair with ortho appreciate rec.  PT/OT eval will need placement

## 2022-02-07 NOTE — ED NOTES
Pt resting comfortably. VSS Stable appears in no acute distress. Denies any additional needs.Call light within reach. Awaiting Pre op

## 2022-02-07 NOTE — CONSULTS
Date of Service: 02/07/22    Atiya Henley was seen today in consultation for left ankle fracture at the request of Dr. Buckner    CC: Left ankle pain    HPI: Atiya Henley is a 71 y.o. female who presents with complaints of pain to left ankle.  This started last night after a fall while walking to the bathroom.  She was in normal standard bear weight afterwards.  She was found on the ground.  She is a walker for ambulation in the house in a wheelchair when outside.  The pain is 6/10 and is described as sharp.  The pain is made worse by palpation of the area and made better by rest and immobilization.    PMH:   Past Medical History:   Diagnosis Date   • Anxiety disorder    • Arthritis    • Bipolar affective ([facility])    • Bladder incontinence    • Bowel obstruction ([facility])    • Breath shortness     with activity   • Cancer (HCC)     cervical   • Chronic autoimmune thyroiditis     + TPO ab = 563 / + ultrasound   • Colostomy in place ([facility]) 2009   • COPD    • Dental disorder     dentures upper   • Diabetes (HCC)     Pt denies DM 7/23/19   • EMPHYSEMA    • Hashimoto's disease    • High cholesterol    • History of total knee arthroplasty     infection   • Hypertension     stated no longer on meds due to lost 100 lbs   • Hypothyroid 7/7/2009   • Indigestion    • Infectious disease     MRSA,   • Infectious disease     VRE   • Obesity 7/7/2009   • Obstruction     colostomy   • Oxygen dependent     4.5 L NC   • Pain     b/l legs   • Personal history of venous thrombosis and embolism 2009, 2012    arm and leg left side   • Presence of IVC filter    • Psychiatric problem     depression, denies currently 7/23/19   • Sleep apnea     USES CPAP, 3-4L oxygen   • Snoring    • Status post cholecystectomy 1992   • Weight gain        PSH:   Past Surgical History:   Procedure Laterality Date   • VITRECTOMY POSTERIOR Left 7/23/2019    Procedure: REMOVAL, VITREOUS BODY, POSTERIOR PORTION- MEMBRANE PEEL LASER, GAS;  Surgeon: Ernst SERVIN  CARY Morris;  Location: SURGERY SAME DAY [facility];  Service: Ophthalmology   • PB INJECT RX OTHER PERIPH NERVE Left 8/21/2015    Procedure: NEUROLYTIC DEST-OTHER NERVE;  Surgeon: Kd Galaviz D.O.;  Location: [facility];  Service: Pain Management   • KY FLUOROSCOPIC GUIDANCE NEEDLE PLACEMENT Left 8/21/2015    Procedure: FLOURO GUIDE NEEDLE PLACEMENT;  Surgeon: Kd Galaviz D.O.;  Location: SURGERY [facility];  Service: Pain Management   • PB INJECT RX OTHER PERIPH NERVE  8/21/2015    Procedure: NEUROLYTIC DEST-OTHER NERVE;  Surgeon: Kd Galaviz D.O.;  Location: SURGERY [facility];  Service: Pain Management   • PB INJECT RX OTHER PERIPH NERVE  8/21/2015    Procedure: NEUROLYTIC DEST-OTHER NERVE;  Surgeon: Kd Galaviz D.O.;  Location: SURGERY [facility];  Service: Pain Management   • EXPLORATORY LAPAROTOMY  12/10/2013    Performed by Sang Castañeda M.D. at [facility]   • WOUND CLOSURE GENERAL  12/10/2013    Performed by Sang Castañeda M.D. at [facility]   • COLOSTOMY  11/26/2013    Performed by Maninder Carter M.D. at [facility]   • EXPLORATORY LAPAROTOMY  11/26/2013    Performed by Maninder Carter M.D. at [facility]   • LYSIS ADHESIONS GENERAL  11/26/2013    Performed by Maninder Carter M.D. at [facility]   • COLON RESECTION  11/26/2013    Performed by Maninder Carter M.D. at [facility]   • BLADDER BIOPSY WITH CYSTOSCOPY  10/10/2013    Performed by Sang Castañeda M.D. at [facility]   • EXPLORATORY LAPAROTOMY  9/2/2013    Performed by Maninder Carter M.D. at [facility]   • PARASTOMAL HERNIA REPAIR   9/2/2013    Performed by Maninder Carter M.D. at [facility]   • IRRIGATION & DEBRIDEMENT ORTHO  2/8/2013    Performed by David Fowler M.D. at [facility]   • IRRIGATION & DEBRIDEMENT GENERAL  12/17/2012    Performed by  David Fowler M.D. at SURGERY [facility]   • IRRIGATION & DEBRIDEMENT ORTHO  7/19/2012    Performed by BERYL LOYD at SURGERY [facility]   • IRRIGATION & DEBRIDEMENT ORTHO  7/17/2012    Performed by BERYL LOYD at [facility]   • KNEE ARTHROPLASTY TOTAL  6/10/2012    left   • LATERAL RELEASE  7/20/2011    Performed by BERYL LOYD at [facility]   • KNEE ARTHROSCOPY  7/20/2011    Performed by BERYL LOYD at [facility]   • MENISCECTOMY, KNEE, MEDIAL  7/20/2011    Performed by BERYL LOYD at [facility]   • MENISCECTOMY  7/20/2011    Performed by BERYL LOYD at [facility]   • COLONOSCOPY - ENDO  11/17/2010    Performed by JESUSITA SUMNER at ENDOSCOPY [facility]   • TRACHEOSTOMY  3/31/2010    Performed by JESUSITA SUMNER at SURGERY [facility]   • COLOSTOMY  3/16/2010    Performed by CHRISTA BENNETT at SURGERY [facility] ORS   • EXPLORATORY LAPAROTOMY  3/16/2010    Performed by CHRISTA BENNETT at SURGERY [facility]S   • COLECTOMY  3/16/2010    Performed by CHRISTA BENNETT at SURGERY [facility] ORS   • LOW ANTERIOR RESECTION  3/10/2010    Performed by JESUSITA SUMNER at SURGERY [facility] ORS   • LOW ANTERIOR RESECTION LAPAROSCOPIC  3/10/2010    Performed by JESUSITA SUMNER at SURGERY [facility]   • MYRINGOTOMY  9/9/2009    Performed by RENETTA GOODSON at SURGERY SAME DAY [facility]   • CHOLECYSTECTOMY  1992    laparoscopic   • HYSTERECTOMY LAPAROSCOPY  1983   • OTHER ORTHOPEDIC SURGERY      LT KNEE X2       FH:   Family History   Problem Relation Age of Onset   • Heart Disease Mother    • Seizures Mother    • Alcohol/Drug Father    • Lung Disease Father    • Hypertension Other        SH:   Social History     Socioeconomic History   • Marital status:      Spouse name: Not on file   • Number of children: Not on file   • Years of education: Not on file   • Highest  education level: Not on file   Occupational History   • Not on file   Tobacco Use   • Smoking status: Current Every Day Smoker     Packs/day: 0.50     Years: 50.00     Pack years: 25.00     Types: Cigarettes   • Smokeless tobacco: Never Used   • Tobacco comment: 1 ppd, 45 yrs   Vaping Use   • Vaping Use: Never used   Substance and Sexual Activity   • Alcohol use: No     Alcohol/week: 0.0 oz   • Drug use: Yes     Types: Marijuana, Inhaled     Comment: 1-2 daily, last 7/22/19  at 1530   • Sexual activity: Never   Other Topics Concern   •  Service No   • Blood Transfusions No     Comment: unknown   • Caffeine Concern No   • Occupational Exposure No   • Hobby Hazards No   • Sleep Concern Yes     Comment: sleep apnea   • Stress Concern Yes   • Weight Concern No   • Special Diet Yes   • Back Care Yes   • Exercise No   • Bike Helmet No     Comment: does not ride bike    • Seat Belt Yes   • Self-Exams Yes   Social History Narrative   • Not on file     Social Determinants of Health     Financial Resource Strain:    • Difficulty of Paying Living Expenses: Not on file   Food Insecurity:    • Worried About Running Out of Food in the Last Year: Not on file   • Ran Out of Food in the Last Year: Not on file   Transportation Needs:    • Lack of Transportation (Medical): Not on file   • Lack of Transportation (Non-Medical): Not on file   Physical Activity:    • Days of Exercise per Week: Not on file   • Minutes of Exercise per Session: Not on file   Stress:    • Feeling of Stress : Not on file   Social Connections:    • Frequency of Communication with Friends and Family: Not on file   • Frequency of Social Gatherings with Friends and Family: Not on file   • Attends Hoahaoism Services: Not on file   • Active Member of Clubs or Organizations: Not on file   • Attends Club or Organization Meetings: Not on file   • Marital Status: Not on file   Intimate Partner Violence:    • Fear of Current or Ex-Partner: Not on file   •  "Emotionally Abused: Not on file   • Physically Abused: Not on file   • Sexually Abused: Not on file   Housing Stability:    • Unable to Pay for Housing in the Last Year: Not on file   • Number of Places Lived in the Last Year: Not on file   • Unstable Housing in the Last Year: Not on file       ROS: In review of the following systems: Constitutional, Eyes, ENT, Cardiovascular,Respiratory, GI, , Musculoskeletal, Skin, Neuro, Psych, Hematologic, Endocrine, Allergic; no pertinent findings were found related to the chief complaint and orthopedic injury     BP (!) 178/74   Pulse 68   Temp 35.8 °C (96.5 °F) (Tympanic)   Resp (!) 26   Ht 1.626 m (5' 4\")   Wt 117 kg (259 lb)   SpO2 90%     Physical Exam:  General: Well nourished, well developed, age appropriate appearance   HEENT: Normocephalic, atraumatic  Psych: Normal mood and affect  Neck: Supple, no pain to motion  Chest/Pulmonary: breathing unlabored, no audible wheezing  Cardio: Regular heart rate and rhythm  Neuro: Sensation grossly intact to BUE and BLE, moving all four extremities  Skin: Intact with no full thickness abrasions or lacerations  MSK: Left ankle is currently in a splint.  Capillary refill to the toes is less than 2 seconds.  Normal sensation to toes.  No pain to passive range of motion.  No effusion at the knee.  Right lower extremity and bilateral extremities are atraumatic nontender palpation with range of motion x-rays of the left ankle show a displaced lateral malleolus fracture as well as talar subluxation laterally    Imaging and labs:.  There is small avulsion insult of the medial malleolus    Recent Labs     02/07/22  0648   WBC 8.7   RBC 3.94*   HEMOGLOBIN 12.5   HEMATOCRIT 39.1   MCV 99.2*   MCH 31.7   RDW 58.0*   PLATELETCT 235   MPV 10.3   NEUTSPOLYS 79.20*   LYMPHOCYTES 12.90*   MONOCYTES 6.50   EOSINOPHILS 0.80   BASOPHILS 0.30       Assessment:   1. Closed fracture of left ankle, initial encounter Acute    2. Ankle dislocation, " left, initial encounter Acute    3. Morbid obesity (HCC) Acute    4. Chronic obstructive pulmonary disease, unspecified COPD type (HCC) Acute    5. Dependence on continuous supplemental oxygen Acute    6. Closed torus fracture of proximal end of left fibula, initial encounter Acute    7. Closed patellar dislocation, left, initial encounter Acute        We discussed the diagnosis and findings with the patient at length.  We reviewed possible non operative and operative interventions and the risks and benefits of each of these.  I recommended open reduction to fixation of the left lateral malleolus to reestablish a more stable ankle mortise allow for early range of motion eventual weightbearing.  She had a chance to ask questions and all of these were answered to her satisfaction.        Plan:  N.p.o.  ORIF left ankle  Mobilize postoperatively  Perioperative antibiotics  Discharge planning

## 2022-02-08 ENCOUNTER — PATIENT OUTREACH (OUTPATIENT)
Dept: HEALTH INFORMATION MANAGEMENT | Facility: OTHER | Age: 72
End: 2022-02-08

## 2022-02-08 LAB
ANION GAP SERPL CALC-SCNC: 10 MMOL/L (ref 7–16)
BUN SERPL-MCNC: 10 MG/DL (ref 8–22)
CALCIUM SERPL-MCNC: 8.4 MG/DL (ref 8.5–10.5)
CHLORIDE SERPL-SCNC: 102 MMOL/L (ref 96–112)
CO2 SERPL-SCNC: 27 MMOL/L (ref 20–33)
CREAT SERPL-MCNC: 0.58 MG/DL (ref 0.5–1.4)
ERYTHROCYTE [DISTWIDTH] IN BLOOD BY AUTOMATED COUNT: 59 FL (ref 35.9–50)
GLUCOSE SERPL-MCNC: 112 MG/DL (ref 65–99)
HCT VFR BLD AUTO: 34 % (ref 37–47)
HGB BLD-MCNC: 10.8 G/DL (ref 12–16)
MCH RBC QN AUTO: 32.1 PG (ref 27–33)
MCHC RBC AUTO-ENTMCNC: 31.8 G/DL (ref 33.6–35)
MCV RBC AUTO: 101.2 FL (ref 81.4–97.8)
PLATELET # BLD AUTO: 208 K/UL (ref 164–446)
PMV BLD AUTO: 10.2 FL (ref 9–12.9)
POTASSIUM SERPL-SCNC: 4.3 MMOL/L (ref 3.6–5.5)
RBC # BLD AUTO: 3.36 M/UL (ref 4.2–5.4)
SODIUM SERPL-SCNC: 139 MMOL/L (ref 135–145)
WBC # BLD AUTO: 7.6 K/UL (ref 4.8–10.8)

## 2022-02-08 PROCEDURE — 99406 BEHAV CHNG SMOKING 3-10 MIN: CPT | Performed by: PHYSICAL MEDICINE & REHABILITATION

## 2022-02-08 PROCEDURE — 700102 HCHG RX REV CODE 250 W/ 637 OVERRIDE(OP): Performed by: HOSPITALIST

## 2022-02-08 PROCEDURE — 700102 HCHG RX REV CODE 250 W/ 637 OVERRIDE(OP): Performed by: STUDENT IN AN ORGANIZED HEALTH CARE EDUCATION/TRAINING PROGRAM

## 2022-02-08 PROCEDURE — 36415 COLL VENOUS BLD VENIPUNCTURE: CPT

## 2022-02-08 PROCEDURE — 700111 HCHG RX REV CODE 636 W/ 250 OVERRIDE (IP): Performed by: HOSPITALIST

## 2022-02-08 PROCEDURE — 85027 COMPLETE CBC AUTOMATED: CPT

## 2022-02-08 PROCEDURE — 96376 TX/PRO/DX INJ SAME DRUG ADON: CPT

## 2022-02-08 PROCEDURE — 99226 PR SUBSEQUENT OBSERVATION CARE,LEVEL III: CPT | Performed by: INTERNAL MEDICINE

## 2022-02-08 PROCEDURE — A9270 NON-COVERED ITEM OR SERVICE: HCPCS | Performed by: STUDENT IN AN ORGANIZED HEALTH CARE EDUCATION/TRAINING PROGRAM

## 2022-02-08 PROCEDURE — A9270 NON-COVERED ITEM OR SERVICE: HCPCS | Performed by: HOSPITALIST

## 2022-02-08 PROCEDURE — 99205 OFFICE O/P NEW HI 60 MIN: CPT | Mod: 25 | Performed by: PHYSICAL MEDICINE & REHABILITATION

## 2022-02-08 PROCEDURE — G0378 HOSPITAL OBSERVATION PER HR: HCPCS

## 2022-02-08 PROCEDURE — 80048 BASIC METABOLIC PNL TOTAL CA: CPT

## 2022-02-08 PROCEDURE — 97166 OT EVAL MOD COMPLEX 45 MIN: CPT

## 2022-02-08 PROCEDURE — 97162 PT EVAL MOD COMPLEX 30 MIN: CPT

## 2022-02-08 RX ADMIN — DOCUSATE SODIUM 50 MG AND SENNOSIDES 8.6 MG 2 TABLET: 8.6; 5 TABLET, FILM COATED ORAL at 04:40

## 2022-02-08 RX ADMIN — OXYCODONE HYDROCHLORIDE 10 MG: 10 TABLET ORAL at 11:57

## 2022-02-08 RX ADMIN — DOCUSATE SODIUM 50 MG AND SENNOSIDES 8.6 MG 2 TABLET: 8.6; 5 TABLET, FILM COATED ORAL at 16:08

## 2022-02-08 RX ADMIN — LISINOPRIL 2.5 MG: 2.5 TABLET ORAL at 04:56

## 2022-02-08 RX ADMIN — MORPHINE SULFATE 4 MG: 4 INJECTION INTRAVENOUS at 16:10

## 2022-02-08 RX ADMIN — OXYCODONE HYDROCHLORIDE 10 MG: 10 TABLET ORAL at 18:14

## 2022-02-08 RX ADMIN — TIOTROPIUM BROMIDE INHALATION SPRAY 5 MCG: 3.12 SPRAY, METERED RESPIRATORY (INHALATION) at 04:43

## 2022-02-08 RX ADMIN — LEVOTHYROXINE SODIUM 200 MCG: 0.2 TABLET ORAL at 04:40

## 2022-02-08 RX ADMIN — CARBAMAZEPINE 600 MG: 200 TABLET ORAL at 21:21

## 2022-02-08 RX ADMIN — OXYCODONE 5 MG: 5 TABLET ORAL at 04:41

## 2022-02-08 RX ADMIN — BUDESONIDE AND FORMOTEROL FUMARATE DIHYDRATE 2 PUFF: 160; 4.5 AEROSOL RESPIRATORY (INHALATION) at 16:10

## 2022-02-08 RX ADMIN — OXYCODONE 5 MG: 5 TABLET ORAL at 08:35

## 2022-02-08 RX ADMIN — BUDESONIDE AND FORMOTEROL FUMARATE DIHYDRATE 2 PUFF: 160; 4.5 AEROSOL RESPIRATORY (INHALATION) at 04:54

## 2022-02-08 RX ADMIN — OXYCODONE HYDROCHLORIDE 10 MG: 10 TABLET ORAL at 15:10

## 2022-02-08 ASSESSMENT — COGNITIVE AND FUNCTIONAL STATUS - GENERAL
MOVING TO AND FROM BED TO CHAIR: A LOT
SUGGESTED CMS G CODE MODIFIER DAILY ACTIVITY: CK
STANDING UP FROM CHAIR USING ARMS: A LOT
MOBILITY SCORE: 12
WALKING IN HOSPITAL ROOM: A LOT
MOVING FROM LYING ON BACK TO SITTING ON SIDE OF FLAT BED: UNABLE
TOILETING: A LOT
MOVING FROM LYING ON BACK TO SITTING ON SIDE OF FLAT BED: A LOT
HELP NEEDED FOR BATHING: A LOT
MOBILITY SCORE: 9
DRESSING REGULAR UPPER BODY CLOTHING: A LOT
HELP NEEDED FOR BATHING: A LOT
SUGGESTED CMS G CODE MODIFIER MOBILITY: CL
DRESSING REGULAR LOWER BODY CLOTHING: A LITTLE
PERSONAL GROOMING: A LITTLE
WALKING IN HOSPITAL ROOM: TOTAL
DAILY ACTIVITIY SCORE: 19
TOILETING: A LOT
TURNING FROM BACK TO SIDE WHILE IN FLAT BAD: A LOT
STANDING UP FROM CHAIR USING ARMS: A LOT
TURNING FROM BACK TO SIDE WHILE IN FLAT BAD: A LOT
MOVING TO AND FROM BED TO CHAIR: A LOT
DRESSING REGULAR LOWER BODY CLOTHING: A LOT
SUGGESTED CMS G CODE MODIFIER MOBILITY: CM
DAILY ACTIVITIY SCORE: 15
CLIMB 3 TO 5 STEPS WITH RAILING: A LOT
SUGGESTED CMS G CODE MODIFIER DAILY ACTIVITY: CK
CLIMB 3 TO 5 STEPS WITH RAILING: TOTAL

## 2022-02-08 ASSESSMENT — PAIN DESCRIPTION - PAIN TYPE
TYPE: SURGICAL PAIN
TYPE: ACUTE PAIN;SURGICAL PAIN
TYPE: SURGICAL PAIN
TYPE: ACUTE PAIN
TYPE: SURGICAL PAIN
TYPE: SURGICAL PAIN

## 2022-02-08 ASSESSMENT — LIFESTYLE VARIABLES
EVER FELT BAD OR GUILTY ABOUT YOUR DRINKING: NO
CONSUMPTION TOTAL: NEGATIVE
HAVE PEOPLE ANNOYED YOU BY CRITICIZING YOUR DRINKING: NO
AVERAGE NUMBER OF DAYS PER WEEK YOU HAVE A DRINK CONTAINING ALCOHOL: 0
ALCOHOL_USE: NO
ON A TYPICAL DAY WHEN YOU DRINK ALCOHOL HOW MANY DRINKS DO YOU HAVE: 0
TOTAL SCORE: 0
EVER HAD A DRINK FIRST THING IN THE MORNING TO STEADY YOUR NERVES TO GET RID OF A HANGOVER: NO
TOTAL SCORE: 0
HAVE YOU EVER FELT YOU SHOULD CUT DOWN ON YOUR DRINKING: NO
TOTAL SCORE: 0
DOES PATIENT WANT TO STOP DRINKING: NO
HOW MANY TIMES IN THE PAST YEAR HAVE YOU HAD 5 OR MORE DRINKS IN A DAY: 0

## 2022-02-08 ASSESSMENT — ENCOUNTER SYMPTOMS
COUGH: 0
ABDOMINAL PAIN: 0
NECK PAIN: 0
SHORTNESS OF BREATH: 0
NAUSEA: 0
PALPITATIONS: 0
MYALGIAS: 0
VOMITING: 0
NERVOUS/ANXIOUS: 1

## 2022-02-08 ASSESSMENT — ACTIVITIES OF DAILY LIVING (ADL): TOILETING: INDEPENDENT

## 2022-02-08 ASSESSMENT — PATIENT HEALTH QUESTIONNAIRE - PHQ9
SUM OF ALL RESPONSES TO PHQ9 QUESTIONS 1 AND 2: 0
2. FEELING DOWN, DEPRESSED, IRRITABLE, OR HOPELESS: NOT AT ALL
1. LITTLE INTEREST OR PLEASURE IN DOING THINGS: NOT AT ALL

## 2022-02-08 ASSESSMENT — GAIT ASSESSMENTS: GAIT LEVEL OF ASSIST: UNABLE TO PARTICIPATE

## 2022-02-08 NOTE — DIETARY
NUTRITION SERVICES: BMI - Pt with BMI >40 (=Body mass index is 44.46 kg/m².), Class III obesity. Weight is stated. Recommend obtain measured weight when feasible. Weight loss counseling not appropriate in acute care setting. RECOMMEND - If appropriate at DC please refer to outpatient nutrition services for weight management.

## 2022-02-08 NOTE — ANESTHESIA POSTPROCEDURE EVALUATION
Patient: Atiya Henley    Procedure Summary     Date: 02/07/22 Room / Location: [place] [id number] / SURGERY [facility]    Anesthesia Start: 1526 Anesthesia Stop: 1644    Procedure: ORIF, ANKLE (Left Ankle) Diagnosis: (left ankle fracture)    Surgeons: Hussein Khoury M.D. Responsible Provider: Eliud Landry M.D.    Anesthesia Type: general ASA Status: 3          Final Anesthesia Type: general  Last vitals  BP   Blood Pressure : 160/74    Temp   36.8 °C (98.2 °F)    Pulse   82   Resp   16    SpO2   98 %      Anesthesia Post Evaluation    Patient location during evaluation: PACU  Patient participation: complete - patient participated  Level of consciousness: awake and alert  Pain score: 0    Airway patency: patent  Anesthetic complications: no  Cardiovascular status: hemodynamically stable  Respiratory status: acceptable  Hydration status: euvolemic    PONV: none          No complications documented.     Nurse Pain Score: 0 (NPRS)

## 2022-02-08 NOTE — PROGRESS NOTES
[facility] Medicine Daily Progress Note    Date of Service  2/8/2022    Chief Complaint  Atiya Henley is a 71 y.o. female admitted 2/7/2022 with left ankle fracture    Hospital Course  No notes on file  71 y.o. female who presented 2/7/2022 with fall. PMH of COPD on 2L 02, HTN, tobacco use, YESSICA, on CPAP, Hx of DVT on warfarin, anxiety, hypothyroid. Lives in nursing home.  Found down in the bathroom and complaining of pain on her left lower extremity.  Did not hit her head, no LOC.  Says she also fell a few weeks ago and hit her head, ecchymosis below both eyes bilaterally.  She was found to have left ankle fracture and dislocation. Ortho was consulted and plans to take the patient to the OR for repair.    Interval Problem Update  Patient seen and examined, afebrile, no acute events overnight. Had surgical repair with ortho yesterday. Appreciate rec,   PT/OT eval.  Pain management.    I have personally seen and examined the patient at bedside. I discussed the plan of care with patient     Consultants/Specialty  orthopedics    Code Status  DNAR/DNI    Disposition  Patient is not medically cleared for discharge.   Anticipate discharge to to skilled nursing facility.  I have placed the appropriate orders for post-discharge needs.    Review of Systems  Review of Systems   Constitutional: Positive for malaise/fatigue.   HENT: Negative for ear discharge and ear pain.    Respiratory: Negative for cough and shortness of breath.    Cardiovascular: Negative for chest pain and palpitations.   Gastrointestinal: Negative for abdominal pain, nausea and vomiting.   Genitourinary: Negative for dysuria, frequency and urgency.   Musculoskeletal: Positive for joint pain. Negative for myalgias and neck pain.   Psychiatric/Behavioral: The patient is nervous/anxious.    All other systems reviewed and are negative.       Physical Exam  Temp:  [35.8 °C (96.5 °F)-36.8 °C (98.2 °F)] 36.4 °C (97.6 °F)  Pulse:  [60-83] 65  Resp:  [13-20]  16  BP: (110-160)/(52-82) 133/52  SpO2:  [90 %-99 %] 95 %    Physical Exam  Vitals and nursing note reviewed.   Constitutional:       Appearance: She is obese.   Eyes:      General: No scleral icterus.  Cardiovascular:      Rate and Rhythm: Bradycardia present.   Pulmonary:      Effort: Pulmonary effort is normal. No respiratory distress.      Breath sounds: No rales.   Abdominal:      General: There is no distension.      Tenderness: There is no abdominal tenderness. There is no guarding or rebound.   Musculoskeletal:      Comments: Splint over ankle   Skin:     General: Skin is warm.      Coloration: Skin is not jaundiced.   Neurological:      General: No focal deficit present.      Mental Status: Mental status is at baseline.         Fluids    Intake/Output Summary (Last 24 hours) at 2/8/2022 1237  Last data filed at 2/7/2022 1643  Gross per 24 hour   Intake 815 ml   Output 300 ml   Net 515 ml       Laboratory  Recent Labs     02/07/22  0648 02/08/22  0557   WBC 8.7 7.6   RBC 3.94* 3.36*   HEMOGLOBIN 12.5 10.8*   HEMATOCRIT 39.1 34.0*   MCV 99.2* 101.2*   MCH 31.7 32.1   MCHC 32.0* 31.8*   RDW 58.0* 59.0*   PLATELETCT 235 208   MPV 10.3 10.2     Recent Labs     02/07/22  0648 02/08/22  0557   SODIUM 141 139   POTASSIUM 4.2 4.3   CHLORIDE 104 102   CO2 28 27   GLUCOSE 135* 112*   BUN 7* 10   CREATININE 0.67 0.58   CALCIUM 8.9 8.4*     Recent Labs     02/07/22  0648   INR 2.62*               Imaging  DX-PORTABLE FLUORO > 1 HOUR   Final Result      Portable fluoroscopy utilized for 24 seconds.         INTERPRETING LOCATION: [address] [place] [address], [id number]      DX-ANKLE 3+ VIEWS LEFT   Final Result      Intraoperative image as above described.      DX-ANKLE 3+ VIEWS LEFT   Final Result         1.  Distal fibular metaphyseal fracture   2.  Medial malleolus fracture   3.  Interval reduction of lateral ankle subluxation         DX-KNEE 3 VIEWS LEFT   Final Result         1.  Spiral proximal fibular diaphyseal fracture   2.   Lateral subluxation of the patella, concerning for patellar dislocation. Could be further evaluated with sunrise view.      DX-TIBIA AND FIBULA LEFT   Final Result         1.  Proximal fibular diaphyseal fracture.   2.  Distal fibular metaphyseal fracture      DX-ANKLE 3+ VIEWS LEFT   Final Result         1.  Lateral malleolus fracture with lateral subluxation of the ankle mortise joint.   2.  Bony fracture fragments adjacent to the medial malleolus compatible with ligamentous avulsion fracture fragments.              Assessment/Plan  * Closed left ankle fracture, initial encounter- (present on admission)  Assessment & Plan  Left ankle fracture dislocation following fall.    Fracture from ground-level fall.  Vitamin D level checked and found to be unremarkable  S/p surgical repair with ortho appreciate rec.     Class 3 severe obesity due to excess calories with serious comorbidity and body mass index (BMI) of 40.0 to 44.9 in adult ([facility])- (present on admission)  Assessment & Plan  BMI 44.46.  Ground-level fall with fracture  PT, OT    Hypothyroidism, acquired, autoimmune- (present on admission)  Assessment & Plan  Continue levothyroxine    History of DVT (deep vein thrombosis)- (present on admission)  Assessment & Plan  On warfarin.  Held due to expected surgery  Resume when ok with ortho     Tobacco abuse- (present on admission)  Assessment & Plan  Says she is down to few cigarettes a week.  Says she does not need nicotine patch    COPD (chronic obstructive pulmonary disease) ([facility])- (present on admission)  Assessment & Plan  Nonacute exacerbation, his baseline is 2 L  Continue Trelegy Ellipta    Primary hypertension- (present on admission)  Assessment & Plan  Continue lisinopril    YESSICA (obstructive sleep apnea)- (present on admission)  Assessment & Plan  Nocturnal CPAP ordered       VTE prophylaxis: SCDs/TEDs    I have performed a physical exam and reviewed and updated ROS and Plan today (2/8/2022). In review of  yesterday's note (2/7/2022), there are no changes except as documented above.

## 2022-02-08 NOTE — THERAPY
"Occupational Therapy   Initial Evaluation     Patient Name: Atiya Henley  Age:  71 y.o., Sex:  female  Medical Record #: [id number]  Today's Date: 2/8/2022     Precautions  Precautions: Fall Risk,Non Weight Bearing Left Lower Extremity,Non Weight Bearing Right Upper Extremity. Patient also had a \"failed\" L knee replacement and has difficulty with LLE mobility.  Comments: Patient admits to noncompliance in the past with WB precautions. RUE NWB from a fall 2 weeks ago, pt went to [place]    Assessment  Patient is 71 y.o. female with a diagnosis of GLF, s/p K ankle ORIF.  Additional factors influencing patient status / progress: Per discharge order, patient needs to maintain NWB to go home. Patient also reports a fall a couple weeks ago in which she injured her shoulder and went to [place] - they advised NWB and to have a sling. She was able to demo bed mobility supv today, however had much more difficult with transfers. She was unable to maintain NWB status on LLE and required Mod A to stand up. She requires Mod-Max A for LB ADLs and supv-Mod A for UB ADLs. Will follow in this setting. She lives alone and has a caregiver 24 hours/week, however anticipate that she will require post-acute placement prior to DCing back home.       Plan    Recommend Occupational Therapy 3 times per week until therapy goals are met for the following treatments:  Adaptive Equipment, Cognitive Skill Development, Manual Therapy Techniques, Neuro Re-Education / Balance, Self Care/Activities of Daily Living, Therapeutic Activities and Therapeutic Exercises.       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 Wait, I always say that! 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    Prior Level of ADL Function   Comments Ind with ADLs previously   Prior Level of IADL Function   Medication Management Independent   Laundry Dependent   Kitchen Mobility Dependent   Finances Dependent   Home Management Dependent   Shopping Requires Assist   Prior Level Of Mobility Independent With Device in Home   Comments Patient relies on PCA for most of her IADL   Precautions   Precautions Fall Risk;Non Weight Bearing Left Lower Extremity;Non Weight Bearing Right Upper Extremity   Comments Patient admits to noncompliance in the past with WB precautions. RUROSALINO NWB from a fall 2 weeks ago, pt went to [place]   Cognition    Cognition / Consciousness X   Level of Consciousness Alert   Ability To Follow Commands 1 Step   New Learning Impaired   Attention Impaired   Comments Patient is very easily distracted and talkative.   Active ROM Upper Body   Active ROM Upper Body  X   Comments RUE limited 2/2 fall 2 weeks ago   Sensation Upper Body   Upper Extremity Sensation  WDL   Balance Assessment   Sitting Balance (Static) Fair   Sitting Balance (Dynamic) Fair -   Standing Balance (Static) Trace +   Weight Shift Sitting Poor   Comments Unable to maintain WB precaution   Bed Mobility    Supine to Sit Supervised   Sit to Supine Supervised   ADL Assessment   Grooming Seated;Supervision   Lower Body Dressing Maximal Assist   Toileting Maximal Assist  (katiuska)   Functional Mobility   Sit to Stand Moderate Assist   Bed, Chair, Wheelchair Transfer Unable to Participate   Mobility sup><sit, STS    Visual Perception   Visual Perception  X   Comments 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

## 2022-02-08 NOTE — OP REPORT
DATE OF SERVICE:  02/07/2022     PREOPERATIVE DIAGNOSIS:  Closed left ankle fracture.     POSTOPERATIVE DIAGNOSIS:  Closed left ankle fracture.     OPERATION PERFORMED:  Open reduction and internal fixation of the fibula with   stabilization of the syndesmosis.     SURGEON:  Hussein Khoury MD     ASSISTANT:  Brandon Hemphill MD     INDICATIONS FOR OPERATION:  Displaced ankle fracture.  The patient with   obesity, BMI of 44.46.     COMPLICATIONS:  None.     IMPLANTS UTILIZED:  Nicole plate.     TOURNIQUET TIME:  Approximately an hour.     BLOOD LOSS:  Minimal secondary to tourniquet utilization.     DESCRIPTION OF PROCEDURE:  The patient brought to the operating room awake,   alert, placed on the operative table in supine position.  Left lower extremity   shaved, scrubbed, prepped and draped in normal sterile routine fashion.    Timeout, appropriate extremity identified.     Basically, the ankle mortise and talus and fibula were all displaced   laterally.  We simply used the plate as a reduction tool.  We chose the 8-hole   Nicole plate and then placed a syndesmotic screw for reduction.  We checked   both AP and lateral, demonstrated excellent alignment and position of the   reduction.     At this point, we were satisfied with the reduction, we did not feel we needed   any additional dissection or direct reduction maneuvers. We simply placed 2   syndesmotic screws and then multiple screws in the metaphysis distally and   proximally. At completion of procedure, we tried to displace the ankle.  We   were unable to do that.     Multiple views demonstrated excellent alignment, position of the fixation and   the reduction.     The wound was now copiously irrigated, closed in layers with staples,   posterior splint was applied and the patient was then taken to the recovery   room in stable condition.  No intraoperative or immediate postoperative   complications.  The patient tolerated the procedure well and was taken  to the   recovery room in stable condition.        ______________________________  MD CLAUDINE Padron/CEZAR    DD:  02/07/2022 17:01  DT:  02/07/2022 17:11    Job#:  [id number]

## 2022-02-08 NOTE — CARE PLAN
The patient is Stable - Low risk of patient condition declining or worsening    Shift Goals  Clinical Goals: safety and,pain control   Patient Goals: Rest  Family Goals: n/a    Progress made toward(s) clinical / shift goals: Plan of care discussed.Educated on fall prevention,call light within reach,bed locked and on its lowest position,bed alarm on.Encouraged to call for assistance.verbalized understanding.  Problem: Pain - Standard  Goal: Alleviation of pain or a reduction in pain to the patient’s comfort goal  Outcome: Progressing     Problem: Knowledge Deficit - Standard  Goal: Patient and family/care givers will demonstrate understanding of plan of care, disease process/condition, diagnostic tests and medications  Outcome: Progressing     Problem: Fall Risk  Goal: Patient will remain free from falls  Outcome: Progressing     Problem: Skin Integrity  Goal: Skin integrity is maintained or improved  Outcome: Progressing       Patient is not progressing towards the following goals:

## 2022-02-08 NOTE — CARE PLAN
The patient is Watcher - Medium risk of patient condition declining or worsening    Shift Goals  Clinical Goals: Mobility, Pain Control  Patient Goals: Pain Control, Rest  Family Goals: n/a    Progress made toward(s) clinical / shift goals:  Pain Management    Patient is not progressing towards the following goals:Pt unable to mobilize with staff or therapies. Pt was able to get EOB with PT/OT (x3). Pt stated she would not follow weight bearing restrictions.     Problem: Pain - Standard  Goal: Alleviation of pain or a reduction in pain to the patient’s comfort goal  Outcome: Progressing  Note: Pt medicated with PRN oxyodone Q3 hours with minimal relief per pt. Explained to pt that we can try morphine 1 hour post-PO pain medication. Pt was agreeable with plan.      Problem: Fall Risk  Goal: Patient will remain free from falls  Outcome: Progressing  Note: Fall risk assessed using Sawyer-Davonte Scale. Pt is a HIGH fall risk. Fall precautions in place. Pt calls for assistance appropriately.       Problem: Skin Integrity  Goal: Skin integrity is maintained or improved  Outcome: Progressing  Note: Waffle mattress overlay placed on bed.

## 2022-02-08 NOTE — DISCHARGE PLANNING
[facility] Transitional Care Coordination    Referral from:  Dr. Lawrence Griffith  Insurance Provider on Facesheet: Medicare/Medicaid FFS  Potential Rehab Diagnosis:  Other Ortho    Chart review indicates patient has on going medical management and may have therapy needs to possibly meet inpatient rehab facility criteria with the goal of returning to community.    D/C support: [facility]     Physiatry to consult.  Would welcome OT as clinically appropriate.      Last Covid test:  2/07/2022 antigen negative     Thank you for the referral.

## 2022-02-08 NOTE — THERAPY
Physical Therapy   Initial Evaluation     Patient Name: Atiya Henley  Age:  71 y.o., Sex:  female  Medical Record #: [id number]  Today's Date: 2/8/2022     Precautions  Precautions: Fall Risk;Non Weight Bearing Left Lower Extremity  Comments: (P) Pt reports non-compliance with WB restrictions in the past, blind in L eye    Assessment  Patient is 71 y.o. female seen POD#1 ORIF for L fibula fx and stabilization of sydesmosis. Injuries were caused by a fall in her facility. Pt also reports several other recent falls, one causing a R shoulder dislocation that is supposed to have WB restrictions but pt states she doesn't like to do what drs tell her. PMH includes COPD (2L baseline), HTN, YESSICA, hx of DVT on warfarin, anxiety and hypothyroid. Pt was able to demonstrate supine<>sit with Abdelrahman with HOB elevated. She required modA for STS, but was immediately helped back to sitting as she was unable to maintain NWB through the LE. PT will continue to follow while in acute care to address bed mobility, transfers, gait, WC mobility, and activity tolerance.    Plan    Recommend Physical Therapy 3 times per week until therapy goals are met for the following treatments:  Bed Mobility, Gait Training, Neuro Re-Education / Balance, Self Care/Home Evaluation, Therapeutic Activities and Therapeutic Exercises    DC Equipment Recommendations: (P) Unable to determine at this time  Discharge Recommendations: (P) Recommend post-acute placement for additional physical therapy services prior to discharge home          02/08/22 0941   Vitals   O2 (LPM) 2   O2 Delivery Device Nasal Cannula   Pain 0 - 10 Group   Therapist Pain Assessment During Activity  (tolerable pain, not rated)   Prior Living Situation   Prior Services Other (Comments)  (PCA for 23hrs/wk. Helps with cooking, cleaning and laundry)   Housing / Facility Assisted Living Residence   Steps Into Home 0   Steps In Home 0   Equipment Owned Power Wheel Chair;Front-Wheel Walker;Single  Point Cane;Quad Cane   Lives with - Patient's Self Care Capacity Alone and Unable to Care For Self  (PCA providing some assistance)   Comments Pt reports power chair and walker are bariatric and too wide to fit through the doorways at the facility she lives at. She primarily uses the SPC when unable to use the WC   Prior Level of Functional Mobility   Bed Mobility Independent   Transfer Status Independent   Ambulation Independent   Distance Ambulation (Feet)   (Household)   Assistive Devices Used Single Point Cane  (Power chair for longer distances)   Wheelchair Independent   Stairs Unable To Determine At This Time   History of Falls   History of Falls Yes  (reason for admit, pt reporting several other recent falls)   Cognition    Cognition / Consciousness X   Level of Consciousness Alert   New Learning Impaired   Attention Impaired   Active ROM Lower Body    Active ROM Lower Body  X   Comments LLE decreased at baseline,unable to assess fullly d/t recent sx   Strength Lower Body   Lower Body Strength  X   Lt Hip Flexion Strength 3- (F-)   Lt Knee Extension Strength 3- (F-)   Comments Unable to fully assess d/t sx   Sensation Lower Body   Lower Extremity Sensation   X   Comments Baseline decrease in sensation in LLE   Vision   Vision Comments Blind in L eye   Balance Assessment   Sitting Balance (Static) Fair   Sitting Balance (Dynamic) Fair -   Standing Balance (Static) Trace +   Weight Shift Sitting Poor   Comments Pt unable to maintain WB with attempt to stand. Defered further standing balance and gait   Gait Analysis   Gait Level Of Assist Unable to Participate   Comments Unable to maintain NWB in standing   Bed Mobility    Supine to Sit Minimal Assist   Sit to Supine Minimal Assist   Comments HOB elevated, pt has hospital bed at home   Functional Mobility   Sit to Stand Moderate Assist  (unable to maintain NWB )   Mobility In room   Comments Deferred transfer to chair d/t pts inability to maintain LLE NWB    Short Term Goals    Short Term Goal # 1 Pt will demonstrate supine<>sit using bed features as needed with SPV in 6txs to improve independence.    Short Term Goal # 2 Pt will demonstrate functional transfers with LRAD while maintaining LLE NWB with SPV in 6tx to improve independence.    Short Term Goal # 3 Pt will demonstrate 50' of WC mobility with SPV in 6tx to facilitate independent moblity in her home.    Education Group   Education Provided Role of Physical Therapist;Weight Bearing Status   Role of Physical Therapist Patient Response Patient;Acceptance;Explanation;Demonstration;Verbal Demonstration   Weight Bearing Status Patient Response Patient;Acceptance;Explanation;Verbal Demonstration;Reinforcement Needed   Problem List    Problems Pain;Impaired Bed Mobility;Impaired Transfers;Impaired Ambulation;Functional ROM Deficit;Functional Strength Deficit;Impaired Balance;Impaired Vision;Decreased Activity Tolerance;Safety Awareness Deficits / Cognition          100

## 2022-02-08 NOTE — CARE PLAN
Problem: Pain - Standard  Goal: Alleviation of pain or a reduction in pain to the patient’s comfort goal  Outcome: Progressing  Note:  Patient educated on 0-10 pain scale, and non-pharmacological pain control. Encouraged to verbalize and contact staff when in pain.  Administered PRN medication as needed.       Problem: Fall Risk  Goal: Patient will remain free from falls  Outcome: Progressing  Note:  Verified bed is locked and in lowest position, hourly rounding in place, bed alarm on, call light with in reach, slip socks on, educated patient on use of call light for assistance.     The patient is Stable - Low risk of patient condition declining or worsening    Shift Goals  Clinical Goals: Pain control, safety  Patient Goals: Pain control, rest   Family Goals: n/a    Progress made toward(s) clinical / shift goals:  Patient pain is currently managed with medication regimen, patient using call light approprietly for assistance.

## 2022-02-08 NOTE — CONSULTS
"    Physical Medicine and Rehabilitation Consultation              Date of initial consultation: 2/8/2022  Requesting provider: Ck Griffith MD   Consulting provider: Sandhya Finnegan D.O.  Reason for consultation: assess for acute inpatient rehab appropriateness  LOS: 0 Day(s)    Chief complaint: Ankle pain after ground-level fall    HPI: The patient is a 71 y.o.female with a past medical history of COPD on 2 L O2, hypertension, tobacco use, obstructive sleep apnea on CPAP, history of DVT on warfarin, hypothyroidism and anxiety;  who presented on 2/7/2022  3:53 AM with ankle pain after ground-level fall.  Per documentation patient fell in the bathroom at her assisted living residence.  Reportedly, patient did not hit her head, she had no loss of consciousness.  Upon evaluation in the emergency department x-rays revealed a left ankle avulsion insult of the medial malleolus with talar subluxation laterally.  Orthopedic surgery was consulted, Dr. Marte, and patient was taken to the OR on 2/7 for an ORIF of the fibula with stabilization of the syndesmosis.  Patient is NWB LLE.  Postoperatively, patient has had mild acute blood loss anemia with hemoglobin down to 10.8.  Functionally, patient has been willing to participate with therapy.  She has been able to transfer at a mod assist level for sit to stand, she is min assist for bed mobility.  Patient is unable to participate in any kind of functional gait due to being unable to maintain her nonweightbearing status.     Patient seen and examined at bedside, patient reports she feels tired. Reports she has \"bad shoulders\" and chronically impaired vision. Denies HA, lightheadedness, dizziness, SOB, has chronic cough from smoking. Denies SOB. Reports ankle pain, and chronic numbness/tingling in her feet.     Social Hx:  Patient lives at an assisted living facility where she has the potential to upgrade to 24-hour assistance for personal care assistant.  She will need to " navigate stairs, and she utilizes a power wheelchair for community distances.  However her power chair and bariatric walker are too wide to fit the doorways of the facility where she lives.  0 YANET  At prior level of function patient was independent with single-point cane however she used power chair for longer distances.  She also owns a bariatric walker.    Tobacco: current every day smoker   Alcohol: denies   Drugs: denies     THERAPY:  Restrictions: NWB LLE  PT: Functional mobility    PT note: Min assist bed mobility, mod assist sit to stand, unable to participate in functional gait patient is unable to maintain her nonweightbearing status.    OT: ADLs   OT note: Mod assist for sit to stand, unable to participate in functional transfer, supervision for bed mobility    SLP:   No SLP notes    IMAGIN/7 ankle x-ray  1.  Lateral malleolus fracture with lateral subluxation of the ankle mortise joint.  2.  Bony fracture fragments adjacent to the medial malleolus compatible with ligamentous avulsion fracture fragments.    PROCEDURES:   ORIF of the left ankle performed by Dr. Khoury.     PMH:  Past Medical History:   Diagnosis Date   • Anxiety disorder    • Arthritis    • Bipolar affective ([facility])    • Bladder incontinence    • Bowel obstruction ([facility])    • Breath shortness     with activity   • Cancer ([facility])     cervical   • Chronic autoimmune thyroiditis     + TPO ab = 563 / + ultrasound   • Colostomy in place ([facility])    • COPD    • Dental disorder     dentures upper   • Diabetes ([facility])     Pt denies DM 19   • EMPHYSEMA    • Hashimoto's disease    • High cholesterol    • History of total knee arthroplasty     infection   • Hypertension     stated no longer on meds due to lost 100 lbs   • Hypothyroid 2009   • Indigestion    • Infectious disease     MRSA,   • Infectious disease     VRE   • Obesity 2009   • Obstruction     colostomy   • Oxygen dependent     4.5 L NC   • Pain     b/l legs   • Personal  history of venous thrombosis and embolism 2009, 2012    arm and leg left side   • Presence of IVC filter    • Psychiatric problem     depression, denies currently 7/23/19   • Sleep apnea     USES CPAP, 3-4L oxygen   • Snoring    • Status post cholecystectomy 1992   • Weight gain        PSH:  Past Surgical History:   Procedure Laterality Date   • ORIF, ANKLE Left 2/7/2022    Procedure: ORIF, ANKLE;  Surgeon: Hussein Khoury M.D.;  Location: [facility];  Service: Orthopedics   • VITRECTOMY POSTERIOR Left 7/23/2019    Procedure: REMOVAL, VITREOUS BODY, POSTERIOR PORTION- MEMBRANE PEEL LASER, GAS;  Surgeon: Ernst Morris M.D.;  Location: SURGERY SAME DAY [facility];  Service: Ophthalmology   • PB INJECT RX OTHER PERIPH NERVE Left 8/21/2015    Procedure: NEUROLYTIC DEST-OTHER NERVE;  Surgeon: Kd Galaviz D.O.;  Location: [facility];  Service: Pain Management   • KS FLUOROSCOPIC GUIDANCE NEEDLE PLACEMENT Left 8/21/2015    Procedure: FLOURO GUIDE NEEDLE PLACEMENT;  Surgeon: Kd Galaviz D.O.;  Location: [facility];  Service: Pain Management   • PB INJECT RX OTHER PERIPH NERVE  8/21/2015    Procedure: NEUROLYTIC DEST-OTHER NERVE;  Surgeon: Kd Galaviz D.O.;  Location: [facility];  Service: Pain Management   • PB INJECT RX OTHER PERIPH NERVE  8/21/2015    Procedure: NEUROLYTIC DEST-OTHER NERVE;  Surgeon: Kd Galaviz D.O.;  Location: [facility];  Service: Pain Management   • EXPLORATORY LAPAROTOMY  12/10/2013    Performed by Sang Castañeda M.D. at [facility]   • WOUND CLOSURE GENERAL  12/10/2013    Performed by aSng Castañeda M.D. at [facility]   • COLOSTOMY  11/26/2013    Performed by Maninder Carter M.D. at [facility]   • EXPLORATORY LAPAROTOMY  11/26/2013    Performed by Maninder Carter M.D. at [facility]   • LYSIS ADHESIONS GENERAL  11/26/2013    Performed by Maninder DICKEY  CARY Carter at [facility]   • COLON RESECTION  11/26/2013    Performed by Maninder Carter M.D. at [facility]   • BLADDER BIOPSY WITH CYSTOSCOPY  10/10/2013    Performed by Sang Castañeda M.D. at [facility]   • EXPLORATORY LAPAROTOMY  9/2/2013    Performed by Maninder Carter M.D. at [facility]   • PARASTOMAL HERNIA REPAIR   9/2/2013    Performed by Maninder Carter M.D. at [facility]   • IRRIGATION & DEBRIDEMENT ORTHO  2/8/2013    Performed by David Fowler M.D. at [facility]   • IRRIGATION & DEBRIDEMENT GENERAL  12/17/2012    Performed by David Fowler M.D. at [facility]   • IRRIGATION & DEBRIDEMENT ORTHO  7/19/2012    Performed by BERYL LOYD at [facility]   • IRRIGATION & DEBRIDEMENT ORTHO  7/17/2012    Performed by BERYL LOYD at [facility]   • KNEE ARTHROPLASTY TOTAL  6/10/2012    left   • LATERAL RELEASE  7/20/2011    Performed by BERYL LOYD at [facility]   • KNEE ARTHROSCOPY  7/20/2011    Performed by BERYL LOYD at [facility]   • MENISCECTOMY, KNEE, MEDIAL  7/20/2011    Performed by BERYL LOYD at [facility]   • MENISCECTOMY  7/20/2011    Performed by BERYL LOYD at [facility]   • COLONOSCOPY - ENDO  11/17/2010    Performed by JESUSITA SUMNER at ENDOSCOPY [facility]   • TRACHEOSTOMY  3/31/2010    Performed by JESUSITA SUMNER at SURGERY [facility]   • COLOSTOMY  3/16/2010    Performed by CHRISTA BENNETT at [facility]   • EXPLORATORY LAPAROTOMY  3/16/2010    Performed by CHRISTA BENNETT at [facility]   • COLECTOMY  3/16/2010    Performed by CHRISTA BENNETT at [facility]   • LOW ANTERIOR RESECTION  3/10/2010    Performed by JESUSITA SUMNER at [facility]   • LOW ANTERIOR RESECTION LAPAROSCOPIC  3/10/2010    Performed by BURAK  "JESUSITA BROWN at SURGERY [facility] ORS   • MYRINGOTOMY  9/9/2009    Performed by RENETTA GOODSON at SURGERY SAME DAY [facility] ORS   • CHOLECYSTECTOMY  1992    laparoscopic   • HYSTERECTOMY LAPAROSCOPY  1983   • OTHER ORTHOPEDIC SURGERY      LT KNEE X2       FHX:  Family History   Problem Relation Age of Onset   • Heart Disease Mother    • Seizures Mother    • Alcohol/Drug Father    • Lung Disease Father    • Hypertension Other        Medications:  Current Facility-Administered Medications   Medication Dose   • senna-docusate (PERICOLACE or SENOKOT S) 8.6-50 MG per tablet 2 Tablet  2 Tablet    And   • polyethylene glycol/lytes (MIRALAX) PACKET 1 Packet  1 Packet    And   • magnesium hydroxide (MILK OF MAGNESIA) suspension 30 mL  30 mL    And   • bisacodyl (DULCOLAX) suppository 10 mg  10 mg   • acetaminophen (Tylenol) tablet 650 mg  650 mg   • labetalol (NORMODYNE/TRANDATE) injection 10 mg  10 mg   • albuterol inhaler 2 Puff  2 Puff   • carBAMazepine (TEGRETOL) tablet 600 mg  600 mg   • levothyroxine (SYNTHROID) tablet 200 mcg  200 mcg   • lisinopril (PRINIVIL) tablet 2.5 mg  2.5 mg   • budesonide-formoterol (SYMBICORT) 160-4.5 MCG/ACT inhaler 2 Puff  2 Puff   • tiotropium (Spiriva Respimat) 2.5 mcg/Act inhalation spray 5 mcg  5 mcg   • Pharmacy Consult Request ...Pain Management Review 1 Each  1 Each   • oxyCODONE immediate-release (ROXICODONE) tablet 5 mg  5 mg    Or   • oxyCODONE immediate release (ROXICODONE) tablet 10 mg  10 mg    Or   • morphine 4 MG/ML injection 4 mg  4 mg       Allergies:  Allergies   Allergen Reactions   • Pcn [Penicillins] Anaphylaxis and Rash     Reaction; happened as a child. Tolerated cephalosporins and Zosyn         Physical Exam:  Vitals: /52   Pulse 65   Temp 36.4 °C (97.6 °F) (Temporal)   Resp 16   Ht 1.626 m (5' 4\")   Wt 117 kg (259 lb)   SpO2 95%   Gen: NAD, laying comfortably in bed   Head:  NC/AT  Eyes/ Nose/ Mouth: PERRLA, moist mucous membranes  Cardio: RRR, good " distal perfusion, warm extremities  Pulm: normal respiratory effort, no cyanosis, + intermittent coughing   Abd: Soft NTND, negative borborygmi   Ext: No peripheral edema. + LLE in splint     Mental status:  A&Ox4 (person, place, date, situation) answers questions appropriately follows commands  Speech: fluent, no aphasia or dysarthria    CRANIAL NERVES:  2,3: visual acuity grossly intact, PERRL  3,4,6: EOMI bilaterally, no nystagmus or diplopia  5: sensation intact to light touch bilaterally and symmetric  7: no facial asymmetry  8: hearing grossly intact      Motor:      Upper Extremity  Myotome R L   Shoulder flexion C5 3/5 5/5   Elbow flexion C5 5/5 5/5   Wrist extension C6 5/5 5/5   Elbow extension C7 5/5 5/5   Finger flexion C8 5/5 5/5   Finger abduction T1 5/5 5/5     Lower Extremity Myotome R L   Hip flexion L2 5/5 3, limited by discomfort/5   Knee extension L3 5/5 5/5   Ankle dorsiflexion L4 5/5 5/5   Toe extension L5 5/5 5/5   Ankle plantarflexion S1 5/5 5/5       Sensory:   intact to light touch through out b/l UE and LE       DTRs: 2+ in bilateral  biceps  Negative Fonseca b/l     Tone: no spasticity noted, no cogwheeling noted    Coordination:   intact finger to nose bilaterally  intact fine motor with fingers bilaterally        Labs: Reviewed and significant for   Recent Labs     02/07/22  0648 02/08/22  0557   RBC 3.94* 3.36*   HEMOGLOBIN 12.5 10.8*   HEMATOCRIT 39.1 34.0*   PLATELETCT 235 208   PROTHROMBTM 27.2*  --    INR 2.62*  --      Recent Labs     02/07/22  0648 02/08/22  0557   SODIUM 141 139   POTASSIUM 4.2 4.3   CHLORIDE 104 102   CO2 28 27   GLUCOSE 135* 112*   BUN 7* 10   CREATININE 0.67 0.58   CALCIUM 8.9 8.4*     Recent Results (from the past 24 hour(s))   Basic Metabolic Panel (BMP)    Collection Time: 02/08/22  5:57 AM   Result Value Ref Range    Sodium 139 135 - 145 mmol/L    Potassium 4.3 3.6 - 5.5 mmol/L    Chloride 102 96 - 112 mmol/L    Co2 27 20 - 33 mmol/L    Glucose 112 (H) 65  - 99 mg/dL    Bun 10 8 - 22 mg/dL    Creatinine 0.58 0.50 - 1.40 mg/dL    Calcium 8.4 (L) 8.5 - 10.5 mg/dL    Anion Gap 10.0 7.0 - 16.0   CBC without Differential    Collection Time: 02/08/22  5:57 AM   Result Value Ref Range    WBC 7.6 4.8 - 10.8 K/uL    RBC 3.36 (L) 4.20 - 5.40 M/uL    Hemoglobin 10.8 (L) 12.0 - 16.0 g/dL    Hematocrit 34.0 (L) 37.0 - 47.0 %    .2 (H) 81.4 - 97.8 fL    MCH 32.1 27.0 - 33.0 pg    MCHC 31.8 (L) 33.6 - 35.0 g/dL    RDW 59.0 (H) 35.9 - 50.0 fL    Platelet Count 208 164 - 446 K/uL    MPV 10.2 9.0 - 12.9 fL   ESTIMATED GFR    Collection Time: 02/08/22  5:57 AM   Result Value Ref Range    GFR If African American >60 >60 mL/min/1.73 m 2    GFR If Non African American >60 >60 mL/min/1.73 m 2         ASSESSMENT:  Patient is a 71 y.o. female admitted with left ankle pain after ground-level fall, found to have left fibula fracture    Rehabilitation: Impaired ADLs and mobility  Patient is a poor candidate for inpatient rehab based on patient's refusal for post acute rehab and inability to maintain WB precautions. See dispo details below.      Barriers to transfer include: Insurance authorization, TCCs to verify disposition, medical clearance and bed availability     Additional Recommendations:  Left ankle fracture with dislocation  -Patient now status post ORIF of the left ankle on 2/7 referred by Dr. Khoury  -Patient is nonweightbearing left lower extremity  - NWB LLE status will be diffculty with chronic R shoulder pain, recommend trial of slide board to WC rather than stand pivot attempts   -Continue with PT/OT  -Patient currently lives in assisted living facility, she is a single-point cane at baseline however she is unable to maintain her weightbearing precautions    History of DVT  -Patient is on Coumadin in the outpatient setting  -Coumadin has been held due to plans for surgery  -Recommend initiating chemical DVT prophylaxis when okay by Ortho due to patient's immobility and high  risk for repeat DVT  - reviewed smoking session with patient to also decrease risk of DVT       Disposition  -Currently patient is functioning below her level of baseline recommend postacute rehab  -Patient is unlikely to return home to her assisted living facility within 10 to 14 days due to her inability to maintain weightbearing precautions or for recommend SNF for prolonged rehabilitation program, however patient refusing all post acute rehab options   - with current NWB LLE status, impaired R shoulder strength, low vision, and impaired strength, recommend SNF level rehab prior to going home   -PM&R to sign off      Medical Complexity:  Left ankle fracture status post ORIF  Morbid obesity  History of DVT on Coumadin in the outpatient setting  COPD  Hypertension  Obstructive sleep apnea on CPAP  Hypothyroidism      DVT PPX: SCDs      Thank you for allowing us to participate in the care of this patient.     Patient was seen for 88 minutes on unit/floor of which > 50% of time was spent on counseling and coordination of care regarding the above, including prognosis, risk reduction, benefits of treatment, and options for next stage of care.    Sandhya Finnegan D.O.   Physical Medicine and Rehabilitation     Please note that this dictation was created using voice recognition software. I have made every reasonable attempt to correct obvious errors, but there may be errors of grammar and possibly content that I did not discover before finalizing the note.

## 2022-02-08 NOTE — ANESTHESIA TIME REPORT
Anesthesia Start and Stop Event Times     Date Time Event    2/7/2022 1513 Ready for Procedure     1526 Anesthesia Start     1644 Anesthesia Stop        Responsible Staff  02/07/22    Name Role Begin End    Eliud Landry M.D. Anesth 1526 1644        Preop Diagnosis (Free Text):  Pre-op Diagnosis     left ankle fracture        Preop Diagnosis (Codes):    Premium Reason  A. 3PM - 7AM    Comments:

## 2022-02-08 NOTE — CONSULTS
2/7/2022    The patient was seen at the request of emergency service    HPI: Atiya Henley is a 71 y.o. female who presents with complaints of pain to ankle  This started after fall.  The pain is 9/10 and is described as sharp.  The pain is made worse by palpation of the area and made better by rest and immobilization. Obvious fracture of the ankle    Past Medical History:   Diagnosis Date   • Anxiety disorder    • Arthritis    • Bipolar affective ([facility])    • Bladder incontinence    • Bowel obstruction ([facility])    • Breath shortness     with activity   • Cancer ([facility])     cervical   • Chronic autoimmune thyroiditis     + TPO ab = 563 / + ultrasound   • Colostomy in place ([facility]) 2009   • COPD    • Dental disorder     dentures upper   • Diabetes ([facility])     Pt denies DM 7/23/19   • EMPHYSEMA    • Hashimoto's disease    • High cholesterol    • History of total knee arthroplasty     infection   • Hypertension     stated no longer on meds due to lost 100 lbs   • Hypothyroid 7/7/2009   • Indigestion    • Infectious disease     MRSA,   • Infectious disease     VRE   • Obesity 7/7/2009   • Obstruction     colostomy   • Oxygen dependent     4.5 L NC   • Pain     b/l legs   • Personal history of venous thrombosis and embolism 2009, 2012    arm and leg left side   • Presence of IVC filter    • Psychiatric problem     depression, denies currently 7/23/19   • Sleep apnea     USES CPAP, 3-4L oxygen   • Snoring    • Status post cholecystectomy 1992   • Weight gain        Past Surgical History:   Procedure Laterality Date   • VITRECTOMY POSTERIOR Left 7/23/2019    Procedure: REMOVAL, VITREOUS BODY, POSTERIOR PORTION- MEMBRANE PEEL LASER, GAS;  Surgeon: Ernst Morris M.D.;  Location: SURGERY SAME DAY [facility] ORS;  Service: Ophthalmology   • PB INJECT RX OTHER PERIPH NERVE Left 8/21/2015    Procedure: NEUROLYTIC DEST-OTHER NERVE;  Surgeon: Kd Galaviz D.O.;  Location: SURGERY SURGICAL [facility] ORS;  Service: Pain Management   •  AZ FLUOROSCOPIC GUIDANCE NEEDLE PLACEMENT Left 8/21/2015    Procedure: FLOURO GUIDE NEEDLE PLACEMENT;  Surgeon: Kd Galaviz D.O.;  Location: [facility];  Service: Pain Management   • PB INJECT RX OTHER PERIPH NERVE  8/21/2015    Procedure: NEUROLYTIC DEST-OTHER NERVE;  Surgeon: Kd Galaviz D.O.;  Location: [facility];  Service: Pain Management   • PB INJECT RX OTHER PERIPH NERVE  8/21/2015    Procedure: NEUROLYTIC DEST-OTHER NERVE;  Surgeon: Kd Galaviz D.O.;  Location: [facility];  Service: Pain Management   • EXPLORATORY LAPAROTOMY  12/10/2013    Performed by Sang Castañeda M.D. at [facility]   • WOUND CLOSURE GENERAL  12/10/2013    Performed by Sang Castañeda M.D. at [facility]   • COLOSTOMY  11/26/2013    Performed by Maninder Carter M.D. at [facility]   • EXPLORATORY LAPAROTOMY  11/26/2013    Performed by Maninder Carter M.D. at [facility]   • LYSIS ADHESIONS GENERAL  11/26/2013    Performed by Maninder Carter M.D. at [facility]   • COLON RESECTION  11/26/2013    Performed by Maninder Carter M.D. at [facility]   • BLADDER BIOPSY WITH CYSTOSCOPY  10/10/2013    Performed by Sang Castañeda M.D. at [facility]   • EXPLORATORY LAPAROTOMY  9/2/2013    Performed by Maninder Carter M.D. at [facility]   • PARASTOMAL HERNIA REPAIR   9/2/2013    Performed by Maninder Carter M.D. at [facility]   • IRRIGATION & DEBRIDEMENT ORTHO  2/8/2013    Performed by David Fowler M.D. at [facility]   • IRRIGATION & DEBRIDEMENT GENERAL  12/17/2012    Performed by David Fowler M.D. at [facility]   • IRRIGATION & DEBRIDEMENT ORTHO  7/19/2012    Performed by BERYL LOYD at [facility]   • IRRIGATION & DEBRIDEMENT ORTHO  7/17/2012    Performed by BERYL LOYD at [facility]   • KNEE  ARTHROPLASTY TOTAL  6/10/2012    left   • LATERAL RELEASE  7/20/2011    Performed by BERYL LOYD at SURGERY [facility] ORS   • KNEE ARTHROSCOPY  7/20/2011    Performed by BERYL LOYD at SURGERY [facility]   • MENISCECTOMY, KNEE, MEDIAL  7/20/2011    Performed by BERYL LOYD at SURGERY [facility] ORS   • MENISCECTOMY  7/20/2011    Performed by BERYL LOYD at SURGERY [facility]   • COLONOSCOPY - ENDO  11/17/2010    Performed by JESUSITA SUMNER at ENDOSCOPY [facility] ORS   • TRACHEOSTOMY  3/31/2010    Performed by JESUSITA SUMNER at SURGERY [facility]S   • COLOSTOMY  3/16/2010    Performed by CHRISTA BENNETT at SURGERY [facility] ORS   • EXPLORATORY LAPAROTOMY  3/16/2010    Performed by CHRISTA BENNETT at SURGERY [facility] ORS   • COLECTOMY  3/16/2010    Performed by CHRISTA BENNETT at SURGERY [facility] ORS   • LOW ANTERIOR RESECTION  3/10/2010    Performed by JESUSITA SUMNER at SURGERY [facility] ORS   • LOW ANTERIOR RESECTION LAPAROSCOPIC  3/10/2010    Performed by JESUSITA SUMNER at SURGERY [facility] ORS   • MYRINGOTOMY  9/9/2009    Performed by RENETTA GOODSON at SURGERY SAME DAY [facility]   • CHOLECYSTECTOMY  1992    laparoscopic   • HYSTERECTOMY LAPAROSCOPY  1983   • OTHER ORTHOPEDIC SURGERY      LT KNEE X2       Medications  No current facility-administered medications on file prior to encounter.     Current Outpatient Medications on File Prior to Encounter   Medication Sig Dispense Refill   • lisinopril (PRINIVIL) 2.5 MG Tab Take 2.5 mg by mouth every day.     • levothyroxine (SYNTHROID) 200 MCG Tab Take 200 mcg by mouth every morning on an empty stomach.     • amitriptyline (ELAVIL) 25 MG Tab Take 75 mg by mouth every evening.     • mirtazapine (REMERON) 15 MG Tab Take 15 mg by mouth every evening.     • Cholecalciferol (VITAMIN D) 50 MCG (2000 UT) Cap Take 2,000 Units by mouth every day.     • warfarin (COUMADIN) 10 MG Tab Take 5-10 mg by mouth at  bedtime. 5 mg on Monday, Wednesday, Friday  10 mg all other days     • tiotropium (SPIRIVA RESPIMAT) 2.5 mcg/Act Aero Soln Inhale 2 Inhalation every day. 3 Each 3   • budesonide-formoterol (SYMBICORT) 160-4.5 MCG/ACT Aerosol Inhale 2 Puffs 2 times a day. With spacer, rinse mouth after use 3 Each 3   • albuterol (VENTOLIN HFA) 108 (90 Base) MCG/ACT Aero Soln inhalation aerosol Inhale 2 Puffs every four hours as needed for Shortness of Breath. 1 Each 11   • fluticasone (FLONASE) 50 MCG/ACT nasal spray Administer 1-2 Sprays into affected nostril(S) every day. Each nostril. 16 g 6   • Incontinence Supply Disposable (FQ PROTECTIVE UNDERWEAR) Misc USE 1 PULLUP 3 TIMES DAILY 90 Each 0   • gabapentin (NEURONTIN) 300 MG Cap TAKE THREE CAPSULES BY MOUTH THREE TIMES A DAY (Patient taking differently: Take 300 mg by mouth 3 times a day.) 270 Cap 0   • Incontinence Supply Disposable (FQ PROTECTIVE UNDERWEAR) Misc USE 1 PULLUP 3 TIMES DAILY 90 Each 3   • carbamazepine (TEGRETOL) 200 MG Tab Take 600 mg by mouth every bedtime.         Allergies  Pcn [penicillins]    ROS  .All other systems were reviewed and found to be negative    Family History   Problem Relation Age of Onset   • Heart Disease Mother    • Seizures Mother    • Alcohol/Drug Father    • Lung Disease Father    • Hypertension Other        Social History     Socioeconomic History   • Marital status:      Spouse name: Not on file   • Number of children: Not on file   • Years of education: Not on file   • Highest education level: Not on file   Occupational History   • Not on file   Tobacco Use   • Smoking status: Current Every Day Smoker     Packs/day: 0.50     Years: 50.00     Pack years: 25.00     Types: Cigarettes   • Smokeless tobacco: Never Used   • Tobacco comment: 1 ppd, 45 yrs   Vaping Use   • Vaping Use: Never used   Substance and Sexual Activity   • Alcohol use: No     Alcohol/week: 0.0 oz   • Drug use: Yes     Types: Marijuana, Inhaled     Comment: 1-2  "daily, last 7/22/19  at 1530   • Sexual activity: Never   Other Topics Concern   •  Service No   • Blood Transfusions No     Comment: unknown   • Caffeine Concern No   • Occupational Exposure No   • Hobby Hazards No   • Sleep Concern Yes     Comment: sleep apnea   • Stress Concern Yes   • Weight Concern No   • Special Diet Yes   • Back Care Yes   • Exercise No   • Bike Helmet No     Comment: does not ride bike    • Seat Belt Yes   • Self-Exams Yes   Social History Narrative   • Not on file     Social Determinants of Health     Financial Resource Strain:    • Difficulty of Paying Living Expenses: Not on file   Food Insecurity:    • Worried About Running Out of Food in the Last Year: Not on file   • Ran Out of Food in the Last Year: Not on file   Transportation Needs:    • Lack of Transportation (Medical): Not on file   • Lack of Transportation (Non-Medical): Not on file   Physical Activity:    • Days of Exercise per Week: Not on file   • Minutes of Exercise per Session: Not on file   Stress:    • Feeling of Stress : Not on file   Social Connections:    • Frequency of Communication with Friends and Family: Not on file   • Frequency of Social Gatherings with Friends and Family: Not on file   • Attends Temple Services: Not on file   • Active Member of Clubs or Organizations: Not on file   • Attends Club or Organization Meetings: Not on file   • Marital Status: Not on file   Intimate Partner Violence:    • Fear of Current or Ex-Partner: Not on file   • Emotionally Abused: Not on file   • Physically Abused: Not on file   • Sexually Abused: Not on file   Housing Stability:    • Unable to Pay for Housing in the Last Year: Not on file   • Number of Places Lived in the Last Year: Not on file   • Unstable Housing in the Last Year: Not on file       Physical Exam  Vitals  /70   Pulse 83   Temp 36.6 °C (97.8 °F)   Resp 16   Ht 1.626 m (5' 4\")   Wt 117 kg (259 lb)   SpO2 97%   General: Well Developed, Well " Nourished, Age appropriate appearance  HEENT: Normocephalic, atraumatic  Psych: Normal mood and affect  Neck: Supple, nontender, no masses  Lungs: Breathing unlabored, No audible wheezing  Heart: Regular heart rate and rhythm  Abdomen: Soft, NT, ND  Neuro: Sensation grossly intact to BUE and BLE, moving all four extremities  Skin: Intact, no open wounds  Swollen ankle not open, nv ok no other acute ortho injuries or joint problems noted      Radiographs:  DX-ANKLE 3+ VIEWS LEFT   Final Result      Intraoperative image as above described.      DX-ANKLE 3+ VIEWS LEFT   Final Result         1.  Distal fibular metaphyseal fracture   2.  Medial malleolus fracture   3.  Interval reduction of lateral ankle subluxation         DX-KNEE 3 VIEWS LEFT   Final Result         1.  Spiral proximal fibular diaphyseal fracture   2.  Lateral subluxation of the patella, concerning for patellar dislocation. Could be further evaluated with sunrise view.      DX-TIBIA AND FIBULA LEFT   Final Result         1.  Proximal fibular diaphyseal fracture.   2.  Distal fibular metaphyseal fracture      DX-ANKLE 3+ VIEWS LEFT   Final Result         1.  Lateral malleolus fracture with lateral subluxation of the ankle mortise joint.   2.  Bony fracture fragments adjacent to the medial malleolus compatible with ligamentous avulsion fracture fragments.         DX-PORTABLE FLUORO > 1 HOUR    (Results Pending)       Laboratory Values  Recent Labs     02/07/22  0648   WBC 8.7   RBC 3.94*   HEMOGLOBIN 12.5   HEMATOCRIT 39.1   MCV 99.2*   MCH 31.7   MCHC 32.0*   RDW 58.0*   PLATELETCT 235   MPV 10.3     Recent Labs     02/07/22  0648   SODIUM 141   POTASSIUM 4.2   CHLORIDE 104   CO2 28   GLUCOSE 135*   BUN 7*     Recent Labs     02/07/22  0648   INR 2.62*         Impression:closed ankle fracture left    Plan:We discussed the diagnosis and findings with the patient at length.  We reviewed possible non operative and operative interventions and the risks and  benefits of each of these.  she had a chance to ask questions and all of these were answered to her satisfaction. The patient chose to proceed with  operativeintervention. Risks and benefits of surgery were discussed which include but are not limited to bleeding, infection, neurovascular damage, malunion, nonunion, instability, limb length discrepancy, DVT, PE, MI, Stroke and death. They understand these risks and wish to proceed.

## 2022-02-09 ENCOUNTER — APPOINTMENT (OUTPATIENT)
Dept: SLEEP MEDICINE | Facility: MEDICAL CENTER | Age: 72
End: 2022-02-09
Attending: NURSE PRACTITIONER
Payer: MEDICARE

## 2022-02-09 ENCOUNTER — APPOINTMENT (OUTPATIENT)
Dept: SLEEP MEDICINE | Facility: MEDICAL CENTER | Age: 72
End: 2022-02-09
Payer: MEDICARE

## 2022-02-09 LAB
ANION GAP SERPL CALC-SCNC: 9 MMOL/L (ref 7–16)
BASOPHILS # BLD AUTO: 0.5 % (ref 0–1.8)
BASOPHILS # BLD: 0.03 K/UL (ref 0–0.12)
BUN SERPL-MCNC: 7 MG/DL (ref 8–22)
CALCIUM SERPL-MCNC: 8.2 MG/DL (ref 8.5–10.5)
CHLORIDE SERPL-SCNC: 99 MMOL/L (ref 96–112)
CO2 SERPL-SCNC: 29 MMOL/L (ref 20–33)
CREAT SERPL-MCNC: 0.51 MG/DL (ref 0.5–1.4)
EOSINOPHIL # BLD AUTO: 0.21 K/UL (ref 0–0.51)
EOSINOPHIL NFR BLD: 3.3 % (ref 0–6.9)
ERYTHROCYTE [DISTWIDTH] IN BLOOD BY AUTOMATED COUNT: 56.2 FL (ref 35.9–50)
GLUCOSE SERPL-MCNC: 103 MG/DL (ref 65–99)
HCT VFR BLD AUTO: 33.3 % (ref 37–47)
HGB BLD-MCNC: 10.6 G/DL (ref 12–16)
IMM GRANULOCYTES # BLD AUTO: 0.02 K/UL (ref 0–0.11)
IMM GRANULOCYTES NFR BLD AUTO: 0.3 % (ref 0–0.9)
INR PPP: 1.56 (ref 0.87–1.13)
LYMPHOCYTES # BLD AUTO: 1.47 K/UL (ref 1–4.8)
LYMPHOCYTES NFR BLD: 22.8 % (ref 22–41)
MCH RBC QN AUTO: 31.3 PG (ref 27–33)
MCHC RBC AUTO-ENTMCNC: 31.8 G/DL (ref 33.6–35)
MCV RBC AUTO: 98.2 FL (ref 81.4–97.8)
MONOCYTES # BLD AUTO: 0.59 K/UL (ref 0–0.85)
MONOCYTES NFR BLD AUTO: 9.1 % (ref 0–13.4)
NEUTROPHILS # BLD AUTO: 4.14 K/UL (ref 2–7.15)
NEUTROPHILS NFR BLD: 64 % (ref 44–72)
NRBC # BLD AUTO: 0 K/UL
NRBC BLD-RTO: 0 /100 WBC
PLATELET # BLD AUTO: 204 K/UL (ref 164–446)
PMV BLD AUTO: 10.6 FL (ref 9–12.9)
POTASSIUM SERPL-SCNC: 3.9 MMOL/L (ref 3.6–5.5)
PROTHROMBIN TIME: 18.2 SEC (ref 12–14.6)
RBC # BLD AUTO: 3.39 M/UL (ref 4.2–5.4)
SODIUM SERPL-SCNC: 137 MMOL/L (ref 135–145)
WBC # BLD AUTO: 6.5 K/UL (ref 4.8–10.8)

## 2022-02-09 PROCEDURE — 302146: Performed by: INTERNAL MEDICINE

## 2022-02-09 PROCEDURE — 700102 HCHG RX REV CODE 250 W/ 637 OVERRIDE(OP): Performed by: HOSPITALIST

## 2022-02-09 PROCEDURE — 36415 COLL VENOUS BLD VENIPUNCTURE: CPT

## 2022-02-09 PROCEDURE — 700102 HCHG RX REV CODE 250 W/ 637 OVERRIDE(OP): Performed by: INTERNAL MEDICINE

## 2022-02-09 PROCEDURE — 99225 PR SUBSEQUENT OBSERVATION CARE,LEVEL II: CPT | Performed by: INTERNAL MEDICINE

## 2022-02-09 PROCEDURE — 85025 COMPLETE CBC W/AUTO DIFF WBC: CPT

## 2022-02-09 PROCEDURE — A9270 NON-COVERED ITEM OR SERVICE: HCPCS | Performed by: HOSPITALIST

## 2022-02-09 PROCEDURE — A9270 NON-COVERED ITEM OR SERVICE: HCPCS | Performed by: INTERNAL MEDICINE

## 2022-02-09 PROCEDURE — G0378 HOSPITAL OBSERVATION PER HR: HCPCS

## 2022-02-09 PROCEDURE — 85610 PROTHROMBIN TIME: CPT

## 2022-02-09 PROCEDURE — A9270 NON-COVERED ITEM OR SERVICE: HCPCS | Performed by: STUDENT IN AN ORGANIZED HEALTH CARE EDUCATION/TRAINING PROGRAM

## 2022-02-09 PROCEDURE — 80048 BASIC METABOLIC PNL TOTAL CA: CPT

## 2022-02-09 PROCEDURE — 700102 HCHG RX REV CODE 250 W/ 637 OVERRIDE(OP): Performed by: STUDENT IN AN ORGANIZED HEALTH CARE EDUCATION/TRAINING PROGRAM

## 2022-02-09 RX ORDER — WARFARIN SODIUM 5 MG/1
5 TABLET ORAL
Status: DISCONTINUED | OUTPATIENT
Start: 2022-02-11 | End: 2022-02-10

## 2022-02-09 RX ORDER — WARFARIN SODIUM 10 MG/1
10 TABLET ORAL
Status: COMPLETED | OUTPATIENT
Start: 2022-02-09 | End: 2022-02-09

## 2022-02-09 RX ORDER — WARFARIN SODIUM 10 MG/1
10 TABLET ORAL
Status: DISCONTINUED | OUTPATIENT
Start: 2022-02-10 | End: 2022-02-10

## 2022-02-09 RX ADMIN — WARFARIN SODIUM 10 MG: 10 TABLET ORAL at 17:16

## 2022-02-09 RX ADMIN — LEVOTHYROXINE SODIUM 200 MCG: 0.2 TABLET ORAL at 04:53

## 2022-02-09 RX ADMIN — BUDESONIDE AND FORMOTEROL FUMARATE DIHYDRATE 2 PUFF: 160; 4.5 AEROSOL RESPIRATORY (INHALATION) at 04:57

## 2022-02-09 RX ADMIN — OXYCODONE HYDROCHLORIDE 10 MG: 10 TABLET ORAL at 12:47

## 2022-02-09 RX ADMIN — BUDESONIDE AND FORMOTEROL FUMARATE DIHYDRATE 2 PUFF: 160; 4.5 AEROSOL RESPIRATORY (INHALATION) at 17:17

## 2022-02-09 RX ADMIN — OXYCODONE HYDROCHLORIDE 10 MG: 10 TABLET ORAL at 01:01

## 2022-02-09 RX ADMIN — DOCUSATE SODIUM 50 MG AND SENNOSIDES 8.6 MG 2 TABLET: 8.6; 5 TABLET, FILM COATED ORAL at 04:53

## 2022-02-09 RX ADMIN — OXYCODONE HYDROCHLORIDE 10 MG: 10 TABLET ORAL at 22:06

## 2022-02-09 RX ADMIN — DOCUSATE SODIUM 50 MG AND SENNOSIDES 8.6 MG 2 TABLET: 8.6; 5 TABLET, FILM COATED ORAL at 17:16

## 2022-02-09 RX ADMIN — TIOTROPIUM BROMIDE INHALATION SPRAY 5 MCG: 3.12 SPRAY, METERED RESPIRATORY (INHALATION) at 04:55

## 2022-02-09 RX ADMIN — OXYCODONE HYDROCHLORIDE 10 MG: 10 TABLET ORAL at 06:40

## 2022-02-09 RX ADMIN — CARBAMAZEPINE 600 MG: 200 TABLET ORAL at 22:06

## 2022-02-09 RX ADMIN — LISINOPRIL 2.5 MG: 2.5 TABLET ORAL at 04:54

## 2022-02-09 ASSESSMENT — PAIN DESCRIPTION - PAIN TYPE
TYPE: ACUTE PAIN;SURGICAL PAIN
TYPE: ACUTE PAIN;SURGICAL PAIN
TYPE: ACUTE PAIN
TYPE: SURGICAL PAIN
TYPE: SURGICAL PAIN

## 2022-02-09 ASSESSMENT — ENCOUNTER SYMPTOMS
VOMITING: 0
ABDOMINAL PAIN: 0
SHORTNESS OF BREATH: 0
PALPITATIONS: 0
NERVOUS/ANXIOUS: 1
NAUSEA: 0
NECK PAIN: 0
MYALGIAS: 0
COUGH: 0

## 2022-02-09 NOTE — DISCHARGE PLANNING
Agency/Facility Name: [facility]  Spoke To: Mira  Outcome: Pt declined. Pt is observation and facility not doing waiver      CM informed

## 2022-02-09 NOTE — CARE PLAN
The patient is Stable - Low risk of patient condition declining or worsening    Shift Goals  Clinical Goals: pain control, safety  Patient Goals: pain control  Family Goals: n/a    Progress made toward(s) clinical / shift goals:      Patient is not progressing towards the following goals:    Problem: Pain - Standard  Goal: Alleviation of pain or a reduction in pain to the patient’s comfort goal  Outcome: Progressing    Educated on pain scale. Encouraged to verbalize pain. Will medicate as per MAR.     Problem: Fall Risk  Goal: Patient will remain free from falls  Outcome: Progressing    Fall protocol in effect. Call light within reach. Reminded patient to call for assist. Hourly rounds in effect. Verbalized understanding.      Problem: Knowledge Deficit - Standard  Goal: Patient and family/care givers will demonstrate understanding of plan of care, disease process/condition, diagnostic tests and medications  Outcome: Progressing    POC discussed with the patient. Questions answered. verbalized understanding.

## 2022-02-09 NOTE — DISCHARGE PLANNING
Received Choice form at 1345  Agency/Facility Name: [facility], [facility], [facility], [place], [facility]  Referral sent per Choice form @ 1345       CM informed

## 2022-02-09 NOTE — PROGRESS NOTES
"   Orthopaedic PA Progress Note    Interval changes:Doing well.  Clear for disposition (home/SNF/IRF) from Orthopaedic team standpoint.    ROS - Patient denies any new issues. No chest pain, dyspnea, or fever.  Pain well controlled.    /80   Pulse 75   Temp 36.3 °C (97.3 °F) (Temporal)   Resp 17   Ht 1.626 m (5' 4\")   Wt 117 kg (259 lb)   SpO2 96%     Patient seen and examined  No acute distress  Breathing non labored  RRR  Prox compartments soft  Splint intact  Toes DF/PF/SILT/ICR        Recent Labs     02/07/22  0648 02/08/22  0557   WBC 8.7 7.6   RBC 3.94* 3.36*   HEMOGLOBIN 12.5 10.8*   HEMATOCRIT 39.1 34.0*   MCV 99.2* 101.2*   MCH 31.7 32.1   MCHC 32.0* 31.8*   RDW 58.0* 59.0*   PLATELETCT 235 208   MPV 10.3 10.2       Active Hospital Problems    Diagnosis    • History of DVT (deep vein thrombosis) [Z86.718]      Priority: Medium     Diagnois update 10/1/2016     • COPD (chronic obstructive pulmonary disease) ([facility]) [J44.9]      Priority: Medium     On home O2  Current smoker.  Significant comorbidity  Does not appear to be exacerbating  Resp and O2 per protocol     • Primary hypertension [I10]      Priority: Medium   • YESSICA (obstructive sleep apnea) [G47.33]      Priority: Medium     CPAP at night at home     • Tobacco abuse [Z72.0]      Priority: Low   • Closed left ankle fracture, initial encounter [F69.552X]    • Class 3 severe obesity due to excess calories with serious comorbidity and body mass index (BMI) of 40.0 to 44.9 in adult ([facility]) [E66.01, Z68.41]    • Hypothyroidism, acquired, autoimmune [E06.3]        Assessment/Plan:  POD#1 S/P ORIF L ankle  Wt bearing status - NWB LLE  PT/OT-initiated  Wound care:Ortho team to manage wound care beneath all splints. Call [phone] if concerns  Antibiotics: complete  DVT Prophylaxis- TEDS/SCDs/Foot pumps.   Lovenox: Start 40 mg daily, Duration-until ambulatory > 150'  Future Procedures - none planned  Case Coordination for Discharge Planning - Disposition " Clear for disposition (home/SNF/IRF) from Orthopaedic team standpoint.  Follow-Up: needs appointment with Dr. Khoury at [facility] at 10-14 days post-op for re-evaluation, staple removal and radiographs.

## 2022-02-09 NOTE — DISCHARGE PLANNING
Agency/Facility Name: [facility]  Spoke To: Ana María  Outcome: Pt accepted. Bed possibly available 2/10. Need PASRR and LOC. Need neg COVID result once bed available      CM informed

## 2022-02-09 NOTE — PROGRESS NOTES
0710 Patient's sitting up in bed. Bedside report received from JACQUELINE Light RN at the beginning of the shift.    0910 Patient's sitting up in bed. LLE elevated on pillows.Educated on the importance/use of IS at least 10x every hour while awake, able to reach 750. Q 2 turn in effect. Call light within reach. Reminded patient to call for assist. Assessment completed. No distress noted. Plan of care reviewed with the patient. Verbalized understanding.    1140 Patient's in bed. No distress noted.     1247 Medicated with Oxycodone (see MAR) for c/o's left ankle/leg pain, rates pain 8/10.

## 2022-02-09 NOTE — CARE PLAN
The patient is Stable - Low risk of patient condition declining or worsening    Shift Goals  Clinical Goals: Pain control and safety  Patient Goals: pain control and rest  Family Goals: n/a    Progress made toward(s) clinical / shift goals: Plan of care discussed.Educated on fall prevention,call light within reach,encouraged to call for assistance.PRN medication given appropriately.   Problem: Pain - Standard  Goal: Alleviation of pain or a reduction in pain to the patient’s comfort goal  Outcome: Progressing     Problem: Fall Risk  Goal: Patient will remain free from falls  Outcome: Progressing     Problem: Skin Integrity  Goal: Skin integrity is maintained or improved  Outcome: Progressing       Patient is not progressing towards the following goals:

## 2022-02-09 NOTE — DISCHARGE PLANNING
Agency[facility]Facility Name: [facility]  Spoke To: [facility]  Outcome: Pt declined. Care exceeds capacity      CM informed

## 2022-02-09 NOTE — DISCHARGE PLANNING
[facility]itional [facility] Coordination    Physiatry consult complete.  Dr. Finnegan recommending poor candidate for inpatient rehab.  Anticipate skilled nursing for post acute care.  Voalte message to T3 MARTIN Bains.      [facility] will no longer follow.      Thank you for the referral.

## 2022-02-09 NOTE — PROGRESS NOTES
Inpatient Anticoagulation Service Note  Date: 2/9/2022  Reason for Anticoagulation: Atrial Fibrillation,Deep Vein Thrombosis   Hemoglobin Value: (!) 10.6  Hematocrit Value: (!) 33.3  Lab Platelet Value: 204  Target INR: 2.0 to 3.0  INR from last 7 days     Date/Time INR Value    02/09/22 1209 1.56    02/07/22 06:48:01 2.62        Dose from last 7 days     Date/Time Dose (mg)    02/09/22 1351 10        Average Dose (mg): TBD (Home Dose: 5 mg MWF + 10 mg ROW per MR report)  Significant Interactions: Thyroid Medications,Other (carbamezepine)  Bridge Therapy: No   Reversal Agent Administered: Not Applicable  Comments: INR below goal as expected after holding warfarin therapy. No overt bleeding noted per chart review. H/H low. PLT down slightly. Will give warfarin 10 mg today to mobilize INR. INR with AM labs.    Plan:  Warfarin 10 mg 2/9/22  Education Material Provided?: No (chronic warfarin patient)  Pharmacist suggested discharge dosing: warfarin 5 mg Mo/We/Fr and 10 mg all other days    Gavino Fowler, PharmD

## 2022-02-09 NOTE — PROGRESS NOTES
[facility] Medicine Daily Progress Note    Date of Service  2/9/2022    Chief Complaint  Atiya Henley is a 71 y.o. female admitted 2/7/2022 with left ankle fracture    Hospital Course  No notes on file  71 y.o. female who presented 2/7/2022 with fall. PMH of COPD on 2L 02, HTN, tobacco use, YESSICA, on CPAP, Hx of DVT on warfarin, anxiety, hypothyroid. Lives in nursing home.  Found down in the bathroom and complaining of pain on her left lower extremity.  Did not hit her head, no LOC.  Says she also fell a few weeks ago and hit her head, ecchymosis below both eyes bilaterally.  She was found to have left ankle fracture and dislocation. Ortho was consulted and plans to take the patient to the OR for repair.    Interval Problem Update  Afebrile, no acute events overnight. Had surgical repair with ortho on 2/7. Appreciate rec,   PT/OT eval needs placement   Pain management.    I have personally seen and examined the patient at bedside. I discussed the plan of care with patient     Consultants/Specialty  orthopedics    Code Status  DNAR/DNI    Disposition  Patient is medically cleared for discharge.   Anticipate discharge to to skilled nursing facility.  I have placed the appropriate orders for post-discharge needs.    Review of Systems  Review of Systems   Constitutional: Positive for malaise/fatigue.   HENT: Negative for ear discharge and ear pain.    Respiratory: Negative for cough and shortness of breath.    Cardiovascular: Negative for chest pain and palpitations.   Gastrointestinal: Negative for abdominal pain, nausea and vomiting.   Genitourinary: Negative for dysuria, frequency and urgency.   Musculoskeletal: Positive for joint pain. Negative for myalgias and neck pain.   Psychiatric/Behavioral: The patient is nervous/anxious.    All other systems reviewed and are negative.       Physical Exam  Temp:  [36.3 °C (97.3 °F)-37.1 °C (98.7 °F)] 36.7 °C (98 °F)  Pulse:  [69-90] 69  Resp:  [16-18] 18  BP:  (128-156)/(69-85) 156/75  SpO2:  [90 %-96 %] 93 %    Physical Exam  Vitals and nursing note reviewed.   Constitutional:       Appearance: She is obese.   Eyes:      General: No scleral icterus.  Cardiovascular:      Rate and Rhythm: Bradycardia present.   Pulmonary:      Effort: Pulmonary effort is normal. No respiratory distress.      Breath sounds: No rales.   Abdominal:      General: There is no distension.      Tenderness: There is no abdominal tenderness. There is no guarding or rebound.   Musculoskeletal:      Comments: Splint over ankle   Skin:     General: Skin is warm.      Coloration: Skin is not jaundiced.   Neurological:      General: No focal deficit present.      Mental Status: Mental status is at baseline.         Fluids  No intake or output data in the 24 hours ending 02/09/22 1134    Laboratory  Recent Labs     02/07/22  0648 02/08/22  0557 02/09/22  0338   WBC 8.7 7.6 6.5   RBC 3.94* 3.36* 3.39*   HEMOGLOBIN 12.5 10.8* 10.6*   HEMATOCRIT 39.1 34.0* 33.3*   MCV 99.2* 101.2* 98.2*   MCH 31.7 32.1 31.3   MCHC 32.0* 31.8* 31.8*   RDW 58.0* 59.0* 56.2*   PLATELETCT 235 208 204   MPV 10.3 10.2 10.6     Recent Labs     02/07/22  0648 02/08/22  0557 02/09/22  0338   SODIUM 141 139 137   POTASSIUM 4.2 4.3 3.9   CHLORIDE 104 102 99   CO2 28 27 29   GLUCOSE 135* 112* 103*   BUN 7* 10 7*   CREATININE 0.67 0.58 0.51   CALCIUM 8.9 8.4* 8.2*     Recent Labs     02/07/22  0648   INR 2.62*               Imaging  DX-PORTABLE FLUORO > 1 HOUR   Final Result      Portable fluoroscopy utilized for 24 seconds.         INTERPRETING LOCATION: [address] [place] [address], [address]      DX-ANKLE 3+ VIEWS LEFT   Final Result      Intraoperative image as above described.      DX-ANKLE 3+ VIEWS LEFT   Final Result         1.  Distal fibular metaphyseal fracture   2.  Medial malleolus fracture   3.  Interval reduction of lateral ankle subluxation         DX-KNEE 3 VIEWS LEFT   Final Result         1.  Spiral proximal fibular diaphyseal  fracture   2.  Lateral subluxation of the patella, concerning for patellar dislocation. Could be further evaluated with sunrise view.      DX-TIBIA AND FIBULA LEFT   Final Result         1.  Proximal fibular diaphyseal fracture.   2.  Distal fibular metaphyseal fracture      DX-ANKLE 3+ VIEWS LEFT   Final Result         1.  Lateral malleolus fracture with lateral subluxation of the ankle mortise joint.   2.  Bony fracture fragments adjacent to the medial malleolus compatible with ligamentous avulsion fracture fragments.              Assessment/Plan  * Closed left ankle fracture, initial encounter- (present on admission)  Assessment & Plan  Left ankle fracture dislocation following fall.    Fracture from ground-level fall.  Vitamin D level checked and found to be unremarkable  S/p surgical repair with ortho appreciate rec.  PT/OT eval will need placement      Class 3 severe obesity due to excess calories with serious comorbidity and body mass index (BMI) of 40.0 to 44.9 in adult ([facility])- (present on admission)  Assessment & Plan  BMI 44.46.  Ground-level fall with fracture  PT, OT    Hypothyroidism, acquired, autoimmune- (present on admission)  Assessment & Plan  Continue levothyroxine    History of DVT (deep vein thrombosis)- (present on admission)  Assessment & Plan  On warfarin.  Held due to expected surgery  Restarting today 2/9     Tobacco abuse- (present on admission)  Assessment & Plan  Says she is down to few cigarettes a week.  Says she does not need nicotine patch    COPD (chronic obstructive pulmonary disease) ([facility])- (present on admission)  Assessment & Plan  Nonacute exacerbation, his baseline is 2 L  Continue Trelegy Ellipta    Primary hypertension- (present on admission)  Assessment & Plan  Continue lisinopril    YESSICA (obstructive sleep apnea)- (present on admission)  Assessment & Plan  Nocturnal CPAP ordered       VTE prophylaxis: SCDs/TEDs    I have performed a physical exam and reviewed and updated ROS  and Plan today (2/9/2022). In review of yesterday's note (2/8/2022), there are no changes except as documented above.

## 2022-02-09 NOTE — DISCHARGE PLANNING
Received Choice form at 1030  Agency/Facility Name: [facility](1), [facility](2)  Referral sent per Choice form @ 1030      CM informed

## 2022-02-10 LAB
ANION GAP SERPL CALC-SCNC: 11 MMOL/L (ref 7–16)
BASOPHILS # BLD AUTO: 0.8 % (ref 0–1.8)
BASOPHILS # BLD: 0.05 K/UL (ref 0–0.12)
BUN SERPL-MCNC: 6 MG/DL (ref 8–22)
CALCIUM SERPL-MCNC: 8.7 MG/DL (ref 8.5–10.5)
CHLORIDE SERPL-SCNC: 97 MMOL/L (ref 96–112)
CO2 SERPL-SCNC: 27 MMOL/L (ref 20–33)
CREAT SERPL-MCNC: 0.54 MG/DL (ref 0.5–1.4)
EOSINOPHIL # BLD AUTO: 0.31 K/UL (ref 0–0.51)
EOSINOPHIL NFR BLD: 4.8 % (ref 0–6.9)
ERYTHROCYTE [DISTWIDTH] IN BLOOD BY AUTOMATED COUNT: 55.2 FL (ref 35.9–50)
GLUCOSE SERPL-MCNC: 107 MG/DL (ref 65–99)
HCT VFR BLD AUTO: 34.3 % (ref 37–47)
HGB BLD-MCNC: 11.1 G/DL (ref 12–16)
IMM GRANULOCYTES # BLD AUTO: 0.02 K/UL (ref 0–0.11)
IMM GRANULOCYTES NFR BLD AUTO: 0.3 % (ref 0–0.9)
INR PPP: 1.32 (ref 0.87–1.13)
LYMPHOCYTES # BLD AUTO: 1.48 K/UL (ref 1–4.8)
LYMPHOCYTES NFR BLD: 23.1 % (ref 22–41)
MCH RBC QN AUTO: 31.4 PG (ref 27–33)
MCHC RBC AUTO-ENTMCNC: 32.4 G/DL (ref 33.6–35)
MCV RBC AUTO: 97.2 FL (ref 81.4–97.8)
MONOCYTES # BLD AUTO: 0.63 K/UL (ref 0–0.85)
MONOCYTES NFR BLD AUTO: 9.8 % (ref 0–13.4)
NEUTROPHILS # BLD AUTO: 3.93 K/UL (ref 2–7.15)
NEUTROPHILS NFR BLD: 61.2 % (ref 44–72)
NRBC # BLD AUTO: 0 K/UL
NRBC BLD-RTO: 0 /100 WBC
PLATELET # BLD AUTO: 239 K/UL (ref 164–446)
PMV BLD AUTO: 10.5 FL (ref 9–12.9)
POTASSIUM SERPL-SCNC: 3.7 MMOL/L (ref 3.6–5.5)
PROTHROMBIN TIME: 16 SEC (ref 12–14.6)
RBC # BLD AUTO: 3.53 M/UL (ref 4.2–5.4)
SODIUM SERPL-SCNC: 135 MMOL/L (ref 135–145)
WBC # BLD AUTO: 6.4 K/UL (ref 4.8–10.8)

## 2022-02-10 PROCEDURE — 85025 COMPLETE CBC W/AUTO DIFF WBC: CPT

## 2022-02-10 PROCEDURE — A9270 NON-COVERED ITEM OR SERVICE: HCPCS | Performed by: INTERNAL MEDICINE

## 2022-02-10 PROCEDURE — 80048 BASIC METABOLIC PNL TOTAL CA: CPT

## 2022-02-10 PROCEDURE — A9270 NON-COVERED ITEM OR SERVICE: HCPCS | Performed by: HOSPITALIST

## 2022-02-10 PROCEDURE — 85610 PROTHROMBIN TIME: CPT

## 2022-02-10 PROCEDURE — A9270 NON-COVERED ITEM OR SERVICE: HCPCS | Performed by: STUDENT IN AN ORGANIZED HEALTH CARE EDUCATION/TRAINING PROGRAM

## 2022-02-10 PROCEDURE — 36415 COLL VENOUS BLD VENIPUNCTURE: CPT

## 2022-02-10 PROCEDURE — 700102 HCHG RX REV CODE 250 W/ 637 OVERRIDE(OP): Performed by: INTERNAL MEDICINE

## 2022-02-10 PROCEDURE — G0378 HOSPITAL OBSERVATION PER HR: HCPCS

## 2022-02-10 PROCEDURE — 700102 HCHG RX REV CODE 250 W/ 637 OVERRIDE(OP): Performed by: HOSPITALIST

## 2022-02-10 PROCEDURE — 700102 HCHG RX REV CODE 250 W/ 637 OVERRIDE(OP): Performed by: STUDENT IN AN ORGANIZED HEALTH CARE EDUCATION/TRAINING PROGRAM

## 2022-02-10 PROCEDURE — 99225 PR SUBSEQUENT OBSERVATION CARE,LEVEL II: CPT | Performed by: INTERNAL MEDICINE

## 2022-02-10 RX ORDER — NYSTATIN 100000 [USP'U]/G
POWDER TOPICAL 2 TIMES DAILY
Status: COMPLETED | OUTPATIENT
Start: 2022-02-10 | End: 2022-02-13

## 2022-02-10 RX ORDER — WARFARIN SODIUM 10 MG/1
10 TABLET ORAL
Status: COMPLETED | OUTPATIENT
Start: 2022-02-10 | End: 2022-02-10

## 2022-02-10 RX ORDER — OXYCODONE HYDROCHLORIDE 5 MG/1
5 TABLET ORAL EVERY 8 HOURS PRN
Qty: 9 TABLET | Refills: 0 | Status: SHIPPED | OUTPATIENT
Start: 2022-02-10 | End: 2022-02-15

## 2022-02-10 RX ADMIN — OXYCODONE HYDROCHLORIDE 10 MG: 10 TABLET ORAL at 21:29

## 2022-02-10 RX ADMIN — CARBAMAZEPINE 600 MG: 200 TABLET ORAL at 21:29

## 2022-02-10 RX ADMIN — NYSTATIN 1 APPLICATION: 100000 POWDER TOPICAL at 10:02

## 2022-02-10 RX ADMIN — BUDESONIDE AND FORMOTEROL FUMARATE DIHYDRATE 2 PUFF: 160; 4.5 AEROSOL RESPIRATORY (INHALATION) at 04:59

## 2022-02-10 RX ADMIN — OXYCODONE HYDROCHLORIDE 10 MG: 10 TABLET ORAL at 17:31

## 2022-02-10 RX ADMIN — NYSTATIN 1 APPLICATION: 100000 POWDER TOPICAL at 17:36

## 2022-02-10 RX ADMIN — TIOTROPIUM BROMIDE INHALATION SPRAY 5 MCG: 3.12 SPRAY, METERED RESPIRATORY (INHALATION) at 04:59

## 2022-02-10 RX ADMIN — WARFARIN SODIUM 10 MG: 10 TABLET ORAL at 17:30

## 2022-02-10 RX ADMIN — BUDESONIDE AND FORMOTEROL FUMARATE DIHYDRATE 2 PUFF: 160; 4.5 AEROSOL RESPIRATORY (INHALATION) at 17:33

## 2022-02-10 RX ADMIN — OXYCODONE HYDROCHLORIDE 10 MG: 10 TABLET ORAL at 12:50

## 2022-02-10 RX ADMIN — LISINOPRIL 2.5 MG: 2.5 TABLET ORAL at 04:58

## 2022-02-10 RX ADMIN — DOCUSATE SODIUM 50 MG AND SENNOSIDES 8.6 MG 2 TABLET: 8.6; 5 TABLET, FILM COATED ORAL at 04:56

## 2022-02-10 RX ADMIN — LEVOTHYROXINE SODIUM 200 MCG: 0.2 TABLET ORAL at 04:58

## 2022-02-10 ASSESSMENT — PAIN DESCRIPTION - PAIN TYPE
TYPE: ACUTE PAIN
TYPE: ACUTE PAIN
TYPE: ACUTE PAIN;SURGICAL PAIN
TYPE: ACUTE PAIN

## 2022-02-10 NOTE — DISCHARGE PLANNING
Agency/Facility Name: [facility]  Spoke To: Ana María  Outcome: No answer. Left message inquiring on bed availability      CM informed

## 2022-02-10 NOTE — PROGRESS NOTES
Bedside report received from Delmy SOMMER, pt care assumed, assessment completed. Pt is A&O 4, pt denies current pain. Updated on POC, questions answered. Bed in lowest, locked position, treaded socks on, call light and belongings within reach. Fall precautions in place.

## 2022-02-10 NOTE — DISCHARGE PLANNING
Agency/Facility Name: [facility]  Spoke To: Ana María  Outcome: Have to cancel transport for 2/10 because pt is actually [employer] and facility needs to get ins auth. Likely 1-3 days.      CM informed

## 2022-02-10 NOTE — PROGRESS NOTES
0710 Patient's sitting up in bed. Bedside report received from JACQUELINE Brock RN at the beginning of the shift.     0850 Patient's sitting up in bed. LLE elevated on pillows.Educated on the importance/use of IS at least 10x every hour while awake, able to reach 750. Q 2 turn in effect. Call light within reach. Reminded patient to call for assist. Assessment completed. No distress noted. Plan of care reviewed with the patient. Verbalized understanding.    0932 Dr Griffith visited. POC discussed with the patient. New order received and acknowledged.    1110 New order received and acknowledged fo the patient to be discharge to another facility.    1250 Medicated with Oxycodone for c/o's left leg pain, rates pain 7/10.     1345 Notified patient that she wont be discharged to SNF today per . Verbalized understanding.              1500 Patient's in bed. No distress noted.    1731 Medicated with Oxycodone (see MAR) for c/o's left leg pain, rates pain 7/10.    1855 Patient's in bed. No changes in status. Bedside report given to Oncancelmo PHILLIPS RN (Delmy).

## 2022-02-10 NOTE — DISCHARGE PLANNING
Anticipated Discharge Disposition: SNF    Action: LSW met with patient at bedside to complete assessment and to discuss discharge plan. Patient resides alone and was independent with daily living activities.. Patient states she has supports including Helen. Patient reported that she receives monthly SSDI and [employer] [place]. LSW went over SNF choice with patient, she agrees to SNF and wanted [facility], patient  signed form. LSW will update patient on SNF.    LSW faxed choice to KURTIS Galan     SNF choices denied, LSW went over more choices with patient and she agrees to send out to more SNF.    LSW refaxed more SNF choice to KURTIS Galan.    Barriers to Discharge: Medical clearance, SNF acceptance    Plan: Case Management to follow up with medical team with any discharge barriers.

## 2022-02-10 NOTE — CARE PLAN
The patient is Watcher - Medium risk of patient condition declining or worsening    Shift Goals  Clinical Goals: pain control, safety  Patient Goals: pain control  Family Goals: n/a    Progress made toward(s) clinical / shift goals:    Problem: Pain - Standard  Goal: Alleviation of pain or a reduction in pain to the patient’s comfort goal  Outcome: Progressing     Problem: Knowledge Deficit - Standard  Goal: Patient and family/care givers will demonstrate understanding of plan of care, disease process/condition, diagnostic tests and medications  Outcome: Progressing     Problem: Fall Risk  Goal: Patient will remain free from falls  Outcome: Progressing       Patient has verbalized plan of care and has denied current pain. Pt calls appropriately and fall risk safety precautions are in place.

## 2022-02-10 NOTE — DISCHARGE PLANNING
Agency/Facility Name: [facility]  Spoke To: Ana María  Outcome: Bed available 2/10.      CM informed

## 2022-02-10 NOTE — PROGRESS NOTES
"   Orthopaedic Progress Note    Interval changes:  Patient doing well  LLE in short leg splint CDI  Cleared for DC to SNF by ortho pending medicine clearance    ROS - Patient denies any new issues.  Pain well controlled.    /75   Pulse 69   Temp 36.7 °C (98 °F) (Temporal)   Resp 18   Ht 1.626 m (5' 4\")   Wt 117 kg (259 lb)   SpO2 93%       Patient seen and examined  No acute distress  Breathing non labored  RRR  LLE in short leg splint CDI, DNVI, moves all toes, cap refill <2 sec.     Recent Labs     02/07/22  0648 02/08/22  0557 02/09/22  0338   WBC 8.7 7.6 6.5   RBC 3.94* 3.36* 3.39*   HEMOGLOBIN 12.5 10.8* 10.6*   HEMATOCRIT 39.1 34.0* 33.3*   MCV 99.2* 101.2* 98.2*   MCH 31.7 32.1 31.3   MCHC 32.0* 31.8* 31.8*   RDW 58.0* 59.0* 56.2*   PLATELETCT 235 208 204   MPV 10.3 10.2 10.6       Active Hospital Problems    Diagnosis    • History of DVT (deep vein thrombosis) [Z86.718]      Priority: Medium     Diagnois update 10/1/2016     • COPD (chronic obstructive pulmonary disease) ([facility]) [J44.9]      Priority: Medium     On home O2  Current smoker.  Significant comorbidity  Does not appear to be exacerbating  Resp and O2 per protocol     • Primary hypertension [I10]      Priority: Medium   • YESSICA (obstructive sleep apnea) [G47.33]      Priority: Medium     CPAP at night at home     • Tobacco abuse [Z72.0]      Priority: Low   • Closed left ankle fracture, initial encounter [S82.152A]    • Class 3 severe obesity due to excess calories with serious comorbidity and body mass index (BMI) of 40.0 to 44.9 in adult ([facility]) [E66.01, Z68.41]    • Hypothyroidism, acquired, autoimmune [E06.3]        Assessment/Plan:  Patient doing well  LLE in short leg splint CDI  Cleared for DC to SNF by ortho pending medicine clearance  POD#2 S/P Open reduction and internal fixation of the fibula with stabilization of the syndesmosis.  Wt bearing status -  NWB LLE  Wound care/Drains - Dressings to be changed every other day by " nursing  Future Procedures - none planned   Sutures/Staples out- 14 days post operatively  PT/OT-initiated  Antibiotics: Perioperative completed  DVT Prophylaxis- TEDS/SCDs/Foot pumps  Turner-none  Case Coordination for Discharge Planning - Disposition SNF

## 2022-02-10 NOTE — DISCHARGE SUMMARY
Discharge Summary    CHIEF COMPLAINT ON ADMISSION  Chief Complaint   Patient presents with   • GLF     Pt BIB [facility] for glf at home when pt was trying to go to the bathroom and tripped and twisted her ankle, didnt hit her head, -LOC, +blood thinners. Pt c/o LLE pain, arrived with splint in place. PTA meds 200mcg fentanyl total, 35 ketamine, arrived with NRB, placed on NC 3L.        Reason for Admission  EMS     CODE STATUS  DNAR/DNI    HPI & HOSPITAL COURSE  This is a 71 y.o. female with PMHX of COPD, HTN, YESSICA who was admitted on 2/7/22 after presenting to ER complaining of left ankle pain after a fall. Here in ER patient was found to have left ankle fracture and dislocation.  Ortho was consulted and patient admitted for further treatment.  Patient had surgical repair on 2/7/22. Tolerated procedure well.  Patient was evaluated by physical therapy and placement has been recommended.  Patient will be discharged to SNF today. Will need to follow up with ortho as outpatient in 14 days post op.    The patient met 2-midnight criteria for an inpatient stay at the time of discharge.      FOLLOW UP ITEMS POST DISCHARGE  Ortho     DISCHARGE DIAGNOSES  Principal Problem:    Closed left ankle fracture, initial encounter POA: Yes  Active Problems:    YESSICA (obstructive sleep apnea) POA: Yes      Overview: CPAP at night at home    Primary hypertension POA: Yes    COPD (chronic obstructive pulmonary disease) (HCC) (Chronic) POA: Yes      Overview: On home O2      Current smoker.      Significant comorbidity      Does not appear to be exacerbating      Resp and O2 per protocol    Tobacco abuse POA: Yes    History of DVT (deep vein thrombosis) (Chronic) POA: Yes      Overview: Robert update 10/1/2016    Hypothyroidism, acquired, autoimmune POA: Yes    Class 3 severe obesity due to excess calories with serious comorbidity and body mass index (BMI) of 40.0 to 44.9 in adult (HCC) POA: Yes  Resolved Problems:    * No resolved hospital  problems. *      FOLLOW UP  No future appointments.  Lio Deluca M.D.  [address] [facility] [facility]  [address]  [place] [address] [phone]    Call  Please call your primary care provider to schedule a hospital follow up. Thank you.     [facility] [place]  [address] [place] [address]  [place] [id number] [phone]          MEDICATIONS ON DISCHARGE     Medication List      START taking these medications      Instructions   oxyCODONE immediate-release 5 MG Tabs  Commonly known as: ROXICODONE   Take 1 Tablet by mouth every 8 hours as needed for up to 3 days.  Dose: 5 mg        CHANGE how you take these medications      Instructions   gabapentin 300 MG Caps  What changed: See the new instructions.  Commonly known as: NEURONTIN   TAKE THREE CAPSULES BY MOUTH THREE TIMES A DAY        CONTINUE taking these medications      Instructions   albuterol 108 (90 Base) MCG/ACT Aers inhalation aerosol  Commonly known as: Ventolin HFA   Inhale 2 Puffs every four hours as needed for Shortness of Breath.  Dose: 2 Puff     amitriptyline 25 MG Tabs  Commonly known as: ELAVIL   Take 75 mg by mouth every evening.  Dose: 75 mg     budesonide-formoterol 160-4.5 MCG/ACT Aero  Commonly known as: Symbicort   Inhale 2 Puffs 2 times a day. With spacer, rinse mouth after use  Dose: 2 Puff     carBAMazepine 200 MG Tabs  Commonly known as: TEGRETOL   Take 600 mg by mouth every bedtime.  Dose: 600 mg     fluticasone 50 MCG/ACT nasal spray  Commonly known as: FLONASE   Administer 1-2 Sprays into affected nostril(S) every day. Each nostril.  Dose: 1-2 Spray     * FQ Protective Underwear Misc   USE 1 PULLUP 3 TIMES DAILY     * FQ Protective Underwear Misc   USE 1 PULLUP 3 TIMES DAILY     levothyroxine 200 MCG Tabs  Commonly known as: SYNTHROID   Take 200 mcg by mouth every morning on an empty stomach.  Dose: 200 mcg     lisinopril 2.5 MG Tabs  Commonly known as: PRINIVIL   Take 2.5 mg by mouth every day.  Dose: 2.5 mg     mirtazapine 15 MG  Tabs  Commonly known as: Remeron   Take 15 mg by mouth every evening.  Dose: 15 mg     Spiriva Respimat 2.5 mcg/Act Aers  Generic drug: tiotropium   Inhale 2 Inhalation every day.  Dose: 5 mcg     Vitamin D 50 MCG (2000 UT) Caps   Take 2,000 Units by mouth every day.  Dose: 2,000 Units     warfarin 10 MG Tabs  Commonly known as: COUMADIN   Take 5-10 mg by mouth at bedtime. 5 mg on Monday, Wednesday, Friday  10 mg all other days  Dose: 5-10 mg         * This list has 2 medication(s) that are the same as other medications prescribed for you. Read the directions carefully, and ask your doctor or other care provider to review them with you.            ASK your doctor about these medications      Instructions   oxyCODONE-acetaminophen  MG Tabs  Commonly known as: PERCOCET-10  Ask about: Should I take this medication?   Take 1 Tablet by mouth every four hours as needed for Severe Pain for up to 2 days.  Dose: 1 Tablet            Allergies  Allergies   Allergen Reactions   • Pcn [Penicillins] Anaphylaxis and Rash     Reaction; happened as a child. Tolerated cephalosporins and Zosyn       DIET  Orders Placed This Encounter   Procedures   • Diet Order Diet: Regular     Standing Status:   Standing     Number of Occurrences:   1     Order Specific Question:   Diet:     Answer:   Regular [1]       ACTIVITY  As tolerated.  Weight bearing as tolerated    LINES, DRAINS, AND WOUNDS  This is an automated list. Peripheral IVs will be removed prior to discharge.  Peripheral IV 02/08/22 22 G Anterior;Right Forearm (Active)   Site Assessment Clean;Dry;Intact 02/10/22 0850   Dressing Type Transparent;Tape 02/10/22 0850   Line Status Flushed;Scrubbed the hub prior to access;Saline locked 02/10/22 0850   Dressing Status Clean;Dry;Intact 02/10/22 0850   Dressing Intervention N/A 02/09/22 0910   Dressing Change Due 02/12/22 02/10/22 0850   Infiltration Grading ([facility]) 0 02/10/22 0850   Phlebitis Scale ([facility] Only) 0 02/10/22  0850       Wound 02/07/22 Incision Ankle Left xeroform, 4x4, splint (Active)   Site Assessment ANIBAL 02/10/22 0850   Periwound Assessment ANIBAL 02/10/22 0850   Margins ANIBAL 02/10/22 0850   Closure ANIBAL 02/10/22 0850   Drainage Amount None 02/10/22 0850   Drainage Description ANIBAL 02/07/22 1400   Treatments Site care;Offloading;Ice applied 02/09/22 2150   Wound Cleansing Not Applicable 02/09/22 2150   Periwound Protectant Not Applicable 02/09/22 2150   Dressing Cleansing/Solutions Not Applicable 02/09/22 2150   Dressing Options Ace Wrap;Splint 02/09/22 2150   Dressing Changed Observed 02/10/22 0850   Dressing Status Clean;Dry;Intact 02/09/22 2150   Dressing Change/Treatment Frequency As Needed 02/09/22 2150       Wound 02/07/22 Abrasion Foot Right (Active)   Wound Image   02/07/22 1400   Site Assessment Light Purple;[place] 02/10/22 0850   Periwound Assessment Dry;Red;Edema;Fragile 02/10/22 0850   Margins Defined edges 02/10/22 0850   Closure Open to air 02/09/22 2150   Drainage Amount Scant 02/09/22 2150   Drainage Description Serosanguineous 02/09/22 2150   Treatments Offloading 02/09/22 2150   Dressing Options Open to Air 02/09/22 2150       Peripheral IV 02/08/22 22 G Anterior;Right Forearm (Active)   Site Assessment Clean;Dry;Intact 02/10/22 0850   Dressing Type Transparent;Tape 02/10/22 0850   Line Status Flushed;Scrubbed the hub prior to access;Saline locked 02/10/22 0850   Dressing Status Clean;Dry;Intact 02/10/22 0850   Dressing Intervention N/A 02/09/22 0910   Dressing Change Due 02/12/22 02/10/22 0850   Infiltration Grading ([facility]) 0 02/10/22 0850   Phlebitis Scale ([facility] Only) 0 02/10/22 0850               MENTAL STATUS ON TRANSFER             CONSULTATIONS  Ortho     PROCEDURES  Open reduction and internal fixation of the fibula with   stabilization of the syndesmosis.    LABORATORY  Lab Results   Component Value Date    SODIUM 135 02/10/2022    POTASSIUM 3.7 02/10/2022    CHLORIDE 97 02/10/2022    CO2 27  02/10/2022    GLUCOSE 107 (H) 02/10/2022    BUN 6 (L) 02/10/2022    CREATININE 0.54 02/10/2022    CREATININE 0.8 04/27/2009        Lab Results   Component Value Date    WBC 6.4 02/10/2022    HEMOGLOBIN 11.1 (L) 02/10/2022    HEMATOCRIT 34.3 (L) 02/10/2022    PLATELETCT 239 02/10/2022        Total time of the discharge process exceeds 41 minutes.

## 2022-02-10 NOTE — DISCHARGE PLANNING
DC Transport Scheduled    Received request at: 1224    Transport Company Scheduled:  [employer]  Spoke with Linda at [facility] to schedule transport.  [facility] Trip #: [id number]    Scheduled Date: 02/10/22  Scheduled Time: 1415    Destination: [address] [place] [address]    Notified care team of scheduled transport via Vo[employer].     If there are any changes needed to the DC transportation scheduled, please contact [facility] Line at ext. [phone] between the hours of 3976-2104 Mon-Fri. If outside those hours, contact the ED Case Manager at ext. [phone].

## 2022-02-11 LAB
INR PPP: 1.47 (ref 0.87–1.13)
PROTHROMBIN TIME: 17.4 SEC (ref 12–14.6)

## 2022-02-11 PROCEDURE — 97530 THERAPEUTIC ACTIVITIES: CPT

## 2022-02-11 PROCEDURE — 85610 PROTHROMBIN TIME: CPT

## 2022-02-11 PROCEDURE — 36415 COLL VENOUS BLD VENIPUNCTURE: CPT

## 2022-02-11 PROCEDURE — 700102 HCHG RX REV CODE 250 W/ 637 OVERRIDE(OP): Performed by: HOSPITALIST

## 2022-02-11 PROCEDURE — 700102 HCHG RX REV CODE 250 W/ 637 OVERRIDE(OP): Performed by: INTERNAL MEDICINE

## 2022-02-11 PROCEDURE — A9270 NON-COVERED ITEM OR SERVICE: HCPCS | Performed by: HOSPITALIST

## 2022-02-11 PROCEDURE — 99224 PR SUBSEQUENT OBSERVATION CARE,LEVEL I: CPT | Performed by: INTERNAL MEDICINE

## 2022-02-11 PROCEDURE — A9270 NON-COVERED ITEM OR SERVICE: HCPCS | Performed by: INTERNAL MEDICINE

## 2022-02-11 PROCEDURE — G0378 HOSPITAL OBSERVATION PER HR: HCPCS

## 2022-02-11 PROCEDURE — A9270 NON-COVERED ITEM OR SERVICE: HCPCS | Performed by: STUDENT IN AN ORGANIZED HEALTH CARE EDUCATION/TRAINING PROGRAM

## 2022-02-11 PROCEDURE — 94664 DEMO&/EVAL PT USE INHALER: CPT

## 2022-02-11 PROCEDURE — 700102 HCHG RX REV CODE 250 W/ 637 OVERRIDE(OP): Performed by: STUDENT IN AN ORGANIZED HEALTH CARE EDUCATION/TRAINING PROGRAM

## 2022-02-11 RX ORDER — WARFARIN SODIUM 5 MG/1
5 TABLET ORAL DAILY
Status: DISCONTINUED | OUTPATIENT
Start: 2022-02-12 | End: 2022-02-13

## 2022-02-11 RX ORDER — WARFARIN SODIUM 7.5 MG/1
7.5 TABLET ORAL
Status: COMPLETED | OUTPATIENT
Start: 2022-02-11 | End: 2022-02-11

## 2022-02-11 RX ADMIN — TIOTROPIUM BROMIDE INHALATION SPRAY 5 MCG: 3.12 SPRAY, METERED RESPIRATORY (INHALATION) at 05:47

## 2022-02-11 RX ADMIN — OXYCODONE HYDROCHLORIDE 10 MG: 10 TABLET ORAL at 11:38

## 2022-02-11 RX ADMIN — BUDESONIDE AND FORMOTEROL FUMARATE DIHYDRATE 2 PUFF: 160; 4.5 AEROSOL RESPIRATORY (INHALATION) at 17:22

## 2022-02-11 RX ADMIN — OXYCODONE HYDROCHLORIDE 10 MG: 10 TABLET ORAL at 21:03

## 2022-02-11 RX ADMIN — CARBAMAZEPINE 600 MG: 200 TABLET ORAL at 21:01

## 2022-02-11 RX ADMIN — NYSTATIN: 100000 POWDER TOPICAL at 05:49

## 2022-02-11 RX ADMIN — BUDESONIDE AND FORMOTEROL FUMARATE DIHYDRATE 2 PUFF: 160; 4.5 AEROSOL RESPIRATORY (INHALATION) at 05:47

## 2022-02-11 RX ADMIN — ALBUTEROL SULFATE 2 PUFF: 90 AEROSOL, METERED RESPIRATORY (INHALATION) at 21:05

## 2022-02-11 RX ADMIN — LEVOTHYROXINE SODIUM 200 MCG: 0.2 TABLET ORAL at 05:48

## 2022-02-11 RX ADMIN — OXYCODONE HYDROCHLORIDE 10 MG: 10 TABLET ORAL at 16:58

## 2022-02-11 RX ADMIN — NYSTATIN: 100000 POWDER TOPICAL at 17:22

## 2022-02-11 RX ADMIN — WARFARIN SODIUM 7.5 MG: 7.5 TABLET ORAL at 17:20

## 2022-02-11 RX ADMIN — LISINOPRIL 2.5 MG: 2.5 TABLET ORAL at 05:48

## 2022-02-11 RX ADMIN — OXYCODONE HYDROCHLORIDE 10 MG: 10 TABLET ORAL at 05:52

## 2022-02-11 ASSESSMENT — PAIN DESCRIPTION - PAIN TYPE
TYPE: SURGICAL PAIN

## 2022-02-11 ASSESSMENT — ENCOUNTER SYMPTOMS
SHORTNESS OF BREATH: 0
PALPITATIONS: 0
NECK PAIN: 0
NAUSEA: 0
MYALGIAS: 0
VOMITING: 0
ABDOMINAL PAIN: 0
NERVOUS/ANXIOUS: 1
COUGH: 0

## 2022-02-11 ASSESSMENT — COGNITIVE AND FUNCTIONAL STATUS - GENERAL
TOILETING: A LOT
HELP NEEDED FOR BATHING: A LOT
DAILY ACTIVITIY SCORE: 18
SUGGESTED CMS G CODE MODIFIER DAILY ACTIVITY: CK
DRESSING REGULAR LOWER BODY CLOTHING: A LOT

## 2022-02-11 NOTE — DISCHARGE PLANNING
Agency/Facility Name: [facility]  Spoke To: Alicia  Outcome: Called to inquire on ins auth. Alicia will check and callback      CM halie

## 2022-02-11 NOTE — PROGRESS NOTES
"   Orthopaedic Progress Note    Interval changes:  Patient doing well  LLE in short leg splint CDI  Cleared for DC to SNF by ortho pending medicine clearance    ROS - Patient denies any new issues.  Pain well controlled.    /77   Pulse 74   Temp 37.2 °C (98.9 °F) (Temporal)   Resp 16   Ht 1.626 m (5' 4\")   Wt 117 kg (259 lb)   SpO2 92%       Patient seen and examined  No acute distress  Breathing non labored  RRR  LLE in short leg splint CDI, DNVI, moves all toes, cap refill <2 sec.     Recent Labs     02/09/22  0338 02/10/22  0053   WBC 6.5 6.4   RBC 3.39* 3.53*   HEMOGLOBIN 10.6* 11.1*   HEMATOCRIT 33.3* 34.3*   MCV 98.2* 97.2   MCH 31.3 31.4   MCHC 31.8* 32.4*   RDW 56.2* 55.2*   PLATELETCT 204 239   MPV 10.6 10.5       Active Hospital Problems    Diagnosis    • History of DVT (deep vein thrombosis) [Z86.718]      Priority: Medium     Diagnois update 10/1/2016     • COPD (chronic obstructive pulmonary disease) ([facility]) [J44.9]      Priority: Medium     On home O2  Current smoker.  Significant comorbidity  Does not appear to be exacerbating  Resp and O2 per protocol     • Primary hypertension [I10]      Priority: Medium   • YESSICA (obstructive sleep apnea) [G47.33]      Priority: Medium     CPAP at night at home     • Tobacco abuse [Z72.0]      Priority: Low   • Closed left ankle fracture, initial encounter [O33.266N]    • Class 3 severe obesity due to excess calories with serious comorbidity and body mass index (BMI) of 40.0 to 44.9 in adult ([facility]) [E66.01, Z68.41]    • Hypothyroidism, acquired, autoimmune [E06.3]        Assessment/Plan:  Patient doing well  LLE in short leg splint CDI  Cleared for DC to SNF by ortho pending medicine clearance  POD#4 S/P Open reduction and internal fixation of the fibula with stabilization of the syndesmosis.  Wt bearing status - NWB LLE  Wound care/Drains - Dressings to be changed every other day by nursing  Future Procedures - none planned   Sutures/Staples out- 14 days " post operatively  PT/OT-initiated  Antibiotics: Perioperative completed  DVT Prophylaxis- TEDS/SCDs/Foot pumps  Turner-none  Case Coordination for Discharge Planning - Disposition SNF     I have performed a physical exam and reviewed and updated ROS and Plan today (2/11). In review of yesterday's note (2/10), there are no changes except as documented above.

## 2022-02-11 NOTE — PROGRESS NOTES
Inpatient Anticoagulation Service Note    Date: 2/11/2022    Reason for Anticoagulation: Atrial Fibrillation,Deep Vein Thrombosis   Target INR: 2.0 to 3.0         Hemoglobin Value: (!) 11.1  Hematocrit Value: (!) 34.3  Lab Platelet Value: 239    INR from last 7 days     Date/Time INR Value    02/11/22 0211 1.47    02/10/22 00:53:01 1.32    02/09/22 1209 1.56    02/07/22 06:48:01 2.62        Dose from last 7 days     Date/Time Dose (mg)    02/11/22 1456 7.5    02/10/22 1600 10    02/09/22 1351 10        Average Dose (mg):  (Home Dose: 5 mg MWF + 10 mg ROW per MR report)  Significant Interactions: Thyroid Medications,Other (Comments) (carbamezepine)  Bridge Therapy: No     Reversal Agent Administered: Not Applicable  Comments: INR remains subtherapeutic this morning. Patient has been bolused with warfarin 10mg x2. Will give warfarin 7.5mg tonight and 5mg daily starting tomorrow until INR normalizes. No new CBC this morning. No charted bleeding. Pharmacy will continue to monitor.    Plan:  Warfarin 7.5mg tonight. Repeat INR tomorrow  Education Material Provided?: No (chronic warfarin patient)  Pharmacist suggested discharge dosing: Resume home dose of warfarin 5mg MWF and 10mg all other days. Patient to follow up for repeat INR within 48hrs of discharge.      Leslie Camara, PharmD

## 2022-02-11 NOTE — THERAPY
Occupational Therapy  Daily Treatment     Patient Name: Atiya Henley  Age:  71 y.o., Sex:  female  Medical Record #: [id number]  Today's Date: 2/11/2022     Precautions  Precautions: (P) Fall Risk,Non Weight Bearing Left Lower Extremity,Non Weight Bearing Right Upper Extremity  Comments: Patient admits to noncompliance in the past with WB precautions. RUE NWB from a fall 2 weeks ago, pt went to [facility]    Assessment     Pt currently limited by decreased functional mobility, activity tolerance, sensation, strength, AROM, coordination, balance, and pain which are affecting pt's ability to complete ADLs/IADLs at baseline. Pt would benefit from OT services in the acute care setting to maximize functional recovery.     Plan    Continue current treatment plan.       Discharge Recommendations: (P) Recommend post-acute placement for additional occupational therapy services prior to discharge home       02/11/22 1054   Activities of Daily Living   Grooming Supervision   Lower Body Dressing Maximal Assist   Toileting Maximal Assist   Functional Mobility   Sit to Stand Maximal Assist   Bed, Chair, Wheelchair Transfer Unable to Participate   Short Term Goals   Short Term Goal # 1 Patient will maintain WB precautions supv without cuing   Goal Outcome # 1 Progressing as expected   Short Term Goal # 2 Patient will complete UB dressing supv   Goal Outcome # 2 Progressing as expected   Short Term Goal # 3 Patient will complete BSC xfer Min A   Goal Outcome # 3 Progressing slower than expected

## 2022-02-11 NOTE — PROGRESS NOTES
[facility] Medicine Daily Progress Note    Date of Service  2/11/2022    Chief Complaint  Atiya Henley is a 71 y.o. female admitted 2/7/2022 with left ankle fracture    Hospital Course  No notes on file  71 y.o. female who presented 2/7/2022 with fall. PMH of COPD on 2L 02, HTN, tobacco use, YESSICA, on CPAP, Hx of DVT on warfarin, anxiety, hypothyroid. Lives in nursing home.  Found down in the bathroom and complaining of pain on her left lower extremity.  Did not hit her head, no LOC.  Says she also fell a few weeks ago and hit her head, ecchymosis below both eyes bilaterally.  She was found to have left ankle fracture and dislocation. Ortho was consulted and plans to take the patient to the OR for repair.    Interval Problem Update  Patient seen and examined o acute events overnight. Had surgical repair with ortho on 2/7. Appreciate rec,  No nausea or vomiting   PT/OT eval needs placement   Pain management.    I have personally seen and examined the patient at bedside. I discussed the plan of care with patient     Consultants/Specialty  orthopedics    Code Status  DNAR/DNI    Disposition  Patient is medically cleared for discharge.   Anticipate discharge to to skilled nursing facility.  I have placed the appropriate orders for post-discharge needs.    Review of Systems  Review of Systems   Constitutional: Positive for malaise/fatigue.   HENT: Negative for ear discharge and ear pain.    Respiratory: Negative for cough and shortness of breath.    Cardiovascular: Negative for chest pain and palpitations.   Gastrointestinal: Negative for abdominal pain, nausea and vomiting.   Genitourinary: Negative for dysuria, frequency and urgency.   Musculoskeletal: Positive for joint pain. Negative for myalgias and neck pain.   Psychiatric/Behavioral: The patient is nervous/anxious.    All other systems reviewed and are negative.       Physical Exam  Temp:  [36.2 °C (97.2 °F)-36.7 °C (98 °F)] 36.7 °C (98 °F)  Pulse:  [66-73]  66  Resp:  [17-18] 17  BP: (124-150)/(59-79) 138/79  SpO2:  [92 %-96 %] 92 %    Physical Exam  Vitals and nursing note reviewed.   Constitutional:       Appearance: She is obese.   Eyes:      General: No scleral icterus.  Cardiovascular:      Rate and Rhythm: Bradycardia present.   Pulmonary:      Effort: Pulmonary effort is normal. No respiratory distress.      Breath sounds: No rales.   Abdominal:      General: There is no distension.      Tenderness: There is no abdominal tenderness. There is no guarding or rebound.   Musculoskeletal:      Comments: Splint over ankle   Skin:     General: Skin is warm.      Coloration: Skin is not jaundiced.   Neurological:      General: No focal deficit present.      Mental Status: Mental status is at baseline.         Fluids    Intake/Output Summary (Last 24 hours) at 2/11/2022 1129  Last data filed at 2/10/2022 1800  Gross per 24 hour   Intake 720 ml   Output --   Net 720 ml       Laboratory  Recent Labs     02/09/22  0338 02/10/22  0053   WBC 6.5 6.4   RBC 3.39* 3.53*   HEMOGLOBIN 10.6* 11.1*   HEMATOCRIT 33.3* 34.3*   MCV 98.2* 97.2   MCH 31.3 31.4   MCHC 31.8* 32.4*   RDW 56.2* 55.2*   PLATELETCT 204 239   MPV 10.6 10.5     Recent Labs     02/09/22  0338 02/10/22  0053   SODIUM 137 135   POTASSIUM 3.9 3.7   CHLORIDE 99 97   CO2 29 27   GLUCOSE 103* 107*   BUN 7* 6*   CREATININE 0.51 0.54   CALCIUM 8.2* 8.7     Recent Labs     02/09/22  1209 02/10/22  0053 02/11/22  0211   INR 1.56* 1.32* 1.47*               Imaging  DX-PORTABLE FLUORO > 1 HOUR   Final Result      Portable fluoroscopy utilized for 24 seconds.         INTERPRETING LOCATION: [address] [place] [address], [id number]      DX-ANKLE 3+ VIEWS LEFT   Final Result      Intraoperative image as above described.      DX-ANKLE 3+ VIEWS LEFT   Final Result         1.  Distal fibular metaphyseal fracture   2.  Medial malleolus fracture   3.  Interval reduction of lateral ankle subluxation         DX-KNEE 3 VIEWS LEFT   Final Result          1.  Spiral proximal fibular diaphyseal fracture   2.  Lateral subluxation of the patella, concerning for patellar dislocation. Could be further evaluated with sunrise view.      DX-TIBIA AND FIBULA LEFT   Final Result         1.  Proximal fibular diaphyseal fracture.   2.  Distal fibular metaphyseal fracture      DX-ANKLE 3+ VIEWS LEFT   Final Result         1.  Lateral malleolus fracture with lateral subluxation of the ankle mortise joint.   2.  Bony fracture fragments adjacent to the medial malleolus compatible with ligamentous avulsion fracture fragments.              Assessment/Plan  * Closed left ankle fracture, initial encounter- (present on admission)  Assessment & Plan  Left ankle fracture dislocation following fall.    Fracture from ground-level fall.  Vitamin D level checked and found to be unremarkable  S/p surgical repair with ortho appreciate rec.  PT/OT eval will need placement      Class 3 severe obesity due to excess calories with serious comorbidity and body mass index (BMI) of 40.0 to 44.9 in adult ([facility])- (present on admission)  Assessment & Plan  BMI 44.46.  Ground-level fall with fracture  PT, OT    Hypothyroidism, acquired, autoimmune- (present on admission)  Assessment & Plan  Continue levothyroxine    History of DVT (deep vein thrombosis)- (present on admission)  Assessment & Plan  On warfarin.  Held due to expected surgery  Restarting today 2/9     Tobacco abuse- (present on admission)  Assessment & Plan  Says she is down to few cigarettes a week.  Says she does not need nicotine patch    COPD (chronic obstructive pulmonary disease) ([facility])- (present on admission)  Assessment & Plan  Nonacute exacerbation, his baseline is 2 L  Continue Trelegy Ellipta    Primary hypertension- (present on admission)  Assessment & Plan  Continue lisinopril    YESSICA (obstructive sleep apnea)- (present on admission)  Assessment & Plan  Nocturnal CPAP ordered       VTE prophylaxis: SCDs/TEDs    I have performed a  physical exam and reviewed and updated ROS and Plan today (2/11/2022). In review of yesterday's note (2/10/2022), there are no changes except as documented above.

## 2022-02-11 NOTE — PROGRESS NOTES
[facility] Medicine Daily Progress Note    Date of Service  2/10/2022    Chief Complaint  Atiya Henley is a 71 y.o. female admitted 2/7/2022 with left ankle fracture    Hospital Course  No notes on file  71 y.o. female who presented 2/7/2022 with fall. PMH of COPD on 2L 02, HTN, tobacco use, YESSICA, on CPAP, Hx of DVT on warfarin, anxiety, hypothyroid. Lives in nursing home.  Found down in the bathroom and complaining of pain on her left lower extremity.  Did not hit her head, no LOC.  Says she also fell a few weeks ago and hit her head, ecchymosis below both eyes bilaterally.  She was found to have left ankle fracture and dislocation. Ortho was consulted and plans to take the patient to the OR for repair.    Interval Problem Update  No acute events overnight. Had surgical repair with ortho on 2/7. Appreciate rec,   PT/OT eval needs placement   Pain management.    I have personally seen and examined the patient at bedside. I discussed the plan of care with patient     Consultants/Specialty  orthopedics    Code Status  DNAR/DNI    Disposition  Patient is medically cleared for discharge.   Anticipate discharge to to skilled nursing facility.  I have placed the appropriate orders for post-discharge needs.    Review of Systems  Review of Systems   Constitutional: Positive for malaise/fatigue.   HENT: Negative for ear discharge and ear pain.    Respiratory: Negative for cough and shortness of breath.    Cardiovascular: Negative for chest pain and palpitations.   Gastrointestinal: Negative for abdominal pain, nausea and vomiting.   Genitourinary: Negative for dysuria, frequency and urgency.   Musculoskeletal: Positive for joint pain. Negative for myalgias and neck pain.   Psychiatric/Behavioral: The patient is nervous/anxious.    All other systems reviewed and are negative.       Physical Exam  Temp:  [36.2 °C (97.2 °F)-36.7 °C (98 °F)] 36.7 °C (98 °F)  Pulse:  [66-73] 66  Resp:  [17-18] 17  BP: (124-150)/(59-79)  138/79  SpO2:  [92 %-96 %] 92 %    Physical Exam  Vitals and nursing note reviewed.   Constitutional:       Appearance: She is obese.   Eyes:      General: No scleral icterus.  Cardiovascular:      Rate and Rhythm: Bradycardia present.   Pulmonary:      Effort: Pulmonary effort is normal. No respiratory distress.      Breath sounds: No rales.   Abdominal:      General: There is no distension.      Tenderness: There is no abdominal tenderness. There is no guarding or rebound.   Musculoskeletal:      Comments: Splint over ankle   Skin:     General: Skin is warm.      Coloration: Skin is not jaundiced.   Neurological:      General: No focal deficit present.      Mental Status: Mental status is at baseline.         Fluids    Intake/Output Summary (Last 24 hours) at 2/11/2022 1128  Last data filed at 2/10/2022 1800  Gross per 24 hour   Intake 720 ml   Output --   Net 720 ml       Laboratory  Recent Labs     02/09/22  0338 02/10/22  0053   WBC 6.5 6.4   RBC 3.39* 3.53*   HEMOGLOBIN 10.6* 11.1*   HEMATOCRIT 33.3* 34.3*   MCV 98.2* 97.2   MCH 31.3 31.4   MCHC 31.8* 32.4*   RDW 56.2* 55.2*   PLATELETCT 204 239   MPV 10.6 10.5     Recent Labs     02/09/22  0338 02/10/22  0053   SODIUM 137 135   POTASSIUM 3.9 3.7   CHLORIDE 99 97   CO2 29 27   GLUCOSE 103* 107*   BUN 7* 6*   CREATININE 0.51 0.54   CALCIUM 8.2* 8.7     Recent Labs     02/09/22  1209 02/10/22  0053 02/11/22  0211   INR 1.56* 1.32* 1.47*               Imaging  DX-PORTABLE FLUORO > 1 HOUR   Final Result      Portable fluoroscopy utilized for 24 seconds.         INTERPRETING LOCATION: [address] [place] [address], [id number]      DX-ANKLE 3+ VIEWS LEFT   Final Result      Intraoperative image as above described.      DX-ANKLE 3+ VIEWS LEFT   Final Result         1.  Distal fibular metaphyseal fracture   2.  Medial malleolus fracture   3.  Interval reduction of lateral ankle subluxation         DX-KNEE 3 VIEWS LEFT   Final Result         1.  Spiral proximal fibular diaphyseal  fracture   2.  Lateral subluxation of the patella, concerning for patellar dislocation. Could be further evaluated with sunrise view.      DX-TIBIA AND FIBULA LEFT   Final Result         1.  Proximal fibular diaphyseal fracture.   2.  Distal fibular metaphyseal fracture      DX-ANKLE 3+ VIEWS LEFT   Final Result         1.  Lateral malleolus fracture with lateral subluxation of the ankle mortise joint.   2.  Bony fracture fragments adjacent to the medial malleolus compatible with ligamentous avulsion fracture fragments.              Assessment/Plan  * Closed left ankle fracture, initial encounter- (present on admission)  Assessment & Plan  Left ankle fracture dislocation following fall.    Fracture from ground-level fall.  Vitamin D level checked and found to be unremarkable  S/p surgical repair with ortho appreciate rec.  PT/OT eval will need placement      Class 3 severe obesity due to excess calories with serious comorbidity and body mass index (BMI) of 40.0 to 44.9 in adult ([facility])- (present on admission)  Assessment & Plan  BMI 44.46.  Ground-level fall with fracture  PT, OT    Hypothyroidism, acquired, autoimmune- (present on admission)  Assessment & Plan  Continue levothyroxine    History of DVT (deep vein thrombosis)- (present on admission)  Assessment & Plan  On warfarin.  Held due to expected surgery  Restarting today 2/9     Tobacco abuse- (present on admission)  Assessment & Plan  Says she is down to few cigarettes a week.  Says she does not need nicotine patch    COPD (chronic obstructive pulmonary disease) ([facility])- (present on admission)  Assessment & Plan  Nonacute exacerbation, his baseline is 2 L  Continue Trelegy Ellipta    Primary hypertension- (present on admission)  Assessment & Plan  Continue lisinopril    YESSICA (obstructive sleep apnea)- (present on admission)  Assessment & Plan  Nocturnal CPAP ordered       VTE prophylaxis: SCDs/TEDs    I have performed a physical exam and reviewed and updated ROS  and Plan today (2/11/2022). In review of yesterday's note (2/10/2022), there are no changes except as documented above.

## 2022-02-11 NOTE — PROGRESS NOTES
"   Orthopaedic Progress Note    Interval changes:  Patient doing well  LLE in short leg splint CDI  Cleared for DC to SNF by ortho pending medicine clearance    ROS - Patient denies any new issues.  Pain well controlled.    /73   Pulse 73   Temp 36.4 °C (97.6 °F) (Temporal)   Resp 18   Ht 1.626 m (5' 4\")   Wt 117 kg (259 lb)   SpO2 96%       Patient seen and examined  No acute distress  Breathing non labored  RRR  LLE in short leg splint CDI, DNVI, moves all toes, cap refill <2 sec.     Recent Labs     02/08/22  0557 02/09/22  0338 02/10/22  0053   WBC 7.6 6.5 6.4   RBC 3.36* 3.39* 3.53*   HEMOGLOBIN 10.8* 10.6* 11.1*   HEMATOCRIT 34.0* 33.3* 34.3*   .2* 98.2* 97.2   MCH 32.1 31.3 31.4   MCHC 31.8* 31.8* 32.4*   RDW 59.0* 56.2* 55.2*   PLATELETCT 208 204 239   MPV 10.2 10.6 10.5       Active Hospital Problems    Diagnosis    • History of DVT (deep vein thrombosis) [Z86.718]      Priority: Medium     Diagnois update 10/1/2016     • COPD (chronic obstructive pulmonary disease) ([facility]) [J44.9]      Priority: Medium     On home O2  Current smoker.  Significant comorbidity  Does not appear to be exacerbating  Resp and O2 per protocol     • Primary hypertension [I10]      Priority: Medium   • YESSICA (obstructive sleep apnea) [G47.33]      Priority: Medium     CPAP at night at home     • Tobacco abuse [Z72.0]      Priority: Low   • Closed left ankle fracture, initial encounter [S82.452A]    • Class 3 severe obesity due to excess calories with serious comorbidity and body mass index (BMI) of 40.0 to 44.9 in adult ([facility]) [E66.01, Z68.41]    • Hypothyroidism, acquired, autoimmune [E06.3]        Assessment/Plan:  Patient doing well  LLE in short leg splint CDI  Cleared for DC to SNF by ortho pending medicine clearance  POD#3 S/P Open reduction and internal fixation of the fibula with stabilization of the syndesmosis.  Wt bearing status - NWB LLE  Wound care/Drains - Dressings to be changed every other day by " nursing  Future Procedures - none planned   Sutures/Staples out- 14 days post operatively  PT/OT-initiated  Antibiotics: Perioperative completed  DVT Prophylaxis- TEDS/SCDs/Foot pumps  Turner-none  Case Coordination for Discharge Planning - Disposition SNF

## 2022-02-11 NOTE — PROGRESS NOTES
Inpatient Anticoagulation Service Note    Date: 2/10/2022    Reason for Anticoagulation: Atrial Fibrillation,Deep Vein Thrombosis   Target INR: 2.0 to 3.0         Hemoglobin Value: (!) 11.1  Hematocrit Value: (!) 34.3  Lab Platelet Value: 239    INR from last 7 days     Date/Time INR Value    02/10/22 00:53:01 1.32    02/09/22 1209 1.56    02/07/22 06:48:01 2.62        Dose from last 7 days     Date/Time Dose (mg)    02/10/22 1600 10    02/09/22 1351 10        Average Dose (mg):  (Home Dose: 5 mg MWF + 10 mg ROW per MR report)  Significant Interactions: Thyroid Medications,Other (Comments) (carbamezepine)  Bridge Therapy: No     Reversal Agent Administered: Not Applicable  Comments: INR remains below goal today. Warfarin restarted yesterday after being held for procedure. Will give another 10mg dose today and recheck INR tomorrow. DDI as noted above. H/h and plt stable. No s/sx of bleeding. Will CTM.    Plan:  Warfarin 10mg today. Recheck INR tomorrow.   Education Material Provided?: No (chronic warfarin patient)  Pharmacist suggested discharge dosing: Resume home dose of warfarin 5mg MWF and 10mg all other days. Patient to follow up for repeat INR within 48hrs of discharge.      Leslie Camara, PharmD

## 2022-02-12 LAB
INR PPP: 1.73 (ref 0.87–1.13)
PROTHROMBIN TIME: 19.7 SEC (ref 12–14.6)

## 2022-02-12 PROCEDURE — 96376 TX/PRO/DX INJ SAME DRUG ADON: CPT

## 2022-02-12 PROCEDURE — 99225 PR SUBSEQUENT OBSERVATION CARE,LEVEL II: CPT | Performed by: INTERNAL MEDICINE

## 2022-02-12 PROCEDURE — 700102 HCHG RX REV CODE 250 W/ 637 OVERRIDE(OP): Performed by: HOSPITALIST

## 2022-02-12 PROCEDURE — 700102 HCHG RX REV CODE 250 W/ 637 OVERRIDE(OP): Performed by: INTERNAL MEDICINE

## 2022-02-12 PROCEDURE — A9270 NON-COVERED ITEM OR SERVICE: HCPCS | Performed by: HOSPITALIST

## 2022-02-12 PROCEDURE — A9270 NON-COVERED ITEM OR SERVICE: HCPCS | Performed by: STUDENT IN AN ORGANIZED HEALTH CARE EDUCATION/TRAINING PROGRAM

## 2022-02-12 PROCEDURE — A9270 NON-COVERED ITEM OR SERVICE: HCPCS | Performed by: INTERNAL MEDICINE

## 2022-02-12 PROCEDURE — 302111 WAFER OST 2.25IN N IMG RD 2 PC (BARRIER): Performed by: INTERNAL MEDICINE

## 2022-02-12 PROCEDURE — 85610 PROTHROMBIN TIME: CPT

## 2022-02-12 PROCEDURE — 700102 HCHG RX REV CODE 250 W/ 637 OVERRIDE(OP): Performed by: STUDENT IN AN ORGANIZED HEALTH CARE EDUCATION/TRAINING PROGRAM

## 2022-02-12 PROCEDURE — 302094 BELT OSTOMY (HOLLISTER): Performed by: INTERNAL MEDICINE

## 2022-02-12 PROCEDURE — G0378 HOSPITAL OBSERVATION PER HR: HCPCS

## 2022-02-12 PROCEDURE — 302146: Performed by: INTERNAL MEDICINE

## 2022-02-12 PROCEDURE — 700111 HCHG RX REV CODE 636 W/ 250 OVERRIDE (IP): Performed by: HOSPITALIST

## 2022-02-12 PROCEDURE — 36415 COLL VENOUS BLD VENIPUNCTURE: CPT

## 2022-02-12 RX ADMIN — NYSTATIN: 100000 POWDER TOPICAL at 18:00

## 2022-02-12 RX ADMIN — MORPHINE SULFATE 4 MG: 4 INJECTION INTRAVENOUS at 22:41

## 2022-02-12 RX ADMIN — LISINOPRIL 2.5 MG: 2.5 TABLET ORAL at 06:30

## 2022-02-12 RX ADMIN — WARFARIN SODIUM 5 MG: 5 TABLET ORAL at 18:00

## 2022-02-12 RX ADMIN — OXYCODONE HYDROCHLORIDE 10 MG: 10 TABLET ORAL at 19:48

## 2022-02-12 RX ADMIN — BUDESONIDE AND FORMOTEROL FUMARATE DIHYDRATE 2 PUFF: 160; 4.5 AEROSOL RESPIRATORY (INHALATION) at 06:34

## 2022-02-12 RX ADMIN — BUDESONIDE AND FORMOTEROL FUMARATE DIHYDRATE 2 PUFF: 160; 4.5 AEROSOL RESPIRATORY (INHALATION) at 18:00

## 2022-02-12 RX ADMIN — LEVOTHYROXINE SODIUM 200 MCG: 0.2 TABLET ORAL at 06:31

## 2022-02-12 RX ADMIN — TIOTROPIUM BROMIDE INHALATION SPRAY 5 MCG: 3.12 SPRAY, METERED RESPIRATORY (INHALATION) at 06:34

## 2022-02-12 RX ADMIN — DOCUSATE SODIUM 50 MG AND SENNOSIDES 8.6 MG 2 TABLET: 8.6; 5 TABLET, FILM COATED ORAL at 18:00

## 2022-02-12 RX ADMIN — OXYCODONE HYDROCHLORIDE 10 MG: 10 TABLET ORAL at 06:31

## 2022-02-12 RX ADMIN — OXYCODONE HYDROCHLORIDE 10 MG: 10 TABLET ORAL at 09:35

## 2022-02-12 RX ADMIN — DOCUSATE SODIUM 50 MG AND SENNOSIDES 8.6 MG 2 TABLET: 8.6; 5 TABLET, FILM COATED ORAL at 06:31

## 2022-02-12 RX ADMIN — NYSTATIN: 100000 POWDER TOPICAL at 08:12

## 2022-02-12 RX ADMIN — OXYCODONE HYDROCHLORIDE 10 MG: 10 TABLET ORAL at 14:14

## 2022-02-12 RX ADMIN — CARBAMAZEPINE 600 MG: 200 TABLET ORAL at 22:03

## 2022-02-12 ASSESSMENT — PAIN SCALES - PAIN ASSESSMENT IN ADVANCED DEMENTIA (PAINAD)
BREATHING: NORMAL
FACIALEXPRESSION: SMILING OR INEXPRESSIVE
BREATHING: NORMAL
TOTALSCORE: 0
BODYLANGUAGE: RELAXED
BODYLANGUAGE: RELAXED
CONSOLABILITY: NO NEED TO CONSOLE
CONSOLABILITY: NO NEED TO CONSOLE
FACIALEXPRESSION: SMILING OR INEXPRESSIVE
CONSOLABILITY: NO NEED TO CONSOLE
FACIALEXPRESSION: SMILING OR INEXPRESSIVE
TOTALSCORE: 0
BODYLANGUAGE: RELAXED

## 2022-02-12 ASSESSMENT — PAIN DESCRIPTION - PAIN TYPE
TYPE: SURGICAL PAIN
TYPE: ACUTE PAIN;SURGICAL PAIN
TYPE: SURGICAL PAIN
TYPE: ACUTE PAIN;SURGICAL PAIN

## 2022-02-12 ASSESSMENT — ENCOUNTER SYMPTOMS
ABDOMINAL PAIN: 0
SHORTNESS OF BREATH: 0
PALPITATIONS: 0
VOMITING: 0
COUGH: 0
NERVOUS/ANXIOUS: 1
NECK PAIN: 0
NAUSEA: 0
MYALGIAS: 0

## 2022-02-12 NOTE — PROGRESS NOTES
Hospital Medicine Daily Progress Note    Date of Service  2/12/2022    Chief Complaint  Atiya Henley is a 71 y.o. female admitted 2/7/2022 with left ankle fracture    Hospital Course  No notes on file  71 y.o. female who presented 2/7/2022 with fall. PMH of COPD on 2L 02, HTN, tobacco use, YESSICA, on CPAP, Hx of DVT on warfarin, anxiety, hypothyroid. Lives in nursing home.  Found down in the bathroom and complaining of pain on her left lower extremity.  Did not hit her head, no LOC.  Says she also fell a few weeks ago and hit her head, ecchymosis below both eyes bilaterally.  She was found to have left ankle fracture and dislocation. Ortho was consulted and plans to take the patient to the OR for repair.    Interval Problem Update  No acute events overnight. No nausea or vomiting   PT/OT eval needs placement. Awaiting arrangements    Pain controlled     I have personally seen and examined the patient at bedside. I discussed the plan of care with patient     Consultants/Specialty  orthopedics    Code Status  DNAR/DNI    Disposition  Patient is medically cleared for discharge.   Anticipate discharge to to skilled nursing facility.  I have placed the appropriate orders for post-discharge needs.    Review of Systems  Review of Systems   Constitutional: Positive for malaise/fatigue.   HENT: Negative for ear discharge and ear pain.    Respiratory: Negative for cough and shortness of breath.    Cardiovascular: Negative for chest pain and palpitations.   Gastrointestinal: Negative for abdominal pain, nausea and vomiting.   Genitourinary: Negative for dysuria, frequency and urgency.   Musculoskeletal: Positive for joint pain. Negative for myalgias and neck pain.   Psychiatric/Behavioral: The patient is nervous/anxious.    All other systems reviewed and are negative.       Physical Exam  Temp:  [36.4 °C (97.6 °F)-37.2 °C (98.9 °F)] 36.4 °C (97.6 °F)  Pulse:  [64-76] 66  Resp:  [16-17] 17  BP: (120-150)/(67-81)  127/67  SpO2:  [90 %-94 %] 93 %    Physical Exam  Vitals and nursing note reviewed.   Constitutional:       Appearance: She is obese.   Eyes:      General: No scleral icterus.  Cardiovascular:      Rate and Rhythm: Bradycardia present.   Pulmonary:      Effort: Pulmonary effort is normal. No respiratory distress.      Breath sounds: No rales.   Abdominal:      General: There is no distension.      Tenderness: There is no abdominal tenderness. There is no guarding or rebound.   Musculoskeletal:      Comments: Splint over ankle   Skin:     General: Skin is warm.      Coloration: Skin is not jaundiced.   Neurological:      General: No focal deficit present.      Mental Status: Mental status is at baseline.         Fluids    Intake/Output Summary (Last 24 hours) at 2/12/2022 0945  Last data filed at 2/11/2022 1400  Gross per 24 hour   Intake 240 ml   Output --   Net 240 ml       Laboratory  Recent Labs     02/10/22  0053   WBC 6.4   RBC 3.53*   HEMOGLOBIN 11.1*   HEMATOCRIT 34.3*   MCV 97.2   MCH 31.4   MCHC 32.4*   RDW 55.2*   PLATELETCT 239   MPV 10.5     Recent Labs     02/10/22  0053   SODIUM 135   POTASSIUM 3.7   CHLORIDE 97   CO2 27   GLUCOSE 107*   BUN 6*   CREATININE 0.54   CALCIUM 8.7     Recent Labs     02/10/22  0053 02/11/22  0211 02/12/22  0538   INR 1.32* 1.47* 1.73*               Imaging  DX-PORTABLE FLUORO > 1 HOUR   Final Result      Portable fluoroscopy utilized for 24 seconds.         INTERPRETING LOCATION: [address] [place] [address], [facility]      DX-ANKLE 3+ VIEWS LEFT   Final Result      Intraoperative image as above described.      DX-ANKLE 3+ VIEWS LEFT   Final Result         1.  Distal fibular metaphyseal fracture   2.  Medial malleolus fracture   3.  Interval reduction of lateral ankle subluxation         DX-KNEE 3 VIEWS LEFT   Final Result         1.  Spiral proximal fibular diaphyseal fracture   2.  Lateral subluxation of the patella, concerning for patellar dislocation. Could be further evaluated  with sunrise view.      DX-TIBIA AND FIBULA LEFT   Final Result         1.  Proximal fibular diaphyseal fracture.   2.  Distal fibular metaphyseal fracture      DX-ANKLE 3+ VIEWS LEFT   Final Result         1.  Lateral malleolus fracture with lateral subluxation of the ankle mortise joint.   2.  Bony fracture fragments adjacent to the medial malleolus compatible with ligamentous avulsion fracture fragments.              Assessment/Plan  * Closed left ankle fracture, initial encounter- (present on admission)  Assessment & Plan  Left ankle fracture dislocation following fall.    Fracture from ground-level fall.  Vitamin D level checked and found to be unremarkable  S/p surgical repair with ortho appreciate rec.  PT/OT eval will need placement      Class 3 severe obesity due to excess calories with serious comorbidity and body mass index (BMI) of 40.0 to 44.9 in adult ([facility])- (present on admission)  Assessment & Plan  BMI 44.46.  Ground-level fall with fracture  PT, OT    Hypothyroidism, acquired, autoimmune- (present on admission)  Assessment & Plan  Continue levothyroxine    History of DVT (deep vein thrombosis)- (present on admission)  Assessment & Plan  On warfarin.  Held due to expected surgery  Restarting today 2/9     Tobacco abuse- (present on admission)  Assessment & Plan  Says she is down to few cigarettes a week.  Says she does not need nicotine patch    COPD (chronic obstructive pulmonary disease) ([facility])- (present on admission)  Assessment & Plan  Nonacute exacerbation, his baseline is 2 L  Continue Trelegy Ellipta    Primary hypertension- (present on admission)  Assessment & Plan  Continue lisinopril    YESSICA (obstructive sleep apnea)- (present on admission)  Assessment & Plan  Nocturnal CPAP ordered       VTE prophylaxis: SCDs/TEDs    I have performed a physical exam and reviewed and updated ROS and Plan today (2/12/2022). In review of yesterday's note (2/11/2022), there are no changes except as documented  above.

## 2022-02-12 NOTE — RESPIRATORY CARE
COPD EDUCATION by COPD CLINICAL EDUCATOR  2/11/2022  at  4:42 PM by Deyanira Mast RRT     Patient interviewed by COPD education team.  Patient unable to participate in full program.  A short intervention has been conducted.  A comprehensive packet including information about COPD, types of treatments to manage their disease and safe home Oxygen usage was provided and reviewed with patient at the bedside.    Per patient last PFT on 06/01/2020 shows mild restrictive disease. FEV1 66/ Ratio 83. Discussed the use of oxygen and medication. Patient stated she quit smoking 3 or 4 weeks ago on last admit, she wanted information still in case she started back up.           COPD Assessment  COPD Clinical Specialists ONLY  COPD Education Initiated: Yes--Short Intervention  DME Company: [employer]  DME Equipment Type: CPAP supplies/ Oxygen  Physician Name: PRATIBHA MUÑOZ M.D.  Pulmonologist Name: DR Gore (??)  Referrals Initiated: Yes  Pulmonary Rehab: Declined  Smoking Cessation: N/A (pt stated she quit smoking 3 weeks ago, took info in case shhe needs it)  Palliative Care:  (TBD)  Hospice: N/A  Home Health Care:  (TBD)  [facility] Outreach: N/A  [facility] Group: N/A  Dispatch Health: Yes (referral sent)  Private In-Home Care Agency: N/A  Is this a COPD exacerbation patient?: No  $ Demo/Eval of SVN's, MDI's and Aerosols: Yes (spacer given)  (OP) Pulmonary Function Testing: Yes (06/01/2020 FEV1 66 / Ratio 83. Mild restriction)    PFT Results    No results found for: PFT    Meds to Beds  Would the patient like to opt in for Bedside Medication Delivery at Discharge?: No  No action plan was completed at this time

## 2022-02-12 NOTE — CARE PLAN
The patient is Stable - Low risk of patient condition declining or worsening    Shift Goals  Clinical Goals: safety, pain control, improved activity tolerance  Patient Goals: safety, pain control, rest  Family Goals: n/a    Progress made toward(s) clinical / shift goals:  PRN oxycodone for pain. Calls as appropriate, makes no unsafe attempts at ambulation. Turning Q2hrs, appropriate skin protocols in place.      Problem: Pain - Standard  Goal: Alleviation of pain or a reduction in pain to the patient’s comfort goal  Outcome: Progressing     Problem: Fall Risk  Goal: Patient will remain free from falls  Outcome: Progressing     Problem: Skin Integrity  Goal: Skin integrity is maintained or improved  Outcome: Progressing       Patient is not progressing towards the following goals:

## 2022-02-12 NOTE — PROGRESS NOTES
Inpatient Anticoagulation Service Note    Date: 2/12/2022  Reason for Anticoagulation: Atrial Fibrillation,Deep Vein Thrombosis      Hemoglobin Value: (!) 11.1  Hematocrit Value: (!) 34.3  Lab Platelet Value: 239  Target INR: 2.0 to 3.0    INR from last 7 days     Date/Time INR Value    02/12/22 0538 1.73    02/11/22 0211 1.47    02/10/22 00:53:01 1.32    02/09/22 1209 1.56    02/07/22 06:48:01 2.62        Dose from last 7 days     Date/Time Dose (mg)    02/12/22 0802 5    02/11/22 1456 7.5    02/10/22 1600 10    02/09/22 1351 10        Average Dose (mg):  (Home Dose: 5 mg MWF + 10 mg ROW per MR report)  Significant Interactions: Thyroid Medications,Other (Comments) (carbamezepine)  Bridge Therapy: No     Reversal Agent Administered: Not Applicable  Comments: INR remains subtherapeutic this morning. No new CBC this morning. No charted bleeding. Pharmacy will continue to monitor.    Plan:  Plan to give 5mg today. Dose may need to be increased in next 1-2 days. Repeat INR 2/13 AM.    Education Material Provided?: No (chronic warfarin patient)  Pharmacist suggested discharge dosing: Resume home dose of warfarin 5mg MWF and 10mg all other days. Patient to follow up for repeat INR within 48hrs of discharge.      Modesto Howard, PharmD, BCPS

## 2022-02-12 NOTE — CARE PLAN
Problem: Pain - Standard  Goal: Alleviation of pain or a reduction in pain to the patient’s comfort goal  Outcome: Progressing  Flowsheets (Taken 2/12/2022 1055)  OB Pain Intervention:   Cold pack   Education   Repositioned   Elevation  Note:  Patient educated on 0-10 pain scale, and non-pharmacological pain control. Encouraged to verbalize and contact staff when in pain.  Administered PRN medication as needed.       Problem: Knowledge Deficit - Standard  Goal: Patient and family/care givers will demonstrate understanding of plan of care, disease process/condition, diagnostic tests and medications  Outcome: Progressing  Note: Discussed plan of care with patient.     Problem: Fall Risk  Goal: Patient will remain free from falls  Outcome: Progressing  Note:  Verified bed is locked and in lowest position, hourly rounding in place, bed alarm on, call light with in reach, slip socks on, educated patient on use of call light for assistance.     The patient is Stable - Low risk of patient condition declining or worsening    Shift Goals  Clinical Goals: Safety, pain control, mobillity  Patient Goals: Pain control, rest   Family Goals: n/a    Progress made toward(s) clinical / shift goals:  Patient able to verbalize understanding of the plan of care.  Pain currently managed with medication regimen.  Patient tolerating X5zngae today, linens remain clean and dry throughout the day.  Patient using call light appropriately for assistance through the day.

## 2022-02-12 NOTE — CARE PLAN
The patient is Stable - Low risk of patient condition declining or worsening    Shift Goals  Clinical Goals: safety, pain control  Patient Goals: pain control, rest  Family Goals: n/a    Progress made toward(s) clinical / shift goals:      Problem: Pain - Standard  Goal: Alleviation of pain or a reduction in pain to the patient’s comfort goal  Outcome: Progressing     Problem: Fall Risk  Goal: Patient will remain free from falls  Outcome: Progressing     Problem: Skin Integrity  Goal: Skin integrity is maintained or improved  Outcome: Progressing    PRN oxycodone for pain. Calls as appropriate, makes no unsafe attempts at ambulation. Turning Q2hrs, appropriate skin protocols in place.       Patient is not progressing towards the following goals:

## 2022-02-13 LAB
INR PPP: 1.73 (ref 0.87–1.13)
PROTHROMBIN TIME: 19.7 SEC (ref 12–14.6)

## 2022-02-13 PROCEDURE — 700102 HCHG RX REV CODE 250 W/ 637 OVERRIDE(OP): Performed by: INTERNAL MEDICINE

## 2022-02-13 PROCEDURE — G0378 HOSPITAL OBSERVATION PER HR: HCPCS

## 2022-02-13 PROCEDURE — 302118 SHAMPOO,NO RINSE: Performed by: INTERNAL MEDICINE

## 2022-02-13 PROCEDURE — 700102 HCHG RX REV CODE 250 W/ 637 OVERRIDE(OP): Performed by: HOSPITALIST

## 2022-02-13 PROCEDURE — 85610 PROTHROMBIN TIME: CPT

## 2022-02-13 PROCEDURE — 302102 BAG OST N IMG 2.25IN 2PC (FECAL): Performed by: INTERNAL MEDICINE

## 2022-02-13 PROCEDURE — A9270 NON-COVERED ITEM OR SERVICE: HCPCS | Performed by: INTERNAL MEDICINE

## 2022-02-13 PROCEDURE — A9270 NON-COVERED ITEM OR SERVICE: HCPCS | Performed by: HOSPITALIST

## 2022-02-13 PROCEDURE — 700102 HCHG RX REV CODE 250 W/ 637 OVERRIDE(OP): Performed by: STUDENT IN AN ORGANIZED HEALTH CARE EDUCATION/TRAINING PROGRAM

## 2022-02-13 PROCEDURE — 99225 PR SUBSEQUENT OBSERVATION CARE,LEVEL II: CPT | Performed by: INTERNAL MEDICINE

## 2022-02-13 PROCEDURE — A9270 NON-COVERED ITEM OR SERVICE: HCPCS | Performed by: STUDENT IN AN ORGANIZED HEALTH CARE EDUCATION/TRAINING PROGRAM

## 2022-02-13 PROCEDURE — 36415 COLL VENOUS BLD VENIPUNCTURE: CPT

## 2022-02-13 RX ORDER — WARFARIN SODIUM 10 MG/1
10 TABLET ORAL
Status: COMPLETED | OUTPATIENT
Start: 2022-02-13 | End: 2022-02-13

## 2022-02-13 RX ADMIN — TIOTROPIUM BROMIDE INHALATION SPRAY 5 MCG: 3.12 SPRAY, METERED RESPIRATORY (INHALATION) at 04:57

## 2022-02-13 RX ADMIN — NYSTATIN: 100000 POWDER TOPICAL at 17:35

## 2022-02-13 RX ADMIN — CARBAMAZEPINE 600 MG: 200 TABLET ORAL at 19:59

## 2022-02-13 RX ADMIN — NYSTATIN: 100000 POWDER TOPICAL at 04:57

## 2022-02-13 RX ADMIN — DOCUSATE SODIUM 50 MG AND SENNOSIDES 8.6 MG 2 TABLET: 8.6; 5 TABLET, FILM COATED ORAL at 04:57

## 2022-02-13 RX ADMIN — WARFARIN SODIUM 10 MG: 10 TABLET ORAL at 17:35

## 2022-02-13 RX ADMIN — BUDESONIDE AND FORMOTEROL FUMARATE DIHYDRATE 2 PUFF: 160; 4.5 AEROSOL RESPIRATORY (INHALATION) at 04:57

## 2022-02-13 RX ADMIN — LISINOPRIL 2.5 MG: 2.5 TABLET ORAL at 04:57

## 2022-02-13 RX ADMIN — LEVOTHYROXINE SODIUM 200 MCG: 0.2 TABLET ORAL at 04:57

## 2022-02-13 RX ADMIN — DOCUSATE SODIUM 50 MG AND SENNOSIDES 8.6 MG 2 TABLET: 8.6; 5 TABLET, FILM COATED ORAL at 17:35

## 2022-02-13 RX ADMIN — BUDESONIDE AND FORMOTEROL FUMARATE DIHYDRATE 2 PUFF: 160; 4.5 AEROSOL RESPIRATORY (INHALATION) at 17:37

## 2022-02-13 RX ADMIN — OXYCODONE HYDROCHLORIDE 10 MG: 10 TABLET ORAL at 19:59

## 2022-02-13 ASSESSMENT — PAIN DESCRIPTION - PAIN TYPE
TYPE: ACUTE PAIN;SURGICAL PAIN
TYPE: SURGICAL PAIN;ACUTE PAIN

## 2022-02-13 ASSESSMENT — ENCOUNTER SYMPTOMS
SHORTNESS OF BREATH: 0
PALPITATIONS: 0
ABDOMINAL PAIN: 0
NERVOUS/ANXIOUS: 1
COUGH: 0
MYALGIAS: 0
NAUSEA: 0
VOMITING: 0
NECK PAIN: 0

## 2022-02-13 NOTE — PROGRESS NOTES
A&Ox 4, Pain 2/10. +BS last BM 2/11/22 via colostomy, adequate urine output via commode. Splint to LLE is CDI. Ambulation pivot to commode, NWB to LLE. Discussed POC with pt-pt verbalized understanding. Call light within reach, bed in lowest position, Bed/chair alarm on.

## 2022-02-13 NOTE — PROGRESS NOTES
Inpatient Anticoagulation Service Note    Date: 2/13/2022  Reason for Anticoagulation: Atrial Fibrillation,Deep Vein Thrombosis           Hemoglobin Value: (!) 11.1  Hematocrit Value: (!) 34.3  Lab Platelet Value: 239  Target INR: 2.0 to 3.0    INR from last 7 days     Date/Time INR Value    02/13/22 0715 1.73    02/12/22 0538 1.73    02/11/22 0211 1.47    02/10/22 00:53:01 1.32    02/09/22 1209 1.56    02/07/22 06:48:01 2.62        Dose from last 7 days     Date/Time Dose (mg)    02/13/22 0953 10    02/12/22 0802 5    02/11/22 1456 7.5    02/10/22 1600 10    02/09/22 1351 10        Average Dose (mg):  (Home Dose: 5 mg MWF + 10 mg ROW per MR report)  Significant Interactions: Thyroid Medications,Other (Comments) (carbamezepine)  Bridge Therapy: No     Reversal Agent Administered: Not Applicable  Comments: INR remains subtherapeutic this morning.  No charted concern of bleeding. Pharmacy will continue to monitor.    Plan: give warfarin 10mg today. Repeat INR 2/14  Education Material Provided?: No (chronic warfarin patient)  Pharmacist suggested discharge dosing: Resume home dose of warfarin 5mg MWF and 10mg all other days. Patient to follow up for repeat INR within 48hrs of discharge.      Modesto Howard, PharmD, BCPS

## 2022-02-13 NOTE — CARE PLAN
Problem: Pain - Standard  Goal: Alleviation of pain or a reduction in pain to the patient’s comfort goal  Outcome: Progressing     Problem: Knowledge Deficit - Standard  Goal: Patient and family/care givers will demonstrate understanding of plan of care, disease process/condition, diagnostic tests and medications  Outcome: Progressing     Problem: Fall Risk  Goal: Patient will remain free from falls  Outcome: Progressing     Problem: Skin Integrity  Goal: Skin integrity is maintained or improved  Outcome: Progressing     The patient is Stable - Low risk of patient condition declining or worsening    Shift Goals  Clinical Goals: Safety, pain control  Patient Goals: Pain control, comfort  Family Goals: n/a    Progress made toward(s) clinical / shift goals:  Educate patient of available PRN pain medication per MAR. Reinforce fall/safety precautions.     Patient is not progressing towards the following goals:

## 2022-02-13 NOTE — PROGRESS NOTES
"   Orthopaedic Progress Note    Interval changes:  Patient doing well  LLE in short leg splint CDI  Cleared for DC to SNF by ortho pending medicine clearance    ROS - Patient denies any new issues.  Pain well controlled.    /67   Pulse 66   Temp 36.4 °C (97.6 °F) (Temporal)   Resp 17   Ht 1.626 m (5' 4\")   Wt 117 kg (259 lb)   SpO2 93%       Patient seen and examined  No acute distress  Breathing non labored  RRR  LLE in short leg splint CDI, DNVI, moves all toes, cap refill <2 sec.     Recent Labs     02/10/22  0053   WBC 6.4   RBC 3.53*   HEMOGLOBIN 11.1*   HEMATOCRIT 34.3*   MCV 97.2   MCH 31.4   MCHC 32.4*   RDW 55.2*   PLATELETCT 239   MPV 10.5       Active Hospital Problems    Diagnosis    • History of DVT (deep vein thrombosis) [Z86.718]      Priority: Medium     Diagnois update 10/1/2016     • COPD (chronic obstructive pulmonary disease) ([facility]) [J44.9]      Priority: Medium     On home O2  Current smoker.  Significant comorbidity  Does not appear to be exacerbating  Resp and O2 per protocol     • Primary hypertension [I10]      Priority: Medium   • YESSICA (obstructive sleep apnea) [G47.33]      Priority: Medium     CPAP at night at home     • Tobacco abuse [Z72.0]      Priority: Low   • Closed left ankle fracture, initial encounter [S82.962A]    • Class 3 severe obesity due to excess calories with serious comorbidity and body mass index (BMI) of 40.0 to 44.9 in adult ([facility]) [E66.01, Z68.41]    • Hypothyroidism, acquired, autoimmune [E06.3]        Assessment/Plan:  Patient doing well  LLE in short leg splint CDI  Cleared for DC to SNF by ortho pending medicine clearance  POD#5 S/P Open reduction and internal fixation of the fibula with stabilization of the syndesmosis.  Wt bearing status - NWB LLE  Wound care/Drains - Dressings to be changed every other day by nursing  Future Procedures - none planned   Sutures/Staples out- 14 days post operatively  PT/OT-initiated  Antibiotics: Perioperative " completed  DVT Prophylaxis- TEDS/SCDs/Foot pumps  Turner-none  Case Coordination for Discharge Planning - Disposition SNF     I have performed a physical exam and reviewed and updated ROS and Plan today (2/12). In review of yesterday's note (2/11), there are no changes except as documented above.

## 2022-02-13 NOTE — PROGRESS NOTES
[facility] Medicine Daily Progress Note    Date of Service  2/11/2022    Chief Complaint  Atiya Henley is a 71 y.o. female admitted 2/7/2022 with left ankle fracture    Hospital Course  No notes on file  71 y.o. female who presented 2/7/2022 with fall. PMH of COPD on 2L 02, HTN, tobacco use, YESSICA, on CPAP, Hx of DVT on warfarin, anxiety, hypothyroid. Lives in nursing home.  Found down in the bathroom and complaining of pain on her left lower extremity.  Did not hit her head, no LOC.  Says she also fell a few weeks ago and hit her head, ecchymosis below both eyes bilaterally.  She was found to have left ankle fracture and dislocation. Ortho was consulted and plans to take the patient to the OR for repair.    Interval Problem Update  Patient seen and examined, no acute events overnight. No nausea or vomiting   PT/OT eval needs placement. Awaiting arrangements    Requesting IV pain morphine to control her  Pain.     I have personally seen and examined the patient at bedside. I discussed the plan of care with patient     Consultants/Specialty  orthopedics    Code Status  DNAR/DNI    Disposition  Patient is medically cleared for discharge.   Anticipate discharge to to skilled nursing facility.  I have placed the appropriate orders for post-discharge needs.    Review of Systems  Review of Systems   Constitutional: Positive for malaise/fatigue.   HENT: Negative for ear discharge and ear pain.    Respiratory: Negative for cough and shortness of breath.    Cardiovascular: Negative for chest pain and palpitations.   Gastrointestinal: Negative for abdominal pain, nausea and vomiting.   Genitourinary: Negative for dysuria, frequency and urgency.   Musculoskeletal: Positive for joint pain. Negative for myalgias and neck pain.   Psychiatric/Behavioral: The patient is nervous/anxious.    All other systems reviewed and are negative.       Physical Exam  Temp:  [36.2 °C (97.2 °F)-37 °C (98.6 °F)] 36.6 °C (97.8 °F)  Pulse:   [62-76] 76  Resp:  [17-18] 18  BP: (130-160)/(59-84) 160/84  SpO2:  [92 %-95 %] 95 %    Physical Exam  Vitals and nursing note reviewed.   Constitutional:       Appearance: She is obese.   Eyes:      General: No scleral icterus.  Cardiovascular:      Rate and Rhythm: Bradycardia present.   Pulmonary:      Effort: Pulmonary effort is normal. No respiratory distress.      Breath sounds: No rales.   Abdominal:      General: There is no distension.      Tenderness: There is no abdominal tenderness. There is no guarding or rebound.   Musculoskeletal:      Comments: Splint over ankle   Skin:     General: Skin is warm.      Coloration: Skin is not jaundiced.   Neurological:      General: No focal deficit present.      Mental Status: Mental status is at baseline.         Fluids  No intake or output data in the 24 hours ending 02/13/22 1018    Laboratory          Recent Labs     02/11/22  0211 02/12/22  0538 02/13/22  0715   INR 1.47* 1.73* 1.73*               Imaging  DX-PORTABLE FLUORO > 1 HOUR   Final Result      Portable fluoroscopy utilized for 24 seconds.         INTERPRETING LOCATION: [address] [place] [address], [id number]      DX-ANKLE 3+ VIEWS LEFT   Final Result      Intraoperative image as above described.      DX-ANKLE 3+ VIEWS LEFT   Final Result         1.  Distal fibular metaphyseal fracture   2.  Medial malleolus fracture   3.  Interval reduction of lateral ankle subluxation         DX-KNEE 3 VIEWS LEFT   Final Result         1.  Spiral proximal fibular diaphyseal fracture   2.  Lateral subluxation of the patella, concerning for patellar dislocation. Could be further evaluated with sunrise view.      DX-TIBIA AND FIBULA LEFT   Final Result         1.  Proximal fibular diaphyseal fracture.   2.  Distal fibular metaphyseal fracture      DX-ANKLE 3+ VIEWS LEFT   Final Result         1.  Lateral malleolus fracture with lateral subluxation of the ankle mortise joint.   2.  Bony fracture fragments adjacent to the medial  malleolus compatible with ligamentous avulsion fracture fragments.              Assessment/Plan  * Closed left ankle fracture, initial encounter- (present on admission)  Assessment & Plan  Left ankle fracture dislocation following fall.    Fracture from ground-level fall.  Vitamin D level checked and found to be unremarkable  S/p surgical repair with ortho appreciate rec.  PT/OT eval will need placement      Class 3 severe obesity due to excess calories with serious comorbidity and body mass index (BMI) of 40.0 to 44.9 in adult ([facility])- (present on admission)  Assessment & Plan  BMI 44.46.  Ground-level fall with fracture  PT, OT    Hypothyroidism, acquired, autoimmune- (present on admission)  Assessment & Plan  Continue levothyroxine    History of DVT (deep vein thrombosis)- (present on admission)  Assessment & Plan  On warfarin.  Held due to expected surgery  Restarting today 2/9     Tobacco abuse- (present on admission)  Assessment & Plan  Says she is down to few cigarettes a week.  Says she does not need nicotine patch    COPD (chronic obstructive pulmonary disease) ([facility])- (present on admission)  Assessment & Plan  Nonacute exacerbation, his baseline is 2 L  Continue Trelegy Ellipta    Primary hypertension- (present on admission)  Assessment & Plan  Continue lisinopril    YESSICA (obstructive sleep apnea)- (present on admission)  Assessment & Plan  Nocturnal CPAP ordered       VTE prophylaxis: SCDs/TEDs    I have performed a physical exam and reviewed and updated ROS and Plan today (2/13/2022). In review of yesterday's note (2/12/2022), there are no changes except as documented above.

## 2022-02-13 NOTE — PROGRESS NOTES
"   Orthopaedic Progress Note    Interval changes:  Patient doing well  LLE in short leg splint CDI  Cleared for DC to SNF by ortho pending medicine clearance    ROS - Patient denies any new issues.  Pain well controlled.    /84   Pulse 76   Temp 36.6 °C (97.8 °F) (Temporal)   Resp 18   Ht 1.626 m (5' 4\")   Wt 117 kg (259 lb)   SpO2 95%       Patient seen and examined  No acute distress  Breathing non labored  RRR  LLE in short leg splint CDI, DNVI, moves all toes, cap refill <2 sec.           Active Hospital Problems    Diagnosis    • History of DVT (deep vein thrombosis) [Z86.718]      Priority: Medium     Diagnois update 10/1/2016     • COPD (chronic obstructive pulmonary disease) ([facility]) [J44.9]      Priority: Medium     On home O2  Current smoker.  Significant comorbidity  Does not appear to be exacerbating  Resp and O2 per protocol     • Primary hypertension [I10]      Priority: Medium   • YESSICA (obstructive sleep apnea) [G47.33]      Priority: Medium     CPAP at night at home     • Tobacco abuse [Z72.0]      Priority: Low   • Closed left ankle fracture, initial encounter [S82.892A]    • Class 3 severe obesity due to excess calories with serious comorbidity and body mass index (BMI) of 40.0 to 44.9 in adult ([facility]) [E66.01, Z68.41]    • Hypothyroidism, acquired, autoimmune [E06.3]        Assessment/Plan:  Patient doing well  LLE in short leg splint CDI  Cleared for DC to SNF by ortho pending medicine clearance  POD#6 S/P Open reduction and internal fixation of the fibula with stabilization of the syndesmosis.  Wt bearing status - NWB LLE  Wound care/Drains - Dressings to be changed every other day by nursing  Future Procedures - none planned   Sutures/Staples out- 14 days post operatively  PT/OT-initiated  Antibiotics: Perioperative completed  DVT Prophylaxis- TEDS/SCDs/Foot pumps  Turner-none  Case Coordination for Discharge Planning - Disposition SNF     I have performed a physical exam and reviewed and " updated ROS and Plan today (2/13). In review of yesterday's note (2/12), there are no changes except as documented above.

## 2022-02-13 NOTE — CARE PLAN
Problem: Pain - Standard  Goal: Alleviation of pain or a reduction in pain to the patient’s comfort goal  Outcome: Progressing     Problem: Knowledge Deficit - Standard  Goal: Patient and family/care givers will demonstrate understanding of plan of care, disease process/condition, diagnostic tests and medications  Outcome: Progressing     The patient is Stable - Low risk of patient condition declining or worsening    Shift Goals  Clinical Goals: safety, pain control  Patient Goals: comfort  Family Goals: n/a    Progress made toward(s) clinical / shift goals: pain managed with rest and distraction. POC discussed with pt-pt verbalized understand.    Patient is not progressing towards the following goals:

## 2022-02-14 LAB
ANION GAP SERPL CALC-SCNC: 9 MMOL/L (ref 7–16)
BASOPHILS # BLD AUTO: 0.7 % (ref 0–1.8)
BASOPHILS # BLD: 0.05 K/UL (ref 0–0.12)
BUN SERPL-MCNC: 8 MG/DL (ref 8–22)
CALCIUM SERPL-MCNC: 9.2 MG/DL (ref 8.5–10.5)
CHLORIDE SERPL-SCNC: 98 MMOL/L (ref 96–112)
CO2 SERPL-SCNC: 28 MMOL/L (ref 20–33)
CREAT SERPL-MCNC: 0.42 MG/DL (ref 0.5–1.4)
EOSINOPHIL # BLD AUTO: 0.47 K/UL (ref 0–0.51)
EOSINOPHIL NFR BLD: 7 % (ref 0–6.9)
ERYTHROCYTE [DISTWIDTH] IN BLOOD BY AUTOMATED COUNT: 53.4 FL (ref 35.9–50)
GLUCOSE SERPL-MCNC: 110 MG/DL (ref 65–99)
HCT VFR BLD AUTO: 33.9 % (ref 37–47)
HGB BLD-MCNC: 11.2 G/DL (ref 12–16)
IMM GRANULOCYTES # BLD AUTO: 0.01 K/UL (ref 0–0.11)
IMM GRANULOCYTES NFR BLD AUTO: 0.1 % (ref 0–0.9)
INR PPP: 1.89 (ref 0.87–1.13)
LYMPHOCYTES # BLD AUTO: 1.31 K/UL (ref 1–4.8)
LYMPHOCYTES NFR BLD: 19.5 % (ref 22–41)
MCH RBC QN AUTO: 32 PG (ref 27–33)
MCHC RBC AUTO-ENTMCNC: 33 G/DL (ref 33.6–35)
MCV RBC AUTO: 96.9 FL (ref 81.4–97.8)
MONOCYTES # BLD AUTO: 0.64 K/UL (ref 0–0.85)
MONOCYTES NFR BLD AUTO: 9.5 % (ref 0–13.4)
NEUTROPHILS # BLD AUTO: 4.23 K/UL (ref 2–7.15)
NEUTROPHILS NFR BLD: 63.2 % (ref 44–72)
NRBC # BLD AUTO: 0 K/UL
NRBC BLD-RTO: 0 /100 WBC
PLATELET # BLD AUTO: 273 K/UL (ref 164–446)
PMV BLD AUTO: 10.3 FL (ref 9–12.9)
POTASSIUM SERPL-SCNC: 3.7 MMOL/L (ref 3.6–5.5)
PROTHROMBIN TIME: 21.1 SEC (ref 12–14.6)
RBC # BLD AUTO: 3.5 M/UL (ref 4.2–5.4)
SODIUM SERPL-SCNC: 135 MMOL/L (ref 135–145)
WBC # BLD AUTO: 6.7 K/UL (ref 4.8–10.8)

## 2022-02-14 PROCEDURE — 85025 COMPLETE CBC W/AUTO DIFF WBC: CPT

## 2022-02-14 PROCEDURE — A9270 NON-COVERED ITEM OR SERVICE: HCPCS | Performed by: HOSPITALIST

## 2022-02-14 PROCEDURE — 97530 THERAPEUTIC ACTIVITIES: CPT

## 2022-02-14 PROCEDURE — 700102 HCHG RX REV CODE 250 W/ 637 OVERRIDE(OP): Performed by: STUDENT IN AN ORGANIZED HEALTH CARE EDUCATION/TRAINING PROGRAM

## 2022-02-14 PROCEDURE — G0378 HOSPITAL OBSERVATION PER HR: HCPCS

## 2022-02-14 PROCEDURE — A9270 NON-COVERED ITEM OR SERVICE: HCPCS | Performed by: INTERNAL MEDICINE

## 2022-02-14 PROCEDURE — 80048 BASIC METABOLIC PNL TOTAL CA: CPT

## 2022-02-14 PROCEDURE — 700102 HCHG RX REV CODE 250 W/ 637 OVERRIDE(OP): Performed by: INTERNAL MEDICINE

## 2022-02-14 PROCEDURE — 700102 HCHG RX REV CODE 250 W/ 637 OVERRIDE(OP): Performed by: HOSPITALIST

## 2022-02-14 PROCEDURE — A9270 NON-COVERED ITEM OR SERVICE: HCPCS | Performed by: STUDENT IN AN ORGANIZED HEALTH CARE EDUCATION/TRAINING PROGRAM

## 2022-02-14 PROCEDURE — 36415 COLL VENOUS BLD VENIPUNCTURE: CPT

## 2022-02-14 PROCEDURE — 85610 PROTHROMBIN TIME: CPT

## 2022-02-14 PROCEDURE — 99225 PR SUBSEQUENT OBSERVATION CARE,LEVEL II: CPT | Performed by: INTERNAL MEDICINE

## 2022-02-14 RX ORDER — WARFARIN SODIUM 10 MG/1
10 TABLET ORAL
Status: DISCONTINUED | OUTPATIENT
Start: 2022-02-15 | End: 2022-02-15 | Stop reason: HOSPADM

## 2022-02-14 RX ORDER — WARFARIN SODIUM 5 MG/1
5 TABLET ORAL
Status: DISCONTINUED | OUTPATIENT
Start: 2022-02-14 | End: 2022-02-15 | Stop reason: HOSPADM

## 2022-02-14 RX ADMIN — OXYCODONE HYDROCHLORIDE 10 MG: 10 TABLET ORAL at 14:44

## 2022-02-14 RX ADMIN — LEVOTHYROXINE SODIUM 200 MCG: 0.2 TABLET ORAL at 05:10

## 2022-02-14 RX ADMIN — WARFARIN SODIUM 5 MG: 5 TABLET ORAL at 18:17

## 2022-02-14 RX ADMIN — LISINOPRIL 2.5 MG: 2.5 TABLET ORAL at 05:11

## 2022-02-14 RX ADMIN — OXYCODONE HYDROCHLORIDE 10 MG: 10 TABLET ORAL at 05:49

## 2022-02-14 RX ADMIN — BUDESONIDE AND FORMOTEROL FUMARATE DIHYDRATE 2 PUFF: 160; 4.5 AEROSOL RESPIRATORY (INHALATION) at 05:09

## 2022-02-14 RX ADMIN — CARBAMAZEPINE 600 MG: 200 TABLET ORAL at 21:44

## 2022-02-14 RX ADMIN — DOCUSATE SODIUM 50 MG AND SENNOSIDES 8.6 MG 2 TABLET: 8.6; 5 TABLET, FILM COATED ORAL at 05:11

## 2022-02-14 RX ADMIN — BUDESONIDE AND FORMOTEROL FUMARATE DIHYDRATE 2 PUFF: 160; 4.5 AEROSOL RESPIRATORY (INHALATION) at 18:17

## 2022-02-14 RX ADMIN — OXYCODONE HYDROCHLORIDE 10 MG: 10 TABLET ORAL at 18:16

## 2022-02-14 RX ADMIN — TIOTROPIUM BROMIDE INHALATION SPRAY 5 MCG: 3.12 SPRAY, METERED RESPIRATORY (INHALATION) at 05:09

## 2022-02-14 RX ADMIN — OXYCODONE HYDROCHLORIDE 10 MG: 10 TABLET ORAL at 21:44

## 2022-02-14 RX ADMIN — DOCUSATE SODIUM 50 MG AND SENNOSIDES 8.6 MG 2 TABLET: 8.6; 5 TABLET, FILM COATED ORAL at 18:16

## 2022-02-14 RX ADMIN — OXYCODONE HYDROCHLORIDE 10 MG: 10 TABLET ORAL at 09:27

## 2022-02-14 ASSESSMENT — COGNITIVE AND FUNCTIONAL STATUS - GENERAL
MOBILITY SCORE: 8
MOVING FROM LYING ON BACK TO SITTING ON SIDE OF FLAT BED: UNABLE
CLIMB 3 TO 5 STEPS WITH RAILING: TOTAL
TOILETING: A LOT
SUGGESTED CMS G CODE MODIFIER DAILY ACTIVITY: CK
HELP NEEDED FOR BATHING: A LOT
TURNING FROM BACK TO SIDE WHILE IN FLAT BAD: A LOT
DAILY ACTIVITIY SCORE: 18
MOVING TO AND FROM BED TO CHAIR: UNABLE
SUGGESTED CMS G CODE MODIFIER MOBILITY: CM
WALKING IN HOSPITAL ROOM: TOTAL
DRESSING REGULAR LOWER BODY CLOTHING: A LOT
STANDING UP FROM CHAIR USING ARMS: A LOT

## 2022-02-14 ASSESSMENT — ENCOUNTER SYMPTOMS
NERVOUS/ANXIOUS: 1
COUGH: 0
ABDOMINAL PAIN: 0
NAUSEA: 0
VOMITING: 0
NECK PAIN: 0
PALPITATIONS: 0
MYALGIAS: 0
SHORTNESS OF BREATH: 0

## 2022-02-14 ASSESSMENT — PAIN DESCRIPTION - PAIN TYPE
TYPE: SURGICAL PAIN

## 2022-02-14 ASSESSMENT — GAIT ASSESSMENTS: GAIT LEVEL OF ASSIST: UNABLE TO PARTICIPATE

## 2022-02-14 NOTE — THERAPY
"Physical Therapy   Daily Treatment     Patient Name: Atiya Henley  Age:  71 y.o., Sex:  female  Medical Record #: [id number]  Today's Date: 2/14/2022     Precautions  Precautions: (P) Fall Risk;Non Weight Bearing Left Lower Extremity  Comments: (P) RUE NWB from previous fall. Pt reports non-compliance with past WB restrictions. Blind in L eye    Assessment    Pt seen today for follow-up PT treatment. Pt requiring increased assistance for supine to sit, modA today and only Abdelrahman on evaluation. Pt was able to STS with modAx2 and maintain NWB on LLE in standing with verbal cuing. D/t improved ability to comply with WB precautions in standing pt was helped to bedside chair with modAx2. Pt required repeated cues to maintain NWB on LLE. Continuing to recommend post-acute placement to address strength and mobility deficits. PT will continue to follow while in acute care.    Plan    Continue current treatment plan.    DC Equipment Recommendations: (P) Unable to determine at this time  Discharge Recommendations: (P) Recommend post-acute placement for additional physical therapy services prior to discharge home      Subjective    \"I'm deaf in my left eye\"        02/14/22 0822   Vitals   O2 (LPM) 4   O2 Delivery Device Nasal Cannula   Pain 0 - 10 Group   Therapist Pain Assessment During Activity  (not rated, pain in LLE with movement)   Cognition    Cognition / Consciousness X   Level of Consciousness Alert   New Learning Impaired   Attention Impaired   Balance   Sitting Balance (Static) Fair   Sitting Balance (Dynamic) Fair -   Standing Balance (Static) Trace +   Weight Shift Sitting Poor   Weight Shift Standing Absent  (LLE NWB)   Skilled Intervention Verbal Cuing;Tactile Cuing;Sequencing;Facilitation   Comments HHA x2 in standing   Gait Analysis   Gait Level Of Assist Unable to Participate   Bed Mobility    Supine to Sit Moderate Assist   Sit to Supine   (Up in chair post)   Scooting Minimal Assist   Skilled Intervention " Verbal Cuing;Tactile Cuing;Sequencing;Facilitation;Compensatory Strategies   Comments HOB elevated   Functional Mobility   Sit to Stand Moderate Assist  (x2)   Bed, Chair, Wheelchair Transfer Maximal Assist   Transfer Method Stand Pivot   Mobility In room, EOB to chair   Skilled Intervention Verbal Cuing;Tactile Cuing;Compensatory Strategies;Facilitation   Comments HHAx2 for STS and transfer to chair   Short Term Goals    Short Term Goal # 1 Pt will demonstrate supine<>sit using bed features as needed with SPV in 6txs to improve independence.    Goal Outcome # 1 goal not met   Short Term Goal # 2 Pt will demonstrate functional transfers with LRAD while maintaining LLE NWB with SPV in 6tx to improve independence.    Goal Outcome # 2 Goal not met   Short Term Goal # 3 Pt will demonstrate 50' of WC mobility with SPV in 6tx to facilitate independent moblity in her home.    Goal Outcome # 3 Goal not met   Education Group   Education Provided Role of Physical Therapist;Weight Bearing Status   Role of Physical Therapist Patient Response Patient;Acceptance;Explanation;Demonstration;Verbal Demonstration   Weight Bearing Status Patient Response Patient;Acceptance;Explanation;Verbal Demonstration;Reinforcement Needed;Action Demonstration

## 2022-02-14 NOTE — PROGRESS NOTES
Inpatient Anticoagulation Service Note    Date: 2/14/2022  Reason for Anticoagulation: Atrial Fibrillation,Deep Vein Thrombosis           Hemoglobin Value: (!) 11.2  Hematocrit Value: (!) 33.9  Lab Platelet Value: 273  Target INR: 2.0 to 3.0    INR from last 7 days     Date/Time INR Value    02/14/22 04:58:01 1.89    02/13/22 0715 1.73    02/12/22 0538 1.73    02/11/22 0211 1.47    02/10/22 00:53:01 1.32    02/09/22 1209 1.56        Dose from last 7 days     Date/Time Dose (mg)    02/13/22 0953 10    02/12/22 0802 5    02/11/22 1456 7.5    02/10/22 1600 10    02/09/22 1351 10        Average Dose (mg):  (Home Dose: 5 mg MWF + 10 mg ROW per MR report)  Significant Interactions: Thyroid Medications,Other (Comments) (carbamezepine)  Bridge Therapy: No     Reversal Agent Administered: Not Applicable  Comments: INR remains subtherapeutic this morning.No charted concern of bleeding. Pharmacy will continue to monitor.    Plan: Plan to resume home dosing of  5mg today.   Education Material Provided?: No (chronic warfarin patient)  Pharmacist suggested discharge dosing: Resume home dose of warfarin 5mg MWF and 10mg all other days. Patient to follow up for repeat INR within 72hrs of discharge.      Modesto Howard, PharmD, BCPS

## 2022-02-14 NOTE — PROGRESS NOTES
"   Orthopaedic Progress Note    Interval changes:  Patient doing well  LLE in short leg splint CDI  Cleared for DC to SNF by ortho pending medicine clearance    ROS - Patient denies any new issues.  Pain well controlled.    /71   Pulse 77   Temp 36.7 °C (98 °F) (Temporal)   Resp 18   Ht 1.626 m (5' 4\")   Wt 117 kg (259 lb)   SpO2 96%       Patient seen and examined  No acute distress  Breathing non labored  RRR  LLE in short leg splint CDI, DNVI, moves all toes, cap refill <2 sec.     Recent Labs     02/14/22  0458   WBC 6.7   RBC 3.50*   HEMOGLOBIN 11.2*   HEMATOCRIT 33.9*   MCV 96.9   MCH 32.0   MCHC 33.0*   RDW 53.4*   PLATELETCT 273   MPV 10.3       Active Hospital Problems    Diagnosis    • History of DVT (deep vein thrombosis) [Z86.718]      Priority: Medium     Diagnois update 10/1/2016     • COPD (chronic obstructive pulmonary disease) ([facility]) [J44.9]      Priority: Medium     On home O2  Current smoker.  Significant comorbidity  Does not appear to be exacerbating  Resp and O2 per protocol     • Primary hypertension [I10]      Priority: Medium   • YESSICA (obstructive sleep apnea) [G47.33]      Priority: Medium     CPAP at night at home     • Tobacco abuse [Z72.0]      Priority: Low   • Closed left ankle fracture, initial encounter [S82.282A]    • Class 3 severe obesity due to excess calories with serious comorbidity and body mass index (BMI) of 40.0 to 44.9 in adult ([facility]) [E66.01, Z68.41]    • Hypothyroidism, acquired, autoimmune [E06.3]        Assessment/Plan:  Patient doing well  LLE in short leg splint CDI  Cleared for DC to SNF by ortho pending medicine clearance  POD#7 S/P Open reduction and internal fixation of the fibula with stabilization of the syndesmosis.  Wt bearing status - NWB LLE  Wound care/Drains - Dressings to be changed every other day by nursing  Future Procedures - none planned   Sutures/Staples out- 14 days post operatively  PT/OT-initiated  Antibiotics: Perioperative " completed  DVT Prophylaxis- TEDS/SCDs/Foot pumps  Turner-none  Case Coordination for Discharge Planning - Disposition SNF     I have performed a physical exam and reviewed and updated ROS and Plan today (2/14). In review of yesterday's note (2/13), there are no changes except as documented above.

## 2022-02-14 NOTE — PROGRESS NOTES
[facility] Medicine Daily Progress Note    Date of Service  2/11/2022    Chief Complaint  Atiya Henley is a 71 y.o. female admitted 2/7/2022 with left ankle fracture    Hospital Course  No notes on file  71 y.o. female who presented 2/7/2022 with fall. PMH of COPD on 2L 02, HTN, tobacco use, YESSICA, on CPAP, Hx of DVT on warfarin, anxiety, hypothyroid. Lives in nursing home.  Found down in the bathroom and complaining of pain on her left lower extremity.  Did not hit her head, no LOC.  Says she also fell a few weeks ago and hit her head, ecchymosis below both eyes bilaterally.  She was found to have left ankle fracture and dislocation. Ortho was consulted and plans to take the patient to the OR for repair.    Interval Problem Update  No acute events overnight. No nausea or vomiting. Sitting up in the cahir this morning.   PT/OT eval needs placement. Awaiting arrangements        I have personally seen and examined the patient at bedside. I discussed the plan of care with patient     Consultants/Specialty  orthopedics    Code Status  DNAR/DNI    Disposition  Patient is medically cleared for discharge.   Anticipate discharge to to skilled nursing facility.  I have placed the appropriate orders for post-discharge needs.    Review of Systems  Review of Systems   Constitutional: Positive for malaise/fatigue.   HENT: Negative for ear discharge and ear pain.    Respiratory: Negative for cough and shortness of breath.    Cardiovascular: Negative for chest pain and palpitations.   Gastrointestinal: Negative for abdominal pain, nausea and vomiting.   Genitourinary: Negative for dysuria, frequency and urgency.   Musculoskeletal: Positive for joint pain. Negative for myalgias and neck pain.   Psychiatric/Behavioral: The patient is nervous/anxious.    All other systems reviewed and are negative.       Physical Exam  Temp:  [36.2 °C (97.1 °F)-37.1 °C (98.8 °F)] 36.7 °C (98 °F)  Pulse:  [67-79] 77  Resp:  [17-18] 18  BP:  (108-156)/(60-74) 108/71  SpO2:  [94 %-99 %] 96 %    Physical Exam  Vitals and nursing note reviewed.   Constitutional:       Appearance: She is obese.   Eyes:      General: No scleral icterus.  Cardiovascular:      Rate and Rhythm: Bradycardia present.   Pulmonary:      Effort: Pulmonary effort is normal. No respiratory distress.      Breath sounds: No rales.   Abdominal:      General: There is no distension.      Tenderness: There is no abdominal tenderness. There is no guarding or rebound.   Musculoskeletal:      Comments: Splint over ankle   Skin:     General: Skin is warm.      Coloration: Skin is not jaundiced.   Neurological:      General: No focal deficit present.      Mental Status: Mental status is at baseline.         Fluids    Intake/Output Summary (Last 24 hours) at 2/14/2022 1129  Last data filed at 2/13/2022 2000  Gross per 24 hour   Intake --   Output 400 ml   Net -400 ml       Laboratory  Recent Labs     02/14/22  0458   WBC 6.7   RBC 3.50*   HEMOGLOBIN 11.2*   HEMATOCRIT 33.9*   MCV 96.9   MCH 32.0   MCHC 33.0*   RDW 53.4*   PLATELETCT 273   MPV 10.3     Recent Labs     02/14/22  0458   SODIUM 135   POTASSIUM 3.7   CHLORIDE 98   CO2 28   GLUCOSE 110*   BUN 8   CREATININE 0.42*   CALCIUM 9.2     Recent Labs     02/12/22  0538 02/13/22  0715 02/14/22  0458   INR 1.73* 1.73* 1.89*               Imaging  DX-PORTABLE FLUORO > 1 HOUR   Final Result      Portable fluoroscopy utilized for 24 seconds.         INTERPRETING LOCATION: [address] [place] [address], [facility]      DX-ANKLE 3+ VIEWS LEFT   Final Result      Intraoperative image as above described.      DX-ANKLE 3+ VIEWS LEFT   Final Result         1.  Distal fibular metaphyseal fracture   2.  Medial malleolus fracture   3.  Interval reduction of lateral ankle subluxation         DX-KNEE 3 VIEWS LEFT   Final Result         1.  Spiral proximal fibular diaphyseal fracture   2.  Lateral subluxation of the patella, concerning for patellar dislocation. Could be  further evaluated with sunrise view.      DX-TIBIA AND FIBULA LEFT   Final Result         1.  Proximal fibular diaphyseal fracture.   2.  Distal fibular metaphyseal fracture      DX-ANKLE 3+ VIEWS LEFT   Final Result         1.  Lateral malleolus fracture with lateral subluxation of the ankle mortise joint.   2.  Bony fracture fragments adjacent to the medial malleolus compatible with ligamentous avulsion fracture fragments.              Assessment/Plan  * Closed left ankle fracture, initial encounter- (present on admission)  Assessment & Plan  Left ankle fracture dislocation following fall.    Fracture from ground-level fall.  Vitamin D level checked and found to be unremarkable  S/p surgical repair with ortho appreciate rec.  PT/OT eval will need placement      Class 3 severe obesity due to excess calories with serious comorbidity and body mass index (BMI) of 40.0 to 44.9 in adult ([facility])- (present on admission)  Assessment & Plan  BMI 44.46.  Ground-level fall with fracture  PT, OT    Hypothyroidism, acquired, autoimmune- (present on admission)  Assessment & Plan  Continue levothyroxine    History of DVT (deep vein thrombosis)- (present on admission)  Assessment & Plan  On warfarin.  Held due to expected surgery  Restarting today 2/9     Tobacco abuse- (present on admission)  Assessment & Plan  Says she is down to few cigarettes a week.  Says she does not need nicotine patch    COPD (chronic obstructive pulmonary disease) ([facility])- (present on admission)  Assessment & Plan  Nonacute exacerbation, his baseline is 2 L  Continue Trelegy Ellipta    Primary hypertension- (present on admission)  Assessment & Plan  Continue lisinopril    YESSICA (obstructive sleep apnea)- (present on admission)  Assessment & Plan  Nocturnal CPAP ordered       VTE prophylaxis: SCDs/TEDs    I have performed a physical exam and reviewed and updated ROS and Plan today (2/14/2022). In review of yesterday's note (2/13/2022), there are no changes  except as documented above.

## 2022-02-14 NOTE — DISCHARGE PLANNING
Agency/Facility Name: [facility]   Spoke To: Oumou   Outcome: Pert Oumou she is waiting for a call back from insurance regarding auth. Per Oumou she will update DPA.   LSW Notified

## 2022-02-14 NOTE — DISCHARGE PLANNING
Anticipated Discharge Disposition: SNF     Action: LSW left a message with [facility] asking for a telephone call back regarding auth. LSW then called and spoke with Alicia, she will let worker know of the status of the authorization for placement.    Barriers to Discharge: Authorization, bed availability    Plan: Case Management to follow up with medical team with discharge barriers

## 2022-02-14 NOTE — THERAPY
Occupational Therapy  Daily Treatment     Patient Name: Atiya Henley  Age:  71 y.o., Sex:  female  Medical Record #: [id number]  Today's Date: 2/14/2022     Precautions  Precautions: (P) Fall Risk,Non Weight Bearing Left Lower Extremity (R UE NWB from previous injury)  Comments: RUE NWB from previous fall. Pt reports non-compliance with past WB restrictions. Blind in L eye    Assessment     Pt currently limited by decreased functional mobility, activity tolerance, balance, and pain which are affecting pt's ability to complete ADLs/IADLs at baseline. Pt would benefit from OT services in the acute care setting to maximize functional recovery.     Plan    Continue current treatment plan.       Discharge Recommendations: (P) Recommend post-acute placement for additional occupational therapy services prior to discharge home       02/14/22 0836   Activities of Daily Living   Grooming Supervision   Lower Body Dressing Maximal Assist   Toileting Maximal Assist   Functional Mobility   Sit to Stand Moderate Assist  (x2)   Bed, Chair, Wheelchair Transfer Maximal Assist   Short Term Goals   Short Term Goal # 1 Patient will maintain WB precautions supv without cuing   Goal Outcome # 1 Progressing as expected   Short Term Goal # 2 Patient will complete UB dressing supv   Goal Outcome # 2 Progressing as expected   Short Term Goal # 3 Patient will complete BSC xfer Min A   Goal Outcome # 3 Progressing slower than expected

## 2022-02-15 VITALS
BODY MASS INDEX: 44.22 KG/M2 | OXYGEN SATURATION: 96 % | HEART RATE: 75 BPM | SYSTOLIC BLOOD PRESSURE: 125 MMHG | TEMPERATURE: 97.8 F | DIASTOLIC BLOOD PRESSURE: 74 MMHG | RESPIRATION RATE: 16 BRPM | HEIGHT: 64 IN | WEIGHT: 259 LBS

## 2022-02-15 PROBLEM — S82.892A CLOSED LEFT ANKLE FRACTURE, INITIAL ENCOUNTER: Status: RESOLVED | Noted: 2022-02-07 | Resolved: 2022-02-15

## 2022-02-15 LAB
ERYTHROCYTE [DISTWIDTH] IN BLOOD BY AUTOMATED COUNT: 54.8 FL (ref 35.9–50)
HCT VFR BLD AUTO: 34.7 % (ref 37–47)
HGB BLD-MCNC: 11.3 G/DL (ref 12–16)
INR PPP: 1.96 (ref 0.87–1.13)
MCH RBC QN AUTO: 32.2 PG (ref 27–33)
MCHC RBC AUTO-ENTMCNC: 32.6 G/DL (ref 33.6–35)
MCV RBC AUTO: 98.9 FL (ref 81.4–97.8)
PLATELET # BLD AUTO: 289 K/UL (ref 164–446)
PMV BLD AUTO: 10.3 FL (ref 9–12.9)
PROTHROMBIN TIME: 21.8 SEC (ref 12–14.6)
RBC # BLD AUTO: 3.51 M/UL (ref 4.2–5.4)
SARS-COV+SARS-COV-2 AG RESP QL IA.RAPID: NOTDETECTED
SPECIMEN SOURCE: NORMAL
WBC # BLD AUTO: 6.6 K/UL (ref 4.8–10.8)

## 2022-02-15 PROCEDURE — 85610 PROTHROMBIN TIME: CPT

## 2022-02-15 PROCEDURE — A9270 NON-COVERED ITEM OR SERVICE: HCPCS | Performed by: HOSPITALIST

## 2022-02-15 PROCEDURE — 99217 PR OBSERVATION CARE DISCHARGE: CPT | Performed by: HOSPITALIST

## 2022-02-15 PROCEDURE — 700102 HCHG RX REV CODE 250 W/ 637 OVERRIDE(OP): Performed by: HOSPITALIST

## 2022-02-15 PROCEDURE — 87426 SARSCOV CORONAVIRUS AG IA: CPT

## 2022-02-15 PROCEDURE — 700102 HCHG RX REV CODE 250 W/ 637 OVERRIDE(OP): Performed by: STUDENT IN AN ORGANIZED HEALTH CARE EDUCATION/TRAINING PROGRAM

## 2022-02-15 PROCEDURE — 85027 COMPLETE CBC AUTOMATED: CPT

## 2022-02-15 PROCEDURE — 36415 COLL VENOUS BLD VENIPUNCTURE: CPT

## 2022-02-15 PROCEDURE — G0378 HOSPITAL OBSERVATION PER HR: HCPCS

## 2022-02-15 PROCEDURE — A9270 NON-COVERED ITEM OR SERVICE: HCPCS | Performed by: STUDENT IN AN ORGANIZED HEALTH CARE EDUCATION/TRAINING PROGRAM

## 2022-02-15 RX ORDER — OXYCODONE AND ACETAMINOPHEN 10; 325 MG/1; MG/1
1 TABLET ORAL EVERY 8 HOURS PRN
Qty: 6 TABLET | Refills: 0 | Status: SHIPPED | OUTPATIENT
Start: 2022-02-15 | End: 2022-02-17

## 2022-02-15 RX ORDER — OXYCODONE HYDROCHLORIDE 5 MG/1
5 TABLET ORAL EVERY 8 HOURS PRN
Qty: 9 TABLET | Refills: 0 | Status: SHIPPED | OUTPATIENT
Start: 2022-02-15 | End: 2022-02-17

## 2022-02-15 RX ORDER — AMITRIPTYLINE HYDROCHLORIDE 25 MG/1
25 TABLET, FILM COATED ORAL NIGHTLY
Qty: 30 TABLET | Status: SHIPPED
Start: 2022-02-15

## 2022-02-15 RX ADMIN — LEVOTHYROXINE SODIUM 200 MCG: 0.2 TABLET ORAL at 05:52

## 2022-02-15 RX ADMIN — OXYCODONE HYDROCHLORIDE 10 MG: 10 TABLET ORAL at 08:49

## 2022-02-15 RX ADMIN — DOCUSATE SODIUM 50 MG AND SENNOSIDES 8.6 MG 2 TABLET: 8.6; 5 TABLET, FILM COATED ORAL at 05:52

## 2022-02-15 RX ADMIN — OXYCODONE HYDROCHLORIDE 10 MG: 10 TABLET ORAL at 00:44

## 2022-02-15 RX ADMIN — OXYCODONE HYDROCHLORIDE 10 MG: 10 TABLET ORAL at 05:52

## 2022-02-15 RX ADMIN — LISINOPRIL 2.5 MG: 2.5 TABLET ORAL at 05:52

## 2022-02-15 ASSESSMENT — PAIN DESCRIPTION - PAIN TYPE
TYPE: SURGICAL PAIN
TYPE: SURGICAL PAIN;ACUTE PAIN
TYPE: SURGICAL PAIN

## 2022-02-15 NOTE — HOSPITAL COURSE
Patient is 71-year-old female with a past medical significant for COPD, on 2 L of oxygen, ongoing nicotine dependence, hypertension, YESSICA on CPAP, history of DVT on Coumadin, anxiety, hypothyroidism was found down in the bathroom and complaining of pain in the left lower extremity and thus was brought in the hospital on 2/7/2022. Patient is a resident of nursing home.    Of note she had a fall few weeks ago, noted to have ecchymosis below her eye, at that time she did hit her head.  Imaging in the ED showed left ankle fracture along with dislocation.  Orthopedic surgery was consulted, she underwent Open reduction and internal fixation of the fibula with stabilization of the syndesmosis by Dr Khoury on 2/7.  PT/OT evaluated the patient, recommended postacute, SNF/physiatry referral in place.     Patient was started on Coumadin for DVT, INR is noted to be subtherapeutic.  At this time patient is medically stable to be discharged to skilled nursing facility.  Patient need to have daily INRs checked on a significant therapeutic.  Patient is to follow-up with orthopedic surgery Dr. Khoury as an outpatient

## 2022-02-15 NOTE — DISCHARGE PLANNING
Action: Discussed patient with unit RN CM. Patient has been accepted to [facility] and they have a bed available today if the patient is medically cleared. [facility] needs a rapid COVID test prior to taking patient. Notified MD and bedside RN via Voalte.     Barriers to Discharge: Rapid COVID test results.    Plan: [facility] to  patient at 1600 today if COVID test is negative.     COBRA packet completed by this RN Case Manager and signed by MD and patient. Awaiting D/C summary.

## 2022-02-15 NOTE — PROGRESS NOTES
Inpatient Anticoagulation Service Note    Date: 2/15/2022    Reason for Anticoagulation: Atrial Fibrillation,Deep Vein Thrombosis   Target INR: 2.0 to 3.0  Hemoglobin Value: (!) 11.3  Hematocrit Value: (!) 34.7  Lab Platelet Value: 289    INR from last 7 days     Date/Time INR Value    02/15/22 05:11:01 1.96    22 04:58:01 1.89    22 0715 1.73    22 0538 1.73    22 0211 1.47    02/10/22 00:53:01 1.32    22 1209 1.56        Dose from last 7 days     Date/Time Dose (mg)    02/15/22 1103 10    22 04:58:01 5    22 0953 10    22 0802 5    22 1456 7.5    02/10/22 1600 10    22 1351 10        Average Dose (mg): Home Dose: 5 mg MWF + 10 mg ROW per MR report  Significant Interactions: Thyroid Medications, carbamazepine  Bridge Therapy: No  Reversal Agent Administered: Not Applicable    Comments: INR still just below therapeutic range this morning. Pt is POD8 with no bleeding noted and H/H stable. DDI remain stable. Expect INR to continue to increase with multiple days of 10 mg dosing. Will continue home regimen. Pt should go to SNF today.    Plan:  Warfarin 10 mg tonight and INR tomorrow AM if pt still here  Education Material Provided?: No (chronic warfarin patient)  Pharmacist suggested discharge dosin mg on Mon/Wed/Fri, 10 mg all other days, and INR within 5 days of discharge     Nataliya Craven, PharmD, BCPS

## 2022-02-15 NOTE — CARE PLAN
The patient is Stable - Low risk of patient condition declining or worsening    Shift Goals: Pain control, Safety  Clinical Goals: Pain control, Safety  Patient Goals: Pain control   Family Goals: ANIBAL    Progress made toward(s) clinical / shift goals:  PRN pain medication per MAR. Repositioning for comfort. Fall precautions in place.     Patient is not progressing towards the following goals: N/A      Problem: Pain - Standard  Goal: Alleviation of pain or a reduction in pain to the patient’s comfort goal  Outcome: Progressing  Pain assessed using verbal and nonverbal scales. PRN pain medication given as needed and as ordered. Education provided on pain management.       Problem: Skin Integrity  Goal: Skin integrity is maintained or improved  Outcome: Progressing  Pillows. Q2H turns. Repositioning for comfort.      Problem: Fall Risk  Goal: Patient will remain free from falls  Outcome: Progressing  Bed in lowest and locked position. Bed alarm in use. Treaded socks on pt. Belongings and call light in reach. Hourly rounding in place. Provided safety education.

## 2022-02-15 NOTE — PROGRESS NOTES
"   Orthopaedic Progress Note    Interval changes:  Patient doing well  LLE in short leg splint CDI  Cleared for DC to SNF by ortho pending medicine clearance    ROS - Patient denies any new issues.  Pain well controlled.    /73   Pulse 70   Temp 36.7 °C (98 °F) (Temporal)   Resp 18   Ht 1.626 m (5' 4\")   Wt 117 kg (259 lb)   SpO2 95%       Patient seen and examined  No acute distress  Breathing non labored  RRR  LLE in short leg splint CDI, DNVI, moves all toes, cap refill <2 sec.     Recent Labs     02/14/22  0458 02/15/22  0511   WBC 6.7 6.6   RBC 3.50* 3.51*   HEMOGLOBIN 11.2* 11.3*   HEMATOCRIT 33.9* 34.7*   MCV 96.9 98.9*   MCH 32.0 32.2   MCHC 33.0* 32.6*   RDW 53.4* 54.8*   PLATELETCT 273 289   MPV 10.3 10.3       Active Hospital Problems    Diagnosis    • History of DVT (deep vein thrombosis) [Z86.718]      Priority: Medium     Diagnois update 10/1/2016     • COPD (chronic obstructive pulmonary disease) ([facility]) [J44.9]      Priority: Medium     On home O2  Current smoker.  Significant comorbidity  Does not appear to be exacerbating  Resp and O2 per protocol     • Primary hypertension [I10]      Priority: Medium   • YESSICA (obstructive sleep apnea) [G47.33]      Priority: Medium     CPAP at night at home     • Tobacco abuse [Z72.0]      Priority: Low   • Class 3 severe obesity due to excess calories with serious comorbidity and body mass index (BMI) of 40.0 to 44.9 in adult ([facility]) [E66.01, Z68.41]    • Hypothyroidism, acquired, autoimmune [E06.3]        Assessment/Plan:  Patient doing well  LLE in short leg splint CDI  Cleared for DC to SNF by ortho pending medicine clearance  POD#8 S/P Open reduction and internal fixation of the fibula with stabilization of the syndesmosis.  Wt bearing status - NWB LLE  Wound care/Drains - Dressings to be changed every other day by nursing  Future Procedures - none planned   Sutures/Staples out- 14 days post operatively  PT/OT-initiated  Antibiotics: Perioperative " completed  DVT Prophylaxis- TEDS/SCDs/Foot pumps  Turner-none  Case Coordination for Discharge Planning - Disposition SNF     I have performed a physical exam and reviewed and updated ROS and Plan today (2/15). In review of yesterday's note (2/14), there are no changes except as documented above.

## 2022-02-15 NOTE — DISCHARGE SUMMARY
Discharge Summary    CHIEF COMPLAINT ON ADMISSION  Chief Complaint   Patient presents with   • GLF     Pt BIB [facility] for glf at home when pt was trying to go to the bathroom and tripped and twisted her ankle, didnt hit her head, -LOC, +blood thinners. Pt c/o LLE pain, arrived with splint in place. PTA meds 200mcg fentanyl total, 35 ketamine, arrived with NRB, placed on NC 3L.        Reason for Admission  EMS     CODE STATUS  DNAR/DNI    HPI & HOSPITAL COURSE  Patient is 71-year-old female with a past medical significant for COPD, on 2-3 L of oxygen, ongoing nicotine dependence, hypertension, YESSICA on CPAP, history of DVT on Coumadin, anxiety, hypothyroidism was found down in the bathroom and complaining of pain in the left lower extremity and thus was brought in the hospital on 2/7/2022. Patient is a resident of nursing home.    Of note she had a fall few weeks ago, noted to have ecchymosis below her eye, at that time she did hit her head.  Imaging in the ED showed left ankle fracture along with dislocation.  Orthopedic surgery was consulted, she underwent Open reduction and internal fixation of the fibula with stabilization of the syndesmosis by Dr Khoury on 2/7.  PT/OT evaluated the patient, recommended postacute.    Patient was started on Coumadin for DVT, INR is noted to be subtherapeutic.  At this time patient is medically stable to be discharged to skilled nursing facility.  Patient need to have daily INRs checked on a significant therapeutic.  Patient INR today is noted to be at 1.96.    Patient is to follow-up with orthopedic surgery Dr. Khoury as an outpatient    Therefore, she is discharged in good and stable condition to skilled nursing facility.    The patient met 2-midnight criteria for an inpatient stay at the time of discharge.      FOLLOW UP ITEMS POST DISCHARGE  2/15/2022    DISCHARGE DIAGNOSES  Principal Problem:    Closed left ankle fracture, initial encounter POA: Yes  Active Problems:    YESSICA  (obstructive sleep apnea) POA: Yes      Overview: CPAP at night at home    Primary hypertension POA: Yes    COPD (chronic obstructive pulmonary disease) ([facility]) (Chronic) POA: Yes      Overview: On home O2      Current smoker.      Significant comorbidity      Does not appear to be exacerbating      Resp and O2 per protocol    Tobacco abuse POA: Yes    History of DVT (deep vein thrombosis) (Chronic) POA: Yes      Overview: Diagnois update 10/1/2016    Hypothyroidism, acquired, autoimmune POA: Yes    Class 3 severe obesity due to excess calories with serious comorbidity and body mass index (BMI) of 40.0 to 44.9 in adult ([facility]) POA: Yes  Resolved Problems:    * No resolved hospital problems. *      FOLLOW UP  No future appointments.  Lio Deluca M.D.  [address]  [address]  [facility] [id number] [phone]    Call  Please call your primary care provider to schedule a hospital follow up. Thank you.     [address] [id number] [phone]        Hussein Khoury M.D.  [address] [facility] [address]  [phone]    Schedule an appointment as soon as possible for a visit in 1 week  For wound re-check, As needed, If symptoms worsen, call for questions or concerns. 10-14 days post op.      MEDICATIONS ON DISCHARGE     Medication List      START taking these medications      Instructions   oxyCODONE-acetaminophen  MG Tabs  Commonly known as: PERCOCET-10   Take 1 Tablet by mouth every 8 hours as needed for Severe Pain for up to 2 days.  Dose: 1 Tablet        CHANGE how you take these medications      Instructions   amitriptyline 25 MG Tabs  What changed: how much to take  Commonly known as: ELAVIL   Take 1 Tablet by mouth every evening.  Dose: 25 mg     gabapentin 300 MG Caps  What changed: See the new instructions.  Commonly known as: NEURONTIN   TAKE THREE CAPSULES BY MOUTH THREE TIMES A DAY        CONTINUE taking these medications      Instructions    albuterol 108 (90 Base) MCG/ACT Aers inhalation aerosol  Commonly known as: Ventolin HFA   Inhale 2 Puffs every four hours as needed for Shortness of Breath.  Dose: 2 Puff     budesonide-formoterol 160-4.5 MCG/ACT Aero  Commonly known as: Symbicort   Inhale 2 Puffs 2 times a day. With spacer, rinse mouth after use  Dose: 2 Puff     carBAMazepine 200 MG Tabs  Commonly known as: TEGRETOL   Take 600 mg by mouth every bedtime.  Dose: 600 mg     fluticasone 50 MCG/ACT nasal spray  Commonly known as: FLONASE   Administer 1-2 Sprays into affected nostril(S) every day. Each nostril.  Dose: 1-2 Spray     * FQ Protective Underwear Misc   USE 1 PULLUP 3 TIMES DAILY     * FQ Protective Underwear Misc   USE 1 PULLUP 3 TIMES DAILY     levothyroxine 200 MCG Tabs  Commonly known as: SYNTHROID   Take 200 mcg by mouth every morning on an empty stomach.  Dose: 200 mcg     lisinopril 2.5 MG Tabs  Commonly known as: PRINIVIL   Take 2.5 mg by mouth every day.  Dose: 2.5 mg     mirtazapine 15 MG Tabs  Commonly known as: Remeron   Take 15 mg by mouth every evening.  Dose: 15 mg     Spiriva Respimat 2.5 mcg/Act Aers  Generic drug: tiotropium   Inhale 2 Inhalation every day.  Dose: 5 mcg     Vitamin D 50 MCG (2000 UT) Caps   Take 2,000 Units by mouth every day.  Dose: 2,000 Units     warfarin 10 MG Tabs  Commonly known as: COUMADIN   Take 5-10 mg by mouth at bedtime. 5 mg on Monday, Wednesday, Friday  10 mg all other days  Dose: 5-10 mg         * This list has 2 medication(s) that are the same as other medications prescribed for you. Read the directions carefully, and ask your doctor or other care provider to review them with you.            ASK your doctor about these medications      Instructions   oxyCODONE immediate-release 5 MG Tabs  Commonly known as: ROXICODONE  Ask about: Should I take this medication?   Take 1 Tablet by mouth every 8 hours as needed for up to 3 days.  Dose: 5 mg            Allergies  Allergies   Allergen Reactions    • Pcn [Penicillins] Anaphylaxis and Rash     Reaction; happened as a child. Tolerated cephalosporins and Zosyn       DIET  Orders Placed This Encounter   Procedures   • Diet Order Diet: Regular     Standing Status:   Standing     Number of Occurrences:   1     Order Specific Question:   Diet:     Answer:   Regular [1]       ACTIVITY  As tolerated and directed by skilled nursing.  NWB LLE    LINES, DRAINS, AND WOUNDS  This is an automated list. Peripheral IVs will be removed prior to discharge.  Peripheral IV 02/12/22 22 G Anterior;Right Forearm (Active)   Site Assessment Clean;Dry;Intact 02/14/22 2143   Dressing Type Transparent 02/14/22 2143   Line Status Scrubbed the hub prior to access;Flushed;Saline locked 02/14/22 2143   Dressing Status Clean;Dry;Intact 02/14/22 2143   Dressing Intervention N/A 02/14/22 2143   Infiltration Grading ([facility]) 0 02/14/22 2143   Phlebitis Scale ([facility] Only) 0 02/14/22 2143       Wound 02/07/22 Incision Ankle Left xeroform, 4x4, splint (Active)   Site Assessment ANIBAL 02/15/22 0826   Periwound Assessment ANIBAL 02/15/22 0826   Margins ANIBAL 02/15/22 0826   Closure ANIBAL 02/15/22 0826   Drainage Amount ANIBAL 02/15/22 0826   Drainage Description ANIBAL 02/15/22 0826   Treatments Offloading 02/14/22 2143   Wound Cleansing Not Applicable 02/12/22 0804   Periwound Protectant Not Applicable 02/12/22 0804   Dressing Cleansing/Solutions Not Applicable 02/12/22 0804   Dressing Options Ace Wrap;Splint 02/15/22 0826   Dressing Changed Observed 02/15/22 0826   Dressing Status Clean;Dry;Intact 02/15/22 0826   Dressing Change/Treatment Frequency As Needed 02/13/22 0810       Wound 02/07/22 Abrasion Foot Right (Active)   Wound Image   02/07/22 1400   Site Assessment Pink;Clean;Dry 02/15/22 0826   Periwound Assessment Pink 02/15/22 0826   Margins Attached edges;Defined edges 02/15/22 0826   Closure Open to air 02/14/22 2143   Drainage Amount None 02/14/22 2143   Drainage Description Serosanguineous  02/12/22 1945   Treatments Offloading 02/14/22 2143   Dressing Options Open to Air 02/14/22 2143       Peripheral IV 02/12/22 22 G Anterior;Right Forearm (Active)   Site Assessment Clean;Dry;Intact 02/14/22 2143   Dressing Type Transparent 02/14/22 2143   Line Status Scrubbed the hub prior to access;Flushed;Saline locked 02/14/22 2143   Dressing Status Clean;Dry;Intact 02/14/22 2143   Dressing Intervention N/A 02/14/22 2143   Infiltration Grading ([facility]) 0 02/14/22 2143   Phlebitis Scale ([facility] Only) 0 02/14/22 2143               MENTAL STATUS ON TRANSFER     Alert and oriented, answering questions appropriately       CONSULTATIONS  Orthopedic surgery    PROCEDURES  Open reduction and internal fixation of the fibula with   stabilization of the syndesmosis.         LABORATORY  Lab Results   Component Value Date    SODIUM 135 02/14/2022    POTASSIUM 3.7 02/14/2022    CHLORIDE 98 02/14/2022    CO2 28 02/14/2022    GLUCOSE 110 (H) 02/14/2022    BUN 8 02/14/2022    CREATININE 0.42 (L) 02/14/2022    CREATININE 0.8 04/27/2009        Lab Results   Component Value Date    WBC 6.6 02/15/2022    HEMOGLOBIN 11.3 (L) 02/15/2022    HEMATOCRIT 34.7 (L) 02/15/2022    PLATELETCT 289 02/15/2022        Total time of the discharge process exceeds 35 minutes.

## 2022-02-15 NOTE — DISCHARGE INSTRUCTIONS
Discharge Instructions     Discharged to [facility] by ambulance with escort. Discharged via ambulance, hospital escort: Yes.  Special equipment needed: Staff assistance, slide board.    Be sure to schedule a follow-up appointment with your primary care doctor or any specialists as instructed.     [facility] Plan:  [facility].   Diet Plan: Regular  Activity Level: Non Weight Bearing to Left leg with splint.  Unable to tolerate weight on right upper extremity due to shoulder.   Confirmed Follow up Appointment: Patient to Call and Schedule Appointment  Confirmed Symptoms Management: Discussed  Medication Reconciliation Updated: Yes  Influenza Vaccine Indication: Not indicated: Previously immunized this influenza season and > 8 years of age    I understand that a diet low in cholesterol, fat, and sodium is recommended for good health. Unless I have been given specific instructions below for another diet, I accept this instruction as my diet prescription.     Special Instructions: Discharge instructions for the Orthopedic Patient    Follow up with Primary Care Physician within 2 weeks of discharge to home, regarding:  Review of medications and diagnostic testing.  Surveillance for medical complications.  Workup and treatment of osteoporosis, if appropriate.     -Is this a Hip/Knee/Shoulder Joint Replacement patient? No    -Is this patient being discharged with medication to prevent blood clots?  Yes, Warfarin (Coumadin).      · Is patient discharged on Warfarin / Coumadin?   Yes    You are receiving the drug warfarin. Please understand the importance of monitoring warfarin with scheduled PT/INR blood draws.  Follow-up with a call to your personal Doctor's office in 3 days to schedule a PT/INR. .    IMPORTANT: HOW TO USE THIS INFORMATION:  This is a summary and does NOT have all possible information about this product. This information does not assure that this product is  "safe, effective, or appropriate for you. This information is not individual medical advice and does not substitute for the advice of your health care professional. Always ask your health care professional for complete information about this product and your specific health needs.      WARFARIN - ORAL (WARF-uh-rin)      COMMON BRAND NAME(S): Coumadin      WARNING:  Warfarin can cause very serious (possibly fatal) bleeding. This is more likely to occur when you first start taking this medication or if you take too much warfarin. To decrease your risk for bleeding, your doctor or other health care provider will monitor you closely and check your lab results (INR test) to make sure you are not taking too much warfarin. Keep all medical and laboratory appointments. Tell your doctor right away if you notice any signs of serious bleeding. See also Side Effects section.      USES:  This medication is used to treat blood clots (such as in deep vein thrombosis-DVT or pulmonary embolus-PE) and/or to prevent new clots from forming in your body. Preventing harmful blood clots helps to reduce the risk of a stroke or heart attack. Conditions that increase your risk of developing blood clots include a certain type of irregular heart rhythm (atrial fibrillation), heart valve replacement, recent heart attack, and certain surgeries (such as hip/knee replacement). Warfarin is commonly called a \"blood thinner,\" but the more correct term is \"anticoagulant.\" It helps to keep blood flowing smoothly in your body by decreasing the amount of certain substances (clotting proteins) in your blood.      HOW TO USE:  Read the Medication Guide provided by your pharmacist before you start taking warfarin and each time you get a refill. If you have any questions, ask your doctor or pharmacist. Take this medication by mouth with or without food as directed by your doctor or other health care professional, usually once a day. It is very important to " take it exactly as directed. Do not increase the dose, take it more frequently, or stop using it unless directed by your doctor. Dosage is based on your medical condition, laboratory tests (such as INR), and response to treatment. Your doctor or other health care provider will monitor you closely while you are taking this medication to determine the right dose for you. Use this medication regularly to get the most benefit from it. To help you remember, take it at the same time each day. It is important to eat a balanced, consistent diet while taking warfarin. Some foods can affect how warfarin works in your body and may affect your treatment and dose. Avoid sudden large increases or decreases in your intake of foods high in vitamin K (such as broccoli, cauliflower, cabbage, brussels sprouts, kale, spinach, and other green leafy vegetables, liver, green tea, certain vitamin supplements). If you are trying to lose weight, check with your doctor before you try to go on a diet. Cranberry products may also affect how your warfarin works. Limit the amount of cranberry juice (16 ounces/480 milliliters a day) or other cranberry products you may drink or eat.      SIDE EFFECTS:  Nausea, loss of appetite, or stomach/abdominal pain may occur. If any of these effects persist or worsen, tell your doctor or pharmacist promptly. Remember that your doctor has prescribed this medication because he or she has judged that the benefit to you is greater than the risk of side effects. Many people using this medication do not have serious side effects. This medication can cause serious bleeding if it affects your blood clotting proteins too much (shown by unusually high INR lab results). Even if your doctor stops your medication, this risk of bleeding can continue for up to a week. Tell your doctor right away if you have any signs of serious bleeding, including: unusual pain/swelling/discomfort, unusual/easy bruising, prolonged bleeding  from cuts or gums, persistent/frequent nosebleeds, unusually heavy/prolonged menstrual flow, pink/dark urine, coughing up blood, vomit that is bloody or looks like coffee grounds, severe headache, dizziness/fainting, unusual or persistent tiredness/weakness, bloody/black/tarry stools, chest pain, shortness of breath, difficulty swallowing. Tell your doctor right away if any of these unlikely but serious side effects occur: persistent nausea/vomiting, severe stomach/abdominal pain, yellowing eyes/skin. This drug rarely has caused very serious (possibly fatal) problems if its effects lead to small blood clots (usually at the beginning of treatment). This can lead to severe skin/tissue damage that may require surgery or amputation if left untreated. Patients with certain blood conditions (protein C or S deficiency) may be at greater risk. Get medical help right away if any of these rare but serious side effects occur: painful/red/purplish patches on the skin (such as on the toe, breast, abdomen), change in the amount of urine, vision changes, confusion, slurred speech, weakness on one side of the body. A very serious allergic reaction to this drug is rare. However, get medical help right away if you notice any symptoms of a serious allergic reaction, including: rash, itching/swelling (especially of the face/tongue/throat), severe dizziness, trouble breathing. This is not a complete list of possible side effects. If you notice other effects not listed above, contact your doctor or pharmacist. In the [place] - Call your doctor for medical advice about side effects. You may report side effects to FDA at 7-363-UAV-7272. In [place] - Call your doctor for medical advice about side effects. You may report side effects to [facility] [place] at 1-138.315.5618.      PRECAUTIONS:  Before taking warfarin, tell your doctor or pharmacist if you are allergic to it; or if you have any other allergies. This product may contain inactive  ingredients, which can cause allergic reactions or other problems. Talk to your pharmacist for more details. Before using this medication, tell your doctor or pharmacist your medical history, especially of: blood disorders (such as anemia, hemophilia), bleeding problems (such as bleeding of the stomach/intestines, bleeding in the brain), blood vessel disorders (such as aneurysms), recent major injury/surgery, liver disease, alcohol use, mental/mood disorders (including memory problems), frequent falls/injuries. It is important that all your doctors and dentists know that you take warfarin. Before having surgery or any medical/dental procedures, tell your doctor or dentist that you are taking this medication and about all the products you use (including prescription drugs, nonprescription drugs, and herbal products). Avoid getting injections into the muscles. If you must have an injection into a muscle (for example, a flu shot), it should be given in the arm. This way, it will be easier to check for bleeding and/or apply pressure bandages. This medication may cause stomach bleeding. Daily use of alcohol while using this medicine will increase your risk for stomach bleeding and may also affect how this medication works. Limit or avoid alcoholic beverages. If you have not been eating well, if you have an illness or infection that causes fever, vomiting, or diarrhea for more than 2 days, or if you start using any antibiotic medications, contact your doctor or pharmacist immediately because these conditions can affect how warfarin works. This medication can cause heavy bleeding. To lower the chance of getting cut, bruised, or injured, use great caution with sharp objects like safety razors and nail cutters. Use an electric razor when shaving and a soft toothbrush when brushing your teeth. Avoid activities such as contact sports. If you fall or injure yourself, especially if you hit your head, call your doctor  "immediately. Your doctor may need to check you. The Food & Drug Administration has stated that generic warfarin products are interchangeable. However, consult your doctor or pharmacist before switching warfarin products. Be careful not to take more than one medication that contains warfarin unless specifically directed by the doctor or health care provider who is monitoring your warfarin treatment. Older adults may be at greater risk for bleeding while using this drug. This medication is not recommended for use during pregnancy because of serious (possibly fatal) harm to an unborn baby. Discuss the use of reliable forms of birth control with your doctor. If you become pregnant or think you may be pregnant, tell your doctor immediately. If you are planning pregnancy, discuss a plan for managing your condition with your doctor before you become pregnant. Your doctor may switch the type of medication you use during pregnancy. Very small amounts of this medication may pass into breast milk but is unlikely to harm a nursing infant. Consult your doctor before breast-feeding.      DRUG INTERACTIONS:  Drug interactions may change how your medications work or increase your risk for serious side effects. This document does not contain all possible drug interactions. Keep a list of all the products you use (including prescription/nonprescription drugs and herbal products) and share it with your doctor and pharmacist. Do not start, stop, or change the dosage of any medicines without your doctor's approval. Warfarin interacts with many prescription, nonprescription, vitamin, and herbal products. This includes medications that are applied to the skin or inside the vagina or rectum. The interactions with warfarin usually result in an increase or decrease in the \"blood-thinning\" (anticoagulant) effect. Your doctor or other health care professional should closely monitor you to prevent serious bleeding or clotting problems. While " taking warfarin, it is very important to tell your doctor or pharmacist of any changes in medications, vitamins, or herbal products that you are taking. Some products that may interact with this drug include: capecitabine, imatinib, mifepristone. Aspirin, aspirin-like drugs (salicylates), and nonsteroidal anti-inflammatory drugs (NSAIDs such as ibuprofen, naproxen, celecoxib) may have effects similar to warfarin. These drugs may increase the risk of bleeding problems if taken during treatment with warfarin. Carefully check all prescription/nonprescription product labels (including drugs applied to the skin such as pain-relieving creams) since the products may contain NSAIDs or salicylates. Talk to your doctor about using a different medication (such as acetaminophen) to treat pain/fever. Low-dose aspirin and related drugs (such as clopidogrel, ticlopidine) should be continued if prescribed by your doctor for specific medical reasons such as heart attack or stroke prevention. Consult your doctor or pharmacist for more details. Many herbal products interact with warfarin. Tell your doctor before taking any herbal products, especially bromelains, coenzyme Q10, cranberry, danshen, dong quai, fenugreek, garlic, ginkgo biloba, ginseng, and Leyner's wort, among others. This medication may interfere with a certain laboratory test to measure theophylline levels, possibly causing false test results. Make sure laboratory personnel and all your doctors know you use this drug.      OVERDOSE:  If overdose is suspected, contact a poison control center or emergency room immediately. [place] residents can call the [place] National Poison Hotline at 1-155.185.7604. [place] residents can call a provincial poison control center. Symptoms of overdose may include: bloody/black/tarry stools, pink/dark urine, unusual/prolonged bleeding.      NOTES:  Do not share this medication with others. Laboratory and/or medical tests (such as INR, complete  blood count) must be performed periodically to monitor your progress or check for side effects. Consult your doctor for more details.      MISSED DOSE:  For the best possible benefit, do not miss any doses. If you do miss a dose and remember on the same day, take it as soon as you remember. If you remember on the next day, skip the missed dose and resume your usual dosing schedule. Do not double the dose to catch up because this could increase your risk for bleeding. Keep a record of missed doses to give to your doctor or pharmacist. Contact your doctor or pharmacist if you miss 2 or more doses in a row.      STORAGE:  Store at room temperature away from light and moisture. Do not store in the bathroom. Keep all medications away from children and pets. Do not flush medications down the toilet or pour them into a drain unless instructed to do so. Properly discard this product when it is  or no longer needed. Consult your pharmacist or local waste disposal company for more details about how to safely discard your product.      MEDICAL ALERT:  Your condition and medication can cause complications in a medical emergency. For information about enrolling in MedicAlert, call 1-167.633.4245 ([place]) or 1-882.605.1131 ([place]).      Information last revised 2010 Copyright(c) 2010 [employer]             Depression / Suicide Risk    As you are discharged from this [facility] facility, it is important to learn how to keep safe from harming yourself.    Recognize the warning signs:  · Abrupt changes in personality, positive or negative- including increase in energy   · Giving away possessions  · Change in eating patterns- significant weight changes-  positive or negative  · Change in sleeping patterns- unable to sleep or sleeping all the time   · Unwillingness or inability to communicate  · Depression  · Unusual sadness, discouragement and loneliness  · Talk of wanting to die  · Neglect of personal  appearance   · Rebelliousness- reckless behavior  · Withdrawal from people/activities they love  · Confusion- inability to concentrate     If you or a loved one observes any of these behaviors or has concerns about self-harm, here's what you can do:  · Talk about it- your feelings and reasons for harming yourself  · Remove any means that you might use to hurt yourself (examples: pills, rope, extension cords, firearm)  · Get professional help from the community (Mental Health, Substance Abuse, psychological counseling)  · Do not be alone:Call your Safe Contact- someone whom you trust who will be there for you.  · Call your local CRISIS HOTLINE 430-1043 or 848-259-7307  · Call your local Children's Mobile Crisis Response Team [place] (158) 624-3255 or www.[place]  · Call the toll free National Suicide Prevention Hotlines   · National Suicide Prevention Lifeline 271-978-ALAC (9509)  · [employer] [facility] 800-SUICIDE (543-1356)    Oxycodone tablets or capsules  What is this medicine?  OXYCODONE (ox i KOE done) is a pain reliever. It is used to treat moderate to severe pain.  This medicine may be used for other purposes; ask your health care provider or pharmacist if you have questions.  COMMON BRAND NAME(S): Dazidox, Endocodone, Oxaydo, OXECTA, OxyIR, Percolone, Roxicodone, Roxybond  What should I tell my health care provider before I take this medicine?  They need to know if you have any of these conditions:  · Alex's disease  · brain tumor  · head injury  · heart disease  · history of drug or alcohol abuse problem  · if you often drink alcohol  · kidney disease  · liver disease  · lung or breathing disease, like asthma  · mental illness  · pancreatic disease  · seizures  · thyroid disease  · an unusual or allergic reaction to oxycodone, codeine, hydrocodone, morphine, other medicines, foods, dyes, or preservatives  · pregnant or trying to get pregnant  · breast-feeding  How should I use this  medicine?  Take this medicine by mouth with a glass of water. Follow the directions on the prescription label. You can take it with or without food. If it upsets your stomach, take it with food. Take your medicine at regular intervals. Do not take it more often than directed. Do not stop taking except on your doctor's advice.  Some brands of this medicine, like Oxecta, have special instructions. Ask your doctor or pharmacist if these directions are for you: Do not cut, crush or chew this medicine. Swallow only one tablet at a time. Do not wet, soak, or lick the tablet before you take it.  A special MedGuide will be given to you by the pharmacist with each prescription and refill. Be sure to read this information carefully each time.  Talk to your pediatrician regarding the use of this medicine in children. Special care may be needed.  Overdosage: If you think you have taken too much of this medicine contact a poison control center or emergency room at once.  NOTE: This medicine is only for you. Do not share this medicine with others.  What if I miss a dose?  If you miss a dose, take it as soon as you can. If it is almost time for your next dose, take only that dose. Do not take double or extra doses.  What may interact with this medicine?  This medicine may interact with the following medications:  · alcohol  · antihistamines for allergy, cough and cold  · antiviral medicines for HIV or AIDS  · atropine  · certain antibiotics like clarithromycin, erythromycin, linezolid, rifampin  · certain medicines for anxiety or sleep  · certain medicines for bladder problems like oxybutynin, tolterodine  · certain medicines for depression like amitriptyline, fluoxetine, sertraline  · certain medicines for fungal infections like ketoconazole, itraconazole, voriconazole  · certain medicines for migraine headache like almotriptan, eletriptan, frovatriptan, naratriptan, rizatriptan, sumatriptan, zolmitriptan  · certain medicines for  nausea or vomiting like dolasetron, ondansetron, palonosetron  · certain medicines for Parkinson's disease like benztropine, trihexyphenidyl  · certain medicines for seizures like phenobarbital, phenytoin, primidone  · certain medicines for stomach problems like dicyclomine, hyoscyamine  · certain medicines for travel sickness like scopolamine  · diuretics  · general anesthetics like halothane, isoflurane, methoxyflurane, propofol  · ipratropium  · local anesthetics like lidocaine, pramoxine, tetracaine  · MAOIs like Carbex, Eldepryl, Marplan, Nardil, and Parnate  · medicines that relax muscles for surgery  · methylene blue  · nilotinib  · other narcotic medicines for pain or cough  · phenothiazines like chlorpromazine, mesoridazine, prochlorperazine, thioridazine  This list may not describe all possible interactions. Give your health care provider a list of all the medicines, herbs, non-prescription drugs, or dietary supplements you use. Also tell them if you smoke, drink alcohol, or use illegal drugs. Some items may interact with your medicine.  What should I watch for while using this medicine?  Tell your doctor or health care professional if your pain does not go away, if it gets worse, or if you have new or a different type of pain. You may develop tolerance to the medicine. Tolerance means that you will need a higher dose of the medicine for pain relief. Tolerance is normal and is expected if you take this medicine for a long time.  Do not suddenly stop taking your medicine because you may develop a severe reaction. Your body becomes used to the medicine. This does NOT mean you are addicted. Addiction is a behavior related to getting and using a drug for a non-medical reason. If you have pain, you have a medical reason to take pain medicine. Your doctor will tell you how much medicine to take. If your doctor wants you to stop the medicine, the dose will be slowly lowered over time to avoid any side  effects.  There are different types of narcotic medicines (opiates). If you take more than one type at the same time or if you are taking another medicine that also causes drowsiness, you may have more side effects. Give your health care provider a list of all medicines you use. Your doctor will tell you how much medicine to take. Do not take more medicine than directed. Call emergency for help if you have problems breathing or unusual sleepiness.  You may get drowsy or dizzy. Do not drive, use machinery, or do anything that needs mental alertness until you know how the medicine affects you. Do not stand or sit up quickly, especially if you are an older patient. This reduces the risk of dizzy or fainting spells. Alcohol may interfere with the effect of this medicine. Avoid alcoholic drinks.  This medicine will cause constipation. Try to have a bowel movement at least every 2 to 3 days. If you do not have a bowel movement for 3 days, call your doctor or health care professional.  Your mouth may get dry. Chewing sugarless gum or sucking hard candy, and drinking plenty of water may help. Contact your doctor if the problem does not go away or is severe.  What side effects may I notice from receiving this medicine?  Side effects that you should report to your doctor or health care professional as soon as possible:  · allergic reactions like skin rash, itching or hives, swelling of the face, lips, or tongue  · breathing problems  · confusion  · signs and symptoms of low blood pressure like dizziness; feeling faint or lightheaded, falls; unusually weak or tired  · trouble passing urine or change in the amount of urine  · trouble swallowing  Side effects that usually do not require medical attention (report to your doctor or health care professional if they continue or are bothersome):  · constipation  · dry mouth  · nausea, vomiting  · tiredness  This list may not describe all possible side effects. Call your doctor for  medical advice about side effects. You may report side effects to FDA at 1-834-TDF-9329.  Where should I keep my medicine?  Keep out of the reach of children. This medicine can be abused. Keep your medicine in a safe place to protect it from theft. Do not share this medicine with anyone. Selling or giving away this medicine is dangerous and against the law.  Store at room temperature between 15 and 30 degrees C (59 and 86 degrees F). Protect from light. Keep container tightly closed.  This medicine may cause harm and death if it is taken by other adults, children, or pets. Return medicine that has not been used to an official disposal site. Contact the [facility], [facility] at [phone] or your [facility] government to find a site. If you cannot return the medicine, flush it down the toilet. Do not use the medicine after the expiration date.  NOTE: This sheet is a summary. It may not cover all possible information. If you have questions about this medicine, talk to your doctor, pharmacist, or health care provider.  © 2020 Elsevier/Gold Standard (2018-04-24 16:13:10)      Ankle Fracture    The ankle joint is made up of the lower (distal) sections of your lower leg bones(tibia and fibula) along with a bone in your foot (talus). An ankle fracture is a break in one, two, or all three of these sections of bone.  Follow these instructions at home:  If you have a splint:  · Wear the splint as told by your doctor. Take it off only as told by your doctor.  · Loosen the splint if your toes tingle, become numb, or turn cold and blue.  · Keep the splint clean.  · If the splint is not waterproof:  ? Do not let it get wet.  ? Cover it with a watertight covering when you take a bath or a shower.  If you have a cast:  · Do not stick anything inside the cast to scratch your skin. Doing that increases your risk of infection.  · Check the skin around the cast every day. Tell your doctor about any concerns.  · You may put lotion on dry skin around  the edges of the cast. Do not put lotion on the skin underneath the cast.  · Keep the cast clean.  · If the cast is not waterproof:  ? Do not let it get wet.  ? Cover it with a watertight covering when you take a bath or a shower.  Managing pain, stiffness, and swelling  · If directed, put ice on the injured area:  ? If you have a removable splint, remove it as told by your doctor.  ? Put ice in a plastic bag.  ? Place a towel between your skin and the bag.  ? Leave the ice on for 20 minutes, 2-3 times a day.  · Move your toes often. This prevents stiffness and lessens swelling.  · Raise (elevate) the injured area above the level of your heart while you are sitting or lying down.  General instructions  · Do not use the injured limb to support your body weight until your doctor says that you can. Use crutches as told by your doctor.  · Take over-the-counter and prescription medicines only as told by your doctor.  · Ask your doctor when it is safe to drive if you have a cast or splint.  · Do exercises as told by your doctor.  · Do not use any products that contain nicotine or tobacco, such as cigarettes and e-cigarettes. These can delay bone healing. If you need help quitting, ask your doctor.  · Keep all follow-up visits as told by your doctor. This is important.  Contact a doctor if:  · Your pain or swelling gets worse.  · Your pain or swelling does not get better when you rest or take medicine.  Get help right away if:  · Your cast gets damaged.  · You continue to have very bad pain.  · You have new pain or swelling.  · Your skin or toes below the injured ankle:  ? Turn blue or gray.  ? Feel cold or numb.  ? Lose sensitivity to touch.  Summary  · An ankle fracture is a break in one, two, or all three of the bones in your lower leg and lower foot.  · If you have a splint, wear it as told by your health care provider. Keep it clean and dry.  · If you have a cast, do not stick anything inside the cast to scratch your  skin. This can cause infection.  · Use ice, take medicines, raise your foot, and avoid tobacco and nicotine products. These steps will lessen pain and swelling and speed up healing.  This information is not intended to replace advice given to you by your health care provider. Make sure you discuss any questions you have with your health care provider.  Document Released: 10/15/2010 Document Revised: 11/30/2018 Document Reviewed: 01/22/2018  [employer] Patient Education © 2020 [employer]  Open Reduction, Internal Fixation (ORIF), Generic  Usually, if bones are broken (fractured) and are out of place, unstable, or may become out of place, surgery is needed. This surgery is called an open reduction and internal fixation (ORIF). Open reduction means that the area of the fracture is opened up so the surgeon can see it. Internal fixation means that screws, pins, or fixation devices are used to hold the bone pieces in place.  LET YOUR CAREGIVER KNOW ABOUT:   · Allergies.  · Medicines taken, including herbs, eyedrops, over-the-counter medicines, and creams.  · Use of steroids (by mouth or creams).  · Previous problems with anesthetics or numbing medicines.  · History of bleeding or blood problems.  · History of blood clots.  · Possibility of pregnancy, if this applies.  · Previous surgery.  · Other health problems.  RISKS AND COMPLICATIONS   All surgery is associated with risks. Some of these risks are:  · Excessive bleeding.  · Infection.  · Imperfect results with loss of joint function.  BEFORE THE PROCEDURE   Usually, surgery is performed shortly after the injury. It is important to provide information to your caregiver after your injury.  AFTER THE PROCEDURE   After surgery, you will be taken to a recovery area where a nurse will monitor your progress. You may have a long, narrow tube(catheter) in the bladder following surgery that helps you pass your water. When awake, stable, taking fluids well, and without  complications, you will be returned to your room. You will receive physical therapy and other care. Physical therapy is done until you are doing well and your caregiver feels it is safe for you to go home or to an extended care facility.  Following surgery, the bones may be protected with a cast. The type of casting depends on where the fracture was. Casts are generally left in place for about 5 to 6 weeks. During this time, your caregiver will follow your progress. X-rays may be taken during healing to make sure the bones stay in place.  HOME CARE INSTRUCTIONS   · You or your child may resume normal diet and activities as directed or allowed.  · Put ice on the injured area.  · Put ice in a plastic bag.  · Place a towel between the skin and the bag.  · Leave the ice on for 15-20 minutes at a time, 3-4 times a day, for the first 2 days following surgery.  · Change bandages (dressings) if necessary or as directed.  · If given a plaster or fiberglass cast:  · Do not try to scratch the skin under the cast using sharp or pointed objects.  · Check the skin around the cast every day. You may put lotion on any red or sore areas.  · Keep the cast dry and clean.  · Do not put pressure on any part of the cast or splint until it is fully hardened.  · The cast or splint can be protected during bathing with a plastic bag. Do not lower the cast or splint into water.  · Only take over-the-counter or prescription medicines for pain, discomfort, or fever as directed by your caregiver.  · Use crutches as directed and do not exercise the leg unless instructed.  · If the bones get out of position (displaced), it may eventually lead to arthritis and lasting disability. Problems can follow even the best of care. Follow the directions of your caregiver.  · Follow all instructions given by your caregiver, make and keep follow-up appointments, and use crutches as directed.  SEEK IMMEDIATE MEDICAL CARE IF:   · There is redness, swelling,  numbness, or increasing pain in the wound.  · There is pus coming from the wound.  · You or your child has an oral temperature above 102° F (38.9° C), not controlled by medicine.  · A bad smell is coming from the wound or dressing.  · The wound breaks open (edges not staying together) after stitches (sutures) or staples have been removed.  · The skin or nails below the injury turn blue or gray, or feel cold or numb.  · There is severe pain under the cast or in the foot.  If there is not a window in the cast for observing the wound, a discharge or minor bleeding may show up as a stain on the outside of the cast. Report these findings to your caregiver.  MAKE SURE YOU:   · Understand these instructions.  · Will watch your condition.  · Will get help right away if you are not doing well or gets worse.  Document Released: 12/29/2007 Document Revised: 03/11/2013 Document Reviewed: 12/05/2008  [employer]® Patient Information ©2014 [employer], [employer].

## 2022-02-15 NOTE — PROGRESS NOTES
Give report to [facility]. Cobra packet ready and prescription inside packet. Patient awaiting transport.

## 2022-02-16 NOTE — PROGRESS NOTES
[facility] staff member did not bring oxygen with her. Notified them that she can't go without oxygen.   Awaiting for [facility] staff to bring oxygen tank    1650: [facility] staff arrived with oxygen tank. Pt. Transported to [facility] transportation

## 2022-02-17 ENCOUNTER — ANTICOAGULATION MONITORING (OUTPATIENT)
Dept: VASCULAR LAB | Facility: MEDICAL CENTER | Age: 72
End: 2022-02-17
Payer: MEDICARE

## 2022-02-17 DIAGNOSIS — Z95.828 S/P IVC FILTER: ICD-10-CM

## 2022-02-17 DIAGNOSIS — Z86.718 HISTORY OF DVT (DEEP VEIN THROMBOSIS): ICD-10-CM

## 2022-02-17 DIAGNOSIS — Z79.01 CHRONIC ANTICOAGULATION: ICD-10-CM

## 2022-02-18 NOTE — PROGRESS NOTES
Anticoagulation Summary  As of 2022    INR goal:  2.0-3.0   TTR:  41.2 % (6.4 y)   INR used for dosin.96 (2/15/2022)   Warfarin maintenance plan:  5 mg (10 mg x 0.5) every Mon, Wed, Fri; 10 mg (10 mg x 1) all other days   Weekly warfarin total:  55 mg   Plan last modified:  Nahum Bowens, PharmD (2021)   Next INR check:  3/1/2022   Target end date:  Indefinite    Indications    DVT (deep venous thrombosis) (HCC) (Resolved) [I82.409]  Chronic anticoagulation [Z79.01]  S/P IVC filter [Z95.828]  History of DVT (deep vein thrombosis) [Z86.718]             Anticoagulation Episode Summary     INR check location:  Anticoagulation Clinic    Preferred lab:  [facility]    Send INR reminders to:      Comments:  MD INR      Anticoagulation Care Providers     Provider Role Specialty Phone number    Carmelo Pat M.D. Referring [facility] Medicine [phone]    [facility] Anticoagulation Services Responsible  [phone]        Anticoagulation Patient Findings          Left voicemail message to report a   therapeutic INR of 2.0.  Pt to continue with current warfarin dosing regimen. Requested pt contact the clinic for any s/s of unusual bleeding, bruising, clotting or any changes to diet or medication.  Follow up in 2 weeks, to reduce the risk of adverse events related to this high risk medication, warfarin.    Ruth Munoz, Clinical Pharmacist

## 2022-04-26 ENCOUNTER — ANTICOAGULATION MONITORING (OUTPATIENT)
Dept: VASCULAR LAB | Facility: MEDICAL CENTER | Age: 72
End: 2022-04-26
Payer: MEDICAID

## 2022-04-26 DIAGNOSIS — Z79.01 CHRONIC ANTICOAGULATION: ICD-10-CM

## 2022-04-26 DIAGNOSIS — Z86.718 HISTORY OF DVT (DEEP VEIN THROMBOSIS): ICD-10-CM

## 2022-04-26 DIAGNOSIS — Z95.828 S/P IVC FILTER: ICD-10-CM

## 2022-04-26 LAB — INR PPP: 3.2 (ref 2–3.5)

## 2022-04-26 NOTE — PROGRESS NOTES
Anticoagulation Summary  As of 4/26/2022    INR goal:  2.0-3.0   TTR:  42.3 % (6.6 y)   INR used for dosing:  3.20 (4/26/2022)   Warfarin maintenance plan:  5 mg (10 mg x 0.5) every Mon, Wed, Fri; 10 mg (10 mg x 1) all other days   Weekly warfarin total:  55 mg   Plan last modified:  Nahum Bowens, PharmD (11/23/2021)   Next INR check:  5/3/2022   Target end date:  Indefinite    Indications    DVT (deep venous thrombosis) (HCC) (Resolved) [I82.409]  Chronic anticoagulation [Z79.01]  S/P IVC filter [Z95.828]  History of DVT (deep vein thrombosis) [Z86.718]             Anticoagulation Episode Summary     INR check location:  Anticoagulation Clinic    Preferred lab:  [facility] GENERAL    Send INR reminders to:      Comments:  MD INR      Anticoagulation Care Providers     Provider Role Specialty Phone number    Carmelo Pat M.D. Referring [facility] Medicine [phone]    [facility] Anticoagulation Services Responsible  [phone]        Anticoagulation Patient Findings          HPI:  Atiya Henley, on anticoagulation therapy with warfarin for DVT.   Changes to current medical/health status since last appt: none  Denies signs/symptoms of bleeding and/or thrombosis since the last appt.    Denies any interval changes to diet  Denies any interval changes to medications since last appt.   Denies any complications or cost restrictions with current therapy.     A/P   INR  SUPRA-therapeutic.   Reduce today then Pt is to continue with current warfarin dosing regimen.     Next INR in 1 week(s).    Bon Ash, PharmD

## 2022-05-19 ENCOUNTER — ANTICOAGULATION MONITORING (OUTPATIENT)
Dept: VASCULAR LAB | Facility: MEDICAL CENTER | Age: 72
End: 2022-05-19
Payer: MEDICAID

## 2022-05-19 DIAGNOSIS — Z95.828 S/P IVC FILTER: ICD-10-CM

## 2022-05-19 DIAGNOSIS — Z86.718 HISTORY OF DVT (DEEP VEIN THROMBOSIS): ICD-10-CM

## 2022-05-19 DIAGNOSIS — Z79.01 CHRONIC ANTICOAGULATION: ICD-10-CM

## 2022-05-19 LAB — INR PPP: 4.3 (ref 2–3.5)

## 2022-05-19 RX ORDER — WARFARIN SODIUM 10 MG/1
5-10 TABLET ORAL
Qty: 30 TABLET | Refills: 0 | Status: SHIPPED | OUTPATIENT
Start: 2022-05-19 | End: 2022-07-06

## 2022-05-20 NOTE — PROGRESS NOTES
Anticoagulation Summary  As of 2022    INR goal:  2.0-3.0   TTR:  39.3 % (7 y)   INR used for dosin.30 (2022)   Warfarin maintenance plan:  10 mg (10 mg x 1) every Tue, Thu; 5 mg (10 mg x 0.5) all other days   Weekly warfarin total:  45 mg   Plan last modified:  Alicia Parada PharmD (2022)   Next INR check:  2022   Target end date:  Indefinite    Indications    DVT (deep venous thrombosis) (HCC) (Resolved) [I82.409]  Chronic anticoagulation [Z79.01]  S/P IVC filter [Z95.828]  History of DVT (deep vein thrombosis) [Z86.718]             Anticoagulation Episode Summary     INR check location:  Anticoagulation Clinic    Preferred lab:  [facility]    Send INR reminders to:      Comments:  MD INR      Anticoagulation Care Providers     Provider Role Specialty Phone number    Carmelo Pat M.D. Referring Family Medicine [phone]    [facility] Anticoagulation Services Responsible  [phone]          Refer to Anticoagulation Patient Findings for HPI  Patient Findings     Negatives:  Signs/symptoms of thrombosis, Signs/symptoms of bleeding, Laboratory test error suspected, Change in health, Change in alcohol use, Change in activity, Upcoming invasive procedure, Emergency department visit, Upcoming dental procedure, Missed doses, Extra doses, Change in medications, Change in diet/appetite, Hospital admission, Bruising, Other complaints          Spoke with pt.  INR is supratherapeutic.     Pt verifies warfarin weekly dosing.     Will have pt hold x 1 day then reduce weekly regimen    Repeat INR in 1 week(s).     Alicia Parada, RodriguezD

## 2022-06-03 ENCOUNTER — ANTICOAGULATION MONITORING (OUTPATIENT)
Dept: VASCULAR LAB | Facility: MEDICAL CENTER | Age: 72
End: 2022-06-03
Payer: MEDICAID

## 2022-06-03 DIAGNOSIS — Z95.828 S/P IVC FILTER: ICD-10-CM

## 2022-06-03 DIAGNOSIS — Z86.718 HISTORY OF DVT (DEEP VEIN THROMBOSIS): ICD-10-CM

## 2022-06-03 DIAGNOSIS — Z79.01 CHRONIC ANTICOAGULATION: ICD-10-CM

## 2022-06-03 LAB — INR PPP: 1.8 (ref 2–3.5)

## 2022-06-03 NOTE — PROGRESS NOTES
Anticoagulation Summary  As of 6/3/2022    INR goal:  2.0-3.0   TTR:  39.3 % (7 y)   INR used for dosin.80 (6/3/2022)   Warfarin maintenance plan:  10 mg (10 mg x 1) every Tue, Thu; 5 mg (10 mg x 0.5) all other days   Weekly warfarin total:  45 mg   Plan last modified:  Rodriguez BeltranD (2022)   Next INR check:  6/10/2022   Target end date:  Indefinite    Indications    DVT (deep venous thrombosis) (HCC) (Resolved) [I82.409]  Chronic anticoagulation [Z79.01]  S/P IVC filter [Z95.828]  History of DVT (deep vein thrombosis) [Z86.718]             Anticoagulation Episode Summary     INR check location:  Anticoagulation Clinic    Preferred lab:  [facility] GENERAL    Send INR reminders to:      Comments:  MD INR      Anticoagulation Care Providers     Provider Role Specialty Phone number    Carmelo Pat M.D. Referring [facility] Medicine [phone]    [facility] Anticoagulation Services Responsible  [phone]        Anticoagulation Patient Findings      Left voicemail message to report a SUB therapeutic INR of 1.8.    Will have pt BOLUS x 1 dose today w/ 10 mg and then continue on with current warfarin dosing regimen.   Requested pt contact the clinic for any s/s of unusual bleeding, bruising, clotting or any changes to diet or medication.    FU INR in 1 week(s).    Mauricio Redd, PharmD, BCACP

## 2022-06-21 ENCOUNTER — ANTICOAGULATION MONITORING (OUTPATIENT)
Dept: CARDIOLOGY | Facility: MEDICAL CENTER | Age: 72
End: 2022-06-21
Payer: MEDICAID

## 2022-06-21 DIAGNOSIS — Z95.828 S/P IVC FILTER: ICD-10-CM

## 2022-06-21 DIAGNOSIS — Z86.718 HISTORY OF DVT (DEEP VEIN THROMBOSIS): ICD-10-CM

## 2022-06-21 DIAGNOSIS — Z79.01 CHRONIC ANTICOAGULATION: ICD-10-CM

## 2022-06-21 LAB — INR PPP: 2.2 (ref 2–3.5)

## 2022-06-21 NOTE — PROGRESS NOTES
Anticoagulation Summary  As of 2022    INR goal:  2.0-3.0   TTR:  39.4 % (7.1 y)   INR used for dosin.20 (2022)   Warfarin maintenance plan:  10 mg (10 mg x 1) every Tue, Thu; 5 mg (10 mg x 0.5) all other days   Weekly warfarin total:  45 mg   Plan last modified:  Alicia Parada, PharmD (2022)   Next INR check:  2022   Target end date:  Indefinite    Indications    DVT (deep venous thrombosis) (HCC) (Resolved) [I82.409]  Chronic anticoagulation [Z79.01]  S/P IVC filter [Z95.828]  History of DVT (deep vein thrombosis) [Z86.718]             Anticoagulation Episode Summary     INR check location:  Anticoagulation Clinic    Preferred lab:  [facility]    Send INR reminders to:      Comments:  MD INR      Anticoagulation Care Providers     Provider Role Specialty Phone number    Carmelo Pat M.D. Referring Family Medicine [phone]    [facility] Anticoagulation Services Responsible  [phone]        Anticoagulation Patient Findings  Patient Findings     Negatives:  Signs/symptoms of thrombosis, Signs/symptoms of bleeding, Laboratory test error suspected, Change in health, Change in alcohol use, Change in activity, Upcoming invasive procedure, Emergency department visit, Upcoming dental procedure, Missed doses, Extra doses, Change in medications, Change in diet/appetite, Hospital admission, Bruising, Other complaints           Spoke with patient today regarding therapeutic INR of 2.20.  Patient denies any signs/symptoms of bruising or bleeding or any changes in diet and medications.  Instructed patient to call clinic with any questions or concerns.    Pt is not on antiplatelet therapy     Pt is to continue with current warfarin dosing regimen.    Follow up in 2 weeks, to reduce risk of adverse events related to this high risk medication,  Warfarin.    Gomez Fernando, Pharmacy Intern

## 2022-07-01 ENCOUNTER — ANTICOAGULATION MONITORING (OUTPATIENT)
Dept: VASCULAR LAB | Facility: MEDICAL CENTER | Age: 72
End: 2022-07-01
Payer: MEDICAID

## 2022-07-01 DIAGNOSIS — Z95.828 S/P IVC FILTER: ICD-10-CM

## 2022-07-01 DIAGNOSIS — Z86.718 HISTORY OF DVT (DEEP VEIN THROMBOSIS): ICD-10-CM

## 2022-07-01 DIAGNOSIS — Z79.01 CHRONIC ANTICOAGULATION: ICD-10-CM

## 2022-07-01 LAB — INR PPP: 1.3 (ref 2–3.5)

## 2022-07-01 NOTE — PROGRESS NOTES
Anticoagulation Summary  As of 2022    INR goal:  2.0-3.0   TTR:  39.4 % (7.1 y)   INR used for dosin.30 (2022)   Warfarin maintenance plan:  10 mg (10 mg x 1) every Tue, u; 5 mg (10 mg x 0.5) all other days   Weekly warfarin total:  45 mg   Plan last modified:  Rodriguez BeltranD (2022)   Next INR check:  2022   Target end date:  Indefinite    Indications    DVT (deep venous thrombosis) (HCC) (Resolved) [I82.409]  Chronic anticoagulation [Z79.01]  S/P IVC filter [Z95.828]  History of DVT (deep vein thrombosis) [Z86.718]             Anticoagulation Episode Summary     INR check location:  Anticoagulation Clinic    Preferred lab:  [facility] GENER[facility]    Send INR reminders to:      Comments:  MD INR      Anticoagulation Care Providers     Provider Role Specialty Phone number    Carmelo Pat M.D. Referring [facility] [phone]    [facility] Anticoagulation Services Responsible  [phone]        Anticoagulation Patient Findings      Left voicemail to report a SUB-therapeutic INR of 1.3.    Will have pt take BOLUS dose of warfarin today () of 10 mg and then Pt to continue with current warfarin dosing regimen. Requested pt contact the clinic for any s/s of unusual bleeding, bruising, clotting or any changes to diet or medication.  Requested patient call clinic if she is off warfarin for a procedure or if she would like to discuss recent changes.     FU INR in 1 week(s).    Karolyn Figueroa, PhT    I agree with the above plan.  Ramírez Porras  PharmD

## 2022-07-06 DIAGNOSIS — Z79.01 CHRONIC ANTICOAGULATION: ICD-10-CM

## 2022-07-06 RX ORDER — WARFARIN SODIUM 10 MG/1
TABLET ORAL
Qty: 90 TABLET | Refills: 1 | Status: SHIPPED | OUTPATIENT
Start: 2022-07-06 | End: 2022-07-07 | Stop reason: SDUPTHER

## 2022-07-07 DIAGNOSIS — Z79.01 CHRONIC ANTICOAGULATION: ICD-10-CM

## 2022-07-07 RX ORDER — WARFARIN SODIUM 10 MG/1
TABLET ORAL
Qty: 90 TABLET | Refills: 1 | Status: SHIPPED | OUTPATIENT
Start: 2022-07-07 | End: 2022-07-22

## 2022-07-07 NOTE — TELEPHONE ENCOUNTER
Received call from [facility] that they need supervising MD on warfarin RX.    Resent RX with Dr Bloch's name    Alicia Parada, PharmD

## 2022-07-08 ENCOUNTER — ANTICOAGULATION MONITORING (OUTPATIENT)
Dept: VASCULAR LAB | Facility: MEDICAL CENTER | Age: 72
End: 2022-07-08
Payer: MEDICAID

## 2022-07-08 DIAGNOSIS — Z79.01 CHRONIC ANTICOAGULATION: ICD-10-CM

## 2022-07-08 DIAGNOSIS — Z95.828 S/P IVC FILTER: ICD-10-CM

## 2022-07-08 DIAGNOSIS — Z86.718 HISTORY OF DVT (DEEP VEIN THROMBOSIS): ICD-10-CM

## 2022-07-08 LAB — INR PPP: 2.3 (ref 2–3.5)

## 2022-07-08 NOTE — PROGRESS NOTES
Anticoagulation Summary  As of 2022    INR goal:  2.0-3.0   TTR:  39.4 % (7.1 y)   INR used for dosin.30 (2022)   Warfarin maintenance plan:  10 mg (10 mg x 1) every e, Thu; 5 mg (10 mg x 0.5) all other days   Weekly warfarin total:  45 mg   Plan last modified:  Zahida Porras (2022)   Next INR check:  7/15/2022   Target end date:  Indefinite    Indications    DVT (deep venous thrombosis) (HCC) (Resolved) [I82.409]  Chronic anticoagulation [Z79.01]  S/P IVC filter [Z95.828]  History of DVT (deep vein thrombosis) [Z86.718]             Anticoagulation Episode Summary     INR check location:  Anticoagulation Clinic    Preferred lab:  [facility] GENERAL    Send INR reminders to:      Comments:  MD INR      Anticoagulation Care Providers     Provider Role Specialty Phone number    Carmelo Pat M.D. Referring Family Medicine [phone]    [facility] Anticoagulation Services Responsible  [phone]          Refer to Anticoagulation Patient Findings for HPI  Patient Findings     Positives:  Change in diet/appetite (Pt reports a decrease in diet due to the heat)    Negatives:  Signs/symptoms of thrombosis, Signs/symptoms of bleeding, Laboratory test error suspected, Change in health, Change in alcohol use, Change in activity, Upcoming invasive procedure, Emergency department visit, Upcoming dental procedure, Missed doses, Extra doses, Change in medications, Hospital admission, Bruising, Other complaints          Spoke with patient to report a therapeutic INR.      Pt is NOT on antiplatelet therapy.    Pt instructed to continue with current warfarin dosing regimen, confirms dosing.   Will follow up in 1 week(s).     Karolyn Figueroa PhT

## 2022-07-11 NOTE — PROGRESS NOTES
Anticoagulation Summary  As of 9/19/2018    INR goal:   2.0-3.0   TTR:   40.4 % (3 y)   Today's INR:   1.7!   Warfarin maintenance plan:   10 mg (10 mg x 1) on Mon, Wed, Fri; 12.5 mg (10 mg x 1 and 5 mg x 0.5) all other days   Weekly warfarin total:   80 mg   Plan last modified:   Bon Ash, PharmD (6/7/2018)   Next INR check:   9/26/2018   Priority:   Routine   Target end date:   Indefinite    Indications    DVT (deep venous thrombosis) (HCC) (Resolved) [I82.409]  Chronic anticoagulation [Z79.01]  S/P IVC filter [Z95.828]  History of DVT (deep vein thrombosis) [Z86.718]             Anticoagulation Episode Summary     INR check location:   [facility]    Preferred lab:   [facility]    Send INR reminders to:       Comments:   MD INR      Anticoagulation Care Providers     Provider Role Specialty Phone number    Carmelo Pat M.D. Referring [facility] Medicine [phone]    [facility] Anticoagulation Services Responsible  [phone]        Anticoagulation Patient Findings      Spoke with patient.  INR is SUB therapeutic.   Pt states that she has missed doses of warfarin as she has misplaced her warfarin.  Pt denies any unusual s/s of bleeding, bruising, clotting or any changes to diet or medications. Denies any etoh, cranberries, supplements, or illness.   Pt verifies warfarin weekly dosing.     Will have pt take a boost dose of 15mg x1 today and then resume her normal warfarin dosing. Sent warfarin Rx's to [facility] and [facility] so pt can  today.    Repeat INR in 1 week(s).     Porsche Bello, PharmD       Patient arrived to room. PIV placed. Admit assessment completed. Plan of care discussed with patient. Will monitor

## 2022-07-22 ENCOUNTER — ANTICOAGULATION MONITORING (OUTPATIENT)
Dept: VASCULAR LAB | Facility: MEDICAL CENTER | Age: 72
End: 2022-07-22
Payer: MEDICAID

## 2022-07-22 DIAGNOSIS — Z79.01 CHRONIC ANTICOAGULATION: ICD-10-CM

## 2022-07-22 DIAGNOSIS — Z86.718 HISTORY OF DVT (DEEP VEIN THROMBOSIS): ICD-10-CM

## 2022-07-22 DIAGNOSIS — Z95.828 S/P IVC FILTER: ICD-10-CM

## 2022-07-22 LAB — INR PPP: 2.6 (ref 2–3.5)

## 2022-07-22 NOTE — PROGRESS NOTES
Anticoagulation Summary  As of 2022    INR goal:  2.0-3.0   TTR:  39.8 % (7.2 y)   INR used for dosin.60 (2022)   Warfarin maintenance plan:  10 mg (10 mg x 1) every e, Thu; 5 mg (10 mg x 0.5) all other days   Weekly warfarin total:  45 mg   Plan last modified:  Zahida Porras (2022)   Next INR check:  2022   Target end date:  Indefinite    Indications    DVT (deep venous thrombosis) (HCC) (Resolved) [I82.409]  Chronic anticoagulation [Z79.01]  S/P IVC filter [Z95.828]  History of DVT (deep vein thrombosis) [Z86.718]             Anticoagulation Episode Summary     INR check location:  Anticoagulation Clinic    Preferred lab:  [facility]    Send INR reminders to:      Comments:  MD INR      Anticoagulation Care Providers     Provider Role Specialty Phone number    Carmelo Pat M.D. Referring Family Medicine [phone]    [facility] Anticoagulation Services Responsible  [phone]          Refer to Anticoagulation Patient Findings for HPI      Spoke with patient to report a therapeutic INR.      Pt instructed to continue with current warfarin dosing regimen, confirms dosing.   Will follow up in 2 week(s).     Mauricio Redd, PharmD, BCACP

## 2022-08-16 RX ORDER — FLUTICASONE PROPIONATE 50 MCG
SPRAY, SUSPENSION (ML) NASAL
Qty: 16 G | Refills: 10 | Status: SHIPPED | OUTPATIENT
Start: 2022-08-16 | End: 2023-03-14

## 2022-08-16 NOTE — TELEPHONE ENCOUNTER
Have we ever prescribed this med? Yes.  If yes, what date? 11/08/21 JANNY APODACA    Last OV: 11/08/21 JANNY APODACA     Next OV: no pending follow up     DX:     Medications: fluticasone (FLONASE) 50 MCG/ACT nasal spray

## 2022-08-26 ENCOUNTER — DOCUMENTATION (OUTPATIENT)
Dept: VASCULAR LAB | Facility: MEDICAL CENTER | Age: 72
End: 2022-08-26
Payer: MEDICAID

## 2022-09-08 ENCOUNTER — ANTICOAGULATION MONITORING (OUTPATIENT)
Dept: VASCULAR LAB | Facility: MEDICAL CENTER | Age: 72
End: 2022-09-08
Payer: MEDICAID

## 2022-09-08 DIAGNOSIS — Z79.01 CHRONIC ANTICOAGULATION: ICD-10-CM

## 2022-09-08 DIAGNOSIS — Z86.718 HISTORY OF DVT (DEEP VEIN THROMBOSIS): ICD-10-CM

## 2022-09-08 DIAGNOSIS — Z95.828 S/P IVC FILTER: ICD-10-CM

## 2022-09-08 LAB — INR PPP: 1.5 (ref 2–3.5)

## 2022-09-08 NOTE — PROGRESS NOTES
Anticoagulation Summary  As of 2022      INR goal:  2.0-3.0   TTR:  40.0 % (7.3 y)   INR used for dosin.50 (2022)   Warfarin maintenance plan:  10 mg (10 mg x 1) every e, Thu; 5 mg (10 mg x 0.5) all other days   Weekly warfarin total:  45 mg   Plan last modified:  Zahida Porras (2022)   Next INR check:  9/15/2022   Target end date:  Indefinite    Indications    DVT (deep venous thrombosis) (HCC) (Resolved) [I82.409]  Chronic anticoagulation [Z79.01]  S/P IVC filter [Z95.828]  History of DVT (deep vein thrombosis) [Z86.718]                 Anticoagulation Episode Summary       INR check location:  Anticoagulation Clinic    Preferred lab:  [facility]    Send INR reminders to:      Comments:  MD INR          Anticoagulation Care Providers       Provider Role Specialty Phone number    Carmelo Pat M.D. Referring Family Medicine [phone]    [facility]ticoagulation [facility] Responsible  [phone]            Refer to Anticoagulation Patient Findings for HPI  Patient Findings       Positives:  Change in medications (pt dosing varied due to not being able to test and due to the change in dispensing via bubble packing)    Negatives:  Signs/symptoms of thrombosis, Signs/symptoms of bleeding, Laboratory test error suspected, Change in health, Change in alcohol use, Change in activity, Upcoming invasive procedure, Emergency department visit, Upcoming dental procedure, Missed doses, Extra doses, Change in diet/appetite, Hospital admission, Bruising, Other complaints            Spoke with pt.  INR is sub- therapeutic.     Pt verifies warfarin weekly dosing.     Pt is NOT on antiplatelet therapy   Patient denies any interval changes to diet and/or medications. Patient denies any signs/symptoms of bleeding or clotting.    Will have pt take a one time dose increase of 1.5 tabs (15mg) today and then continue with normal dosing thereafter. .    Repeat INR in 1 week(s).     Rosales Espinoza    Discussed with Sadaf Newby    I agree with the above plan.   Ramírez FriasD

## 2022-09-27 ENCOUNTER — ANTICOAGULATION MONITORING (OUTPATIENT)
Dept: VASCULAR LAB | Facility: MEDICAL CENTER | Age: 72
End: 2022-09-27
Payer: MEDICAID

## 2022-09-27 DIAGNOSIS — Z95.828 S/P IVC FILTER: ICD-10-CM

## 2022-09-27 DIAGNOSIS — Z79.01 CHRONIC ANTICOAGULATION: ICD-10-CM

## 2022-09-27 DIAGNOSIS — Z86.718 HISTORY OF DVT (DEEP VEIN THROMBOSIS): ICD-10-CM

## 2022-09-27 LAB — INR PPP: 1.2 (ref 2–3.5)

## 2022-09-27 NOTE — PROGRESS NOTES
Anticoagulation Summary  As of 2022      INR goal:  2.0-3.0   TTR:  39.7 % (7.4 y)   INR used for dosin.20 (2022)   Warfarin maintenance plan:  10 mg (10 mg x 1) every Tue, Thu; 5 mg (10 mg x 0.5) all other days   Weekly warfarin total:  45 mg   Plan last modified:  Zahida Porras (2022)   Next INR check:     Target end date:  Indefinite    Indications    DVT (deep venous thrombosis) (HCC) (Resolved) [I82.409]  Chronic anticoagulation [Z79.01]  S/P IVC filter [Z95.828]  History of DVT (deep vein thrombosis) [Z86.718]                 Anticoagulation Episode Summary       INR check location:  Anticoagulation Clinic    Preferred lab:  [facility]    Send INR reminders to:      Comments:  MD INR          Anticoagulation Care Providers       Provider Role Specialty Phone number    Carmelo Pat M.D. Referring [facility] [phone]    [facility] Anticoagulation Services Responsible  [phone]          Anticoagulation Patient Findings          Left voicemail message to report a SUBtherapeutic INR of 1.2.  Requested pt contact the clinic for any s/s of unusual bleeding, bruising, clotting or any changes to diet or medication.     Via voicemail, pt instructed to take bolus of 15 mg of warfarin tonight, then follow new dosing plan as described on dosing calendar for a TWD increase of 11.1%.     Pt is not on antiplatelet therapy    F/u 3 days    Rosales Graham

## 2022-10-24 NOTE — PROGRESS NOTES
OP Telephone Anticoagulation Service Note    Date: 10/11/2021      Anticoagulation Summary  As of 10/11/2021    INR goal:  2.0-3.0   TTR:  39.1 % (6.1 y)   INR used for dosin.9 (10/10/2021)   Warfarin maintenance plan:  5 mg (10 mg x 0.5) every Mon, Wed, Fri; 10 mg (10 mg x 1) all other days   Weekly warfarin total:  55 mg   Plan last modified:  Wellington Thompson, PharmD (2021)   Next INR check:  10/18/2021   Target end date:  Indefinite    Indications    DVT (deep venous thrombosis) (HCC) (Resolved) [I82.409]  Chronic anticoagulation [Z79.01]  S/P IVC filter [Z95.828]  History of DVT (deep vein thrombosis) [Z86.718]             Anticoagulation Episode Summary     INR check location:  Anticoagulation Clinic    Preferred lab:  [facility]    Send INR reminders to:      Comments:  MD INR      Anticoagulation Care Providers     Provider Role Specialty Phone number    Carmelo Pat M.D. Referring Family Medicine [phone]    [facility]tico[facility] Responsible  [phone]        Anticoagulation Patient Findings  Patient Findings     Positives:  Change in medications (Levothyroxine dose was increased)    Negatives:  Signs/symptoms of thrombosis, Signs/symptoms of bleeding, Laboratory test error suspected, Change in health, Change in alcohol use, Change in activity, Upcoming invasive procedure, Emergency department visit, Upcoming dental procedure, Missed doses, Extra doses, Change in diet/appetite, Hospital admission, Bruising, Other complaints          INR SUB-therapeutic at 1.9.  Spoke w/ pt on phone.  Verified regimen w/ pt - her INR was 1.2 on 10/6 and she self-bolused w/ 15 mg of her own volition.  Instructed pt to bolus x 1 dose w/ 10 mg and to then continue on w/ her current regimen.  NO s/s bleeding reported per pt.  NO changes in diet reported per pt.  Check INR in 1 week(s).  Instructed pt to call clinic at [phone] if there are any questions.  Pt stated understanding.    Pt  is not on antiplatelet therapy.    Mauricio Redd, RodriguezD           Erythromycin Pregnancy And Lactation Text: This medication is Pregnancy Category B and is considered safe during pregnancy. It is also excreted in breast milk.

## 2022-11-11 ENCOUNTER — PATIENT MESSAGE (OUTPATIENT)
Dept: HEALTH INFORMATION MANAGEMENT | Facility: OTHER | Age: 72
End: 2022-11-11

## 2023-01-10 ENCOUNTER — ANTICOAGULATION MONITORING (OUTPATIENT)
Dept: VASCULAR LAB | Facility: MEDICAL CENTER | Age: 73
End: 2023-01-10
Payer: COMMERCIAL

## 2023-01-10 DIAGNOSIS — Z86.718 HISTORY OF DVT (DEEP VEIN THROMBOSIS): Chronic | ICD-10-CM

## 2023-01-10 DIAGNOSIS — Z95.828 S/P IVC FILTER: ICD-10-CM

## 2023-01-10 DIAGNOSIS — Z79.01 CHRONIC ANTICOAGULATION: ICD-10-CM

## 2023-01-10 LAB — INR PPP: 1 (ref 2–3.5)

## 2023-01-10 NOTE — PROGRESS NOTES
OP Anticoagulation Service Note    Date: 1/10/2023    Anticoagulation Summary  As of 1/10/2023      INR goal:  2.0-3.0   TTR:  38.2 % (7.6 y)   INR used for dosin.00 (1/10/2023)   Warfarin maintenance plan:  10 mg (10 mg x 1) every Mon, Wed, Fri; 5 mg (10 mg x 0.5) all other days   Weekly warfarin total:  50 mg   Plan last modified:  Bob Dupree, PharmD (1/10/2023)   Next INR check:  2023   Target end date:  Indefinite    Indications    DVT (deep venous thrombosis) (HCC) (Resolved) [I82.409]  Chronic anticoagulation [Z79.01]  S/P IVC filter [Z95.828]  History of DVT (deep vein thrombosis) [Z86.718]                 Anticoagulation Episode Summary       INR check location:  Anticoagulation Clinic    Preferred lab:  [facility]    Send INR reminders to:      Comments:  MD INR          Anticoagulation Care Providers       Provider Role Specialty Phone number    Caremlo Pat M.D. Referring Family Medicine [phone]    [facility] Anticoagulation Services Responsible  [phone]          Anticoagulation Patient Findings  Patient Findings       Positives:  Missed doses    Negatives:  Signs/symptoms of thrombosis, Signs/symptoms of bleeding, Laboratory test error suspected, Change in health, Change in alcohol use, Change in activity, Upcoming invasive procedure, Emergency department visit, Upcoming dental procedure, Extra doses, Change in medications, Change in diet/appetite, Hospital admission, Bruising, Other complaints              Patient's preferred phone number:  [phone]        HPI:   The reason for today's call is to prevent morbidity and mortality from a blood clot and/or stroke and to reduce the risk of bleeding while on a anticoagulant.     PCP:  Lio Deluca M.D.  [address] [facility] [address]  [place] [facility] [place] [address]    Assessment:     INR  sub-therapeutic.   She has been lost to follow-up for some time, she has also been skipping/forgetting doses.  She is having problems with her  insurance and getting medications, but reports she was able to get warfarin.  Due to her current insurance issues we will not use Lovenox at this time, and rather pursue more aggressive warfarin dosing and shorter follow-up..    Lab Results   Component Value Date/Time    BUN 8 02/14/2022 04:58 AM    CREATININE 0.42 (L) 02/14/2022 04:58 AM    CREATININE 0.8 04/27/2009 08:33 AM    BUNCREATRAT 13.8 01/27/2022 05:01 AM     Lab Results   Component Value Date/Time    HEMOGLOBIN 11.3 (L) 02/15/2022 05:11 AM    HEMATOCRIT 34.7 (L) 02/15/2022 05:11 AM    PLATELETCT 289 02/15/2022 05:11 AM    ALKPHOSPHAT 86 01/24/2022 11:18 PM    ALKPHOSPHAT 103 (H) 11/08/2021 02:20 PM    ASTSGOT 23 01/24/2022 11:18 PM    ASTSGOT 13 11/08/2021 02:20 PM    ALTSGPT 18 01/24/2022 11:18 PM    ALTSGPT 12 11/08/2021 02:20 PM          Current Outpatient Medications:     fluticasone, ADMINISTER 1-2 SPRAYS INTO AFFECTED NOSTRIL(S) EVERY DAY    warfarin, TAKE 1/2 TO 1 TABLET BY MOUTH DAILY OR AS DIRECTED BY ANTICOAGULATION CLINIC.    amitriptyline, 25 mg, Oral, Nightly    levothyroxine, 200 mcg, Oral, AM ES    mirtazapine, 15 mg, Oral, Nightly    Vitamin D, 2,000 Units, Oral, DAILY    Spiriva Respimat, 5 mcg, Inhalation, DAILY    budesonide-formoterol, 2 Puff, Inhalation, BID    albuterol, 2 Puff, Inhalation, Q4HRS PRN    FQ Protective Underwear, USE 1 PULLUP 3 TIMES DAILY    gabapentin, TAKE THREE CAPSULES BY MOUTH THREE TIMES A DAY (Patient taking differently: 300 mg, Oral, 3 TIMES DAILY)    FQ Protective Underwear, USE 1 PULLUP 3 TIMES DAILY    carBAMazepine, 600 mg, Oral, QHS      Plan:     10 mg x 4 days then resume    Follow-up:     On date seen above    Additional information discussed with patient:     Asked patient to please call the anticoagulation clinic if they have any signs/symptoms of bleeding and/or thrombosis or any changes to diet or medications.      National recommendations regarding anticoagulation therapy:     The CHEST guidelines  recommends frequent INR monitoring at regular intervals (a few days up to a max of 12 weeks) to ensure patients are on the proper dose of warfarin, and patients are not having any complications from therapy.  INRs can dramatically change over a short time period due to diet, medications, and medical conditions.       Bob Dupree, PharmD, MS, BCACP, [facility]  Phone: [phone]  Fax: [phone]  On call: [phone]  General scheduling/information [phone]  For emergencies please dial [phone]  Please do not use [employer] for urgent matters, call the phone numbers listed above.    This note was created using voice recognition software (Dragon). The accuracy of the dictation is limited by the abilities of the software. I have reviewed the note prior to signing, however some errors in grammar and context are still possible. If you have any questions related to this note please do not hesitate to contact our office.

## 2023-03-14 ENCOUNTER — HOSPITAL ENCOUNTER (OUTPATIENT)
Facility: MEDICAL CENTER | Age: 73
End: 2023-03-15
Attending: EMERGENCY MEDICINE | Admitting: STUDENT IN AN ORGANIZED HEALTH CARE EDUCATION/TRAINING PROGRAM
Payer: COMMERCIAL

## 2023-03-14 DIAGNOSIS — H54.61 VISION LOSS OF RIGHT EYE: Primary | ICD-10-CM

## 2023-03-14 LAB — INR PPP: 1.2 (ref 2–3.5)

## 2023-03-14 PROCEDURE — 99285 EMERGENCY DEPT VISIT HI MDM: CPT

## 2023-03-14 PROCEDURE — G0378 HOSPITAL OBSERVATION PER HR: HCPCS

## 2023-03-14 PROCEDURE — A9270 NON-COVERED ITEM OR SERVICE: HCPCS | Performed by: STUDENT IN AN ORGANIZED HEALTH CARE EDUCATION/TRAINING PROGRAM

## 2023-03-14 PROCEDURE — 700102 HCHG RX REV CODE 250 W/ 637 OVERRIDE(OP): Performed by: STUDENT IN AN ORGANIZED HEALTH CARE EDUCATION/TRAINING PROGRAM

## 2023-03-14 PROCEDURE — 99222 1ST HOSP IP/OBS MODERATE 55: CPT | Performed by: STUDENT IN AN ORGANIZED HEALTH CARE EDUCATION/TRAINING PROGRAM

## 2023-03-14 RX ORDER — LEVOTHYROXINE SODIUM 0.2 MG/1
200 TABLET ORAL
Status: DISCONTINUED | OUTPATIENT
Start: 2023-03-15 | End: 2023-03-15 | Stop reason: HOSPADM

## 2023-03-14 RX ORDER — GABAPENTIN 300 MG/1
900 CAPSULE ORAL 3 TIMES DAILY
Status: DISCONTINUED | OUTPATIENT
Start: 2023-03-14 | End: 2023-03-15 | Stop reason: HOSPADM

## 2023-03-14 RX ORDER — ALBUTEROL SULFATE 90 UG/1
2 AEROSOL, METERED RESPIRATORY (INHALATION) EVERY 4 HOURS PRN
Status: DISCONTINUED | OUTPATIENT
Start: 2023-03-14 | End: 2023-03-15 | Stop reason: HOSPADM

## 2023-03-14 RX ORDER — CARBAMAZEPINE 200 MG/1
600 TABLET ORAL
Status: DISCONTINUED | OUTPATIENT
Start: 2023-03-14 | End: 2023-03-15 | Stop reason: HOSPADM

## 2023-03-14 RX ORDER — MIRTAZAPINE 15 MG/1
15 TABLET, FILM COATED ORAL
Status: DISCONTINUED | OUTPATIENT
Start: 2023-03-14 | End: 2023-03-15 | Stop reason: HOSPADM

## 2023-03-14 RX ORDER — AMITRIPTYLINE HYDROCHLORIDE 25 MG/1
25 TABLET, FILM COATED ORAL NIGHTLY
Status: DISCONTINUED | OUTPATIENT
Start: 2023-03-14 | End: 2023-03-15 | Stop reason: HOSPADM

## 2023-03-14 RX ORDER — BUDESONIDE AND FORMOTEROL FUMARATE DIHYDRATE 160; 4.5 UG/1; UG/1
2 AEROSOL RESPIRATORY (INHALATION) 2 TIMES DAILY
Status: DISCONTINUED | OUTPATIENT
Start: 2023-03-14 | End: 2023-03-15 | Stop reason: HOSPADM

## 2023-03-14 RX ORDER — GABAPENTIN 300 MG/1
900 CAPSULE ORAL 3 TIMES DAILY
COMMUNITY

## 2023-03-14 RX ORDER — WARFARIN SODIUM 10 MG/1
10 TABLET ORAL EVERY EVENING
Status: ON HOLD | COMMUNITY
End: 2023-03-15

## 2023-03-14 RX ORDER — TIOTROPIUM BROMIDE INHALATION SPRAY 3.12 UG/1
5 SPRAY, METERED RESPIRATORY (INHALATION) DAILY
COMMUNITY

## 2023-03-14 RX ORDER — LISINOPRIL 2.5 MG/1
2.5 TABLET ORAL DAILY
COMMUNITY

## 2023-03-14 RX ORDER — LISINOPRIL 2.5 MG/1
2.5 TABLET ORAL DAILY
Status: DISCONTINUED | OUTPATIENT
Start: 2023-03-15 | End: 2023-03-15 | Stop reason: HOSPADM

## 2023-03-14 RX ADMIN — GABAPENTIN 900 MG: 300 CAPSULE ORAL at 22:08

## 2023-03-14 RX ADMIN — AMITRIPTYLINE HYDROCHLORIDE 25 MG: 25 TABLET, FILM COATED ORAL at 22:08

## 2023-03-14 RX ADMIN — CARBAMAZEPINE 600 MG: 200 TABLET ORAL at 22:08

## 2023-03-14 RX ADMIN — BUDESONIDE AND FORMOTEROL FUMARATE DIHYDRATE 2 PUFF: 160; 4.5 AEROSOL RESPIRATORY (INHALATION) at 22:09

## 2023-03-14 RX ADMIN — MIRTAZAPINE 15 MG: 15 TABLET, FILM COATED ORAL at 22:08

## 2023-03-14 ASSESSMENT — ENCOUNTER SYMPTOMS
BLURRED VISION: 1
FEVER: 0
DIZZINESS: 0
MYALGIAS: 0
BRUISES/BLEEDS EASILY: 0
DEPRESSION: 0
NECK PAIN: 0
NAUSEA: 0
COUGH: 0
HEADACHES: 0
DOUBLE VISION: 1
HEMOPTYSIS: 0
PALPITATIONS: 0
HEARTBURN: 0
CHILLS: 0

## 2023-03-14 ASSESSMENT — FIBROSIS 4 INDEX: FIB4 SCORE: 1.31

## 2023-03-15 VITALS
RESPIRATION RATE: 18 BRPM | TEMPERATURE: 97.2 F | BODY MASS INDEX: 41.32 KG/M2 | HEART RATE: 54 BPM | HEIGHT: 64 IN | OXYGEN SATURATION: 92 % | WEIGHT: 242 LBS | SYSTOLIC BLOOD PRESSURE: 125 MMHG | DIASTOLIC BLOOD PRESSURE: 58 MMHG

## 2023-03-15 LAB
ALBUMIN SERPL BCP-MCNC: 3.8 G/DL (ref 3.2–4.9)
ALBUMIN/GLOB SERPL: 1.1 G/DL
ALP SERPL-CCNC: 82 U/L (ref 30–99)
ALT SERPL-CCNC: 9 U/L (ref 2–50)
ANION GAP SERPL CALC-SCNC: 13 MMOL/L (ref 7–16)
AST SERPL-CCNC: 13 U/L (ref 12–45)
BASOPHILS # BLD AUTO: 0.9 % (ref 0–1.8)
BASOPHILS # BLD: 0.05 K/UL (ref 0–0.12)
BILIRUB SERPL-MCNC: 0.5 MG/DL (ref 0.1–1.5)
BUN SERPL-MCNC: 6 MG/DL (ref 8–22)
CALCIUM ALBUM COR SERPL-MCNC: 9.2 MG/DL (ref 8.5–10.5)
CALCIUM SERPL-MCNC: 9 MG/DL (ref 8.5–10.5)
CHLORIDE SERPL-SCNC: 103 MMOL/L (ref 96–112)
CO2 SERPL-SCNC: 24 MMOL/L (ref 20–33)
CREAT SERPL-MCNC: 0.56 MG/DL (ref 0.5–1.4)
EOSINOPHIL # BLD AUTO: 0.32 K/UL (ref 0–0.51)
EOSINOPHIL NFR BLD: 5.6 % (ref 0–6.9)
ERYTHROCYTE [DISTWIDTH] IN BLOOD BY AUTOMATED COUNT: 55.5 FL (ref 35.9–50)
GFR SERPLBLD CREATININE-BSD FMLA CKD-EPI: 98 ML/MIN/1.73 M 2
GLOBULIN SER CALC-MCNC: 3.5 G/DL (ref 1.9–3.5)
GLUCOSE SERPL-MCNC: 99 MG/DL (ref 65–99)
HCT VFR BLD AUTO: 37.3 % (ref 37–47)
HGB BLD-MCNC: 12.5 G/DL (ref 12–16)
IMM GRANULOCYTES # BLD AUTO: 0.02 K/UL (ref 0–0.11)
IMM GRANULOCYTES NFR BLD AUTO: 0.3 % (ref 0–0.9)
INR PPP: 1.02 (ref 0.87–1.13)
LYMPHOCYTES # BLD AUTO: 1.94 K/UL (ref 1–4.8)
LYMPHOCYTES NFR BLD: 33.9 % (ref 22–41)
MCH RBC QN AUTO: 35 PG (ref 27–33)
MCHC RBC AUTO-ENTMCNC: 33.5 G/DL (ref 33.6–35)
MCV RBC AUTO: 104.5 FL (ref 81.4–97.8)
MONOCYTES # BLD AUTO: 0.51 K/UL (ref 0–0.85)
MONOCYTES NFR BLD AUTO: 8.9 % (ref 0–13.4)
NEUTROPHILS # BLD AUTO: 2.89 K/UL (ref 2–7.15)
NEUTROPHILS NFR BLD: 50.4 % (ref 44–72)
NRBC # BLD AUTO: 0 K/UL
NRBC BLD-RTO: 0 /100 WBC
PLATELET # BLD AUTO: 228 K/UL (ref 164–446)
PMV BLD AUTO: 10.5 FL (ref 9–12.9)
POTASSIUM SERPL-SCNC: 3.3 MMOL/L (ref 3.6–5.5)
PROT SERPL-MCNC: 7.3 G/DL (ref 6–8.2)
PROTHROMBIN TIME: 13.3 SEC (ref 12–14.6)
RBC # BLD AUTO: 3.57 M/UL (ref 4.2–5.4)
SODIUM SERPL-SCNC: 140 MMOL/L (ref 135–145)
WBC # BLD AUTO: 5.7 K/UL (ref 4.8–10.8)

## 2023-03-15 PROCEDURE — 700102 HCHG RX REV CODE 250 W/ 637 OVERRIDE(OP): Performed by: STUDENT IN AN ORGANIZED HEALTH CARE EDUCATION/TRAINING PROGRAM

## 2023-03-15 PROCEDURE — G0378 HOSPITAL OBSERVATION PER HR: HCPCS

## 2023-03-15 PROCEDURE — 99239 HOSP IP/OBS DSCHRG MGMT >30: CPT | Performed by: HOSPITALIST

## 2023-03-15 PROCEDURE — 85610 PROTHROMBIN TIME: CPT

## 2023-03-15 PROCEDURE — 85025 COMPLETE CBC W/AUTO DIFF WBC: CPT

## 2023-03-15 PROCEDURE — A9270 NON-COVERED ITEM OR SERVICE: HCPCS | Performed by: STUDENT IN AN ORGANIZED HEALTH CARE EDUCATION/TRAINING PROGRAM

## 2023-03-15 PROCEDURE — 700101 HCHG RX REV CODE 250: Performed by: STUDENT IN AN ORGANIZED HEALTH CARE EDUCATION/TRAINING PROGRAM

## 2023-03-15 PROCEDURE — 80053 COMPREHEN METABOLIC PANEL: CPT

## 2023-03-15 RX ADMIN — TIOTROPIUM BROMIDE INHALATION SPRAY 5 MCG: 3.12 SPRAY, METERED RESPIRATORY (INHALATION) at 06:44

## 2023-03-15 RX ADMIN — LEVOTHYROXINE SODIUM 200 MCG: 0.2 TABLET ORAL at 06:32

## 2023-03-15 RX ADMIN — LISINOPRIL 2.5 MG: 2.5 TABLET ORAL at 06:32

## 2023-03-15 RX ADMIN — GABAPENTIN 900 MG: 300 CAPSULE ORAL at 12:52

## 2023-03-15 RX ADMIN — BUDESONIDE AND FORMOTEROL FUMARATE DIHYDRATE 2 PUFF: 160; 4.5 AEROSOL RESPIRATORY (INHALATION) at 06:32

## 2023-03-15 RX ADMIN — GABAPENTIN 900 MG: 300 CAPSULE ORAL at 06:32

## 2023-03-15 RX ADMIN — CYCLOPENTOLATE HYDROCHLORIDE AND PHENYLEPHRINE HYDROCHLORIDE 1 DROP: 2; 10 SOLUTION/ DROPS OPHTHALMIC at 06:44

## 2023-03-15 ASSESSMENT — PATIENT HEALTH QUESTIONNAIRE - PHQ9
SUM OF ALL RESPONSES TO PHQ9 QUESTIONS 1 AND 2: 2
4. FEELING TIRED OR HAVING LITTLE ENERGY: SEVERAL DAYS
5. POOR APPETITE OR OVEREATING: NOT AT ALL
3. TROUBLE FALLING OR STAYING ASLEEP OR SLEEPING TOO MUCH: NOT AT ALL
7. TROUBLE CONCENTRATING ON THINGS, SUCH AS READING THE NEWSPAPER OR WATCHING TELEVISION: NOT AT ALL
2. FEELING DOWN, DEPRESSED, IRRITABLE, OR HOPELESS: SEVERAL DAYS
SUM OF ALL RESPONSES TO PHQ QUESTIONS 1-9: 3
1. LITTLE INTEREST OR PLEASURE IN DOING THINGS: SEVERAL DAYS
8. MOVING OR SPEAKING SO SLOWLY THAT OTHER PEOPLE COULD HAVE NOTICED. OR THE OPPOSITE, BEING SO FIGETY OR RESTLESS THAT YOU HAVE BEEN MOVING AROUND A LOT MORE THAN USUAL: NOT AT ALL
6. FEELING BAD ABOUT YOURSELF - OR THAT YOU ARE A FAILURE OR HAVE LET YOURSELF OR YOUR FAMILY DOWN: NOT AL ALL
9. THOUGHTS THAT YOU WOULD BE BETTER OFF DEAD, OR OF HURTING YOURSELF: NOT AT ALL

## 2023-03-15 ASSESSMENT — ENCOUNTER SYMPTOMS
CHILLS: 0
HEADACHES: 0
CONSTIPATION: 0
HEMOPTYSIS: 0
DIAPHORESIS: 0
VOMITING: 0
MUSCULOSKELETAL NEGATIVE: 1
BRUISES/BLEEDS EASILY: 0
DIZZINESS: 0
WHEEZING: 0
BLURRED VISION: 1
BLOOD IN STOOL: 0
FOCAL WEAKNESS: 0
SEIZURES: 0
FEVER: 0
NERVOUS/ANXIOUS: 1
CARDIOVASCULAR NEGATIVE: 1
NAUSEA: 0
ABDOMINAL PAIN: 0
PALPITATIONS: 0
LOSS OF CONSCIOUSNESS: 0
DOUBLE VISION: 0
HEARTBURN: 0
DIARRHEA: 0
GASTROINTESTINAL NEGATIVE: 1
COUGH: 0
NEUROLOGICAL NEGATIVE: 1
RESPIRATORY NEGATIVE: 1

## 2023-03-15 ASSESSMENT — VISUAL ACUITY: OU: 1

## 2023-03-15 ASSESSMENT — PAIN DESCRIPTION - PAIN TYPE
TYPE: ACUTE PAIN
TYPE: ACUTE PAIN

## 2023-03-15 NOTE — PROGRESS NOTES
Report received from Lauren SOMMER.    Patient resting comfortably in bed, bed alarm in place, call light within reach, all safety precautions implemented. AOx4, 2L NC baseline, no vision in left eye pupil non-reactive, R eye reactive to light, patient reports ability to see light intermittently and copious floaters, does not flinch or react to close proximity movement.    Skin check completed, no further needs at this time.    4 Eyes Skin Assessment Completed by ROS Haque and ROS Levine.    Head WDL  Ears WDL  Nose WDL  Mouth WDL  Neck Scab  Breast/Chest WDL  Shoulder Blades WDL  Spine WDL  (R) Arm/Elbow/Hand Bruising  (L) Arm/Elbow/Hand Bruising  Abdomen Redness and Rash - skin fold excoriation with powder in place  Groin Redness and Excoriation - skin fold excoriation with powder in place  Scrotum/Coccyx/Buttocks Redness and Blanching  (R) Leg WDL  (L) Leg WDL  (R) Heel/Foot/Toe Redness and Blanching  (L) Heel/Foot/Toe Redness and Blanching          Devices In Places ECG, Blood Pressure Cuff, Pulse Ox, and Nasal Cannula      Interventions In Place Gray Ear Foams, NC W/Ear Foams, Pillows, Q2 Turns, Heels Loaded W/Pillows, and Pressure Redistribution Mattress    Possible Skin Injury No    Pictures Uploaded Into Epic N/A  Wound Consult Placed N/A  RN Wound Prevention Protocol Ordered Yes

## 2023-03-15 NOTE — ASSESSMENT & PLAN NOTE
Body mass index is 41.54 kg/m².  Outpatient weight loss management program highly recommended with lifestyle modification

## 2023-03-15 NOTE — CARE PLAN
The patient is Stable - Low risk of patient condition declining or worsening    Shift Goals  Clinical Goals: Ophthalmology consult in AM, monitor vision over night  Patient Goals: rest  Family Goals: no family at bedside    Progress made toward(s) clinical / shift goals:    Problem: Knowledge Deficit - Standard  Goal: Patient and family/care givers will demonstrate understanding of plan of care, disease process/condition, diagnostic tests and medications  Outcome: Progressing  Note: Patient updated on POC and eyedrop administration in morning prior to arrival of ophthalmologist.     Problem: Fall Risk  Goal: Patient will remain free from falls  Outcome: Progressing  Note: Precautions in place.       Patient is not progressing towards the following goals:

## 2023-03-15 NOTE — PROGRESS NOTES
Patient picked up by [facility] for transport to [facility]. Patient dressed, IV removed, and discharge paperwork reviewed. Eduardo, caretaker, called and is at [facility] with personal walker and home oxygen for the patient.

## 2023-03-15 NOTE — ASSESSMENT & PLAN NOTE
Optimize blood pressure management keep systolic blood pressure less than 140 diastolic under 90.   good minus

## 2023-03-15 NOTE — ED TRIAGE NOTES
"Chief Complaint   Patient presents with    Loss of Vision     Pt transferred from [facility] for R eye retinal detachment. Hx of L eye retinal detachment. Loss of vision on R eye started 2 days ago.     BP (!) 172/78   Pulse 65   Temp 36.2 °C (97.2 °F) (Temporal)   Resp 18   Ht 1.626 m (5' 4\")   Wt 110 kg (242 lb)   LMP 08/02/1975   SpO2 91%   BMI 41.54 kg/m²     "

## 2023-03-15 NOTE — ED NOTES
Med rec updated and complete. Allergies reviewed. Confirmed name and date of birth. Interviewed pt at bedside. Pt denies antibiotic use in last 30 days.     Home pharmacy  ( long term)  [facility]  [phone]    Short term [facility] [phone]

## 2023-03-15 NOTE — DISCHARGE SUMMARY
Discharge Summary    CHIEF COMPLAINT ON ADMISSION  Chief Complaint   Patient presents with    Loss of Vision     Pt transferred from [facility] for R eye retinal detachment. Hx of L eye retinal detachment. Loss of vision on R eye started 2 days ago.       Reason for Admission  Retinal detachment     Admission Date  3/14/2023    CODE STATUS  Full Code    HPI & HOSPITAL COURSE  Ms. Bernardo Garcia is a 72-year-old female who comes into the hospital complaining of visual disturbance in her right eye.  Patient was found to have a retinal detachment.  Ophthalmology was consulted and they were able to evaluate the patient this morning.  At this point ophthalmology has made arrangements to do laser repair on her right eye this afternoon in their eye center.  The patient will be discharging to the Eye Center to get surgery.  Afterwards the patient will need to be reassessed to determine if she is safe to go home.  Currently the patient does not have any acute medical problems aside from the retinal detachment.  The patient should be able to discharge home with outpatient follow-ups and management afterwards.  We have contacted her caretaker who is able to pick the patient up from the Eye Center and take her home.  Arrangements this will be made to transfer the patient to the surgical center where the laser surgery can be performed.    Therefore, she is discharged in good and stable condition to home with close outpatient follow-up.    The patient recovered much more quickly than anticipated on admission.    Discharge Date  3/15/2023    FOLLOW UP ITEMS POST DISCHARGE  Follow-up with the primary care physician in 2 to 3 days  Follow-up with ophthalmology as arranged    DISCHARGE DIAGNOSES  Principal Problem:    Vision loss of right eye POA: Yes  Active Problems:    Left retinal detachment POA: Yes    Primary hypertension POA: Yes    COPD (chronic obstructive pulmonary disease) (HCC) (Chronic) POA: Yes       Overview: On home O2      Current smoker.      Significant comorbidity      Does not appear to be exacerbating      Resp and O2 per protocol    Chronic respiratory failure (HCC) POA: Unknown    History of DVT (deep vein thrombosis) (Chronic) POA: Yes      Overview: Robert update 10/1/2016    Hypothyroidism, acquired, autoimmune POA: Yes    Class 3 severe obesity in adult (HCC) POA: Unknown  Resolved Problems:    * No resolved hospital problems. *      FOLLOW UP  No future appointments.  No follow-up provider specified.    MEDICATIONS ON DISCHARGE     Medication List        CONTINUE taking these medications        Instructions   albuterol 108 (90 Base) MCG/ACT Aers inhalation aerosol  Commonly known as: Ventolin HFA   Inhale 2 Puffs every four hours as needed for Shortness of Breath.  Dose: 2 Puff     amitriptyline 25 MG Tabs  Commonly known as: ELAVIL   Take 1 Tablet by mouth every evening.  Dose: 25 mg     budesonide-formoterol 160-4.5 MCG/ACT Aero  Commonly known as: Symbicort   Inhale 2 Puffs 2 times a day. With spacer, rinse mouth after use  Dose: 2 Puff     carBAMazepine 200 MG Tabs  Commonly known as: TEGRETOL   Take 600 mg by mouth every bedtime.  Dose: 600 mg     gabapentin 300 MG Caps  Commonly known as: NEURONTIN   Take 900 mg by mouth 3 times a day.  Dose: 900 mg     levothyroxine 200 MCG Tabs  Commonly known as: SYNTHROID   Take 200 mcg by mouth every morning on an empty stomach.  Dose: 200 mcg     lisinopril 2.5 MG Tabs  Commonly known as: PRINIVIL   Take 2.5 mg by mouth every day.  Dose: 2.5 mg     mirtazapine 15 MG Tabs  Commonly known as: Remeron   Take 15 mg by mouth at bedtime.  Dose: 15 mg     Spiriva Respimat 2.5 mcg/Act Aers  Generic drug: tiotropium   Inhale 5 mcg every day.  Dose: 5 mcg            STOP taking these medications      warfarin 10 MG Tabs  Commonly known as: COUMADIN              Allergies  Allergies   Allergen Reactions    Penicillins Anaphylaxis and Rash     Reaction; happened  as a child. Tolerated cephalosporins and Zosyn  Reaction; happened as a child. Tolerated cephalosporins and Zosyn  Reaction; happened as a child. Tolerated cephalosporins and Zosyn       DIET  Orders Placed This Encounter   Procedures    Diet NPO Restrict to: Sips with Medications     Standing Status:   Standing     Number of Occurrences:   1     Order Specific Question:   Diet NPO Restrict to:     Answer:   Sips with Medications [3]       ACTIVITY  As tolerated.  Weight bearing as tolerated    CONSULTATIONS  Ophthalmology    PROCEDURES  None    LABORATORY  Lab Results   Component Value Date    SODIUM 140 03/15/2023    POTASSIUM 3.3 (L) 03/15/2023    CHLORIDE 103 03/15/2023    CO2 24 03/15/2023    GLUCOSE 99 03/15/2023    BUN 6 (L) 03/15/2023    CREATININE 0.56 03/15/2023    CREATININE 0.8 04/27/2009        Lab Results   Component Value Date    WBC 5.7 03/15/2023    HEMOGLOBIN 12.5 03/15/2023    HEMATOCRIT 37.3 03/15/2023    PLATELETCT 228 03/15/2023        Total time of the discharge process exceeds 45 minutes.

## 2023-03-15 NOTE — H&P
Hospital Medicine History & Physical Note    Date of Service  3/14/2023    Primary Care Physician  Lio Deluca M.D.    Consultants  ophthalmology    Specialist Names: Dr. Gibbs    Code Status  Full Code    Chief Complaint  Chief Complaint   Patient presents with    Loss of Vision     Pt transferred from [facility] for R eye retinal detachment. Hx of L eye retinal detachment. Loss of vision on R eye started 2 days ago.       History of Presenting Illness  Atiya Henley is a 72 y.o. female past medical history of hyperlipidemia, hypertension, diabetes mellitus, left eye blindness, DVT on warfarin who presented 3/14/2023 with as a transfer from outside hospital [facility] with right eye retinal detachment.  Patient reports she recently had to put her cat down and has been very emotional and has been crying.  She does report frequently touching her eye to wipe her tears and thinks she may have rubbed it too hard.  She denies any trauma to the eye.  She denies any discharge.  She reports her vision has been blurry and she is only able to see objects but not make out what it is for the last 48 hours.  Patient lives alone.  No other relieving or exacerbating factors are noted.  Ophthalmology was consulted recommended admission for evaluation in a.m.  Recommended dilating drops to be given at 7 AM.    I discussed the plan of care with patient.    Review of Systems  Review of Systems   Constitutional:  Negative for chills and fever.   HENT:  Negative for hearing loss and tinnitus.    Eyes:  Positive for blurred vision and double vision.   Respiratory:  Negative for cough and hemoptysis.    Cardiovascular:  Negative for chest pain and palpitations.   Gastrointestinal:  Negative for heartburn and nausea.   Genitourinary:  Negative for dysuria and urgency.   Musculoskeletal:  Negative for myalgias and neck pain.   Skin:  Negative for rash.   Neurological:  Negative for dizziness and headaches.    Endo/Heme/Allergies:  Does not bruise/bleed easily.   Psychiatric/Behavioral:  Negative for depression and suicidal ideas.      Past Medical History   has a past medical history of Anxiety disorder, Arthritis, Bipolar affective (HCC), Bladder incontinence, Bowel obstruction (HCC), Breath shortness, Cancer (HCC), Chronic autoimmune thyroiditis, Colostomy in place (HCC) (2009), Dental disorder, Diabetes (HCC), EMPHYSEMA, Hashimoto's disease, High cholesterol, History of total knee arthroplasty, Hypertension, Hypothyroid (7/7/2009), Indigestion, Infectious disease, Infectious disease, Obesity (7/7/2009), Obstruction, Oxygen dependent, Pain, Personal history of venous thrombosis and embolism (2009, 2012), Presence of IVC filter, Psychiatric problem, Sleep apnea, Snoring, Status post cholecystectomy (1992), and Weight gain.    Surgical History   has a past surgical history that includes myringotomy (9/9/2009); low anterior resection (3/10/2010); low anterior resection laparoscopic (3/10/2010); colostomy (3/16/2010); tracheostomy (3/31/2010); cholecystectomy (1992); exploratory laparotomy (3/16/2010); colectomy (3/16/2010); hysterectomy laparoscopy (1983); colonoscopy - endo (11/17/2010); lateral release (7/20/2011); other orthopedic surgery; knee arthroscopy (7/20/2011); meniscectomy, knee, medial (7/20/2011); meniscectomy (7/20/2011); irrigation & debridement ortho (7/17/2012); irrigation & debridement ortho (7/19/2012); knee arthroplasty total (6/10/2012); irrigation & debridement general (12/17/2012); irrigation & debridement ortho (2/8/2013); exploratory laparotomy (9/2/2013); parastomal hernia repair  (9/2/2013); bladder biopsy with cystoscopy (10/10/2013); colostomy (11/26/2013); exploratory laparotomy (11/26/2013); lysis adhesions general (11/26/2013); colon resection (11/26/2013); exploratory laparotomy (12/10/2013); wound closure general (12/10/2013); pr inject rx other periph nerve (Left, 8/21/2015); pr  fluoroscopic guidance needle placement (Left, 8/21/2015); pr inject rx other periph nerve (8/21/2015); pr inject rx other periph nerve (8/21/2015); vitrectomy posterior (Left, 7/23/2019); and orif, ankle (Left, 2/7/2022).     Family History  family history includes Alcohol/Drug in her father; Heart Disease in her mother; Hypertension in an other family member; Lung Disease in her father; Seizures in her mother.   Family history reviewed with patient. There is no family history that is pertinent to the chief complaint.     Social History   reports that she has been smoking cigarettes. She has a 25.00 pack-year smoking history. She has never used smokeless tobacco. She reports current drug use. Drugs: Marijuana and Inhaled. She reports that she does not drink alcohol.    Allergies  Allergies   Allergen Reactions    Penicillins Anaphylaxis and Rash     Reaction; happened as a child. Tolerated cephalosporins and Zosyn  Reaction; happened as a child. Tolerated cephalosporins and Zosyn  Reaction; happened as a child. Tolerated cephalosporins and Zosyn       Medications  Prior to Admission Medications   Prescriptions Last Dose Informant Patient Reported? Taking?   albuterol (VENTOLIN HFA) 108 (90 Base) MCG/ACT Aero Soln inhalation aerosol 3/14/2023 at 1030 Patient No No   Sig: Inhale 2 Puffs every four hours as needed for Shortness of Breath.   amitriptyline (ELAVIL) 25 MG Tab 3/13/2023 at 2130 Patient No No   Sig: Take 1 Tablet by mouth every evening.   budesonide-formoterol (SYMBICORT) 160-4.5 MCG/ACT Aerosol 3/14/2023 at 0900 Patient No No   Sig: Inhale 2 Puffs 2 times a day. With spacer, rinse mouth after use   carbamazepine (TEGRETOL) 200 MG Tab 3/13/2023 at 0900 Patient Yes No   Sig: Take 600 mg by mouth every bedtime.   gabapentin (NEURONTIN) 300 MG Cap 3/14/2023 at 0900 Patient Yes Yes   Sig: Take 900 mg by mouth 3 times a day.   levothyroxine (SYNTHROID) 200 MCG Tab 3/14/2023 at 0830 Patient Yes No   Sig: Take 200  mcg by mouth every morning on an empty stomach.   lisinopril (PRINIVIL) 2.5 MG Tab 3/14/2023 at 0900 Patient Yes Yes   Sig: Take 2.5 mg by mouth every day.   mirtazapine (REMERON) 15 MG Tab 3/13/2023 at 2130 Patient Yes No   Sig: Take 15 mg by mouth at bedtime.   tiotropium (SPIRIVA RESPIMAT) 2.5 mcg/Act Aero Soln 3/14/2023 at 0900 Patient Yes Yes   Sig: Inhale 5 mcg every day.   warfarin (COUMADIN) 10 MG Tab 3/13/2023 at 1930 Patient Yes Yes   Sig: Take 10 mg by mouth every evening.      Facility-Administered Medications: None       Physical Exam  Temp:  [36.2 °C (97.1 °F)-36.2 °C (97.2 °F)] 36.2 °C (97.1 °F)  Pulse:  [55-65] 55  Resp:  [18] 18  BP: (158-183)/(78-81) 158/78  SpO2:  [87 %-95 %] 95 %  Blood Pressure : (!) 158/78   Temperature: 36.2 °C (97.1 °F)   Pulse: (!) 55   Respiration: 18   Pulse Oximetry: 95 %       Physical Exam  Vitals and nursing note reviewed.   Constitutional:       Appearance: She is obese.   HENT:      Head: Normocephalic and atraumatic.      Right Ear: Tympanic membrane normal.      Left Ear: Tympanic membrane normal.      Nose: Nose normal.      Mouth/Throat:      Mouth: Mucous membranes are moist.      Pharynx: Oropharynx is clear.   Eyes:      Extraocular Movements: Extraocular movements intact.      Pupils: Pupils are equal, round, and reactive to light.      Comments: Left eye blind  Rt eye able to count my fingers.    Cardiovascular:      Rate and Rhythm: Normal rate and regular rhythm.      Pulses: Normal pulses.      Heart sounds: Normal heart sounds.   Pulmonary:      Effort: Pulmonary effort is normal.      Breath sounds: Normal breath sounds.   Abdominal:      General: Bowel sounds are normal. There is no distension.      Palpations: Abdomen is soft. There is no mass.   Musculoskeletal:         General: Normal range of motion.      Cervical back: Neck supple.   Skin:     General: Skin is warm.      Capillary Refill: Capillary refill takes less than 2 seconds.   Neurological:       General: No focal deficit present.      Mental Status: She is alert and oriented to person, place, and time. Mental status is at baseline.   Psychiatric:         Mood and Affect: Mood normal.         Behavior: Behavior normal.       Laboratory:          No results for input(s): ALTSGPT, ASTSGOT, ALKPHOSPHAT, TBILIRUBIN, DBILIRUBIN, GAMMAGT, AMYLASE, LIPASE, ALB, PREALBUMIN, GLUCOSE in the last 72 hours.      No results for input(s): NTPROBNP in the last 72 hours.      No results for input(s): TROPONINT in the last 72 hours.    Imaging:  No orders to display       no X-Ray or EKG requiring interpretation    Assessment/Plan:  Justification for Admission Status  I anticipate this patient is appropriate for observation status at this time because right vision loss requiring opthalmology consultation in am    Patient will need a Med/Surg bed on MEDICAL service .  The need is secondary to see above.    * Vision loss of right eye- (present on admission)  Assessment & Plan  Ultrasound performed at outside facility concerning for retinal detachment versus vitreal hemorrhage.  Ophthalmology consulted will formally evaluate patient in a.m.  Recommended dilating drops at 7 AM.  We will hold patient's warfarin given possible need for surgery.    Class 3 severe obesity in adult (HCC)  Assessment & Plan  BMI 41.54    Left retinal detachment- (present on admission)  Assessment & Plan  Known left retinal detachment.    Hypothyroidism, acquired, autoimmune- (present on admission)  Assessment & Plan  Continue with levothyroxine  Recheck TSH with T4    History of DVT (deep vein thrombosis)- (present on admission)  Assessment & Plan  History of DVT on warfarin.  Hold warfarin as patient may require surgery as differential includes vitreous hemorrhage    Chronic respiratory failure (HCC)  Assessment & Plan  Reports using 2 L at home 24 hours 7 days a week.  Continue oxygen supplementation    COPD (chronic obstructive pulmonary  disease) (HCC)- (present on admission)  Assessment & Plan  Not in exacerbation  Continue with oxygen supplementation and home inhalers    Primary hypertension- (present on admission)  Assessment & Plan  Continue with home lisinopril        VTE prophylaxis: SCDs/TEDs

## 2023-03-15 NOTE — DISCHARGE PLANNING
Transport not here, message sent to [place] with ride line. Per reply talking with Aron with [facility]. Awaiting update.

## 2023-03-15 NOTE — ASSESSMENT & PLAN NOTE
-supportive measures with synthroid supplementation at 200 mcg per day, no change  -latest TSH is 29.40 which is from 1 year ago we will reestablish TSH level with repeat labs

## 2023-03-15 NOTE — DISCHARGE PLANNING
DC Transport Scheduled    Received request at: [phone]    Transport Company Scheduled:  [employer] with [employer]    Scheduled Date: 3/15/2023  Scheduled Time: 1400    Destination: Eye  [address]    Notified care team of scheduled transport via [employer].     If there are any changes needed to the DC transportation scheduled, please contact [facility] [employer] [facility] at ext. [phone] between the hours of 6450-3440 Mon-Fri. If outside those hours, contact the ED Case Manager at ext. [phone].

## 2023-03-15 NOTE — PROGRESS NOTES
[facility] Medicine Daily Progress Note    Date of Service  3/15/2023    Chief Complaint  Atiya Henley is a 72 y.o. female admitted 3/14/2023 with visual disturbance in the right eye    Hospital Course  Patient initially admitted because of visual disturbance in the right eye.  Patient is found to have retinal detachment.  Ophthalmology was consulted.  Ophthalmology at this point making arrangements for surgical intervention this afternoon.    Interval Problem Update  N.p.o. for surgery  Pain management  Blood pressure management  Synthroid supplementation  Oxygen support  RT protocol  Surgery this afternoon and depending on surgical recommendations further decision will be made according to the.    I have discussed this patient's plan of care and discharge plan at IDT rounds today with Case Management, Nursing, Nursing leadership, and other members of the IDT team.    Consultants/Specialty  ophthalmology    Code Status  Full Code    Disposition  Patient is not medically cleared for discharge.   Anticipate discharge to to home with close outpatient follow-up.  I have placed the appropriate orders for post-discharge needs.    Review of Systems  Review of Systems   Constitutional:  Positive for malaise/fatigue. Negative for chills, diaphoresis and fever.   HENT: Negative.     Eyes:  Positive for blurred vision (Right eye with visual changes as well as rainbow colors). Negative for double vision.   Respiratory: Negative.  Negative for cough, hemoptysis and wheezing.    Cardiovascular: Negative.  Negative for chest pain, palpitations and leg swelling.   Gastrointestinal: Negative.  Negative for abdominal pain, blood in stool, constipation, diarrhea, heartburn, nausea and vomiting.   Genitourinary: Negative.  Negative for frequency, hematuria and urgency.   Musculoskeletal: Negative.  Negative for joint pain.   Skin: Negative.  Negative for itching and rash.   Neurological: Negative.  Negative for dizziness, focal  weakness, seizures, loss of consciousness and headaches.   Endo/Heme/Allergies: Negative.  Does not bruise/bleed easily.   Psychiatric/Behavioral:  Negative for suicidal ideas. The patient is nervous/anxious.    All other systems reviewed and are negative.     Physical Exam  Temp:  [36.2 °C (97.1 °F)-36.3 °C (97.4 °F)] 36.2 °C (97.2 °F)  Pulse:  [54-65] 54  Resp:  [18] 18  BP: (125-183)/(58-81) 125/58  SpO2:  [87 %-98 %] 92 %    Physical Exam  Vitals and nursing note reviewed. Exam conducted with a chaperone present.   Constitutional:       General: She is awake.      Appearance: Normal appearance. She is well-developed and well-groomed. She is obese. She is ill-appearing.   HENT:      Head: Normocephalic and atraumatic.      Jaw: There is normal jaw occlusion. No trismus.      Salivary Glands: Right salivary gland is not tender. Left salivary gland is not tender.      Right Ear: External ear normal.      Left Ear: External ear normal.      Mouth/Throat:      Mouth: Mucous membranes are moist.      Pharynx: Oropharynx is clear.   Eyes:      General: Lids are normal. Vision grossly intact.      Extraocular Movements: Extraocular movements intact.      Conjunctiva/sclera: Conjunctivae normal.      Right eye: Right conjunctiva is not injected. No exudate.     Left eye: Left conjunctiva is not injected. No exudate.     Pupils: Pupils are equal, round, and reactive to light.      Visual Fields:      Right eye: DM in the upper temporal quadrant. DM in the upper nasal quadrant. DM in the lower temporal quadrant. DM in the lower nasal quadrant.      Left eye: DL in the upper nasal quadrant. DL in the upper temporal quadrant. DL in the lower nasal quadrant. DL in the lower temporal quadrant.   Neck:      Thyroid: No thyroid mass.      Vascular: No hepatojugular reflux or JVD.      Trachea: No abnormal tracheal secretions or tracheal deviation.   Cardiovascular:      Rate and Rhythm: Normal rate and regular rhythm. Occasional  Extrasystoles are present.     Pulses: Normal pulses.      Heart sounds: Murmur heard.     No friction rub.   Pulmonary:      Effort: Pulmonary effort is normal.      Breath sounds: Normal breath sounds. No wheezing or rhonchi.   Abdominal:      General: Abdomen is flat. Bowel sounds are normal.      Palpations: Abdomen is soft.      Tenderness: There is no abdominal tenderness. There is no right CVA tenderness or left CVA tenderness.      Hernia: No hernia is present.      Comments: Colostomy in place   Musculoskeletal:      Cervical back: Full passive range of motion without pain, normal range of motion and neck supple. No rigidity. No muscular tenderness.      Right lower leg: Edema present.      Left lower leg: Edema present.   Lymphadenopathy:      Head:      Right side of head: No submental adenopathy.      Left side of head: No submental adenopathy.      Cervical:      Right cervical: No superficial cervical adenopathy.     Left cervical: No superficial cervical adenopathy.      Upper Body:      Right upper body: No supraclavicular adenopathy.      Left upper body: No supraclavicular adenopathy.   Skin:     General: Skin is warm and dry.      Capillary Refill: Capillary refill takes less than 2 seconds.      Coloration: Skin is not cyanotic or pale.      Findings: No abrasion or bruising.   Neurological:      General: No focal deficit present.      Mental Status: She is alert and oriented to person, place, and time. Mental status is at baseline.      GCS: GCS eye subscore is 4. GCS verbal subscore is 5. GCS motor subscore is 6.      Cranial Nerves: No cranial nerve deficit.      Sensory: No sensory deficit.      Motor: Motor function is intact.      Deep Tendon Reflexes:      Reflex Scores:       Tricep reflexes are 2+ on the right side and 2+ on the left side.       Bicep reflexes are 2+ on the right side and 2+ on the left side.       Brachioradialis reflexes are 2+ on the right side and 2+ on the left  side.       Patellar reflexes are 2+ on the right side and 2+ on the left side.       Achilles reflexes are 2+ on the right side and 2+ on the left side.  Psychiatric:         Attention and Perception: Attention and perception normal.         Mood and Affect: Mood normal.         Speech: Speech normal.         Behavior: Behavior is cooperative.         Thought Content: Thought content normal.         Cognition and Memory: Cognition and memory normal.         Judgment: Judgment normal.       Fluids    Intake/Output Summary (Last 24 hours) at 3/15/2023 1245  Last data filed at 3/15/2023 0913  Gross per 24 hour   Intake 60 ml   Output 465 ml   Net -405 ml       Laboratory  Recent Labs     03/15/23  0123   WBC 5.7   RBC 3.57*   HEMOGLOBIN 12.5   HEMATOCRIT 37.3   .5*   MCH 35.0*   MCHC 33.5*   RDW 55.5*   PLATELETCT 228   MPV 10.5     Recent Labs     03/15/23  0123   SODIUM 140   POTASSIUM 3.3*   CHLORIDE 103   CO2 24   GLUCOSE 99   BUN 6*   CREATININE 0.56   CALCIUM 9.0     Recent Labs     03/15/23  0123   INR 1.02               Imaging  No orders to display        Assessment/Plan  * Vision loss of right eye- (present on admission)  Assessment & Plan  Secondary to retinal detachment.  Surgery planned for later today.    Left retinal detachment- (present on admission)  Assessment & Plan  Discussed with ophthalmology  Patient was eval by ophthalmology this morning.  Recommendation is surgery this afternoon.  Surgical time to be determined.  Post surgery patient will be monitored overnight and should be able to discharge home tomorrow.    Chronic respiratory failure (HCC)  Assessment & Plan  Patient uses 2 L of oxygen 24/7  Continue with oxygen support to keep oxygen saturations above 90%    COPD (chronic obstructive pulmonary disease) (HCC)- (present on admission)  Assessment & Plan  Chronic COPD without acute exacerbation  Continue oxygen support and RT protocol    Primary hypertension- (present on  admission)  Assessment & Plan  Optimize blood pressure management keep systolic blood pressure less than 140 diastolic under 90.    Class 3 severe obesity in adult (HCC)  Assessment & Plan  Body mass index is 41.54 kg/m².  Outpatient weight loss management program highly recommended with lifestyle modification    Hypothyroidism, acquired, autoimmune- (present on admission)  Assessment & Plan  -supportive measures with synthroid supplementation at 200 mcg per day, no change  -latest TSH is 29.40 which is from 1 year ago we will reestablish TSH level with repeat labs    History of DVT (deep vein thrombosis)- (present on admission)  Assessment & Plan  Coumadin being held for surgery         VTE prophylaxis: SCDs/TEDs    I have performed a physical exam and reviewed and updated ROS and Plan today (3/15/2023). In review of yesterday's note (3/14/2023), there are no changes except as documented above.

## 2023-03-15 NOTE — CARE PLAN
The patient is Stable - Low risk of patient condition declining or worsening    Shift Goals  Clinical Goals: Opthamology consult, ongoing care for right eye  Patient Goals: Rest, see MD  Family Goals: No family present    Progress made toward(s) clinical / shift goals:    Problem: Knowledge Deficit - Standard  Goal: Patient and family/care givers will demonstrate understanding of plan of care, disease process/condition, diagnostic tests and medications  Outcome: Progressing     Problem: Fall Risk  Goal: Patient will remain free from falls  Outcome: Progressing       Patient seen by opthamologist and remained free from falls.

## 2023-03-15 NOTE — DISCHARGE INSTRUCTIONS
Retinal Detachment  The retina is the thin membrane at the back of the eye. It contains the light and color sensitive cells. These cells allow us to see by sending signals to the optic nerve, and then to the brain. It is like the film in a camera that processes images and allows us to see. Behind the retina is a layer of small blood vessels (choroids). These blood vessels give oxygen and food to the retina. Detachment of the retina means the retina splits away from this layer of vessels. If the retina detaches, it is a medical emergency. It needs to be fixed immediately before permanent damage occurs. Common causes of retinal detachment are injury,inflammation, and tumors. It is most common among  people, men, and nearsighted people. Retinal detachment is painless and usually symptoms appear before detachment occurs. You may see flashes of light and there may be the appearance of something floating within the eye. Once the retina detaches, it looks like a shadow or curtain is pulled over the eye.  TREATMENT   Once a retina detaches or begins to detach, the treatment is surgical. It is necessary to see an ophthalmologist or go to the emergency room immediately. If you have a retinal detachment, and you have not been examined by an eye specialist (either an Ophthalmologist or a Retinal Specialist), you must be examined by one of these specialists as soon as possible. If the detachment can be treated before the macula (the part of the retina responsible for the sharp central vision) has detached, often the vision can be saved. The treatment may be laser therapy. Laser therapy involves a laser beam directed to the damaged area of the retina. This is called photocoagulation. It causes scarring. This scarring holds the retina down. Sometimes this is also done with a cold probe (cryopexy). This also causes scarring to hold the retina down.  POSTOPERATIVE TREATMENT  Avoid strenuous activities for several weeks or  as directed.  Avoid straining. Straining can happen with coughing, urinating, or having a bowel movement. Use a stool softener if necessary.  Document Released: 02/26/2003 Document Revised: 03/11/2013 Document Reviewed: 12/18/2006  ExitCare® Patient Information ©2014 [employer].  Blurred Vision, Adult              Having blurred vision means that you cannot see things clearly. Your vision may seem fuzzy or out of focus. It can involve your vision for objects that are close or far away. It may affect one or both eyes. There are many causes of blurred vision, including cataracts, macular degeneration, eye inflammation (uveitis), and diabetic retinopathy.  In many cases, blurred vision has to do with the shape of your eye. An abnormal eye shape means you cannot focus well (refractive error). When this happens, it can cause:  Faraway objects to look blurry (nearsightedness).  Close objects to look blurry (farsightedness).  Blurry vision at any distance (astigmatism).  Refractive errors are often corrected with glasses or contacts.  Blurred vision can be diagnosed based on your symptoms and a physical exam. Tell your health care provider about any other health problems you have, any recent eye injury, and any prior surgeries. You may need to see a health care provider who specializes in eye problems (ophthalmologist). Your treatment will depend on what is causing your blurred vision.  Follow these instructions at home:  Keep all follow-up visits as told by your health care provider. This is important. These include any visits to your eye specialists.  Do not drive or use heavy machinery if your vision is blurry.  Use eye drops only as told by your health care provider.  If you were prescribed glasses or contact lenses, wear the glasses or contacts as told by your health care provider.  Schedule eye exams regularly.  Pay attention to any changes in your symptoms.  Contact a health care provider if:  Your symptoms do  not improve or they get worse.  You have:  New symptoms.  A headache.  Trouble seeing at night.  Trouble noticing the difference between colors.  You notice:  Drooping of your eyelids.  Drainage coming from your eyes.  A rash around your eyes.  Get help right away if:  You have:  Severe eye pain.  A severe headache.  A sudden change in vision.  A sudden loss of vision.  A vision change after an injury.  You notice flashing lights in your field of vision. Your field of vision is the area that you can see without moving your eyes.  Summary  Having blurred vision means that you cannot see things clearly. Your vision may seem fuzzy or out of focus.  There are many causes of blurred vision. In many cases, blurred vision has to do with an abnormal eye shape (refractive error), and it can be corrected with glasses or contact lenses.  Pay attention to any changes in your symptoms. Contact a health care provider if your symptoms do not improve or if you have any new symptoms.  This information is not intended to replace advice given to you by your health care provider. Make sure you discuss any questions you have with your health care provider.  Document Released: 12/20/2004 Document Revised: 03/14/2019 Document Reviewed: 04/06/2018  Elsevier Patient Education © 2020 [employer].

## 2023-03-15 NOTE — PROGRESS NOTES
Report received from night shift RN. Patient A&Ox4, in bed resting comfortably. VSS, patient on 2L nasal canula at baseline, denies pain, bed in lowest position and locked, call light within reach. Bed alarm is active. Patient stated vision in right eye is starting to return but is very blurry.

## 2023-03-15 NOTE — DISCHARGE PLANNING
Updated by Codey SOMMER that patient needs to go to ophthalmology office @ [address] [place] [address] for treatment. Dr. Morris ok with D/C to Ophthalmology and then home. Called and spoke with Fany at [facility] and explained need. Fany will call  back. Rid line paperwork filled out and faxed to Fany along with face sheet. Codey SOMMER updated.

## 2023-03-15 NOTE — PROGRESS NOTES
Contacted by Nikole at [facility] regarding surgery today. MD from opthamology office requesting patient to be transported to their office for surgery as soon as possible. Transport scheduled for 1400. Eduardo, the patient's caretaker, has been contacted and verbally stated he would be at [facility] within 1 hour with the patient's wheelchair and home oxygen tank. Caretaker will be able to bring the patient home after the procedure. , patient, MD, and RN aware of all these items.

## 2023-03-15 NOTE — ED PROVIDER NOTES
"ER Provider Note    Scribed for Michael Waller Ii, M.d. by Wesley Steen. 3/14/2023  5:17 PM    Primary Care Provider: Lio Deluca M.D.    CHIEF COMPLAINT  Chief Complaint   Patient presents with    Loss of Vision     Pt transferred from [facility] for R eye retinal detachment. Hx of L eye retinal detachment. Loss of vision on R eye started 2 days ago.     LIMITATION TO HISTORY   None    HPI/ROS  OUTSIDE HISTORIAN(S):  None    EXTERNAL RECORDS REVIEWED  External ED Note Labs performed at [facility] earlier today were significant for an INR of 1 and PTT of 10    Atiya Henley is a 72 y.o. female with a history of left eye retinal detachment, who presents to the ED via EMS for evaluation of right eye loss of vision onset 2 days ago. Atiya states that she had to put her cat down 5 days ago and has been frequently crying as well as rubbing her eyes. Beginning 2 days ago she reports waking up and began seeing a yellowish-red hue in her right eye which she describes as the color of iodine. She notes that the vision in her left eye has been progressively worsening with floaters which she describes as \"sparkles.\" After being seen by her Home Health nurse earlier today she was taken to [facility]. Imaging was concerning for right retinal detachment and she was ultimately transferred to [facility] for ophthalmological consultation. Associated symptoms include right eye floaters, right eye blurry vision, and photophobia. She denies any right eye pain or right eye discharge.  Her last INR was 1.2 and was measured yesterday. She was formerly followed by Dr. Morris and Dr. Oreilly, but has not seen them in a while secondary to insurance coverage.     PAST MEDICAL HISTORY  Past Medical History:   Diagnosis Date    Anxiety disorder     Arthritis     Bipolar affective (HCC)     Bladder incontinence     Bowel obstruction (HCC)     Breath shortness     with activity    Cancer (HCC)  "    cervical    Chronic autoimmune thyroiditis     + TPO ab = 563 / + ultrasound    Colostomy in place (HCC) 2009    Dental disorder     dentures upper    Diabetes (HCC)     Pt denies DM 7/23/19    EMPHYSEMA     Hashimoto's disease     High cholesterol     History of total knee arthroplasty     infection    Hypertension     stated no longer on meds due to lost 100 lbs    Hypothyroid 7/7/2009    Indigestion     Infectious disease     MRSA,    Infectious disease     VRE    Obesity 7/7/2009    Obstruction     colostomy    Oxygen dependent     4.5 L NC    Pain     b/l legs    Personal history of venous thrombosis and embolism 2009, 2012    arm and leg left side    Presence of IVC filter     Psychiatric problem     depression, denies currently 7/23/19    Sleep apnea     USES CPAP, 3-4L oxygen    Snoring     Status post cholecystectomy 1992    Weight gain        SURGICAL HISTORY  Past Surgical History:   Procedure Laterality Date    ORIF, ANKLE Left 2/7/2022    Procedure: ORIF, ANKLE;  Surgeon: Hussein Khoury M.D.;  Location: SURGERY [facility];  Service: Orthopedics    VITRECTOMY POSTERIOR Left 7/23/2019    Procedure: REMOVAL, VITREOUS BODY, POSTERIOR PORTION- MEMBRANE PEEL LASER, GAS;  Surgeon: Ernst Morris M.D.;  Location: SURGERY SAME DAY [facility];  Service: Ophthalmology    PB INJECT RX OTHER PERIPH NERVE Left 8/21/2015    Procedure: NEUROLYTIC DEST-OTHER NERVE;  Surgeon: Kd Galaviz D.O.;  Location: [facility];  Service: Pain Management    AL FLUOROSCOPIC GUIDANCE NEEDLE PLACEMENT Left 8/21/2015    Procedure: FLOURO GUIDE NEEDLE PLACEMENT;  Surgeon: Kd Galaviz D.O.;  Location: [facility];  Service: Pain Management    PB INJECT RX OTHER PERIPH NERVE  8/21/2015    Procedure: NEUROLYTIC DEST-OTHER NERVE;  Surgeon: Kd Galaviz D.O.;  Location: [facility];  Service: Pain Management    PB INJECT RX OTHER PERIPH NERVE  8/21/2015    Procedure:  NEUROLYTIC DEST-OTHER NERVE;  Surgeon: Kd Galaviz D.O.;  Location: [facility];  Service: Pain Management    EXPLORATORY LAPAROTOMY  12/10/2013    Performed by Sang Castañeda M.D. at [facility]    WOUND CLOSURE GENERAL  12/10/2013    Performed by Sang Castañeda M.D. at [facility]    COLOSTOMY  11/26/2013    Performed by Maninder Carter M.D. at [facility]    EXPLORATORY LAPAROTOMY  11/26/2013    Performed by Maninder Carter M.D. at [facility]    LYSIS ADHESIONS GENERAL  11/26/2013    Performed by Maninder Carter M.D. at [facility]    COLON RESECTION  11/26/2013    Performed by Maninder Carter M.D. at [facility]    BLADDER BIOPSY WITH CYSTOSCOPY  10/10/2013    Performed by Sang Castañeda M.D. at [facility]    EXPLORATORY LAPAROTOMY  9/2/2013    Performed by Maninder Carter M.D. at [facility]    PARASTOMAL HERNIA REPAIR   9/2/2013    Performed by Maninder Carter M.D. at [facility]    IRRIGATION & DEBRIDEMENT ORTHO  2/8/2013    Performed by David Fowler M.D. at [facility]    IRRIGATION & DEBRIDEMENT GENERAL  12/17/2012    Performed by David Fowler M.D. at [facility]    IRRIGATION & DEBRIDEMENT ORTHO  7/19/2012    Performed by BERYL LOYD at [facility]    IRRIGATION & DEBRIDEMENT ORTHO  7/17/2012    Performed by BERYL LOYD at [facility]    KNEE ARTHROPLASTY TOTAL  6/10/2012    left    LATERAL RELEASE  7/20/2011    Performed by BERYL LOYD at [facility]    KNEE ARTHROSCOPY  7/20/2011    Performed by BERYL LOYD at [facility]    MENISCECTOMY, KNEE, MEDIAL  7/20/2011    Performed by BERYL LOYD at [facility]    MENISCECTOMY  7/20/2011    Performed by BERYL LOYD at [facility]    COLONOSCOPY - ENDO  11/17/2010     Performed by JESUSITA SUMNER at ENDOSCOPY [facility]    TRACHEOSTOMY  3/31/2010    Performed by JESUSITA SUMNER at SURGERY [facility]    COLOSTOMY  3/16/2010    Performed by CHRISTA BENNETT at SURGERY [facility]    EXPLORATORY LAPAROTOMY  3/16/2010    Performed by CHRISTA BENNETT at SURGERY [facility]    COLECTOMY  3/16/2010    Performed by CHRISTA BENNETT at SURGERY [facility]    LOW ANTERIOR RESECTION  3/10/2010    Performed by JESUSITA SUMNER at SURGERY [facility]    LOW ANTERIOR RESECTION LAPAROSCOPIC  3/10/2010    Performed by JESUSITA SUMNER at SURGERY [facility]    MYRINGOTOMY  9/9/2009    Performed by RENETTA GOODSON at SURGERY SAME DAY [facility]    CHOLECYSTECTOMY  1992    laparoscopic    HYSTERECTOMY LAPAROSCOPY  1983    OTHER ORTHOPEDIC SURGERY      LT KNEE X2       FAMILY HISTORY  Family History   Problem Relation Age of Onset    Heart Disease Mother     Seizures Mother     Alcohol/Drug Father     Lung Disease Father     Hypertension Other        SOCIAL HISTORY   reports that she has been smoking cigarettes. She has a 25.00 pack-year smoking history. She has never used smokeless tobacco. She reports current drug use. Drugs: Marijuana and Inhaled. She reports that she does not drink alcohol.    CURRENT MEDICATIONS  Previous Medications    ALBUTEROL (VENTOLIN HFA) 108 (90 BASE) MCG/ACT AERO SOLN INHALATION AEROSOL    Inhale 2 Puffs every four hours as needed for Shortness of Breath.    AMITRIPTYLINE (ELAVIL) 25 MG TAB    Take 1 Tablet by mouth every evening.    BUDESONIDE-FORMOTEROL (SYMBICORT) 160-4.5 MCG/ACT AEROSOL    Inhale 2 Puffs 2 times a day. With spacer, rinse mouth after use    CARBAMAZEPINE (TEGRETOL) 200 MG TAB    Take 600 mg by mouth every bedtime.    GABAPENTIN (NEURONTIN) 300 MG CAP    Take 900 mg by mouth 3 times a day.    LEVOTHYROXINE (SYNTHROID) 200 MCG TAB    Take 200 mcg by mouth every morning on an empty stomach.    LISINOPRIL (PRINIVIL) 2.5 MG TAB     "Take 2.5 mg by mouth every day.    MIRTAZAPINE (REMERON) 15 MG TAB    Take 15 mg by mouth at bedtime.    TIOTROPIUM (SPIRIVA RESPIMAT) 2.5 MCG/ACT AERO SOLN    Inhale 5 mcg every day.    WARFARIN (COUMADIN) 10 MG TAB    Take 10 mg by mouth every evening.       ALLERGIES  Penicillins    PHYSICAL EXAM  BP (!) 172/78   Pulse 65   Temp 36.2 °C (97.2 °F) (Temporal)   Resp 18   Ht 1.626 m (5' 4\")   Wt 110 kg (242 lb)   LMP 08/02/1975   SpO2 91%   BMI 41.54 kg/m²     Physical Exam  Vitals and nursing note reviewed.   HENT:      Head: Normocephalic and atraumatic.      Nose: No congestion.      Mouth/Throat:      Mouth: Mucous membranes are moist.   Eyes:      Comments: Pupils equal in size and reactive.  Blind in left eye.  Able to see shapes in his fingers light from dark with the right eye.  Bedside ultrasound performed and at the posterior part of the globe there is raised irregular thickened shape  Concerning for detached retina or vitreal detachment/hemorrhage.   Pulmonary:      Effort: Pulmonary effort is normal.   Neurological:      General: No focal deficit present.      Mental Status: She is alert.      Comments: Clear speech, no aphasia, no facial droop, moving all extremities with normal strength.   Psychiatric:         Mood and Affect: Mood normal.     COURSE & MEDICAL DECISION MAKING    ED Observation Status? No; Patient does not meet criteria for ED Observation.     INITIAL ASSESSMENT AND PLAN  Care Narrative:       5:17 PM - Patient seen and evaluated at bedside. Atiya Henley is a 72 y.o. female with a history of left eye retinal detachment, cataract disease, thromboembolic disease taking Coumadin who presents with progressive right eye vision loss onset 2 days ago.  Bedside ultrasound concerning for retinal detachment versus vitreal detachment/hemorrhage.  Discussed consulting with Ophthalmology, he agrees to the plan of care.     5:38 PM - Paged Ophthalmology    5:40 PM - I discussed the " patient's case and the above findings with Dr. Gibbs (Ophthalmology) who relayed to have the patient follow up in clinic tomorrow morning at 7:30 AM    5:43 PM - Explained my consult with Dr. Gibbs to the patient as noted above. 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 Will call Ophthalmology again.    6:10 PM - I spoke with Dr. Gibbs again who considered her transportation and visual acuity limitations. They agreed to proceed with a plan for admission and to have dilating drops given at 7:00 AM prior to their evaluation tomorrow morning.    6:28 PM - Paged Hospitalist    6:40 PM - I discussed the patient's case and the above findings with Dr. Wade (Hospitalist) who will assess the patient for hospitalization.     PROBLEM LIST AND DISPOSITION  #Right eye vision loss   -Retinal detachment versus vitreal hemorrhage   -Admit for ophthalmology evaluation early tomorrow morning    #Thromboembolic disease, anticoagulation with Coumadin   -Not therapeutic, INR is 1               DISPOSITION AND DISCUSSIONS  I have discussed management of the patient with the following physicians and KATI's: Dr. Gibbs (Ophthalmology) and Dr. Wade (Hospitalist)    Discussion of management with other [facility] or appropriate source(s): None     Barriers to care at this time, including but not limited to: Patient lacks transportation .       DISPOSITION:  Patient will be hospitalized by Dr. Wade in stable condition.    FINAL IMPRESSION   1. Vision loss of right eye Active        Wesley KYLE (Evangelist), am scribing for, and in the presence of, ISAC Pettit II.    Electronically signed by: Wesley Steen (Scribe), 3/14/2023    Michael KYLE II, M* personally performed the services described in this documentation, as scribed by Wesley Steen in my presence, and  it is both accurate and complete.    The note accurately reflects work and decisions made by me.  Michael Waller II, M.D.  3/14/2023  7:03 PM

## 2023-03-15 NOTE — ASSESSMENT & PLAN NOTE
Discussed with ophthalmology  Patient was eval by ophthalmology this morning.  Recommendation is surgery this afternoon.  Surgical time to be determined.  Post surgery patient will be monitored overnight and should be able to discharge home tomorrow.

## 2023-03-15 NOTE — ASSESSMENT & PLAN NOTE
Patient uses 2 L of oxygen 24/7  Continue with oxygen support to keep oxygen saturations above 90%

## 2023-03-16 ENCOUNTER — ANTICOAGULATION MONITORING (OUTPATIENT)
Dept: VASCULAR LAB | Facility: MEDICAL CENTER | Age: 73
End: 2023-03-16
Payer: COMMERCIAL

## 2023-03-16 DIAGNOSIS — Z95.828 S/P IVC FILTER: ICD-10-CM

## 2023-03-16 DIAGNOSIS — Z86.718 HISTORY OF DVT (DEEP VEIN THROMBOSIS): Chronic | ICD-10-CM

## 2023-03-16 DIAGNOSIS — Z79.01 CHRONIC ANTICOAGULATION: ICD-10-CM

## 2023-03-20 NOTE — PROGRESS NOTES
Anticoagulation Summary  As of 3/16/2023      INR goal:  2.0-3.0   TTR:  37.3 % (7.8 y)   INR used for dosin.20 (3/14/2023)   Warfarin maintenance plan:  10 mg (10 mg x 1) every Mon, Wed, Fri; 5 mg (10 mg x 0.5) all other days   Weekly warfarin total:  50 mg   Plan last modified:  Bob Dupree, PharmD (1/10/2023)   Next INR check:  3/23/2023   Target end date:  Indefinite    Indications    DVT (deep venous thrombosis) (HCC) (Resolved) [I82.409]  Chronic anticoagulation [Z79.01]  S/P IVC filter [Z95.828]  History of DVT (deep vein thrombosis) [Z86.718]                 Anticoagulation Episode Summary       INR check location:  Anticoagulation Clinic    Preferred lab:  [facility]    Send INR reminders to:  AMB ANTICOAG POOL    Comments:  MD INR          Anticoagulation Care Providers       Provider Role Specialty Phone number    Carmelo Pat M.D. Referring [facility] Medicine [phone]    [facility] Anticoagulation Services Responsible  [phone]          Anticoagulation Patient Findings    Left voicemail message to report a sub-therapeutic INR.    Will have pt take bolus dose of warfarin today of 20 mg and then continue with current regimen. Requested pt call clinic to discuss warfarin regimen and consistently sub therapeutic INR.   *Discussed dose changes with Alicia Parada, Pharmacist.     FU INR in 3 days.     Blanca Painting, Pharmacy Intern

## 2023-03-21 ENCOUNTER — ANTICOAGULATION MONITORING (OUTPATIENT)
Dept: CARDIOLOGY | Facility: MEDICAL CENTER | Age: 73
End: 2023-03-21
Payer: COMMERCIAL

## 2023-03-21 DIAGNOSIS — Z79.01 CHRONIC ANTICOAGULATION: ICD-10-CM

## 2023-03-21 DIAGNOSIS — Z86.718 HISTORY OF DVT (DEEP VEIN THROMBOSIS): Chronic | ICD-10-CM

## 2023-03-21 DIAGNOSIS — Z95.828 S/P IVC FILTER: ICD-10-CM

## 2023-03-21 LAB — INR PPP: 1.2 (ref 2–3.5)

## 2023-03-21 NOTE — PROGRESS NOTES
Asked patient to please call us as her INR has been very low and we have not been able to actually talk with her, only leave a voice message.  We need to figure out what is going on.  Advised her to take at least 10 mg today and try to call us today or tomorrow

## 2023-03-22 NOTE — PROGRESS NOTES
Anticoagulation Summary  As of 3/21/2023      INR goal:  2.0-3.0   TTR:  37.3 % (7.8 y)   INR used for dosin.20 (3/21/2023)   Warfarin maintenance plan:  10 mg (10 mg x 1) every Mon, Wed, Fri; 5 mg (10 mg x 0.5) all other days   Weekly warfarin total:  50 mg   Plan last modified:  Rodriguez MeadowsD (1/10/2023)   Next INR check:  3/24/2023   Target end date:  Indefinite    Indications    DVT (deep venous thrombosis) (HCC) (Resolved) [I82.409]  Chronic anticoagulation [Z79.01]  S/P IVC filter [Z95.828]  History of DVT (deep vein thrombosis) [Z86.718]                 Anticoagulation Episode Summary       INR check location:  Anticoagulation Clinic    Preferred lab:  [facility]    Send INR reminders to:  AMB ANTICOAG POOL    Comments:  MD INR          Anticoagulation Care Providers       Provider Role Specialty Phone number    Carmelo Pat M.D. Referring Family Medicine [phone]    [facility] Anticoagulation Services Responsible  [phone]            Refer to Anticoagulation Patient Findings for HPI  Patient Findings       Positives:  Missed doses (has been missing doses d/t vision problems)    Negatives:  Signs/symptoms of thrombosis, Signs/symptoms of bleeding, Laboratory test error suspected, Change in health, Change in alcohol use, Change in activity, Upcoming invasive procedure, Emergency department visit, Upcoming dental procedure, Extra doses, Change in medications, Change in diet/appetite, Hospital admission, Bruising, Other complaints            Spoke with pt.  INR is sub therapeutic.     Pt verifies warfarin weekly dosing.     Will have pt bolus with 20mg x2 days    Repeat INR in 2 days.     Alicia Parada, PharmD

## 2023-03-24 ENCOUNTER — ANTICOAGULATION MONITORING (OUTPATIENT)
Dept: VASCULAR LAB | Facility: MEDICAL CENTER | Age: 73
End: 2023-03-24
Payer: COMMERCIAL

## 2023-03-24 DIAGNOSIS — Z79.01 CHRONIC ANTICOAGULATION: ICD-10-CM

## 2023-03-24 DIAGNOSIS — Z86.718 HISTORY OF DVT (DEEP VEIN THROMBOSIS): Chronic | ICD-10-CM

## 2023-03-24 DIAGNOSIS — Z95.828 S/P IVC FILTER: ICD-10-CM

## 2023-03-24 LAB — INR PPP: 1.4 (ref 2–3.5)

## 2023-03-25 NOTE — PROGRESS NOTES
Anticoagulation Summary  As of 3/24/2023      INR goal:  2.0-3.0   TTR:  37.2 % (7.8 y)   INR used for dosin.40 (3/24/2023)   Warfarin maintenance plan:  10 mg (10 mg x 1) every Mon, Wed, Fri; 5 mg (10 mg x 0.5) all other days   Weekly warfarin total:  50 mg   Plan last modified:  Rodriguez MeadowsD (1/10/2023)   Next INR check:  3/28/2023   Target end date:  Indefinite    Indications    DVT (deep venous thrombosis) (HCC) (Resolved) [I82.409]  Chronic anticoagulation [Z79.01]  S/P IVC filter [Z95.828]  History of DVT (deep vein thrombosis) [Z86.718]                 Anticoagulation Episode Summary       INR check location:  Anticoagulation Clinic    Preferred lab:  [facility]    Send INR reminders to:  AMB ANTICOAG POOL    Comments:  MD INR          Anticoagulation Care Providers       Provider Role Specialty Phone number    Carmelo Pat M.D. Referring Family Medicine [phone]    [facility] Anticoagulation Services Responsible  [phone]            Refer to Anticoagulation Patient Findings for HPI  Patient Findings       Positives:  Upcoming invasive procedure (3/29 eye surgery)    Negatives:  Signs/symptoms of thrombosis, Signs/symptoms of bleeding, Laboratory test error suspected, Change in health, Change in alcohol use, Change in activity, Emergency department visit, Upcoming dental procedure, Missed doses, Extra doses, Change in medications, Change in diet/appetite, Hospital admission, Bruising, Other complaints            Spoke with patient.  INR is SUB-therapeutic. Increased from previous. Patient to have potential eye surgery on 3/29/23.     Pt verifies warfarin weekly dosing.     Pt is NOT on antiplatelet therapy     Will have pt continue regimen of MWF 10 mg and 5 mg AOD.     Repeat INR in 4 day(s). Patient scheduled for in person visit due to being out of test strips.     Blanca Padilla, RodriguezD

## 2023-03-28 ENCOUNTER — APPOINTMENT (OUTPATIENT)
Dept: VASCULAR LAB | Facility: MEDICAL CENTER | Age: 73
End: 2023-03-28
Payer: COMMERCIAL

## 2023-04-07 ENCOUNTER — ANTICOAGULATION MONITORING (OUTPATIENT)
Dept: VASCULAR LAB | Facility: MEDICAL CENTER | Age: 73
End: 2023-04-07
Payer: COMMERCIAL

## 2023-04-07 DIAGNOSIS — Z86.718 HISTORY OF DVT (DEEP VEIN THROMBOSIS): Chronic | ICD-10-CM

## 2023-04-07 DIAGNOSIS — Z95.828 S/P IVC FILTER: ICD-10-CM

## 2023-04-07 DIAGNOSIS — Z79.01 CHRONIC ANTICOAGULATION: ICD-10-CM

## 2023-04-07 LAB — INR PPP: 2.1 (ref 2–3.5)

## 2023-04-07 NOTE — PROGRESS NOTES
OP Anticoagulation Service Note    Date: 2023    Anticoagulation Summary  As of 2023      INR goal:  2.0-3.0   TTR:  37.2 % (7.9 y)   INR used for dosin.10 (2023)   Warfarin maintenance plan:  10 mg (10 mg x 1) every Mon, Wed, Fri; 5 mg (10 mg x 0.5) all other days   Weekly warfarin total:  50 mg   Plan last modified:  Bob Dupree, PharmD (1/10/2023)   Next INR check:  2023   Target end date:  Indefinite    Indications    DVT (deep venous thrombosis) (HCC) (Resolved) [I82.409]  Chronic anticoagulation [Z79.01]  S/P IVC filter [Z95.828]  History of DVT (deep vein thrombosis) [Z86.718]                 Anticoagulation Episode Summary       INR check location:  Anticoagulation Clinic    Preferred lab:  [facility]    Send INR reminders to:  AMB ANTICOAG POOL    Comments:  MD INR          Anticoagulation Care Providers       Provider Role Specialty Phone number    Carmelo Pat M.D. Referring Family Medicine [phone]    [facility] Anticoagulation Services Responsible  [phone]          Anticoagulation Patient Findings        Patient's preferred phone number:  [phone]        HPI:   The reason for today's call is to prevent morbidity and mortality from a blood clot and/or stroke and to reduce the risk of bleeding while on a anticoagulant.     PCP:  Lio Deluca M.D.  [address]  [facility] [address]    Assessment:     INR  therapeutic.     Lab Results   Component Value Date/Time    BUN 6 (L) 03/15/2023 01:23 AM    CREATININE 0.56 03/15/2023 01:23 AM    CREATININE 0.8 2009 08:33 AM    BUNCREATRAT 13.8 2022 05:01 AM     Lab Results   Component Value Date/Time    HEMOGLOBIN 12.5 03/15/2023 01:23 AM    HEMATOCRIT 37.3 03/15/2023 01:23 AM    PLATELETCT 228 03/15/2023 01:23 AM    ALKPHOSPHAT 82 03/15/2023 01:23 AM    ASTSGOT 13 03/15/2023 01:23 AM    ALTSGPT 9 03/15/2023 01:23 AM          Current Outpatient Medications:     gabapentin, 900 mg, Oral, TID     Spiriva Respimat, 5 mcg, Inhalation, DAILY    lisinopril, 2.5 mg, Oral, DAILY    amitriptyline, 25 mg, Oral, Nightly    levothyroxine, 200 mcg, Oral, AM ES    mirtazapine, 15 mg, Oral, QHS    budesonide-formoterol, 2 Puff, Inhalation, BID    albuterol, 2 Puff, Inhalation, Q4HRS PRN    carBAMazepine, 600 mg, Oral, QHS      Plan:     Continue the same warfarin dose, as noted above.       Follow-up:     As seen above      Additional information discussed with patient:     Asked patient to please call the anticoagulation clinic if they have any signs/symptoms of bleeding and/or thrombosis or any changes to diet or medications.      National recommendations regarding anticoagulation therapy:     The CHEST guidelines recommends frequent INR monitoring at regular intervals (a few days up to a max of 12 weeks) to ensure patients are on the proper dose of warfarin, and patients are not having any complications from therapy.  INRs can dramatically change over a short time period due to diet, medications, and medical conditions.         [facility]  Phone: [phone]  Fax: [phone]  On call: [phone]  General scheduling/information [phone]  For emergencies please dial [phone]  Please do not use [facility] for urgent matters, call the phone numbers listed above.    This note was created using voice recognition software (Dragon). The accuracy of the dictation is limited by the abilities of the software. I have reviewed the note prior to signing, however some errors in grammar and context are still possible. If you have any questions related to this note please do not hesitate to contact our office.

## 2023-05-04 ENCOUNTER — TELEPHONE (OUTPATIENT)
Dept: VASCULAR LAB | Facility: MEDICAL CENTER | Age: 73
End: 2023-05-04
Payer: COMMERCIAL

## 2023-06-07 ENCOUNTER — ANTICOAGULATION MONITORING (OUTPATIENT)
Dept: VASCULAR LAB | Facility: MEDICAL CENTER | Age: 73
End: 2023-06-07
Payer: COMMERCIAL

## 2023-06-07 DIAGNOSIS — Z95.828 S/P IVC FILTER: ICD-10-CM

## 2023-06-07 DIAGNOSIS — Z86.718 HISTORY OF DVT (DEEP VEIN THROMBOSIS): Chronic | ICD-10-CM

## 2023-06-07 DIAGNOSIS — Z79.01 CHRONIC ANTICOAGULATION: ICD-10-CM

## 2023-06-07 LAB — INR PPP: 2.6 (ref 2–3.5)

## 2023-06-08 NOTE — PROGRESS NOTES
Anticoagulation Summary  As of 2023      INR goal:  2.0-3.0   TTR:  38.5 % (8.1 y)   INR used for dosin.60 (2023)   Warfarin maintenance plan:  10 mg (10 mg x 1) every Mon, Wed, Fri; 5 mg (10 mg x 0.5) all other days   Weekly warfarin total:  50 mg   Plan last modified:  Rodriguez MeadowsD (1/10/2023)   Next INR check:  2023   Target end date:  Indefinite    Indications    DVT (deep venous thrombosis) (HCC) (Resolved) [I82.409]  Chronic anticoagulation [Z79.01]  S/P IVC filter [Z95.828]  History of DVT (deep vein thrombosis) [Z86.718]                 Anticoagulation Episode Summary       INR check location:  Anticoagulation Clinic    Preferred lab:  [facility]    Send INR reminders to:  AMB ANTICOAG POOL    Comments:  MD INR          Anticoagulation Care Providers       Provider Role Specialty Phone number    Carmelo Pat M.D. Referring [facility] Medicine [phone]    [facility] Anticoagulation Services Responsible  [phone]          Anticoagulation Patient Findings      Left voicemail to report a therapeutic INR.      Pt to continue with current warfarin dosing regimen. Requested pt contact the clinic for any s/s of unusual bleeding, bruising, clotting or any changes to diet or medication.    FU INR in 1 week(s).    Celina Gonzalez, PhT    Reviewed with student and agree with above plan.   Blanca Padilla, RodriguezD

## 2023-06-09 LAB — INR PPP: 2.6 (ref 2–3.5)

## 2023-06-12 ENCOUNTER — ANTICOAGULATION MONITORING (OUTPATIENT)
Dept: CARDIOLOGY | Facility: MEDICAL CENTER | Age: 73
End: 2023-06-12
Payer: COMMERCIAL

## 2023-06-12 DIAGNOSIS — Z86.718 HISTORY OF DVT (DEEP VEIN THROMBOSIS): Chronic | ICD-10-CM

## 2023-06-12 DIAGNOSIS — Z79.01 CHRONIC ANTICOAGULATION: ICD-10-CM

## 2023-06-12 DIAGNOSIS — Z95.828 S/P IVC FILTER: ICD-10-CM

## 2023-06-12 LAB — INR PPP: 2.6 (ref 2–3.5)

## 2023-06-12 NOTE — PROGRESS NOTES
Anticoagulation Summary  As of 2023      INR goal:  2.0-3.0   TTR:  38.6 % (8.1 y)   INR used for dosin.60 (2023)   Warfarin maintenance plan:  10 mg (10 mg x 1) every Mon, Wed, Fri; 5 mg (10 mg x 0.5) all other days   Weekly warfarin total:  50 mg   Plan last modified:  Rodriguez MeadoswD (1/10/2023)   Next INR check:  2023   Target end date:  Indefinite    Indications    DVT (deep venous thrombosis) (HCC) (Resolved) [I82.409]  Chronic anticoagulation [Z79.01]  S/P IVC filter [Z95.828]  History of DVT (deep vein thrombosis) [Z86.718]                 Anticoagulation Episode Summary       INR check location:  Anticoagulation Clinic    Preferred lab:  [facility]    Send INR reminders to:  AMB ANTICOAG POOL    Comments:  MD INR          Anticoagulation Care Providers       Provider Role Specialty Phone number    Carmelo Pat M.D. Referring Family Medicine [phone]    [facility] Anticoagulation Services Responsible  [phone]          Anticoagulation Patient Findings  Patient Findings       Negatives:  Signs/symptoms of thrombosis, Signs/symptoms of bleeding, Laboratory test error suspected, Change in health, Change in alcohol use, Change in activity, Upcoming invasive procedure, Emergency department visit, Upcoming dental procedure, Missed doses, Extra doses, Change in medications, Change in diet/appetite, Hospital admission, Bruising, Other complaints          Left voicemail message to report therapeutic INR of 2.6.  Patient to continue with current warfarin dosing regimen. Requested pt contact the clinic for any change to diet or medication, and to report any signs or symptoms of bleeding, bruising or clotting.  Pt to follow up in 1 week.    Maki Roman, Med Ass't    I agree with the recommendation provided in this note.  Alicia Parada, PharmD

## 2023-07-04 LAB — INR PPP: 6.8 (ref 2–3.5)

## 2023-07-05 ENCOUNTER — ANTICOAGULATION MONITORING (OUTPATIENT)
Dept: VASCULAR LAB | Facility: MEDICAL CENTER | Age: 73
End: 2023-07-05
Payer: COMMERCIAL

## 2023-07-05 DIAGNOSIS — Z79.01 CHRONIC ANTICOAGULATION: ICD-10-CM

## 2023-07-05 DIAGNOSIS — Z95.828 S/P IVC FILTER: ICD-10-CM

## 2023-07-05 DIAGNOSIS — Z86.718 HISTORY OF DVT (DEEP VEIN THROMBOSIS): Chronic | ICD-10-CM

## 2023-07-05 NOTE — PROGRESS NOTES
Anticoagulation Summary  As of 2023      INR goal:  2.0-3.0   TTR:  38.4 % (8.1 y)   INR used for dosin.80 (2023)   Warfarin maintenance plan:  10 mg (10 mg x 1) every Mon, Wed, Fri; 5 mg (10 mg x 0.5) all other days   Weekly warfarin total:  50 mg   Plan last modified:  Rodriguez MeadowsD (1/10/2023)   Next INR check:  2023   Target end date:  Indefinite    Indications    DVT (deep venous thrombosis) (HCC) (Resolved) [I82.409]  Chronic anticoagulation [Z79.01]  S/P IVC filter [Z95.828]  History of DVT (deep vein thrombosis) [Z86.718]                 Anticoagulation Episode Summary       INR check location:  Anticoagulation Clinic    Preferred lab:  [facility]    Send INR reminders to:  AMB ANTICOAG POOL    Comments:  MD INR          Anticoagulation Care Providers       Provider Role Specialty Phone number    Carmelo Pat M.D. Referring Family Medicine [phone]    [facility] Anticoagulation Services Responsible  [phone]          Anticoagulation Patient Findings    Unable to reach via home ph#  Was able to lvm on mobile ph#    Left voicemail message to report a supratherapeutic INR.    Will have pt hold warfarin x 2 days    Requested pt contact the clinic for any s/s of unusual bleeding, bruising, clotting or any changes to diet or medication.    FU INR in 2 day(s).    Alicia Parada, PharmD

## 2023-07-07 ENCOUNTER — APPOINTMENT (OUTPATIENT)
Dept: RADIOLOGY | Facility: MEDICAL CENTER | Age: 73
End: 2023-07-07
Attending: INTERNAL MEDICINE
Payer: COMMERCIAL

## 2023-07-07 DIAGNOSIS — Z12.31 VISIT FOR SCREENING MAMMOGRAM: ICD-10-CM

## 2023-07-10 ENCOUNTER — ANTICOAGULATION MONITORING (OUTPATIENT)
Dept: CARDIOLOGY | Facility: MEDICAL CENTER | Age: 73
End: 2023-07-10
Payer: COMMERCIAL

## 2023-07-10 ENCOUNTER — APPOINTMENT (OUTPATIENT)
Dept: RADIOLOGY | Facility: MEDICAL CENTER | Age: 73
End: 2023-07-10
Attending: INTERNAL MEDICINE
Payer: COMMERCIAL

## 2023-07-10 DIAGNOSIS — Z79.01 CHRONIC ANTICOAGULATION: ICD-10-CM

## 2023-07-10 DIAGNOSIS — Z86.718 HISTORY OF DVT (DEEP VEIN THROMBOSIS): Chronic | ICD-10-CM

## 2023-07-10 DIAGNOSIS — Z95.828 S/P IVC FILTER: ICD-10-CM

## 2023-07-10 LAB — INR PPP: 3.5 (ref 2–3.5)

## 2023-07-10 NOTE — PROGRESS NOTES
Called mobile and home number - no answer or option to LVM. Will try back at a later time.    Mauricio Redd, RodriguezD, BCACP

## 2023-07-11 NOTE — PROGRESS NOTES
Anticoagulation Summary  As of 7/10/2023      INR goal:  2.0-3.0   TTR:  38.3 % (8.1 y)   INR used for dosing:  3.50 (7/10/2023)   Warfarin maintenance plan:  10 mg (10 mg x 1) every Mon, Wed, Fri; 5 mg (10 mg x 0.5) all other days   Weekly warfarin total:  50 mg   Plan last modified:  Rodriguez MeadowsD (1/10/2023)   Next INR check:  7/13/2023   Target end date:  Indefinite    Indications    DVT (deep venous thrombosis) (HCC) (Resolved) [I82.409]  Chronic anticoagulation [Z79.01]  S/P IVC filter [Z95.828]  History of DVT (deep vein thrombosis) [Z86.718]                 Anticoagulation Episode Summary       INR check location:  Anticoagulation Clinic    Preferred lab:  [facility]    Send INR reminders to:  AMB ANTICOAG POOL    Comments:  MD INR          Anticoagulation Care Providers       Provider Role Specialty Phone number    Carmelo Pat M.D. Referring Family Medicine [phone]    [facility] Anticoagulation Services Responsible  [phone]            Refer to Anticoagulation Patient Findings for HPI  Patient Findings       Negatives:  Signs/symptoms of thrombosis, Signs/symptoms of bleeding, Laboratory test error suspected, Change in health, Change in alcohol use, Change in activity, Upcoming invasive procedure, Emergency department visit, Upcoming dental procedure, Missed doses, Extra doses, Change in medications, Change in diet/appetite, Hospital admission, Bruising, Other complaints            Spoke with pt.  INR is supratherapeutic.     Pt verifies warfarin weekly dosing.     Will have pt hold x2 days    Repeat INR in 2 day(s).     Alicia Parada, PharmD

## 2023-07-27 ENCOUNTER — ANTICOAGULATION MONITORING (OUTPATIENT)
Dept: VASCULAR LAB | Facility: MEDICAL CENTER | Age: 73
End: 2023-07-27
Payer: COMMERCIAL

## 2023-07-27 DIAGNOSIS — Z86.718 HISTORY OF DVT (DEEP VEIN THROMBOSIS): Chronic | ICD-10-CM

## 2023-07-27 DIAGNOSIS — Z95.828 S/P IVC FILTER: ICD-10-CM

## 2023-07-27 DIAGNOSIS — Z79.01 CHRONIC ANTICOAGULATION: ICD-10-CM

## 2023-07-27 LAB — INR PPP: 3.6 (ref 2–3.5)

## 2023-07-27 NOTE — PROGRESS NOTES
Anticoagulation Summary  As of 7/27/2023      INR goal:  2.0-3.0   TTR:  40.1 % (7.9 y)   INR used for dosing:  3.60 (7/27/2023)   Warfarin maintenance plan:  10 mg (10 mg x 1) every Mon; 5 mg (10 mg x 0.5) all other days   Weekly warfarin total:  40 mg   Plan last modified:  Richelle Hawthorne, Pharmacy Intern (7/27/2023)   Next INR check:  8/3/2023   Target end date:  Indefinite    Indications    DVT (deep venous thrombosis) (HCC) (Resolved) [I82.409]  Chronic anticoagulation [Z79.01]  S/P IVC filter [Z95.828]  History of DVT (deep vein thrombosis) [Z86.718]                 Anticoagulation Episode Summary       INR check location:  Anticoagulation Clinic    Preferred lab:  [email or web address]    Send INR reminders to:  AMB ANTICOAG POOL    Comments:  MD INR          Anticoagulation Care Providers       Provider Role Specialty Phone number    Carmelo Pat M.D. Referring [facility] Medicine [phone]    [facility] Anticoagulation Services Responsible  [phone]          Anticoagulation Patient Findings      Left voicemail to report a supra therapeutic INR.      Will have pt hold dose of warfarin today and then decrease weekly regimen by taking 10 mg on Monday and then 5 mg on the rest of the days.  Requested pt contact the clinic for any s/s of unusual bleeding, bruising, clotting or any changes to diet or medication.    FU INR in 1 week(s).  Discussed plan with Ramírez Hawthorne, Pharmacy Intern    I agree with the above plan.  Ramírez Porras PharmD

## 2023-08-03 ENCOUNTER — ANTICOAGULATION MONITORING (OUTPATIENT)
Dept: VASCULAR LAB | Facility: MEDICAL CENTER | Age: 73
End: 2023-08-03
Payer: COMMERCIAL

## 2023-08-03 DIAGNOSIS — Z86.718 HISTORY OF DVT (DEEP VEIN THROMBOSIS): Chronic | ICD-10-CM

## 2023-08-03 DIAGNOSIS — Z95.828 S/P IVC FILTER: ICD-10-CM

## 2023-08-03 DIAGNOSIS — Z79.01 CHRONIC ANTICOAGULATION: ICD-10-CM

## 2023-08-03 LAB — INR PPP: 1.2 (ref 2–3.5)

## 2023-08-03 NOTE — PROGRESS NOTES
Anticoagulation Summary  As of 8/3/2023      INR goal:  2.0-3.0   TTR:  40.1 % (7.9 y)   INR used for dosin.20 (8/3/2023)   Warfarin maintenance plan:  10 mg (10 mg x 1) every Thu; 5 mg (10 mg x 0.5) all other days   Weekly warfarin total:  40 mg   Plan last modified:  Ivett Piña PharmD (8/3/2023)   Next INR check:  8/10/2023   Target end date:  Indefinite    Indications    DVT (deep venous thrombosis) (HCC) (Resolved) [I82.409]  Chronic anticoagulation [Z79.01]  S/P IVC filter [Z95.828]  History of DVT (deep vein thrombosis) [Z86.718]                 Anticoagulation Episode Summary       INR check location:  Anticoagulation Clinic    Preferred lab:  [facility]    Send INR reminders to:  AMB ANTICOAG POOL    Comments:  MD INR          Anticoagulation Care Providers       Provider Role Specialty Phone number    Carmelo Pta M.D. Referring Family Medicine [phone]    [facility] Anticoagulation Services Responsible  [phone]          Anticoagulation Patient Findings    Spoke with patient today regarding SUB-therapeutic INR of 1.20.  Patient denies any signs/symptoms of bruising or bleeding or any changes in diet and medications.  Instructed patient to call clinic with any questions or concerns.    Pt is not on antiplatelet therapy    Pt states she has missed a couple of doses the previous week due to not having her pills sorted in her medication organizer.    Plan to increase today's dose to 10 mg and continue current regimen of 10 mg on  and 5 mg the rest of the week. Patient agreed.    Follow up in 1 week, to reduce risk of adverse events related to this high risk medication,  Warfarin.    Ivett Piña, PharmD

## 2023-08-14 ENCOUNTER — ANTICOAGULATION MONITORING (OUTPATIENT)
Dept: CARDIOLOGY | Facility: MEDICAL CENTER | Age: 73
End: 2023-08-14
Payer: COMMERCIAL

## 2023-08-14 DIAGNOSIS — Z95.828 S/P IVC FILTER: ICD-10-CM

## 2023-08-14 DIAGNOSIS — Z79.01 CHRONIC ANTICOAGULATION: ICD-10-CM

## 2023-08-14 DIAGNOSIS — Z86.718 HISTORY OF DVT (DEEP VEIN THROMBOSIS): Chronic | ICD-10-CM

## 2023-08-14 LAB — INR PPP: 1.9 (ref 2–3.5)

## 2023-08-14 NOTE — PROGRESS NOTES
Anticoagulation Summary  As of 2023      INR goal:  2.0-3.0   TTR:  39.9 % (7.9 y)   INR used for dosin.90 (2023)   Warfarin maintenance plan:  10 mg (10 mg x 1) every Thu; 5 mg (10 mg x 0.5) all other days   Weekly warfarin total:  40 mg   Plan last modified:  Rodriguez BearD (8/3/2023)   Next INR check:  2023   Target end date:  Indefinite    Indications    DVT (deep venous thrombosis) (HCC) (Resolved) [I82.409]  Chronic anticoagulation [Z79.01]  S/P IVC filter [Z95.828]  History of DVT (deep vein thrombosis) [Z86.718]                 Anticoagulation Episode Summary       INR check location:  Anticoagulation Clinic    Preferred lab:  [facility]    Send INR reminders to:  AMB ANTICOAG POOL    Comments:  MD INR          Anticoagulation Care Providers       Provider Role Specialty Phone number    Carmelo Pat M.D. Referring Family Medicine [phone]    [facility] Anticoagulation Services Responsible  [phone]            Refer to Anticoagulation Patient Findings for HPI  Patient Findings       Positives:  Change in diet/appetite (More greens lately.)    Negatives:  Signs/symptoms of thrombosis, Signs/symptoms of bleeding, Laboratory test error suspected, Change in health, Change in alcohol use, Change in activity, Upcoming invasive procedure, Emergency department visit, Upcoming dental procedure, Missed doses, Extra doses, Change in medications, Hospital admission, Bruising, Other complaints            Spoke with pt.  INR is sub therapeutic.     Pt verifies warfarin weekly dosing.     Will have pt BOLUS x 1 dose w/ 10 mg and then continue on w/ her current regimen.    Repeat INR in 2 week(s).     Mauricio Redd, PharmD, BCACP

## 2023-08-24 ENCOUNTER — ANTICOAGULATION MONITORING (OUTPATIENT)
Dept: VASCULAR LAB | Facility: MEDICAL CENTER | Age: 73
End: 2023-08-24
Payer: COMMERCIAL

## 2023-08-24 DIAGNOSIS — Z79.01 CHRONIC ANTICOAGULATION: ICD-10-CM

## 2023-08-24 DIAGNOSIS — Z86.718 HISTORY OF DVT (DEEP VEIN THROMBOSIS): Chronic | ICD-10-CM

## 2023-08-24 DIAGNOSIS — Z95.828 S/P IVC FILTER: ICD-10-CM

## 2023-08-24 LAB — INR PPP: 1.8 (ref 2–3.5)

## 2023-08-24 NOTE — PROGRESS NOTES
Anticoagulation Summary  As of 2023      INR goal:  2.0-3.0   TTR:  39.8 % (7.9 y)   INR used for dosin.80 (2023)   Warfarin maintenance plan:  10 mg (10 mg x 1) every Thu, Sat; 5 mg (10 mg x 0.5) all other days   Weekly warfarin total:  45 mg   Plan last modified:  Alicia Parada, [place] (2023)   Next INR check:  2023   Target end date:  Indefinite    Indications    DVT (deep venous thrombosis) (HCC) (Resolved) [I82.409]  Chronic anticoagulation [Z79.01]  S/P IVC filter [Z95.828]  History of DVT (deep vein thrombosis) [Z86.718]                 Anticoagulation Episode Summary       INR check location:  Anticoagulation Clinic    Preferred lab:  [facility]    Send INR reminders to:  AMB ANTICOAG POOL    Comments:  MD INR          Anticoagulation Care Providers       Provider Role Specialty Phone number    Carmelo Pat M.D. Referring Family Medicine [phone]    [facility] Anticoagulation Services Responsible  [phone]            Refer to Anticoagulation Patient Findings for HPI  Patient Findings       Positives:  Missed doses (took 5mg last Thursday instead of 10mg)    Negatives:  Signs/symptoms of thrombosis, Signs/symptoms of bleeding, Laboratory test error suspected, Change in health, Change in alcohol use, Change in activity, Upcoming invasive procedure, Emergency department visit, Upcoming dental procedure, Extra doses, Change in medications, Change in diet/appetite, Hospital admission, Bruising, Other complaints            Spoke with pt.  INR is subtherapeutic.     Pt verifies warfarin weekly dosing.     Pt is NOT on antiplatelet therapy     Will have pt increase weekly regimen as listed above    Repeat INR in 1 week(s).     Alicia Parada, RodriguezD

## 2023-09-25 ENCOUNTER — ANTICOAGULATION MONITORING (OUTPATIENT)
Dept: VASCULAR LAB | Facility: MEDICAL CENTER | Age: 73
End: 2023-09-25
Payer: COMMERCIAL

## 2023-09-25 DIAGNOSIS — Z95.828 S/P IVC FILTER: ICD-10-CM

## 2023-09-25 DIAGNOSIS — Z86.718 HISTORY OF DVT (DEEP VEIN THROMBOSIS): Chronic | ICD-10-CM

## 2023-09-25 DIAGNOSIS — Z79.01 CHRONIC ANTICOAGULATION: ICD-10-CM

## 2023-09-25 LAB
INR PPP: 8 (ref 2–3.5)
INR PPP: 8 (ref 2–3.5)

## 2023-09-26 NOTE — PROGRESS NOTES
"OP   Telephone Anticoagulation Service Note      Anticoagulation Summary  As of 2023      INR goal:  2.0-3.0   TTR:  39.5 % (8 y)   INR used for dosin.00 (2023)   Warfarin maintenance plan:  10 mg (10 mg x 1) every Thu, Sat; 5 mg (10 mg x 0.5) all other days   Weekly warfarin total:  45 mg   Plan last modified:  Alicia Parada, PharmD (2023)   Next INR check:  2023   Target end date:  Indefinite    Indications    DVT (deep venous thrombosis) (HCC) (Resolved) [I82.409]  Chronic anticoagulation [Z79.01]  S/P IVC filter [Z95.828]  History of DVT (deep vein thrombosis) [Z86.718]                 Anticoagulation Episode Summary       INR check location:  Anticoagulation Clinic    Preferred lab:  [facility]    Send INR reminders to:      Comments:  MD INR          Anticoagulation Care Providers       Provider Role Specialty Phone number    Carmelo Pat M.D. Referring Family Medicine [phone]    [facility] Anticoagulation Services Responsible  [phone]          Anticoagulation Patient Findings  Patient Findings       Positives:  Extra doses (pt was on vacation and took extra doses each week and forgot her meter)    Negatives:  Signs/symptoms of thrombosis, Signs/symptoms of bleeding, Laboratory test error suspected, Change in health, Change in alcohol use, Change in activity, Upcoming invasive procedure, Emergency department visit, Upcoming dental procedure, Missed doses, Change in medications, Change in diet/appetite, Hospital admission, Bruising, Other complaints          Spoke with the patient on the phone today, reporting a SUPRA-therapeutic INR of 8.0  Confirmed the current warfarin dosing regimen and patient compliance.  Patient reports that she was on vacation and had forgotten her meter and her dosing regimen and took what she \"thought\" was correct dose. She was taking 10mg 3x week and 5mg The other 4 days which was more than she was supposed to take.   Patient denies any interval " changes to diet and/or medications. Patient denies any signs/symptoms of bleeding or clotting.  Patient instructed to HOLD warfarin x 3 days and to retest INR again on Thursday.   Patient will retest INR again on Thursday.    Ramírez FriasD

## 2023-09-28 ENCOUNTER — ANTICOAGULATION MONITORING (OUTPATIENT)
Dept: VASCULAR LAB | Facility: MEDICAL CENTER | Age: 73
End: 2023-09-28
Payer: COMMERCIAL

## 2023-09-28 DIAGNOSIS — Z86.718 HISTORY OF DVT (DEEP VEIN THROMBOSIS): Chronic | ICD-10-CM

## 2023-09-28 DIAGNOSIS — Z79.01 CHRONIC ANTICOAGULATION: ICD-10-CM

## 2023-09-28 DIAGNOSIS — Z95.828 S/P IVC FILTER: ICD-10-CM

## 2023-09-28 LAB — INR PPP: 1.9 (ref 2–3.5)

## 2023-09-28 NOTE — PROGRESS NOTES
Anticoagulation Summary  As of 2023      INR goal:  2.0-3.0   TTR:  39.5 % (8 y)   INR used for dosin.90 (2023)   Warfarin maintenance plan:  10 mg (10 mg x 1) every Thu, Sat; 5 mg (10 mg x 0.5) all other days   Weekly warfarin total:  45 mg   Plan last modified:  Alicia Parada PharmD (2023)   Next INR check:  10/2/2023   Target end date:  Indefinite    Indications    DVT (deep venous thrombosis) (HCC) (Resolved) [I82.409]  Chronic anticoagulation [Z79.01]  S/P IVC filter [Z95.828]  History of DVT (deep vein thrombosis) [Z86.718]                 Anticoagulation Episode Summary       INR check location:  Anticoagulation Clinic    Preferred lab:  [facility]    Send INR reminders to:      Comments:  MD INR          Anticoagulation Care Providers       Provider Role Specialty Phone number    Carmelo Pat M.D. Referring Family Medicine [phone]    [facility] Anticoagulation Services Responsible  [phone]          Anticoagulation Patient Findings  Patient Findings       Positives:  Change in medications (started hair, skin & nails supplement which contains vitamin E)    Negatives:  Signs/symptoms of thrombosis, Signs/symptoms of bleeding, Laboratory test error suspected, Change in health, Change in alcohol use, Change in activity, Upcoming invasive procedure, Emergency department visit, Upcoming dental procedure, Missed doses, Extra doses, Change in diet/appetite, Hospital admission, Bruising, Other complaints            INR is slightly subtherapeutic    Called and spoke to patient.    Warfarin Plan:  Take reduced regimen of 5mg daily for now    Pt is not on antiplatelet therapy.    Next INR in 4 day(s).    Alicia Parada, PharmD

## 2023-10-17 ENCOUNTER — ANTICOAGULATION MONITORING (OUTPATIENT)
Dept: MEDICAL GROUP | Facility: PHYSICIAN GROUP | Age: 73
End: 2023-10-17
Payer: COMMERCIAL

## 2023-10-17 DIAGNOSIS — Z86.718 HISTORY OF DVT (DEEP VEIN THROMBOSIS): Chronic | ICD-10-CM

## 2023-10-17 DIAGNOSIS — Z95.828 S/P IVC FILTER: ICD-10-CM

## 2023-10-17 DIAGNOSIS — Z79.01 CHRONIC ANTICOAGULATION: ICD-10-CM

## 2023-10-17 LAB — INR PPP: 1.9 (ref 2–3.5)

## 2023-10-17 NOTE — PROGRESS NOTES
Anticoagulation Summary  As of 10/17/2023      INR goal:  2.0-3.0   TTR:  39.3 % (8.1 y)   INR used for dosin.90 (10/17/2023)   Warfarin maintenance plan:  5 mg (10 mg x 0.5) every day   Weekly warfarin total:  35 mg   Plan last modified:  Bridget Castelan (10/17/2023)   Next INR check:  10/24/2023   Target end date:  Indefinite    Indications    DVT (deep venous thrombosis) (HCC) (Resolved) [I82.409]  Chronic anticoagulation [Z79.01]  S/P IVC filter [Z95.828]  History of DVT (deep vein thrombosis) [Z86.718]                 Anticoagulation Episode Summary       INR check location:  Anticoagulation Clinic    Preferred lab:  [facility]    Send INR reminders to:      Comments:  MD INR          Anticoagulation Care Providers       Provider Role Specialty Phone number    Carmelo Pat M.D. Referring [facility] Medicine [phone]    [facility] Anticoagulation Services Responsible  [phone]          Anticoagulation Patient Findings    Spoke with patient on the phone.   Patient is SUB therapeutic today.     Confirmed dosing. Patient reports taking 0.5 tablets (5mg) everyday. Since INR is close to goal, weekly regimen has been updated. Patient instructed to continue regimen and test in one week.    Pt is not on antiplatelet agent  Instructed patient to call clinic if any unusual bleeding or bruising occurs.   Will continue dosing as outlined.   Will follow-up with patient in 1 week(s).    Plan discussed with Alicia Parada, PharmD  Bridget Castelan, Student Pharmacist

## 2023-10-27 LAB — INR PPP: 1.2 (ref 2–3.5)

## 2023-10-30 ENCOUNTER — ANTICOAGULATION MONITORING (OUTPATIENT)
Dept: VASCULAR LAB | Facility: MEDICAL CENTER | Age: 73
End: 2023-10-30
Payer: COMMERCIAL

## 2023-10-30 DIAGNOSIS — Z79.01 CHRONIC ANTICOAGULATION: ICD-10-CM

## 2023-10-30 DIAGNOSIS — Z95.828 S/P IVC FILTER: ICD-10-CM

## 2023-10-30 DIAGNOSIS — Z86.718 HISTORY OF DVT (DEEP VEIN THROMBOSIS): Chronic | ICD-10-CM

## 2023-10-30 NOTE — PROGRESS NOTES
Anticoagulation Summary  As of 10/30/2023      INR goal:  2.0-3.0   TTR:  39.1 % (8.1 y)   INR used for dosin.20 (10/27/2023)   Warfarin maintenance plan:  5 mg (10 mg x 0.5) every day   Weekly warfarin total:  35 mg   Plan last modified:  Bridget Castelan (10/17/2023)   Next INR check:  2023   Target end date:  Indefinite    Indications    DVT (deep venous thrombosis) (HCC) (Resolved) [I82.409]  Chronic anticoagulation [Z79.01]  S/P IVC filter [Z95.828]  History of DVT (deep vein thrombosis) [Z86.718]                 Anticoagulation Episode Summary       INR check location:  Anticoagulation Clinic    Preferred lab:  [facility]    Send INR reminders to:      Comments:  MD INR          Anticoagulation Care Providers       Provider Role Specialty Phone number    Carmelo Pat M.D. Referring Family Medicine [phone]    [facility] Anticoagulation Services Responsible  [phone]          Anticoagulation Patient Findings  Patient Findings       Positives:  Missed doses (Missed a couple of doses, but has been consistently taking warfarin since Fri), Extra doses (Took warfarin 10 mg on Fri as a bolus dose)    Negatives:  Signs/symptoms of thrombosis, Signs/symptoms of bleeding, Laboratory test error suspected, Change in health, Change in alcohol use, Change in activity, Upcoming invasive procedure, Emergency department visit, Upcoming dental procedure, Change in medications, Change in diet/appetite, Hospital admission, Bruising, Other complaints            Called and spoke to patient.    INR subtherapeutic due to missed doses. Patient took bolus dose on Fri. Will recheck INR this week prior to making additional changes.    Warfarin Plan: Continue regimen as listed above.    Next INR in 2 days    Yo Tenorio, RodriguezD, BCACP

## 2023-11-01 ENCOUNTER — ANTICOAGULATION MONITORING (OUTPATIENT)
Dept: VASCULAR LAB | Facility: MEDICAL CENTER | Age: 73
End: 2023-11-01
Payer: COMMERCIAL

## 2023-11-01 DIAGNOSIS — Z95.828 S/P IVC FILTER: ICD-10-CM

## 2023-11-01 DIAGNOSIS — Z86.718 HISTORY OF DVT (DEEP VEIN THROMBOSIS): Chronic | ICD-10-CM

## 2023-11-01 DIAGNOSIS — Z79.01 CHRONIC ANTICOAGULATION: ICD-10-CM

## 2023-11-01 LAB — INR PPP: 1.9 (ref 2–3.5)

## 2023-11-01 NOTE — PROGRESS NOTES
Anticoagulation Summary  As of 2023      INR goal:  2.0-3.0   TTR:  39.1 % (8.1 y)   INR used for dosin.90 (2023)   Warfarin maintenance plan:  5 mg (10 mg x 0.5) every day   Weekly warfarin total:  35 mg   Plan last modified:  Bridget Castelan (10/17/2023)   Next INR check:  2023   Target end date:  Indefinite    Indications    DVT (deep venous thrombosis) (HCC) (Resolved) [I82.409]  Chronic anticoagulation [Z79.01]  S/P IVC filter [Z95.828]  History of DVT (deep vein thrombosis) [Z86.718]                 Anticoagulation Episode Summary       INR check location:  Anticoagulation Clinic    Preferred lab:  [facility]    Send INR reminders to:      Comments:  MD INR          Anticoagulation Care Providers       Provider Role Specialty Phone number    Carmelo Pat M.D. Referring [facility] Medicine [phone]    [facility] Anticoagulation Services Responsible  [phone]          Anticoagulation Patient Findings    Spoke with patient today regarding subtherapeutic INR of 1.9.  Patient denies any signs/symptoms of bruising or bleeding or any changes in diet and medications.  Instructed patient to call clinic with any questions or concerns.    Pt is not on antiplatelet therapy    Pt is to bolus with 10mg X 1, then continue with current warfarin dosing regimen.  Follow up in 1 weeks, to reduce risk of adverse events related to this high risk medication,  Warfarin.    Nahum Bowens, PharmD, BCACP

## 2023-11-08 ENCOUNTER — ANTICOAGULATION MONITORING (OUTPATIENT)
Dept: VASCULAR LAB | Facility: MEDICAL CENTER | Age: 73
End: 2023-11-08
Payer: COMMERCIAL

## 2023-11-08 DIAGNOSIS — Z79.01 CHRONIC ANTICOAGULATION: ICD-10-CM

## 2023-11-08 DIAGNOSIS — Z95.828 S/P IVC FILTER: ICD-10-CM

## 2023-11-08 DIAGNOSIS — Z86.718 HISTORY OF DVT (DEEP VEIN THROMBOSIS): Chronic | ICD-10-CM

## 2023-11-08 LAB — INR PPP: 3 (ref 2–3.5)

## 2023-11-08 NOTE — PROGRESS NOTES
Anticoagulation Summary  As of 11/8/2023      INR goal:  2.0-3.0   TTR:  39.2 % (8.1 y)   INR used for dosing:  3.00 (11/8/2023)   Warfarin maintenance plan:  5 mg (10 mg x 0.5) every day   Weekly warfarin total:  35 mg   Plan last modified:  Bridget Castelan (10/17/2023)   Next INR check:  11/15/2023   Target end date:  Indefinite    Indications    DVT (deep venous thrombosis) (HCC) (Resolved) [I82.409]  Chronic anticoagulation [Z79.01]  S/P IVC filter [Z95.828]  History of DVT (deep vein thrombosis) [Z86.718]                 Anticoagulation Episode Summary       INR check location:  Anticoagulation Clinic    Preferred lab:  [employer] [facility]    Send INR reminders to:      Comments:  MD INR          Anticoagulation Care Providers       Provider Role Specialty Phone number    Carmelo Pat M.D. Referring [facility] Medicine [phone]    [facility] Anticoagulation Services Responsible  [phone]          Anticoagulation Patient Findings      INR is therapeutic    Called and left VM for patient.    Requested patient to contact the clinic for any s/sx of unusual bleeding, bruising, clotting or any changes to diet or medications. Unless patient reports any changes that would warrant an adjustment to the plan:  Warfarin Plan: Continue regimen as listed above.    Next INR in 1 week(s).    Alicia Parada, PharmD

## 2023-11-24 LAB — INR PPP: 1.9 (ref 2–3.5)

## 2023-11-27 ENCOUNTER — ANTICOAGULATION MONITORING (OUTPATIENT)
Dept: VASCULAR LAB | Facility: MEDICAL CENTER | Age: 73
End: 2023-11-27
Payer: COMMERCIAL

## 2023-11-27 DIAGNOSIS — Z86.718 HISTORY OF DVT (DEEP VEIN THROMBOSIS): Chronic | ICD-10-CM

## 2023-11-27 DIAGNOSIS — Z79.01 CHRONIC ANTICOAGULATION: ICD-10-CM

## 2023-11-27 DIAGNOSIS — Z95.828 S/P IVC FILTER: ICD-10-CM

## 2023-11-28 NOTE — PROGRESS NOTES
Anticoagulation Summary  As of 2023      INR goal:  2.0-3.0   TTR:  39.5 % (8.2 y)   INR used for dosin.90 (2023)   Warfarin maintenance plan:  5 mg (10 mg x 0.5) every day   Weekly warfarin total:  35 mg   Plan last modified:  Bridget Castelan (10/17/2023)   Next INR check:  2023   Target end date:  Indefinite    Indications    DVT (deep venous thrombosis) (HCC) (Resolved) [I82.409]  Chronic anticoagulation [Z79.01]  S/P IVC filter [Z95.828]  History of DVT (deep vein thrombosis) [Z86.718]                 Anticoagulation Episode Summary       INR check location:  Anticoagulation Clinic    Preferred lab:  [employer]    Send INR reminders to:      Comments:  MD INR          Anticoagulation Care Providers       Provider Role Specialty Phone number    Carmelo Pat M.D. Referring Family Medicine [phone]    [facility] Anticoagulation Services Responsible  [phone]          Anticoagulation Patient Findings  Patient Findings       Negatives:  Signs/symptoms of thrombosis, Signs/symptoms of bleeding, Laboratory test error suspected, Change in health, Change in alcohol use, Change in activity, Upcoming invasive procedure, Emergency department visit, Upcoming dental procedure, Missed doses, Extra doses, Change in medications, Change in diet/appetite, Hospital admission, Bruising, Other complaints            Left voicemail message to report a SUB therapeutic INR of 1.9.    Will have pt BOLUS x 1 dose today w/ 10 mg and then continue on with current warfarin dosing regimen.   Requested pt contact the clinic for any s/s of unusual bleeding, bruising, clotting or any changes to diet or medication.    FU INR in 1 week(s).    Mauricio Redd, PharmD, BCACP

## 2023-12-16 LAB — INR PPP: 2.1 (ref 2–3.5)

## 2023-12-18 ENCOUNTER — ANTICOAGULATION MONITORING (OUTPATIENT)
Dept: VASCULAR LAB | Facility: MEDICAL CENTER | Age: 73
End: 2023-12-18
Payer: COMMERCIAL

## 2023-12-18 DIAGNOSIS — Z79.01 CHRONIC ANTICOAGULATION: ICD-10-CM

## 2023-12-18 DIAGNOSIS — Z86.718 HISTORY OF DVT (DEEP VEIN THROMBOSIS): Chronic | ICD-10-CM

## 2023-12-18 DIAGNOSIS — Z95.828 S/P IVC FILTER: ICD-10-CM

## 2023-12-19 NOTE — PROGRESS NOTES
OP Anticoagulation Service Note    Date: 2023    Anticoagulation Summary  As of 2023      INR goal:  2.0-3.0   TTR:  39.5 % (8.2 y)   INR used for dosin.10 (2023)   Warfarin maintenance plan:  5 mg (10 mg x 0.5) every day   Weekly warfarin total:  35 mg   Plan last modified:  Bridget Castelan (10/17/2023)   Next INR check:  1/15/2024   Target end date:  Indefinite    Indications    DVT (deep venous thrombosis) (HCC) (Resolved) [I82.409]  Chronic anticoagulation [Z79.01]  S/P IVC filter [Z95.828]  History of DVT (deep vein thrombosis) [Z86.718]                 Anticoagulation Episode Summary       INR check location:  Anticoagulation Clinic    Preferred lab:  [facility]    Send INR reminders to:      Comments:  MD INR          Anticoagulation Care Providers       Provider Role Specialty Phone number    Carmelo Pat M.D. Referring [facility] Medicine [phone]    [facility] Anticoagulation Services Responsible  [phone]          Anticoagulation Patient Findings        Patient's preferred phone number:  [phone]        HPI:   The reason for today's call is to prevent morbidity and mortality from a blood clot and/or stroke and to reduce the risk of bleeding while on a anticoagulant.     PCP:  Lio Deluca M.D.  [address] [facility] [address] [address]    Assessment:     INR  therapeutic.     Lab Results   Component Value Date/Time    BUN 6 (L) 03/15/2023 01:23 AM    CREATININE 0.56 03/15/2023 01:23 AM    CREATININE 0.8 2009 08:33 AM    BUNCREATRAT 13.8 2022 05:01 AM     Lab Results   Component Value Date/Time    HEMOGLOBIN 12.5 03/15/2023 01:23 AM    HEMATOCRIT 37.3 03/15/2023 01:23 AM    PLATELETCT 228 03/15/2023 01:23 AM    ALKPHOSPHAT 82 03/15/2023 01:23 AM    ASTSGOT 13 03/15/2023 01:23 AM    ALTSGPT 9 03/15/2023 01:23 AM          Current Outpatient Medications:     gabapentin, 900 mg, Oral, TID    Spiriva Respimat, 5 mcg, Inhalation, DAILY    lisinopril,  2.5 mg, Oral, DAILY    amitriptyline, 25 mg, Oral, Nightly    levothyroxine, 200 mcg, Oral, AM ES    mirtazapine, 15 mg, Oral, QHS    budesonide-formoterol, 2 Puff, Inhalation, BID    albuterol, 2 Puff, Inhalation, Q4HRS PRN    carBAMazepine, 600 mg, Oral, QHS      Plan:     Continue the same warfarin dose, as noted above.       Follow-up:     As seen above      Additional information discussed with patient:     Asked patient to please call the anticoagulation clinic if they have any signs/symptoms of bleeding and/or thrombosis or any changes to diet or medications.      National recommendations regarding anticoagulation therapy:     The CHEST guidelines recommends frequent INR monitoring at regular intervals (a few days up to a max of 12 weeks) to ensure patients are on the proper dose of warfarin, and patients are not having any complications from therapy.  INRs can dramatically change over a short time period due to diet, medications, and medical conditions.         [facility]  Phone: [phone]  Fax: [phone]  On call: [phone]  General scheduling/information [phone]  For emergencies please dial [phone]  Please do not use [employer] for urgent matters, call the phone numbers listed above.    This note was created using voice recognition software (Dragon). The accuracy of the dictation is limited by the abilities of the software. I have reviewed the note prior to signing, however some errors in grammar and context are still possible. If you have any questions related to this note please do not hesitate to contact our office.

## 2024-02-22 NOTE — PROGRESS NOTES
OP Telephone Anticoagulation Service Note    Date: 2019      Anticoagulation Summary  As of 2019    INR goal:   2.0-3.0   TTR:   36.5 % (3.8 y)   INR used for dosin.40! (2019)   Warfarin maintenance plan:   12.5 mg (10 mg x 1 and 5 mg x 0.5) every Tue, Sat; 10 mg (10 mg x 1) all other days   Weekly warfarin total:   75 mg   Plan last modified:   Aramis Kaplan, Pharmacy Intern (6/3/2019)   Next INR check:   2019   Priority:   Routine   Target end date:   Indefinite    Indications    DVT (deep venous thrombosis) (HCC) (Resolved) [I82.409]  Chronic anticoagulation [Z79.01]  S/P IVC filter [Z95.828]  History of DVT (deep vein thrombosis) [Z86.718]             Anticoagulation Episode Summary     INR check location:   [facility]    Preferred lab:   [facility]    Send INR reminders to:       Comments:   MD INR      Anticoagulation Care Providers     Provider Role Specialty Phone number    Carmelo Pat M.D. Referring Family Medicine [phone]    [facility] Anticoagulation Services Responsible  [phone]        Anticoagulation Patient Findings        Plan: Spoke with patient on the phone. Patient is supra therapeutic today. Confirmed dosing. No missed tablets in the last week. Patient denies any changes in medications or diet. Patient denies any signs or symptoms of bleeding or clotting. Instructed patient to call clinic if any unusual bleeding or bruising occurs. Will HOLD x 2 doses then lower weekly regimen. Will follow-up with patient in 1 week(s).              Judith Pina, PharmD          
Their voice message is not set up. We will need to call again later.       
Attending Only

## 2024-02-23 ENCOUNTER — ANTICOAGULATION MONITORING (OUTPATIENT)
Dept: VASCULAR LAB | Facility: MEDICAL CENTER | Age: 74
End: 2024-02-23
Payer: COMMERCIAL

## 2024-02-23 DIAGNOSIS — Z86.718 HISTORY OF DVT (DEEP VEIN THROMBOSIS): Chronic | ICD-10-CM

## 2024-02-23 DIAGNOSIS — Z95.828 S/P IVC FILTER: ICD-10-CM

## 2024-02-23 DIAGNOSIS — Z79.01 CHRONIC ANTICOAGULATION: ICD-10-CM

## 2024-02-23 LAB — INR PPP: 2.6 (ref 2–3.5)

## 2024-02-23 NOTE — PROGRESS NOTES
Anticoagulation Summary  As of 2024      INR goal:  2.0-3.0   TTR:  40.9% (8.4 y)   INR used for dosin.60 (2024)   Warfarin maintenance plan:  10 mg (10 mg x 1) every Tue, Sat; 5 mg (10 mg x 0.5) all other days   Weekly warfarin total:  45 mg   Plan last modified:  Blanca Padilla PharmD (2024)   Next INR check:  3/23/2024   Target end date:  Indefinite    Indications    DVT (deep venous thrombosis) (HCC) (Resolved) [I82.409]  Chronic anticoagulation [Z79.01]  S/P IVC filter [Z95.828]  History of DVT (deep vein thrombosis) [Z86.718]                 Anticoagulation Episode Summary       INR check location:  Anticoagulation Clinic    Preferred lab:  [facility]    Send INR reminders to:      Comments:  MD INR          Anticoagulation Care Providers       Provider Role Specialty Phone number    Carmelo Pat M.D. Referring Family Medicine [phone]    [facility] Anticoagulation Services Responsible  [phone]            Refer to Anticoagulation Patient Findings for HPI  Patient Findings       Negatives:  Signs/symptoms of thrombosis, Signs/symptoms of bleeding, Laboratory test error suspected, Change in health, Change in alcohol use, Change in activity, Upcoming invasive procedure, Emergency department visit, Upcoming dental procedure, Missed doses, Extra doses, Change in medications, Change in diet/appetite, Hospital admission, Bruising, Other complaints            Spoke with patient to report a therapeutic INR.      Pt is NOT on antiplatelet therapy     Pt instructed to continue with current warfarin dosing regimen, confirms dosing.   Patient was taking medication differently than prescribed, updated dosing calendar.     Will follow up in 3 week(s).     Blanca Padilla PharmD

## 2024-03-27 ENCOUNTER — ANTICOAGULATION MONITORING (OUTPATIENT)
Dept: VASCULAR LAB | Facility: MEDICAL CENTER | Age: 74
End: 2024-03-27
Payer: COMMERCIAL

## 2024-03-27 DIAGNOSIS — Z79.01 CHRONIC ANTICOAGULATION: ICD-10-CM

## 2024-03-27 DIAGNOSIS — Z86.718 HISTORY OF DVT (DEEP VEIN THROMBOSIS): Chronic | ICD-10-CM

## 2024-03-27 DIAGNOSIS — Z95.828 S/P IVC FILTER: ICD-10-CM

## 2024-03-27 LAB — INR PPP: 1.8 (ref 2–3.5)

## 2024-04-26 ENCOUNTER — ANTICOAGULATION MONITORING (OUTPATIENT)
Dept: VASCULAR LAB | Facility: MEDICAL CENTER | Age: 74
End: 2024-04-26
Payer: COMMERCIAL

## 2024-04-26 DIAGNOSIS — Z79.01 CHRONIC ANTICOAGULATION: ICD-10-CM

## 2024-04-26 DIAGNOSIS — Z86.718 HISTORY OF DVT (DEEP VEIN THROMBOSIS): Chronic | ICD-10-CM

## 2024-04-26 DIAGNOSIS — Z95.828 S/P IVC FILTER: ICD-10-CM

## 2024-04-26 LAB — INR PPP: 2.6 (ref 2–3.5)

## 2024-04-26 NOTE — PROGRESS NOTES
Anticoagulation Summary  As of 2024      INR goal:  2.0-3.0   TTR:  41.6% (8.6 y)   INR used for dosin.60 (2024)   Warfarin maintenance plan:  10 mg (10 mg x 1) every Tue, Sat; 5 mg (10 mg x 0.5) all other days   Weekly warfarin total:  45 mg   Plan last modified:  Rodriguez DobsonD (2024)   Next INR check:  5/3/2024   Target end date:  Indefinite    Indications    DVT (deep venous thrombosis) (HCC) (Resolved) [I82.409]  Chronic anticoagulation [Z79.01]  S/P IVC filter [Z95.828]  History of DVT (deep vein thrombosis) [Z86.718]                 Anticoagulation Episode Summary       INR check location:  Anticoagulation Clinic    Preferred lab:  [facility]    Send INR reminders to:      Comments:  MD INR          Anticoagulation Care Providers       Provider Role Specialty Phone number    Carmelo Pat M.D. Referring [facility] Medicine [phone]    [facility] Anticoagulation Services Responsible  [phone]          Anticoagulation Patient Findings  Patient Findings       Negatives:  Signs/symptoms of thrombosis, Signs/symptoms of bleeding, Laboratory test error suspected, Change in health, Change in alcohol use, Change in activity, Upcoming invasive procedure, Emergency department visit, Upcoming dental procedure, Missed doses, Extra doses, Change in medications, Change in diet/appetite, Hospital admission, Bruising, Other complaints            Left voicemail message to report a therapeutic INR of 2.6.    Will have pt continue on with current warfarin dosing regimen.   Requested pt contact the clinic for any s/s of unusual bleeding, bruising, clotting or any changes to diet or medication.    FU INR in 1 week(s).    Mauricio Redd, PharmD, BCACP

## 2024-06-04 DIAGNOSIS — Z79.01 CHRONIC ANTICOAGULATION: ICD-10-CM

## 2024-07-23 DIAGNOSIS — Z79.01 CHRONIC ANTICOAGULATION: ICD-10-CM

## (undated) DEVICE — LACTATED RINGERS INJ 1000 ML - (14EA/CA 60CA/PF)

## (undated) DEVICE — GLOVE BIOGEL PI ORTHO SZ 8.5 PF LF (40/BX)

## (undated) DEVICE — DRAPE U ORTHOPEDIC - (10/BX)

## (undated) DEVICE — SUTURE EYE

## (undated) DEVICE — CANNULA SUB-TENONS ANESTH. 1.1X25MM 19GAX1IN (10EA/SP)

## (undated) DEVICE — DRAPE LARGE 3 QUARTER - (20/CA)

## (undated) DEVICE — GLOVE SZ 6.5 BIOGEL PI MICRO - PF LF (50PR/BX)

## (undated) DEVICE — DRAPE 36X28IN RAD CARM BND BG - (25/CA) O

## (undated) DEVICE — STAPLER SKIN DISP - (6/BX 10BX/CA) VISISTAT

## (undated) DEVICE — PROBE 23 GA ILLUM FLEX CURVED - LASER(6/BX)

## (undated) DEVICE — SET LEADWIRE 5 LEAD BEDSIDE DISPOSABLE ECG (1SET OF 5/EA)

## (undated) DEVICE — HEAD HOLDER JUNIOR/ADULT

## (undated) DEVICE — ELECTRODE 850 FOAM ADHESIVE - HYDROGEL RADIOTRNSPRNT (50/PK)

## (undated) DEVICE — BLADE SURGICAL #10 - (50/BX)

## (undated) DEVICE — BANDAGE ELASTIC STERILE MATRIX 6 X 10 (20EA/CA)

## (undated) DEVICE — SODIUM CHL IRRIGATION 0.9% 1000ML (12EA/CA)

## (undated) DEVICE — KIT ANESTHESIA W/CIRCUIT & 3/LT BAG W/FILTER (20EA/CA)

## (undated) DEVICE — SUTURE 2-0 VICRYL PLUS CT-1 - 8 X 18 INCH(12/BX)

## (undated) DEVICE — GLOVE BIOGEL SZ 8.5 SURGICAL PF LTX - (50PR/BX 4BX/CA)

## (undated) DEVICE — CANNULA DIVIDED ADULT CO2 - SAMPLE W/FEMALE CONNCT (25/CA)

## (undated) DEVICE — CANISTER SUCTION RIGID RED 1500CC (40EA/CA)

## (undated) DEVICE — SUTURE 0 VICRYL PLUS CT-1 - 8 X 18 INCH (12/BX)

## (undated) DEVICE — DRAPE C ARMOR (12EA/CA)

## (undated) DEVICE — SUTURE 3-0 ETHILON PS-1 (36PK/BX)

## (undated) DEVICE — BOVIE BLADE COATED - (50/PK)

## (undated) DEVICE — PAD LAP STERILE 18 X 18 - (5/PK 40PK/CA)

## (undated) DEVICE — CANISTER SUCTION 3000ML MECHANICAL FILTER AUTO SHUTOFF MEDI-VAC NONSTERILE LF DISP  (40EA/CA)

## (undated) DEVICE — PADDING CAST 6 IN STERILE - 6 X 4 YDS (24/CA)

## (undated) DEVICE — SYRINGE 30 ML LL (56/BX)

## (undated) DEVICE — GLOVE BIOGEL PI INDICATOR SZ 6.5 SURGICAL PF LF - (50/BX 4BX/CA)

## (undated) DEVICE — CANNULA INJECTION 27G (EYE) - 10/BX ALCON

## (undated) DEVICE — SUCTION INSTRUMENT YANKAUER BULBOUS TIP W/O VENT (50EA/CA)

## (undated) DEVICE — PROTECTOR ULNA NERVE - (36PR/CA)

## (undated) DEVICE — BIT DRILL DIA2.6MM SCALED FOR VARIAX 2 WRIST FUSION LOCKING PLATE SYSTEM

## (undated) DEVICE — SET EXTENSION WITH 2 PORTS (48EA/CA) ***PART #2C8610 IS A SUBSTITUTE*****

## (undated) DEVICE — NEPTUNE 4 PORT MANIFOLD - (20/PK)

## (undated) DEVICE — WRAP COBAN SELF-ADHERENT 6 IN X  5YDS STERILE TAN (12/CA)

## (undated) DEVICE — GOWN WARMING STANDARD FLEX - (30/CA)

## (undated) DEVICE — MASK ANESTHESIA ADULT  - (100/CA)

## (undated) DEVICE — TOWEL STOP TIMEOUT SAFETY FLAG (40EA/CA)

## (undated) DEVICE — SENSOR SPO2 NEO LNCS ADHESIVE (20/BX) SEE USER NOTES

## (undated) DEVICE — SUTURE GENERAL

## (undated) DEVICE — PACK LOWER EXTREMITY - (2/CA)

## (undated) DEVICE — ELECTRODE DUAL RETURN W/ CORD - (50/PK)

## (undated) DEVICE — SLEEVE, VASO, THIGH, MED

## (undated) DEVICE — GLOVE BIOGEL ECLIPSE PF LATEX SIZE 8.5 (50PR/BX)

## (undated) DEVICE — CAUTERY OPTH ENDOTHERMY 25GA - (5/PK)

## (undated) DEVICE — SHIELD OPTH AL GRTR CVR FOX (50EA/BX)

## (undated) DEVICE — WATER IRRIGATION STERILE 1000ML (12EA/CA)

## (undated) DEVICE — GLOVE BIOGEL SZ 7.5 SURGICAL PF LTX - (50PR/BX 4BX/CA)

## (undated) DEVICE — PAD EYE GAUZE COVERED OVAL 1 5/8 X 2 5/8" STERILE"

## (undated) DEVICE — TOWELS CLOTH SURGICAL - (4/PK 20PK/CA)

## (undated) DEVICE — CATHETER IV 20 GA X 1-1/4 ---SURG.& SDS ONLY--- (50EA/BX)

## (undated) DEVICE — SYRINGE SAFETY 10 ML 18 GA X 1 1/2 BLUNT LL (100/BX 4BX/CA)

## (undated) DEVICE — CORDS BIPOLAR COAGULATION - 12FT STERILE DISP. (10EA/BX)

## (undated) DEVICE — NEEDLE FILTER ASPIRATION 18 GA X 1 1/2 IN (100EA/BX)

## (undated) DEVICE — TUBING CLEARLINK DUO-VENT - C-FLO (48EA/CA)

## (undated) DEVICE — KIT  I.V. START (100EA/CA)